# Patient Record
Sex: MALE | Race: WHITE | NOT HISPANIC OR LATINO | URBAN - METROPOLITAN AREA
[De-identification: names, ages, dates, MRNs, and addresses within clinical notes are randomized per-mention and may not be internally consistent; named-entity substitution may affect disease eponyms.]

---

## 2017-12-30 ENCOUNTER — EMERGENCY (EMERGENCY)
Facility: HOSPITAL | Age: 72
LOS: 1 days | Discharge: HOME | End: 2017-12-30
Attending: EMERGENCY MEDICINE | Admitting: EMERGENCY MEDICINE
Payer: MEDICARE

## 2017-12-30 ENCOUNTER — EMERGENCY (EMERGENCY)
Facility: HOSPITAL | Age: 72
LOS: 1 days | Discharge: ANOTHER HOSPITAL | End: 2017-12-30
Attending: EMERGENCY MEDICINE | Admitting: EMERGENCY MEDICINE
Payer: MEDICARE

## 2017-12-30 ENCOUNTER — INPATIENT (INPATIENT)
Facility: HOSPITAL | Age: 72
LOS: 3 days | Discharge: HOME | DRG: 243 | End: 2018-01-03
Attending: HOSPITALIST | Admitting: STUDENT IN AN ORGANIZED HEALTH CARE EDUCATION/TRAINING PROGRAM
Payer: MEDICARE

## 2017-12-30 VITALS
HEIGHT: 68 IN | RESPIRATION RATE: 20 BRPM | HEART RATE: 86 BPM | DIASTOLIC BLOOD PRESSURE: 82 MMHG | WEIGHT: 166.01 LBS | OXYGEN SATURATION: 98 % | SYSTOLIC BLOOD PRESSURE: 154 MMHG | TEMPERATURE: 98 F

## 2017-12-30 VITALS
RESPIRATION RATE: 14 BRPM | OXYGEN SATURATION: 99 % | SYSTOLIC BLOOD PRESSURE: 148 MMHG | HEART RATE: 82 BPM | TEMPERATURE: 97 F | DIASTOLIC BLOOD PRESSURE: 81 MMHG

## 2017-12-30 VITALS
WEIGHT: 265 LBS | HEART RATE: 42 BPM | HEIGHT: 68 IN | RESPIRATION RATE: 22 BRPM | OXYGEN SATURATION: 98 % | SYSTOLIC BLOOD PRESSURE: 182 MMHG | TEMPERATURE: 98 F | DIASTOLIC BLOOD PRESSURE: 90 MMHG

## 2017-12-30 VITALS
HEART RATE: 80 BPM | HEIGHT: 68 IN | RESPIRATION RATE: 20 BRPM | SYSTOLIC BLOOD PRESSURE: 171 MMHG | TEMPERATURE: 98 F | OXYGEN SATURATION: 98 % | DIASTOLIC BLOOD PRESSURE: 89 MMHG | WEIGHT: 268.96 LBS

## 2017-12-30 VITALS
RESPIRATION RATE: 14 BRPM | HEART RATE: 88 BPM | DIASTOLIC BLOOD PRESSURE: 59 MMHG | OXYGEN SATURATION: 99 % | TEMPERATURE: 98 F | SYSTOLIC BLOOD PRESSURE: 141 MMHG

## 2017-12-30 DIAGNOSIS — I10 ESSENTIAL (PRIMARY) HYPERTENSION: ICD-10-CM

## 2017-12-30 DIAGNOSIS — I25.10 ATHEROSCLEROTIC HEART DISEASE OF NATIVE CORONARY ARTERY WITHOUT ANGINA PECTORIS: ICD-10-CM

## 2017-12-30 DIAGNOSIS — Z95.0 PRESENCE OF CARDIAC PACEMAKER: Chronic | ICD-10-CM

## 2017-12-30 DIAGNOSIS — E78.5 HYPERLIPIDEMIA, UNSPECIFIED: ICD-10-CM

## 2017-12-30 DIAGNOSIS — R55 SYNCOPE AND COLLAPSE: ICD-10-CM

## 2017-12-30 DIAGNOSIS — Z95.3 PRESENCE OF XENOGENIC HEART VALVE: Chronic | ICD-10-CM

## 2017-12-30 DIAGNOSIS — T82.118A BREAKDOWN (MECHANICAL) OF OTHER CARDIAC ELECTRONIC DEVICE, INITIAL ENCOUNTER: ICD-10-CM

## 2017-12-30 DIAGNOSIS — I49.9 CARDIAC ARRHYTHMIA, UNSPECIFIED: ICD-10-CM

## 2017-12-30 DIAGNOSIS — I48.91 UNSPECIFIED ATRIAL FIBRILLATION: ICD-10-CM

## 2017-12-30 LAB
ALBUMIN SERPL ELPH-MCNC: 3.7 G/DL — SIGNIFICANT CHANGE UP (ref 3.3–5)
ALBUMIN SERPL ELPH-MCNC: 4.1 G/DL — SIGNIFICANT CHANGE UP (ref 3.3–5)
ALBUMIN SERPL ELPH-MCNC: 4.1 G/DL — SIGNIFICANT CHANGE UP (ref 3.3–5)
ALP SERPL-CCNC: 58 U/L — SIGNIFICANT CHANGE UP (ref 40–120)
ALP SERPL-CCNC: 60 U/L — SIGNIFICANT CHANGE UP (ref 30–120)
ALP SERPL-CCNC: 69 U/L — SIGNIFICANT CHANGE UP (ref 30–120)
ALT FLD-CCNC: 25 U/L RC — SIGNIFICANT CHANGE UP (ref 10–45)
ALT FLD-CCNC: 32 U/L DA — SIGNIFICANT CHANGE UP (ref 10–60)
ALT FLD-CCNC: 36 U/L DA — SIGNIFICANT CHANGE UP (ref 10–60)
ANION GAP SERPL CALC-SCNC: 10 MMOL/L — SIGNIFICANT CHANGE UP (ref 5–17)
ANION GAP SERPL CALC-SCNC: 11 MMOL/L — SIGNIFICANT CHANGE UP (ref 5–17)
ANION GAP SERPL CALC-SCNC: 16 MMOL/L — SIGNIFICANT CHANGE UP (ref 5–17)
ANISOCYTOSIS BLD QL: SLIGHT — SIGNIFICANT CHANGE UP
APTT BLD: 27.9 SEC — SIGNIFICANT CHANGE UP (ref 27.5–37.4)
APTT BLD: 28.8 SEC — SIGNIFICANT CHANGE UP (ref 27.5–37.4)
APTT BLD: 29.9 SEC — SIGNIFICANT CHANGE UP (ref 27.5–37.4)
AST SERPL-CCNC: 30 U/L — SIGNIFICANT CHANGE UP (ref 10–40)
AST SERPL-CCNC: 31 U/L — SIGNIFICANT CHANGE UP (ref 10–40)
AST SERPL-CCNC: 48 U/L — HIGH (ref 10–40)
BASOPHILS # BLD AUTO: 0.1 K/UL — SIGNIFICANT CHANGE UP (ref 0–0.2)
BASOPHILS NFR BLD AUTO: 0.6 % — SIGNIFICANT CHANGE UP (ref 0–2)
BASOPHILS NFR BLD AUTO: 0.9 % — SIGNIFICANT CHANGE UP (ref 0–2)
BASOPHILS NFR BLD AUTO: 1.6 % — SIGNIFICANT CHANGE UP (ref 0–2)
BILIRUB SERPL-MCNC: 0.6 MG/DL — SIGNIFICANT CHANGE UP (ref 0.2–1.2)
BILIRUB SERPL-MCNC: 0.7 MG/DL — SIGNIFICANT CHANGE UP (ref 0.2–1.2)
BILIRUB SERPL-MCNC: 0.8 MG/DL — SIGNIFICANT CHANGE UP (ref 0.2–1.2)
BLD GP AB SCN SERPL QL: NEGATIVE — SIGNIFICANT CHANGE UP
BUN SERPL-MCNC: 13 MG/DL — SIGNIFICANT CHANGE UP (ref 7–23)
BUN SERPL-MCNC: 14 MG/DL — SIGNIFICANT CHANGE UP (ref 7–23)
BUN SERPL-MCNC: 15 MG/DL — SIGNIFICANT CHANGE UP (ref 7–23)
CALCIUM SERPL-MCNC: 8.7 MG/DL — SIGNIFICANT CHANGE UP (ref 8.4–10.5)
CALCIUM SERPL-MCNC: 9.1 MG/DL — SIGNIFICANT CHANGE UP (ref 8.4–10.5)
CALCIUM SERPL-MCNC: 9.6 MG/DL — SIGNIFICANT CHANGE UP (ref 8.4–10.5)
CHLORIDE SERPL-SCNC: 105 MMOL/L — SIGNIFICANT CHANGE UP (ref 96–108)
CHLORIDE SERPL-SCNC: 105 MMOL/L — SIGNIFICANT CHANGE UP (ref 96–108)
CHLORIDE SERPL-SCNC: 106 MMOL/L — SIGNIFICANT CHANGE UP (ref 96–108)
CK MB BLD-MCNC: 4.6 % — HIGH (ref 0–3.5)
CK MB CFR SERPL CALC: 5.2 NG/ML — HIGH (ref 0–3.6)
CK MB CFR SERPL CALC: 7.1 NG/ML — HIGH (ref 0–6.7)
CK SERPL-CCNC: 156 U/L — SIGNIFICANT CHANGE UP (ref 30–200)
CO2 SERPL-SCNC: 21 MMOL/L — LOW (ref 22–31)
CO2 SERPL-SCNC: 22 MMOL/L — SIGNIFICANT CHANGE UP (ref 22–31)
CO2 SERPL-SCNC: 23 MMOL/L — SIGNIFICANT CHANGE UP (ref 22–31)
CREAT SERPL-MCNC: 1.03 MG/DL — SIGNIFICANT CHANGE UP (ref 0.5–1.3)
CREAT SERPL-MCNC: 1.15 MG/DL — SIGNIFICANT CHANGE UP (ref 0.5–1.3)
CREAT SERPL-MCNC: 1.2 MG/DL — SIGNIFICANT CHANGE UP (ref 0.5–1.3)
EOSINOPHIL # BLD AUTO: 0.1 K/UL — SIGNIFICANT CHANGE UP (ref 0–0.5)
EOSINOPHIL # BLD AUTO: 0.1 K/UL — SIGNIFICANT CHANGE UP (ref 0–0.5)
EOSINOPHIL # BLD AUTO: 0.2 K/UL — SIGNIFICANT CHANGE UP (ref 0–0.5)
EOSINOPHIL NFR BLD AUTO: 0.9 % — SIGNIFICANT CHANGE UP (ref 0–6)
EOSINOPHIL NFR BLD AUTO: 1.4 % — SIGNIFICANT CHANGE UP (ref 0–6)
EOSINOPHIL NFR BLD AUTO: 3.2 % — SIGNIFICANT CHANGE UP (ref 0–6)
GLUCOSE SERPL-MCNC: 109 MG/DL — HIGH (ref 70–99)
GLUCOSE SERPL-MCNC: 120 MG/DL — HIGH (ref 70–99)
GLUCOSE SERPL-MCNC: 144 MG/DL — HIGH (ref 70–99)
HBA1C BLD-MCNC: 5.5 % — SIGNIFICANT CHANGE UP (ref 4–5.6)
HCT VFR BLD CALC: 38.3 % — LOW (ref 39–50)
HCT VFR BLD CALC: 41 % — SIGNIFICANT CHANGE UP (ref 39–50)
HCT VFR BLD CALC: 43.6 % — SIGNIFICANT CHANGE UP (ref 39–50)
HGB BLD-MCNC: 13 G/DL — SIGNIFICANT CHANGE UP (ref 13–17)
HGB BLD-MCNC: 13.4 G/DL — SIGNIFICANT CHANGE UP (ref 13–17)
HGB BLD-MCNC: 13.8 G/DL — SIGNIFICANT CHANGE UP (ref 13–17)
INR BLD: 1.06 RATIO — SIGNIFICANT CHANGE UP (ref 0.88–1.16)
INR BLD: 1.09 RATIO — SIGNIFICANT CHANGE UP (ref 0.88–1.16)
INR BLD: 1.12 RATIO — SIGNIFICANT CHANGE UP (ref 0.88–1.16)
LYMPHOCYTES # BLD AUTO: 1.7 K/UL — SIGNIFICANT CHANGE UP (ref 1–3.3)
LYMPHOCYTES # BLD AUTO: 2.3 K/UL — SIGNIFICANT CHANGE UP (ref 1–3.3)
LYMPHOCYTES # BLD AUTO: 20.9 % — SIGNIFICANT CHANGE UP (ref 13–44)
LYMPHOCYTES # BLD AUTO: 3.4 K/UL — HIGH (ref 1–3.3)
LYMPHOCYTES # BLD AUTO: 33.1 % — SIGNIFICANT CHANGE UP (ref 13–44)
LYMPHOCYTES # BLD AUTO: 38 % — SIGNIFICANT CHANGE UP (ref 13–44)
MAGNESIUM SERPL-MCNC: 1.8 MG/DL — SIGNIFICANT CHANGE UP (ref 1.6–2.6)
MANUAL SMEAR VERIFICATION: SIGNIFICANT CHANGE UP
MCHC RBC-ENTMCNC: 22.4 PG — LOW (ref 27–34)
MCHC RBC-ENTMCNC: 22.4 PG — LOW (ref 27–34)
MCHC RBC-ENTMCNC: 24.9 PG — LOW (ref 27–34)
MCHC RBC-ENTMCNC: 31.7 GM/DL — LOW (ref 32–36)
MCHC RBC-ENTMCNC: 31.7 GM/DL — LOW (ref 32–36)
MCHC RBC-ENTMCNC: 34.9 GM/DL — SIGNIFICANT CHANGE UP (ref 32–36)
MCV RBC AUTO: 70.5 FL — LOW (ref 80–100)
MCV RBC AUTO: 70.6 FL — LOW (ref 80–100)
MCV RBC AUTO: 71.2 FL — LOW (ref 80–100)
MICROCYTES BLD QL: SLIGHT — SIGNIFICANT CHANGE UP
MONOCYTES # BLD AUTO: 0.5 K/UL — SIGNIFICANT CHANGE UP (ref 0–0.9)
MONOCYTES # BLD AUTO: 0.6 K/UL — SIGNIFICANT CHANGE UP (ref 0–0.9)
MONOCYTES # BLD AUTO: 0.7 K/UL — SIGNIFICANT CHANGE UP (ref 0–0.9)
MONOCYTES NFR BLD AUTO: 7.2 % — SIGNIFICANT CHANGE UP (ref 2–14)
MONOCYTES NFR BLD AUTO: 7.8 % — SIGNIFICANT CHANGE UP (ref 2–14)
MONOCYTES NFR BLD AUTO: 7.8 % — SIGNIFICANT CHANGE UP (ref 2–14)
NEUTROPHILS # BLD AUTO: 2.9 K/UL — SIGNIFICANT CHANGE UP (ref 1.8–7.4)
NEUTROPHILS # BLD AUTO: 5.8 K/UL — SIGNIFICANT CHANGE UP (ref 1.8–7.4)
NEUTROPHILS # BLD AUTO: 5.9 K/UL — SIGNIFICANT CHANGE UP (ref 1.8–7.4)
NEUTROPHILS NFR BLD AUTO: 49.5 % — SIGNIFICANT CHANGE UP (ref 43–77)
NEUTROPHILS NFR BLD AUTO: 57.4 % — SIGNIFICANT CHANGE UP (ref 43–77)
NEUTROPHILS NFR BLD AUTO: 69.8 % — SIGNIFICANT CHANGE UP (ref 43–77)
NT-PROBNP SERPL-SCNC: 328 PG/ML — HIGH (ref 0–125)
OVALOCYTES BLD QL SMEAR: SLIGHT — SIGNIFICANT CHANGE UP
PHOSPHATE SERPL-MCNC: 2.2 MG/DL — LOW (ref 2.5–4.5)
PLAT MORPH BLD: NORMAL — SIGNIFICANT CHANGE UP
PLAT MORPH BLD: NORMAL — SIGNIFICANT CHANGE UP
PLATELET # BLD AUTO: 105 K/UL — LOW (ref 150–400)
PLATELET # BLD AUTO: 115 K/UL — LOW (ref 150–400)
PLATELET # BLD AUTO: 133 K/UL — LOW (ref 150–400)
POTASSIUM SERPL-MCNC: 3.6 MMOL/L — SIGNIFICANT CHANGE UP (ref 3.5–5.3)
POTASSIUM SERPL-MCNC: 4.4 MMOL/L — SIGNIFICANT CHANGE UP (ref 3.5–5.3)
POTASSIUM SERPL-MCNC: 4.8 MMOL/L — SIGNIFICANT CHANGE UP (ref 3.5–5.3)
POTASSIUM SERPL-SCNC: 3.6 MMOL/L — SIGNIFICANT CHANGE UP (ref 3.5–5.3)
POTASSIUM SERPL-SCNC: 4.4 MMOL/L — SIGNIFICANT CHANGE UP (ref 3.5–5.3)
POTASSIUM SERPL-SCNC: 4.8 MMOL/L — SIGNIFICANT CHANGE UP (ref 3.5–5.3)
PROT SERPL-MCNC: 7.8 G/DL — SIGNIFICANT CHANGE UP (ref 6–8.3)
PROT SERPL-MCNC: 7.9 G/DL — SIGNIFICANT CHANGE UP (ref 6–8.3)
PROT SERPL-MCNC: 8.4 G/DL — HIGH (ref 6–8.3)
PROTHROM AB SERPL-ACNC: 11.6 SEC — SIGNIFICANT CHANGE UP (ref 9.8–12.7)
PROTHROM AB SERPL-ACNC: 11.9 SEC — SIGNIFICANT CHANGE UP (ref 9.8–12.7)
PROTHROM AB SERPL-ACNC: 12.2 SEC — SIGNIFICANT CHANGE UP (ref 9.8–12.7)
RBC # BLD: 5.38 M/UL — SIGNIFICANT CHANGE UP (ref 4.2–5.8)
RBC # BLD: 5.81 M/UL — HIGH (ref 4.2–5.8)
RBC # BLD: 6.18 M/UL — HIGH (ref 4.2–5.8)
RBC # FLD: 13.7 % — SIGNIFICANT CHANGE UP (ref 10.3–14.5)
RBC # FLD: 13.8 % — SIGNIFICANT CHANGE UP (ref 10.3–14.5)
RBC # FLD: 14 % — SIGNIFICANT CHANGE UP (ref 10.3–14.5)
RBC BLD AUTO: ABNORMAL
RBC BLD AUTO: SIGNIFICANT CHANGE UP
RH IG SCN BLD-IMP: POSITIVE — SIGNIFICANT CHANGE UP
SODIUM SERPL-SCNC: 137 MMOL/L — SIGNIFICANT CHANGE UP (ref 135–145)
SODIUM SERPL-SCNC: 139 MMOL/L — SIGNIFICANT CHANGE UP (ref 135–145)
SODIUM SERPL-SCNC: 143 MMOL/L — SIGNIFICANT CHANGE UP (ref 135–145)
TROPONIN I SERPL-MCNC: 0.03 NG/ML — SIGNIFICANT CHANGE UP (ref 0.02–0.06)
TROPONIN T SERPL-MCNC: <0.01 NG/ML — SIGNIFICANT CHANGE UP (ref 0–0.06)
TSH SERPL-MCNC: 2.7 UIU/ML — SIGNIFICANT CHANGE UP (ref 0.27–4.2)
WBC # BLD: 10.2 K/UL — SIGNIFICANT CHANGE UP (ref 3.8–10.5)
WBC # BLD: 5.9 K/UL — SIGNIFICANT CHANGE UP (ref 3.8–10.5)
WBC # BLD: 8.3 K/UL — SIGNIFICANT CHANGE UP (ref 3.8–10.5)
WBC # FLD AUTO: 10.2 K/UL — SIGNIFICANT CHANGE UP (ref 3.8–10.5)
WBC # FLD AUTO: 5.9 K/UL — SIGNIFICANT CHANGE UP (ref 3.8–10.5)
WBC # FLD AUTO: 8.3 K/UL — SIGNIFICANT CHANGE UP (ref 3.8–10.5)

## 2017-12-30 PROCEDURE — 93010 ELECTROCARDIOGRAM REPORT: CPT

## 2017-12-30 PROCEDURE — 71045 X-RAY EXAM CHEST 1 VIEW: CPT

## 2017-12-30 PROCEDURE — 71010: CPT | Mod: 26

## 2017-12-30 PROCEDURE — 70486 CT MAXILLOFACIAL W/O DYE: CPT

## 2017-12-30 PROCEDURE — 82553 CREATINE MB FRACTION: CPT

## 2017-12-30 PROCEDURE — 72125 CT NECK SPINE W/O DYE: CPT

## 2017-12-30 PROCEDURE — 80053 COMPREHEN METABOLIC PANEL: CPT

## 2017-12-30 PROCEDURE — 99291 CRITICAL CARE FIRST HOUR: CPT

## 2017-12-30 PROCEDURE — 83880 ASSAY OF NATRIURETIC PEPTIDE: CPT

## 2017-12-30 PROCEDURE — 85027 COMPLETE CBC AUTOMATED: CPT

## 2017-12-30 PROCEDURE — 85730 THROMBOPLASTIN TIME PARTIAL: CPT

## 2017-12-30 PROCEDURE — 36415 COLL VENOUS BLD VENIPUNCTURE: CPT

## 2017-12-30 PROCEDURE — 71010: CPT | Mod: 77,26

## 2017-12-30 PROCEDURE — 70450 CT HEAD/BRAIN W/O DYE: CPT

## 2017-12-30 PROCEDURE — 70450 CT HEAD/BRAIN W/O DYE: CPT | Mod: 26

## 2017-12-30 PROCEDURE — 72125 CT NECK SPINE W/O DYE: CPT | Mod: 26

## 2017-12-30 PROCEDURE — 85610 PROTHROMBIN TIME: CPT

## 2017-12-30 PROCEDURE — 84484 ASSAY OF TROPONIN QUANT: CPT

## 2017-12-30 PROCEDURE — 99284 EMERGENCY DEPT VISIT MOD MDM: CPT | Mod: 25

## 2017-12-30 PROCEDURE — 70486 CT MAXILLOFACIAL W/O DYE: CPT | Mod: 26

## 2017-12-30 PROCEDURE — 99284 EMERGENCY DEPT VISIT MOD MDM: CPT

## 2017-12-30 RX ORDER — METOPROLOL TARTRATE 50 MG
50 TABLET ORAL
Qty: 0 | Refills: 0 | Status: DISCONTINUED | OUTPATIENT
Start: 2017-12-30 | End: 2017-12-30

## 2017-12-30 RX ORDER — THIAMINE MONONITRATE (VIT B1) 100 MG
100 TABLET ORAL DAILY
Qty: 0 | Refills: 0 | Status: DISCONTINUED | OUTPATIENT
Start: 2017-12-30 | End: 2018-01-03

## 2017-12-30 RX ORDER — ASPIRIN/CALCIUM CARB/MAGNESIUM 324 MG
81 TABLET ORAL DAILY
Qty: 0 | Refills: 0 | Status: DISCONTINUED | OUTPATIENT
Start: 2017-12-30 | End: 2018-01-03

## 2017-12-30 RX ORDER — ATORVASTATIN CALCIUM 80 MG/1
40 TABLET, FILM COATED ORAL AT BEDTIME
Qty: 0 | Refills: 0 | Status: DISCONTINUED | OUTPATIENT
Start: 2017-12-30 | End: 2018-01-03

## 2017-12-30 RX ORDER — SPIRONOLACTONE 25 MG/1
12.5 TABLET, FILM COATED ORAL DAILY
Qty: 0 | Refills: 0 | Status: DISCONTINUED | OUTPATIENT
Start: 2017-12-30 | End: 2018-01-03

## 2017-12-30 RX ORDER — SPIRONOLACTONE 25 MG/1
12.5 TABLET, FILM COATED ORAL DAILY
Qty: 0 | Refills: 0 | Status: DISCONTINUED | OUTPATIENT
Start: 2017-12-30 | End: 2017-12-30

## 2017-12-30 RX ORDER — DOCUSATE SODIUM 100 MG
100 CAPSULE ORAL THREE TIMES A DAY
Qty: 0 | Refills: 0 | Status: DISCONTINUED | OUTPATIENT
Start: 2017-12-30 | End: 2018-01-03

## 2017-12-30 RX ORDER — SODIUM CHLORIDE 9 MG/ML
3 INJECTION INTRAMUSCULAR; INTRAVENOUS; SUBCUTANEOUS ONCE
Qty: 0 | Refills: 0 | Status: DISCONTINUED | OUTPATIENT
Start: 2017-12-30 | End: 2018-01-03

## 2017-12-30 RX ORDER — FOLIC ACID 0.8 MG
1 TABLET ORAL DAILY
Qty: 0 | Refills: 0 | Status: DISCONTINUED | OUTPATIENT
Start: 2017-12-30 | End: 2018-01-03
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
## 2017-12-30 NOTE — ED PROVIDER NOTE - MEDICAL DECISION MAKING DETAILS
evaluate the etiology of frequent episode of syncope near syncope corollate with history and lab test

## 2017-12-30 NOTE — CONSULT NOTE ADULT - SUBJECTIVE AND OBJECTIVE BOX
History of Present Illness: The patient is a 72 year old male with a history of HTN, AVR, dual chamber PPM who presents with syncope. He fell and hit his head three days ago. CT head and neck were negative for acute pathology. While in bathroom here, had an episode of syncope. ECG done revealing paroxysmal CHB. He had another episode of syncope about one week ago. He has also been feeling intermittent dizziness over the past few weeks. His device wirelessly send interrogation info, no known issues with pacemaker. No chest pain, shortness of breath, LE edema.    Past Medical/Surgical History:  HTN, AVR, dual chamber PPM     Medications:  · 	Lopressor 50 mg oral tablet: 1 tab(s) orally 2 times a day  · 	Lipitor 10 mg oral tablet: 1 tab(s) orally once a day  · 	Protonix 40 mg oral delayed release tablet: 1 tab(s) orally once a day  · 	folic acid 1 mg oral tablet: 1 tab(s) orally once a day  · 	aspirin 81 mg oral delayed release tablet: 1 tab(s) orally once a day  · 	ZyrTEC 10 mg oral tablet: 1 tab(s) orally once a day  · 	Colace 100 mg oral capsule: orally 3 times a day  · 	Aldactone: 12.5 milligram(s) orally once a day  · 	Tofranil 25 mg oral tablet: orally once a day   · 	Vitamin B1 100 mg oral tablet: 1 tab(s) orally once a day    Family History: Non-contributory family history of premature cardiovascular atherosclerotic disease    Social History: No tobacco, alcohol or drug use    Review of Systems:  General: No fevers, chills, weight loss or gain  Skin: No rashes, color changes  Cardiovascular: No chest pain, orthopnea  Respiratory: No shortness of breath, cough  Gastrointestinal: No nausea, abdominal pain  Genitourinary: No incontinence, pain with urination  Musculoskeletal: No pain, swelling, decreased range of motion  Neurological: No headache, weakness  Psychiatric: No depression, anxiety  Endocrine: No weight loss or gain, increased thirst  All other systems are comprehensively negative.    Physical Exam:  Vitals:        Vital Signs Last 24 Hrs  T(C): 36.6 (30 Dec 2017 11:46), Max: 36.6 (30 Dec 2017 11:46)  T(F): 97.9 (30 Dec 2017 11:46), Max: 97.9 (30 Dec 2017 11:46)  HR: 75 (30 Dec 2017 12:01) (42 - 86)  BP: 162/90 (30 Dec 2017 12:01) (148/81 - 184/90)  BP(mean): --  RR: 14 (30 Dec 2017 12:01) (14 - 22)  SpO2: 99% (30 Dec 2017 12:01) (97% - 99%)  General: NAD  HEENT: MMM  Neck: No JVD, no carotid bruit  Lungs: CTAB  CV: RRR, nl S1/S2, 1/6 systolic murmur  Abdomen: S/NT/ND, +BS  Extremities: No LE edema, no cyanosis  Neuro: AAOx3, non-focal  Skin: No rash    Labs:                        13.8   10.2  )-----------( 133      ( 30 Dec 2017 12:18 )             43.6     12-30    139  |  106  |  14  ----------------------------<  120<H>  3.6   |  23  |  1.20    Ca    9.1      30 Dec 2017 12:18    TPro  8.4<H>  /  Alb  4.1  /  TBili  0.6  /  DBili  x   /  AST  31  /  ALT  36  /  AlkPhos  69  12-30    CARDIAC MARKERS ( 30 Dec 2017 12:18 )  .025 ng/mL / x     / x     / x     / 5.2 ng/mL      PT/INR - ( 30 Dec 2017 12:18 )   PT: 11.9 sec;   INR: 1.09 ratio         PTT - ( 30 Dec 2017 12:18 )  PTT:28.8 sec    ECG:   #1: NSR, LAFB, RBBB  #2: NSR, CHB, ventricular escape
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
## 2017-12-30 NOTE — ED PROVIDER NOTE - CRITICAL CARE PROVIDED
interpretation of diagnostic studies/consultation with other physicians/documentation/direct patient care (not related to procedure)/additional history taking/consult w/ pt's family directly relating to pts condition

## 2017-12-30 NOTE — ED PROVIDER NOTE - OBJECTIVE STATEMENT
73 y/o M seen here today for fall 3 days ago. Pt fell and hit head his head and was c/o bruising to the face. CT was performed today, negative. Pt was discharged but as per wife, pt passed out coming out of the bathroom in [facility]. Pt had an open heart surgery 2 years ago, with valve repair and pacemaker placement. Pt takes baby aspirin. 73 y/o M seen here today for fall 3 days ago. Pt fell and hit head his head and was c/o bruising to the face. CT was performed today, negative. Pt was discharged but as per wife, pt passed out coming out of the bathroom in [facility]. Pt had an open heart surgery 2 years ago, with aortic valve repair and pacemaker placement. Pt takes baby aspirin.

## 2017-12-30 NOTE — CHART NOTE - NSCHARTNOTEFT_GEN_A_CORE
====================  CCU MIDNIGHT ROUNDS  ====================    MARIA G MCFADDEN  [id number]  Patient is a 72y old  Male who presents with a chief complaint of     ====================  SUMMARY:  ====================  72M w/ hx of Ao Valve bovine replacement c/b heart block requiring [facility] PPM (2015), CAD s/p FELIPA to LAD(2009), HTN, & HLD initially presented w/ syncope to osh and found to have comple av dissociation w/ RV lead fx & transferred to [facility] CCU for management and TVP (placed 12/30)      ====================  NEW EVENTS:  ====================    TVP placed by fellow tonight    ====================  VITALS (Last 12 hrs):  ====================    T(C): 36.7 (12-30-17 @ 19:00), Max: 36.7 (12-30-17 @ 17:15)  T(F): 98 (12-30-17 @ 19:00), Max: 98.1 (12-30-17 @ 17:15)  HR: 70 (12-30-17 @ 20:00) (42 - 88)  BP: 147/78 (12-30-17 @ 20:00) (141/59 - 184/90)  BP(mean): 94 (12-30-17 @ 20:00) (94 - 132)  ABP: --  ABP(mean): --  RR: 20 (12-30-17 @ 20:00) (14 - 24)  SpO2: 100% (12-30-17 @ 20:00) (97% - 100%)  Wt(kg): --  CVP(mm Hg): --  CVP(cm H2O): --  CO: --  CI: --  PA: --  PA(mean): --  PCWP: --  SVR: --  PVR: --    I&O's Summary      ====================  NEW LABS:  ====================                          13.4   8.3   )-----------( 115      ( 30 Dec 2017 18:16 )             38.3     12-30    143  |  105  |  13  ----------------------------<  109<H>  4.4   |  22  |  1.03    Ca    9.6      30 Dec 2017 18:17  Phos  2.2     12-30  Mg     1.8     12-30    TPro  7.8  /  Alb  4.1  /  TBili  0.8  /  DBili  x   /  AST  30  /  ALT  25  /  AlkPhos  58  12-30    PT/INR - ( 30 Dec 2017 18:16 )   PT: 12.2 sec;   INR: 1.12 ratio         PTT - ( 30 Dec 2017 18:16 )  PTT:29.9 sec  Troponin T, Serum: <0.01 ng/mL (12-30-17 @ 18:17)  Creatine Kinase, Serum: 156 U/L (12-30-17 @ 18:17)    CKMB Units: 7.1 ng/mL (12-30 @ 18:17)  CKMB Units: 5.2 ng/mL (12-30 @ 12:18)      ====================  PLAN:  ====================  # Complete AV dissociation 2/2 Ao valve w/ PPM RV lead fx  - TVP placed tonight by fellow. Per fellow d/w EP attending and TVP set to rate of 60 BPM set to rate of 40BPM  - hold antinodal agents, hold imipramine held.  -CXR ordered to check TVP placement  - will need AM TTE to evaluate EF  #CAD s/p stent to LAD 2009  - c/w asa/atorvastatin  #HTN  - c/w spironolactone--unclear if has HF history. will need TTE in am          Danielito Poole M.D. PGY-2  CCU Resident [id number]

## 2017-12-30 NOTE — H&P ADULT - PROBLEM SELECTOR PLAN 1
Interrogated [facility] PPM at OSH found to be in failure, high suspicion cause of syncope/collapse and nhung cardia.   - Transvenous pacemaker will be placed at bedside.   -EP consulted and aware, eventual replacement of lead/or PPM  -Monitor on tele   -check CMP, CBC, Mg, Phos, Cardiac enzymes, TSH for alternative causes of syncope  -Will obtain CT head as pt sustained second collapse once TVP in place. Interrogated [facility] PPM at OSH found to be in failure, high suspicion cause of syncope/collapse and nhung cardia.   - Transvenous pacemaker will be placed at bedside.   -EP consulted and aware, eventual replacement of lead/or PPM  -obtain EKG now and daily  -Monitor on tele   -check CMP, CBC, Mg, Phos, Cardiac enzymes, TSH for alternative causes of syncope  -Will obtain CT head as pt sustained second collapse once TVP in place.

## 2017-12-30 NOTE — H&P ADULT - PROBLEM SELECTOR PLAN 2
Interrogated [facility] PPM at OSH found to be in failure, high suspicion cause of syncope/collapse and nhung cardia.   - Transvenous pacemaker will be placed at bedside.  - EP to evaluate for eventual replacement/repair Interrogated [facility] PPM at OSH found to be in failure, high suspicion cause of syncope/collapse and bradycardia.   - Transvenous pacemaker will be placed at bedside.  - EP to evaluate for eventual replacement/repair
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
## 2017-12-30 NOTE — H&P ADULT - PROBLEM SELECTOR PLAN 3
CKMB 5.2 pro- Troponin I 328  -Repeat cardiac enzymes  -c/w lopressor 50mG BID  -c/w ASA 81mG daily  -Start Atorvastatin 40mG daily

## 2017-12-30 NOTE — CONSULT NOTE ADULT - ASSESSMENT
The patient is a 72 year old male with a history of HTN, AVR, dual chamber PPM who presents with syncope in the setting of pacemaker malfunction.    Plan:  - CXR done as portable, no obvious abnormalities seen on that  - [facility] rep called but interrogation not done as of yet  - Transcutaneous pacer capturing, set as back-up at 56 bpm  - Currently in NSR with normal AV conduction  - Plan for transfer to [facility] for further work-up and possible lead reposition vs. pacemaker change The patient is a 72 year old male with a history of HTN, AVR, dual chamber PPM who presents with syncope in the setting of pacemaker malfunction.    Plan:  - CXR done as portable, no obvious abnormalities seen on that  - [facility] rep called but interrogation not done as of yet  - Transcutaneous pacer capturing, set as back-up at 56 bpm  - Currently in NSR with normal AV conduction  - Plan for transfer to [facility] for further work-up and possible lead reposition vs. lead change    ADDENDUM:  Device interrogated, appeared to be oversensing, settings changed. Threshold significantly elevated, suspicious for lead fracture. The patient is a 72 year old male with a history of HTN, AVR, dual chamber PPM who presents with syncope in the setting of pacemaker malfunction.    Plan:  - CXR done as portable, no obvious abnormalities seen on that  - [facility] rep called but interrogation not done as of yet  - Transcutaneous pacer capturing, set as back-up at 56 bpm  - Currently in NSR with normal AV conduction  - Plan for transfer to [facility] for further work-up and possible lead revision    ADDENDUM:  - Recurrent episode of CHB when patient sat up, short syncope/cardiac arrest with 5 seconds of CPR, HR eventually picked up  - Device interrogated, appeared to be oversensing, settings changed. Threshold significantly elevated, suspicious for lead fracture.

## 2017-12-31 LAB
ALBUMIN SERPL ELPH-MCNC: 3.8 G/DL — SIGNIFICANT CHANGE UP (ref 3.3–5)
ALBUMIN SERPL ELPH-MCNC: 4 G/DL — SIGNIFICANT CHANGE UP (ref 3.3–5)
ALP SERPL-CCNC: 55 U/L — SIGNIFICANT CHANGE UP (ref 40–120)
ALP SERPL-CCNC: 57 U/L — SIGNIFICANT CHANGE UP (ref 40–120)
ALT FLD-CCNC: 22 U/L RC — SIGNIFICANT CHANGE UP (ref 10–45)
ALT FLD-CCNC: 24 U/L RC — SIGNIFICANT CHANGE UP (ref 10–45)
ANION GAP SERPL CALC-SCNC: 15 MMOL/L — SIGNIFICANT CHANGE UP (ref 5–17)
ANION GAP SERPL CALC-SCNC: 17 MMOL/L — SIGNIFICANT CHANGE UP (ref 5–17)
APTT BLD: 28.8 SEC — SIGNIFICANT CHANGE UP (ref 27.5–37.4)
APTT BLD: 29.3 SEC — SIGNIFICANT CHANGE UP (ref 27.5–37.4)
AST SERPL-CCNC: 23 U/L — SIGNIFICANT CHANGE UP (ref 10–40)
AST SERPL-CCNC: 26 U/L — SIGNIFICANT CHANGE UP (ref 10–40)
BASOPHILS # BLD AUTO: 0 K/UL — SIGNIFICANT CHANGE UP (ref 0–0.2)
BASOPHILS # BLD AUTO: 0.1 K/UL — SIGNIFICANT CHANGE UP (ref 0–0.2)
BASOPHILS NFR BLD AUTO: 0.4 % — SIGNIFICANT CHANGE UP (ref 0–2)
BASOPHILS NFR BLD AUTO: 0.9 % — SIGNIFICANT CHANGE UP (ref 0–2)
BILIRUB SERPL-MCNC: 0.9 MG/DL — SIGNIFICANT CHANGE UP (ref 0.2–1.2)
BILIRUB SERPL-MCNC: 1.1 MG/DL — SIGNIFICANT CHANGE UP (ref 0.2–1.2)
BUN SERPL-MCNC: 12 MG/DL — SIGNIFICANT CHANGE UP (ref 7–23)
BUN SERPL-MCNC: 12 MG/DL — SIGNIFICANT CHANGE UP (ref 7–23)
CALCIUM SERPL-MCNC: 9 MG/DL — SIGNIFICANT CHANGE UP (ref 8.4–10.5)
CALCIUM SERPL-MCNC: 9.5 MG/DL — SIGNIFICANT CHANGE UP (ref 8.4–10.5)
CHLORIDE SERPL-SCNC: 103 MMOL/L — SIGNIFICANT CHANGE UP (ref 96–108)
CHLORIDE SERPL-SCNC: 105 MMOL/L — SIGNIFICANT CHANGE UP (ref 96–108)
CHOLEST SERPL-MCNC: 171 MG/DL — SIGNIFICANT CHANGE UP (ref 10–199)
CHOLEST SERPL-MCNC: 185 MG/DL — SIGNIFICANT CHANGE UP (ref 10–199)
CO2 SERPL-SCNC: 21 MMOL/L — LOW (ref 22–31)
CO2 SERPL-SCNC: 22 MMOL/L — SIGNIFICANT CHANGE UP (ref 22–31)
CREAT SERPL-MCNC: 1.03 MG/DL — SIGNIFICANT CHANGE UP (ref 0.5–1.3)
CREAT SERPL-MCNC: 1.05 MG/DL — SIGNIFICANT CHANGE UP (ref 0.5–1.3)
EOSINOPHIL # BLD AUTO: 0.1 K/UL — SIGNIFICANT CHANGE UP (ref 0–0.5)
EOSINOPHIL # BLD AUTO: 0.2 K/UL — SIGNIFICANT CHANGE UP (ref 0–0.5)
EOSINOPHIL NFR BLD AUTO: 1.3 % — SIGNIFICANT CHANGE UP (ref 0–6)
EOSINOPHIL NFR BLD AUTO: 1.8 % — SIGNIFICANT CHANGE UP (ref 0–6)
GLUCOSE SERPL-MCNC: 105 MG/DL — HIGH (ref 70–99)
GLUCOSE SERPL-MCNC: 117 MG/DL — HIGH (ref 70–99)
HBA1C BLD-MCNC: 5.4 % — SIGNIFICANT CHANGE UP (ref 4–5.6)
HCT VFR BLD CALC: 38 % — LOW (ref 39–50)
HCT VFR BLD CALC: 38.2 % — LOW (ref 39–50)
HDLC SERPL-MCNC: 46 MG/DL — SIGNIFICANT CHANGE UP (ref 40–125)
HDLC SERPL-MCNC: 51 MG/DL — SIGNIFICANT CHANGE UP (ref 40–125)
HGB BLD-MCNC: 12.8 G/DL — LOW (ref 13–17)
HGB BLD-MCNC: 12.9 G/DL — LOW (ref 13–17)
INR BLD: 1.16 RATIO — SIGNIFICANT CHANGE UP (ref 0.88–1.16)
INR BLD: 1.21 RATIO — HIGH (ref 0.88–1.16)
LIPID PNL WITH DIRECT LDL SERPL: 107 MG/DL — SIGNIFICANT CHANGE UP
LIPID PNL WITH DIRECT LDL SERPL: 93 MG/DL — SIGNIFICANT CHANGE UP
LYMPHOCYTES # BLD AUTO: 2.6 K/UL — SIGNIFICANT CHANGE UP (ref 1–3.3)
LYMPHOCYTES # BLD AUTO: 2.7 K/UL — SIGNIFICANT CHANGE UP (ref 1–3.3)
LYMPHOCYTES # BLD AUTO: 29.6 % — SIGNIFICANT CHANGE UP (ref 13–44)
LYMPHOCYTES # BLD AUTO: 31.1 % — SIGNIFICANT CHANGE UP (ref 13–44)
MAGNESIUM SERPL-MCNC: 1.8 MG/DL — SIGNIFICANT CHANGE UP (ref 1.6–2.6)
MAGNESIUM SERPL-MCNC: 2.2 MG/DL — SIGNIFICANT CHANGE UP (ref 1.6–2.6)
MCHC RBC-ENTMCNC: 23.7 PG — LOW (ref 27–34)
MCHC RBC-ENTMCNC: 23.8 PG — LOW (ref 27–34)
MCHC RBC-ENTMCNC: 33.5 GM/DL — SIGNIFICANT CHANGE UP (ref 32–36)
MCHC RBC-ENTMCNC: 33.8 GM/DL — SIGNIFICANT CHANGE UP (ref 32–36)
MCV RBC AUTO: 70.6 FL — LOW (ref 80–100)
MCV RBC AUTO: 70.8 FL — LOW (ref 80–100)
MONOCYTES # BLD AUTO: 0.8 K/UL — SIGNIFICANT CHANGE UP (ref 0–0.9)
MONOCYTES # BLD AUTO: 0.8 K/UL — SIGNIFICANT CHANGE UP (ref 0–0.9)
MONOCYTES NFR BLD AUTO: 8.9 % — SIGNIFICANT CHANGE UP (ref 2–14)
MONOCYTES NFR BLD AUTO: 9.8 % — SIGNIFICANT CHANGE UP (ref 2–14)
NEUTROPHILS # BLD AUTO: 5 K/UL — SIGNIFICANT CHANGE UP (ref 1.8–7.4)
NEUTROPHILS # BLD AUTO: 5 K/UL — SIGNIFICANT CHANGE UP (ref 1.8–7.4)
NEUTROPHILS NFR BLD AUTO: 57.5 % — SIGNIFICANT CHANGE UP (ref 43–77)
NEUTROPHILS NFR BLD AUTO: 58.8 % — SIGNIFICANT CHANGE UP (ref 43–77)
PHOSPHATE SERPL-MCNC: 4.8 MG/DL — HIGH (ref 2.5–4.5)
PLATELET # BLD AUTO: 109 K/UL — LOW (ref 150–400)
PLATELET # BLD AUTO: 114 K/UL — LOW (ref 150–400)
POTASSIUM SERPL-MCNC: 3.9 MMOL/L — SIGNIFICANT CHANGE UP (ref 3.5–5.3)
POTASSIUM SERPL-MCNC: 4.1 MMOL/L — SIGNIFICANT CHANGE UP (ref 3.5–5.3)
POTASSIUM SERPL-SCNC: 3.9 MMOL/L — SIGNIFICANT CHANGE UP (ref 3.5–5.3)
POTASSIUM SERPL-SCNC: 4.1 MMOL/L — SIGNIFICANT CHANGE UP (ref 3.5–5.3)
PROT SERPL-MCNC: 7.2 G/DL — SIGNIFICANT CHANGE UP (ref 6–8.3)
PROT SERPL-MCNC: 7.4 G/DL — SIGNIFICANT CHANGE UP (ref 6–8.3)
PROTHROM AB SERPL-ACNC: 12.7 SEC — SIGNIFICANT CHANGE UP (ref 9.8–12.7)
PROTHROM AB SERPL-ACNC: 13.2 SEC — HIGH (ref 9.8–12.7)
RBC # BLD: 5.37 M/UL — SIGNIFICANT CHANGE UP (ref 4.2–5.8)
RBC # BLD: 5.42 M/UL — SIGNIFICANT CHANGE UP (ref 4.2–5.8)
RBC # FLD: 13.7 % — SIGNIFICANT CHANGE UP (ref 10.3–14.5)
RBC # FLD: 13.7 % — SIGNIFICANT CHANGE UP (ref 10.3–14.5)
RH IG SCN BLD-IMP: POSITIVE — SIGNIFICANT CHANGE UP
SODIUM SERPL-SCNC: 140 MMOL/L — SIGNIFICANT CHANGE UP (ref 135–145)
SODIUM SERPL-SCNC: 143 MMOL/L — SIGNIFICANT CHANGE UP (ref 135–145)
TOTAL CHOLESTEROL/HDL RATIO MEASUREMENT: 3.6 RATIO — SIGNIFICANT CHANGE UP (ref 3.4–9.6)
TOTAL CHOLESTEROL/HDL RATIO MEASUREMENT: 3.7 RATIO — SIGNIFICANT CHANGE UP (ref 3.4–9.6)
TRIGL SERPL-MCNC: 135 MG/DL — SIGNIFICANT CHANGE UP (ref 10–149)
TRIGL SERPL-MCNC: 158 MG/DL — HIGH (ref 10–149)
TSH SERPL-MCNC: 3.63 UIU/ML — SIGNIFICANT CHANGE UP (ref 0.27–4.2)
WBC # BLD: 8.6 K/UL — SIGNIFICANT CHANGE UP (ref 3.8–10.5)
WBC # BLD: 8.7 K/UL — SIGNIFICANT CHANGE UP (ref 3.8–10.5)
WBC # FLD AUTO: 8.6 K/UL — SIGNIFICANT CHANGE UP (ref 3.8–10.5)
WBC # FLD AUTO: 8.7 K/UL — SIGNIFICANT CHANGE UP (ref 3.8–10.5)

## 2017-12-31 PROCEDURE — 70450 CT HEAD/BRAIN W/O DYE: CPT | Mod: 26

## 2017-12-31 PROCEDURE — 99291 CRITICAL CARE FIRST HOUR: CPT

## 2017-12-31 PROCEDURE — 99233 SBSQ HOSP IP/OBS HIGH 50: CPT

## 2017-12-31 PROCEDURE — 71010: CPT | Mod: 26

## 2017-12-31 PROCEDURE — 93010 ELECTROCARDIOGRAM REPORT: CPT | Mod: 76

## 2017-12-31 RX ORDER — BACITRACIN ZINC 500 UNIT/G
1 OINTMENT IN PACKET (EA) TOPICAL
Qty: 0 | Refills: 0 | Status: DISCONTINUED | OUTPATIENT
Start: 2017-12-31 | End: 2018-01-03

## 2017-12-31 RX ORDER — POLYETHYLENE GLYCOL 3350 17 G/17G
17 POWDER, FOR SOLUTION ORAL AT BEDTIME
Qty: 0 | Refills: 0 | Status: COMPLETED | OUTPATIENT
Start: 2017-12-31 | End: 2017-12-31

## 2017-12-31 RX ORDER — POTASSIUM CHLORIDE 20 MEQ
20 PACKET (EA) ORAL ONCE
Qty: 0 | Refills: 0 | Status: COMPLETED | OUTPATIENT
Start: 2017-12-31 | End: 2017-12-31

## 2017-12-31 RX ORDER — SENNA PLUS 8.6 MG/1
2 TABLET ORAL AT BEDTIME
Qty: 0 | Refills: 0 | Status: DISCONTINUED | OUTPATIENT
Start: 2017-12-31 | End: 2018-01-03

## 2017-12-31 RX ORDER — MAGNESIUM SULFATE 500 MG/ML
1 VIAL (ML) INJECTION ONCE
Qty: 0 | Refills: 0 | Status: COMPLETED | OUTPATIENT
Start: 2017-12-31 | End: 2017-12-31

## 2017-12-31 RX ADMIN — Medication 100 GRAM(S): at 02:27

## 2017-12-31 RX ADMIN — Medication 100 MILLIGRAM(S): at 05:45

## 2017-12-31 RX ADMIN — Medication 81 MILLIGRAM(S): at 12:13

## 2017-12-31 RX ADMIN — Medication 20 MILLIEQUIVALENT(S): at 06:20

## 2017-12-31 RX ADMIN — ATORVASTATIN CALCIUM 40 MILLIGRAM(S): 80 TABLET, FILM COATED ORAL at 21:43

## 2017-12-31 RX ADMIN — Medication 1 APPLICATION(S): at 16:25

## 2017-12-31 RX ADMIN — Medication 100 MILLIGRAM(S): at 12:14

## 2017-12-31 RX ADMIN — SPIRONOLACTONE 12.5 MILLIGRAM(S): 25 TABLET, FILM COATED ORAL at 05:45

## 2017-12-31 RX ADMIN — Medication 100 MILLIGRAM(S): at 21:43

## 2017-12-31 RX ADMIN — ATORVASTATIN CALCIUM 40 MILLIGRAM(S): 80 TABLET, FILM COATED ORAL at 00:01

## 2017-12-31 RX ADMIN — Medication 100 MILLIGRAM(S): at 00:00

## 2017-12-31 RX ADMIN — Medication 1 MILLIGRAM(S): at 12:14

## 2017-12-31 RX ADMIN — SENNA PLUS 2 TABLET(S): 8.6 TABLET ORAL at 21:43

## 2017-12-31 RX ADMIN — POLYETHYLENE GLYCOL 3350 17 GRAM(S): 17 POWDER, FOR SOLUTION ORAL at 21:43

## 2017-12-31 NOTE — PROGRESS NOTE ADULT - PROBLEM SELECTOR PLAN 1
Interrogated [facility] PPM at OSH found to be in failure, high suspicion cause of syncope/collapse and nhung cardia.   - Transvenous pacemaker will be placed at bedside.   - EP consulted and aware, eventual replacement of lead/or PPM  -reversible causes ie electrolyes, TSH (3.6), Cardiac enzymes,   -obtain CT head today  -obtain EKG now and daily  -Monitor on tele Interrogated [facility] PPM at OSH found to be in failure, high suspicion cause of syncope/collapse and nhung cardia.   - Transvenous pacemaker will be placed at bedside.   - EP consulted and aware, eventual replacement of lead/or PPM  - reversible causes ie electrolyes, TSH (3.6), Cardiac enzymes wnl  - obtain CT head today  -obtain EKG now and daily  -Monitor on tele

## 2017-12-31 NOTE — CONSULT NOTE ADULT - SUBJECTIVE AND OBJECTIVE BOX
Mr. Oden is a 72 Man w/ hx of CAD s/p x2 FELIPA to LAD (), severe AS s/p AVR c/b heart block requiring PPM ([facility], 2015), HLD, HTN, transferred from [facility] for broken PPM lead.  Pt endorsing dizziness for the past few days as well as confusion.  Fell ~ 3 days ago; and presented to OSH ([facility]) for further evaluation.  While at the ED, he syncopized and had CHB.  PPM was interrogated and lead was found to be fractured.  He was transferred to our facility for further evaluation.        PMH:   Depression  DVT (deep venous thrombosis)  HLD (hyperlipidemia)  HTN (hypertension)  Aortic valve replaced  Stented coronary artery  CAD (coronary artery disease)      PSH:   Artificial pacemaker  S/P aortic valve replacement with porcine valve      Medications:   aspirin enteric coated 81 milliGRAM(s) Oral daily  atorvastatin 40 milliGRAM(s) Oral at bedtime  docusate sodium 100 milliGRAM(s) Oral three times a day  folic acid 1 milliGRAM(s) Oral daily  spironolactone 12.5 milliGRAM(s) Oral daily  thiamine 100 milliGRAM(s) Oral daily      Allergies:  No Known Allergies      FAMILY HISTORY:      Social History:  Smoking History:  Alcohol Use:  Drug Use:    Review of Systems:  REVIEW OF SYSTEMS:    CONSTITUTIONAL: No weakness, fevers or chills  EYES/ENT: No visual changes;  No dysphagia  NECK: No pain or stiffness  RESPIRATORY: No cough, wheezing, hemoptysis; No shortness of breath  CARDIOVASCULAR: No chest pain or palpitations; No lower extremity edema  GASTROINTESTINAL: No abdominal or epigastric pain. No nausea, vomiting, or hematemesis; No diarrhea or constipation. No melena or hematochezia.  BACK: No back pain  GENITOURINARY: No dysuria, frequency or hematuria  NEUROLOGICAL: No numbness or weakness  SKIN: No itching, burning, rashes, or lesions   All other review of systems is negative unless indicated above.    Physical Exam:  T(F): 98.6 (12-31), Max: 98.6 (12-)  HR: 80 (-) (42 - 90)  BP: 164/78 (12-) (101/70 - 184/90)  RR: 13 (12-31)  SpO2: 92% (12-31)  GENERAL: No acute distress, well-developed  HEAD:  Atraumatic, Normocephalic  ENT: EOMI, PERRLA, conjunctiva and sclera clear, Neck supple, No JVD, moist mucosa  CHEST/LUNG: Clear to auscultation bilaterally; No wheeze, equal breath sounds bilaterally   BACK: No spinal tenderness  HEART: Regular rate and rhythm; No murmurs, rubs, or gallops  ABDOMEN: Soft, Nontender, Nondistended; Bowel sounds present  EXTREMITIES:  No clubbing, cyanosis, or edema  PSYCH: Nl behavior, nl affect  NEUROLOGY: AAOx3, non-focal, cranial nerves intact  SKIN: Normal color, No rashes or lesions  LINES:    Cardiovascular Diagnostic Testing:    ECG: Personally reviewed    Echo:    Stress Testing:    Cath:    Interpretation of Telemetry:    Imaging:    Labs: Personally reviewed                        12.8   8.6   )-----------( 109      ( 31 Dec 2017 05:24 )             38.0         140  |  103  |  12  ----------------------------<  117<H>  3.9   |  22  |  1.03    Ca    9.0      31 Dec 2017 05:24  Phos  4.8       Mg     2.2         TPro  7.2  /  Alb  3.8  /  TBili  0.9  /  DBili  x   /  AST  23  /  ALT  22  /  AlkPhos  55      PT/INR - ( 31 Dec 2017 05:24 )   PT: 12.7 sec;   INR: 1.16 ratio         PTT - ( 31 Dec 2017 05:24 )  PTT:29.3 sec  CARDIAC MARKERS ( 30 Dec 2017 18:17 )  x     / <0.01 ng/mL / 156 U/L / x     / 7.1 ng/mL  CARDIAC MARKERS ( 30 Dec 2017 12:18 )  .025 ng/mL / x     / x     / x     / 5.2 ng/mL      Serum Pro-Brain Natriuretic Peptide: 328 pg/mL ( @ 12:18)    Total Cholesterol: 171  LDL: 93  HDL: 46  T  Total Cholesterol: 185  LDL: 107  HDL: 51  T    Hemoglobin A1C, Whole Blood: 5.4 % ( @ 04:33)  Hemoglobin A1C, Whole Blood: 5.5 % ( @ 21:14)    Thyroid Stimulating Hormone, Serum: 3.63 uIU/mL ( @ 04:33)  Thyroid Stimulating Hormone, Serum: 2.70 uIU/mL ( @ 21:14)    Assessment     72 man w/ hx of severe AS s/p AVR w/ post-procedure CHB requiring PPM ~ 4 years ago.  Found to be in CHB w/ pauses and NSVT (concerning for R on T).  PPM interrogated, found to have RV lead fracture.      Plan  - TVP placed (threshold ~ 1mV)   - hold AVN therapy; will need lead removal   - TTE tomorrow   - EP recs in the am     Binu Foster EP

## 2017-12-31 NOTE — CONSULT NOTE ADULT - ASSESSMENT
72 man w/ hx of severe AS s/p bio AVR w/ post-procedure CHB requiring PPM ~ 4 years ago.  Found to be in CHB w/ pauses and NSVT (concerning for R on T).  PPM interrogated, found to have RV lead fracture.  Pt feeling well now, resting comfortably in bed    TVP placed overnight  EP input appreciated, plan for lead extraction  hold AV radha blockers  BP stable - monitor hemodynamics carefully  Check TTE to eval LV function  MRI brain pending as well s/p multiple falls fall - CT head negative for acute pathology  c/w ASA, statin, spironolactone  CCU team input greatly appreciated  Will follow along with you
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
## 2017-12-31 NOTE — PROGRESS NOTE ADULT - PROBLEM SELECTOR PLAN 3
CKMB 5.2 pro- Troponin I 328  -Repeat cardiac enzymes  -c/w lopressor 50mG BID  -c/w ASA 81mG daily  -Start Atorvastatin 40mG daily CKMB 5.2 pro- Troponin I 328  -Repeat cardiac enzymes  -c/w ASA 81mG daily  -Start Atorvastatin 40mG daily  - hold AV radha blockers for now

## 2017-12-31 NOTE — PROGRESS NOTE ADULT - PROBLEM SELECTOR PLAN 2
Interrogated [facility] Joe PPM at OSH found to be in failure, high suspicion cause of syncope/collapse and bradycardia.   - Transvenous pacemaker will be placed at bedside.  - EP to evaluate for eventual replacement/repair Interrogated [facility] Joe PPM at OSH found to be in failure, high suspicion cause of syncope/collapse and bradycardia.   - Transvenous pacemaker  - TVP setting: rate 60, mA 5, threshold 0.6  - EP to evaluate for eventual replacement/repair likely 01/02

## 2018-01-01 ENCOUNTER — TRANSCRIPTION ENCOUNTER (OUTPATIENT)
Age: 73
End: 2018-01-01

## 2018-01-01 LAB
ALBUMIN SERPL ELPH-MCNC: 4.1 G/DL — SIGNIFICANT CHANGE UP (ref 3.3–5)
ALP SERPL-CCNC: 61 U/L — SIGNIFICANT CHANGE UP (ref 40–120)
ALT FLD-CCNC: 20 U/L RC — SIGNIFICANT CHANGE UP (ref 10–45)
ANION GAP SERPL CALC-SCNC: 17 MMOL/L — SIGNIFICANT CHANGE UP (ref 5–17)
AST SERPL-CCNC: 25 U/L — SIGNIFICANT CHANGE UP (ref 10–40)
BASOPHILS # BLD AUTO: 0.1 K/UL — SIGNIFICANT CHANGE UP (ref 0–0.2)
BASOPHILS NFR BLD AUTO: 0.7 % — SIGNIFICANT CHANGE UP (ref 0–2)
BILIRUB SERPL-MCNC: 1 MG/DL — SIGNIFICANT CHANGE UP (ref 0.2–1.2)
BUN SERPL-MCNC: 15 MG/DL — SIGNIFICANT CHANGE UP (ref 7–23)
CALCIUM SERPL-MCNC: 9.2 MG/DL — SIGNIFICANT CHANGE UP (ref 8.4–10.5)
CHLORIDE SERPL-SCNC: 103 MMOL/L — SIGNIFICANT CHANGE UP (ref 96–108)
CO2 SERPL-SCNC: 22 MMOL/L — SIGNIFICANT CHANGE UP (ref 22–31)
CREAT SERPL-MCNC: 1.02 MG/DL — SIGNIFICANT CHANGE UP (ref 0.5–1.3)
EOSINOPHIL # BLD AUTO: 0.3 K/UL — SIGNIFICANT CHANGE UP (ref 0–0.5)
EOSINOPHIL NFR BLD AUTO: 3.1 % — SIGNIFICANT CHANGE UP (ref 0–6)
GLUCOSE SERPL-MCNC: 127 MG/DL — HIGH (ref 70–99)
HCT VFR BLD CALC: 41.4 % — SIGNIFICANT CHANGE UP (ref 39–50)
HGB BLD-MCNC: 13.7 G/DL — SIGNIFICANT CHANGE UP (ref 13–17)
INR BLD: 1.22 RATIO — HIGH (ref 0.88–1.16)
LYMPHOCYTES # BLD AUTO: 2.8 K/UL — SIGNIFICANT CHANGE UP (ref 1–3.3)
LYMPHOCYTES # BLD AUTO: 33 % — SIGNIFICANT CHANGE UP (ref 13–44)
MAGNESIUM SERPL-MCNC: 2 MG/DL — SIGNIFICANT CHANGE UP (ref 1.6–2.6)
MCHC RBC-ENTMCNC: 23.5 PG — LOW (ref 27–34)
MCHC RBC-ENTMCNC: 33.1 GM/DL — SIGNIFICANT CHANGE UP (ref 32–36)
MCV RBC AUTO: 71.1 FL — LOW (ref 80–100)
MONOCYTES # BLD AUTO: 0.8 K/UL — SIGNIFICANT CHANGE UP (ref 0–0.9)
MONOCYTES NFR BLD AUTO: 9.5 % — SIGNIFICANT CHANGE UP (ref 2–14)
NEUTROPHILS # BLD AUTO: 4.5 K/UL — SIGNIFICANT CHANGE UP (ref 1.8–7.4)
NEUTROPHILS NFR BLD AUTO: 53.7 % — SIGNIFICANT CHANGE UP (ref 43–77)
PHOSPHATE SERPL-MCNC: 3.7 MG/DL — SIGNIFICANT CHANGE UP (ref 2.5–4.5)
PLATELET # BLD AUTO: 104 K/UL — LOW (ref 150–400)
POTASSIUM SERPL-MCNC: 3.7 MMOL/L — SIGNIFICANT CHANGE UP (ref 3.5–5.3)
POTASSIUM SERPL-SCNC: 3.7 MMOL/L — SIGNIFICANT CHANGE UP (ref 3.5–5.3)
PROT SERPL-MCNC: 7.6 G/DL — SIGNIFICANT CHANGE UP (ref 6–8.3)
PROTHROM AB SERPL-ACNC: 13.2 SEC — HIGH (ref 9.8–12.7)
RBC # BLD: 5.83 M/UL — HIGH (ref 4.2–5.8)
RBC # FLD: 14 % — SIGNIFICANT CHANGE UP (ref 10.3–14.5)
SODIUM SERPL-SCNC: 142 MMOL/L — SIGNIFICANT CHANGE UP (ref 135–145)
WBC # BLD: 8.4 K/UL — SIGNIFICANT CHANGE UP (ref 3.8–10.5)
WBC # FLD AUTO: 8.4 K/UL — SIGNIFICANT CHANGE UP (ref 3.8–10.5)

## 2018-01-01 PROCEDURE — 93010 ELECTROCARDIOGRAM REPORT: CPT

## 2018-01-01 PROCEDURE — 71045 X-RAY EXAM CHEST 1 VIEW: CPT | Mod: 26

## 2018-01-01 PROCEDURE — 93306 TTE W/DOPPLER COMPLETE: CPT | Mod: 26

## 2018-01-01 PROCEDURE — 99291 CRITICAL CARE FIRST HOUR: CPT

## 2018-01-01 RX ORDER — CHLORHEXIDINE GLUCONATE 213 G/1000ML
1 SOLUTION TOPICAL ONCE
Qty: 0 | Refills: 0 | Status: COMPLETED | OUTPATIENT
Start: 2018-01-01 | End: 2018-01-02

## 2018-01-01 RX ORDER — GLYCERIN ADULT
1 SUPPOSITORY, RECTAL RECTAL ONCE
Qty: 0 | Refills: 0 | Status: COMPLETED | OUTPATIENT
Start: 2018-01-01 | End: 2018-01-01

## 2018-01-01 RX ORDER — CEFAZOLIN SODIUM 1 G
2000 VIAL (EA) INJECTION ONCE
Qty: 0 | Refills: 0 | Status: COMPLETED | OUTPATIENT
Start: 2018-01-01 | End: 2018-01-02

## 2018-01-01 RX ORDER — POTASSIUM CHLORIDE 20 MEQ
40 PACKET (EA) ORAL ONCE
Qty: 0 | Refills: 0 | Status: COMPLETED | OUTPATIENT
Start: 2018-01-01 | End: 2018-01-01

## 2018-01-01 RX ORDER — TAMSULOSIN HYDROCHLORIDE 0.4 MG/1
0.4 CAPSULE ORAL AT BEDTIME
Qty: 0 | Refills: 0 | Status: DISCONTINUED | OUTPATIENT
Start: 2018-01-01 | End: 2018-01-03

## 2018-01-01 RX ADMIN — SENNA PLUS 2 TABLET(S): 8.6 TABLET ORAL at 21:21

## 2018-01-01 RX ADMIN — Medication 100 MILLIGRAM(S): at 21:22

## 2018-01-01 RX ADMIN — Medication 1 MILLIGRAM(S): at 12:02

## 2018-01-01 RX ADMIN — ATORVASTATIN CALCIUM 40 MILLIGRAM(S): 80 TABLET, FILM COATED ORAL at 21:22

## 2018-01-01 RX ADMIN — Medication 100 MILLIGRAM(S): at 12:02

## 2018-01-01 RX ADMIN — Medication 100 MILLIGRAM(S): at 05:12

## 2018-01-01 RX ADMIN — Medication 1 SUPPOSITORY(S): at 21:52

## 2018-01-01 RX ADMIN — Medication 40 MILLIEQUIVALENT(S): at 06:31

## 2018-01-01 RX ADMIN — Medication 1 APPLICATION(S): at 18:13

## 2018-01-01 RX ADMIN — Medication 1 APPLICATION(S): at 05:12

## 2018-01-01 RX ADMIN — TAMSULOSIN HYDROCHLORIDE 0.4 MILLIGRAM(S): 0.4 CAPSULE ORAL at 21:21

## 2018-01-01 RX ADMIN — Medication 81 MILLIGRAM(S): at 12:02

## 2018-01-01 RX ADMIN — SPIRONOLACTONE 12.5 MILLIGRAM(S): 25 TABLET, FILM COATED ORAL at 05:12

## 2018-01-01 NOTE — PROGRESS NOTE ADULT - SUBJECTIVE AND OBJECTIVE BOX
[facility]    Jesús Kelley, Kodak, Nneka, Mike, Senthil, Irene      [phone]    CHIEF COMPLAINT: Patient is a 72y old  Male who presents with a chief complaint of syncope    Follow Up: pacemaker lead failure, syncope    Interim history: The patient reports no new symptoms.  Denies chest discomfort and shortness of breath.  No abdominal pain.  No new neurologic symptoms.    MEDICATIONS  (STANDING):  aspirin enteric coated 81 milliGRAM(s) Oral daily  atorvastatin 40 milliGRAM(s) Oral at bedtime  BACItracin   Ointment 1 Application(s) Topical two times a day  docusate sodium 100 milliGRAM(s) Oral three times a day  folic acid 1 milliGRAM(s) Oral daily  senna 2 Tablet(s) Oral at bedtime  spironolactone 12.5 milliGRAM(s) Oral daily  thiamine 100 milliGRAM(s) Oral daily    MEDICATIONS  (PRN):      REVIEW OF SYSTEMS:  eye, ent, GI, , allergic, dermatologic, musculoskeletal and neurologic are negative except as described above    Vital Signs Last 24 Hrs  T(C): 36.7 (01 Jan 2018 08:00), Max: 36.8 (31 Dec 2017 15:00)  T(F): 98 (01 Jan 2018 08:00), Max: 98.2 (31 Dec 2017 15:00)  HR: 44 (01 Jan 2018 08:00) (44 - 70)  BP: 182/95 (01 Jan 2018 08:00) (108/70 - 190/89)  BP(mean): 133 (01 Jan 2018 08:00) (85 - 133)  RR: 25 (01 Jan 2018 08:00) (7 - 28)  SpO2: 95% (01 Jan 2018 08:00) (92% - 97%)    I&O's Summary    31 Dec 2017 07:01  -  01 Jan 2018 07:00  --------------------------------------------------------  IN: 600 mL / OUT: 1050 mL / NET: -450 mL        Telemetry past 24h: backup . 6 beats wct    PHYSICAL EXAM:    Constitutional: well-nourished, well-developed, NAD   HEENT:  right ij tvp introducer, MMM, sclerae anicteric, conjunctivae clear, no oral cyanosis. facial ecchymosis  Pulmonary: Non-labored, breath sounds are clear bilaterally, No wheezing, rales or rhonchi  Cardiovascular: Regular, S1 and S2.  No murmur.  No rubs, gallops or clicks  Gastrointestinal: Bowel Sounds present, soft, nontender.   Lymph: No peripheral edema.   Neurological: Alert, no focal deficits  Skin: No rashes.  Psych:  Mood & affect appropriate    LABS: All Labs Reviewed:                        13.7   8.4   )-----------( 104      ( 01 Jan 2018 05:46 )             41.4                         12.8   8.6   )-----------( 109      ( 31 Dec 2017 05:24 )             38.0                         12.9   8.7   )-----------( 114      ( 31 Dec 2017 00:02 )             38.2     01 Jan 2018 05:46    142    |  103    |  15     ----------------------------<  127    3.7     |  22     |  1.02   31 Dec 2017 05:24    140    |  103    |  12     ----------------------------<  117    3.9     |  22     |  1.03   31 Dec 2017 00:02    143    |  105    |  12     ----------------------------<  105    4.1     |  21     |  1.05     Ca    9.2        01 Jan 2018 05:46  Ca    9.0        31 Dec 2017 05:24  Ca    9.5        31 Dec 2017 00:02  Phos  3.7       01 Jan 2018 05:46  Phos  4.8       31 Dec 2017 05:24  Phos  2.2       30 Dec 2017 18:17  Mg     2.0       01 Jan 2018 05:46  Mg     2.2       31 Dec 2017 05:24  Mg     1.8       31 Dec 2017 00:02    TPro  7.6    /  Alb  4.1    /  TBili  1.0    /  DBili  x      /  AST  25     /  ALT  20     /  AlkPhos  61     01 Jan 2018 05:46  TPro  7.2    /  Alb  3.8    /  TBili  0.9    /  DBili  x      /  AST  23     /  ALT  22     /  AlkPhos  55     31 Dec 2017 05:24  TPro  7.4    /  Alb  4.0    /  TBili  1.1    /  DBili  x      /  AST  26     /  ALT  24     /  AlkPhos  57     31 Dec 2017 00:02    PT/INR - ( 01 Jan 2018 05:46 )   PT: 13.2 sec;   INR: 1.22 ratio         PTT - ( 31 Dec 2017 05:24 )  PTT:29.3 sec  CARDIAC MARKERS ( 30 Dec 2017 18:17 )  x     / <0.01 ng/mL / 156 U/L / x     / 7.1 ng/mL  CARDIAC MARKERS ( 30 Dec 2017 12:18 )  .025 ng/mL / x     / x     / x     / 5.2 ng/mL      Blood Culture:   12-30 @ 12:18  Pro Bnp 328    12-31 @ 04:33  TSH: 3.63  12-30 @ 21:14  TSH: 2.70      RADIOLOGY:    EKG:    Echo:

## 2018-01-01 NOTE — PROGRESS NOTE ADULT - PROBLEM SELECTOR PLAN 2
Interrogated [facility] PPM at OSH found to be in failure, high suspicion cause of syncope/collapse and bradycardia.   - TVP setting: rate 60, mA 5, threshold 0.6  - EP to evaluate for eventual replacement/repair likely 01/02

## 2018-01-01 NOTE — DISCHARGE NOTE ADULT - CARE PROVIDER_API CALL
Monique Ndiaye), Internal Medicine  [address] [place] [address]  Phone: [phone]  Fax: [phone] Monique Ndiaye), Internal Medicine  [address] [place] [address]  Phone: [phone]  Fax: [phone] Monique Ndiaye), Internal Medicine  [address] [place] [address]  Phone: [phone]  Fax: [phone]    Yuki Drake), Cardiac Electrophysiology; Cardiovascular Disease; Internal Medicine  [address] [place] [address]  Phone: [phone]  Fax: [phone]

## 2018-01-01 NOTE — DIETITIAN INITIAL EVALUATION ADULT. - ENERGY NEEDS
Height: 68 inches, Weight: 295 pounds, BMI: 40.9 kg/m2 IBW: 154 pounds (+/-10%), %IBW: 192%  No edema, no pressure injuries noted at present.

## 2018-01-01 NOTE — CHART NOTE - NSCHARTNOTEFT_GEN_A_CORE
====================  CCU MIDNIGHT ROUNDS  ====================    MARIA G MCFADDEN  [id number]    ====================  SUMMARY:   72M hx CAD s/p x2 FELIPA to LAD (2009), 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 PPM (2015), HLD, HTN, Colonoscopy polyp removed (2002), provoked 2/2 trauma DVT of LE formally on warfarin (25-30 years prior) presents to CCU as txfer from [facility] for broken PPM lead. In the CCU pt was found to be in complete heart block and urgent TVP placed.   ====================    ====================  NEW EVENTS: No acute events. TVP capturing and awaiting lead revision in the AM.   ====================    ====================  VITALS (Last 12 hrs):  ====================    T(C): 36.5 (01-01-18 @ 20:10), Max: 36.8 (01-01-18 @ 17:00)  HR: 58 (01-01-18 @ 21:10) (58 - 60)  BP: 168/75 (01-01-18 @ 21:10) (119/66 - 169/63)  BP(mean): 108 (01-01-18 @ 21:10) (73 - 122)  RR: 25 (01-01-18 @ 21:10) (18 - 37)  SpO2: 95% (01-01-18 @ 21:10) (92% - 96%)    TELEMETRY: V paced 100% via TVP    I&O's Summary    31 Dec 2017 07:01  -  01 Jan 2018 07:00  --------------------------------------------------------  IN: 600 mL / OUT: 1050 mL / NET: -450 mL    01 Jan 2018 07:01  -  01 Jan 2018 22:36  --------------------------------------------------------  IN: 300 mL / OUT: 725 mL / NET: -425 mL    ====================  PLAN:  ====================  CHB secondary to lead fracture: TVP in place, daily chest XR, plan for lead revision in AM   CAD s/p CABG: Continue ASA, Lipitor, spirinolactone     Gauri Jenkins, CCU NP   #[id number]

## 2018-01-01 NOTE — DISCHARGE NOTE ADULT - ADDITIONAL INSTRUCTIONS
1. Please call to schedule an appointment with your cardiologist  within 2 week after discharge from hospital 1. Please call to schedule an appointment with Dr. Drake  within 2 week after discharge from hospital  2. please call to schedule an appointment with your Cardiologist within 1-2 week after discharge from hospital

## 2018-01-01 NOTE — DIETITIAN INITIAL EVALUATION ADULT. - PROBLEM SELECTOR PLAN 1
Interrogated [facility] PPM at OSH found to be in failure, high suspicion cause of syncope/collapse and nhung cardia.   - Transvenous pacemaker will be placed at bedside.   -EP consulted and aware, eventual replacement of lead/or PPM  -obtain EKG now and daily  -Monitor on tele   -check CMP, CBC, Mg, Phos, Cardiac enzymes, TSH for alternative causes of syncope  -Will obtain CT head as pt sustained second collapse once TVP in place.

## 2018-01-01 NOTE — PROGRESS NOTE ADULT - PROBLEM SELECTOR PLAN 3
CKMB 7.1 pro- Troponin I .025  -Repeat cardiac enzymes  -c/w ASA 81mG daily  -c/w Atorvastatin 40mG daily  - hold AV radha blockers for now

## 2018-01-01 NOTE — DIETITIAN INITIAL EVALUATION ADULT. - OTHER INFO
Pt admitted with syncope, noted with pacemaker lead failure, planned for lead extraction 1/2/18 per chart review. RD consult requested for BMI >40 kg/m2. Pt currently on DASH diet with good appetite/intake (%). Denies N+V. Reports constipation (on senna and colace). Nutrition recommendations for constipation reviewed. Pt/family also expressed interest in reviewing nutrition education. Nutrition information reviewed and noted below.

## 2018-01-01 NOTE — DISCHARGE NOTE ADULT - HOSPITAL COURSE
Pt. w/ PPM p/w cc syncope found to have fractured lead, had R TVP placed 12/31/17, plan for transvenous lead replacement on 1/2/18 Mr. Oden is a 72 Man w/ hx of CAD s/p x2 FELIPA to LAD (2009), severe AS s/p AVR c/b heart block requiring PPM ([facility], 2015), HLD, HTN, transferred from [facility] for broken PPM lead.  Pt endorsing dizziness for the past few days as well as confusion.  Fell ~ 3 days ago; and presented to OSH ([facility]) for further evaluation.  While at the ED, he synopsized and had CHB.  CT head/ maxillofacial/ cervical spine: No CT evidence for acute territorial infarct, intracranial hemorrhage, or midline shift is visualized. No mass or mass effect is noted, no fractures. Patient was discharged from the ED and in the waiting crandall had another episode of dizziness and syncope of which lead to another fall w/ head trauma (no repeat CT done).   PPM was interrogated and lead was found to be fractured.  He was transferred to our facility for further evaluation.      On 1/2/18 PPM lead extraction with a DDD PCM implanted by EP           Pt. w/ PPM p/w cc syncope found to have fractured lead, had R TVP placed 12/31/17, plan for transvenous lead replacement on 1/2/18 Mr. Oden is a 72 Man w/ hx of CAD s/p x2 FELIPA to LAD (2009), severe AS s/p AVR c/b heart block requiring PPM ([facility], 2015), HLD, HTN, transferred from [facility] for broken PPM lead.  Pt endorsing dizziness for the past few days as well as confusion.  Fell ~ 3 days ago; and presented to OSH ([facility]) for further evaluation.  While at the ED, he synopsized and had CHB.  CT head/ maxillofacial/ cervical spine: No CT evidence for acute territorial infarct, intracranial hemorrhage, or midline shift is visualized. No mass or mass effect is noted, no fractures. Patient was discharged from the ED and in the waiting crandall had another episode of dizziness and syncope of which lead to another fall w/ head trauma (no repeat CT done).   PPM was interrogated and lead was found to be fractured.  He was transferred to our facility for further evaluation.      On 1/2/18 PPM lead extraction with a DDD PCM implanted by EP   Chest xray on 1/2  with no evidence of pneumothorax   PPM was interrogated on 1/3 by EP   patient seen by cardiology this am and cleared for discharge  Patient will follow up with EP in 2 weeks and his PMD within 1 week           Pt. w/ PPM p/w cc syncope found to have fractured lead, had R TVP placed 12/31/17, plan for transvenous lead replacement on 1/2/18 Mr. Oden is a 72 Man w/ hx of CAD s/p x2 FELIPA to LAD (2009), severe AS s/p AVR c/b heart block requiring PPM ([facility], 2015), HLD, HTN, transferred from [facility] for broken PPM lead.  Pt endorsing dizziness for the past few days as well as confusion.  Fell ~ 3 days ago; and presented to OSH ([facility]) for further evaluation.  While at the ED, he synopsized and had CHB.  CT head/ maxillofacial/ cervical spine: No CT evidence for acute territorial infarct, intracranial hemorrhage, or midline shift is visualized. No mass or mass effect is noted, no fractures. Patient was discharged from the ED and in the waiting crandall had another episode of dizziness and syncope of which lead to another fall w/ head trauma (no repeat CT done).   PPM was interrogated and lead was found to be fractured.  He was transferred [facility].    Pt. w/ PPM p/w cc syncope found to have fractured lead, had R TVP placed 12/31/17, plan for transvenous lead replacement on 1/2/18  On 1/2/18 PPM lead extraction with a DDD PCM implanted by EP   Chest xray on 1/2 and 1/3 with no evidence of pneumothorax   PPM was interrogated on 1/3 by EP without any issues, was capturing well  patient seen by cardiology this am and cleared for discharge  Patient will follow up with EP in 2 weeks and his PMD within 1 week     Discharge Diagnosis  Complete Heart Block  Pacemaker failure  Syncope and Collapse  CAD  HTN  HLD  Morbid Obesity  BMI 40.9 Mr. Oden is a 72 Man w/ hx of CAD s/p x2 FELIPA to LAD (2009), severe AS s/p AVR c/b heart block requiring PPM ([facility], 2015), HLD, HTN, transferred from [facility] for broken PPM lead.  Pt endorsing dizziness for the past few days as well as confusion.  Fell ~ 3 days ago; and presented to OSH ([facility]) for further evaluation.  While at the ED, he synopsized and had CHB.  CT head/ maxillofacial/ cervical spine: No CT evidence for acute territorial infarct, intracranial hemorrhage, or midline shift is visualized. No mass or mass effect is noted, no fractures. Patient was discharged from the ED and in the waiting crandall had another episode of dizziness and syncope of which lead to another fall w/ head trauma (no repeat CT done).   PPM was interrogated and lead was found to be fractured.  He was transferred [facility].    Had syncopal episodes in ER. Tele showing complete heart block. Found to have fractured pacemaker lead, had R TVP placed 12/31/17 was was admitted to CCU.  Had Pacemaker and lead extraction with new DDD PCM implantation on 1/2/18 by EP  Chest xray on 1/2 and 1/3 with no evidence of pneumothorax   PPM was interrogated on 1/3 by EP without any issues, was capturing well. Patient was asymptomatic and stable for discharge home per EP and cards. Patient was HD stable. PPM site intact.  Patient will follow up with EP in 2 weeks and his PMD within 1 week     Discharge Diagnosis  Complete Heart Block  Pacemaker failure  Syncope and Collapse  CAD  HTN  HLD  Morbid Obesity  BMI 40.9

## 2018-01-01 NOTE — DISCHARGE NOTE ADULT - PATIENT PORTAL LINK FT
“You can access the [employer] Patient Portal, offered by [facility], by registering with the following website: [email or web address]”

## 2018-01-01 NOTE — PROGRESS NOTE ADULT - PROBLEM SELECTOR PLAN 1
Interrogated [facility] PPM at OSH found to be in failure, high suspicion cause of syncope/collapse and nhung cardia.   - Transvenous pacemaker placed. Rate: 60   - Eventual lead extract.  - reversible causes ie electrolyes, TSH (3.6), Cardiac enzymes wnl  - obtain CT head today  - obtain EKG now and daily  - monitor on tele Interrogated [facility] PPM at OSH found to be in failure, high suspicion cause of syncope/collapse and nhung cardia.   - Transvenous pacemaker placed. Rate: 60 MA:5  - Eventual lead extract.  - reversible causes ie electrolyes, TSH (3.6), Cardiac enzymes wnl  - obtain CT head today  - obtain EKG now and daily  - monitor on tele

## 2018-01-01 NOTE — ED PROVIDER NOTE - OBJECTIVE STATEMENT
Patient came in complaining of dizzy spell on and off state fell 4 days ago face forward here to be check . Denies any LOC but fells nauseous at time,

## 2018-01-01 NOTE — ED PROVIDER NOTE - ENMT, MLM
Airway patent, Nasal mucosa clear. Mouth with normal mucosa. Throat has no vesicles, no oropharyngeal exudates and uvula is midline. multiple skin abrasion face for head nasal bridge

## 2018-01-01 NOTE — DISCHARGE NOTE ADULT - MEDICATION SUMMARY - MEDICATIONS TO TAKE
I will START or STAY ON the medications listed below when I get home from the hospital:    Aldactone  -- 12.5 milligram(s) by mouth once a day  -- Indication: For water pill     aspirin 81 mg oral delayed release tablet  -- 1 tab(s) by mouth once a day  -- Indication: For Preventio of coronary artery disease     tamsulosin 0.4 mg oral capsule  -- 1 cap(s) by mouth once a day (at bedtime), As needed, urinary retention  -- Indication: For urinary retention     Tofranil 25 mg oral tablet  -- orally once a day  -- Indication: For Antidepressant     ZyrTEC 10 mg oral tablet  -- 1 tab(s) by mouth once a day  -- Indication: For Antihistamine     atorvastatin 40 mg oral tablet  -- 1 tab(s) by mouth once a day (at bedtime)  -- Indication: For Elevated cholesterol     Lopressor 50 mg oral tablet  -- 1 tab(s) by mouth 2 times a day  -- Indication: For blood pressure     bacitracin 500 units/g topical ointment  -- 1 application on skin 2 times a day  -- Indication: For Antibiotic to be applies to PPM site wound     senna oral tablet  -- 2 tab(s) by mouth once a day (at bedtime)  -- Indication: For Constipation     Colace 100 mg oral capsule  -- orally 3 times a day  -- Indication: For Stool softner     folic acid 1 mg oral tablet  -- 1 tab(s) by mouth once a day  -- Indication: For Supplement     Vitamin B1 100 mg oral tablet  -- 1 tab(s) by mouth once a day  -- Indication: For vitamin B supplement

## 2018-01-01 NOTE — PROGRESS NOTE ADULT - PROBLEM SELECTOR PLAN 4
-c/w home medication Aldactone  - monitor BP per CCU routine - c/w home medication Aldactone  - monitor BP per CCU routine

## 2018-01-01 NOTE — DIETITIAN INITIAL EVALUATION ADULT. - PROBLEM SELECTOR PLAN 2
Interrogated [facility] PPM at OSH found to be in failure, high suspicion cause of syncope/collapse and bradycardia.   - Transvenous pacemaker will be placed at bedside.  - EP to evaluate for eventual replacement/repair

## 2018-01-01 NOTE — DIETITIAN INITIAL EVALUATION ADULT. - ORAL INTAKE PTA
3 meals/day. Reports breakfast rich in carbohydrates and mixed meals for lunch and dinner. Does not monitor portion sizes and tries to limit sodium on occasion. Consumed Vitamin B1 and folic acid supplements PTA./good

## 2018-01-01 NOTE — DISCHARGE NOTE ADULT - CARE PROVIDERS DIRECT ADDRESSES
[email or web address]

## 2018-01-01 NOTE — PROGRESS NOTE ADULT - SUBJECTIVE AND OBJECTIVE BOX
CHIEF COMPLAINT: SYNCOPE    HPI: 72M hx CAD s/p x2 FELIPA to LAD (2009), 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 This was unwitnessed, patient regained consciousness 2-3 mins later, no loss of bowel or bladder function. Denied chest pain, palpations. Sustained bruising and lacerations of face. He did not seek medical attention at the time. Today (12/30) patient was driving to a [employer] shop (familiar location) and had memory loss, he called his wife as he did not know where he was. Wife picked him up and took him to the hospital. On the grounds of the hospital he felt dizzy/nausea and collapsed. CT head/ maxillofacial/ cervical spine: No CT evidence for acute territorial infarct, intracranial hemorrhage, or midline shift is visualized. No mass or mass effect is noted, no fractures. Patient was discharged from the ED and in the waiting crandall had another episode of dizziness and syncope of which lead to another fall w/ head trauma (no repeat CT done). PPM was interrogated and found to have a broken lead. As per ED notes EKG was read as NSR w/ "episode of sinus arrest". CKMB 5.2 pro- Troponin I 328 (30 Dec 2017 19:18)      Interval Events: PVCs overnight.    REVIEW OF SYSTEMS:  Constitutional:   Eyes:  ENT:  CV:  Resp:  GI:  :  MSK:  Integumentary:  Neurological:  Psychiatric:  Endocrine:  Hematologic/Lymphatic:  Allergic/Immunologic:  [ ] All other systems negative  [ ] Unable to assess ROS because ________    OBJECTIVE:  ICU Vital Signs Last 24 Hrs  T(C): 36.7 (01 Jan 2018 06:00), Max: 36.8 (31 Dec 2017 15:00)  T(F): 98 (01 Jan 2018 06:00), Max: 98.2 (31 Dec 2017 15:00)  HR: 60 (01 Jan 2018 06:00) (58 - 70)  BP: 177/79 (01 Jan 2018 06:00) (108/70 - 187/87)  BP(mean): 112 (01 Jan 2018 06:00) (85 - 133)  ABP: --  ABP(mean): --  RR: 18 (01 Jan 2018 06:00) (13 - 28)  SpO2: 97% (01 Jan 2018 06:00) (92% - 97%)        12-31 @ 07:01  -  01-01 @ 07:00  --------------------------------------------------------  IN: 600 mL / OUT: 1050 mL / NET: -450 mL      CAPILLARY BLOOD GLUCOSE          PHYSICAL EXAM:  General:   HEENT:   Lymph Nodes:  Neck:   Respiratory:   Cardiovascular:   Abdomen:   Extremities:   Skin:   Neurological:  Psychiatry:    LINES:    HOSPITAL MEDICATIONS:  MEDICATIONS  (STANDING):  aspirin enteric coated 81 milliGRAM(s) Oral daily  atorvastatin 40 milliGRAM(s) Oral at bedtime  BACItracin   Ointment 1 Application(s) Topical two times a day  docusate sodium 100 milliGRAM(s) Oral three times a day  folic acid 1 milliGRAM(s) Oral daily  senna 2 Tablet(s) Oral at bedtime  spironolactone 12.5 milliGRAM(s) Oral daily  thiamine 100 milliGRAM(s) Oral daily    MEDICATIONS  (PRN):      LABS:     CARDIAC MARKERS ( 30 Dec 2017 18:17 )  x     / <0.01 ng/mL / 156 U/L / x     / 7.1 ng/mL    CARDIAC MARKERS ( 30 Dec 2017 12:18 )  .025 ng/mL / x     / x     / x     / 5.2 ng/mL                         13.7   8.4   )-----------( 104      ( 01 Jan 2018 05:46 )             41.4     01-01    142  |  103  |  15  ----------------------------<  127<H>  3.7   |  22  |  1.02    Ca    9.2      01 Jan 2018 05:46  Phos  3.7     01-01  Mg     2.0     01-01    TPro  7.6  /  Alb  4.1  /  TBili  1.0  /  DBili  x   /  AST  25  /  ALT  20  /  AlkPhos  61  01-01    PT/INR - ( 01 Jan 2018 05:46 )   PT: 13.2 sec;   INR: 1.22 ratio         PTT - ( 31 Dec 2017 05:24 )  PTT:29.3 sec          MICROBIOLOGY:     RADIOLOGY:  [ ] Reviewed and interpreted by me    EKG: CHIEF COMPLAINT: SYNCOPE    HPI: 72M hx CAD s/p x2 FELIPA to LAD (2009), 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 This was unwitnessed, patient regained consciousness 2-3 mins later, no loss of bowel or bladder function. Denied chest pain, palpations. Sustained bruising and lacerations of face. He did not seek medical attention at the time. Today (12/30) patient was driving to a [employer] shop (familiar location) and had memory loss, he called his wife as he did not know where he was. Wife picked him up and took him to the hospital. On the grounds of the hospital he felt dizzy/nausea and collapsed. CT head/ maxillofacial/ cervical spine: No CT evidence for acute territorial infarct, intracranial hemorrhage, or midline shift is visualized. No mass or mass effect is noted, no fractures. Patient was discharged from the ED and in the waiting crandall had another episode of dizziness and syncope of which lead to another fall w/ head trauma (no repeat CT done). PPM was interrogated and found to have a broken lead. As per ED notes EKG was read as NSR w/ "episode of sinus arrest". CKMB 5.2 pro- Troponin I 328 (30 Dec 2017 19:18)      Interval Events: PVCs overnight.    REVIEW OF SYSTEMS:  GI: Constipation  [X] All other systems negative    OBJECTIVE:  ICU Vital Signs Last 24 Hrs  T(C): 36.7 (01 Jan 2018 06:00), Max: 36.8 (31 Dec 2017 15:00)  T(F): 98 (01 Jan 2018 06:00), Max: 98.2 (31 Dec 2017 15:00)  HR: 60 (01 Jan 2018 06:00) (58 - 70)  BP: 177/79 (01 Jan 2018 06:00) (108/70 - 187/87)  BP(mean): 112 (01 Jan 2018 06:00) (85 - 133)  ABP: --  ABP(mean): --  RR: 18 (01 Jan 2018 06:00) (13 - 28)  SpO2: 97% (01 Jan 2018 06:00) (92% - 97%)        12-31 @ 07:01  -  01-01 @ 07:00  --------------------------------------------------------  IN: 600 mL / OUT: 1050 mL / NET: -450 mL      CAPILLARY BLOOD GLUCOSE          PHYSICAL EXAM:  Appearance:  NAD   Eyes: PERRL, ecchymosis of right eye, multiple bruises on face from fall  HENT: + TVP   Cardiovascular: paced RRR, unappreciable JVP  Respiratory: Clear to auscultation bilaterally  Gastrointestinal: protuberant, soft NTND  Psychiatry: AAOx3      LINES: R TVP, L hand peripheral IV    HOSPITAL MEDICATIONS:  MEDICATIONS  (STANDING):  aspirin enteric coated 81 milliGRAM(s) Oral daily  atorvastatin 40 milliGRAM(s) Oral at bedtime  BACItracin   Ointment 1 Application(s) Topical two times a day  docusate sodium 100 milliGRAM(s) Oral three times a day  folic acid 1 milliGRAM(s) Oral daily  senna 2 Tablet(s) Oral at bedtime  spironolactone 12.5 milliGRAM(s) Oral daily  thiamine 100 milliGRAM(s) Oral daily    MEDICATIONS  (PRN):      LABS:     CARDIAC MARKERS ( 30 Dec 2017 18:17 )  x     / <0.01 ng/mL / 156 U/L / x     / 7.1 ng/mL    CARDIAC MARKERS ( 30 Dec 2017 12:18 )  .025 ng/mL / x     / x     / x     / 5.2 ng/mL                         13.7   8.4   )-----------( 104      ( 01 Jan 2018 05:46 )             41.4     01-01    142  |  103  |  15  ----------------------------<  127<H>  3.7   |  22  |  1.02    Ca    9.2      01 Jan 2018 05:46  Phos  3.7     01-01  Mg     2.0     01-01    TPro  7.6  /  Alb  4.1  /  TBili  1.0  /  DBili  x   /  AST  25  /  ALT  20  /  AlkPhos  61  01-01    PT/INR - ( 01 Jan 2018 05:46 )   PT: 13.2 sec;   INR: 1.22 ratio         PTT - ( 31 Dec 2017 05:24 )  PTT:29.3 sec          MICROBIOLOGY:     RADIOLOGY:  [ ] Reviewed and interpreted by me    EKG:

## 2018-01-01 NOTE — DISCHARGE NOTE ADULT - CARE PLAN
Principal Discharge DX:	Syncope and collapse  Goal:	resolved  Instructions for follow-up, activity and diet:	Fainting usually is caused by fear, stress, pain, standing too long, over tired, overheated, going to bathroom, or coughing  Blood pressure can drop if you do not drink enough, blood pressure medication, alcohol, bleeding,  Consult with your doctor about driving  Avoid activity or condition that causes your syncope  Lay down with your feet up when you feel like you might faint  Secondary Diagnosis:	Pacemaker failure, subsequent encounter  Instructions for follow-up, activity and diet:	Mechanical break done of PCM lead with Pacemaker and lead extraction and   DDD PCM implantation  your pacemaker can send an electrical signal to your heart when it is necessary  call your doctor if you feel dizzy, lightheaded, shortness of breath, confused, more tired, faint  you will follow up with your pacemaker doctor regularly to check your pacemaker  your pacemaker battery usually will last 5 - 8 years  avoid certain electric or magnetic equipment  if you cannot walk through metal detector, ask for hand security search  most of the time you will not be able to have MRI  follow directions  that you received about arm use and mobility, driving, sex & sling use  you make want to get a emergency medical bracelet telling people you have a pacemaker  tell any health care provider that you have a pacemaker  Secondary Diagnosis:	CAD (coronary artery disease)  Instructions for follow-up, activity and diet:	Coronary artery disease is a condition where the arteries the supply the heart muscle get clogges with fatty deposits & puts you at risk for a heart attack  Call your doctor if you have any new pain, pressure, or discomfort in the center of your chest, pain, tingling or discomfort in arms, back, neck, jaw, or stomach, shortness of breath, nausea, vomiting, burping or heartburn, sweating, cold and clammy skin, racing or abnormal heartbeat for more than 10 minutes or if they keep coming & going.  Call 911 and do not tr to get to hospital by care  You can help yourself with lefestyle changes (quitting smoking if you smoke), eat lots of fruits & vegetables & low fat dairy products, not a lot of meat & fatty foods, walk or some form of physical activity most days of the week, lose weight if you are overweight  Take your cardiac medication as prescribed to lower cholesterol, to lower blood pressure, aspirin to prevent blood clots, and diabetes control  Make sure to keep appointments with doctor for cardiac follow up care  Secondary Diagnosis:	Essential hypertension  Instructions for follow-up, activity and diet:	Low salt diet  Activity as tolerated.  Take all medication as prescribed.  Follow up with your medical doctor for routine blood pressure monitoring at your next visit.  Notify your doctor if you have any of the following symptoms:   Dizziness, Lightheadedness, Blurry vision, Headache, Chest pain, Shortness of breath Principal Discharge DX:	Syncope and collapse  Goal:	resolved  Instructions for follow-up, activity and diet:	Fainting usually is caused by fear, stress, pain, standing too long, over tired, overheated, going to bathroom, or coughing  Blood pressure can drop if you do not drink enough, blood pressure medication, alcohol, bleeding,  Consult with your doctor about driving  Avoid activity or condition that causes your syncope  Lay down with your feet up when you feel like you might faint  Secondary Diagnosis:	Pacemaker failure, subsequent encounter  Instructions for follow-up, activity and diet:	Mechanical break done of PCM lead with Pacemaker and lead extraction and   DDD PCM implantation  your pacemaker can send an electrical signal to your heart when it is necessary  call your doctor if you feel dizzy, lightheaded, shortness of breath, confused, more tired, faint  you will follow up with your pacemaker doctor regularly to check your pacemaker  your pacemaker battery usually will last 5 - 8 years  avoid certain electric or magnetic equipment  if you cannot walk through metal detector, ask for hand security search  most of the time you will not be able to have MRI  follow directions  that you received about arm use and mobility, driving, sex & sling use  you make want to get a emergency medical bracelet telling people you have a pacemaker  tell any health care provider that you have a pacemaker  Secondary Diagnosis:	CAD (coronary artery disease)  Instructions for follow-up, activity and diet:	Coronary artery disease is a condition where the arteries the supply the heart muscle get clogges with fatty deposits & puts you at risk for a heart attack  Call your doctor if you have any new pain, pressure, or discomfort in the center of your chest, pain, tingling or discomfort in arms, back, neck, jaw, or stomach, shortness of breath, nausea, vomiting, burping or heartburn, sweating, cold and clammy skin, racing or abnormal heartbeat for more than 10 minutes or if they keep coming & going.  Call 911 and do not tr to get to hospital by care  You can help yourself with lefestyle changes (quitting smoking if you smoke), eat lots of fruits & vegetables & low fat dairy products, not a lot of meat & fatty foods, walk or some form of physical activity most days of the week, lose weight if you are overweight  Take your cardiac medication as prescribed to lower cholesterol, to lower blood pressure, aspirin to prevent blood clots, and diabetes control  Make sure to keep appointments with doctor for cardiac follow up care  Secondary Diagnosis:	Essential hypertension  Instructions for follow-up, activity and diet:	Low salt diet  Activity as tolerated.  Take all medication as prescribed.  Follow up with your medical doctor for routine blood pressure monitoring at your next visit.  Notify your doctor if you have any of the following symptoms:   Dizziness, Lightheadedness, Blurry vision, Headache, Chest pain, Shortness of breath  Goal:	PPM care  Instructions for follow-up, activity and diet:	1. No shower for the next week  2. no heavy lifting at all   3. keep dressing over PPM site for 3 more days or until wound heals   4. apply bacitracin ointment to pacemaker site twice a day and cover with dry sterile dressing    5. no lifting with left arm   6. do not raise left arm over head or reaching for any objects

## 2018-01-01 NOTE — DIETITIAN INITIAL EVALUATION ADULT. - NS AS NUTRI INTERV ED CONTENT
Reviewed DASH diet, including importance of limiting sodium intake and monitoring portion sizes. Healthy/balanced meals and snacks reviewed with pt and family. RD to remain available for further nutritional interventions as indicated/requested by medical team/pt.

## 2018-01-02 LAB
ALBUMIN SERPL ELPH-MCNC: 3.9 G/DL — SIGNIFICANT CHANGE UP (ref 3.3–5)
ALP SERPL-CCNC: 62 U/L — SIGNIFICANT CHANGE UP (ref 40–120)
ALT FLD-CCNC: 22 U/L RC — SIGNIFICANT CHANGE UP (ref 10–45)
ANION GAP SERPL CALC-SCNC: 15 MMOL/L — SIGNIFICANT CHANGE UP (ref 5–17)
AST SERPL-CCNC: 25 U/L — SIGNIFICANT CHANGE UP (ref 10–40)
BASOPHILS # BLD AUTO: 0 K/UL — SIGNIFICANT CHANGE UP (ref 0–0.2)
BASOPHILS NFR BLD AUTO: 0.5 % — SIGNIFICANT CHANGE UP (ref 0–2)
BILIRUB SERPL-MCNC: 1.1 MG/DL — SIGNIFICANT CHANGE UP (ref 0.2–1.2)
BUN SERPL-MCNC: 18 MG/DL — SIGNIFICANT CHANGE UP (ref 7–23)
CALCIUM SERPL-MCNC: 9.1 MG/DL — SIGNIFICANT CHANGE UP (ref 8.4–10.5)
CHLORIDE SERPL-SCNC: 103 MMOL/L — SIGNIFICANT CHANGE UP (ref 96–108)
CO2 SERPL-SCNC: 22 MMOL/L — SIGNIFICANT CHANGE UP (ref 22–31)
CREAT SERPL-MCNC: 1.03 MG/DL — SIGNIFICANT CHANGE UP (ref 0.5–1.3)
EOSINOPHIL # BLD AUTO: 0.4 K/UL — SIGNIFICANT CHANGE UP (ref 0–0.5)
EOSINOPHIL NFR BLD AUTO: 4.2 % — SIGNIFICANT CHANGE UP (ref 0–6)
GLUCOSE SERPL-MCNC: 141 MG/DL — HIGH (ref 70–99)
HCT VFR BLD CALC: 41.1 % — SIGNIFICANT CHANGE UP (ref 39–50)
HGB BLD-MCNC: 13.6 G/DL — SIGNIFICANT CHANGE UP (ref 13–17)
LYMPHOCYTES # BLD AUTO: 2.8 K/UL — SIGNIFICANT CHANGE UP (ref 1–3.3)
LYMPHOCYTES # BLD AUTO: 31.4 % — SIGNIFICANT CHANGE UP (ref 13–44)
MAGNESIUM SERPL-MCNC: 1.9 MG/DL — SIGNIFICANT CHANGE UP (ref 1.6–2.6)
MCHC RBC-ENTMCNC: 23.4 PG — LOW (ref 27–34)
MCHC RBC-ENTMCNC: 33 GM/DL — SIGNIFICANT CHANGE UP (ref 32–36)
MCV RBC AUTO: 71 FL — LOW (ref 80–100)
MONOCYTES # BLD AUTO: 0.9 K/UL — SIGNIFICANT CHANGE UP (ref 0–0.9)
MONOCYTES NFR BLD AUTO: 10.2 % — SIGNIFICANT CHANGE UP (ref 2–14)
NEUTROPHILS # BLD AUTO: 4.7 K/UL — SIGNIFICANT CHANGE UP (ref 1.8–7.4)
NEUTROPHILS NFR BLD AUTO: 53.7 % — SIGNIFICANT CHANGE UP (ref 43–77)
PHOSPHATE SERPL-MCNC: 4.4 MG/DL — SIGNIFICANT CHANGE UP (ref 2.5–4.5)
PLATELET # BLD AUTO: 97 K/UL — LOW (ref 150–400)
POTASSIUM SERPL-MCNC: 3.9 MMOL/L — SIGNIFICANT CHANGE UP (ref 3.5–5.3)
POTASSIUM SERPL-SCNC: 3.9 MMOL/L — SIGNIFICANT CHANGE UP (ref 3.5–5.3)
PROT SERPL-MCNC: 7.4 G/DL — SIGNIFICANT CHANGE UP (ref 6–8.3)
RBC # BLD: 5.8 M/UL — SIGNIFICANT CHANGE UP (ref 4.2–5.8)
RBC # FLD: 13.9 % — SIGNIFICANT CHANGE UP (ref 10.3–14.5)
SODIUM SERPL-SCNC: 140 MMOL/L — SIGNIFICANT CHANGE UP (ref 135–145)
WBC # BLD: 8.8 K/UL — SIGNIFICANT CHANGE UP (ref 3.8–10.5)
WBC # FLD AUTO: 8.8 K/UL — SIGNIFICANT CHANGE UP (ref 3.8–10.5)

## 2018-01-02 PROCEDURE — 99291 CRITICAL CARE FIRST HOUR: CPT

## 2018-01-02 PROCEDURE — 71045 X-RAY EXAM CHEST 1 VIEW: CPT | Mod: 26

## 2018-01-02 PROCEDURE — 93010 ELECTROCARDIOGRAM REPORT: CPT

## 2018-01-02 PROCEDURE — 33208 INSRT HEART PM ATRIAL & VENT: CPT | Mod: 59

## 2018-01-02 PROCEDURE — 33235 REMOVAL PACEMAKER ELECTRODE: CPT | Mod: 59

## 2018-01-02 PROCEDURE — 33233 REMOVAL OF PM GENERATOR: CPT

## 2018-01-02 PROCEDURE — 93010 ELECTROCARDIOGRAM REPORT: CPT | Mod: 77

## 2018-01-02 RX ORDER — MAGNESIUM SULFATE 500 MG/ML
1 VIAL (ML) INJECTION ONCE
Qty: 0 | Refills: 0 | Status: COMPLETED | OUTPATIENT
Start: 2018-01-02 | End: 2018-01-02

## 2018-01-02 RX ORDER — VANCOMYCIN HCL 1 G
1000 VIAL (EA) INTRAVENOUS ONCE
Qty: 0 | Refills: 0 | Status: COMPLETED | OUTPATIENT
Start: 2018-01-02 | End: 2018-01-02

## 2018-01-02 RX ORDER — SPIRONOLACTONE 25 MG/1
25 TABLET, FILM COATED ORAL DAILY
Qty: 0 | Refills: 0 | Status: DISCONTINUED | OUTPATIENT
Start: 2018-01-02 | End: 2018-01-02

## 2018-01-02 RX ORDER — ATORVASTATIN CALCIUM 80 MG/1
10 TABLET, FILM COATED ORAL AT BEDTIME
Qty: 0 | Refills: 0 | Status: DISCONTINUED | OUTPATIENT
Start: 2018-01-02 | End: 2018-01-02

## 2018-01-02 RX ORDER — ASPIRIN/CALCIUM CARB/MAGNESIUM 324 MG
81 TABLET ORAL DAILY
Qty: 0 | Refills: 0 | Status: DISCONTINUED | OUTPATIENT
Start: 2018-01-02 | End: 2018-01-02

## 2018-01-02 RX ORDER — LORATADINE 10 MG/1
10 TABLET ORAL DAILY
Qty: 0 | Refills: 0 | Status: DISCONTINUED | OUTPATIENT
Start: 2018-01-02 | End: 2018-01-02

## 2018-01-02 RX ORDER — METOPROLOL TARTRATE 50 MG
50 TABLET ORAL
Qty: 0 | Refills: 0 | Status: DISCONTINUED | OUTPATIENT
Start: 2018-01-02 | End: 2018-01-03

## 2018-01-02 RX ORDER — POTASSIUM CHLORIDE 20 MEQ
20 PACKET (EA) ORAL ONCE
Qty: 0 | Refills: 0 | Status: COMPLETED | OUTPATIENT
Start: 2018-01-02 | End: 2018-01-02

## 2018-01-02 RX ADMIN — Medication 1 APPLICATION(S): at 05:06

## 2018-01-02 RX ADMIN — Medication 50 MILLIGRAM(S): at 20:24

## 2018-01-02 RX ADMIN — ATORVASTATIN CALCIUM 40 MILLIGRAM(S): 80 TABLET, FILM COATED ORAL at 23:09

## 2018-01-02 RX ADMIN — CHLORHEXIDINE GLUCONATE 1 APPLICATION(S): 213 SOLUTION TOPICAL at 05:00

## 2018-01-02 RX ADMIN — Medication 100 MILLIGRAM(S): at 17:47

## 2018-01-02 RX ADMIN — Medication 1 MILLIGRAM(S): at 15:53

## 2018-01-02 RX ADMIN — Medication 100 MILLIGRAM(S): at 05:06

## 2018-01-02 RX ADMIN — Medication 250 MILLIGRAM(S): at 23:10

## 2018-01-02 RX ADMIN — Medication 100 GRAM(S): at 05:25

## 2018-01-02 RX ADMIN — Medication 100 MILLIGRAM(S): at 15:52

## 2018-01-02 RX ADMIN — Medication 81 MILLIGRAM(S): at 15:52

## 2018-01-02 RX ADMIN — Medication 1 APPLICATION(S): at 17:48

## 2018-01-02 RX ADMIN — Medication 20 MILLIEQUIVALENT(S): at 05:25

## 2018-01-02 RX ADMIN — SPIRONOLACTONE 12.5 MILLIGRAM(S): 25 TABLET, FILM COATED ORAL at 05:06

## 2018-01-02 NOTE — PROGRESS NOTE ADULT - SUBJECTIVE AND OBJECTIVE BOX
CHIEF COMPLAINT: Syncope.    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 This was unwitnessed, patient regained consciousness 2-3 mins later, no loss of bowel or bladder function. Denied chest pain, palpations. Sustained bruising and lacerations of face. He did not seek medical attention at the time. Today (12/30) patient was driving to a [employer] shop (familiar location) and had memory loss, he called his wife as he did not know where he was. Wife picked him up and took him to the hospital. On the grounds of the hospital he felt dizzy/nausea and collapsed. CT head/ maxillofacial/ cervical spine: No CT evidence for acute territorial infarct, intracranial hemorrhage, or midline shift is visualized. No mass or mass effect is noted, no fractures. Patient was discharged from the ED and in the waiting crandall had another episode of dizziness and syncope of which lead to another fall w/ head trauma (no repeat CT done). PPM was interrogated and found to have a broken lead. As per ED notes EKG was read as NSR w/ "episode of sinus arrest". CKMB 5.2 pro- Troponin I 328 (30 Dec 2017 19:18)    Interval Events: Symptoms of sundowning overnight.    REVIEW OF SYSTEMS:  Constitutional:   Eyes:  ENT:  CV:  Resp:  GI:  :  MSK:  Integumentary:  Neurological:  Psychiatric:  Endocrine:  Hematologic/Lymphatic:  Allergic/Immunologic:  [ ] All other systems negative  [ ] Unable to assess ROS because ________    OBJECTIVE:  ICU Vital Signs Last 24 Hrs  T(C): 36.5 (02 Jan 2018 05:15), Max: 36.8 (01 Jan 2018 17:00)  T(F): 97.7 (02 Jan 2018 05:15), Max: 98.3 (01 Jan 2018 17:00)  HR: 54 (02 Jan 2018 06:00) (44 - 60)  BP: 128/59 (02 Jan 2018 06:00) (106/64 - 182/104)  BP(mean): 74 (02 Jan 2018 06:00) (73 - 143)  ABP: --  ABP(mean): --  RR: 14 (02 Jan 2018 06:00) (14 - 37)  SpO2: 100% (02 Jan 2018 06:00) (90% - 100%)        01-01 @ 07:01  -  01-02 @ 07:00  --------------------------------------------------------  IN: 760 mL / OUT: 775 mL / NET: -15 mL      CAPILLARY BLOOD GLUCOSE        PHYSICAL EXAM:  General:   HEENT:   Lymph Nodes:  Neck:   Respiratory:   Cardiovascular:   Abdomen:   Extremities:   Skin:   Neurological:  Psychiatry:    LINES: R TVP, L hand [facility] MEDICATIONS:  MEDICATIONS  (STANDING):  aspirin enteric coated 81 milliGRAM(s) Oral daily  atorvastatin 40 milliGRAM(s) Oral at bedtime  BACItracin   Ointment 1 Application(s) Topical two times a day  ceFAZolin   IVPB 2000 milliGRAM(s) IV Intermittent once  docusate sodium 100 milliGRAM(s) Oral three times a day  folic acid 1 milliGRAM(s) Oral daily  senna 2 Tablet(s) Oral at bedtime  spironolactone 12.5 milliGRAM(s) Oral daily  thiamine 100 milliGRAM(s) Oral daily    MEDICATIONS  (PRN):  tamsulosin 0.4 milliGRAM(s) Oral at bedtime PRN urinary retention      LABS:                          13.6   8.8   )-----------( 97       ( 02 Jan 2018 04:51 )             41.1     01-02    140  |  103  |  18  ----------------------------<  141<H>  3.9   |  22  |  1.03    Ca    9.1      02 Jan 2018 04:51  Phos  4.4     01-02  Mg     1.9     01-02    TPro  7.4  /  Alb  3.9  /  TBili  1.1  /  DBili  x   /  AST  25  /  ALT  22  /  AlkPhos  62  01-02    PT/INR - ( 01 Jan 2018 05:46 )   PT: 13.2 sec;   INR: 1.22 ratio                 MICROBIOLOGY:     RADIOLOGY:  [ ] Reviewed and interpreted by me    EKG: CHIEF COMPLAINT: Syncope.    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 This was unwitnessed, patient regained consciousness 2-3 mins later, no loss of bowel or bladder function. Denied chest pain, palpations. Sustained bruising and lacerations of face. He did not seek medical attention at the time. Today (12/30) patient was driving to a [employer] shop (familiar location) and had memory loss, he called his wife as he did not know where he was. Wife picked him up and took him to the hospital. On the grounds of the hospital he felt dizzy/nausea and collapsed. CT head/ maxillofacial/ cervical spine: No CT evidence for acute territorial infarct, intracranial hemorrhage, or midline shift is visualized. No mass or mass effect is noted, no fractures. Patient was discharged from the ED and in the waiting crandall had another episode of dizziness and syncope of which lead to another fall w/ head trauma (no repeat CT done). PPM was interrogated and found to have a broken lead. As per ED notes EKG was read as NSR w/ "episode of sinus arrest". CKMB 5.2 pro- Troponin I 328 (30 Dec 2017 19:18)    Interval Events: Symptoms of sundowning overnight.    REVIEW OF SYSTEMS:  [X] All other systems negative    OBJECTIVE:  ICU Vital Signs Last 24 Hrs  T(C): 36.5 (02 Jan 2018 05:15), Max: 36.8 (01 Jan 2018 17:00)  T(F): 97.7 (02 Jan 2018 05:15), Max: 98.3 (01 Jan 2018 17:00)  HR: 54 (02 Jan 2018 06:00) (44 - 60)  BP: 128/59 (02 Jan 2018 06:00) (106/64 - 182/104)  BP(mean): 74 (02 Jan 2018 06:00) (73 - 143)  ABP: --  ABP(mean): --  RR: 14 (02 Jan 2018 06:00) (14 - 37)  SpO2: 100% (02 Jan 2018 06:00) (90% - 100%)        01-01 @ 07:01  -  01-02 @ 07:00  --------------------------------------------------------  IN: 760 mL / OUT: 775 mL / NET: -15 mL      CAPILLARY BLOOD GLUCOSE        PHYSICAL EXAM:  General: Well appearing, NAD.  HEENT: EOMI PERRLA  Neck: No JVD  Respiratory: CTA BL  Cardiovascular: RRR, S1S2  Abdomen: Soft NTND  Extremities: No swelling/edema  Skin: No rash.  Neurological: AAOx3  Psychiatry: No risk of harm to self or others.    LINES: R TVP, L hand PIV    HOSPITAL MEDICATIONS:  MEDICATIONS  (STANDING):  aspirin enteric coated 81 milliGRAM(s) Oral daily  atorvastatin 40 milliGRAM(s) Oral at bedtime  BACItracin   Ointment 1 Application(s) Topical two times a day  ceFAZolin   IVPB 2000 milliGRAM(s) IV Intermittent once  docusate sodium 100 milliGRAM(s) Oral three times a day  folic acid 1 milliGRAM(s) Oral daily  senna 2 Tablet(s) Oral at bedtime  spironolactone 12.5 milliGRAM(s) Oral daily  thiamine 100 milliGRAM(s) Oral daily    MEDICATIONS  (PRN):  tamsulosin 0.4 milliGRAM(s) Oral at bedtime PRN urinary retention      LABS:                          13.6   8.8   )-----------( 97       ( 02 Jan 2018 04:51 )             41.1     01-02    140  |  103  |  18  ----------------------------<  141<H>  3.9   |  22  |  1.03    Ca    9.1      02 Jan 2018 04:51  Phos  4.4     01-02  Mg     1.9     01-02    TPro  7.4  /  Alb  3.9  /  TBili  1.1  /  DBili  x   /  AST  25  /  ALT  22  /  AlkPhos  62  01-02    PT/INR - ( 01 Jan 2018 05:46 )   PT: 13.2 sec;   INR: 1.22 ratio                 MICROBIOLOGY:     RADIOLOGY:  [ ] Reviewed and interpreted by me    EKG:
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
## 2018-01-02 NOTE — PROGRESS NOTE ADULT - PROBLEM SELECTOR PLAN 1
Interrogated [facility] PPM at OSH found to be in failure, high suspicion cause of syncope/collapse and nhung cardia.   - Transvenous pacemaker placed. Rate: 60 MA:5  - Eventual lead extract.  - reversible causes ie electrolyes, TSH (3.6), Cardiac enzymes wnl  - obtain CT head today  - obtain EKG now and daily  - monitor on tele Interrogated [facility] PPM at OSH found to be in failure, high suspicion cause of syncope/collapse and nhung cardia.   - Transvenous pacemaker placed. Rate: 60 MA:5  - Lead extraction today  - reversible causes ie electrolyes, TSH (3.6), Cardiac enzymes wnl  - obtain EKG now and daily  - monitor on tele

## 2018-01-02 NOTE — PROGRESS NOTE ADULT - PROBLEM SELECTOR PLAN 2
Interrogated [facility] PPM at OSH found to be in failure, high suspicion cause of syncope/collapse and bradycardia.   - TVP setting: rate 60, mA 5, threshold 0.6  - EP to evaluate for eventual replacement/repair likely 01/02 Interrogated [facility] PPM at OSH found to be in failure, high suspicion cause of syncope/collapse and bradycardia.   - TVP setting: rate 60, mA 5, threshold 0.6  - Pt. sent for lead extraction today

## 2018-01-02 NOTE — PROGRESS NOTE ADULT - SUBJECTIVE AND OBJECTIVE BOX
Pre-op Diagnosis:  Mechanical breakdone of PCM lead    Post-op Diagnosis:  Same    Procedure:  Pacemaker and lead extraction  DDD PCM implantation    Electrophysiologist:  Yuki Drake MD    Assistant:  none    Anesthesia:  Local/IV sedation    Access:   Left axillary puncture    Description:  left PCM pocket opened and PCM removed  Access under flouro via left axillary puncture  New RV lead to RV septum  Old A lead removed with traction  Old V lead removed with traction/ LLD  New A lead to RAA  New Abbott DDD PCM place  temp wire and cordis removed with fluoroscopic guidance    Complications:  none    EBL: 30cc    Disposition:  Transferred to CCU in good condition    Plan:  Stat CXR  CXR/Check PCM in AM  1 dose vanco in 12 hours  FU 2 weeks

## 2018-01-02 NOTE — PROGRESS NOTE ADULT - SUBJECTIVE AND OBJECTIVE BOX
[facility] - Jesús Kelley, Kodak, Nneka, Senthil Mckeon Savella  Office Number:  [phone]    Patient resting comfortably in bed in NAD.  Laying flat with no respiratory distress.  No complaints of chest pain, dyspnea, palpitations, PND, or orthopnea.  For lead extraction today.  Still with TVP    Telemetry:  V pacing    MEDICATIONS  (STANDING):  aspirin enteric coated 81 milliGRAM(s) Oral daily  atorvastatin 40 milliGRAM(s) Oral at bedtime  BACItracin   Ointment 1 Application(s) Topical two times a day  ceFAZolin   IVPB 2000 milliGRAM(s) IV Intermittent once  docusate sodium 100 milliGRAM(s) Oral three times a day  folic acid 1 milliGRAM(s) Oral daily  senna 2 Tablet(s) Oral at bedtime  spironolactone 12.5 milliGRAM(s) Oral daily  thiamine 100 milliGRAM(s) Oral daily    MEDICATIONS  (PRN):  tamsulosin 0.4 milliGRAM(s) Oral at bedtime PRN urinary retention      Allergies    No Known Allergies          Vital Signs Last 24 Hrs  T(C): 36.5 (02 Jan 2018 05:15), Max: 36.8 (01 Jan 2018 17:00)  T(F): 97.7 (02 Jan 2018 05:15), Max: 98.3 (01 Jan 2018 17:00)  HR: 54 (02 Jan 2018 06:00) (44 - 60)  BP: 128/59 (02 Jan 2018 06:00) (106/64 - 182/104)  BP(mean): 74 (02 Jan 2018 06:00) (73 - 143)  RR: 14 (02 Jan 2018 06:00) (14 - 37)  SpO2: 100% (02 Jan 2018 06:00) (90% - 100%)    I&O's Summary    01 Jan 2018 07:01  -  02 Jan 2018 07:00  --------------------------------------------------------  IN: 760 mL / OUT: 775 mL / NET: -15 mL        ON EXAM:    General: NAD, awake and alert, oriented x 3  HEENT: Mucous membranes are moist, anicteric  Lungs: Non-labored, breath sounds are clear bilaterally, No wheezing, rales or rhonchi  Cardiovascular: Regular, S1 and S2, 1/6 systolic murmur  Gastrointestinal: Bowel Sounds present, soft, nontender. obese  Lymph: Minimal peripheral edema. No lymphadenopathy.  Skin: No rashes or ulcers  Psych:  Mood & affect appropriate    LABS: All Labs Reviewed:                        13.6   8.8   )-----------( 97       ( 02 Jan 2018 04:51 )             41.1                         13.7   8.4   )-----------( 104      ( 01 Jan 2018 05:46 )             41.4                         12.8   8.6   )-----------( 109      ( 31 Dec 2017 05:24 )             38.0     02 Jan 2018 04:51    140    |  103    |  18     ----------------------------<  141    3.9     |  22     |  1.03   01 Jan 2018 05:46    142    |  103    |  15     ----------------------------<  127    3.7     |  22     |  1.02   31 Dec 2017 05:24    140    |  103    |  12     ----------------------------<  117    3.9     |  22     |  1.03     Ca    9.1        02 Jan 2018 04:51  Ca    9.2        01 Jan 2018 05:46  Ca    9.0        31 Dec 2017 05:24  Phos  4.4       02 Jan 2018 04:51  Phos  3.7       01 Jan 2018 05:46  Phos  4.8       31 Dec 2017 05:24  Mg     1.9       02 Jan 2018 04:51  Mg     2.0       01 Jan 2018 05:46  Mg     2.2       31 Dec 2017 05:24    TPro  7.4    /  Alb  3.9    /  TBili  1.1    /  DBili  x      /  AST  25     /  ALT  22     /  AlkPhos  62     02 Jan 2018 04:51  TPro  7.6    /  Alb  4.1    /  TBili  1.0    /  DBili  x      /  AST  25     /  ALT  20     /  AlkPhos  61     01 Jan 2018 05:46  TPro  7.2    /  Alb  3.8    /  TBili  0.9    /  DBili  x      /  AST  23     /  ALT  22     /  AlkPhos  55     31 Dec 2017 05:24    PT/INR - ( 01 Jan 2018 05:46 )   PT: 13.2 sec;   INR: 1.22 ratio               Blood Culture:   12-30 @ 12:18  Pro Bnp 328    12-31 @ 04:33  TSH: 3.63  12-30 @ 21:14  TSH: 2.70

## 2018-01-03 VITALS
RESPIRATION RATE: 18 BRPM | TEMPERATURE: 98 F | OXYGEN SATURATION: 99 % | DIASTOLIC BLOOD PRESSURE: 89 MMHG | HEART RATE: 88 BPM | SYSTOLIC BLOOD PRESSURE: 150 MMHG

## 2018-01-03 DIAGNOSIS — Z29.9 ENCOUNTER FOR PROPHYLACTIC MEASURES, UNSPECIFIED: ICD-10-CM

## 2018-01-03 DIAGNOSIS — T82.118D BREAKDOWN (MECHANICAL) OF OTHER CARDIAC ELECTRONIC DEVICE, SUBSEQUENT ENCOUNTER: ICD-10-CM

## 2018-01-03 DIAGNOSIS — I10 ESSENTIAL (PRIMARY) HYPERTENSION: ICD-10-CM

## 2018-01-03 DIAGNOSIS — E66.01 MORBID (SEVERE) OBESITY DUE TO EXCESS CALORIES: ICD-10-CM

## 2018-01-03 DIAGNOSIS — I25.10 ATHEROSCLEROTIC HEART DISEASE OF NATIVE CORONARY ARTERY WITHOUT ANGINA PECTORIS: ICD-10-CM

## 2018-01-03 DIAGNOSIS — E78.5 HYPERLIPIDEMIA, UNSPECIFIED: ICD-10-CM

## 2018-01-03 LAB
ANION GAP SERPL CALC-SCNC: 15 MMOL/L — SIGNIFICANT CHANGE UP (ref 5–17)
BASOPHILS # BLD AUTO: 0.05 K/UL — SIGNIFICANT CHANGE UP (ref 0–0.2)
BASOPHILS NFR BLD AUTO: 0.6 % — SIGNIFICANT CHANGE UP (ref 0–2)
BUN SERPL-MCNC: 21 MG/DL — SIGNIFICANT CHANGE UP (ref 7–23)
CALCIUM SERPL-MCNC: 9.1 MG/DL — SIGNIFICANT CHANGE UP (ref 8.4–10.5)
CHLORIDE SERPL-SCNC: 102 MMOL/L — SIGNIFICANT CHANGE UP (ref 96–108)
CO2 SERPL-SCNC: 23 MMOL/L — SIGNIFICANT CHANGE UP (ref 22–31)
CREAT SERPL-MCNC: 1.13 MG/DL — SIGNIFICANT CHANGE UP (ref 0.5–1.3)
EOSINOPHIL # BLD AUTO: 0.43 K/UL — SIGNIFICANT CHANGE UP (ref 0–0.5)
EOSINOPHIL NFR BLD AUTO: 4.9 % — SIGNIFICANT CHANGE UP (ref 0–6)
GLUCOSE SERPL-MCNC: 114 MG/DL — HIGH (ref 70–99)
HCT VFR BLD CALC: 40.4 % — SIGNIFICANT CHANGE UP (ref 39–50)
HGB BLD-MCNC: 12.9 G/DL — LOW (ref 13–17)
IMM GRANULOCYTES NFR BLD AUTO: 0.9 % — SIGNIFICANT CHANGE UP (ref 0–1.5)
LYMPHOCYTES # BLD AUTO: 2.08 K/UL — SIGNIFICANT CHANGE UP (ref 1–3.3)
LYMPHOCYTES # BLD AUTO: 23.8 % — SIGNIFICANT CHANGE UP (ref 13–44)
MAGNESIUM SERPL-MCNC: 2.1 MG/DL — SIGNIFICANT CHANGE UP (ref 1.6–2.6)
MCHC RBC-ENTMCNC: 22.3 PG — LOW (ref 27–34)
MCHC RBC-ENTMCNC: 31.9 GM/DL — LOW (ref 32–36)
MCV RBC AUTO: 69.8 FL — LOW (ref 80–100)
MONOCYTES # BLD AUTO: 0.73 K/UL — SIGNIFICANT CHANGE UP (ref 0–0.9)
MONOCYTES NFR BLD AUTO: 8.3 % — SIGNIFICANT CHANGE UP (ref 2–14)
NEUTROPHILS # BLD AUTO: 5.38 K/UL — SIGNIFICANT CHANGE UP (ref 1.8–7.4)
NEUTROPHILS NFR BLD AUTO: 61.5 % — SIGNIFICANT CHANGE UP (ref 43–77)
PLATELET # BLD AUTO: 106 K/UL — LOW (ref 150–400)
POTASSIUM SERPL-MCNC: 4 MMOL/L — SIGNIFICANT CHANGE UP (ref 3.5–5.3)
POTASSIUM SERPL-SCNC: 4 MMOL/L — SIGNIFICANT CHANGE UP (ref 3.5–5.3)
RBC # BLD: 5.79 M/UL — SIGNIFICANT CHANGE UP (ref 4.2–5.8)
RBC # FLD: 15.3 % — HIGH (ref 10.3–14.5)
SODIUM SERPL-SCNC: 140 MMOL/L — SIGNIFICANT CHANGE UP (ref 135–145)
WBC # BLD: 8.75 K/UL — SIGNIFICANT CHANGE UP (ref 3.8–10.5)
WBC # FLD AUTO: 8.75 K/UL — SIGNIFICANT CHANGE UP (ref 3.8–10.5)

## 2018-01-03 PROCEDURE — 99223 1ST HOSP IP/OBS HIGH 75: CPT

## 2018-01-03 PROCEDURE — 99239 HOSP IP/OBS DSCHRG MGMT >30: CPT

## 2018-01-03 PROCEDURE — 71046 X-RAY EXAM CHEST 2 VIEWS: CPT | Mod: 26

## 2018-01-03 PROCEDURE — 99232 SBSQ HOSP IP/OBS MODERATE 35: CPT

## 2018-01-03 RX ORDER — SENNA PLUS 8.6 MG/1
2 TABLET ORAL
Qty: 0 | Refills: 0 | DISCHARGE
Start: 2018-01-03

## 2018-01-03 RX ORDER — BACITRACIN ZINC 500 UNIT/G
1 OINTMENT IN PACKET (EA) TOPICAL
Qty: 0 | Refills: 0 | DISCHARGE
Start: 2018-01-03

## 2018-01-03 RX ORDER — ATORVASTATIN CALCIUM 80 MG/1
1 TABLET, FILM COATED ORAL
Qty: 0 | Refills: 0 | DISCHARGE
Start: 2018-01-03

## 2018-01-03 RX ORDER — TAMSULOSIN HYDROCHLORIDE 0.4 MG/1
1 CAPSULE ORAL
Qty: 0 | Refills: 0 | DISCHARGE
Start: 2018-01-03

## 2018-01-03 RX ADMIN — Medication 1 APPLICATION(S): at 05:47

## 2018-01-03 RX ADMIN — Medication 100 MILLIGRAM(S): at 05:21

## 2018-01-03 RX ADMIN — SPIRONOLACTONE 12.5 MILLIGRAM(S): 25 TABLET, FILM COATED ORAL at 05:21

## 2018-01-03 RX ADMIN — Medication 1 MILLIGRAM(S): at 11:56

## 2018-01-03 RX ADMIN — Medication 50 MILLIGRAM(S): at 05:21

## 2018-01-03 RX ADMIN — Medication 100 MILLIGRAM(S): at 11:56

## 2018-01-03 RX ADMIN — Medication 81 MILLIGRAM(S): at 11:56
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
## 2018-01-03 NOTE — PROGRESS NOTE ADULT - PROBLEM SELECTOR PLAN 2
resolved, was due to complete heart block due to nonfunctioning pacemaker due to RV lead fracture now s/p repair

## 2018-01-03 NOTE — PROGRESS NOTE ADULT - ATTENDING COMMENTS
Patient is seen and examined with fellow, NP and the CCU house-staff. I agree with the history, physical and the assessment and plan.  s/p TVP - threshold  plan for possible lead extraction  TTE to evaluate LV function
Patient is seen and examined with fellow, NP and the CCU house-staff. I agree with the history, physical and the assessment and plan.  NPO post MN for lead extraction and possible re-implant
Patient seen and examined. Agree with assessment and plan as outlined above. 71 yo M with PPM lead fx status-post TVP status-post PPM lead revision.
IF CXR from 11/3 negative and EP interrogation of new pacemaker is normal and functioning, patient can be discharged home today with EP and cards followup  discharge time 45 min

## 2018-01-03 NOTE — PROGRESS NOTE ADULT - PROBLEM SELECTOR PROBLEM 5
HLD (hyperlipidemia)
Hyperlipidemia, unspecified hyperlipidemia type
HLD (hyperlipidemia)
HLD (hyperlipidemia)
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
## 2018-01-03 NOTE — PROGRESS NOTE ADULT - PROBLEM SELECTOR PLAN 5
-lipid profile acceptable  -will start atorvastatin 40mG as pt has hx of CAD w/ stent
-lipid profile acceptable  -c/w atorvastatin 40mG as pt has hx of CAD w/ stent
-lipid profile acceptable  -c/w atorvastatin 40mG as pt has hx of CAD w/ stent
c/w statin

## 2018-01-03 NOTE — PROVIDER CONTACT NOTE (OTHER) - SITUATION
pt. 4.4 sec pause on tele, ppm failure to capture on tele. VSS. patient clam in bed. EP team interrogating ppm during tele event as stated by patient.

## 2018-01-03 NOTE — PROGRESS NOTE ADULT - PROBLEM SELECTOR PLAN 1
Appreciate EP note and recommendations. Broken lead repaired today. CXR and empiric antibiotics performed.  -F/u AM CXR PA/LAT  -Repeat interrogation AM as per EP  -F/u w/ EP 2 weeks after d/c.  -Discharge as per EP Appreciate EP note and recommendations. Broken lead repaired today. CXR and empiric antibiotics performed.  -F/u AM CXR PA/LAT regarding pneumothorax  -Repeat interrogation AM as per EP  -F/u w/ EP 2 weeks after d/c. Post procedure care as per EP  -Discharge when cleared by EP, tentatively planned for today

## 2018-01-03 NOTE — PROGRESS NOTE ADULT - PROBLEM SELECTOR PROBLEM 3
CAD (coronary artery disease)
Essential hypertension
CAD (coronary artery disease)
CAD (coronary artery disease)
Coronary artery disease involving native heart without angina pectoris, unspecified vessel or lesion type

## 2018-01-03 NOTE — PROVIDER CONTACT NOTE (CHANGE IN STATUS NOTIFICATION) - ASSESSMENT
patient woke up from nap and was disoriented to place and time. Patient was also visually hallucinating. Gait and stability were off. Neuro assessment done; pupils reactive no facial asymmetry noted. Patient reoriented after 10 minutes.

## 2018-01-03 NOTE — PROCEDURE NOTE - ADDITIONAL PROCEDURE DETAILS
Indication for interrogation: Routine check post op day 1  Measured data WNL, NL PM function, Pt is  not PM dependent  Stored data revealed; no events for review   Changes made: none

## 2018-01-03 NOTE — PROGRESS NOTE ADULT - PROBLEM SELECTOR PLAN 1
Appreciate EP note and recommendations. Broken lead repaired by EP on 1/2/18  -s/p ppx antibiotics per EP  -CXR performed on 11/2 no pneumo,  -CXR performed on 11/3, personally reviewed by me, no PNA but f/u offical read  -discussed with EP NP, will interogate new pacemaker today  -F/u w/ EP 2 weeks after d/c. Post procedure care as per EP

## 2018-01-03 NOTE — PROGRESS NOTE ADULT - SUBJECTIVE AND OBJECTIVE BOX
Patient is a 72y old  Male who presents with a chief complaint of 71yo M p/w cc syncope. (01 Jan 2018 13:19)      SUBJECTIVE / OVERNIGHT EVENTS: Feels well, no overnight events. No chest pain, sob, lightheadness, syncope, n/v.     MEDICATIONS  (STANDING):  aspirin enteric coated 81 milliGRAM(s) Oral daily  atorvastatin 40 milliGRAM(s) Oral at bedtime  BACItracin   Ointment 1 Application(s) Topical two times a day  docusate sodium 100 milliGRAM(s) Oral three times a day  folic acid 1 milliGRAM(s) Oral daily  metoprolol     tartrate 50 milliGRAM(s) Oral two times a day  senna 2 Tablet(s) Oral at bedtime  spironolactone 12.5 milliGRAM(s) Oral daily  thiamine 100 milliGRAM(s) Oral daily    MEDICATIONS  (PRN):  tamsulosin 0.4 milliGRAM(s) Oral at bedtime PRN urinary retention      Vital Signs Last 24 Hrs  T(C): 37 (03 Jan 2018 05:04), Max: 37 (03 Jan 2018 05:04)  T(F): 98.6 (03 Jan 2018 05:04), Max: 98.6 (03 Jan 2018 05:04)  HR: 77 (03 Jan 2018 09:52) (77 - 110)  BP: 130/89 (03 Jan 2018 05:04) (86/65 - 149/75)  BP(mean): 95 (02 Jan 2018 21:00) (72 - 107)  RR: 19 (03 Jan 2018 05:04) (14 - 26)  SpO2: 96% (03 Jan 2018 09:52) (91% - 98%)  CAPILLARY BLOOD GLUCOSE        I&O's Summary    02 Jan 2018 07:01  -  03 Jan 2018 07:00  --------------------------------------------------------  IN: 150 mL / OUT: 550 mL / NET: -400 mL    03 Jan 2018 07:01  -  03 Jan 2018 11:27  --------------------------------------------------------  IN: 300 mL / OUT: 0 mL / NET: 300 mL        PHYSICAL EXAM:  GENERAL: NAD, well-developed  HEAD:  Atraumatic, Normocephalic  NECK: Supple, No JVD  CHEST/LUNG: Clear to auscultation bilaterally; No wheeze. pacemaker site healing well, no swelling  HEART: Regular rate and rhythm; No murmurs, rubs, or gallops  ABDOMEN: Soft, Nontender, Nondistended; Bowel sounds present  EXTREMITIES:  2+ Peripheral Pulses, No clubbing, cyanosis, or edema  PSYCH: AAOx3  Neuro: non focal, 5/5 in all extremites      LABS:                        13.6   8.8   )-----------( 97       ( 02 Jan 2018 04:51 )             41.1     01-03    140  |  102  |  21  ----------------------------<  114<H>  4.0   |  23  |  1.13    Ca    9.1      03 Jan 2018 08:50  Phos  4.4     01-02  Mg     2.1     01-03    TPro  7.4  /  Alb  3.9  /  TBili  1.1  /  DBili  x   /  AST  25  /  ALT  22  /  AlkPhos  62  01-02            RADIOLOGY & ADDITIONAL TESTS:    personally reviewed tele: sinus a paced 70-80s    Imaging Personally Reviewed: personally reviewed CXR: clear lungs, pacemaker in place, no pneuno, f/u offical read    Consultant(s) Notes Reviewed:   EP, cards    Care Discussed with Consultants/Other Providers: EP NP as below

## 2018-01-03 NOTE — PROVIDER CONTACT NOTE (CHANGE IN STATUS NOTIFICATION) - BACKGROUND
Patient came in with cardiac arrhythmia. S/p pacer maker interrogation, lead replaced surgically on 1/2/18. Vancomycin given prophylactically.  Hx of depression, HTN, CAD.

## 2018-01-03 NOTE — PROGRESS NOTE ADULT - PROBLEM SELECTOR PROBLEM 4
HTN (hypertension)
Coronary artery disease involving native heart without angina pectoris, unspecified vessel or lesion type
HTN (hypertension)
HTN (hypertension)
Prophylactic measure

## 2018-01-03 NOTE — PROVIDER CONTACT NOTE (OTHER) - ACTION/TREATMENT ORDERED:
Lisa Mcleod, NP aware, stated that tele event is artifact due to EP team interrogating PPM at the time.

## 2018-01-04 NOTE — PROGRESS NOTE ADULT - PROBLEM SELECTOR PROBLEM 1
Pacemaker failure, subsequent encounter
Pacemaker failure, subsequent encounter
Syncope and collapse
Syncope and collapse
Pacemaker failure, subsequent encounter
Syncope and collapse
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
## 2018-01-04 NOTE — PROGRESS NOTE ADULT - PROVIDER SPECIALTY LIST ADULT
CCU
Cardiology
Electrophysiology
Electrophysiology
Hospitalist
Hospitalist

## 2018-01-04 NOTE — PROGRESS NOTE ADULT - PROBLEM SELECTOR PLAN 1
S/P PPM implant on 1/3  Completed antibiotic regimen for PPM prophylaxis  Chest x-ray: lead tips in place, no PTX noted  PPM interrogated today- normal pacemaker fxn. See computer for report.  ID card, booklet and discharge instructions given, pt and wife verbalized understanding  Discharge planning and F/U with EP clinic as scheduled for device/ wound check.

## 2018-01-04 NOTE — PROGRESS NOTE ADULT - SUBJECTIVE AND OBJECTIVE BOX
LATE ENTRY NOTE- PT SEEN ON 1/3/18 @ 3PM.      INTERVAL HPI/OVERNIGHT EVENTS: Pt resting comfortably in bed, no complaints offered    MEDICATIONS  (STANDING):    MEDICATIONS  (PRN):      Allergies    No Known Allergies    Intolerances      ROS:  General: Pt denies fever/chills  Cardiovascular: denies chest pain/palpitations/leg edema  Respiratory and Thorax: denies SOB/cough/wheezing  Gastrointestinal: denies abdominal pain/diarrhea/constipation/bloody stool  Musculoskeletal: denies joint pain or swelling, denies restricted motion  Skin: denies rashes/sores      Vital Signs Last 24 Hrs  T(C): 36.8 (03 Jan 2018 16:44), Max: 36.8 (03 Jan 2018 16:44)  T(F): 98.2 (03 Jan 2018 16:44), Max: 98.2 (03 Jan 2018 16:44)  HR: 88 (03 Jan 2018 16:44) (77 - 88)  BP: 150/89 (03 Jan 2018 16:44) (129/87 - 150/89)  BP(mean): --  RR: 18 (03 Jan 2018 16:44) (18 - 18)  SpO2: 99% (03 Jan 2018 16:44) (96% - 99%)    Physical Exam:  Neurological: Alert & Oriented x 3  Respiratory: CTA B/L, No wheezing/crackles/rhonchi  Cardiovascular: (+) S1 & S2  Gastrointestinal: soft, NT, nondistended, (+) BS  Genitourinary: non distended bladder, voiding freely  Extremities: No pedal edema, No clubbing, No cyanosis  Skin:  normal skin color and pigmentation, no skin lesions        LABS:                        12.9   8.75  )-----------( 106      ( 03 Jan 2018 08:51 )             40.4     01-03    140  |  102  |  21  ----------------------------<  114<H>  4.0   |  23  |  1.13    Ca    9.1      03 Jan 2018 08:50  Mg     2.1     01-03            RADIOLOGY & ADDITIONAL TESTS:    TELE: SR 70- 80's occasional A- pacing

## 2018-01-10 PROBLEM — Z00.00 ENCOUNTER FOR PREVENTIVE HEALTH EXAMINATION: Status: ACTIVE | Noted: 2018-01-10

## 2018-01-17 RX ORDER — ATORVASTATIN CALCIUM 80 MG/1
1 TABLET, FILM COATED ORAL
Qty: 0 | Refills: 0 | COMMUNITY

## 2018-01-26 ENCOUNTER — APPOINTMENT (OUTPATIENT)
Dept: ELECTROPHYSIOLOGY | Facility: CLINIC | Age: 73
End: 2018-01-26
Payer: MEDICARE

## 2018-01-26 VITALS
OXYGEN SATURATION: 98 % | DIASTOLIC BLOOD PRESSURE: 77 MMHG | HEART RATE: 61 BPM | BODY MASS INDEX: 40.32 KG/M2 | SYSTOLIC BLOOD PRESSURE: 136 MMHG | WEIGHT: 266 LBS | HEIGHT: 68 IN

## 2018-01-26 DIAGNOSIS — R00.1 BRADYCARDIA, UNSPECIFIED: ICD-10-CM

## 2018-01-26 PROCEDURE — 99024 POSTOP FOLLOW-UP VISIT: CPT

## 2018-04-27 ENCOUNTER — APPOINTMENT (OUTPATIENT)
Dept: ELECTROPHYSIOLOGY | Facility: CLINIC | Age: 73
End: 2018-04-27
Payer: MEDICARE

## 2018-04-27 VITALS
DIASTOLIC BLOOD PRESSURE: 77 MMHG | HEIGHT: 68 IN | BODY MASS INDEX: 39.4 KG/M2 | SYSTOLIC BLOOD PRESSURE: 114 MMHG | HEART RATE: 66 BPM | OXYGEN SATURATION: 98 % | WEIGHT: 260 LBS

## 2018-04-27 PROCEDURE — 93280 PM DEVICE PROGR EVAL DUAL: CPT

## 2018-07-16 PROBLEM — Z95.2 PRESENCE OF PROSTHETIC HEART VALVE: Chronic | Status: ACTIVE | Noted: 2017-12-30

## 2018-09-19 NOTE — PROVIDER CONTACT NOTE (CHANGE IN STATUS NOTIFICATION) - SITUATION
present during morning rounds with Guilherme Radford MD and Duncan Vlilarreal NP. Thank you for this referral,   
 
[facility]  /Patient [facility] [address]. [address]   
[phone] mental status change

## 2019-01-01 ENCOUNTER — APPOINTMENT (OUTPATIENT)
Dept: CT IMAGING | Facility: CLINIC | Age: 74
End: 2019-01-01
Payer: MEDICARE

## 2019-01-01 ENCOUNTER — APPOINTMENT (OUTPATIENT)
Dept: INTERNAL MEDICINE | Facility: CLINIC | Age: 74
End: 2019-01-01
Payer: MEDICARE

## 2019-01-01 ENCOUNTER — TRANSCRIPTION ENCOUNTER (OUTPATIENT)
Age: 74
End: 2019-01-01

## 2019-01-01 ENCOUNTER — OTHER (OUTPATIENT)
Age: 74
End: 2019-01-01

## 2019-01-01 ENCOUNTER — RESULT REVIEW (OUTPATIENT)
Age: 74
End: 2019-01-01

## 2019-01-01 ENCOUNTER — APPOINTMENT (OUTPATIENT)
Dept: NEUROLOGY | Facility: CLINIC | Age: 74
End: 2019-01-01
Payer: MEDICARE

## 2019-01-01 ENCOUNTER — INPATIENT (INPATIENT)
Facility: HOSPITAL | Age: 74
LOS: 5 days | Discharge: HOME | DRG: 802 | End: 2019-12-20
Attending: INTERNAL MEDICINE | Admitting: INTERNAL MEDICINE
Payer: MEDICARE

## 2019-01-01 ENCOUNTER — APPOINTMENT (OUTPATIENT)
Dept: GASTROENTEROLOGY | Facility: CLINIC | Age: 74
End: 2019-01-01

## 2019-01-01 ENCOUNTER — MEDICATION RENEWAL (OUTPATIENT)
Age: 74
End: 2019-01-01

## 2019-01-01 ENCOUNTER — RX RENEWAL (OUTPATIENT)
Age: 74
End: 2019-01-01

## 2019-01-01 ENCOUNTER — APPOINTMENT (OUTPATIENT)
Dept: GASTROENTEROLOGY | Facility: CLINIC | Age: 74
End: 2019-01-01
Payer: MEDICARE

## 2019-01-01 ENCOUNTER — LABORATORY RESULT (OUTPATIENT)
Age: 74
End: 2019-01-01

## 2019-01-01 ENCOUNTER — OUTPATIENT (OUTPATIENT)
Dept: OUTPATIENT SERVICES | Facility: HOSPITAL | Age: 74
LOS: 1 days | End: 2019-01-01
Payer: MEDICARE

## 2019-01-01 ENCOUNTER — FORM ENCOUNTER (OUTPATIENT)
Age: 74
End: 2019-01-01

## 2019-01-01 ENCOUNTER — INPATIENT (INPATIENT)
Facility: HOSPITAL | Age: 74
LOS: 3 days | Discharge: HOME | DRG: 177 | End: 2019-11-07
Attending: HOSPITALIST | Admitting: HOSPITALIST
Payer: MEDICARE

## 2019-01-01 ENCOUNTER — APPOINTMENT (OUTPATIENT)
Dept: INTERNAL MEDICINE | Facility: CLINIC | Age: 74
End: 2019-01-01

## 2019-01-01 VITALS
HEIGHT: 68 IN | TEMPERATURE: 98.3 F | OXYGEN SATURATION: 95 % | DIASTOLIC BLOOD PRESSURE: 85 MMHG | BODY MASS INDEX: 40.77 KG/M2 | HEART RATE: 64 BPM | WEIGHT: 269 LBS | RESPIRATION RATE: 16 BRPM | SYSTOLIC BLOOD PRESSURE: 128 MMHG

## 2019-01-01 VITALS
HEIGHT: 68 IN | WEIGHT: 275 LBS | TEMPERATURE: 98.8 F | BODY MASS INDEX: 41.68 KG/M2 | HEART RATE: 98 BPM | OXYGEN SATURATION: 96 %

## 2019-01-01 VITALS
HEIGHT: 68 IN | TEMPERATURE: 98.6 F | WEIGHT: 277 LBS | BODY MASS INDEX: 41.98 KG/M2 | HEART RATE: 72 BPM | OXYGEN SATURATION: 95 %

## 2019-01-01 VITALS
DIASTOLIC BLOOD PRESSURE: 62 MMHG | HEART RATE: 71 BPM | RESPIRATION RATE: 17 BRPM | OXYGEN SATURATION: 97 % | TEMPERATURE: 98 F | SYSTOLIC BLOOD PRESSURE: 117 MMHG

## 2019-01-01 VITALS
HEART RATE: 89 BPM | BODY MASS INDEX: 41.83 KG/M2 | OXYGEN SATURATION: 98 % | TEMPERATURE: 98.2 F | HEIGHT: 68 IN | WEIGHT: 276 LBS

## 2019-01-01 VITALS
OXYGEN SATURATION: 95 % | HEART RATE: 106 BPM | WEIGHT: 220.02 LBS | SYSTOLIC BLOOD PRESSURE: 159 MMHG | RESPIRATION RATE: 22 BRPM | DIASTOLIC BLOOD PRESSURE: 83 MMHG | TEMPERATURE: 103 F | HEIGHT: 65 IN

## 2019-01-01 VITALS
WEIGHT: 275 LBS | HEIGHT: 68 IN | DIASTOLIC BLOOD PRESSURE: 69 MMHG | HEART RATE: 72 BPM | OXYGEN SATURATION: 97 % | BODY MASS INDEX: 41.68 KG/M2 | TEMPERATURE: 98.5 F | SYSTOLIC BLOOD PRESSURE: 108 MMHG

## 2019-01-01 VITALS
WEIGHT: 269 LBS | HEIGHT: 68 IN | SYSTOLIC BLOOD PRESSURE: 138 MMHG | HEART RATE: 84 BPM | OXYGEN SATURATION: 97 % | RESPIRATION RATE: 14 BRPM | BODY MASS INDEX: 40.77 KG/M2 | DIASTOLIC BLOOD PRESSURE: 88 MMHG

## 2019-01-01 VITALS
SYSTOLIC BLOOD PRESSURE: 136 MMHG | HEIGHT: 68 IN | TEMPERATURE: 103 F | DIASTOLIC BLOOD PRESSURE: 85 MMHG | RESPIRATION RATE: 19 BRPM | OXYGEN SATURATION: 93 % | WEIGHT: 274.92 LBS | HEART RATE: 91 BPM

## 2019-01-01 VITALS — DIASTOLIC BLOOD PRESSURE: 80 MMHG | BODY MASS INDEX: 40.25 KG/M2 | WEIGHT: 264.7 LBS | SYSTOLIC BLOOD PRESSURE: 122 MMHG

## 2019-01-01 VITALS — SYSTOLIC BLOOD PRESSURE: 138 MMHG | DIASTOLIC BLOOD PRESSURE: 84 MMHG

## 2019-01-01 VITALS — OXYGEN SATURATION: 99 %

## 2019-01-01 VITALS — SYSTOLIC BLOOD PRESSURE: 132 MMHG | DIASTOLIC BLOOD PRESSURE: 82 MMHG

## 2019-01-01 DIAGNOSIS — Z95.0 PRESENCE OF CARDIAC PACEMAKER: Chronic | ICD-10-CM

## 2019-01-01 DIAGNOSIS — R79.89 OTHER SPECIFIED ABNORMAL FINDINGS OF BLOOD CHEMISTRY: ICD-10-CM

## 2019-01-01 DIAGNOSIS — Z87.898 PERSONAL HISTORY OF OTHER SPECIFIED CONDITIONS: ICD-10-CM

## 2019-01-01 DIAGNOSIS — Z95.3 PRESENCE OF XENOGENIC HEART VALVE: Chronic | ICD-10-CM

## 2019-01-01 DIAGNOSIS — E66.9 OBESITY, UNSPECIFIED: ICD-10-CM

## 2019-01-01 DIAGNOSIS — J18.9 PNEUMONIA, UNSPECIFIED ORGANISM: ICD-10-CM

## 2019-01-01 DIAGNOSIS — G43.109 MIGRAINE WITH AURA, NOT INTRACTABLE, W/OUT STATUS MIGRAINOSUS: ICD-10-CM

## 2019-01-01 DIAGNOSIS — E53.8 DEFICIENCY OF OTHER SPECIFIED B GROUP VITAMINS: ICD-10-CM

## 2019-01-01 DIAGNOSIS — Z80.1 FAMILY HISTORY OF MALIGNANT NEOPLASM OF TRACHEA, BRONCHUS AND LUNG: ICD-10-CM

## 2019-01-01 DIAGNOSIS — Z12.11 ENCOUNTER FOR SCREENING FOR MALIGNANT NEOPLASM OF COLON: ICD-10-CM

## 2019-01-01 DIAGNOSIS — R41.82 ALTERED MENTAL STATUS, UNSPECIFIED: ICD-10-CM

## 2019-01-01 DIAGNOSIS — K63.5 POLYP OF COLON: ICD-10-CM

## 2019-01-01 DIAGNOSIS — J20.9 ACUTE BRONCHITIS, UNSPECIFIED: ICD-10-CM

## 2019-01-01 DIAGNOSIS — Z78.9 OTHER SPECIFIED HEALTH STATUS: ICD-10-CM

## 2019-01-01 DIAGNOSIS — G47.30 SLEEP APNEA, UNSPECIFIED: ICD-10-CM

## 2019-01-01 DIAGNOSIS — R05 COUGH: ICD-10-CM

## 2019-01-01 DIAGNOSIS — Z81.8 FAMILY HISTORY OF OTHER MENTAL AND BEHAVIORAL DISORDERS: ICD-10-CM

## 2019-01-01 DIAGNOSIS — J44.9 CHRONIC OBSTRUCTIVE PULMONARY DISEASE, UNSPECIFIED: ICD-10-CM

## 2019-01-01 DIAGNOSIS — K21.9 GASTRO-ESOPHAGEAL REFLUX DISEASE WITHOUT ESOPHAGITIS: ICD-10-CM

## 2019-01-01 DIAGNOSIS — Z95.2 PRESENCE OF PROSTHETIC HEART VALVE: ICD-10-CM

## 2019-01-01 DIAGNOSIS — I25.10 ATHEROSCLEROTIC HEART DISEASE OF NATIVE CORONARY ARTERY WITHOUT ANGINA PECTORIS: ICD-10-CM

## 2019-01-01 DIAGNOSIS — G31.84 MILD COGNITIVE IMPAIRMENT OF UNCERTAIN OR UNKNOWN ETIOLOGY: ICD-10-CM

## 2019-01-01 DIAGNOSIS — Z82.0 FAMILY HISTORY OF EPILEPSY AND OTHER DISEASES OF THE NERVOUS SYSTEM: ICD-10-CM

## 2019-01-01 DIAGNOSIS — Z86.69 PERSONAL HISTORY OF OTHER DISEASES OF THE NERVOUS SYSTEM AND SENSE ORGANS: ICD-10-CM

## 2019-01-01 DIAGNOSIS — Z86.39 PERSONAL HISTORY OF OTHER ENDOCRINE, NUTRITIONAL AND METABOLIC DISEASE: ICD-10-CM

## 2019-01-01 DIAGNOSIS — Z86.19 PERSONAL HISTORY OF OTHER INFECTIOUS AND PARASITIC DISEASES: ICD-10-CM

## 2019-01-01 DIAGNOSIS — R21 RASH AND OTHER NONSPECIFIC SKIN ERUPTION: ICD-10-CM

## 2019-01-01 DIAGNOSIS — I35.9 NONRHEUMATIC AORTIC VALVE DISORDER, UNSPECIFIED: ICD-10-CM

## 2019-01-01 DIAGNOSIS — J30.2 OTHER SEASONAL ALLERGIC RHINITIS: ICD-10-CM

## 2019-01-01 DIAGNOSIS — Z86.718 PERSONAL HISTORY OF OTHER VENOUS THROMBOSIS AND EMBOLISM: ICD-10-CM

## 2019-01-01 DIAGNOSIS — F03.90 UNSPECIFIED DEMENTIA WITHOUT BEHAVIORAL DISTURBANCE: ICD-10-CM

## 2019-01-01 DIAGNOSIS — E55.9 VITAMIN D DEFICIENCY, UNSPECIFIED: ICD-10-CM

## 2019-01-01 DIAGNOSIS — R41.0 DISORIENTATION, UNSPECIFIED: ICD-10-CM

## 2019-01-01 DIAGNOSIS — I82.409 ACUTE EMBOLISM AND THROMBOSIS OF UNSPECIFIED DEEP VEINS OF UNSPECIFIED LOWER EXTREMITY: ICD-10-CM

## 2019-01-01 LAB
25(OH)D3 SERPL-MCNC: 19.7 NG/ML
25(OH)D3 SERPL-MCNC: 22 NG/ML
ALBUMIN SERPL ELPH-MCNC: 2.7 G/DL — LOW (ref 3.3–5)
ALBUMIN SERPL ELPH-MCNC: 2.7 G/DL — LOW (ref 3.3–5)
ALBUMIN SERPL ELPH-MCNC: 2.8 G/DL — LOW (ref 3.3–5)
ALBUMIN SERPL ELPH-MCNC: 3.2 G/DL — LOW (ref 3.3–5)
ALBUMIN SERPL ELPH-MCNC: 3.2 G/DL — LOW (ref 3.3–5)
ALBUMIN SERPL ELPH-MCNC: 3.3 G/DL — SIGNIFICANT CHANGE UP (ref 3.3–5)
ALBUMIN SERPL ELPH-MCNC: 3.3 G/DL — SIGNIFICANT CHANGE UP (ref 3.3–5)
ALBUMIN SERPL ELPH-MCNC: 3.4 G/DL — SIGNIFICANT CHANGE UP (ref 3.3–5)
ALBUMIN SERPL ELPH-MCNC: 3.9 G/DL
ALBUMIN SERPL ELPH-MCNC: 4.3 G/DL
ALBUMIN SERPL ELPH-MCNC: 4.3 G/DL
ALP BLD-CCNC: 117 U/L
ALP BLD-CCNC: 60 U/L
ALP BLD-CCNC: 83 U/L
ALP SERPL-CCNC: 100 U/L — SIGNIFICANT CHANGE UP (ref 30–120)
ALP SERPL-CCNC: 111 U/L — SIGNIFICANT CHANGE UP (ref 30–120)
ALP SERPL-CCNC: 71 U/L — SIGNIFICANT CHANGE UP (ref 30–120)
ALP SERPL-CCNC: 72 U/L — SIGNIFICANT CHANGE UP (ref 30–120)
ALP SERPL-CCNC: 77 U/L — SIGNIFICANT CHANGE UP (ref 30–120)
ALP SERPL-CCNC: 81 U/L — SIGNIFICANT CHANGE UP (ref 30–120)
ALP SERPL-CCNC: 82 U/L — SIGNIFICANT CHANGE UP (ref 30–120)
ALP SERPL-CCNC: 87 U/L — SIGNIFICANT CHANGE UP (ref 30–120)
ALT FLD-CCNC: 18 U/L DA — SIGNIFICANT CHANGE UP (ref 10–60)
ALT FLD-CCNC: 18 U/L DA — SIGNIFICANT CHANGE UP (ref 10–60)
ALT FLD-CCNC: 21 U/L DA — SIGNIFICANT CHANGE UP (ref 10–60)
ALT FLD-CCNC: 24 U/L DA — SIGNIFICANT CHANGE UP (ref 10–60)
ALT FLD-CCNC: 25 U/L DA — SIGNIFICANT CHANGE UP (ref 10–60)
ALT FLD-CCNC: 26 U/L DA — SIGNIFICANT CHANGE UP (ref 10–60)
ALT SERPL-CCNC: 13 U/L
ALT SERPL-CCNC: 22 U/L
ALT SERPL-CCNC: 29 U/L
ANA SER IF-ACNC: NEGATIVE
ANION GAP SERPL CALC-SCNC: 11 MMOL/L — SIGNIFICANT CHANGE UP (ref 5–17)
ANION GAP SERPL CALC-SCNC: 13 MMOL/L
ANION GAP SERPL CALC-SCNC: 13 MMOL/L — SIGNIFICANT CHANGE UP (ref 5–17)
ANION GAP SERPL CALC-SCNC: 14 MMOL/L
ANION GAP SERPL CALC-SCNC: 14 MMOL/L — SIGNIFICANT CHANGE UP (ref 5–17)
ANION GAP SERPL CALC-SCNC: 15 MMOL/L
ANION GAP SERPL CALC-SCNC: 16 MMOL/L — SIGNIFICANT CHANGE UP (ref 5–17)
ANION GAP SERPL CALC-SCNC: 7 MMOL/L — SIGNIFICANT CHANGE UP (ref 5–17)
ANION GAP SERPL CALC-SCNC: 7 MMOL/L — SIGNIFICANT CHANGE UP (ref 5–17)
ANION GAP SERPL CALC-SCNC: 8 MMOL/L — SIGNIFICANT CHANGE UP (ref 5–17)
ANION GAP SERPL CALC-SCNC: 9 MMOL/L — SIGNIFICANT CHANGE UP (ref 5–17)
APPEARANCE UR: CLEAR — SIGNIFICANT CHANGE UP
APTT BLD: 34.9 SEC — SIGNIFICANT CHANGE UP (ref 28.5–37)
APTT BLD: 37.6 SEC — HIGH (ref 28.5–37)
AST SERPL-CCNC: 17 U/L — SIGNIFICANT CHANGE UP (ref 10–40)
AST SERPL-CCNC: 18 U/L
AST SERPL-CCNC: 20 U/L — SIGNIFICANT CHANGE UP (ref 10–40)
AST SERPL-CCNC: 21 U/L — SIGNIFICANT CHANGE UP (ref 10–40)
AST SERPL-CCNC: 23 U/L — SIGNIFICANT CHANGE UP (ref 10–40)
AST SERPL-CCNC: 24 U/L — SIGNIFICANT CHANGE UP (ref 10–40)
AST SERPL-CCNC: 25 U/L — SIGNIFICANT CHANGE UP (ref 10–40)
AST SERPL-CCNC: 26 U/L — SIGNIFICANT CHANGE UP (ref 10–40)
AST SERPL-CCNC: 29 U/L — SIGNIFICANT CHANGE UP (ref 10–40)
AST SERPL-CCNC: 32 U/L
AST SERPL-CCNC: 33 U/L
B BURGDOR IGG+IGM SER QL IB: NORMAL
BASE EXCESS BLDA CALC-SCNC: -1.9 MMOL/L — SIGNIFICANT CHANGE UP (ref -2–2)
BASE EXCESS BLDA CALC-SCNC: -6.7 MMOL/L — LOW (ref -2–2)
BASE EXCESS BLDV CALC-SCNC: -4.3 MMOL/L — LOW (ref -2–2)
BASOPHILS # BLD AUTO: 0 K/UL — SIGNIFICANT CHANGE UP (ref 0–0.2)
BASOPHILS # BLD AUTO: 0.01 K/UL — SIGNIFICANT CHANGE UP (ref 0–0.2)
BASOPHILS # BLD AUTO: 0.02 K/UL
BASOPHILS # BLD AUTO: 0.02 K/UL
BASOPHILS # BLD AUTO: 0.02 K/UL — SIGNIFICANT CHANGE UP (ref 0–0.2)
BASOPHILS # BLD AUTO: 0.02 K/UL — SIGNIFICANT CHANGE UP (ref 0–0.2)
BASOPHILS # BLD AUTO: 0.05 K/UL
BASOPHILS NFR BLD AUTO: 0 % — SIGNIFICANT CHANGE UP (ref 0–2)
BASOPHILS NFR BLD AUTO: 0.2 % — SIGNIFICANT CHANGE UP (ref 0–2)
BASOPHILS NFR BLD AUTO: 0.3 % — SIGNIFICANT CHANGE UP (ref 0–2)
BASOPHILS NFR BLD AUTO: 0.4 %
BASOPHILS NFR BLD AUTO: 0.4 %
BASOPHILS NFR BLD AUTO: 0.4 % — SIGNIFICANT CHANGE UP (ref 0–2)
BASOPHILS NFR BLD AUTO: 0.5 %
BASOPHILS NFR BLD AUTO: 0.5 % — SIGNIFICANT CHANGE UP (ref 0–2)
BASOPHILS NFR BLD AUTO: 0.6 % — SIGNIFICANT CHANGE UP (ref 0–2)
BILIRUB SERPL-MCNC: 0.6 MG/DL — SIGNIFICANT CHANGE UP (ref 0.2–1.2)
BILIRUB SERPL-MCNC: 0.7 MG/DL
BILIRUB SERPL-MCNC: 0.7 MG/DL
BILIRUB SERPL-MCNC: 0.7 MG/DL — SIGNIFICANT CHANGE UP (ref 0.2–1.2)
BILIRUB SERPL-MCNC: 0.7 MG/DL — SIGNIFICANT CHANGE UP (ref 0.2–1.2)
BILIRUB SERPL-MCNC: 0.9 MG/DL
BILIRUB SERPL-MCNC: 0.9 MG/DL — SIGNIFICANT CHANGE UP (ref 0.2–1.2)
BILIRUB SERPL-MCNC: 0.9 MG/DL — SIGNIFICANT CHANGE UP (ref 0.2–1.2)
BILIRUB SERPL-MCNC: 1 MG/DL — SIGNIFICANT CHANGE UP (ref 0.2–1.2)
BILIRUB SERPL-MCNC: 1 MG/DL — SIGNIFICANT CHANGE UP (ref 0.2–1.2)
BILIRUB SERPL-MCNC: 1.6 MG/DL — HIGH (ref 0.2–1.2)
BILIRUB UR-MCNC: NEGATIVE — SIGNIFICANT CHANGE UP
BLOOD GAS SOURCE: SIGNIFICANT CHANGE UP
BLOOD GAS SOURCE: SIGNIFICANT CHANGE UP
BUN SERPL-MCNC: 10 MG/DL — SIGNIFICANT CHANGE UP (ref 7–23)
BUN SERPL-MCNC: 10 MG/DL — SIGNIFICANT CHANGE UP (ref 7–23)
BUN SERPL-MCNC: 12 MG/DL — SIGNIFICANT CHANGE UP (ref 7–23)
BUN SERPL-MCNC: 13 MG/DL
BUN SERPL-MCNC: 13 MG/DL — SIGNIFICANT CHANGE UP (ref 7–23)
BUN SERPL-MCNC: 13 MG/DL — SIGNIFICANT CHANGE UP (ref 7–23)
BUN SERPL-MCNC: 14 MG/DL
BUN SERPL-MCNC: 14 MG/DL — SIGNIFICANT CHANGE UP (ref 7–23)
BUN SERPL-MCNC: 14 MG/DL — SIGNIFICANT CHANGE UP (ref 7–23)
BUN SERPL-MCNC: 15 MG/DL
BUN SERPL-MCNC: 15 MG/DL — SIGNIFICANT CHANGE UP (ref 7–23)
BUN SERPL-MCNC: 16 MG/DL — SIGNIFICANT CHANGE UP (ref 7–23)
BUN SERPL-MCNC: 18 MG/DL — SIGNIFICANT CHANGE UP (ref 7–23)
BUN SERPL-MCNC: 19 MG/DL — SIGNIFICANT CHANGE UP (ref 7–23)
CALCIUM SERPL-MCNC: 8.2 MG/DL — LOW (ref 8.4–10.5)
CALCIUM SERPL-MCNC: 8.3 MG/DL — LOW (ref 8.4–10.5)
CALCIUM SERPL-MCNC: 8.4 MG/DL — SIGNIFICANT CHANGE UP (ref 8.4–10.5)
CALCIUM SERPL-MCNC: 8.5 MG/DL — SIGNIFICANT CHANGE UP (ref 8.4–10.5)
CALCIUM SERPL-MCNC: 8.5 MG/DL — SIGNIFICANT CHANGE UP (ref 8.4–10.5)
CALCIUM SERPL-MCNC: 8.6 MG/DL — SIGNIFICANT CHANGE UP (ref 8.4–10.5)
CALCIUM SERPL-MCNC: 8.8 MG/DL — SIGNIFICANT CHANGE UP (ref 8.4–10.5)
CALCIUM SERPL-MCNC: 9 MG/DL — SIGNIFICANT CHANGE UP (ref 8.4–10.5)
CALCIUM SERPL-MCNC: 9 MG/DL — SIGNIFICANT CHANGE UP (ref 8.4–10.5)
CALCIUM SERPL-MCNC: 9.2 MG/DL
CALCIUM SERPL-MCNC: 9.3 MG/DL
CALCIUM SERPL-MCNC: 9.6 MG/DL
CHLORIDE SERPL-SCNC: 100 MMOL/L — SIGNIFICANT CHANGE UP (ref 96–108)
CHLORIDE SERPL-SCNC: 101 MMOL/L
CHLORIDE SERPL-SCNC: 101 MMOL/L — SIGNIFICANT CHANGE UP (ref 96–108)
CHLORIDE SERPL-SCNC: 102 MMOL/L — SIGNIFICANT CHANGE UP (ref 96–108)
CHLORIDE SERPL-SCNC: 103 MMOL/L — SIGNIFICANT CHANGE UP (ref 96–108)
CHLORIDE SERPL-SCNC: 103 MMOL/L — SIGNIFICANT CHANGE UP (ref 96–108)
CHLORIDE SERPL-SCNC: 104 MMOL/L — SIGNIFICANT CHANGE UP (ref 96–108)
CHLORIDE SERPL-SCNC: 105 MMOL/L
CHLORIDE SERPL-SCNC: 105 MMOL/L — SIGNIFICANT CHANGE UP (ref 96–108)
CHLORIDE SERPL-SCNC: 105 MMOL/L — SIGNIFICANT CHANGE UP (ref 96–108)
CHLORIDE SERPL-SCNC: 106 MMOL/L
CHOLEST SERPL-MCNC: 139 MG/DL
CHOLEST SERPL-MCNC: 151 MG/DL
CHOLEST SERPL-MCNC: 163 MG/DL
CHOLEST/HDLC SERPL: 3.5 RATIO
CHOLEST/HDLC SERPL: 4 RATIO
CHOLEST/HDLC SERPL: 4.5 RATIO
CK MB CFR SERPL CALC: 2.4 NG/ML — SIGNIFICANT CHANGE UP (ref 0–3.6)
CO2 SERPL-SCNC: 19 MMOL/L — LOW (ref 22–31)
CO2 SERPL-SCNC: 21 MMOL/L — LOW (ref 22–31)
CO2 SERPL-SCNC: 22 MMOL/L
CO2 SERPL-SCNC: 22 MMOL/L — SIGNIFICANT CHANGE UP (ref 22–31)
CO2 SERPL-SCNC: 23 MMOL/L
CO2 SERPL-SCNC: 24 MMOL/L
CO2 SERPL-SCNC: 24 MMOL/L — SIGNIFICANT CHANGE UP (ref 22–31)
CO2 SERPL-SCNC: 26 MMOL/L — SIGNIFICANT CHANGE UP (ref 22–31)
CO2 SERPL-SCNC: 28 MMOL/L — SIGNIFICANT CHANGE UP (ref 22–31)
CO2 SERPL-SCNC: 29 MMOL/L — SIGNIFICANT CHANGE UP (ref 22–31)
CO2 SERPL-SCNC: 29 MMOL/L — SIGNIFICANT CHANGE UP (ref 22–31)
CO2 SERPL-SCNC: 30 MMOL/L — SIGNIFICANT CHANGE UP (ref 22–31)
COLOR SPEC: YELLOW — SIGNIFICANT CHANGE UP
CREAT SERPL-MCNC: 1.13 MG/DL — SIGNIFICANT CHANGE UP (ref 0.5–1.3)
CREAT SERPL-MCNC: 1.15 MG/DL — SIGNIFICANT CHANGE UP (ref 0.5–1.3)
CREAT SERPL-MCNC: 1.16 MG/DL
CREAT SERPL-MCNC: 1.17 MG/DL — SIGNIFICANT CHANGE UP (ref 0.5–1.3)
CREAT SERPL-MCNC: 1.2 MG/DL
CREAT SERPL-MCNC: 1.26 MG/DL — SIGNIFICANT CHANGE UP (ref 0.5–1.3)
CREAT SERPL-MCNC: 1.26 MG/DL — SIGNIFICANT CHANGE UP (ref 0.5–1.3)
CREAT SERPL-MCNC: 1.3 MG/DL — SIGNIFICANT CHANGE UP (ref 0.5–1.3)
CREAT SERPL-MCNC: 1.32 MG/DL
CREAT SERPL-MCNC: 1.34 MG/DL — HIGH (ref 0.5–1.3)
CREAT SERPL-MCNC: 1.42 MG/DL — HIGH (ref 0.5–1.3)
CREAT SERPL-MCNC: 1.45 MG/DL — HIGH (ref 0.5–1.3)
CREAT SERPL-MCNC: 1.45 MG/DL — HIGH (ref 0.5–1.3)
CREAT SERPL-MCNC: 1.73 MG/DL — HIGH (ref 0.5–1.3)
CRP SERPL-MCNC: 0.31 MG/DL
CRP SERPL-MCNC: 5.39 MG/DL — HIGH (ref 0–0.4)
CRP SERPL-MCNC: 6.79 MG/DL — HIGH (ref 0–0.4)
CULTURE RESULTS: SIGNIFICANT CHANGE UP
DIFF PNL FLD: ABNORMAL
DIR ANTIGLOB POLYSPECIFIC INTERPRETATION: SIGNIFICANT CHANGE UP
EOSINOPHIL # BLD AUTO: 0 K/UL — SIGNIFICANT CHANGE UP (ref 0–0.5)
EOSINOPHIL # BLD AUTO: 0.02 K/UL — SIGNIFICANT CHANGE UP (ref 0–0.5)
EOSINOPHIL # BLD AUTO: 0.02 K/UL — SIGNIFICANT CHANGE UP (ref 0–0.5)
EOSINOPHIL # BLD AUTO: 0.03 K/UL — SIGNIFICANT CHANGE UP (ref 0–0.5)
EOSINOPHIL # BLD AUTO: 0.03 K/UL — SIGNIFICANT CHANGE UP (ref 0–0.5)
EOSINOPHIL # BLD AUTO: 0.05 K/UL
EOSINOPHIL # BLD AUTO: 0.06 K/UL
EOSINOPHIL # BLD AUTO: 0.09 K/UL — SIGNIFICANT CHANGE UP (ref 0–0.5)
EOSINOPHIL # BLD AUTO: 0.13 K/UL
EOSINOPHIL NFR BLD AUTO: 0 % — SIGNIFICANT CHANGE UP (ref 0–6)
EOSINOPHIL NFR BLD AUTO: 0.5 % — SIGNIFICANT CHANGE UP (ref 0–6)
EOSINOPHIL NFR BLD AUTO: 0.9 % — SIGNIFICANT CHANGE UP (ref 0–6)
EOSINOPHIL NFR BLD AUTO: 0.9 % — SIGNIFICANT CHANGE UP (ref 0–6)
EOSINOPHIL NFR BLD AUTO: 1 % — SIGNIFICANT CHANGE UP (ref 0–6)
EOSINOPHIL NFR BLD AUTO: 1.1 %
EOSINOPHIL NFR BLD AUTO: 1.3 %
EOSINOPHIL NFR BLD AUTO: 1.4 %
EOSINOPHIL NFR BLD AUTO: 2.8 % — SIGNIFICANT CHANGE UP (ref 0–6)
ERYTHROCYTE [SEDIMENTATION RATE] IN BLOOD BY WESTERGREN METHOD: 44 MM/HR
ERYTHROCYTE [SEDIMENTATION RATE] IN BLOOD: 36 MM/HR — HIGH (ref 0–9)
ESTIMATED AVERAGE GLUCOSE: 114 MG/DL
ESTIMATED AVERAGE GLUCOSE: 117 MG/DL
ESTIMATED AVERAGE GLUCOSE: 117 MG/DL
FERRITIN SERPL-MCNC: 467 NG/ML — HIGH (ref 30–400)
FERRITIN SERPL-MCNC: 685 NG/ML — HIGH (ref 30–400)
FLU A RESULT: SIGNIFICANT CHANGE UP
FLUAV AG NPH QL: SIGNIFICANT CHANGE UP
FLUAV AG NPH QL: SIGNIFICANT CHANGE UP
FLUBV AG NPH QL: SIGNIFICANT CHANGE UP
FLUBV AG NPH QL: SIGNIFICANT CHANGE UP
FOLATE SERPL-MCNC: 16.1 NG/ML — SIGNIFICANT CHANGE UP
FOLATE SERPL-MCNC: >20 NG/ML
FOLATE SERPL-MCNC: >20 NG/ML — SIGNIFICANT CHANGE UP
GAS PNL BLDV: SIGNIFICANT CHANGE UP
GLUCOSE BLDC GLUCOMTR-MCNC: 143 MG/DL — HIGH (ref 70–99)
GLUCOSE BLDC GLUCOMTR-MCNC: 152 MG/DL — HIGH (ref 70–99)
GLUCOSE BLDC GLUCOMTR-MCNC: 155 MG/DL — HIGH (ref 70–99)
GLUCOSE BLDC GLUCOMTR-MCNC: 172 MG/DL — HIGH (ref 70–99)
GLUCOSE SERPL-MCNC: 101 MG/DL
GLUCOSE SERPL-MCNC: 107 MG/DL — HIGH (ref 70–99)
GLUCOSE SERPL-MCNC: 109 MG/DL
GLUCOSE SERPL-MCNC: 110 MG/DL — HIGH (ref 70–99)
GLUCOSE SERPL-MCNC: 112 MG/DL
GLUCOSE SERPL-MCNC: 115 MG/DL — HIGH (ref 70–99)
GLUCOSE SERPL-MCNC: 117 MG/DL — HIGH (ref 70–99)
GLUCOSE SERPL-MCNC: 126 MG/DL — HIGH (ref 70–99)
GLUCOSE SERPL-MCNC: 131 MG/DL — HIGH (ref 70–99)
GLUCOSE SERPL-MCNC: 133 MG/DL — HIGH (ref 70–99)
GLUCOSE SERPL-MCNC: 154 MG/DL — HIGH (ref 70–99)
GLUCOSE SERPL-MCNC: 162 MG/DL — HIGH (ref 70–99)
GLUCOSE SERPL-MCNC: 224 MG/DL — HIGH (ref 70–99)
GLUCOSE SERPL-MCNC: 224 MG/DL — HIGH (ref 70–99)
GLUCOSE UR QL: NEGATIVE MG/DL — SIGNIFICANT CHANGE UP
HBA1C BLD-MCNC: 5.4 % — SIGNIFICANT CHANGE UP (ref 4–5.6)
HBA1C MFR BLD HPLC: 5.6 %
HBA1C MFR BLD HPLC: 5.7 %
HBA1C MFR BLD HPLC: 5.7 %
HCO3 BLDA-SCNC: 19 MMOL/L — LOW (ref 21–29)
HCO3 BLDV-SCNC: 21 MMOL/L — SIGNIFICANT CHANGE UP (ref 21–29)
HCT VFR BLD CALC: 30.5 % — LOW (ref 39–50)
HCT VFR BLD CALC: 30.9 % — LOW (ref 39–50)
HCT VFR BLD CALC: 31.5 % — LOW (ref 39–50)
HCT VFR BLD CALC: 32.9 % — LOW (ref 39–50)
HCT VFR BLD CALC: 33.5 % — LOW (ref 39–50)
HCT VFR BLD CALC: 33.9 % — LOW (ref 39–50)
HCT VFR BLD CALC: 34.5 % — LOW (ref 39–50)
HCT VFR BLD CALC: 34.8 % — LOW (ref 39–50)
HCT VFR BLD CALC: 35.6 % — LOW (ref 39–50)
HCT VFR BLD CALC: 36.7 % — LOW (ref 39–50)
HCT VFR BLD CALC: 37.1 % — LOW (ref 39–50)
HCT VFR BLD CALC: 38.4 %
HCT VFR BLD CALC: 39.6 %
HCT VFR BLD CALC: 44 %
HDLC SERPL-MCNC: 31 MG/DL
HDLC SERPL-MCNC: 38 MG/DL
HDLC SERPL-MCNC: 46 MG/DL
HGB BLD-MCNC: 10.1 G/DL — LOW (ref 13–17)
HGB BLD-MCNC: 10.4 G/DL — LOW (ref 13–17)
HGB BLD-MCNC: 10.6 G/DL — LOW (ref 13–17)
HGB BLD-MCNC: 10.9 G/DL — LOW (ref 13–17)
HGB BLD-MCNC: 11 G/DL — LOW (ref 13–17)
HGB BLD-MCNC: 11.3 G/DL — LOW (ref 13–17)
HGB BLD-MCNC: 11.7 G/DL
HGB BLD-MCNC: 12 G/DL
HGB BLD-MCNC: 12.6 G/DL
HGB BLD-MCNC: 9.6 G/DL — LOW (ref 13–17)
HOROWITZ INDEX BLDA+IHG-RTO: 21 — SIGNIFICANT CHANGE UP
HOROWITZ INDEX BLDA+IHG-RTO: 28 — SIGNIFICANT CHANGE UP
HOROWITZ INDEX BLDV+IHG-RTO: 21 — SIGNIFICANT CHANGE UP
IMM GRANULOCYTES NFR BLD AUTO: 0.3 %
IMM GRANULOCYTES NFR BLD AUTO: 0.3 % — SIGNIFICANT CHANGE UP (ref 0–1.5)
IMM GRANULOCYTES NFR BLD AUTO: 0.4 %
IMM GRANULOCYTES NFR BLD AUTO: 0.4 % — SIGNIFICANT CHANGE UP (ref 0–1.5)
IMM GRANULOCYTES NFR BLD AUTO: 0.4 % — SIGNIFICANT CHANGE UP (ref 0–1.5)
IMM GRANULOCYTES NFR BLD AUTO: 0.6 % — SIGNIFICANT CHANGE UP (ref 0–1.5)
IMM GRANULOCYTES NFR BLD AUTO: 0.9 %
IMM GRANULOCYTES NFR BLD AUTO: 0.9 % — SIGNIFICANT CHANGE UP (ref 0–1.5)
IMM GRANULOCYTES NFR BLD AUTO: 0.9 % — SIGNIFICANT CHANGE UP (ref 0–1.5)
INR BLD: 1.46 RATIO — HIGH (ref 0.88–1.16)
INR BLD: 1.48 RATIO — HIGH (ref 0.88–1.16)
IRON SATN MFR SERPL: 15 UG/DL — LOW (ref 45–165)
IRON SATN MFR SERPL: 18 % — SIGNIFICANT CHANGE UP (ref 16–55)
IRON SATN MFR SERPL: 28 UG/DL — LOW (ref 45–165)
IRON SATN MFR SERPL: 7 % — LOW (ref 16–55)
KETONES UR-MCNC: NEGATIVE — SIGNIFICANT CHANGE UP
LACTATE SERPL-SCNC: 1.9 MMOL/L — SIGNIFICANT CHANGE UP (ref 0.7–2)
LACTATE SERPL-SCNC: 2.2 MMOL/L — HIGH (ref 0.7–2)
LACTATE SERPL-SCNC: 2.3 MMOL/L — HIGH (ref 0.7–2)
LACTATE SERPL-SCNC: 2.4 MMOL/L — HIGH (ref 0.7–2)
LACTATE SERPL-SCNC: 4.1 MMOL/L — CRITICAL HIGH (ref 0.7–2)
LACTATE SERPL-SCNC: 5.5 MMOL/L — CRITICAL HIGH (ref 0.7–2)
LACTATE SERPL-SCNC: 7.6 MMOL/L — CRITICAL HIGH (ref 0.7–2)
LACTATE SERPL-SCNC: 9 MMOL/L — CRITICAL HIGH (ref 0.7–2)
LDH SERPL L TO P-CCNC: 252 U/L — HIGH (ref 50–242)
LDH SERPL L TO P-CCNC: 306 U/L — HIGH (ref 50–242)
LDLC SERPL CALC-MCNC: 85 MG/DL
LDLC SERPL CALC-MCNC: 85 MG/DL
LDLC SERPL CALC-MCNC: 88 MG/DL
LEGIONELLA AG UR QL: NEGATIVE — SIGNIFICANT CHANGE UP
LEUKOCYTE ESTERASE UR-ACNC: NEGATIVE — SIGNIFICANT CHANGE UP
LYMPHOCYTES # BLD AUTO: 0.38 K/UL — LOW (ref 1–3.3)
LYMPHOCYTES # BLD AUTO: 0.44 K/UL — LOW (ref 1–3.3)
LYMPHOCYTES # BLD AUTO: 0.48 K/UL — LOW (ref 1–3.3)
LYMPHOCYTES # BLD AUTO: 0.57 K/UL — LOW (ref 1–3.3)
LYMPHOCYTES # BLD AUTO: 0.64 K/UL
LYMPHOCYTES # BLD AUTO: 0.67 K/UL — LOW (ref 1–3.3)
LYMPHOCYTES # BLD AUTO: 0.71 K/UL — LOW (ref 1–3.3)
LYMPHOCYTES # BLD AUTO: 0.73 K/UL — LOW (ref 1–3.3)
LYMPHOCYTES # BLD AUTO: 0.74 K/UL — LOW (ref 1–3.3)
LYMPHOCYTES # BLD AUTO: 1.34 K/UL
LYMPHOCYTES # BLD AUTO: 1.9 K/UL
LYMPHOCYTES # BLD AUTO: 11.4 % — LOW (ref 13–44)
LYMPHOCYTES # BLD AUTO: 12.2 % — LOW (ref 13–44)
LYMPHOCYTES # BLD AUTO: 13.7 % — SIGNIFICANT CHANGE UP (ref 13–44)
LYMPHOCYTES # BLD AUTO: 19.1 % — SIGNIFICANT CHANGE UP (ref 13–44)
LYMPHOCYTES # BLD AUTO: 22.3 % — SIGNIFICANT CHANGE UP (ref 13–44)
LYMPHOCYTES # BLD AUTO: 22.5 % — SIGNIFICANT CHANGE UP (ref 13–44)
LYMPHOCYTES # BLD AUTO: 6 % — LOW (ref 13–44)
LYMPHOCYTES # BLD AUTO: 8.2 % — LOW (ref 13–44)
LYMPHOCYTES NFR BLD AUTO: 14.4 %
LYMPHOCYTES NFR BLD AUTO: 20.2 %
LYMPHOCYTES NFR BLD AUTO: 28.8 %
MAGNESIUM SERPL-MCNC: 1.8 MG/DL — SIGNIFICANT CHANGE UP (ref 1.6–2.6)
MAGNESIUM SERPL-MCNC: 1.8 MG/DL — SIGNIFICANT CHANGE UP (ref 1.6–2.6)
MAGNESIUM SERPL-MCNC: 1.9 MG/DL — SIGNIFICANT CHANGE UP (ref 1.6–2.6)
MAGNESIUM SERPL-MCNC: 2.1 MG/DL
MAGNESIUM SERPL-MCNC: 2.1 MG/DL — SIGNIFICANT CHANGE UP (ref 1.6–2.6)
MAGNESIUM SERPL-MCNC: 2.1 MG/DL — SIGNIFICANT CHANGE UP (ref 1.6–2.6)
MAGNESIUM SERPL-MCNC: 2.2 MG/DL — SIGNIFICANT CHANGE UP (ref 1.6–2.6)
MAN DIFF?: NORMAL
MCHC RBC-ENTMCNC: 20.9 PG — LOW (ref 27–34)
MCHC RBC-ENTMCNC: 21 PG — LOW (ref 27–34)
MCHC RBC-ENTMCNC: 21.1 PG — LOW (ref 27–34)
MCHC RBC-ENTMCNC: 21.2 PG — LOW (ref 27–34)
MCHC RBC-ENTMCNC: 21.2 PG — LOW (ref 27–34)
MCHC RBC-ENTMCNC: 21.3 PG — LOW (ref 27–34)
MCHC RBC-ENTMCNC: 21.4 PG
MCHC RBC-ENTMCNC: 21.5 PG
MCHC RBC-ENTMCNC: 21.5 PG — LOW (ref 27–34)
MCHC RBC-ENTMCNC: 21.6 PG — LOW (ref 27–34)
MCHC RBC-ENTMCNC: 21.7 PG — LOW (ref 27–34)
MCHC RBC-ENTMCNC: 21.7 PG — LOW (ref 27–34)
MCHC RBC-ENTMCNC: 21.9 PG
MCHC RBC-ENTMCNC: 21.9 PG — LOW (ref 27–34)
MCHC RBC-ENTMCNC: 28.6 GM/DL
MCHC RBC-ENTMCNC: 30 GM/DL — LOW (ref 32–36)
MCHC RBC-ENTMCNC: 30.3 GM/DL
MCHC RBC-ENTMCNC: 30.5 GM/DL
MCHC RBC-ENTMCNC: 30.5 GM/DL — LOW (ref 32–36)
MCHC RBC-ENTMCNC: 30.5 GM/DL — LOW (ref 32–36)
MCHC RBC-ENTMCNC: 30.7 GM/DL — LOW (ref 32–36)
MCHC RBC-ENTMCNC: 30.9 GM/DL — LOW (ref 32–36)
MCHC RBC-ENTMCNC: 31 GM/DL — LOW (ref 32–36)
MCHC RBC-ENTMCNC: 31.1 GM/DL — LOW (ref 32–36)
MCHC RBC-ENTMCNC: 31.3 GM/DL — LOW (ref 32–36)
MCHC RBC-ENTMCNC: 31.3 GM/DL — LOW (ref 32–36)
MCHC RBC-ENTMCNC: 31.5 GM/DL — LOW (ref 32–36)
MCHC RBC-ENTMCNC: 31.9 GM/DL — LOW (ref 32–36)
MCV RBC AUTO: 67.9 FL — LOW (ref 80–100)
MCV RBC AUTO: 68 FL — LOW (ref 80–100)
MCV RBC AUTO: 68.1 FL — LOW (ref 80–100)
MCV RBC AUTO: 68.7 FL — LOW (ref 80–100)
MCV RBC AUTO: 69.2 FL — LOW (ref 80–100)
MCV RBC AUTO: 69.5 FL — LOW (ref 80–100)
MCV RBC AUTO: 69.5 FL — LOW (ref 80–100)
MCV RBC AUTO: 70.1 FL
MCV RBC AUTO: 70.7 FL — LOW (ref 80–100)
MCV RBC AUTO: 70.7 FL — LOW (ref 80–100)
MCV RBC AUTO: 70.8 FL
MCV RBC AUTO: 76.4 FL
MONOCYTES # BLD AUTO: 0.22 K/UL — SIGNIFICANT CHANGE UP (ref 0–0.9)
MONOCYTES # BLD AUTO: 0.39 K/UL — SIGNIFICANT CHANGE UP (ref 0–0.9)
MONOCYTES # BLD AUTO: 0.41 K/UL — SIGNIFICANT CHANGE UP (ref 0–0.9)
MONOCYTES # BLD AUTO: 0.45 K/UL — SIGNIFICANT CHANGE UP (ref 0–0.9)
MONOCYTES # BLD AUTO: 0.49 K/UL — SIGNIFICANT CHANGE UP (ref 0–0.9)
MONOCYTES # BLD AUTO: 0.53 K/UL
MONOCYTES # BLD AUTO: 0.58 K/UL — SIGNIFICANT CHANGE UP (ref 0–0.9)
MONOCYTES # BLD AUTO: 0.63 K/UL — SIGNIFICANT CHANGE UP (ref 0–0.9)
MONOCYTES # BLD AUTO: 0.64 K/UL
MONOCYTES # BLD AUTO: 0.64 K/UL — SIGNIFICANT CHANGE UP (ref 0–0.9)
MONOCYTES # BLD AUTO: 0.77 K/UL
MONOCYTES NFR BLD AUTO: 10.9 % — SIGNIFICANT CHANGE UP (ref 2–14)
MONOCYTES NFR BLD AUTO: 11.4 %
MONOCYTES NFR BLD AUTO: 14.4 %
MONOCYTES NFR BLD AUTO: 18.2 % — HIGH (ref 2–14)
MONOCYTES NFR BLD AUTO: 19.2 % — HIGH (ref 2–14)
MONOCYTES NFR BLD AUTO: 19.6 % — HIGH (ref 2–14)
MONOCYTES NFR BLD AUTO: 2.5 % — SIGNIFICANT CHANGE UP (ref 2–14)
MONOCYTES NFR BLD AUTO: 20.2 % — HIGH (ref 2–14)
MONOCYTES NFR BLD AUTO: 4.1 % — SIGNIFICANT CHANGE UP (ref 2–14)
MONOCYTES NFR BLD AUTO: 8.2 %
MONOCYTES NFR BLD AUTO: 9 % — SIGNIFICANT CHANGE UP (ref 2–14)
NEUTROPHILS # BLD AUTO: 1.19 K/UL — LOW (ref 1.8–7.4)
NEUTROPHILS # BLD AUTO: 1.37 K/UL — LOW (ref 1.8–7.4)
NEUTROPHILS # BLD AUTO: 1.77 K/UL — LOW (ref 1.8–7.4)
NEUTROPHILS # BLD AUTO: 2.06 K/UL — SIGNIFICANT CHANGE UP (ref 1.8–7.4)
NEUTROPHILS # BLD AUTO: 2.68 K/UL
NEUTROPHILS # BLD AUTO: 3.07 K/UL
NEUTROPHILS # BLD AUTO: 4.15 K/UL — SIGNIFICANT CHANGE UP (ref 1.8–7.4)
NEUTROPHILS # BLD AUTO: 4.5 K/UL — SIGNIFICANT CHANGE UP (ref 1.8–7.4)
NEUTROPHILS # BLD AUTO: 6.51 K/UL
NEUTROPHILS # BLD AUTO: 7.97 K/UL — HIGH (ref 1.8–7.4)
NEUTROPHILS # BLD AUTO: 8.53 K/UL — HIGH (ref 1.8–7.4)
NEUTROPHILS NFR BLD AUTO: 55.5 % — SIGNIFICANT CHANGE UP (ref 43–77)
NEUTROPHILS NFR BLD AUTO: 56 % — SIGNIFICANT CHANGE UP (ref 43–77)
NEUTROPHILS NFR BLD AUTO: 57.4 %
NEUTROPHILS NFR BLD AUTO: 59.5 % — SIGNIFICANT CHANGE UP (ref 43–77)
NEUTROPHILS NFR BLD AUTO: 66 % — SIGNIFICANT CHANGE UP (ref 43–77)
NEUTROPHILS NFR BLD AUTO: 69.1 %
NEUTROPHILS NFR BLD AUTO: 69.4 %
NEUTROPHILS NFR BLD AUTO: 76.5 % — SIGNIFICANT CHANGE UP (ref 43–77)
NEUTROPHILS NFR BLD AUTO: 76.7 % — SIGNIFICANT CHANGE UP (ref 43–77)
NEUTROPHILS NFR BLD AUTO: 89 % — HIGH (ref 43–77)
NEUTROPHILS NFR BLD AUTO: 89.5 % — HIGH (ref 43–77)
NITRITE UR-MCNC: NEGATIVE — SIGNIFICANT CHANGE UP
NRBC # BLD: 0 /100 WBCS — SIGNIFICANT CHANGE UP (ref 0–0)
NT-PROBNP SERPL-SCNC: 3620 PG/ML — HIGH (ref 0–450)
NT-PROBNP SERPL-SCNC: 841 PG/ML — HIGH (ref 0–450)
NT-PROBNP SERPL-SCNC: 907 PG/ML — HIGH (ref 0–450)
PCO2 BLDA: 27 MMHG — LOW (ref 32–46)
PCO2 BLDA: 29 MMHG — LOW (ref 32–46)
PCO2 BLDV: 36 MMHG — SIGNIFICANT CHANGE UP (ref 35–50)
PCP SPEC-MCNC: SIGNIFICANT CHANGE UP
PH BLD: 7.42 — SIGNIFICANT CHANGE UP (ref 7.35–7.45)
PH BLD: 7.47 — HIGH (ref 7.35–7.45)
PH BLDV: 7.36 — SIGNIFICANT CHANGE UP (ref 7.35–7.45)
PH UR: 5 — SIGNIFICANT CHANGE UP (ref 5–8)
PHOSPHATE SERPL-MCNC: 2.4 MG/DL — LOW (ref 2.5–4.5)
PHOSPHATE SERPL-MCNC: 2.9 MG/DL — SIGNIFICANT CHANGE UP (ref 2.5–4.5)
PHOSPHATE SERPL-MCNC: 3.1 MG/DL
PHOSPHATE SERPL-MCNC: 3.3 MG/DL — SIGNIFICANT CHANGE UP (ref 2.5–4.5)
PHOSPHATE SERPL-MCNC: 3.5 MG/DL — SIGNIFICANT CHANGE UP (ref 2.5–4.5)
PHOSPHATE SERPL-MCNC: 4.2 MG/DL — SIGNIFICANT CHANGE UP (ref 2.5–4.5)
PHOSPHATE SERPL-MCNC: 4.2 MG/DL — SIGNIFICANT CHANGE UP (ref 2.5–4.5)
PHOSPHATE SERPL-MCNC: 4.4 MG/DL — SIGNIFICANT CHANGE UP (ref 2.5–4.5)
PLATELET # BLD AUTO: 105 K/UL — LOW (ref 150–400)
PLATELET # BLD AUTO: 105 K/UL — LOW (ref 150–400)
PLATELET # BLD AUTO: 115 K/UL
PLATELET # BLD AUTO: 130 K/UL
PLATELET # BLD AUTO: 185 K/UL
PLATELET # BLD AUTO: 52 K/UL — LOW (ref 150–400)
PLATELET # BLD AUTO: 53 K/UL — LOW (ref 150–400)
PLATELET # BLD AUTO: 68 K/UL — LOW (ref 150–400)
PLATELET # BLD AUTO: 70 K/UL — LOW (ref 150–400)
PLATELET # BLD AUTO: 70 K/UL — LOW (ref 150–400)
PLATELET # BLD AUTO: 84 K/UL — LOW (ref 150–400)
PLATELET # BLD AUTO: 90 K/UL — LOW (ref 150–400)
PLATELET # BLD AUTO: 95 K/UL — LOW (ref 150–400)
PLATELET # BLD AUTO: 98 K/UL — LOW (ref 150–400)
PO2 BLDA: 100 MMHG — SIGNIFICANT CHANGE UP (ref 74–108)
PO2 BLDA: 154 MMHG — HIGH (ref 74–108)
PO2 BLDV: 60 MMHG — HIGH (ref 25–45)
POTASSIUM SERPL-MCNC: 3.4 MMOL/L — LOW (ref 3.5–5.3)
POTASSIUM SERPL-MCNC: 3.7 MMOL/L — SIGNIFICANT CHANGE UP (ref 3.5–5.3)
POTASSIUM SERPL-MCNC: 3.8 MMOL/L — SIGNIFICANT CHANGE UP (ref 3.5–5.3)
POTASSIUM SERPL-MCNC: 4 MMOL/L — SIGNIFICANT CHANGE UP (ref 3.5–5.3)
POTASSIUM SERPL-MCNC: 4.2 MMOL/L — SIGNIFICANT CHANGE UP (ref 3.5–5.3)
POTASSIUM SERPL-SCNC: 3.4 MMOL/L — LOW (ref 3.5–5.3)
POTASSIUM SERPL-SCNC: 3.7 MMOL/L — SIGNIFICANT CHANGE UP (ref 3.5–5.3)
POTASSIUM SERPL-SCNC: 3.8 MMOL/L — SIGNIFICANT CHANGE UP (ref 3.5–5.3)
POTASSIUM SERPL-SCNC: 4 MMOL/L — SIGNIFICANT CHANGE UP (ref 3.5–5.3)
POTASSIUM SERPL-SCNC: 4.2 MMOL/L — SIGNIFICANT CHANGE UP (ref 3.5–5.3)
POTASSIUM SERPL-SCNC: 4.4 MMOL/L
POTASSIUM SERPL-SCNC: 4.7 MMOL/L
POTASSIUM SERPL-SCNC: 4.9 MMOL/L
PROCALCITONIN SERPL-MCNC: 0.1 NG/ML — SIGNIFICANT CHANGE UP (ref 0.02–0.1)
PROCALCITONIN SERPL-MCNC: 0.16 NG/ML — HIGH (ref 0.02–0.1)
PROT SERPL-MCNC: 6.9 G/DL — SIGNIFICANT CHANGE UP (ref 6–8.3)
PROT SERPL-MCNC: 7.2 G/DL — SIGNIFICANT CHANGE UP (ref 6–8.3)
PROT SERPL-MCNC: 7.2 G/DL — SIGNIFICANT CHANGE UP (ref 6–8.3)
PROT SERPL-MCNC: 7.3 G/DL
PROT SERPL-MCNC: 7.3 G/DL
PROT SERPL-MCNC: 7.4 G/DL — SIGNIFICANT CHANGE UP (ref 6–8.3)
PROT SERPL-MCNC: 7.5 G/DL
PROT SERPL-MCNC: 7.6 G/DL — SIGNIFICANT CHANGE UP (ref 6–8.3)
PROT SERPL-MCNC: 7.7 G/DL — SIGNIFICANT CHANGE UP (ref 6–8.3)
PROT SERPL-MCNC: 8.3 G/DL — SIGNIFICANT CHANGE UP (ref 6–8.3)
PROT SERPL-MCNC: 8.3 G/DL — SIGNIFICANT CHANGE UP (ref 6–8.3)
PROT UR-MCNC: 15 MG/DL
PROTHROM AB SERPL-ACNC: 16.1 SEC — HIGH (ref 10–12.9)
PROTHROM AB SERPL-ACNC: 16.3 SEC — HIGH (ref 10–12.9)
RAPID RVP RESULT: SIGNIFICANT CHANGE UP
RBC # BLD: 4.44 M/UL — SIGNIFICANT CHANGE UP (ref 4.2–5.8)
RBC # BLD: 4.5 M/UL — SIGNIFICANT CHANGE UP (ref 4.2–5.8)
RBC # BLD: 4.53 M/UL — SIGNIFICANT CHANGE UP (ref 4.2–5.8)
RBC # BLD: 4.83 M/UL — SIGNIFICANT CHANGE UP (ref 4.2–5.8)
RBC # BLD: 4.88 M/UL — SIGNIFICANT CHANGE UP (ref 4.2–5.8)
RBC # BLD: 4.93 M/UL — SIGNIFICANT CHANGE UP (ref 4.2–5.8)
RBC # BLD: 5.02 M/UL — SIGNIFICANT CHANGE UP (ref 4.2–5.8)
RBC # BLD: 5.03 M/UL — SIGNIFICANT CHANGE UP (ref 4.2–5.8)
RBC # BLD: 5.19 M/UL — SIGNIFICANT CHANGE UP (ref 4.2–5.8)
RBC # BLD: 5.24 M/UL — SIGNIFICANT CHANGE UP (ref 4.2–5.8)
RBC # BLD: 5.25 M/UL — SIGNIFICANT CHANGE UP (ref 4.2–5.8)
RBC # BLD: 5.48 M/UL
RBC # BLD: 5.59 M/UL
RBC # BLD: 5.76 M/UL
RBC # FLD: 14.6 % — HIGH (ref 10.3–14.5)
RBC # FLD: 14.7 % — HIGH (ref 10.3–14.5)
RBC # FLD: 15 % — HIGH (ref 10.3–14.5)
RBC # FLD: 15.1 % — HIGH (ref 10.3–14.5)
RBC # FLD: 15.1 % — HIGH (ref 10.3–14.5)
RBC # FLD: 15.3 % — HIGH (ref 10.3–14.5)
RBC # FLD: 15.4 %
RBC # FLD: 15.4 % — HIGH (ref 10.3–14.5)
RBC # FLD: 15.5 %
RBC # FLD: 15.5 % — HIGH (ref 10.3–14.5)
RBC # FLD: 15.7 % — HIGH (ref 10.3–14.5)
RBC # FLD: 17.6 %
RBC CASTS # UR COMP ASSIST: ABNORMAL /HPF (ref 0–4)
RSV RESULT: SIGNIFICANT CHANGE UP
RSV RESULT: SIGNIFICANT CHANGE UP
RSV RNA RESP QL NAA+PROBE: SIGNIFICANT CHANGE UP
RSV RNA RESP QL NAA+PROBE: SIGNIFICANT CHANGE UP
S PNEUM AG SER QL: SIGNIFICANT CHANGE UP
SAO2 % BLDA: 98 % — HIGH (ref 92–96)
SAO2 % BLDA: >99 % — HIGH (ref 92–96)
SAO2 % BLDV: 89 % — HIGH (ref 67–88)
SODIUM SERPL-SCNC: 136 MMOL/L — SIGNIFICANT CHANGE UP (ref 135–145)
SODIUM SERPL-SCNC: 136 MMOL/L — SIGNIFICANT CHANGE UP (ref 135–145)
SODIUM SERPL-SCNC: 137 MMOL/L — SIGNIFICANT CHANGE UP (ref 135–145)
SODIUM SERPL-SCNC: 138 MMOL/L
SODIUM SERPL-SCNC: 138 MMOL/L — SIGNIFICANT CHANGE UP (ref 135–145)
SODIUM SERPL-SCNC: 140 MMOL/L — SIGNIFICANT CHANGE UP (ref 135–145)
SODIUM SERPL-SCNC: 140 MMOL/L — SIGNIFICANT CHANGE UP (ref 135–145)
SODIUM SERPL-SCNC: 141 MMOL/L — SIGNIFICANT CHANGE UP (ref 135–145)
SODIUM SERPL-SCNC: 141 MMOL/L — SIGNIFICANT CHANGE UP (ref 135–145)
SODIUM SERPL-SCNC: 142 MMOL/L
SODIUM SERPL-SCNC: 142 MMOL/L — SIGNIFICANT CHANGE UP (ref 135–145)
SODIUM SERPL-SCNC: 143 MMOL/L
SP GR SPEC: 1.01 — SIGNIFICANT CHANGE UP (ref 1.01–1.02)
SPECIMEN SOURCE: SIGNIFICANT CHANGE UP
T PALLIDUM AB SER QL IA: NEGATIVE
T4 FREE SERPL-MCNC: 1.1 NG/DL — SIGNIFICANT CHANGE UP (ref 0.9–1.8)
TIBC SERPL-MCNC: 152 UG/DL — LOW (ref 220–430)
TIBC SERPL-MCNC: 229 UG/DL — SIGNIFICANT CHANGE UP (ref 220–430)
TM INTERPRETATION: SIGNIFICANT CHANGE UP
TRIGL SERPL-MCNC: 115 MG/DL
TRIGL SERPL-MCNC: 138 MG/DL
TRIGL SERPL-MCNC: 146 MG/DL
TROPONIN I SERPL-MCNC: 0 NG/ML — LOW (ref 0.02–0.06)
TROPONIN I SERPL-MCNC: 0.04 NG/ML — SIGNIFICANT CHANGE UP (ref 0.02–0.06)
TROPONIN I SERPL-MCNC: 0.11 NG/ML — HIGH (ref 0.02–0.06)
TROPONIN I SERPL-MCNC: 0.14 NG/ML — HIGH (ref 0.02–0.06)
TROPONIN I SERPL-MCNC: 0.17 NG/ML — HIGH (ref 0.02–0.06)
TSH SERPL-ACNC: 1.91 UIU/ML
TSH SERPL-ACNC: 2.01 UIU/ML
TSH SERPL-ACNC: 2.66 UIU/ML
TSH SERPL-MCNC: 1 UIU/ML — SIGNIFICANT CHANGE UP (ref 0.27–4.2)
TSH SERPL-MCNC: 1.48 UIU/ML — SIGNIFICANT CHANGE UP (ref 0.27–4.2)
UIBC SERPL-MCNC: 124 UG/DL — SIGNIFICANT CHANGE UP (ref 110–370)
UIBC SERPL-MCNC: 214 UG/DL — SIGNIFICANT CHANGE UP (ref 110–370)
UROBILINOGEN FLD QL: NEGATIVE MG/DL — SIGNIFICANT CHANGE UP
VANCOMYCIN TROUGH SERPL-MCNC: 8.9 UG/ML — LOW (ref 10–20)
VIT B12 SERPL-MCNC: 312 PG/ML — SIGNIFICANT CHANGE UP (ref 232–1245)
VIT B12 SERPL-MCNC: 441 PG/ML
VIT B12 SERPL-MCNC: 468 PG/ML
VIT B12 SERPL-MCNC: 608 PG/ML — SIGNIFICANT CHANGE UP (ref 232–1245)
VIT B12 SERPL-MCNC: 863 PG/ML
WBC # BLD: 2.09 K/UL — LOW (ref 3.8–10.5)
WBC # BLD: 2.3 K/UL — LOW (ref 3.8–10.5)
WBC # BLD: 2.91 K/UL — LOW (ref 3.8–10.5)
WBC # BLD: 3.12 K/UL — LOW (ref 3.8–10.5)
WBC # BLD: 3.19 K/UL — LOW (ref 3.8–10.5)
WBC # BLD: 4.97 K/UL — SIGNIFICANT CHANGE UP (ref 3.8–10.5)
WBC # BLD: 5.42 K/UL — SIGNIFICANT CHANGE UP (ref 3.8–10.5)
WBC # BLD: 5.87 K/UL — SIGNIFICANT CHANGE UP (ref 3.8–10.5)
WBC # BLD: 7.67 K/UL — SIGNIFICANT CHANGE UP (ref 3.8–10.5)
WBC # BLD: 8.95 K/UL — SIGNIFICANT CHANGE UP (ref 3.8–10.5)
WBC # BLD: 9.53 K/UL — SIGNIFICANT CHANGE UP (ref 3.8–10.5)
WBC # FLD AUTO: 2.09 K/UL — LOW (ref 3.8–10.5)
WBC # FLD AUTO: 2.3 K/UL — LOW (ref 3.8–10.5)
WBC # FLD AUTO: 2.91 K/UL — LOW (ref 3.8–10.5)
WBC # FLD AUTO: 3.12 K/UL — LOW (ref 3.8–10.5)
WBC # FLD AUTO: 3.19 K/UL — LOW (ref 3.8–10.5)
WBC # FLD AUTO: 4.45 K/UL
WBC # FLD AUTO: 4.66 K/UL
WBC # FLD AUTO: 4.97 K/UL — SIGNIFICANT CHANGE UP (ref 3.8–10.5)
WBC # FLD AUTO: 5.42 K/UL — SIGNIFICANT CHANGE UP (ref 3.8–10.5)
WBC # FLD AUTO: 5.87 K/UL — SIGNIFICANT CHANGE UP (ref 3.8–10.5)
WBC # FLD AUTO: 7.67 K/UL — SIGNIFICANT CHANGE UP (ref 3.8–10.5)
WBC # FLD AUTO: 8.95 K/UL — SIGNIFICANT CHANGE UP (ref 3.8–10.5)
WBC # FLD AUTO: 9.39 K/UL
WBC # FLD AUTO: 9.53 K/UL — SIGNIFICANT CHANGE UP (ref 3.8–10.5)

## 2019-01-01 PROCEDURE — 84484 ASSAY OF TROPONIN QUANT: CPT

## 2019-01-01 PROCEDURE — 36415 COLL VENOUS BLD VENIPUNCTURE: CPT

## 2019-01-01 PROCEDURE — 12345: CPT | Mod: NC

## 2019-01-01 PROCEDURE — 20206 BIOPSY MUSCLE PERQ NEEDLE: CPT

## 2019-01-01 PROCEDURE — 70450 CT HEAD/BRAIN W/O DYE: CPT | Mod: 26

## 2019-01-01 PROCEDURE — 88333 PATH CONSLTJ SURG CYTO XM 1: CPT | Mod: 26

## 2019-01-01 PROCEDURE — 80048 BASIC METABOLIC PNL TOTAL CA: CPT

## 2019-01-01 PROCEDURE — 99233 SBSQ HOSP IP/OBS HIGH 50: CPT

## 2019-01-01 PROCEDURE — 88333 PATH CONSLTJ SURG CYTO XM 1: CPT

## 2019-01-01 PROCEDURE — 88305 TISSUE EXAM BY PATHOLOGIST: CPT | Mod: 26

## 2019-01-01 PROCEDURE — 88305 TISSUE EXAM BY PATHOLOGIST: CPT

## 2019-01-01 PROCEDURE — 99285 EMERGENCY DEPT VISIT HI MDM: CPT | Mod: 25

## 2019-01-01 PROCEDURE — 82565 ASSAY OF CREATININE: CPT

## 2019-01-01 PROCEDURE — 99222 1ST HOSP IP/OBS MODERATE 55: CPT

## 2019-01-01 PROCEDURE — 83615 LACTATE (LD) (LDH) ENZYME: CPT

## 2019-01-01 PROCEDURE — 84145 PROCALCITONIN (PCT): CPT

## 2019-01-01 PROCEDURE — 94640 AIRWAY INHALATION TREATMENT: CPT

## 2019-01-01 PROCEDURE — 99232 SBSQ HOSP IP/OBS MODERATE 35: CPT

## 2019-01-01 PROCEDURE — 80053 COMPREHEN METABOLIC PANEL: CPT

## 2019-01-01 PROCEDURE — 80202 ASSAY OF VANCOMYCIN: CPT

## 2019-01-01 PROCEDURE — 93306 TTE W/DOPPLER COMPLETE: CPT

## 2019-01-01 PROCEDURE — 87040 BLOOD CULTURE FOR BACTERIA: CPT

## 2019-01-01 PROCEDURE — 74261 CT COLONOGRAPHY DX: CPT | Mod: 26

## 2019-01-01 PROCEDURE — 97161 PT EVAL LOW COMPLEX 20 MIN: CPT

## 2019-01-01 PROCEDURE — 96365 THER/PROPH/DIAG IV INF INIT: CPT

## 2019-01-01 PROCEDURE — 90653 IIV ADJUVANT VACCINE IM: CPT

## 2019-01-01 PROCEDURE — 93970 EXTREMITY STUDY: CPT

## 2019-01-01 PROCEDURE — G0008: CPT

## 2019-01-01 PROCEDURE — 87205 SMEAR GRAM STAIN: CPT

## 2019-01-01 PROCEDURE — 83550 IRON BINDING TEST: CPT

## 2019-01-01 PROCEDURE — 74176 CT ABD & PELVIS W/O CONTRAST: CPT | Mod: 26

## 2019-01-01 PROCEDURE — 87633 RESP VIRUS 12-25 TARGETS: CPT

## 2019-01-01 PROCEDURE — 74261 CT COLONOGRAPHY DX: CPT

## 2019-01-01 PROCEDURE — 70496 CT ANGIOGRAPHY HEAD: CPT | Mod: 26

## 2019-01-01 PROCEDURE — 99214 OFFICE O/P EST MOD 30 MIN: CPT

## 2019-01-01 PROCEDURE — 71250 CT THORAX DX C-: CPT | Mod: 26

## 2019-01-01 PROCEDURE — 87899 AGENT NOS ASSAY W/OPTIC: CPT

## 2019-01-01 PROCEDURE — 85027 COMPLETE CBC AUTOMATED: CPT

## 2019-01-01 PROCEDURE — 99239 HOSP IP/OBS DSCHRG MGMT >30: CPT

## 2019-01-01 PROCEDURE — 87581 M.PNEUMON DNA AMP PROBE: CPT

## 2019-01-01 PROCEDURE — 71045 X-RAY EXAM CHEST 1 VIEW: CPT | Mod: 26

## 2019-01-01 PROCEDURE — 84439 ASSAY OF FREE THYROXINE: CPT

## 2019-01-01 PROCEDURE — 90792 PSYCH DIAG EVAL W/MED SRVCS: CPT

## 2019-01-01 PROCEDURE — 99203 OFFICE O/P NEW LOW 30 MIN: CPT

## 2019-01-01 PROCEDURE — 83735 ASSAY OF MAGNESIUM: CPT

## 2019-01-01 PROCEDURE — 82607 VITAMIN B-12: CPT

## 2019-01-01 PROCEDURE — 97110 THERAPEUTIC EXERCISES: CPT

## 2019-01-01 PROCEDURE — 70450 CT HEAD/BRAIN W/O DYE: CPT

## 2019-01-01 PROCEDURE — 82962 GLUCOSE BLOOD TEST: CPT

## 2019-01-01 PROCEDURE — 49180 BIOPSY ABDOMINAL MASS: CPT

## 2019-01-01 PROCEDURE — 93005 ELECTROCARDIOGRAM TRACING: CPT

## 2019-01-01 PROCEDURE — 70498 CT ANGIOGRAPHY NECK: CPT | Mod: 26

## 2019-01-01 PROCEDURE — 99231 SBSQ HOSP IP/OBS SF/LOW 25: CPT

## 2019-01-01 PROCEDURE — 96368 THER/DIAG CONCURRENT INF: CPT

## 2019-01-01 PROCEDURE — 99205 OFFICE O/P NEW HI 60 MIN: CPT

## 2019-01-01 PROCEDURE — 94660 CPAP INITIATION&MGMT: CPT

## 2019-01-01 PROCEDURE — 84100 ASSAY OF PHOSPHORUS: CPT

## 2019-01-01 PROCEDURE — 82746 ASSAY OF FOLIC ACID SERUM: CPT

## 2019-01-01 PROCEDURE — 85610 PROTHROMBIN TIME: CPT

## 2019-01-01 PROCEDURE — 99213 OFFICE O/P EST LOW 20 MIN: CPT | Mod: 25

## 2019-01-01 PROCEDURE — 77012 CT SCAN FOR NEEDLE BIOPSY: CPT

## 2019-01-01 PROCEDURE — 94664 DEMO&/EVAL PT USE INHALER: CPT

## 2019-01-01 PROCEDURE — 93306 TTE W/DOPPLER COMPLETE: CPT | Mod: 26

## 2019-01-01 PROCEDURE — 99223 1ST HOSP IP/OBS HIGH 75: CPT

## 2019-01-01 PROCEDURE — 83880 ASSAY OF NATRIURETIC PEPTIDE: CPT

## 2019-01-01 PROCEDURE — 87449 NOS EACH ORGANISM AG IA: CPT

## 2019-01-01 PROCEDURE — 70460 CT HEAD/BRAIN W/DYE: CPT

## 2019-01-01 PROCEDURE — 86880 COOMBS TEST DIRECT: CPT

## 2019-01-01 PROCEDURE — 81001 URINALYSIS AUTO W/SCOPE: CPT

## 2019-01-01 PROCEDURE — 83540 ASSAY OF IRON: CPT

## 2019-01-01 PROCEDURE — 87798 DETECT AGENT NOS DNA AMP: CPT

## 2019-01-01 PROCEDURE — 71045 X-RAY EXAM CHEST 1 VIEW: CPT

## 2019-01-01 PROCEDURE — 70496 CT ANGIOGRAPHY HEAD: CPT

## 2019-01-01 PROCEDURE — 93970 EXTREMITY STUDY: CPT | Mod: 26

## 2019-01-01 PROCEDURE — 86140 C-REACTIVE PROTEIN: CPT

## 2019-01-01 PROCEDURE — 94760 N-INVAS EAR/PLS OXIMETRY 1: CPT

## 2019-01-01 PROCEDURE — 83036 HEMOGLOBIN GLYCOSYLATED A1C: CPT

## 2019-01-01 PROCEDURE — 74176 CT ABD & PELVIS W/O CONTRAST: CPT

## 2019-01-01 PROCEDURE — 82803 BLOOD GASES ANY COMBINATION: CPT

## 2019-01-01 PROCEDURE — 82728 ASSAY OF FERRITIN: CPT

## 2019-01-01 PROCEDURE — 99495 TRANSJ CARE MGMT MOD F2F 14D: CPT

## 2019-01-01 PROCEDURE — 76705 ECHO EXAM OF ABDOMEN: CPT

## 2019-01-01 PROCEDURE — 93010 ELECTROCARDIOGRAM REPORT: CPT

## 2019-01-01 PROCEDURE — 83605 ASSAY OF LACTIC ACID: CPT

## 2019-01-01 PROCEDURE — 99285 EMERGENCY DEPT VISIT HI MDM: CPT

## 2019-01-01 PROCEDURE — 99238 HOSP IP/OBS DSCHRG MGMT 30/<: CPT

## 2019-01-01 PROCEDURE — 76705 ECHO EXAM OF ABDOMEN: CPT | Mod: 26

## 2019-01-01 PROCEDURE — 85652 RBC SED RATE AUTOMATED: CPT

## 2019-01-01 PROCEDURE — 96366 THER/PROPH/DIAG IV INF ADDON: CPT

## 2019-01-01 PROCEDURE — 82553 CREATINE MB FRACTION: CPT

## 2019-01-01 PROCEDURE — 80307 DRUG TEST PRSMV CHEM ANLYZR: CPT

## 2019-01-01 PROCEDURE — 88184 FLOWCYTOMETRY/ TC 1 MARKER: CPT

## 2019-01-01 PROCEDURE — 87086 URINE CULTURE/COLONY COUNT: CPT

## 2019-01-01 PROCEDURE — 96367 TX/PROPH/DG ADDL SEQ IV INF: CPT

## 2019-01-01 PROCEDURE — 84443 ASSAY THYROID STIM HORMONE: CPT

## 2019-01-01 PROCEDURE — 87631 RESP VIRUS 3-5 TARGETS: CPT

## 2019-01-01 PROCEDURE — 71250 CT THORAX DX C-: CPT

## 2019-01-01 PROCEDURE — 97116 GAIT TRAINING THERAPY: CPT

## 2019-01-01 PROCEDURE — 96375 TX/PRO/DX INJ NEW DRUG ADDON: CPT

## 2019-01-01 PROCEDURE — 88185 FLOWCYTOMETRY/TC ADD-ON: CPT

## 2019-01-01 PROCEDURE — 85730 THROMBOPLASTIN TIME PARTIAL: CPT

## 2019-01-01 PROCEDURE — 99291 CRITICAL CARE FIRST HOUR: CPT

## 2019-01-01 PROCEDURE — 99213 OFFICE O/P EST LOW 20 MIN: CPT

## 2019-01-01 PROCEDURE — 70498 CT ANGIOGRAPHY NECK: CPT

## 2019-01-01 PROCEDURE — 77012 CT SCAN FOR NEEDLE BIOPSY: CPT | Mod: 26

## 2019-01-01 PROCEDURE — 87486 CHLMYD PNEUM DNA AMP PROBE: CPT

## 2019-01-01 PROCEDURE — 92610 EVALUATE SWALLOWING FUNCTION: CPT

## 2019-01-01 PROCEDURE — 88189 FLOWCYTOMETRY/READ 16 & >: CPT

## 2019-01-01 RX ORDER — PANTOPRAZOLE 40 MG/1
40 TABLET, DELAYED RELEASE ORAL DAILY
Qty: 90 | Refills: 1 | Status: ACTIVE | COMMUNITY
Start: 2019-01-01 | End: 1900-01-01

## 2019-01-01 RX ORDER — ACETAMINOPHEN 500 MG
2 TABLET ORAL
Qty: 0 | Refills: 0 | DISCHARGE
Start: 2019-01-01

## 2019-01-01 RX ORDER — FOLIC ACID 0.8 MG
1 TABLET ORAL
Qty: 0 | Refills: 0 | DISCHARGE
Start: 2019-01-01

## 2019-01-01 RX ORDER — ENOXAPARIN SODIUM 100 MG/ML
40 INJECTION SUBCUTANEOUS EVERY 12 HOURS
Refills: 0 | Status: DISCONTINUED | OUTPATIENT
Start: 2019-01-01 | End: 2019-01-01

## 2019-01-01 RX ORDER — FERROUS SULFATE 325(65) MG
325 TABLET ORAL
Refills: 0 | Status: DISCONTINUED | OUTPATIENT
Start: 2019-01-01 | End: 2019-01-01

## 2019-01-01 RX ORDER — METOPROLOL TARTRATE 50 MG
1 TABLET ORAL
Qty: 0 | Refills: 0 | DISCHARGE

## 2019-01-01 RX ORDER — FOLIC ACID 0.8 MG
1 TABLET ORAL DAILY
Refills: 0 | Status: DISCONTINUED | OUTPATIENT
Start: 2019-01-01 | End: 2019-01-01

## 2019-01-01 RX ORDER — SODIUM CHLORIDE 9 MG/ML
1000 INJECTION, SOLUTION INTRAVENOUS ONCE
Refills: 0 | Status: COMPLETED | OUTPATIENT
Start: 2019-01-01 | End: 2019-01-01

## 2019-01-01 RX ORDER — PANTOPRAZOLE SODIUM 20 MG/1
40 TABLET, DELAYED RELEASE ORAL
Refills: 0 | Status: DISCONTINUED | OUTPATIENT
Start: 2019-01-01 | End: 2019-01-01

## 2019-01-01 RX ORDER — FOLIC ACID 1 MG/1
1 TABLET ORAL DAILY
Qty: 90 | Refills: 3 | Status: ACTIVE | COMMUNITY
Start: 2019-01-01 | End: 1900-01-01

## 2019-01-01 RX ORDER — PIPERACILLIN AND TAZOBACTAM 4; .5 G/20ML; G/20ML
3.38 INJECTION, POWDER, LYOPHILIZED, FOR SOLUTION INTRAVENOUS EVERY 8 HOURS
Refills: 0 | Status: DISCONTINUED | OUTPATIENT
Start: 2019-01-01 | End: 2019-01-01

## 2019-01-01 RX ORDER — MAGNESIUM SULFATE 500 MG/ML
2 VIAL (ML) INJECTION ONCE
Refills: 0 | Status: COMPLETED | OUTPATIENT
Start: 2019-01-01 | End: 2019-01-01

## 2019-01-01 RX ORDER — PREGABALIN 225 MG/1
1000 CAPSULE ORAL DAILY
Refills: 0 | Status: DISCONTINUED | OUTPATIENT
Start: 2019-01-01 | End: 2019-01-01

## 2019-01-01 RX ORDER — ACETAMINOPHEN 500 MG
650 TABLET ORAL EVERY 6 HOURS
Refills: 0 | Status: DISCONTINUED | OUTPATIENT
Start: 2019-01-01 | End: 2019-01-01

## 2019-01-01 RX ORDER — SODIUM,POTASSIUM PHOSPHATES 278-250MG
1 POWDER IN PACKET (EA) ORAL
Refills: 0 | Status: COMPLETED | OUTPATIENT
Start: 2019-01-01 | End: 2019-01-01

## 2019-01-01 RX ORDER — DOCUSATE SODIUM 100 MG
0 CAPSULE ORAL
Qty: 0 | Refills: 0 | DISCHARGE

## 2019-01-01 RX ORDER — IPRATROPIUM/ALBUTEROL SULFATE 18-103MCG
3 AEROSOL WITH ADAPTER (GRAM) INHALATION EVERY 6 HOURS
Refills: 0 | Status: DISCONTINUED | OUTPATIENT
Start: 2019-01-01 | End: 2019-01-01

## 2019-01-01 RX ORDER — ATORVASTATIN CALCIUM 10 MG/1
10 TABLET, FILM COATED ORAL
Qty: 90 | Refills: 0 | Status: ACTIVE | COMMUNITY
Start: 2019-01-01 | End: 1900-01-01

## 2019-01-01 RX ORDER — HEPARIN SODIUM 5000 [USP'U]/ML
5000 INJECTION INTRAVENOUS; SUBCUTANEOUS EVERY 8 HOURS
Refills: 0 | Status: DISCONTINUED | OUTPATIENT
Start: 2019-01-01 | End: 2019-01-01

## 2019-01-01 RX ORDER — BUDESONIDE AND FORMOTEROL FUMARATE DIHYDRATE 160; 4.5 UG/1; UG/1
2 AEROSOL RESPIRATORY (INHALATION)
Refills: 0 | Status: DISCONTINUED | OUTPATIENT
Start: 2019-01-01 | End: 2019-01-01

## 2019-01-01 RX ORDER — SODIUM CHLORIDE 9 MG/ML
1000 INJECTION INTRAMUSCULAR; INTRAVENOUS; SUBCUTANEOUS ONCE
Refills: 0 | Status: COMPLETED | OUTPATIENT
Start: 2019-01-01 | End: 2019-01-01

## 2019-01-01 RX ORDER — THIAMINE MONONITRATE (VIT B1) 100 MG
1 TABLET ORAL
Qty: 0 | Refills: 0 | DISCHARGE
Start: 2019-01-01

## 2019-01-01 RX ORDER — SODIUM BICARBONATE 1 MEQ/ML
0.11 SYRINGE (ML) INTRAVENOUS
Qty: 150 | Refills: 0 | Status: DISCONTINUED | OUTPATIENT
Start: 2019-01-01 | End: 2019-01-01

## 2019-01-01 RX ORDER — THIAMINE MONONITRATE (VIT B1) 100 MG
1 TABLET ORAL
Qty: 0 | Refills: 0 | DISCHARGE

## 2019-01-01 RX ORDER — ASPIRIN/CALCIUM CARB/MAGNESIUM 324 MG
81 TABLET ORAL DAILY
Refills: 0 | Status: DISCONTINUED | OUTPATIENT
Start: 2019-01-01 | End: 2019-01-01

## 2019-01-01 RX ORDER — CEFTRIAXONE 500 MG/1
1000 INJECTION, POWDER, FOR SOLUTION INTRAMUSCULAR; INTRAVENOUS EVERY 24 HOURS
Refills: 0 | Status: DISCONTINUED | OUTPATIENT
Start: 2019-01-01 | End: 2019-01-01

## 2019-01-01 RX ORDER — ASPIRIN/CALCIUM CARB/MAGNESIUM 324 MG
1 TABLET ORAL
Qty: 0 | Refills: 0 | DISCHARGE
Start: 2019-01-01

## 2019-01-01 RX ORDER — ATORVASTATIN CALCIUM 80 MG/1
10 TABLET, FILM COATED ORAL AT BEDTIME
Refills: 0 | Status: DISCONTINUED | OUTPATIENT
Start: 2019-01-01 | End: 2019-01-01

## 2019-01-01 RX ORDER — SODIUM BICARBONATE 1 MEQ/ML
50 SYRINGE (ML) INTRAVENOUS ONCE
Refills: 0 | Status: COMPLETED | OUTPATIENT
Start: 2019-01-01 | End: 2019-01-01

## 2019-01-01 RX ORDER — IPRATROPIUM/ALBUTEROL SULFATE 18-103MCG
3 AEROSOL WITH ADAPTER (GRAM) INHALATION ONCE
Refills: 0 | Status: COMPLETED | OUTPATIENT
Start: 2019-01-01 | End: 2019-01-01

## 2019-01-01 RX ORDER — FOLIC ACID 0.8 MG
1 TABLET ORAL
Qty: 0 | Refills: 0 | DISCHARGE

## 2019-01-01 RX ORDER — BUDESONIDE AND FORMOTEROL FUMARATE DIHYDRATE 160; 4.5 UG/1; UG/1
1 AEROSOL RESPIRATORY (INHALATION)
Qty: 0 | Refills: 0 | DISCHARGE
Start: 2019-01-01

## 2019-01-01 RX ORDER — PREGABALIN 225 MG/1
1000 CAPSULE ORAL ONCE
Refills: 0 | Status: COMPLETED | OUTPATIENT
Start: 2019-01-01 | End: 2019-01-01

## 2019-01-01 RX ORDER — TIOTROPIUM BROMIDE 18 UG/1
1 CAPSULE ORAL; RESPIRATORY (INHALATION) DAILY
Refills: 0 | Status: DISCONTINUED | OUTPATIENT
Start: 2019-01-01 | End: 2019-01-01

## 2019-01-01 RX ORDER — ASPIRIN/CALCIUM CARB/MAGNESIUM 324 MG
1 TABLET ORAL
Qty: 0 | Refills: 0 | DISCHARGE

## 2019-01-01 RX ORDER — AZITHROMYCIN 500 MG/1
500 TABLET, FILM COATED ORAL EVERY 24 HOURS
Refills: 0 | Status: DISCONTINUED | OUTPATIENT
Start: 2019-01-01 | End: 2019-01-01

## 2019-01-01 RX ORDER — CETIRIZINE HYDROCHLORIDE 10 MG/1
1 TABLET ORAL
Qty: 0 | Refills: 0 | DISCHARGE

## 2019-01-01 RX ORDER — ALBUTEROL 90 UG/1
2 AEROSOL, METERED ORAL EVERY 6 HOURS
Refills: 0 | Status: DISCONTINUED | OUTPATIENT
Start: 2019-01-01 | End: 2019-01-01

## 2019-01-01 RX ORDER — SODIUM BICARBONATE 1 MEQ/ML
100 SYRINGE (ML) INTRAVENOUS ONCE
Refills: 0 | Status: DISCONTINUED | OUTPATIENT
Start: 2019-01-01 | End: 2019-01-01

## 2019-01-01 RX ORDER — IPRATROPIUM/ALBUTEROL SULFATE 18-103MCG
3 AEROSOL WITH ADAPTER (GRAM) INHALATION EVERY 4 HOURS
Refills: 0 | Status: DISCONTINUED | OUTPATIENT
Start: 2019-01-01 | End: 2019-01-01

## 2019-01-01 RX ORDER — VANCOMYCIN HCL 1 G
1000 VIAL (EA) INTRAVENOUS EVERY 12 HOURS
Refills: 0 | Status: DISCONTINUED | OUTPATIENT
Start: 2019-01-01 | End: 2019-01-01

## 2019-01-01 RX ORDER — METOPROLOL TARTRATE 50 MG
1 TABLET ORAL
Qty: 0 | Refills: 0 | DISCHARGE
Start: 2019-01-01

## 2019-01-01 RX ORDER — BUDESONIDE AND FORMOTEROL FUMARATE DIHYDRATE 160; 4.5 UG/1; UG/1
1 AEROSOL RESPIRATORY (INHALATION)
Qty: 1 | Refills: 0
Start: 2019-01-01 | End: 2019-01-01

## 2019-01-01 RX ORDER — IMIPRAMINE HYDROCHLORIDE 25 MG/1
25 TABLET, FILM COATED ORAL
Qty: 90 | Refills: 0 | Status: ACTIVE | COMMUNITY
Start: 2019-01-01 | End: 1900-01-01

## 2019-01-01 RX ORDER — BETAMETHASONE DIPROPIONATE 0.5 MG/G
0.05 CREAM TOPICAL TWICE DAILY
Refills: 0 | Status: ACTIVE | COMMUNITY
Start: 2019-01-01

## 2019-01-01 RX ORDER — PREDNISONE 10 MG/1
10 TABLET ORAL
Refills: 0 | Status: DISCONTINUED | COMMUNITY
Start: 2019-01-01 | End: 2019-01-01

## 2019-01-01 RX ORDER — THIAMINE MONONITRATE (VIT B1) 100 MG
100 TABLET ORAL DAILY
Refills: 0 | Status: DISCONTINUED | OUTPATIENT
Start: 2019-01-01 | End: 2019-01-01

## 2019-01-01 RX ORDER — METOPROLOL TARTRATE 50 MG
50 TABLET ORAL
Refills: 0 | Status: DISCONTINUED | OUTPATIENT
Start: 2019-01-01 | End: 2019-01-01

## 2019-01-01 RX ORDER — METOPROLOL TARTRATE 50 MG/1
50 TABLET, FILM COATED ORAL TWICE DAILY
Qty: 180 | Refills: 0 | Status: ACTIVE | COMMUNITY
Start: 2019-01-01 | End: 1900-01-01

## 2019-01-01 RX ORDER — ACETAMINOPHEN 500 MG
650 TABLET ORAL ONCE
Refills: 0 | Status: COMPLETED | OUTPATIENT
Start: 2019-01-01 | End: 2019-01-01

## 2019-01-01 RX ORDER — ENOXAPARIN SODIUM 100 MG/ML
40 INJECTION SUBCUTANEOUS DAILY
Refills: 0 | Status: DISCONTINUED | OUTPATIENT
Start: 2019-01-01 | End: 2019-01-01

## 2019-01-01 RX ORDER — CETIRIZINE HYDROCHLORIDE 10 MG/1
10 TABLET, CHEWABLE ORAL DAILY
Qty: 30 | Refills: 0 | Status: ACTIVE | COMMUNITY
Start: 2019-01-01

## 2019-01-01 RX ORDER — PREGABALIN 225 MG/1
1 CAPSULE ORAL
Qty: 14 | Refills: 0
Start: 2019-01-01 | End: 2019-01-01

## 2019-01-01 RX ORDER — IPRATROPIUM/ALBUTEROL SULFATE 18-103MCG
3 AEROSOL WITH ADAPTER (GRAM) INHALATION
Qty: 0 | Refills: 0 | DISCHARGE
Start: 2019-01-01

## 2019-01-01 RX ORDER — MAGNESIUM HYDROXIDE 400 MG/1
30 TABLET, CHEWABLE ORAL DAILY
Refills: 0 | Status: DISCONTINUED | OUTPATIENT
Start: 2019-01-01 | End: 2019-01-01

## 2019-01-01 RX ORDER — CEFPODOXIME PROXETIL 100 MG
1 TABLET ORAL
Qty: 6 | Refills: 0
Start: 2019-01-01 | End: 2019-01-01

## 2019-01-01 RX ORDER — SODIUM CHLORIDE 9 MG/ML
1000 INJECTION, SOLUTION INTRAVENOUS
Refills: 0 | Status: DISCONTINUED | OUTPATIENT
Start: 2019-01-01 | End: 2019-01-01

## 2019-01-01 RX ORDER — AZITHROMYCIN 500 MG/1
500 TABLET, FILM COATED ORAL ONCE
Refills: 0 | Status: COMPLETED | OUTPATIENT
Start: 2019-01-01 | End: 2019-01-01

## 2019-01-01 RX ORDER — ASPIRIN 81 MG/1
81 TABLET, COATED ORAL DAILY
Refills: 0 | Status: ACTIVE | COMMUNITY
Start: 2019-01-01

## 2019-01-01 RX ORDER — ATORVASTATIN CALCIUM 80 MG/1
1 TABLET, FILM COATED ORAL
Qty: 0 | Refills: 0 | DISCHARGE

## 2019-01-01 RX ORDER — IOHEXOL 300 MG/ML
30 INJECTION, SOLUTION INTRAVENOUS ONCE
Refills: 0 | Status: COMPLETED | OUTPATIENT
Start: 2019-01-01 | End: 2019-01-01

## 2019-01-01 RX ORDER — CEFTRIAXONE 500 MG/1
1000 INJECTION, POWDER, FOR SOLUTION INTRAMUSCULAR; INTRAVENOUS ONCE
Refills: 0 | Status: COMPLETED | OUTPATIENT
Start: 2019-01-01 | End: 2019-01-01

## 2019-01-01 RX ORDER — ATORVASTATIN CALCIUM 80 MG/1
1 TABLET, FILM COATED ORAL
Qty: 0 | Refills: 0 | DISCHARGE
Start: 2019-01-01

## 2019-01-01 RX ORDER — SODIUM CHLORIDE 9 MG/ML
1000 INJECTION INTRAMUSCULAR; INTRAVENOUS; SUBCUTANEOUS
Refills: 0 | Status: DISCONTINUED | OUTPATIENT
Start: 2019-01-01 | End: 2019-01-01

## 2019-01-01 RX ORDER — PIPERACILLIN AND TAZOBACTAM 4; .5 G/20ML; G/20ML
3.38 INJECTION, POWDER, LYOPHILIZED, FOR SOLUTION INTRAVENOUS ONCE
Refills: 0 | Status: COMPLETED | OUTPATIENT
Start: 2019-01-01 | End: 2019-01-01

## 2019-01-01 RX ORDER — VANCOMYCIN HCL 1 G
1000 VIAL (EA) INTRAVENOUS ONCE
Refills: 0 | Status: COMPLETED | OUTPATIENT
Start: 2019-01-01 | End: 2019-01-01

## 2019-01-01 RX ADMIN — Medication 3 MILLILITER(S): at 05:34

## 2019-01-01 RX ADMIN — Medication 3 MILLILITER(S): at 03:01

## 2019-01-01 RX ADMIN — Medication 100 MILLIGRAM(S): at 20:25

## 2019-01-01 RX ADMIN — Medication 125 MILLIGRAM(S): at 06:01

## 2019-01-01 RX ADMIN — Medication 3 MILLILITER(S): at 00:36

## 2019-01-01 RX ADMIN — Medication 250 MILLIGRAM(S): at 17:20

## 2019-01-01 RX ADMIN — Medication 1 MILLIGRAM(S): at 12:20

## 2019-01-01 RX ADMIN — Medication 81 MILLIGRAM(S): at 11:20

## 2019-01-01 RX ADMIN — HEPARIN SODIUM 5000 UNIT(S): 5000 INJECTION INTRAVENOUS; SUBCUTANEOUS at 05:31

## 2019-01-01 RX ADMIN — Medication 50 MILLIGRAM(S): at 18:28

## 2019-01-01 RX ADMIN — Medication 3 MILLILITER(S): at 07:11

## 2019-01-01 RX ADMIN — Medication 100 MILLIGRAM(S): at 06:04

## 2019-01-01 RX ADMIN — PIPERACILLIN AND TAZOBACTAM 25 GRAM(S): 4; .5 INJECTION, POWDER, LYOPHILIZED, FOR SOLUTION INTRAVENOUS at 22:24

## 2019-01-01 RX ADMIN — HEPARIN SODIUM 5000 UNIT(S): 5000 INJECTION INTRAVENOUS; SUBCUTANEOUS at 23:02

## 2019-01-01 RX ADMIN — Medication 3 MILLILITER(S): at 07:21

## 2019-01-01 RX ADMIN — TIOTROPIUM BROMIDE 1 CAPSULE(S): 18 CAPSULE ORAL; RESPIRATORY (INHALATION) at 06:52

## 2019-01-01 RX ADMIN — Medication 1 MILLIGRAM(S): at 11:38

## 2019-01-01 RX ADMIN — Medication 100 MILLIGRAM(S): at 15:35

## 2019-01-01 RX ADMIN — ATORVASTATIN CALCIUM 10 MILLIGRAM(S): 80 TABLET, FILM COATED ORAL at 21:01

## 2019-01-01 RX ADMIN — HEPARIN SODIUM 5000 UNIT(S): 5000 INJECTION INTRAVENOUS; SUBCUTANEOUS at 05:48

## 2019-01-01 RX ADMIN — Medication 100 MILLIGRAM(S): at 11:17

## 2019-01-01 RX ADMIN — Medication 50 MILLIGRAM(S): at 06:04

## 2019-01-01 RX ADMIN — Medication 50 MILLIGRAM(S): at 05:15

## 2019-01-01 RX ADMIN — Medication 3 MILLILITER(S): at 07:08

## 2019-01-01 RX ADMIN — AZITHROMYCIN 255 MILLIGRAM(S): 500 TABLET, FILM COATED ORAL at 05:08

## 2019-01-01 RX ADMIN — Medication 50 MILLIGRAM(S): at 17:56

## 2019-01-01 RX ADMIN — Medication 50 MILLIGRAM(S): at 06:06

## 2019-01-01 RX ADMIN — Medication 650 MILLIGRAM(S): at 20:15

## 2019-01-01 RX ADMIN — Medication 1 MILLIGRAM(S): at 11:43

## 2019-01-01 RX ADMIN — BUDESONIDE AND FORMOTEROL FUMARATE DIHYDRATE 2 PUFF(S): 160; 4.5 AEROSOL RESPIRATORY (INHALATION) at 06:36

## 2019-01-01 RX ADMIN — BUDESONIDE AND FORMOTEROL FUMARATE DIHYDRATE 2 PUFF(S): 160; 4.5 AEROSOL RESPIRATORY (INHALATION) at 06:52

## 2019-01-01 RX ADMIN — Medication 1 PACKET(S): at 17:27

## 2019-01-01 RX ADMIN — Medication 3 MILLILITER(S): at 14:29

## 2019-01-01 RX ADMIN — Medication 50 GRAM(S): at 20:35

## 2019-01-01 RX ADMIN — Medication 10 MILLIGRAM(S): at 06:13

## 2019-01-01 RX ADMIN — TIOTROPIUM BROMIDE 1 CAPSULE(S): 18 CAPSULE ORAL; RESPIRATORY (INHALATION) at 06:44

## 2019-01-01 RX ADMIN — SODIUM CHLORIDE 100 MILLILITER(S): 9 INJECTION, SOLUTION INTRAVENOUS at 05:35

## 2019-01-01 RX ADMIN — ATORVASTATIN CALCIUM 10 MILLIGRAM(S): 80 TABLET, FILM COATED ORAL at 21:02

## 2019-01-01 RX ADMIN — AZITHROMYCIN 255 MILLIGRAM(S): 500 TABLET, FILM COATED ORAL at 05:22

## 2019-01-01 RX ADMIN — ATORVASTATIN CALCIUM 10 MILLIGRAM(S): 80 TABLET, FILM COATED ORAL at 22:24

## 2019-01-01 RX ADMIN — ATORVASTATIN CALCIUM 10 MILLIGRAM(S): 80 TABLET, FILM COATED ORAL at 22:23

## 2019-01-01 RX ADMIN — Medication 250 MILLIGRAM(S): at 06:55

## 2019-01-01 RX ADMIN — Medication 50 GRAM(S): at 09:33

## 2019-01-01 RX ADMIN — Medication 25 MILLIGRAM(S): at 11:20

## 2019-01-01 RX ADMIN — Medication 10 MILLIGRAM(S): at 06:43

## 2019-01-01 RX ADMIN — MAGNESIUM HYDROXIDE 30 MILLILITER(S): 400 TABLET, CHEWABLE ORAL at 06:25

## 2019-01-01 RX ADMIN — BUDESONIDE AND FORMOTEROL FUMARATE DIHYDRATE 2 PUFF(S): 160; 4.5 AEROSOL RESPIRATORY (INHALATION) at 17:44

## 2019-01-01 RX ADMIN — PANTOPRAZOLE SODIUM 40 MILLIGRAM(S): 20 TABLET, DELAYED RELEASE ORAL at 05:30

## 2019-01-01 RX ADMIN — PANTOPRAZOLE SODIUM 40 MILLIGRAM(S): 20 TABLET, DELAYED RELEASE ORAL at 05:15

## 2019-01-01 RX ADMIN — ENOXAPARIN SODIUM 40 MILLIGRAM(S): 100 INJECTION SUBCUTANEOUS at 12:08

## 2019-01-01 RX ADMIN — PREGABALIN 1000 MICROGRAM(S): 225 CAPSULE ORAL at 12:15

## 2019-01-01 RX ADMIN — Medication 25 MILLIGRAM(S): at 12:15

## 2019-01-01 RX ADMIN — SODIUM CHLORIDE 45 MILLILITER(S): 9 INJECTION, SOLUTION INTRAVENOUS at 18:37

## 2019-01-01 RX ADMIN — Medication 650 MILLIGRAM(S): at 16:18

## 2019-01-01 RX ADMIN — Medication 25 MILLIGRAM(S): at 11:37

## 2019-01-01 RX ADMIN — Medication 3 MILLILITER(S): at 20:21

## 2019-01-01 RX ADMIN — PREGABALIN 1000 MICROGRAM(S): 225 CAPSULE ORAL at 12:08

## 2019-01-01 RX ADMIN — Medication 25 MILLIGRAM(S): at 15:35

## 2019-01-01 RX ADMIN — Medication 125 MILLIGRAM(S): at 05:14

## 2019-01-01 RX ADMIN — PANTOPRAZOLE SODIUM 40 MILLIGRAM(S): 20 TABLET, DELAYED RELEASE ORAL at 06:20

## 2019-01-01 RX ADMIN — HEPARIN SODIUM 5000 UNIT(S): 5000 INJECTION INTRAVENOUS; SUBCUTANEOUS at 22:20

## 2019-01-01 RX ADMIN — Medication 1 MILLIGRAM(S): at 15:35

## 2019-01-01 RX ADMIN — Medication 50 MILLIEQUIVALENT(S): at 20:35

## 2019-01-01 RX ADMIN — Medication 20 MILLIGRAM(S): at 05:31

## 2019-01-01 RX ADMIN — ATORVASTATIN CALCIUM 10 MILLIGRAM(S): 80 TABLET, FILM COATED ORAL at 22:21

## 2019-01-01 RX ADMIN — CEFTRIAXONE 100 MILLIGRAM(S): 500 INJECTION, POWDER, FOR SOLUTION INTRAMUSCULAR; INTRAVENOUS at 05:06

## 2019-01-01 RX ADMIN — Medication 1 MILLIGRAM(S): at 22:33

## 2019-01-01 RX ADMIN — Medication 3 MILLILITER(S): at 13:06

## 2019-01-01 RX ADMIN — PANTOPRAZOLE SODIUM 40 MILLIGRAM(S): 20 TABLET, DELAYED RELEASE ORAL at 05:26

## 2019-01-01 RX ADMIN — Medication 10 MILLIGRAM(S): at 17:38

## 2019-01-01 RX ADMIN — Medication 650 MILLIGRAM(S): at 05:02

## 2019-01-01 RX ADMIN — Medication 3 MILLILITER(S): at 20:18

## 2019-01-01 RX ADMIN — Medication 1 MILLIGRAM(S): at 13:23

## 2019-01-01 RX ADMIN — SODIUM CHLORIDE 1000 MILLILITER(S): 9 INJECTION INTRAMUSCULAR; INTRAVENOUS; SUBCUTANEOUS at 04:00

## 2019-01-01 RX ADMIN — Medication 650 MILLIGRAM(S): at 05:36

## 2019-01-01 RX ADMIN — Medication 50 MILLIGRAM(S): at 05:25

## 2019-01-01 RX ADMIN — BUDESONIDE AND FORMOTEROL FUMARATE DIHYDRATE 2 PUFF(S): 160; 4.5 AEROSOL RESPIRATORY (INHALATION) at 06:20

## 2019-01-01 RX ADMIN — TIOTROPIUM BROMIDE 1 CAPSULE(S): 18 CAPSULE ORAL; RESPIRATORY (INHALATION) at 18:12

## 2019-01-01 RX ADMIN — Medication 3 MILLILITER(S): at 01:51

## 2019-01-01 RX ADMIN — TIOTROPIUM BROMIDE 1 CAPSULE(S): 18 CAPSULE ORAL; RESPIRATORY (INHALATION) at 06:14

## 2019-01-01 RX ADMIN — Medication 325 MILLIGRAM(S): at 05:09

## 2019-01-01 RX ADMIN — Medication 3 MILLILITER(S): at 13:36

## 2019-01-01 RX ADMIN — HEPARIN SODIUM 5000 UNIT(S): 5000 INJECTION INTRAVENOUS; SUBCUTANEOUS at 05:24

## 2019-01-01 RX ADMIN — PIPERACILLIN AND TAZOBACTAM 25 GRAM(S): 4; .5 INJECTION, POWDER, LYOPHILIZED, FOR SOLUTION INTRAVENOUS at 14:43

## 2019-01-01 RX ADMIN — Medication 325 MILLIGRAM(S): at 17:44

## 2019-01-01 RX ADMIN — Medication 25 MILLIGRAM(S): at 15:34

## 2019-01-01 RX ADMIN — CEFTRIAXONE 100 MILLIGRAM(S): 500 INJECTION, POWDER, FOR SOLUTION INTRAMUSCULAR; INTRAVENOUS at 05:41

## 2019-01-01 RX ADMIN — Medication 650 MILLIGRAM(S): at 07:00

## 2019-01-01 RX ADMIN — PIPERACILLIN AND TAZOBACTAM 25 GRAM(S): 4; .5 INJECTION, POWDER, LYOPHILIZED, FOR SOLUTION INTRAVENOUS at 13:32

## 2019-01-01 RX ADMIN — PANTOPRAZOLE SODIUM 40 MILLIGRAM(S): 20 TABLET, DELAYED RELEASE ORAL at 06:13

## 2019-01-01 RX ADMIN — Medication 650 MILLIGRAM(S): at 21:01

## 2019-01-01 RX ADMIN — PREGABALIN 1000 MICROGRAM(S): 225 CAPSULE ORAL at 12:20

## 2019-01-01 RX ADMIN — AZITHROMYCIN 255 MILLIGRAM(S): 500 TABLET, FILM COATED ORAL at 06:03

## 2019-01-01 RX ADMIN — HEPARIN SODIUM 5000 UNIT(S): 5000 INJECTION INTRAVENOUS; SUBCUTANEOUS at 06:03

## 2019-01-01 RX ADMIN — BUDESONIDE AND FORMOTEROL FUMARATE DIHYDRATE 2 PUFF(S): 160; 4.5 AEROSOL RESPIRATORY (INHALATION) at 17:49

## 2019-01-01 RX ADMIN — Medication 3 MILLILITER(S): at 13:40

## 2019-01-01 RX ADMIN — Medication 650 MILLIGRAM(S): at 21:37

## 2019-01-01 RX ADMIN — Medication 650 MILLIGRAM(S): at 06:06

## 2019-01-01 RX ADMIN — Medication 20 MILLIGRAM(S): at 05:26

## 2019-01-01 RX ADMIN — HEPARIN SODIUM 5000 UNIT(S): 5000 INJECTION INTRAVENOUS; SUBCUTANEOUS at 13:16

## 2019-01-01 RX ADMIN — Medication 1 MILLIGRAM(S): at 13:25

## 2019-01-01 RX ADMIN — Medication 3 MILLILITER(S): at 07:03

## 2019-01-01 RX ADMIN — BUDESONIDE AND FORMOTEROL FUMARATE DIHYDRATE 2 PUFF(S): 160; 4.5 AEROSOL RESPIRATORY (INHALATION) at 06:04

## 2019-01-01 RX ADMIN — BUDESONIDE AND FORMOTEROL FUMARATE DIHYDRATE 2 PUFF(S): 160; 4.5 AEROSOL RESPIRATORY (INHALATION) at 06:44

## 2019-01-01 RX ADMIN — Medication 1 MILLIGRAM(S): at 11:26

## 2019-01-01 RX ADMIN — ATORVASTATIN CALCIUM 10 MILLIGRAM(S): 80 TABLET, FILM COATED ORAL at 22:18

## 2019-01-01 RX ADMIN — Medication 81 MILLIGRAM(S): at 12:20

## 2019-01-01 RX ADMIN — Medication 50 MILLIGRAM(S): at 17:28

## 2019-01-01 RX ADMIN — HEPARIN SODIUM 5000 UNIT(S): 5000 INJECTION INTRAVENOUS; SUBCUTANEOUS at 05:09

## 2019-01-01 RX ADMIN — Medication 3 MILLILITER(S): at 20:13

## 2019-01-01 RX ADMIN — Medication 100 MILLIGRAM(S): at 21:11

## 2019-01-01 RX ADMIN — PANTOPRAZOLE SODIUM 40 MILLIGRAM(S): 20 TABLET, DELAYED RELEASE ORAL at 06:06

## 2019-01-01 RX ADMIN — SODIUM CHLORIDE 45 MILLILITER(S): 9 INJECTION, SOLUTION INTRAVENOUS at 16:00

## 2019-01-01 RX ADMIN — Medication 81 MILLIGRAM(S): at 15:34

## 2019-01-01 RX ADMIN — Medication 1 PACKET(S): at 06:43

## 2019-01-01 RX ADMIN — Medication 650 MILLIGRAM(S): at 05:59

## 2019-01-01 RX ADMIN — BUDESONIDE AND FORMOTEROL FUMARATE DIHYDRATE 2 PUFF(S): 160; 4.5 AEROSOL RESPIRATORY (INHALATION) at 19:12

## 2019-01-01 RX ADMIN — Medication 10 MILLIGRAM(S): at 06:19

## 2019-01-01 RX ADMIN — Medication 250 MILLIGRAM(S): at 18:14

## 2019-01-01 RX ADMIN — SODIUM CHLORIDE 1000 MILLILITER(S): 9 INJECTION INTRAMUSCULAR; INTRAVENOUS; SUBCUTANEOUS at 02:58

## 2019-01-01 RX ADMIN — Medication 75 MEQ/KG/HR: at 22:21

## 2019-01-01 RX ADMIN — BUDESONIDE AND FORMOTEROL FUMARATE DIHYDRATE 2 PUFF(S): 160; 4.5 AEROSOL RESPIRATORY (INHALATION) at 06:14

## 2019-01-01 RX ADMIN — Medication 3 MILLILITER(S): at 01:34

## 2019-01-01 RX ADMIN — BUDESONIDE AND FORMOTEROL FUMARATE DIHYDRATE 2 PUFF(S): 160; 4.5 AEROSOL RESPIRATORY (INHALATION) at 18:30

## 2019-01-01 RX ADMIN — Medication 25 MILLIGRAM(S): at 11:17

## 2019-01-01 RX ADMIN — HEPARIN SODIUM 5000 UNIT(S): 5000 INJECTION INTRAVENOUS; SUBCUTANEOUS at 06:06

## 2019-01-01 RX ADMIN — TIOTROPIUM BROMIDE 1 CAPSULE(S): 18 CAPSULE ORAL; RESPIRATORY (INHALATION) at 06:20

## 2019-01-01 RX ADMIN — HEPARIN SODIUM 5000 UNIT(S): 5000 INJECTION INTRAVENOUS; SUBCUTANEOUS at 15:46

## 2019-01-01 RX ADMIN — Medication 1 MILLIGRAM(S): at 12:08

## 2019-01-01 RX ADMIN — PREGABALIN 1000 MICROGRAM(S): 225 CAPSULE ORAL at 11:43

## 2019-01-01 RX ADMIN — PIPERACILLIN AND TAZOBACTAM 25 GRAM(S): 4; .5 INJECTION, POWDER, LYOPHILIZED, FOR SOLUTION INTRAVENOUS at 05:49

## 2019-01-01 RX ADMIN — Medication 3 MILLILITER(S): at 19:56

## 2019-01-01 RX ADMIN — Medication 3 MILLILITER(S): at 19:35

## 2019-01-01 RX ADMIN — Medication 650 MILLIGRAM(S): at 22:00

## 2019-01-01 RX ADMIN — Medication 50 MILLIGRAM(S): at 17:44

## 2019-01-01 RX ADMIN — SODIUM CHLORIDE 75 MILLILITER(S): 9 INJECTION INTRAMUSCULAR; INTRAVENOUS; SUBCUTANEOUS at 16:38

## 2019-01-01 RX ADMIN — Medication 3 MILLILITER(S): at 07:59

## 2019-01-01 RX ADMIN — Medication 81 MILLIGRAM(S): at 11:43

## 2019-01-01 RX ADMIN — AZITHROMYCIN 255 MILLIGRAM(S): 500 TABLET, FILM COATED ORAL at 03:22

## 2019-01-01 RX ADMIN — Medication 81 MILLIGRAM(S): at 12:15

## 2019-01-01 RX ADMIN — CEFTRIAXONE 100 MILLIGRAM(S): 500 INJECTION, POWDER, FOR SOLUTION INTRAMUSCULAR; INTRAVENOUS at 05:23

## 2019-01-01 RX ADMIN — HEPARIN SODIUM 5000 UNIT(S): 5000 INJECTION INTRAVENOUS; SUBCUTANEOUS at 14:12

## 2019-01-01 RX ADMIN — PREGABALIN 1000 MICROGRAM(S): 225 CAPSULE ORAL at 11:17

## 2019-01-01 RX ADMIN — Medication 20 MILLIGRAM(S): at 11:41

## 2019-01-01 RX ADMIN — Medication 81 MILLIGRAM(S): at 11:17

## 2019-01-01 RX ADMIN — Medication 250 MILLIGRAM(S): at 05:51

## 2019-01-01 RX ADMIN — Medication 650 MILLIGRAM(S): at 04:43

## 2019-01-01 RX ADMIN — PANTOPRAZOLE SODIUM 40 MILLIGRAM(S): 20 TABLET, DELAYED RELEASE ORAL at 06:04

## 2019-01-01 RX ADMIN — SODIUM CHLORIDE 1000 MILLILITER(S): 9 INJECTION, SOLUTION INTRAVENOUS at 11:41

## 2019-01-01 RX ADMIN — TIOTROPIUM BROMIDE 1 CAPSULE(S): 18 CAPSULE ORAL; RESPIRATORY (INHALATION) at 06:34

## 2019-01-01 RX ADMIN — Medication 1000 MILLIGRAM(S): at 09:00

## 2019-01-01 RX ADMIN — Medication 650 MILLIGRAM(S): at 20:37

## 2019-01-01 RX ADMIN — PIPERACILLIN AND TAZOBACTAM 25 GRAM(S): 4; .5 INJECTION, POWDER, LYOPHILIZED, FOR SOLUTION INTRAVENOUS at 22:21

## 2019-01-01 RX ADMIN — Medication 81 MILLIGRAM(S): at 11:37

## 2019-01-01 RX ADMIN — Medication 3 MILLILITER(S): at 02:41

## 2019-01-01 RX ADMIN — SODIUM CHLORIDE 1000 MILLILITER(S): 9 INJECTION INTRAMUSCULAR; INTRAVENOUS; SUBCUTANEOUS at 05:05

## 2019-01-01 RX ADMIN — PANTOPRAZOLE SODIUM 40 MILLIGRAM(S): 20 TABLET, DELAYED RELEASE ORAL at 06:36

## 2019-01-01 RX ADMIN — PANTOPRAZOLE SODIUM 40 MILLIGRAM(S): 20 TABLET, DELAYED RELEASE ORAL at 06:43

## 2019-01-01 RX ADMIN — PIPERACILLIN AND TAZOBACTAM 25 GRAM(S): 4; .5 INJECTION, POWDER, LYOPHILIZED, FOR SOLUTION INTRAVENOUS at 05:30

## 2019-01-01 RX ADMIN — HEPARIN SODIUM 5000 UNIT(S): 5000 INJECTION INTRAVENOUS; SUBCUTANEOUS at 22:24

## 2019-01-01 RX ADMIN — SODIUM CHLORIDE 45 MILLILITER(S): 9 INJECTION, SOLUTION INTRAVENOUS at 05:30

## 2019-01-01 RX ADMIN — Medication 25 MILLIGRAM(S): at 11:26

## 2019-01-01 RX ADMIN — Medication 25 MILLIGRAM(S): at 11:43

## 2019-01-01 RX ADMIN — Medication 100 MILLIGRAM(S): at 16:04

## 2019-01-01 RX ADMIN — Medication 25 MILLIGRAM(S): at 12:07

## 2019-01-01 RX ADMIN — PREGABALIN 1000 MICROGRAM(S): 225 CAPSULE ORAL at 13:25

## 2019-01-01 RX ADMIN — Medication 1 MILLIGRAM(S): at 11:17

## 2019-01-01 RX ADMIN — Medication 3 MILLILITER(S): at 06:15

## 2019-01-01 RX ADMIN — Medication 100 MILLIGRAM(S): at 11:38

## 2019-01-01 RX ADMIN — HEPARIN SODIUM 5000 UNIT(S): 5000 INJECTION INTRAVENOUS; SUBCUTANEOUS at 22:23

## 2019-01-01 RX ADMIN — CEFTRIAXONE 100 MILLIGRAM(S): 500 INJECTION, POWDER, FOR SOLUTION INTRAMUSCULAR; INTRAVENOUS at 02:58

## 2019-01-01 RX ADMIN — ATORVASTATIN CALCIUM 10 MILLIGRAM(S): 80 TABLET, FILM COATED ORAL at 21:22

## 2019-01-01 RX ADMIN — Medication 1 PACKET(S): at 06:06

## 2019-01-01 RX ADMIN — Medication 20 MILLIGRAM(S): at 05:48

## 2019-01-01 RX ADMIN — Medication 100 MILLIGRAM(S): at 20:15

## 2019-01-01 RX ADMIN — Medication 650 MILLIGRAM(S): at 06:13

## 2019-01-01 RX ADMIN — PREGABALIN 1000 MICROGRAM(S): 225 CAPSULE ORAL at 17:44

## 2019-01-01 RX ADMIN — PIPERACILLIN AND TAZOBACTAM 3.38 GRAM(S): 4; .5 INJECTION, POWDER, LYOPHILIZED, FOR SOLUTION INTRAVENOUS at 06:50

## 2019-01-01 RX ADMIN — HEPARIN SODIUM 5000 UNIT(S): 5000 INJECTION INTRAVENOUS; SUBCUTANEOUS at 14:43

## 2019-01-01 RX ADMIN — HEPARIN SODIUM 5000 UNIT(S): 5000 INJECTION INTRAVENOUS; SUBCUTANEOUS at 21:10

## 2019-01-01 RX ADMIN — Medication 100 MILLIGRAM(S): at 11:20

## 2019-01-01 RX ADMIN — Medication 650 MILLIGRAM(S): at 21:15

## 2019-01-01 RX ADMIN — PANTOPRAZOLE SODIUM 40 MILLIGRAM(S): 20 TABLET, DELAYED RELEASE ORAL at 06:52

## 2019-01-01 RX ADMIN — Medication 3 MILLILITER(S): at 13:45

## 2019-01-01 RX ADMIN — Medication 1 PACKET(S): at 17:51

## 2019-01-01 RX ADMIN — BUDESONIDE AND FORMOTEROL FUMARATE DIHYDRATE 2 PUFF(S): 160; 4.5 AEROSOL RESPIRATORY (INHALATION) at 17:19

## 2019-01-01 RX ADMIN — HEPARIN SODIUM 5000 UNIT(S): 5000 INJECTION INTRAVENOUS; SUBCUTANEOUS at 21:22

## 2019-01-01 RX ADMIN — Medication 81 MILLIGRAM(S): at 11:26

## 2019-01-01 RX ADMIN — Medication 25 MILLIGRAM(S): at 13:25

## 2019-01-01 RX ADMIN — HEPARIN SODIUM 5000 UNIT(S): 5000 INJECTION INTRAVENOUS; SUBCUTANEOUS at 22:21

## 2019-01-01 RX ADMIN — Medication 81 MILLIGRAM(S): at 12:08

## 2019-01-01 RX ADMIN — BUDESONIDE AND FORMOTEROL FUMARATE DIHYDRATE 2 PUFF(S): 160; 4.5 AEROSOL RESPIRATORY (INHALATION) at 18:55

## 2019-01-01 RX ADMIN — PREGABALIN 1000 MICROGRAM(S): 225 CAPSULE ORAL at 11:20

## 2019-01-01 RX ADMIN — Medication 3 MILLILITER(S): at 08:24

## 2019-01-01 RX ADMIN — PREGABALIN 1000 MICROGRAM(S): 225 CAPSULE ORAL at 11:26

## 2019-01-01 RX ADMIN — BUDESONIDE AND FORMOTEROL FUMARATE DIHYDRATE 2 PUFF(S): 160; 4.5 AEROSOL RESPIRATORY (INHALATION) at 22:20

## 2019-01-01 RX ADMIN — Medication 3 MILLILITER(S): at 07:15

## 2019-01-01 RX ADMIN — Medication 3 MILLILITER(S): at 07:51

## 2019-01-01 RX ADMIN — ATORVASTATIN CALCIUM 10 MILLIGRAM(S): 80 TABLET, FILM COATED ORAL at 21:10

## 2019-01-01 RX ADMIN — SODIUM CHLORIDE 1000 MILLILITER(S): 9 INJECTION INTRAMUSCULAR; INTRAVENOUS; SUBCUTANEOUS at 07:16

## 2019-01-01 RX ADMIN — Medication 3 MILLILITER(S): at 12:35

## 2019-01-01 RX ADMIN — SODIUM CHLORIDE 1000 MILLILITER(S): 9 INJECTION INTRAMUSCULAR; INTRAVENOUS; SUBCUTANEOUS at 14:12

## 2019-01-01 RX ADMIN — AZITHROMYCIN 500 MILLIGRAM(S): 500 TABLET, FILM COATED ORAL at 04:30

## 2019-01-01 RX ADMIN — Medication 250 MILLIGRAM(S): at 05:29

## 2019-01-01 RX ADMIN — ATORVASTATIN CALCIUM 10 MILLIGRAM(S): 80 TABLET, FILM COATED ORAL at 23:02

## 2019-01-01 RX ADMIN — Medication 1 MILLIGRAM(S): at 11:20

## 2019-01-01 RX ADMIN — Medication 50 MILLIGRAM(S): at 05:09

## 2019-01-01 RX ADMIN — Medication 100 MILLIGRAM(S): at 13:16

## 2019-01-01 RX ADMIN — TIOTROPIUM BROMIDE 1 CAPSULE(S): 18 CAPSULE ORAL; RESPIRATORY (INHALATION) at 06:36

## 2019-01-01 RX ADMIN — PIPERACILLIN AND TAZOBACTAM 200 GRAM(S): 4; .5 INJECTION, POWDER, LYOPHILIZED, FOR SOLUTION INTRAVENOUS at 06:20

## 2019-01-01 RX ADMIN — ATORVASTATIN CALCIUM 10 MILLIGRAM(S): 80 TABLET, FILM COATED ORAL at 21:11

## 2019-01-01 RX ADMIN — CEFTRIAXONE 1000 MILLIGRAM(S): 500 INJECTION, POWDER, FOR SOLUTION INTRAMUSCULAR; INTRAVENOUS at 03:31

## 2019-01-01 RX ADMIN — CEFTRIAXONE 100 MILLIGRAM(S): 500 INJECTION, POWDER, FOR SOLUTION INTRAMUSCULAR; INTRAVENOUS at 05:08

## 2019-01-01 RX ADMIN — Medication 650 MILLIGRAM(S): at 03:28

## 2019-01-01 RX ADMIN — IOHEXOL 30 MILLILITER(S): 300 INJECTION, SOLUTION INTRAVENOUS at 15:14

## 2019-01-01 RX ADMIN — BUDESONIDE AND FORMOTEROL FUMARATE DIHYDRATE 2 PUFF(S): 160; 4.5 AEROSOL RESPIRATORY (INHALATION) at 06:34

## 2019-01-01 RX ADMIN — HEPARIN SODIUM 5000 UNIT(S): 5000 INJECTION INTRAVENOUS; SUBCUTANEOUS at 21:11

## 2019-01-01 RX ADMIN — Medication 3 MILLILITER(S): at 01:20

## 2019-01-01 RX ADMIN — HEPARIN SODIUM 5000 UNIT(S): 5000 INJECTION INTRAVENOUS; SUBCUTANEOUS at 15:26

## 2019-03-12 ENCOUNTER — RESULT REVIEW (OUTPATIENT)
Age: 74
End: 2019-03-12

## 2019-04-30 ENCOUNTER — RESULT REVIEW (OUTPATIENT)
Age: 74
End: 2019-04-30

## 2019-06-04 PROBLEM — I82.409 ACUTE EMBOLISM AND THROMBOSIS OF UNSPECIFIED DEEP VEINS OF UNSPECIFIED LOWER EXTREMITY: Chronic | Status: ACTIVE | Noted: 2017-12-30

## 2019-06-04 PROBLEM — I25.10 ATHEROSCLEROTIC HEART DISEASE OF NATIVE CORONARY ARTERY WITHOUT ANGINA PECTORIS: Chronic | Status: ACTIVE | Noted: 2017-12-30

## 2019-06-04 PROBLEM — F32.9 MAJOR DEPRESSIVE DISORDER, SINGLE EPISODE, UNSPECIFIED: Chronic | Status: ACTIVE | Noted: 2017-12-30

## 2019-06-04 PROBLEM — E78.5 HYPERLIPIDEMIA, UNSPECIFIED: Chronic | Status: ACTIVE | Noted: 2017-12-30

## 2019-06-04 PROBLEM — Z95.5 PRESENCE OF CORONARY ANGIOPLASTY IMPLANT AND GRAFT: Chronic | Status: ACTIVE | Noted: 2017-12-30

## 2019-06-04 PROBLEM — I10 ESSENTIAL (PRIMARY) HYPERTENSION: Chronic | Status: ACTIVE | Noted: 2017-12-30

## 2019-07-10 PROBLEM — Z86.69 HISTORY OF OBSTRUCTIVE SLEEP APNEA: Status: RESOLVED | Noted: 2019-01-01 | Resolved: 2019-01-01

## 2019-07-10 PROBLEM — Z87.898 HISTORY OF CONFUSION: Status: RESOLVED | Noted: 2019-01-01 | Resolved: 2019-01-01

## 2019-07-10 PROBLEM — R21 SKIN RASH: Status: RESOLVED | Noted: 2019-01-01 | Resolved: 2019-01-01

## 2019-07-10 PROBLEM — Z86.19 HISTORY OF SEPSIS: Status: RESOLVED | Noted: 2019-01-01 | Resolved: 2019-01-01

## 2019-07-10 PROBLEM — Z87.898 HISTORY OF SYNCOPE: Status: RESOLVED | Noted: 2019-01-01 | Resolved: 2019-01-01

## 2019-07-10 PROBLEM — Z86.718 HISTORY OF DEEP VENOUS THROMBOSIS: Status: RESOLVED | Noted: 2019-01-01 | Resolved: 2019-01-01

## 2019-07-10 PROBLEM — Z86.39 HISTORY OF HYPERGLYCEMIA: Status: RESOLVED | Noted: 2019-01-01 | Resolved: 2019-01-01

## 2019-07-10 PROBLEM — Z80.1 FAMILY HISTORY OF LUNG CANCER: Status: ACTIVE | Noted: 2019-01-01

## 2019-07-10 PROBLEM — Z82.0 FAMILY HISTORY OF ALZHEIMER'S DISEASE: Status: ACTIVE | Noted: 2019-01-01

## 2019-07-10 PROBLEM — Z95.2 H/O AORTIC VALVE REPLACEMENT: Status: RESOLVED | Noted: 2019-01-01 | Resolved: 2019-01-01

## 2019-07-16 NOTE — HISTORY OF PRESENT ILLNESS
[FreeTextEntry8] : Pt comes for FBW and med renewal NO chest painor SOb or leg swelling Wife and kids concerned about memory at times

## 2019-07-16 NOTE — PHYSICAL EXAM
[No Acute Distress] : no acute distress [Well Nourished] : well nourished [Normal Sclera/Conjunctiva] : normal sclera/conjunctiva [Normal Outer Ear/Nose] : the outer ears and nose were normal in appearance [No JVD] : no jugular venous distention [No Lymphadenopathy] : no lymphadenopathy [No Respiratory Distress] : no respiratory distress  [No Accessory Muscle Use] : no accessory muscle use [Clear to Auscultation] : lungs were clear to auscultation bilaterally [Normal Rate] : normal rate  [Regular Rhythm] : with a regular rhythm [No Edema] : there was no peripheral edema [No Extremity Clubbing/Cyanosis] : no extremity clubbing/cyanosis [Soft] : abdomen soft [Non Tender] : non-tender [Non-distended] : non-distended [Coordination Grossly Intact] : coordination grossly intact [No Focal Deficits] : no focal deficits [Normal Affect] : the affect was normal

## 2019-07-16 NOTE — REVIEW OF SYSTEMS
[Joint Stiffness] : joint stiffness [Memory Loss] : memory loss [Fever] : no fever [Chills] : no chills [Chest Pain] : no chest pain [Palpitations] : no palpitations [Lower Ext Edema] : no lower extremity edema [Paroysmal Nocturnal Dyspnea] : no paroysmal nocturnal dyspnea [Shortness Of Breath] : no shortness of breath [Wheezing] : no wheezing [Cough] : no cough [Abdominal Pain] : no abdominal pain [Headache] : no headache [Dizziness] : no dizziness [Fainting] : no fainting

## 2019-07-29 NOTE — PHYSICAL EXAM
[No Acute Distress] : no acute distress [Well Nourished] : well nourished [Normal Sclera/Conjunctiva] : normal sclera/conjunctiva [Normal Outer Ear/Nose] : the outer ears and nose were normal in appearance [Normal Oropharynx] : the oropharynx was normal [No Lymphadenopathy] : no lymphadenopathy [No JVD] : no jugular venous distention [No Respiratory Distress] : no respiratory distress  [No Accessory Muscle Use] : no accessory muscle use [Clear to Auscultation] : lungs were clear to auscultation bilaterally [Normal Rate] : normal rate  [Regular Rhythm] : with a regular rhythm [No Edema] : there was no peripheral edema [Soft] : abdomen soft [Non Tender] : non-tender [No Extremity Clubbing/Cyanosis] : no extremity clubbing/cyanosis

## 2019-07-29 NOTE — REVIEW OF SYSTEMS
[Fever] : no fever [Palpitations] : no palpitations [Chills] : no chills [Chest Pain] : no chest pain [Shortness Of Breath] : no shortness of breath [Wheezing] : no wheezing [Abdominal Pain] : no abdominal pain [Joint Stiffness] : no joint stiffness [Joint Pain] : no joint pain [Joint Swelling] : no joint swelling

## 2019-07-29 NOTE — HISTORY OF PRESENT ILLNESS
[FreeTextEntry8] : Pt comes for cough and mucous production  Hx of prolonged intubation with tracheostomy a few yrs ago with similar problem  Has appt with pulm on THursday  No fever
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
## 2019-09-04 NOTE — PHYSICAL EXAM
[General Appearance - Alert] : alert [General Appearance - In No Acute Distress] : in no acute distress [FreeTextEntry1] : Morbid obese, pleasant, in no acute distress, alert and oriented x3 [Sclera] : the sclera and conjunctiva were normal [Neck Appearance] : the appearance of the neck was normal [Jugular Venous Distention Increased] : there was no jugular-venous distention [Neck Cervical Mass (___cm)] : no neck mass was observed [Apical Impulse] : the apical impulse was normal [Auscultation Breath Sounds / Voice Sounds] : lungs were clear to auscultation bilaterally [Full Pulse] : the pedal pulses are present [Edema] : there was no peripheral edema [Bowel Sounds] : normal bowel sounds [Abdomen Tenderness] : non-tender [Abdomen Soft] : soft [Abdomen Mass (___ Cm)] : no abdominal mass palpated [Normal Sphincter Tone] : normal sphincter tone [No Rectal Mass] : no rectal mass [Occult Blood Positive] : stool was negative for occult blood [Cervical Lymph Nodes Enlarged Posterior Bilaterally] : posterior cervical [Cervical Lymph Nodes Enlarged Anterior Bilaterally] : anterior cervical [Supraclavicular Lymph Nodes Enlarged Bilaterally] : supraclavicular [Axillary Lymph Nodes Enlarged Bilaterally] : axillary [Inguinal Lymph Nodes Enlarged Bilaterally] : inguinal [Femoral Lymph Nodes Enlarged Bilaterally] : femoral [No Spinal Tenderness] : no spinal tenderness [No CVA Tenderness] : no ~M costovertebral angle tenderness [Nail Clubbing] : no clubbing  or cyanosis of the fingernails [Abnormal Walk] : normal gait [Musculoskeletal - Swelling] : no joint swelling seen [Motor Tone] : muscle strength and tone were normal [] : no rash [Skin Turgor] : normal skin turgor [Skin Color & Pigmentation] : normal skin color and pigmentation [Oriented To Time, Place, And Person] : oriented to person, place, and time [No Focal Deficits] : no focal deficits [Affect] : the affect was normal [Impaired Insight] : insight and judgment were intact

## 2019-09-04 NOTE — ASSESSMENT
[FreeTextEntry1] : Impression\par \par Request for colon cancer screening\par \par Suggest\par \par The patient, I am a lightheaded nice compensation\par \par We will agree that CT virtual colonoscopy would probably be the better means of screening for colon cancer in this gentleman\par \par I will call the results afterwards\par \par He may call me or followup with me anytime

## 2019-09-04 NOTE — HISTORY OF PRESENT ILLNESS
[de-identified] : Rei Conde MD Takes care of this very pleasant 74-year-old gentleman is here with his wife\par \par The patient is here to discuss options for screening for colon cancer and polyps\par \par He's had no change in bowel movements\par \par He has constipation that is mild and chronic and does very well with Colace\par \par No blood per rectum\par \par No family history of colon cancer\par \par He is a fairly complicated medical history with aortic valve replacement, permanent pacemaker, tracheostomy with tracheomalacia\par \par He is morbidly obese and a sleep apnea

## 2019-10-22 PROBLEM — E55.9 VITAMIN D DEFICIENCY: Status: ACTIVE | Noted: 2019-01-01

## 2019-10-22 PROBLEM — E53.8 VITAMIN B 12 DEFICIENCY: Status: ACTIVE | Noted: 2019-01-01

## 2019-10-22 NOTE — PHYSICAL EXAM
[No Acute Distress] : no acute distress [Normal Outer Ear/Nose] : the outer ears and nose were normal in appearance [No Lymphadenopathy] : no lymphadenopathy [No JVD] : no jugular venous distention [No Respiratory Distress] : no respiratory distress  [Supple] : supple [Normal Rate] : normal rate  [No Accessory Muscle Use] : no accessory muscle use [Clear to Auscultation] : lungs were clear to auscultation bilaterally [Regular Rhythm] : with a regular rhythm [No Edema] : there was no peripheral edema [Normal S1, S2] : normal S1 and S2 [No Extremity Clubbing/Cyanosis] : no extremity clubbing/cyanosis [Soft] : abdomen soft [Non Tender] : non-tender [Non-distended] : non-distended

## 2019-10-22 NOTE — REVIEW OF SYSTEMS
[Joint Stiffness] : joint stiffness [Fever] : no fever [Chills] : no chills [Chest Pain] : no chest pain [Lower Ext Edema] : no lower extremity edema [Palpitations] : no palpitations [Wheezing] : no wheezing [Shortness Of Breath] : no shortness of breath [Cough] : no cough [Abdominal Pain] : no abdominal pain

## 2019-10-28 PROBLEM — J30.2 SEASONAL ALLERGIES: Status: ACTIVE | Noted: 2019-01-01

## 2019-10-28 PROBLEM — Z78.9 DOES NOT USE ILLICIT DRUGS: Status: ACTIVE | Noted: 2019-01-01

## 2019-10-28 PROBLEM — G43.109 OCULAR MIGRAINE: Status: ACTIVE | Noted: 2019-01-01

## 2019-10-28 PROBLEM — Z81.8 FAMILY HISTORY OF DEMENTIA: Status: ACTIVE | Noted: 2019-01-01

## 2019-10-28 NOTE — PHYSICAL EXAM
[General Appearance - Alert] : alert [General Appearance - In No Acute Distress] : in no acute distress [Oriented To Time, Place, And Person] : oriented to person, place, and time [Impaired Insight] : insight and judgment were intact [Person] : oriented to person [Place] : oriented to place [Time] : oriented to time [Short Term Intact] : short term memory intact [Concentration Intact] : normal concentrating ability [Visual Intact] : visual attention was ~T not ~L decreased [Naming Objects] : no difficulty naming common objects [Fluency] : fluency intact [Comprehension] : comprehension intact [___ / 30] : the patient achieved a total score of [unfilled] /30 [___ / 5] : Visuospatial / Executive: [unfilled] / 5 [0 / 0] : Memory: 0 / 0 [___ / 3] : Attention (Serial 7 subtraction): [unfilled] / 3 [___ / 1] : Fluency: [unfilled] / 1 [___ / 2] : Abstraction: [unfilled] / 2 [___ / 5] : Delayed Recall: [unfilled] / 5 [___ / 6] : Orientation: [unfilled] / 6 [Cranial Nerves Optic (II)] : visual acuity intact bilaterally,  visual fields full to confrontation, pupils equal round and reactive to light [Cranial Nerves Oculomotor (III)] : extraocular motion intact [Cranial Nerves Trigeminal (V)] : facial sensation intact symmetrically [Cranial Nerves Facial (VII)] : face symmetrical [Cranial Nerves Vestibulocochlear (VIII)] : hearing was intact bilaterally [Cranial Nerves Glossopharyngeal (IX)] : tongue and palate midline [Cranial Nerves Accessory (XI - Cranial And Spinal)] : head turning and shoulder shrug symmetric [Cranial Nerves Hypoglossal (XII)] : there was no tongue deviation with protrusion [Motor Tone] : muscle tone was normal in all four extremities [Motor Strength] : muscle strength was normal in all four extremities [No Muscle Atrophy] : normal bulk in all four extremities [Motor Handedness Right-Handed] : the patient is right hand dominant [Sensation Tactile Decrease] : light touch was intact [Sensation Pain / Temperature Decrease] : pain and temperature was intact [Abnormal Walk] : normal gait [Balance] : balance was intact [Coordination - Dysmetria Impaired Finger-to-Nose Right Only] : present on the ride side [2+] : Ankle jerk left 2+ [Sclera] : the sclera and conjunctiva were normal [PERRL With Normal Accommodation] : pupils were equal in size, round, reactive to light, with normal accommodation [Extraocular Movements] : extraocular movements were intact [Optic Disc Abnormality] : the optic disc were normal in size and color [Outer Ear] : the ears and nose were normal in appearance [Hearing Threshold Finger Rub Not Converse] : hearing was normal [Oropharynx] : the oropharynx was normal [Neck Appearance] : the appearance of the neck was normal [Auscultation Breath Sounds / Voice Sounds] : lungs were clear to auscultation bilaterally [Heart Rate And Rhythm] : heart rate was normal and rhythm regular [Heart Sounds] : normal S1 and S2 [Arterial Pulses Carotid] : carotid pulses were normal with no bruits [Full Pulse] : the pedal pulses are present [Bowel Sounds] : normal bowel sounds [Abdomen Soft] : soft [Abdomen Tenderness] : non-tender [No CVA Tenderness] : no ~M costovertebral angle tenderness [No Spinal Tenderness] : no spinal tenderness [Skin Color & Pigmentation] : normal skin color and pigmentation [Skin Turgor] : normal skin turgor [] : no rash [Remote Intact] : remote memory impaired [Repeating Phrases] : difficulty repeating a phrase [Paresis Pronator Drift Right-Sided] : no pronator drift on the right [Paresis Pronator Drift Left-Sided] : no pronator drift on the left [Motor Strength Upper Extremities Bilaterally] : strength was normal in both upper extremities [Motor Strength Lower Extremities Bilaterally] : strength was normal in both lower extremities [Romberg's Sign] : Romberg's sign was negtive [Allodynia] : no ~T allodynia present [Past-pointing] : there was no past-pointing [Tremor] : no tremor present [Dysdiadochokinesia Bilaterally] : not present [Coordination - Dysmetria Impaired Finger-to-Nose Left Only] : not present on the left side [Coordination - Dysmetria Impaired Heel-to-Shin Bilateral] : not present [Plantar Reflex Right Only] : normal on the right [Plantar Reflex Left Only] : normal on the left [FreeTextEntry4] : Memory Impairment Screen: 6 / 15 [FreeTextEntry8] : One error made with right finger-to-nose test; two other attempts had no errors. Slow, narrow-based gait. En bloc turning and retropulsion present. No difficulty with tiptoe and heel gaits. Lists to right with tandem gait, but does not lose balance. [FreeTextEntry1] : Paced rhythm

## 2019-10-28 NOTE — ASSESSMENT
[FreeTextEntry1] : 74 RHM with history of ocular migraines and family history of dementia, with concern for mild cognitive impairment with memory loss vs. early dementia, also with right upper extremity dysmetria and tandem gait difficulty in the setting of cardiovascular disease and morbid obesity that may indicate a cerebellar stroke, as well as bruxism, vivid dreams, en bloc turning, and retropulsion, which may be early signs of idiopathic Parkinson's disease.

## 2019-10-28 NOTE — DISCUSSION/SUMMARY
[FreeTextEntry1] : Neurocognitive Examination Functionality Questionnaire\par \par Relationship: Spouse\par Frequency of interactions in the past month: Daily / Live with\par \par Activities of Daily Living:\par 1. Bathing/Showerin\par 2. Dressin\par 3. Toiletin\par 4. Transferrin\par 5. Continence: 2\par 6: Feedin\par \par Instrumental Activities of Daily Living:\par A. Ability to Use Telephone: 2\par B. Shoppin\par C. Food Preparation: 2\par D. Housekeepin\par E. Laundry: 2\par F. Transportation: 2\par G. Responsibility for Own Medications: 2\par H: Ability to Handle Finances: 2\par

## 2019-10-28 NOTE — HISTORY OF PRESENT ILLNESS
[FreeTextEntry1] : This is a 74-year-old right-handed man who states that he is fine, but that over the past 1-2 years, he has noticed that he forgets things, specifically the addresses and locations of familiar places. His wife states that, in fact,he has been forgetting familiar places for the past 5 years, since he had an aortic valve replacement and permanent pacemaker placement at [facility], with a complicated recovery period lasting 4-5 weeks before discharge. During this time, she has also observed him saying random or tangential things and then forgetting that he said anything. However, she notes that over the past 1-2 months, he seems to have had a slight improvement in his memory and speech.\par \par The patient has a diagnosis of obstructive sleep apnea, and regularly uses a CPAP machine at night. Nevertheless, the patient's wife has noticed that he has intermittent teeth gnashing at night (sometimes also during the day), as well as frequent vivid dreams that can wake him out of sleep. She has observed these for over 10 years, and they predate the patient's use of CPAP and his cardiac surgical procedures 5 years ago.\par \par The patient was previously diagnosed with ocular migraines, and states that he has not had any significant headaches in the last 1-2 years, although about once per month he does have episodes lasting a few seconds each of seeing colorful spirals with either or both eyes, which seem to get closer and bigger before they resolve, typically not associated with eye or head pain, nor with alteration of consciousness.
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
## 2019-11-03 NOTE — ED ADULT NURSE NOTE - OBJECTIVE STATEMENT
wife states woke up nelly 2 hours prior to arrival w/ "cough green sputum". Rec'd flu shot 2 weeks ago.

## 2019-11-03 NOTE — CONSULT NOTE ADULT - ASSESSMENT
Seen for AMS/confusion  H/o Morbid obesity.  H/o Sleep apnea on C PAP  - was not using. Found to be confused.  Had fever, which resolved.  W/up is in progress.  No fever, No signs of meningitis.  Non focal exam.  CT Head - No acute pathology.  No signs of CVA.  H/o PPM   Symptoms likely sec to Sleep abnormality, Hypoxia/Hypercarbia  Continue C pap at night.  Would check TFTs, Mg, Vitamin B12, Folate level  Fall/safety precautions.  D/w Pt/wife at bedside. Questions answered.  Pt is morbidly obese  -Wt loss counseling is done with Pt/wife.  Would continue to follow.
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
## 2019-11-03 NOTE — H&P ADULT - NSHPLABSRESULTS_GEN_ALL_CORE
LABS:                        11.0<L>  5.87  )-----------( 84<L>    ( 03 Nov 2019 03:03 )             34.5<L>    137    |  102    |  14     ----------------------------<  133<H>    03 Nov 2019 03:03  3.7     |  24     |  1.34<H>        Ca 9.0           03 Nov 2019 03:03        TPro  7.6    /  Alb  3.4    /  TBili  1.0    /  DBili  x      /  AST  21     /  ALT  18     /  AlkPhos  77     03 Nov 2019 03:03      EKG:   atrial-sensed, v-paced  Radiology:  CXR - no gross abnormalities

## 2019-11-03 NOTE — ED PROVIDER NOTE - INPATIENT RESIDENT/ACP NOTIFIED DATE
Daily Note     Today's date: 2018  Patient name: Angela Zamarripa  : 1987  MRN: [id number]  Referring provider: Ernestina Lagunas MD  Dx:   Encounter Diagnosis     ICD-10-CM    1  Orthopedic aftercare Z47 89                   Subjective: Upon presentation, SPR =3/10  Patient described pain as 'throbbing'      Objective: See treatment diary below                          Left knee AAROM flexion/ ext  5 min   x5 min                                                                                                                         Exercise Diary             Recumbent bike 10 min  10 min  10'  10'  10' x10'level 2           Qs, add sets  3 sec x 20 each 3sec x 20 3" x 20  5" x 20 "05x20   20x           slr / s/l abd  20 ea  1# x20  3# x20  3# x20 3#x 20 3# 20x           Prone hs curls/ hip ext  20x each  1# x 20  3# x20  3# x20 3#x 20 3# 20x           Prone quad stretch w/strap 20 sec x 5  20 sec x 5  20' x 5  20' x5  np NP           Laq/hip flexion X 20   1# x20  3# x 30  3# x30 3#x 20  3# 20x           Standing slrx 3 X 20  1# x 20  1#x 20 1 5# x20 On foamx20  1 5# on foam             X 20                      Standing hs curls  x 20  1# x 20  1# x 20  1 5# x20 On foamx20 1 5# on foam 20x           Step ups f/ l  X 20 each  1# x20 each  1# x20 ea  1 5# x20 ea 2 5#x20 3# 20x           March on foam 1 min X 1 min  x1'  x1'  1' 2' /2#            slb on foam  X 5   x 5  x5  x5  x5             Squats @ llbar    x 20   x20  x20  x20 20x            leg press   80# x 20  80# x 30  100# x20 100# x30  100# x30            stand on foam ball @ tramp   bball  x 30   bball x 30  bball x30 [id number] yellow 30x            eliptical          5' 6'                                                                                                                 Modalities           Cp L knee 10 min  10 min  10'  pt deffered     NP                                                                Assessment: Tolerated treatment Patient continued to report slight increase in left knee discomfort with increased activity at home (cleaning, walking) and with therapeutic intervention  FOTO completed  Left knee PROM extension 0degrees, flexion 125 degrees    Patient exhibited good technique with therapeutic exercises      Plan: Potential discharge next visit  03-Nov-2019 03:59

## 2019-11-03 NOTE — H&P ADULT - ASSESSMENT
74M with obesity on aPAP, CAD s/p FELIPA x2 to LAD, bioprosthetic AVR, complete heart block s/p PPM, hx of DVT, HTN, HLD, depression, worsening memory loss/dementia who presents here for AMS.   Patient has sleep apnea and was not wearing his CPAP so possibly could be altered from hypercapnia.  Though this would not explain his transient fever.  For now, will treat for CAP +/- COPD exacerbation (fever + worsening cough).      Community-acquired pneumonia  - admitted to medicine  - continue with ceftriaxone and azithromycin for now  - f/u blood cultures  - f/u RVP  - pulm consult (Dr. Moore notified)  - check procalcitonin  - will check chest CT for further evaluation  - check head CT for AMS    COPD  - possible exacerbation  - continue with steroids for now  - nebs as needed    MILO  - previous values lower  - BUN/Cr <20 however  - hydrate and repeat BMP    Microcytic hypochromic anemia  - lower than baseline  - could be iron deficiency  - will check folate, iron studies, and vitamin B12  - check fecal occult    Thrombocytopenia  - was low before with range of   - consider heme/onc consult  - has renal failure + fever + thrombocytopenia -> consider thrombocytopenic microangiopathy (however, low suspicion)  - will check LDH, direct [place] (unlikely though since bilirubin is not elevated so unlikely to be hemolysis)    CAD  - cont with lipitor  - cont with metoprolol  - cont with aspirin    Depression  - cont with imipramine    Preventive measure  IMPROVE VTE Individual Risk Assessment          RISK                                                          Points  [X] Previous VTE                                                 3  [  ] Thrombophilia                                              2  [  ] Lower limb paralysis                                    2        (unable to hold up >15 seconds)    [  ] Current Cancer                                             2         (within 6 months)  [  ] Immobilization > 24 hrs                              1  [  ] ICU/CCU stay > 24 hours                            1  [X] Age > 60                                                        1    IMPROVE VTE Score 4    - had a previous DVT though it was provoked  - will await head CT before starting on anticoagulation   - SCD for now 74M with obesity on aPAP, CAD s/p FELIPA x2 to LAD, bioprosthetic AVR, complete heart block s/p PPM, hx of DVT, HTN, HLD, depression, worsening memory loss/dementia who presents here for AMS.   Patient has sleep apnea and was not wearing his CPAP so possibly could be altered from hypercapnia.  Though this would not explain his transient fever.  For now, will treat for CAP +/- COPD exacerbation (fever + worsening cough).      Community-acquired pneumonia  - admitted to medicine  - continue with ceftriaxone and azithromycin for now  - f/u blood cultures  - f/u RVP  - pulm consult (Dr. Moore notified)  - check procalcitonin  - will check chest CT for further evaluation  - check head CT for AMS    COPD  - possible exacerbation  - continue with steroids for now  - nebs as needed    MILO  - previous values lower  - BUN/Cr <20 however  - hydrate and repeat BMP    Microcytic hypochromic anemia  - lower than baseline  - could be iron deficiency  - will check folate, iron studies, and vitamin B12  - check fecal occult    Thrombocytopenia  - was low before with range of   - consider heme/onc consult  - has renal injury + fever + thrombocytopenia -> consider thrombocytopenic microangiopathy (however, low suspicion)  - will check LDH, direct [place] (though hemolysis unlikely given normal bilirubin level)  - medication adverse effect with imipramine?      CAD  - cont with lipitor  - cont with metoprolol  - cont with aspirin    Depression  - will cont with imipramine for now    Preventive measure  IMPROVE VTE Individual Risk Assessment          RISK                                                          Points  [X] Previous VTE                                                 3  [  ] Thrombophilia                                              2  [  ] Lower limb paralysis                                    2        (unable to hold up >15 seconds)    [  ] Current Cancer                                             2         (within 6 months)  [  ] Immobilization > 24 hrs                              1  [  ] ICU/CCU stay > 24 hours                            1  [X] Age > 60                                                        1    IMPROVE VTE Score 4    - had a previous DVT though it was provoked  - will await head CT before starting on anticoagulation   - SCD for now

## 2019-11-03 NOTE — H&P ADULT - HISTORY OF PRESENT ILLNESS
74M with obesity on aPAP, CAD s/p FELIPA x2 to LAD, bioprosthetic AVR, COPD, complete heart block s/p PPM, hx of DVT, HTN, HLD, depression, worsening memory loss/dementia who presents here for AMS.  Patient was in his usual state of health when all of sudden the patient woke up at 0115 altered.  Of note, patient was not wearing his CPAP.  According to the wife, patient was confused, disorientated, restless, and fell.  No reports of any head trauma or lost of consciousness.  Patient also started to have a worsening cough (has a chronic cough for 5 years) productive of yellow/greenish sputum.  Also was dyspneic.  Patient also started to become agitated and the wife got concerned and called EMS.  EMS came and found the patient to be febrile.  Transported here and was found to have a fever of 102.8F orally.  WBC normal.  CXR did not reveal any gross abnormalities.  However, given his fever and AMS and worsening cough, there was a concern for possible PNA.  Started on ceftriaxone and azithromycin.  Patient reports receiving the flu vaccination already (flu negative here in the ED).  Currently, the patient feels back to baseline and has no acute complaints.   Of note, patient had a head CT earlier today for evaluation of memory loss/dementia.  Patient also had a right bunionectomy about 5-6 weeks ago that just started to have some pain tonight (though no pain currently).

## 2019-11-03 NOTE — ED PROVIDER NOTE - LIVES WITH, PROFILE
Team D SURGERY PROGRESS NOTE      SUBJECTIVE: Pt seen at examined at bedside. AVSS. No acute events overnight. Pain well controlled. +Flatus/ +nonbloody BM. OOB and ambulating. Denies fever, CP, SOB, and NV. Last small amount of blood in BM yesterday morning         Vital Signs Last 24 Hrs  T(C): 36.4 (24 Dec 2017 09:34), Max: 36.9 (23 Dec 2017 16:17)  T(F): 97.5 (24 Dec 2017 09:34), Max: 98.5 (23 Dec 2017 16:17)  HR: 72 (24 Dec 2017 09:34) (61 - 72)  BP: 113/54 (24 Dec 2017 09:34) (106/59 - 125/63)  BP(mean): --  RR: 18 (24 Dec 2017 09:34) (18 - 18)  SpO2: 100% (24 Dec 2017 09:34) (95% - 100%)    Physical Exam  General: awake, alert, NAD    Pulm: respirations unlabored, no increased WOB  Abdomen: soft, mildly distended, NT, incision c/d/i  Extremities: Grossly symmetric    I&O's Summary    23 Dec 2017 07:01  -  24 Dec 2017 07:00  --------------------------------------------------------  IN: 725 mL / OUT: 1700 mL / NET: -975 mL      I&O's Detail    23 Dec 2017 07:01  -  24 Dec 2017 07:00  --------------------------------------------------------  IN:    lactated ringers.: 650 mL    Oral Fluid: 75 mL  Total IN: 725 mL    OUT:    Voided: 1700 mL  Total OUT: 1700 mL    Total NET: -975 mL          MEDICATIONS  (STANDING):  enoxaparin Injectable 40 milliGRAM(s) SubCutaneous every 24 hours  lactated ringers. 1000 milliLiter(s) (50 mL/Hr) IV Continuous <Continuous>    MEDICATIONS  (PRN):      LABS:                        11.0   3.90  )-----------( 259      ( 24 Dec 2017 04:59 )             34.2     12-24    143  |  104  |  5<L>  ----------------------------<  88  3.8   |  32<H>  |  0.51    Ca    8.7      24 Dec 2017 04:59  Phos  2.9     12-24  Mg     2.0     12-24            RADIOLOGY & ADDITIONAL STUDIES:  No new studies spouse

## 2019-11-03 NOTE — H&P ADULT - NSHPPHYSICALEXAM_GEN_ALL_CORE
PHYSICAL EXAM:  Vital Signs Last 24 Hrs  T(C): 39.3 (03 Nov 2019 02:30), Max: 39.3 (03 Nov 2019 02:30)  T(F): 102.8 (03 Nov 2019 02:30), Max: 102.8 (03 Nov 2019 02:30)  HR: 88 (03 Nov 2019 03:10) (83 - 91)  BP: 143/66 (03 Nov 2019 03:10) (136/85 - 143/66)  BP(mean): --  RR: 21 (03 Nov 2019 03:10) (19 - 21)  SpO2: 99% (03 Nov 2019 03:10) (93% - 99%)    GENERAL:     obese diaphoretic male in NAD  HEAD:     atraumatic, normocephalic  EYES:     EOMI, conjunctiva and sclera clear  ENMT:     no tonsillar erythema or exudates or enlargement, no oral lesions, moist mucous membranes, good dentition  NECK:     supple, no JVD  RESPIRATORY:     clear to auscultation bilaterally, no rales or rhonchi or wheezing or rubs  CARDIOVASCULAR:     regular rate and rhythm, soft i/vi HELGA LUSB  GASTROINTESTINAL:     soft, large and protuberant but nontender, nondistended, no hepatosplenomegaly palpated, bowel sounds present  EXTREMITIES:     no clubbing or cyanosis or edema  MUSCULOSKELETAL:     no joint pain or swelling or deformities  NERVOUS SYSTEM:     motor strength intact with 5/5 B/L upper and lower extremities, no gross sensory deficits  SKIN:     no rashes or lesions  PSYCH:     appropriate, alert and orientated x3, good concentration
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
## 2019-11-03 NOTE — H&P ADULT - NSICDXPASTSURGICALHX_GEN_ALL_CORE_FT
PAST SURGICAL HISTORY:  Artificial pacemaker for complete heart block    S/P aortic valve replacement with porcine valve

## 2019-11-03 NOTE — H&P ADULT - NSHPSOCIALHISTORY_GEN_ALL_CORE
+ former smoker (not a heavy smoker), quit years ago  + used to drink but stopped years ago  - no illegal drug use

## 2019-11-03 NOTE — ED ADULT TRIAGE NOTE - BP NONINVASIVE DIASTOLIC (MM HG)
Spoke to patient and .  She has skip-oral ulcerations with pain.  No rash elsewhere.  No fevers.    DDX includes Aguirre-Duran syndrome from seroquel, along with other dermatologic SE's from the drug.  Doubt oral HSV, but that's possible as I can't see the rash.    Recommended she go to the er for evaluation   85

## 2019-11-03 NOTE — ED PROVIDER NOTE - RESPIRATORY, MLM
The results were reviewed and a letter was sent. Normal labs. no respiratory distress, + bronchitic cough, +diffuse wheeze, no accessory muscle use

## 2019-11-03 NOTE — CONSULT NOTE ADULT - PROBLEM SELECTOR RECOMMENDATION 9
cough, copd, chf, cad, ashd, ppm, obesity, aurora, reji  reji - cpap - use nightly - weight management and sleep hygiene discussed -  copd - NEBS prn - will DC solumedrol for NOW - robitussin PRN -   eval Lower Resp tract infection - on Dual ABX - so far unconvincing story for PNA - CT chest pending - cx pending - need for ABX remains to be determined -   CAD ASHD valvular heart disease - PPM - eval CHF -   will check TSH VBG CRP   serial labs  serial clinical exam  on room air  supportive management - work up in progress  will follow
On abx as per pulm.

## 2019-11-03 NOTE — ED PROVIDER NOTE - OBJECTIVE STATEMENT
74 y.o. M BIBEMS for AMS, fever, cough, pt does have some baseline dementia, and seemed at baseline earlier today, tonight was altered, restless, had cough, noted to have fever by EMS, pt was panting to breath, fell while trying to get out of bed, hit his arm, wife does not think he hit his head, seemed disoriented, like couldn't find his place, which is not his baseline mental status, so wife called 911

## 2019-11-03 NOTE — H&P ADULT - NSHPREVIEWOFSYSTEMS_GEN_ALL_CORE
REVIEW OF SYSTEMS:  CONSTITUTIONAL:    +fever  EYES:    no eye pain or visual disturbances or discharge  ENMT:     no difficulty hearing or tinnitus or vertigo, no sinus or throat pain  NECK:    no pain or stiffness  RESPIRATORY:    +cough with sputum  CARDIOVASCULAR:    no chest pain or palpitations or dizziness or leg swelling  GASTROINTESTINAL:    no abdominal or epigastric pain. no nausea or vomiting or hematemesis, no diarrhea or constipation. no melena or hematochezia.  GENITOURINARY:    no dysuria or frequency or hematuria or incontinence  NEUROLOGICAL:    no headaches or memory loss or loss of strength or numbness or tremors  SKIN:    no itching or burning or rashes or lesions   LYMPH NODES:    no enlarged glands  ENDOCRINE:    no heat or cold intolerance, no hair loss, no polydipsia or polyuria  MUSCULOSKELETAL:    +right bunionectomy pain  PSYCHIATRIC:    no depression or anxiety or mood swings or difficulty sleeping  HEME/LYMPH:    no easy bruising or bleeding gums  ALLERGY AND IMMUNOLOGIC:    no hives or eczema
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
## 2019-11-04 NOTE — PROGRESS NOTE ADULT - SUBJECTIVE AND OBJECTIVE BOX
Neurology Follow up note    TAYLOR MCFADDEN 74y Male    HPI: 74M with obesity on aPAP, CAD s/p FELIPA x2 to LAD, bioprosthetic AVR, COPD, complete heart block s/p PPM, hx of DVT, HTN, HLD, depression, worsening memory loss/dementia who presents here for AMS.  Patient was in his usual state of health when all of sudden the patient woke up at 0115 altered.  Of note, patient was not wearing his CPAP.  According to the wife, patient was confused, disorientated, restless, and fell.  No reports of any head trauma or lost of consciousness.  Patient also started to have a worsening cough (has a chronic cough for 5 years) productive of yellow/greenish sputum.  Also was dyspneic.  Patient also started to become agitated and the wife got concerned and called EMS.  EMS came and found the patient to be febrile.  Transported here and was found to have a fever of 102.8F orally.  WBC normal.  CXR did not reveal any gross abnormalities.  However, given his fever and AMS and worsening cough, there was a concern for possible PNA.  Started on ceftriaxone and azithromycin.  Patient reports receiving the flu vaccination already (flu negative here in the ED).  Currently, the patient feels back to baseline and has no acute complaints.   Of note, patient had a head CT earlier today for evaluation of memory loss/dementia.  Patient also had a right bunionectomy about 5-6 weeks ago that just started to have some pain tonight (though no pain currently). (03 Nov 2019 04:44)    Interval History -    Patient is seen, chart was reviewed and case was discussed with the treatment team.  Pt is not in any distress.     Vital Signs Last 24 Hrs  T(C): 36.7 (04 Nov 2019 05:10), Max: 36.8 (03 Nov 2019 17:56)  T(F): 98.1 (04 Nov 2019 05:10), Max: 98.2 (03 Nov 2019 17:56)  HR: 86 (04 Nov 2019 05:10) (79 - 105)  BP: 142/84 (04 Nov 2019 05:10) (127/78 - 150/79)  BP(mean): 103 (04 Nov 2019 05:10) (103 - 108)  RR: 19 (04 Nov 2019 05:10) (19 - 22)  SpO2: 96% (04 Nov 2019 05:10) (96% - 100%)    MEDICATIONS    acetaminophen   Tablet .. 650 milliGRAM(s) Oral every 6 hours PRN  albuterol/ipratropium for Nebulization 3 milliLiter(s) Nebulizer every 4 hours PRN  aspirin enteric coated 81 milliGRAM(s) Oral daily  atorvastatin 10 milliGRAM(s) Oral at bedtime  azithromycin  IVPB 500 milliGRAM(s) IV Intermittent every 24 hours  cefTRIAXone   IVPB 1000 milliGRAM(s) IV Intermittent every 24 hours  folic acid 1 milliGRAM(s) Oral daily  guaiFENesin   Syrup  (Sugar-Free) 200 milliGRAM(s) Oral every 6 hours PRN  heparin  Injectable 5000 Unit(s) SubCutaneous every 8 hours  imipramine 25 milliGRAM(s) Oral daily  lactated ringers. 1000 milliLiter(s) IV Continuous <Continuous>  metoprolol tartrate 50 milliGRAM(s) Oral two times a day  pantoprazole    Tablet 40 milliGRAM(s) Oral before breakfast  thiamine 100 milliGRAM(s) Oral daily    Allergies    No Known Allergies      REVIEW OF SYSTEMS:    CONSTITUTIONAL: No fever  EYES: No eye pain,   ENMT:  No sinus or throat pain  NECK: No pain or stiffness  RESPIRATORY: No cough, No hemoptysis; No shortness of breath  CARDIOVASCULAR: No acute chest pain, palpitations,  or leg swelling  GASTROINTESTINAL: No abdominal pain. No nausea, vomiting, or hematemesis;  No melena or hematochezia.  GENITOURINARY: No  hematuria, or incontinence  MUSCULOSKELETAL: No joint swelling; No extremity pain  SKIN: No itching, rashes, or lesions   LYMPH NODES: No enlarged glands  NEUROLOGICAL: No headaches, memory loss,   PSYCHIATRIC: No depression, anxiety, mood swings, or difficulty sleeping  ENDOCRINE: No heat or cold intolerance;   HEME/LYMPH: No easy bruising, or bleeding gums  Allergy/Immunology. No medication allergy. No seasonal allergies.    PHYSICAL EXAM:    GENERAL: NAD, well-groomed, well-developed  HEAD:  Atraumatic, Normocephalic  EYES: EOMI, PERRLA, conjunctiva and sclera clear  NECK: Supple, No JVD, thyroid non-palpable    On Neurological Examination:    Mental Status - Pt is alert, awake, oriented X3. Poor cognition. Follows commands. Poor recall.    Speech -  Normal. Pt has no aphasia.    Cranial Nerves - Pupils 3 mm equal and reactive to light, extraocular eye movements intact. Pt has no visual field deficit. Pt has no facial asymmetry. Tongue - is in midline.    Motor Exam - 4 plus/5 all over, No drift. No shaking or tremors. Muscle tone - is normal all over. Moves all extremities equally. No asymmetry is seen.      Sensory Exam - Pt withdraws all extremities equally on stimulation. No asymmetry seen. No complaints of tingling, numbness.    Gait - Able to stand and walk unassisted.     Deep tendon Reflexes - 2 plus all over.    Coordination - Fine finger movements are normal on both sides. Finger to nose is also normal on both sides.       Romberg - Negative.    Neck Supple -  Yes.    LABS:    CBC Full  -  ( 04 Nov 2019 06:02 )  WBC Count : 7.67 K/uL  RBC Count : 4.88 M/uL  Hemoglobin : 10.6 g/dL  Hematocrit : 33.9 %  Platelet Count - Automated : 105 K/uL  Mean Cell Volume : 69.5 fl  Mean Cell Hemoglobin : 21.7 pg  Mean Cell Hemoglobin Concentration : 31.3 gm/dL    11-04    138  |  105  |  19  ----------------------------<  131<H>  4.0   |  26  |  1.15    Ca    8.8      04 Nov 2019 06:02  Phos  2.4     11-03  Mg     1.8     11-03    TPro  7.7  /  Alb  3.3  /  TBili  0.7  /  DBili  x   /  AST  20  /  ALT  18  /  AlkPhos  72  11-03      Vitamin B12, Serum: 312 pg/mL (11-03 @ 19:22)     RADIOLOGY & ADDITIONAL STUDIES:    < from: CT Head No Cont (11.03.19 @ 09:58) >    IMPRESSION: No evidence of hemorrhage or mass effect.      < end of copied text >

## 2019-11-04 NOTE — PROGRESS NOTE ADULT - ASSESSMENT
74M with obesity on aPAP, CAD s/p FELIPA x2 to LAD, bioprosthetic AVR, complete heart block s/p PPM, hx of DVT, HTN, HLD, depression, worsening memory loss/dementia who presents here for AMS.   Patient has sleep apnea and was not wearing his CPAP so possibly could be altered from hypercapnia.  Though this would not explain his transient fever.  For now, will treat for CAP +/- COPD exacerbation (fever + worsening cough).      Community-acquired pneumonia  - admitted to medicine  - continue with ceftriaxone and azithromycin for now  - f/u blood cultures  -  RVP-negative.  -CT head- non significant.  CT chest +lung nodule.    COPD  - possible exacerbation  - continue with steroids for now  - nebs as needed    MILO  - previous values lower  - BUN/Cr <20 however  - hydrate and repeat BMP    Microcytic hypochromic anemia  - check fecal occult    Thrombocytopenia  - was low before with range of   - consider heme/onc consult  .      CAD  - cont with lipitor  - cont with metoprolol  - cont with aspirin    Depression/ hallucination  - will cont with imipramine for now  psych evaluation.    Preventive measure  IMPROVE VTE Individual Risk Assessment          RISK                                                          Points  [X] Previous VTE                                                 3  [  ] Thrombophilia                                              2  [  ] Lower limb paralysis                                    2        (unable to hold up >15 seconds)    [  ] Current Cancer                                             2         (within 6 months)  [  ] Immobilization > 24 hrs                              1  [  ] ICU/CCU stay > 24 hours                            1  [X] Age > 60                                                        1    IMPROVE VTE Score 4    - had a previous DVT though it was provoked  - will await head CT before starting on anticoagulation   - SCD for now 74M with obesity on aPAP, CAD s/p FELIPA x2 to LAD, bioprosthetic AVR, complete heart block s/p PPM, hx of DVT, HTN, HLD, depression, worsening memory loss/dementia who presents here for AMS.   Patient has sleep apnea and was not wearing his CPAP so possibly could be altered from hypercapnia.  Though this would not explain his transient fever.  For now, will treat for CAP +/- COPD exacerbation (fever + worsening cough).      Community-acquired pneumonia  - admitted to medicine  - continue with ceftriaxone and azithromycin for now  - f/u blood cultures  -  RVP-negative.  -CT head- non significant.  CT chest +lung nodule.    COPD  - possible exacerbation  - continue with steroids for now  - nebs as needed    MILO  - previous values lower  - BUN/Cr <20 however  - hydrate and repeat BMP    Microcytic hypochromic anemia  - check fecal occult    Thrombocytopenia  - was low before with range of   - consider heme/onc consult  .      CAD  - cont with lipitor  - cont with metoprolol  - cont with aspirin    Depression/ hallucination  - will cont with imipramine for now  psych evaluation.    Hypophosphatemia  replete Phosph and repeat levels.    Lung nodule  6mm  needs out pt f/u.    Plan of care was discuss with patient and family, all questions were answered, seems understand and in agreement.    Case discuss with PMD.

## 2019-11-04 NOTE — PROGRESS NOTE ADULT - SUBJECTIVE AND OBJECTIVE BOX
Patient is a 74y old  Male who presents with a chief complaint of AMS (04 Nov 2019 13:15)      INTERVAL HPI/OVERNIGHT EVENTS:  Pt is seen and examined.  does not feel himself today.  As per wife he was seeing cats inth room, elevators in his room.    Pain Location & Control:     MEDICATIONS  (STANDING):  aspirin enteric coated 81 milliGRAM(s) Oral daily  atorvastatin 10 milliGRAM(s) Oral at bedtime  azithromycin  IVPB 500 milliGRAM(s) IV Intermittent every 24 hours  cefTRIAXone   IVPB 1000 milliGRAM(s) IV Intermittent every 24 hours  cyanocobalamin Injectable 1000 MICROGram(s) IntraMuscular once  folic acid 1 milliGRAM(s) Oral daily  heparin  Injectable 5000 Unit(s) SubCutaneous every 8 hours  imipramine 25 milliGRAM(s) Oral daily  lactated ringers. 1000 milliLiter(s) (45 mL/Hr) IV Continuous <Continuous>  metoprolol tartrate 50 milliGRAM(s) Oral two times a day  pantoprazole    Tablet 40 milliGRAM(s) Oral before breakfast  thiamine 100 milliGRAM(s) Oral daily    MEDICATIONS  (PRN):  acetaminophen   Tablet .. 650 milliGRAM(s) Oral every 6 hours PRN Temp greater or equal to 38C (100.4F)  albuterol/ipratropium for Nebulization 3 milliLiter(s) Nebulizer every 4 hours PRN Shortness of Breath and/or Wheezing  guaiFENesin   Syrup  (Sugar-Free) 200 milliGRAM(s) Oral every 6 hours PRN Cough      Allergies    No Known Allergies    Intolerances      Vital Signs Last 24 Hrs  T(C): 36.7 (04 Nov 2019 14:30), Max: 36.8 (03 Nov 2019 17:56)  T(F): 98.1 (04 Nov 2019 14:30), Max: 98.2 (03 Nov 2019 17:56)  HR: 105 (04 Nov 2019 14:30) (79 - 105)  BP: 163/93 (04 Nov 2019 14:30) (127/78 - 163/93)  BP(mean): 103 (04 Nov 2019 05:10) (103 - 108)  RR: 19 (04 Nov 2019 14:30) (19 - 22)  SpO2: 100% (04 Nov 2019 14:30) (96% - 100%)        LABS:                        10.6   7.67  )-----------( 105      ( 04 Nov 2019 06:02 )             33.9     04 Nov 2019 06:02    138    |  105    |  19     ----------------------------<  131    4.0     |  26     |  1.15     Ca    8.8        04 Nov 2019 06:02  Phos  2.4       03 Nov 2019 14:39  Mg     1.8       03 Nov 2019 14:39    TPro  7.7    /  Alb  3.3    /  TBili  0.7    /  DBili  x      /  AST  20     /  ALT  18     /  AlkPhos  72     03 Nov 2019 14:39        CAPILLARY BLOOD GLUCOSE        CARDIAC MARKERS ( 03 Nov 2019 12:45 )  .000 ng/mL / x     / x     / x     / x          Cultures  Culture Results:   No growth to date. (11-03 @ 12:00)  Culture Results:   No growth to date. (11-03 @ 12:00)        Culture - Blood (collected 11-03-19 @ 12:00)  Source: .Blood Blood-Peripheral  Preliminary Report (11-04-19 @ 12:01):    No growth to date.    Culture - Blood (collected 11-03-19 @ 12:00)  Source: .Blood Blood-Peripheral  Preliminary Report (11-04-19 @ 12:01):    No growth to date.        RADIOLOGY & ADDITIONAL TESTS:    Imaging Personally Reviewed:  [ ] YES  [ ] NO    Consultant(s) Notes Reviewed:  [ ] YES  [ ] NO    Care Discussed with Consultants/Other Providers [ x] YES  [ ] NO

## 2019-11-04 NOTE — DISCHARGE NOTE PROVIDER - NSDCCPCAREPLAN_GEN_ALL_CORE_FT
PRINCIPAL DISCHARGE DIAGNOSIS  Diagnosis: Pneumonia due to infectious organism, unspecified laterality, unspecified part of lung  Assessment and Plan of Treatment:       SECONDARY DISCHARGE DIAGNOSES  Diagnosis: COPD exacerbation  Assessment and Plan of Treatment:

## 2019-11-04 NOTE — DISCHARGE NOTE PROVIDER - NSDCFUSCHEDAPPT_GEN_ALL_CORE_FT
TAYLOR MCFADDEN ; 12/04/2019 ; NPP G[facility] [address] [facility]  TAYLOR MCFADDEN ; 01/21/2020 ; NPP Int[facility] [address] TAYLOR MCFADDEN ; 12/04/2019 ; NPP Gastro [address] [facility]  TAYLOR MCFADDEN ; 01/21/2020 ; NPP Int[facility] [address] TAYLOR MCFADDEN ; 12/04/2019 ; NPP Gastro [address] [facility]  TAYLOR MCFADDEN ; 01/21/2020 ; NPP Int[facility] [address]

## 2019-11-04 NOTE — PROGRESS NOTE ADULT - ASSESSMENT
Seen for AMS/confusion  H/o Morbid obesity.  H/o Sleep apnea on C PAP  - was not using. Found to be confused.  Had fever, which resolved.  W/up is in progress.  No fever, No signs of meningitis.  Non focal exam.  CT Head - No acute pathology.  No signs of CVA.  H/o PPM   Symptoms likely sec to Sleep abnormality, Hypoxia/Hypercarbia  Continue C pap at night.  Vitamin B12 - 312  -Would give one dose for supplementation. rec Monthly supplementation from PMDs office.  Fall/safety precautions.  D/w Pt/wife at bedside. Questions answered.  Pt is morbidly obese  - Wt loss counseling is done with Pt/wife.  DC planning is in progress.

## 2019-11-04 NOTE — PROGRESS NOTE ADULT - SUBJECTIVE AND OBJECTIVE BOX
74M with obesity on CPAP, CAD s/p FELIPA x2 to LAD, bioprosthetic AVR, COPD, complete heart block s/p PPM x 2 (lead fracture post initia implantation), hx of DVT, HTN, HLD, depression, worsening memory loss/dementia who presented here for AMS.  Patient was in his usual state of health when all of sudden the patient woke up altered.  Patient was not wearing his CPAP.  As per chart, patient was confused, disorientated, restless, and fell.  No head trauma or lost of consciousness was noted.  Patient also states he has a chronic cough which has started to worsen and be productive of yellow/greenish sputum.  Now he states he has no SOB or dyspnea but the chart says last evening he was dyspneic.  Chart also states patient became agitated and the wife got concerned and called EMS.  EMS came and found the patient to be febrile.  An had a fever of 102.8F orally.  Of note, patient had a head CT late last week for evaluation of memory loss/dementia.  Denies chest pain, shortness of breath at rest, dyspnea on exertion, orthopnea, paroxysmal nocturnal dyspnea, dizziness, lightheadedness, claudication, pre-syncope or syncope Now.  States this chronic cough has been evaluated by ENT and has a laryngeal abnormality which has not been addressed.      11/5/19: 74M with obesity on CPAP, CAD s/p FELIPA x2 to LAD, bioprosthetic AVR, COPD, complete heart block s/p PPM x 2 (lead fracture post initia implantation), hx of DVT, HTN, HLD, depression, worsening memory loss/dementia who presented here for AMS.  Patient was in his usual state of health when all of sudden the patient woke up altered.  Patient was not wearing his CPAP.  As per chart, patient was confused, disorientated, restless, and fell.  No head trauma or lost of consciousness was noted.  Patient also states he has a chronic cough which has started to worsen and be productive of yellow/greenish sputum.  Now he states he has no SOB or dyspnea but the chart says last evening he was dyspneic.  Chart also states patient became agitated and the wife got concerned and called EMS.  EMS came and found the patient to be febrile.  An had a fever of 102.8F orally.  Of note, patient had a head CT late last week for evaluation of memory loss/dementia.  Denies chest pain, shortness of breath at rest, dyspnea on exertion, orthopnea, paroxysmal nocturnal dyspnea, dizziness, lightheadedness, claudication, pre-syncope or syncope Now.  States this chronic cough has been evaluated by ENT and has a laryngeal abnormality which has not been addressed.      11/5/19: remains confused.  Per wife at bedside AMS waxes & wanes throughout day.      MEDICATIONS:    MEDICATIONS  (STANDING):  aspirin enteric coated 81 milliGRAM(s) Oral daily  atorvastatin 10 milliGRAM(s) Oral at bedtime  azithromycin  IVPB 500 milliGRAM(s) IV Intermittent every 24 hours  cefTRIAXone   IVPB 1000 milliGRAM(s) IV Intermittent every 24 hours  folic acid 1 milliGRAM(s) Oral daily  heparin  Injectable 5000 Unit(s) SubCutaneous every 8 hours  imipramine 25 milliGRAM(s) Oral daily  lactated ringers. 1000 milliLiter(s) (45 mL/Hr) IV Continuous <Continuous>  metoprolol tartrate 50 milliGRAM(s) Oral two times a day  pantoprazole    Tablet 40 milliGRAM(s) Oral before breakfast  thiamine 100 milliGRAM(s) Oral daily    MEDICATIONS  (PRN):  acetaminophen   Tablet .. 650 milliGRAM(s) Oral every 6 hours PRN Temp greater or equal to 38C (100.4F)  albuterol/ipratropium for Nebulization 3 milliLiter(s) Nebulizer every 4 hours PRN Shortness of Breath and/or Wheezing  guaiFENesin   Syrup  (Sugar-Free) 200 milliGRAM(s) Oral every 6 hours PRN Cough      Vital Signs Last 24 Hrs  T(C): 36.7 (04 Nov 2019 05:10), Max: 36.8 (03 Nov 2019 17:56)  T(F): 98.1 (04 Nov 2019 05:10), Max: 98.2 (03 Nov 2019 17:56)  HR: 86 (04 Nov 2019 05:10) (79 - 105)  BP: 142/84 (04 Nov 2019 05:10) (127/78 - 150/79)  BP(mean): 103 (04 Nov 2019 05:10) (103 - 108)  RR: 19 (04 Nov 2019 05:10) (19 - 22)  SpO2: 96% (04 Nov 2019 05:10) (96% - 100%)Daily     Daily I&O's Summary    03 Nov 2019 07:01  -  04 Nov 2019 07:00  --------------------------------------------------------  IN: 781 mL / OUT: 300 mL / NET: 481 mL    PHYSICAL EXAM:  Constitutional: NAD, awake , well-developed  HEENT: PERRLA, EOMI,  No oral cyanosis. Oropharynx Clean and Dry.  Neck:  supple,  No JVD, No Thyroid enlargement. No Carotid Bruits bilaterally.  Respiratory: Breath sounds show bibasilair rhonchi  Cardiovascular: NL S1 and S2, RRR, 1/6 HELGA to LLSB  Gastrointestinal: Bowel Sounds present, soft, Obeses   Extremities: No peripheral edema. No clubbing or cyanosis.  Vascular: 1+ peripheral pulses in LE   Musculoskeletal: no calf tenderness.  Skin: No rashes    LABS: All Labs Reviewed:                        10.6   7.67  )-----------( 105      ( 04 Nov 2019 06:02 )             33.9                         11.3   8.95  )-----------( 98       ( 03 Nov 2019 14:39 )             37.1                         11.0   5.87  )-----------( 84       ( 03 Nov 2019 03:03 )             34.5     04 Nov 2019 06:02    138    |  105    |  19     ----------------------------<  131    4.0     |  26     |  1.15   03 Nov 2019 14:39    138    |  104    |  14     ----------------------------<  224    4.2     |  21     |  1.42   03 Nov 2019 03:03    137    |  102    |  14     ----------------------------<  133    3.7     |  24     |  1.34     Ca    8.8        04 Nov 2019 06:02  Ca    9.0        03 Nov 2019 14:39  Ca    9.0        03 Nov 2019 03:03  Phos  2.4       03 Nov 2019 14:39  Mg     1.8       03 Nov 2019 14:39    TPro  7.7    /  Alb  3.3    /  TBili  0.7    /  DBili  x      /  AST  20     /  ALT  18     /  AlkPhos  72     03 Nov 2019 14:39  TPro  7.6    /  Alb  3.4    /  TBili  1.0    /  DBili  x      /  AST  21     /  ALT  18     /  AlkPhos  77     03 Nov 2019 03:03      CARDIAC MARKERS ( 03 Nov 2019 12:45 )  .000 ng/mL / x     / x     / x     / x          Blood Culture:   11-03 @ 12:45  Pro Bnp 907    11-03 @ 19:48  TSH: 1.00      RADIOLOGY/EKG:      ECHO/CARDIAC CATHTERIZATION/STRESS TEST:

## 2019-11-04 NOTE — PROGRESS NOTE ADULT - PROBLEM SELECTOR PLAN 3
await results of echo to evaluate prosthesis given fever & pneumonia. Echo poor quality study but no evidence of vegetation on prosthetic aortic valve or other valves.    Will sign off, please call if questions.
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
## 2019-11-04 NOTE — PROGRESS NOTE ADULT - ASSESSMENT
74M with PMH of copd, depression, dvt, hld, htn, s/p bioprosthetic AVR, cad s/p stent, CHB s/p PPM, dementia, who presented with AMS, admitted with CAP, COPD exacerbation, MILO, thrombocytopenia.     -AMS improved. ? due to hypercapnia due to not wearing cpap mask. CTH noted, no intracranial pathology  -Monitor HD's. Keep MAP >65  -breathing improved. Keep O2 saturation > 92%. Cpap qhs and prn. Continue nebs/chest PT  -PO diet. PPI  -Continue rocephin and zithro. Monitor wbc/temp. F/U cultures and adjust abx as necessary  -MILO improving. Monitor UOP/Lytes. Replete hypomagnesemia and hypophosphatemia. Avoid nephrotoxic agents  -Monitor plt count. No active bleed. Trend  -DVT ppx  -Supportive care
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
## 2019-11-04 NOTE — DISCHARGE NOTE PROVIDER - NSDCMRMEDTOKEN_GEN_ALL_CORE_FT
acetaminophen 325 mg oral tablet: 2 tab(s) orally every 6 hours, As needed, Temp greater or equal to 38C (100.4F)  aspirin 81 mg oral delayed release tablet: 1 tab(s) orally once a day  bacitracin 500 units/g topical ointment: 1 application topically 2 times a day  Colace 100 mg oral capsule: orally 3 times a day  folic acid 1 mg oral tablet: 1 tab(s) orally once a day  Lipitor 10 mg oral tablet: 1 tab(s) orally once a day  Lopressor 50 mg oral tablet: 1 tab(s) orally 2 times a day  Protonix 40 mg oral delayed release tablet: 1 tab(s) orally once a day  Tofranil 25 mg oral tablet: orally once a day  Vitamin B1 100 mg oral tablet: 1 tab(s) orally once a day  ZyrTEC 10 mg oral tablet: 1 tab(s) orally once a day acetaminophen 325 mg oral tablet: 2 tab(s) orally every 6 hours, As needed, Temp greater or equal to 38C (100.4F)  aspirin 81 mg oral delayed release tablet: 1 tab(s) orally once a day  atorvastatin 10 mg oral tablet: 1 tab(s) orally once a day (at bedtime)  benzonatate 100 mg oral capsule: 1 cap(s) orally 3 times a day  benzonatate 100 mg oral capsule: 1 cap(s) orally 3 times a day   budesonide-formoterol 160 mcg-4.5 mcg/inh inhalation aerosol: 1 puff(s) inhaled 2 times a day  Colace 100 mg oral capsule: orally 3 times a day  folic acid 1 mg oral tablet: 1 tab(s) orally once a day  imipramine 25 mg oral tablet: 1 tab(s) orally once a day  ipratropium-albuterol 0.5 mg-2.5 mg/3 mLinhalation solution: 3 milliliter(s) inhaled every 6 hours  metoprolol tartrate 50 mg oral tablet: 1 tab(s) orally 2 times a day  Protonix 40 mg oral delayed release tablet: 1 tab(s) orally once a day  ROLLING WALKER: 1 each buccal once a day   thiamine 100 mg oral tablet: 1 tab(s) orally once a day  Vitamin B12 1000 mcg oral tablet: 1 tab(s) orally once a day  ZyrTEC 10 mg oral tablet: 1 tab(s) orally once a day

## 2019-11-04 NOTE — DISCHARGE NOTE PROVIDER - HOSPITAL COURSE
74M with obesity on aPAP, CAD s/p FELIPA x2 to LAD, bioprosthetic AVR, complete heart block s/p PPM, hx of DVT, HTN, HLD, depression, worsening memory loss/dementia who presents here for AMS.   Patient has sleep apnea and was not wearing his CPAP so possibly could be altered from hypercapnia.  Though this would not explain his transient fever.  For now, will treat for CAP +/- COPD exacerbation (fever + worsening cough).          Community-acquired pneumonia    antibiotics for totao 5days.        COPD    - possible exacerbation    - continue with steroids for now    - nebs as needed        MILO    trend bmp prn.        Microcytic hypochromic anemia    - lower than baseline    - could be iron deficiency    - will check folate, iron studies, and vitamin B12    - check fecal occult    can be f/u out patient.        Thrombocytopenia    trend cbc.    w/u as an out pt.    CAD    stable    - cont with lipitor    - cont with metoprolol    - cont with aspirin    continue home meds.        Depression    - will cont with imipramine for now
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
## 2019-11-05 NOTE — DIETITIAN INITIAL EVALUATION ADULT. - OTHER INFO
Patient is a 74y old  Male who presents with a chief complaint of AMS, possible pneumonia d/t infectious organism. He was admitted for pneumonia and started on IV antibiotics although CXR was negative. Noted to have microcytic anemia and thrombocytopenia with mild renal insufficiency; heme consulted w/ recommendations for folic acid d/t pt's known to have thalassemia and vit b12 orally. Pt tolerating DASH/TLC diet w/ fair-good PO intakes per pt. Reports appetite is good. Pt lives w/ his wife at home who cooks for him; reports not adding salt to meals and attempting to monitor portion sizes. Provided w/ review of DASH/TLC diet and written nutrition education on heart healthy diet/weight management guidelines as pt noted w/ severe obesity/class III obesity. Pt reports hx of weight fluctuations d/t on and off dieting (UBW reported as 250-300# range). Pt denies n/v/d/c. Pt reports taking MVI/vit B1 at home. Recommend pt continue to take b1, b12 vitamins at home. Patient is a 74y old  Male who presents with a chief complaint of AMS, possible pneumonia d/t infectious organism. He was admitted for pneumonia and started on IV antibiotics although CXR was negative. Noted to have microcytic anemia and thrombocytopenia with mild renal insufficiency; heme consulted w/ recommendations for folic acid d/t pt's known to have thalassemia and vit b12 orally. Pt tolerating DASH/TLC diet w/ fair-good PO intakes per pt. Reports appetite is good. Pt lives w/ his wife at home who cooks for him; reports not adding salt to meals and attempting to monitor portion sizes. Provided w/ review of DASH/TLC diet and written nutrition education on heart healthy diet/weight management guidelines as pt noted w/ severe obesity/class III obesity. Pt reports hx of weight fluctuations d/t on and off dieting (UBW reported as 250-300# range). Pt denies n/v/d/c. Pt reports taking MVI/vit B1 at home. Recommend pt continue to take b1, b12 vitamins at home. Skin intact of PU. Trace 1+ edema noted bilateral feet per flowsheets.

## 2019-11-05 NOTE — PROGRESS NOTE ADULT - ASSESSMENT
74M with obesity on aPAP, CAD s/p FELIPA x2 to LAD, bioprosthetic AVR, complete heart block s/p PPM, hx of DVT, HTN, HLD, depression, worsening memory loss/dementia who presents here for AMS.   Patient has sleep apnea and was not wearing his CPAP so possibly could be altered from hypercapnia.  Though this would not explain his transient fever.  For now, will treat for CAP +/- COPD exacerbation (fever + worsening cough).      Community-acquired pneumonia  - admitted to medicine  - continue with ceftriaxone and azithromycin for now  - f/u blood cultures  -  RVP-negative.  -CT head- non significant.  CT chest +lung nodule.    COPD  - possible exacerbation  - continue with steroids for now  - nebs as needed    MILO  - previous values lower  - BUN/Cr <20 however  - hydrate and repeat BMP    Microcytic hypochromic anemia  - check fecal occult    Thrombocytopenia  - was low before with range of   - consider heme/onc consult  .      CAD  - cont with lipitor  - cont with metoprolol  - cont with aspirin    Depression/ hallucination  - will cont with imipramine for now  psych evaluation.    Hypophosphatemia  replete Phosph and repeat levels.    Lung nodule  6mm  needs out pt f/u.    Plan of care was discuss with patient and family, all questions were answered, seems understand and in agreement.    Case discuss with PMD. 74M with obesity on aPAP, CAD s/p FELIPA x2 to LAD, bioprosthetic AVR, complete heart block s/p PPM, hx of DVT, HTN, HLD, depression, worsening memory loss/dementia who presents here for AMS.   Patient has sleep apnea and was not wearing his CPAP so possibly could be altered from hypercapnia.  Though this would not explain his transient fever.  For now, will treat for CAP +/- COPD exacerbation (fever + worsening cough).      Community-acquired pneumonia  - continue with ceftriaxone and azithromycin for now  - f/u blood cultures  -  RVP-negative.  -CT head- non significant.  CT chest +lung nodule.    COPD  - possible exacerbation  - continue with steroids for now  - nebs as needed    MILO  - previous values lower  - BUN/Cr <20 however  - hydrate and repeat BMP    Microcytic hypochromic anemia  - check fecal occult    Thrombocytopenia  - was low before with range of   - consider heme/onc consult  .      CAD  - cont with lipitor  - cont with metoprolol  - cont with aspirin    Depression/visual  hallucination  - will cont with imipramine for now  psych evaluation.    Hypophosphatemia  replete Phosph and repeat levels.    Lung nodule  6mm  needs out pt f/u.    Plan of care was discuss with patient and family, all questions were answered, seems understand and in agreement.    Case discuss with PMD. 74M with obesity on aPAP, CAD s/p FELIPA x2 to LAD, bioprosthetic AVR, complete heart block s/p PPM, hx of DVT, HTN, HLD, depression, worsening memory loss/dementia who presents here for AMS.   Patient has sleep apnea and was not wearing his CPAP so possibly could be altered from hypercapnia.  Though this would not explain his transient fever.  For now, will treat for CAP +/- COPD exacerbation (fever + worsening cough).      Community-acquired pneumonia  - continue with ceftriaxone and azithromycin for now  - f/u blood cultures  -  RVP-negative.  -CT head- non significant.  CT chest +lung nodule.    COPD  - possible exacerbation  - continue with steroids for now  - nebs as needed    MILO  - previous values lower  - BUN/Cr <20 however  - hydrate and repeat BMP    Microcytic hypochromic anemia  - check fecal occult    Thrombocytopenia  - was low before with range of   - consider heme/onc consult  .      CAD  - cont with lipitor  - cont with metoprolol  - cont with aspirin    Depression/visual  hallucination  - will cont with imipramine for now  f/u psych evaluation.    Hypophosphatemia  replete Phosph and repeat levels.    Lung nodule  6mm  needs out pt f/u.    Plan of care was discuss with patient and family, all questions were answered, seems understand and in agreement.    Case discuss with PMD.

## 2019-11-05 NOTE — PHYSICAL THERAPY INITIAL EVALUATION ADULT - PERTINENT HX OF CURRENT PROBLEM, REHAB EVAL
74M with obesity on aPAP, CAD s/p FELIPA x2 to LAD, bioprosthetic AVR, COPD, complete heart block s/p PPM, hx of DVT, HTN, HLD, depression, worsening memory loss/dementia who presents here for AMS. Pt with +fever, community acquired PNA, possible COPD exacerbation. CT chest: Trace groundglass opacity medial aspect right lung base. 6 mm subpleural nodule, right lower lobe

## 2019-11-05 NOTE — DIETITIAN INITIAL EVALUATION ADULT. - FACTORS AFF FOOD INTAKE
Labs from [facility] laboratory medicine from 12/28/2018: Total T3 78, free T4 1 08, TSH 2 16  Please let patient know her thyroid labs look fine  none

## 2019-11-05 NOTE — BEHAVIORAL HEALTH ASSESSMENT NOTE - NSBHCHARTREVIEWIMAGING_PSY_A_CORE FT
EXAM:  CT BRAIN                        PROCEDURE DATE:  11/03/2019    INTERPRETATION:  Clinical information: Altered mental status     comparison study dated 12/31/2017    Axial images obtained, coronal and sagittal images computer reformatted.    Atrophic changes present. Periventricular white matter hypoattenuation,   microvascular ischemic change. No evidence of mass effect midline shift   epidural or subdural collection. No sign of acute or recent hemorrhage.   Hypodensity in the right basal ganglion likely lacuna infarct.    No unusual calcifications evident. Calvarium intact. Polyp or retention   cyst, right maxillary sinus. No fluid levels in visualized paranasal   sinuses. Mastoids pneumatized.    IMPRESSION: No evidence of hemorrhage or mass effect.    LOUISE SAM M.D.,ATTENDING RADIOLOGIST  This document has been electronically signed. Nov  3 2019 10:31AM

## 2019-11-05 NOTE — CONSULT NOTE ADULT - ASSESSMENT
73 yo male admitted with confusion and fever, noted to have mild renal insufficiency and thrombocytopenia; also mild anemia; started on IV antibiotics with clinical improvement    - improved platelet count also noted; suspect this was reactive due to acute illness/infection  - kidney function improving with hydration  - do not suspect TTP  - would start folic acid given patient known to have thalassemia and would also start B12 orally as his level is ~300  - no additional heme/onc intervention needed at this time  - case discussed with PA in [facility]PCU (Serafin)

## 2019-11-05 NOTE — BEHAVIORAL HEALTH ASSESSMENT NOTE - CASE SUMMARY
Pt is a 74M domiciled with wife with a PMH of obesity, CAD, COPD, complete heart block, DVT, HTN, and HLD and PPH of depression, worsening memory loss/dementia. He presents with visual hallucinations. Pt is alert and oriented x 3. Reports he saw a man being "cuffed" and a man "swimming in water." He asked if people have to "swim through dirt" in order to visit him. Pt received a score of 18/30 on the Superior Cognitive Assessment (MOCA) and had difficulty with recall and organization. Admits to past alcohol use. Denies depression, anxiety, and suicidal ideation.    Impression Delirium vs dementia   Plan: Pt may receive prns of Ativan 0.5mg for anxiety and Haldol  0.5mg po/ IM prn agitation

## 2019-11-05 NOTE — PROGRESS NOTE ADULT - SUBJECTIVE AND OBJECTIVE BOX
Neurology Follow up note    TAYLOR MCFADDEN 74y Male    HPI: 74M with obesity on aPAP, CAD s/p FELIPA x2 to LAD, bioprosthetic AVR, COPD, complete heart block s/p PPM, hx of DVT, HTN, HLD, depression, worsening memory loss/dementia who presents here for AMS.  Patient was in his usual state of health when all of sudden the patient woke up at 0115 altered.  Of note, patient was not wearing his CPAP.  According to the wife, patient was confused, disorientated, restless, and fell.  No reports of any head trauma or lost of consciousness.  Patient also started to have a worsening cough (has a chronic cough for 5 years) productive of yellow/greenish sputum.  Also was dyspneic.  Patient also started to become agitated and the wife got concerned and called EMS.  EMS came and found the patient to be febrile.  Transported here and was found to have a fever of 102.8F orally.  WBC normal.  CXR did not reveal any gross abnormalities.  However, given his fever and AMS and worsening cough, there was a concern for possible PNA.  Started on ceftriaxone and azithromycin.  Patient reports receiving the flu vaccination already (flu negative here in the ED).  Currently, the patient feels back to baseline and has no acute complaints.   Of note, patient had a head CT earlier today for evaluation of memory loss/dementia.  Patient also had a right bunionectomy about 5-6 weeks ago that just started to have some pain tonight (though no pain currently). (03 Nov 2019 04:44)    Interval History -    Patient is seen, chart was reviewed and case was discussed with the treatment team.  In [facility] for SOB.    Vital Signs Last 24 Hrs  T(C): 36.7 (05 Nov 2019 08:16), Max: 37.6 (04 Nov 2019 15:33)  T(F): 98.1 (05 Nov 2019 08:16), Max: 99.6 (04 Nov 2019 15:33)  HR: 82 (05 Nov 2019 08:00) (80 - 105)  BP: 120/81 (05 Nov 2019 08:00) (119/89 - 163/93)  BP(mean): 91 (05 Nov 2019 08:00) (91 - 115)  RR: 25 (05 Nov 2019 08:00) (12 - 25)  SpO2: 96% (05 Nov 2019 08:00) (92% - 100%)    MEDICATIONS    acetaminophen   Tablet .. 650 milliGRAM(s) Oral every 6 hours PRN  albuterol/ipratropium for Nebulization 3 milliLiter(s) Nebulizer every 4 hours PRN  aspirin enteric coated 81 milliGRAM(s) Oral daily  atorvastatin 10 milliGRAM(s) Oral at bedtime  azithromycin  IVPB 500 milliGRAM(s) IV Intermittent every 24 hours  cefTRIAXone   IVPB 1000 milliGRAM(s) IV Intermittent every 24 hours  cyanocobalamin 1000 MICROGram(s) Oral daily  folic acid 1 milliGRAM(s) Oral daily  guaiFENesin   Syrup  (Sugar-Free) 200 milliGRAM(s) Oral every 6 hours PRN  heparin  Injectable 5000 Unit(s) SubCutaneous every 8 hours  imipramine 25 milliGRAM(s) Oral daily  metoprolol tartrate 50 milliGRAM(s) Oral two times a day  pantoprazole    Tablet 40 milliGRAM(s) Oral before breakfast  potassium phosphate / sodium phosphate powder 1 Packet(s) Oral two times a day  thiamine 100 milliGRAM(s) Oral daily    Allergies    No Known Allergies    REVIEW OF SYSTEMS:    CONSTITUTIONAL: No fever  EYES: No eye pain,   ENMT:  No sinus or throat pain  NECK: No pain or stiffness  RESPIRATORY: No cough, No hemoptysis; No shortness of breath  CARDIOVASCULAR: No acute chest pain, palpitations,  or leg swelling  GASTROINTESTINAL: No abdominal pain. No nausea, vomiting, or hematemesis;  No melena or hematochezia.  GENITOURINARY: No  hematuria, or incontinence  MUSCULOSKELETAL: No joint swelling; No extremity pain  SKIN: No itching, rashes, or lesions   LYMPH NODES: No enlarged glands  NEUROLOGICAL: No headaches, memory loss,   PSYCHIATRIC: No depression, anxiety, mood swings, or difficulty sleeping  ENDOCRINE: No heat or cold intolerance;   HEME/LYMPH: No easy bruising, or bleeding gums  Allergy/Immunology. No medication allergy. No seasonal allergies.    PHYSICAL EXAM:    GENERAL: NAD, well-groomed, well-developed  HEAD:  Atraumatic, Normocephalic  EYES: EOMI, PERRLA, conjunctiva and sclera clear  NECK: Supple, No JVD, thyroid non-palpable    On Neurological Examination:    Mental Status - Pt is alert, awake, Poor cognition. Follows commands. Poor recall.    Speech -  Normal. Pt has no aphasia.    Cranial Nerves - Pupils 3 mm equal and reactive to light, extraocular eye movements intact. Pt has no visual field deficit. Pt has no facial asymmetry. Tongue - is in midline.    Motor Exam - 4 plus/5 all over, No drift. No shaking or tremors. Muscle tone - is normal all over. Moves all extremities equally. No asymmetry is seen.      Sensory Exam - Pt withdraws all extremities equally on stimulation. No asymmetry seen. No complaints of tingling, numbness.    Gait - Not tested currently.    Deep tendon Reflexes - 2 plus all over.    Coordination - Fine finger movements are normal on both sides. Finger to nose is also normal on both sides.       Romberg - Negative.    Neck Supple -  Yes.    LABS:    CBC Full  -  ( 04 Nov 2019 06:02 )  WBC Count : 7.67 K/uL  RBC Count : 4.88 M/uL  Hemoglobin : 10.6 g/dL  Hematocrit : 33.9 %  Platelet Count - Automated : 105 K/uL  Mean Cell Volume : 69.5 fl  Mean Cell Hemoglobin : 21.7 pg  Mean Cell Hemoglobin Concentration : 31.3 gm/dL    11-04    138  |  105  |  19  ----------------------------<  131<H>  4.0   |  26  |  1.15    Ca    8.8      04 Nov 2019 06:02  Phos  2.4     11-03  Mg     1.8     11-03    TPro  7.7  /  Alb  3.3  /  TBili  0.7  /  DBili  x   /  AST  20  /  ALT  18  /  AlkPhos  72  11-03    Vitamin B12, Serum: 312 pg/mL (11-03 @ 19:22)     RADIOLOGY & ADDITIONAL STUDIES:    < from: CT Head No Cont (11.03.19 @ 09:58) >    IMPRESSION: No evidence of hemorrhage or mass effect.      < end of copied text >

## 2019-11-05 NOTE — BEHAVIORAL HEALTH ASSESSMENT NOTE - HPI (INCLUDE ILLNESS QUALITY, SEVERITY, DURATION, TIMING, CONTEXT, MODIFYING FACTORS, ASSOCIATED SIGNS AND SYMPTOMS)
Pt is a 74M domiciled with wife with a PMH of obesity, CAD, COPD, complete heart block, DVT, HTN, and HLD and PPH of depression, worsening memory loss/dementia. He presents with visual hallucinations. Pt is alert and oriented x 3. Reports he saw a man being "cuffed" and a man "swimming in water." He asked if people have to "swim through dirt" in order to visit him. 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 Reports occasional bad dreams when sleeping. Pt received a score of 18/30 on the Mayo Cognitive Assessment (MOCA). Admits to past alcohol use. Denies depression, anxiety, and suicidal ideation.

## 2019-11-05 NOTE — PROGRESS NOTE ADULT - SUBJECTIVE AND OBJECTIVE BOX
Patient is a 74y old  Male who presents with a chief complaint of AMS (05 Nov 2019 11:51)      INTERVAL HPI/OVERNIGHT EVENTS:  Pt is seen and examined.    Pain Location & Control:     MEDICATIONS  (STANDING):  aspirin enteric coated 81 milliGRAM(s) Oral daily  atorvastatin 10 milliGRAM(s) Oral at bedtime  azithromycin  IVPB 500 milliGRAM(s) IV Intermittent every 24 hours  cefTRIAXone   IVPB 1000 milliGRAM(s) IV Intermittent every 24 hours  cyanocobalamin 1000 MICROGram(s) Oral daily  folic acid 1 milliGRAM(s) Oral daily  heparin  Injectable 5000 Unit(s) SubCutaneous every 8 hours  imipramine 25 milliGRAM(s) Oral daily  metoprolol tartrate 50 milliGRAM(s) Oral two times a day  pantoprazole    Tablet 40 milliGRAM(s) Oral before breakfast  potassium phosphate / sodium phosphate powder 1 Packet(s) Oral two times a day  thiamine 100 milliGRAM(s) Oral daily    MEDICATIONS  (PRN):  acetaminophen   Tablet .. 650 milliGRAM(s) Oral every 6 hours PRN Temp greater or equal to 38C (100.4F)  albuterol/ipratropium for Nebulization 3 milliLiter(s) Nebulizer every 4 hours PRN Shortness of Breath and/or Wheezing  guaiFENesin   Syrup  (Sugar-Free) 200 milliGRAM(s) Oral every 6 hours PRN Cough      Allergies    No Known Allergies    Intolerances            Vital Signs Last 24 Hrs  T(C): 36.7 (05 Nov 2019 08:16), Max: 37.6 (04 Nov 2019 15:33)  T(F): 98.1 (05 Nov 2019 08:16), Max: 99.6 (04 Nov 2019 15:33)  HR: 80 (05 Nov 2019 10:00) (80 - 103)  BP: 118/81 (05 Nov 2019 10:00) (118/81 - 162/100)  BP(mean): 91 (05 Nov 2019 10:00) (91 - 115)  RR: 18 (05 Nov 2019 10:00) (12 - 25)  SpO2: 97% (05 Nov 2019 10:00) (92% - 99%)        LABS:      Ca    8.8        04 Nov 2019 06:02          CAPILLARY BLOOD GLUCOSE            Cultures  Culture Results:   No growth to date. (11-03 @ 12:00)  Culture Results:   No growth to date. (11-03 @ 12:00)        Culture - Blood (collected 11-03-19 @ 12:00)  Source: .Blood Blood-Peripheral  Preliminary Report (11-04-19 @ 12:01):    No growth to date.    Culture - Blood (collected 11-03-19 @ 12:00)  Source: .Blood Blood-Peripheral  Preliminary Report (11-04-19 @ 12:01):    No growth to date.        RADIOLOGY & ADDITIONAL TESTS:    Imaging Personally Reviewed:  [ ] YES  [ ] NO    Consultant(s) Notes Reviewed:  [ ] YES  [ ] NO    Care Discussed with Consultants/Other Providers [ ] YES  [ ] NO Patient is a 74y old  Male who presents with a chief complaint of AMS (05 Nov 2019 11:51)      INTERVAL HPI/OVERNIGHT EVENTS:  Pt is seen and examined.  feels better. +seeing lions on the window.    Pain Location & Control:     MEDICATIONS  (STANDING):  aspirin enteric coated 81 milliGRAM(s) Oral daily  atorvastatin 10 milliGRAM(s) Oral at bedtime  azithromycin  IVPB 500 milliGRAM(s) IV Intermittent every 24 hours  cefTRIAXone   IVPB 1000 milliGRAM(s) IV Intermittent every 24 hours  cyanocobalamin 1000 MICROGram(s) Oral daily  folic acid 1 milliGRAM(s) Oral daily  heparin  Injectable 5000 Unit(s) SubCutaneous every 8 hours  imipramine 25 milliGRAM(s) Oral daily  metoprolol tartrate 50 milliGRAM(s) Oral two times a day  pantoprazole    Tablet 40 milliGRAM(s) Oral before breakfast  potassium phosphate / sodium phosphate powder 1 Packet(s) Oral two times a day  thiamine 100 milliGRAM(s) Oral daily    MEDICATIONS  (PRN):  acetaminophen   Tablet .. 650 milliGRAM(s) Oral every 6 hours PRN Temp greater or equal to 38C (100.4F)  albuterol/ipratropium for Nebulization 3 milliLiter(s) Nebulizer every 4 hours PRN Shortness of Breath and/or Wheezing  guaiFENesin   Syrup  (Sugar-Free) 200 milliGRAM(s) Oral every 6 hours PRN Cough      Allergies    No Known Allergies    Intolerances        Vital Signs Last 24 Hrs  T(C): 36.7 (05 Nov 2019 08:16), Max: 37.6 (04 Nov 2019 15:33)  T(F): 98.1 (05 Nov 2019 08:16), Max: 99.6 (04 Nov 2019 15:33)  HR: 80 (05 Nov 2019 10:00) (80 - 103)  BP: 118/81 (05 Nov 2019 10:00) (118/81 - 162/100)  BP(mean): 91 (05 Nov 2019 10:00) (91 - 115)  RR: 18 (05 Nov 2019 10:00) (12 - 25)  SpO2: 97% (05 Nov 2019 10:00) (92% - 99%)        LABS:      Ca    8.8        04 Nov 2019 06:02  CAPILLARY BLOOD GLUCOSE      Cultures  Culture Results:   No growth to date. (11-03 @ 12:00)  Culture Results:   No growth to date. (11-03 @ 12:00)        Culture - Blood (collected 11-03-19 @ 12:00)  Source: .Blood Blood-Peripheral  Preliminary Report (11-04-19 @ 12:01):    No growth to date.    Culture - Blood (collected 11-03-19 @ 12:00)  Source: .Blood Blood-Peripheral  Preliminary Report (11-04-19 @ 12:01):    No growth to date.        RADIOLOGY & ADDITIONAL TESTS:    Imaging Personally Reviewed:  [ ] YES  [ ] NO    Consultant(s) Notes Reviewed:  [ ] YES  [ ] NO    Care Discussed with Consultants/Other Providers [ x] YES  [ ] NO

## 2019-11-05 NOTE — PROGRESS NOTE ADULT - SUBJECTIVE AND OBJECTIVE BOX
HPI:     24 hour events:     Review of Systems:  Constitutional: No fever, chills, fatigue  Neuro: No headache, numbness, weakness  Resp: No cough, wheezing, shortness of breath  CVS: No chest pain, palpitations, leg swelling  GI: No abdominal pain, nausea, vomiting, diarrhea   : No dysuria, frequency, incontinence  Skin: No itching, burning, rashes, or lesions   Msk: No joint pain or swelling  Psych: No depression, anxiety, mood swings    T(F): 97.9 (11-05-19 @ 12:15), Max: 98.6 (11-05-19 @ 00:07)  HR: 93 (11-05-19 @ 15:25) (80 - 103)  BP: 134/86 (11-05-19 @ 15:25) (118/81 - 162/100)  RR: 25 (11-05-19 @ 15:25) (12 - 26)  SpO2: 100% (11-05-19 @ 14:00) (92% - 100%)  Wt(kg): --      11-04-19 @ 07:01  -  11-05-19 @ 07:00  --------------------------------------------------------  IN: 1595 mL / OUT: 1050 mL / NET: 545 mL        CAPILLARY BLOOD GLUCOSE          I&O's Summary    04 Nov 2019 07:01  -  05 Nov 2019 07:00  --------------------------------------------------------  IN: 1595 mL / OUT: 1050 mL / NET: 545 mL        Physical Exam:   Gen:  Neuro:  HEENT:  Resp:  CVS:  Abd:  Ext:  Skin:    Meds:  azithromycin  IVPB IV Intermittent  cefTRIAXone   IVPB IV Intermittent    metoprolol tartrate Oral    atorvastatin Oral    albuterol/ipratropium for Nebulization Nebulizer PRN  guaiFENesin   Syrup  (Sugar-Free) Oral PRN    acetaminophen   Tablet .. Oral PRN  imipramine Oral      aspirin enteric coated Oral  heparin  Injectable SubCutaneous    pantoprazole    Tablet Oral      cyanocobalamin Oral  folic acid Oral  potassium phosphate / sodium phosphate powder Oral  thiamine Oral                                  10.6   7.67  )-----------( 105      ( 04 Nov 2019 06:02 )             33.9       11-04    138  |  105  |  19  ----------------------------<  131<H>  4.0   |  26  |  1.15    Ca    8.8      04 Nov 2019 06:02                .Blood Blood-Peripheral   No growth to date. -- 11-03 @ 12:00      Rapid RVP Result: NotDetec (11-03 @ 11:26)          Radiology: ***  Bedside ultrasound: ***    CENTRAL LINE: N/Y          DATE INSERTED:              REMOVE: Y/N  JOHNSON: N/Y                       DATE INSERTED:              REMOVE: Y/N  A-LINE: N/Y                       DATE INSERTED:              REMOVE: Y/N    GLOBAL ISSUE/BEST PRACTICE:  Analgesia:  Sedation:  CAM-ICU:   [facility] elevation: yes  Stress ulcer prophylaxis:  VTE prophylaxis:  Glycemic control:  Nutrition:    CODE STATUS: *** HPI: 75 y/o M w/ pmh of copd, depression, dvt, hld, htn, s/p bioprosthetic AVR, cad s/p stent, CHB s/p PPM, dementia, presented to [facility] on 11/3/19 for fevers, cough and AMS, pt admitted to [facility] as tele-border for possible pna and hypercapnic respiratory failure.     24 hour events: No major overnight events, pt reports overall feeling well today and w/out any medical complains at this time.    Review of Systems:  Constitutional: No fever, chills, fatigue  Neuro: No headache, numbness, weakness  Resp: No cough, wheezing, shortness of breath  CVS: No chest pain, palpitations, leg swelling  GI: No abdominal pain, nausea, vomiting, diarrhea   : No dysuria, frequency, incontinence  Skin: No itching, burning, rashes, or lesions   Msk: No joint pain or swelling  Psych: No depression, anxiety, mood swings    T(F): 97.9 (11-05-19 @ 12:15), Max: 98.6 (11-05-19 @ 00:07)  HR: 93 (11-05-19 @ 15:25) (80 - 103)  BP: 134/86 (11-05-19 @ 15:25) (118/81 - 162/100)  RR: 25 (11-05-19 @ 15:25) (12 - 26)  SpO2: 100% (11-05-19 @ 14:00) (92% - 100%)  Wt(kg): --      11-04-19 @ 07:01  -  11-05-19 @ 07:00  --------------------------------------------------------  IN: 1595 mL / OUT: 1050 mL / NET: 545 mL        CAPILLARY BLOOD GLUCOSE          I&O's Summary    04 Nov 2019 07:01  -  05 Nov 2019 07:00  --------------------------------------------------------  IN: 1595 mL / OUT: 1050 mL / NET: 545 mL        Physical Exam:   Gen: Comfortable in chair in NAD w/ wife at bedside  Neuro: AAOx3   Resp: Good air entry b/l  CVS: +RRR  Abd: BSx4, soft, nt/nd  Ext: no edema  Skin: warm/dry    Meds:  azithromycin  IVPB IV Intermittent  cefTRIAXone   IVPB IV Intermittent    metoprolol tartrate Oral    atorvastatin Oral    albuterol/ipratropium for Nebulization Nebulizer PRN  guaiFENesin   Syrup  (Sugar-Free) Oral PRN    acetaminophen   Tablet .. Oral PRN  imipramine Oral      aspirin enteric coated Oral  heparin  Injectable SubCutaneous    pantoprazole    Tablet Oral      cyanocobalamin Oral  folic acid Oral  potassium phosphate / sodium phosphate powder Oral  thiamine Oral                                  10.6   7.67  )-----------( 105      ( 04 Nov 2019 06:02 )             33.9       11-04    138  |  105  |  19  ----------------------------<  131<H>  4.0   |  26  |  1.15    Ca    8.8      04 Nov 2019 06:02                .Blood Blood-Peripheral   No growth to date. -- 11-03 @ 12:00      Rapid RVP Result: NotDetec (11-03 @ 11:26)          Radiology:     < from: US Transthoracic Echocardiogram w/Doppler Complete (11.03.19 @ 12:11) >  EXAM:  US TTE W DOPPLER COMPLETE                                  PROCEDURE DATE:  11/03/2019          INTERPRETATION:  INDICATION: Fever AOR    Blood Pressure 130/72        Weight (pd) :275     Height (feet, inches):5'8"        Technician: Jayna Ferrera    Dimensions:    LA 4.4       Normal Values: 2.0 - 4.0 cm    Ao 2.1        Normal Values: 2.0 - 3.8 cm  SEPTUM 1.1       Normal Values: 0.6 - 1.2 cm  PWT 1.0       Normal Values: 0.6 - 1.1 cm  LVIDd 4.1         Normal Values: 3.0 - 5.6 cm  LVIDs 3.1         Normal Values: 1.8 - 4.0 cm      OBSERVATIONS:    Mitral Valve: Mildly calcified mitral valve leaflets.  No mitral   regurgitation.  Aortic Valve/Aorta: Poorly visualized aortic valve replacement. No   obvious vegetation present.  Tricuspid Valve: Normal tricuspid valve. Trace tricuspid regurgitation.  Pulmonic Valve: The pulmonic valve is not well visualized. Probably   normal.  Left Atrium: Moderately dilated  Right Atrium: Poorly visualized  Left Ventricle: Normal LV size and systolic function. The EF is   approximately 55%.  Right Ventricle: Poorly Visualized  Pericardium/Pleura: No pericardial effusion noted.  Pulmonary/RV Pressure: The right ventricular systolic pressure is   estimated to be 52 mHg, assuming that the right atrial pressure is   estimated to be 15 mHg. This is consistent with moderate pulmonary   hypertension.  LV Diastolic Function: Normal diastolic function.    Conclusion:   Aortic Valve/Aorta: Poorly visualized aortic valve replacement. No   obviousvegetation present.  Left Ventricle: Normal LV size and systolic function. The EF is   approximately 55%.  The right ventricular systolic pressure is estimated to be 52 mHg,   assuming that the right atrial pressure is estimated to be 15 mHg. This   is consistent with moderate pulmonary hypertension.          MANJEET DESIR   This document has been electronically signed. Nov 4 2019 10:14AM    < end of copied text >    < from: CT Chest No Cont (11.03.19 @ 09:59) >  EXAM:  CT CHEST                                  PROCEDURE DATE:  11/03/2019          INTERPRETATION:  Clinical information: Fever and cough    Axial images obtained, coronal and sagittal images computer reformatted.   Noncontrast exam limits evaluation of hilar and mediastinal regions.    Pacemaker generator present in the subcutaneous soft tissues anterior   left upper thorax. Sternal sutures present.    Coronary artery calcifications identified. Calcifications in the region   of the aortic valve and mitral valve. No pericardial effusion.    No thoracic aortic aneurysm.    No mediastinal lesions identified, evaluation limited due to lack of IV   contrast. No hilar lesions identified, evaluation limited due to lack of   IV contrast.    Central airway intact. Thyroid gland not enlarged.    Trace groundglass opacity medial aspect right lung base. 6 mm subpleural   nodule, right lower lobe.    No adrenal lesions. Large  stone visible in the region of the gallbladder   neck, confirm with sonography. No acute appearing osseous abnormalities.   Small nodes para-aortic region-paracaval region in the mid abdomen,   correlate clinically.        IMPRESSION:  1.  6 mm subpleural nodule  present right lower lobe  2. See additional findings as described above.      LOUISE SAM M.D.,ATTENDING RADIOLOGIST  This document has been electronically signed. Nov  3 2019 10:42AM    < end of copied text >          CENTRAL LINE: N  JOHNSON: N  A-LINE: N    GLOBAL ISSUE/BEST PRACTICE:  Analgesia: N  Sedation: N  CAM-ICU: n/a  HOB elevation: Y  Stress ulcer prophylaxis: Y  VTE prophylaxis: Y  Glycemic control: Y  Nutrition: Y    CODE STATUS: FULL CODE

## 2019-11-05 NOTE — DIETITIAN INITIAL EVALUATION ADULT. - ADD RECOMMEND
Review nutrition education on heart healthy nutrition therapy at follow up, monitor nutrition parameters (PO intake, weight, labs, skin, bowels), follow up x 5 days

## 2019-11-05 NOTE — BEHAVIORAL HEALTH ASSESSMENT NOTE - NSBHCHARTREVIEWLAB_PSY_A_CORE FT
11-04    138  |  105  |  19  ----------------------------<  131<H>  4.0   |  26  |  1.15    Ca    8.8      04 Nov 2019 06:02

## 2019-11-05 NOTE — PROGRESS NOTE ADULT - SUBJECTIVE AND OBJECTIVE BOX
74M with obesity on CPAP, CAD s/p FELIPA x2 to LAD, bioprosthetic AVR, COPD, complete heart block s/p PPM x 2 (lead fracture post initia implantation), hx of DVT, HTN, HLD, depression, worsening memory loss/dementia who presented here for AMS.  Patient was in his usual state of health when all of sudden the patient woke up altered.  Patient was not wearing his CPAP.  As per chart, patient was confused, disorientated, restless, and fell.  No head trauma or lost of consciousness was noted.  Patient also states he has a chronic cough which has started to worsen and be productive of yellow/greenish sputum.  Now he states he has no SOB or dyspnea but the chart says last evening he was dyspneic.  Chart also states patient became agitated and the wife got concerned and called EMS.  EMS came and found the patient to be febrile.  An had a fever of 102.8F orally.  Of note, patient had a head CT late last week for evaluation of memory loss/dementia.  Denies chest pain, shortness of breath at rest, dyspnea on exertion, orthopnea, paroxysmal nocturnal dyspnea, dizziness, lightheadedness, claudication, pre-syncope or syncope Now.  States this chronic cough has been evaluated by ENT and has a laryngeal abnormality which has not been addressed.      11/5/19: remains confused.  Per wife at bedside AMS waxes & wanes throughout day.  11/5/19 Patient is feeling ok , episodes of confusion , feeling better than before , cough        MEDICATIONS  (STANDING):  aspirin enteric coated 81 milliGRAM(s) Oral daily  atorvastatin 10 milliGRAM(s) Oral at bedtime  azithromycin  IVPB 500 milliGRAM(s) IV Intermittent every 24 hours  cefTRIAXone   IVPB 1000 milliGRAM(s) IV Intermittent every 24 hours  cyanocobalamin 1000 MICROGram(s) Oral daily  folic acid 1 milliGRAM(s) Oral daily  heparin  Injectable 5000 Unit(s) SubCutaneous every 8 hours  imipramine 25 milliGRAM(s) Oral daily  lactated ringers. 1000 milliLiter(s) (45 mL/Hr) IV Continuous <Continuous>  metoprolol tartrate 50 milliGRAM(s) Oral two times a day  pantoprazole    Tablet 40 milliGRAM(s) Oral before breakfast  potassium phosphate / sodium phosphate powder 1 Packet(s) Oral two times a day  thiamine 100 milliGRAM(s) Oral daily    MEDICATIONS  (PRN):  acetaminophen   Tablet .. 650 milliGRAM(s) Oral every 6 hours PRN Temp greater or equal to 38C (100.4F)  albuterol/ipratropium for Nebulization 3 milliLiter(s) Nebulizer every 4 hours PRN Shortness of Breath and/or Wheezing  guaiFENesin   Syrup  (Sugar-Free) 200 milliGRAM(s) Oral every 6 hours PRN Cough      Vital Signs Last 24 Hrs  T(C): 36.7 (05 Nov 2019 04:07), Max: 37.6 (04 Nov 2019 15:33)  T(F): 98 (05 Nov 2019 04:07), Max: 99.6 (04 Nov 2019 15:33)  HR: 81 (05 Nov 2019 06:00) (80 - 105)  BP: 149/72 (05 Nov 2019 06:00) (119/89 - 163/93)  BP(mean): 96 (05 Nov 2019 06:00) (96 - 115)  RR: 21 (05 Nov 2019 06:00) (12 - 21)  SpO2: 99% (05 Nov 2019 06:00) (92% - 100%)    I&O's Summary    04 Nov 2019 07:01  -  05 Nov 2019 07:00  --------------------------------------------------------  IN: 1595 mL / OUT: 1050 mL / NET: 545 mL      PHYSICAL EXAM:  Constitutional: NAD, awake , well-developed  HEENT: PERRLA, EOMI,  No oral cyanosis. Oropharynx Clean and Dry.  Neck:  supple,  No JVD, No Thyroid enlargement. No Carotid Bruits bilaterally.  Respiratory: Breath sounds show bibasilair rhonchi  Cardiovascular: NL S1 and S2, RRR, 1/6 HELGA to LLSB  Gastrointestinal: Bowel Sounds present, soft, Obeses   Extremities: No peripheral edema. No clubbing or cyanosis.  Vascular: 1+ peripheral pulses in LE   Musculoskeletal: no calf tenderness.  Skin: No rashes    LABS: All Labs Reviewed:                                    10.6   7.67  )-----------( 105      ( 04 Nov 2019 06:02 )             33.9     11-04    138  |  105  |  19  ----------------------------<  131<H>  4.0   |  26  |  1.15    Ca    8.8      04 Nov 2019 06:02  Phos  2.4     11-03  Mg     1.8     11-03    TPro  7.7  /  Alb  3.3  /  TBili  0.7  /  DBili  x   /  AST  20  /  ALT  18  /  AlkPhos  72  11-03    CARDIAC MARKERS ( 03 Nov 2019 12:45 )  .000 ng/mL / x     / x     / x     / x          LIVER FUNCTIONS - ( 03 Nov 2019 14:39 )  Alb: 3.3 g/dL / Pro: 7.7 g/dL / ALK PHOS: 72 U/L / ALT: 18 U/L DA / AST: 20 U/L / GGT: x                 Culture Results:   No growth to date. (11-03-19 @ 12:00)  Culture Results:   No growth to date. (11-03-19 @ 12:00)      < from: US Transthoracic Echocardiogram w/Doppler Complete (11.03.19 @ 12:11) >    Conclusion:   Aortic Valve/Aorta: Poorly visualized aortic valve replacement. No   obviousvegetation present.  Left Ventricle: Normal LV size and systolic function. The EF is   approximately 55%.  The right ventricular systolic pressure is estimated to be 52 mHg,   assuming that the right atrial pressure is estimated to be 15 mHg. This   is consistent with moderate pulmonary hypertension.        < end of copied text >

## 2019-11-05 NOTE — BEHAVIORAL HEALTH ASSESSMENT NOTE - SUMMARY
Pt is a 74M domiciled with wife with a PMH of obesity, CAD, COPD, complete heart block, DVT, HTN, and HLD and PPH of depression, worsening memory loss/dementia. He presents with visual hallucinations. Pt is alert and oriented x 3. Reports he saw a man being "cuffed" and a man "swimming in water." He asked if people have to "swim through dirt" in order to visit him. 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 Reports occasional bad dreams when sleeping. Pt received a score of 18/30 on the Mayo Cognitive Assessment (MOCA). Admits to past alcohol use. Denies depression, anxiety, and suicidal ideation.    Plan: Pt is a 74M domiciled with wife with a PMH of obesity, CAD, COPD, complete heart block, DVT, HTN, and HLD and PPH of depression, worsening memory loss/dementia. He presents with visual hallucinations. Pt is alert and oriented x 3. Reports he saw a man being "cuffed" and a man "swimming in water." He asked if people have to "swim through dirt" in order to visit him. 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 Reports occasional bad dreams when sleeping. Pt received a score of 18/30 on the Mayo Cognitive Assessment (MOCA). Admits to past alcohol use. Denies depression, anxiety, and suicidal ideation.    Impression Delirium vs dementia   Plan: Pt may receive prns of Ativan 0.5mg for anxiety and Haldol  0.5mg po/ IM prn agitation

## 2019-11-05 NOTE — BEHAVIORAL HEALTH ASSESSMENT NOTE - NSBHCHARTREVIEWINVESTIGATE_PSY_A_CORE FT
Ventricular Rate 89 BPM  Atrial Rate 89 BPM  P-R Interval 164 ms  QRS Duration 198 ms  Q-T Interval 464 ms  QTC Calculation(Bezet) 564 ms  P Axis 95 degrees  R Axis 116 degrees  T Axis -36 degrees    Diagnosis Line Atrial-sensed ventricular-paced rhythm with occasional premature ventricular complexes  Abnormal ECG  No previous ECGs available  Confirmed by Palla MD, Manjinder ([id number]) on 11/5/2019 11:14:46 AM

## 2019-11-05 NOTE — BEHAVIORAL HEALTH ASSESSMENT NOTE - NSBHCHARTREVIEWVS_PSY_A_CORE FT
Vital Signs Last 24 Hrs  T(C): 36.6 (05 Nov 2019 12:15), Max: 37.6 (04 Nov 2019 15:33)  T(F): 97.9 (05 Nov 2019 12:15), Max: 99.6 (04 Nov 2019 15:33)  HR: 80 (05 Nov 2019 10:00) (80 - 103)  BP: 118/81 (05 Nov 2019 10:00) (118/81 - 162/100)  BP(mean): 91 (05 Nov 2019 10:00) (91 - 115)  RR: 18 (05 Nov 2019 10:00) (12 - 25)  SpO2: 97% (05 Nov 2019 10:00) (92% - 99%)
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
## 2019-11-05 NOTE — PHYSICAL THERAPY INITIAL EVALUATION ADULT - ADDITIONAL COMMENTS
Pt lives with spouse in a first floor apt with no steps to enter and no steps inside.   Pt has no DME

## 2019-11-05 NOTE — PHYSICAL THERAPY INITIAL EVALUATION ADULT - CRITERIA FOR SKILLED THERAPEUTIC INTERVENTIONS
anticipated equipment needs at discharge/functional limitations in following categories/anticipated discharge recommendation/impairments found

## 2019-11-05 NOTE — DIETITIAN INITIAL EVALUATION ADULT. - PERTINENT MEDS FT
heparin, zithromax ivpb, rocephin ivpb, lipitor, folic acid, tofranil, robitussin sugar free, tofranil, lopressor, thiamine (B1), potassium phosphate/sodium phosphate, cyanocobalamin

## 2019-11-05 NOTE — DIETITIAN INITIAL EVALUATION ADULT. - PERTINENT LABORATORY DATA
Hgb 10.6 L, Hct 33.9 L, Na 138 wnl, K 4.0 wnl, Cl 105 wnl, CO2 26 wnl, BUN 19 wnl, Creat 1.15 wnl, Glu 131 H, eGFR 61 wnl, Mg 0.16 H, Ferritin 467 H, Iron total 15 L, TSH 5.39 H

## 2019-11-05 NOTE — PROGRESS NOTE ADULT - PROBLEM SELECTOR PLAN 3
Echo poor quality study but no evidence of vegetation on prosthetic aortic valve or other valves. cultures negative     Will sign off, please call if questions.

## 2019-11-05 NOTE — PROGRESS NOTE ADULT - ASSESSMENT
73 y/o M w/ pmh of copd, depression, dvt, hld, htn, s/p bioprosthetic AVR, cad s/p stent, CHB s/p PPM, dementia, presented to [facility] on 11/3/19 for fevers, cough and AMS, pt admitted to [facility]U as tele-border for possible pna and hypercapnic respiratory failure.     -Neuro: AMS improved unclear if from dementia vs hypercapnia? monitor MS closely for now  -Cardiac: no acute process, maintain MAP >65, htn cont home dose lopressor  -Resp: COPD cont supplemental o2 and qhs Cpap, maintain o2 >90%, cont pulmonary toilet, CT results w/ lung nodule informed pt of results which will require outpt follow up pt aware.  -GI: cont diet as ordered/toelarted, cont GI ppx w/ protonix  -Renal: Renal function stable, lytes reviewed, will replete as needed, cont to monitor renal function and lytes  -ID: CAP? cont IV rocephin/azithromycin, Blood cx prelim neg, RVP neg, flu swap neg  -Endo: no acute issues  -Heme: DVT ppx w/ heparin, thrombocytopenia likely 2/2 to acute illness and improving, heme/onc input noted  -Dispo: Pt medically optimized, currently in SPCU as tele border, can be transfer to floor, case d/w hospitilize

## 2019-11-05 NOTE — PROGRESS NOTE ADULT - SUBJECTIVE AND OBJECTIVE BOX
74M with PMH of copd, depression, dvt, hld, htn, s/p bioprosthetic AVR, CAD s/p stent, CHB s/p PPM, dementia, who presented with AMS, admitted  on 11/3 with AMS, COPD exacerbation, MILO, thrombocytopenia.    Vital signs stable  Chart/labs reviewed  Stable for  Telemetry  Plan discussed w/ nursing

## 2019-11-05 NOTE — DISCHARGE NOTE NURSING/CASE MANAGEMENT/SOCIAL WORK - PATIENT PORTAL LINK FT
You can access the FollowMyHealth Patient Portal offered by [facility] by registering at the following website: [email or web address]. By joining [facility]’s FollowMyHealth portal, you will also be able to view your health information using other applications (apps) compatible with our system.

## 2019-11-05 NOTE — PROGRESS NOTE ADULT - ASSESSMENT
Seen for AMS/confusion  H/o Morbid obesity.  SOB.  H/o Sleep apnea on C PAP  - was not using. Found to be confused.  Had fever, which resolved.  W/up is in progress.  No fever, No signs of meningitis.  Non focal exam.  CT Head - No acute pathology.  No signs of CVA.  H/o PPM   Symptoms likely sec to Sleep abnormality, Hypoxia/Hypercarbia  Continue C pap at night.  Vitamin B12 - 312  -Would give one more dose for supplementation. Received one dose yesterday.  Fall/safety precautions.  Pt is morbidly obese  - Wt loss counseling is done with Pt/wife.  Dr. Raphael to follow from tomorrow am.

## 2019-11-06 NOTE — PROGRESS NOTE BEHAVIORAL HEALTH - NSBHCHARTREVIEWVS_PSY_A_CORE FT
Vital Signs Last 24 Hrs  T(C): 36.6 (06 Nov 2019 08:37), Max: 37.1 (05 Nov 2019 22:04)  T(F): 97.9 (06 Nov 2019 08:37), Max: 98.7 (05 Nov 2019 22:04)  HR: 84 (06 Nov 2019 14:00) (60 - 105)  BP: 116/81 (06 Nov 2019 14:00) (102/64 - 138/84)  BP(mean): 93 (06 Nov 2019 14:00) (75 - 105)  RR: 27 (06 Nov 2019 14:00) (16 - 27)  SpO2: 94% (06 Nov 2019 14:00) (93% - 100%)
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
## 2019-11-06 NOTE — PROGRESS NOTE ADULT - SUBJECTIVE AND OBJECTIVE BOX
Patient seen and examined at bedside.  Patients wife and daughter at bedside. today patient denies any specific complaints. reports he has been possibily hallunicating yesterday. also reports non productive cough    Vitals are stable . sitting in chair. on room air,.    Currently on cef/azithro. + prn nebs +prn robitussion    on folic acid, thiamine and b12      Review of Systems:  General:denies fever chills, headache, weakness  HEENT: denies blurry vision,diffculty swallowing,  Cardiovascular: denies chest pain  ,palpitatins  Pulmonary:denies shortness of breath, +++cough, ----wheezing  Gastrointestinal: denies abdominal pain, constipation, diarrhra  : denies hematuria, dysuria  Neurological: denies weakness, numbness , tingling, dizziness  skin: denies skin rash  Psychiatrical: denies mood disturbances    Home Medications:        Objective:  Vitals  T(C): 36.2 (11-06-19 @ 04:07), Max: 37.3 (11-05-19 @ 16:00)  HR: 60 (11-06-19 @ 08:00) (60 - 105)  BP: 128/84 (11-06-19 @ 08:00) (118/81 - 151/95)  RR: 16 (11-06-19 @ 08:00) (16 - 26)  SpO2: 100% (11-06-19 @ 08:00) (93% - 100%)    Physical Exam:  General: comfortable, no acute distress, well nourished  HEENT: no LAD, trachea midline  Cardiovascular: normal s1s2, no mrg  Pulmonary: CTA on left.. Decreased on the right, no wheezing , rhonchi  Gastrointestinal: soft non tender non distended, no masses felt  Muscloskeletal: no lower extremity edema  Neurological: CN II-12 intact. No focal weakness  Psychiatrical: normal mood, cooperative    Labs:                          10.9   3.19  )-----------( 105      ( 06 Nov 2019 06:11 )             34.8     11-06    140  |  103  |  15  ----------------------------<  110<H>  3.8   |  29  |  1.17    Ca    8.6      06 Nov 2019 06:11  Phos  4.2     11-06  Mg     2.2     11-06    TPro  6.9  /  Alb  3.2<L>  /  TBili  0.6  /  DBili  x   /  AST  29  /  ALT  26  /  AlkPhos  71  11-06    LIVER FUNCTIONS - ( 06 Nov 2019 06:11 )  Alb: 3.2 g/dL / Pro: 6.9 g/dL / ALK PHOS: 71 U/L / ALT: 26 U/L DA / AST: 29 U/L / GGT: x                 Active Medications  MEDICATIONS  (STANDING):  albuterol/ipratropium for Nebulization 3 milliLiter(s) Nebulizer every 6 hours  aspirin enteric coated 81 milliGRAM(s) Oral daily  atorvastatin 10 milliGRAM(s) Oral at bedtime  azithromycin  IVPB 500 milliGRAM(s) IV Intermittent every 24 hours  benzonatate 100 milliGRAM(s) Oral three times a day  budesonide 160 MICROgram(s)/formoterol 4.5 MICROgram(s) Inhaler 2 Puff(s) Inhalation two times a day  cefTRIAXone   IVPB 1000 milliGRAM(s) IV Intermittent every 24 hours  cyanocobalamin 1000 MICROGram(s) Oral daily  folic acid 1 milliGRAM(s) Oral daily  heparin  Injectable 5000 Unit(s) SubCutaneous every 8 hours  imipramine 25 milliGRAM(s) Oral daily  metoprolol tartrate 50 milliGRAM(s) Oral two times a day  pantoprazole    Tablet 40 milliGRAM(s) Oral before breakfast  thiamine 100 milliGRAM(s) Oral daily    MEDICATIONS  (PRN):  acetaminophen   Tablet .. 650 milliGRAM(s) Oral every 6 hours PRN Temp greater or equal to 38C (100.4F)

## 2019-11-06 NOTE — PROGRESS NOTE ADULT - ASSESSMENT
74M with obesity on aPAP, CAD s/p FELIPA x2 to LAD, bioprosthetic AVR, complete heart block s/p PPM, hx of DVT, HTN, HLD, depression, worsening memory loss/dementia who presents here for AMS.   Now admitted to SPCU for further care . differentials on etiology include Hypercapnia, clinical CAP, vs COPD exacerbation. Patient has anemia with low b12 Patient has orginally prsented with productive cough therefore ddx includes bronchitis       Metabolic encephalopathy likely infectious in origin  Ct head negative  CXR negative for PNA  CT chest negative for infective processs  blood cultures are negative for  48 hours  B12 low  Appreciate Neurology recommendations    Hypoxemic Respiratory Failure more likely COPD exacerbation + Bronchitis with superimposed Community-acquired pneumonia  - patient initlaly with fever.  -CXR negative for pna  -CT chest negative for pna  - Clincally patient has improved with cef a/zirthro  -  RVP-negative.  -appreciate Pulmonary Recommendations  :: c/w cef /azithro, change prn nebs to standing, change prn robitussin to tessalon niurka. ++ symbicort ,, + chest pt +spirometry      MILO  resolved    Microcytic hypochromic anemia  Thrombocytopenia  likely reactive from infection  -improving  -fecal occult ordered. will cancel  -appreciate Hematology recommendations      CAD  - cont with lipitor  - cont with metoprolol  - cont with aspirin  -appreciate Cardiology recommendations. no further intervention    Depression/visual  hallucination  - will cont with imipramine for now  - to consult psych today      Lung nodule  6mm  needs out pt f/u.    Plan of care was discuss with patient and family, all questions were answered, seems understand and in agreement.      At this time for dispo  1. Downgrade to medicine  2. PT/OT  3. Psych eval  4. dispo after. wife does not want rehab. she prefers to take patient home. she understands limitaitons with MRI/    Target dispo. friday    Case discuss with PMD. 74M with obesity on aPAP, CAD s/p FELIPA x2 to LAD, bioprosthetic AVR, complete heart block s/p PPM, hx of DVT, HTN, HLD, depression, worsening memory loss/dementia who presents here for AMS.   Now admitted to SPCU for further care . differentials on etiology include Hypercapnia, clinical CAP, vs COPD exacerbation. Patient has anemia with low b12 Patient has orginally prsented with productive cough therefore ddx includes bronchitis       Metabolic encephalopathy likely infectious in origin  Ct head negative  CXR negative for PNA  CT chest negative for infective processs  blood cultures are negative for  48 hours  B12 low  Appreciate Neurology recommendations    Hypoxemic Respiratory Failure more likely COPD exacerbation + Bronchitis with superimposed Community-acquired pneumonia  - patient initlaly with fever.  -CXR negative for pNA. THERE IS A trace right lower lobe ground glass opacity.. with a negative procalci  -CT chest negative for pna  - Clincally patient has improved with cef a/zirthro  -  RVP-negative.  -appreciate Pulmonary Recommendations  :: c/w cef /azithro, change prn nebs to standing, change prn robitussin to tessalon niurka. ++ symbicort ,, + chest pt +spirometry      MILO  resolved    Microcytic hypochromic anemia  Thrombocytopenia  likely reactive from infection  -improving  -fecal occult ordered. will cancel  -appreciate Hematology recommendations      CAD  - cont with lipitor  - cont with metoprolol  - cont with aspirin  -appreciate Cardiology recommendations. no further intervention    Depression/visual  hallucination  - will cont with imipramine for now  - to consult psych today      Lung nodule  6mm  needs out pt f/u.    Plan of care was discuss with patient and family, all questions were answered,    At this time for dispo  2. PT/OT followup for ambulatory sats.  3. Psych eval with walk test  4. dispo after. wife does not want rehab. she prefers to take patient home. she understands limitaitons with MRI/    [facility] dispo. friday    Case discuss with PMD. 74M with obesity on aPAP, CAD s/p FELIPA x2 to LAD, bioprosthetic AVR, complete heart block s/p PPM, hx of DVT, HTN, HLD, depression, worsening memory loss/dementia who presents here for AMS.   Now admitted to SPCU for further care . differentials on etiology include Hypercapnia, clinical CAP, vs COPD exacerbation. Patient has anemia with low b12 Patient has orginally prsented with productive cough therefore ddx includes bronchitis       Metabolic encephalopathy likely infectious in origin  Ct head negative  CXR negative for PNA  CT chest negative for infective processs  blood cultures are negative for  48 hours  B12 low  Appreciate Neurology recommendations    Hypoxemic Respiratory Failure more likely COPD exacerbation + Bronchitis with superimposed Community-acquired pneumonia  Concern for aspiration pneumonitis  - patient initlaly with fever.  -CXR negative for pNA. THERE IS A trace right lower lobe ground glass opacity.. with a negative procalciton  -CT chest negative for pna  - Clincally patient has improved with cef a/zirthro  -  RVP-negative.  -appreciate Pulmonary Recommendations  - I have personally witnessed patient coughing while eating small dry foods. (noted periods of transiest desat)  :: c/w cef /azithro, change prn nebs to standing, change prn robitussin to tessalon niurka. ++ symbicort ,, + chest pt +spirometry  :: speech evaled patient. recommened soft diet. no MBS due to obesity    MILO  resolved    Microcytic hypochromic anemia  Thrombocytopenia  likely reactive from infection  -improving  -fecal occult ordered. will cancel  -appreciate Hematology recommendations      CAD  - cont with lipitor  - cont with metoprolol  - cont with aspirin  -appreciate Cardiology recommendations. no further intervention    Depression/visual  hallucination  - will cont with imipramine for now  - to consult psych today      Lung nodule  6mm  needs out pt f/u.    Plan of care was discuss with patient and family, all questions were answered,    At this time for dispo  1. PT/OT followup for ambulatory sats.  2. Psych eval  3. trial of soft diet  4. dispo after. wife does not want rehab. she prefers to take patient home. she understands limitaitons with MRI/    Target dispo. friday    Case discuss with family

## 2019-11-06 NOTE — PROGRESS NOTE BEHAVIORAL HEALTH - SUMMARY
Pt is a 74M domiciled with wife with a PMH of obesity, CAD, COPD, complete heart block, DVT, HTN, and HLD and PPH of depression, worsening memory loss/dementia. He presents with visual hallucinations. Pt is alert and oriented x 3. He reported yesterday that he saw a man being "cuffed" and a man "swimming in water."  Pt received a score of 18/30 on the Mayo Cognitive Assessment (MOCA) yesterday.  Pt is more alert and oriented today with no complaints of hallucinations. Denies depression, anxiety, and suicidal ideation.    Impression Delirium resolving.  Plan: Pt may receive prns of Ativan 0.5mg for anxiety and Haldol  0.5mg po/ IM prn agitation

## 2019-11-06 NOTE — SWALLOW BEDSIDE ASSESSMENT ADULT - SWALLOW EVAL: DIAGNOSIS
1- functional oral management given solid, soft solid, puree, nectar thick and thin liquid textures marked by adequate bolus collection, transfer and transport. 2- mild pharyngeal dysphagia given soft solid, puree, nectar thick and thin liquids marked by delayed swallow trigger and reduced hyolaryngeal excursion without any overt s/s of penetration/aspiration. 3- mild pharyngeal dysphagia given solids marked by delayed swallow trigger and reduced hyolaryngeal excursion resulting in use of multiple swallows, pt c/o pharyngeal stasis and reflexive throat clearing. pt provided with liquid wash which he reports aided in reducing pharyngeal stasis.

## 2019-11-06 NOTE — SWALLOW BEDSIDE ASSESSMENT ADULT - PHARYNGEAL PHASE
Decreased laryngeal elevation/Delayed pharyngeal swallow stasis reportedly reduces given liquid wash/Delayed pharyngeal swallow/Decreased laryngeal elevation/Throat clear post oral intake/Multiple swallows/Complaints of pharyngeal stasis

## 2019-11-06 NOTE — PROGRESS NOTE ADULT - SUBJECTIVE AND OBJECTIVE BOX
Neurology follow up note  COVERING DR RONALD [id number]      Interval History:    Patient feels ok no new complaints sitting in chair     MEDICATIONS    acetaminophen   Tablet .. 650 milliGRAM(s) Oral every 6 hours PRN  albuterol/ipratropium for Nebulization 3 milliLiter(s) Nebulizer every 6 hours  aspirin enteric coated 81 milliGRAM(s) Oral daily  atorvastatin 10 milliGRAM(s) Oral at bedtime  azithromycin  IVPB 500 milliGRAM(s) IV Intermittent every 24 hours  benzonatate 100 milliGRAM(s) Oral three times a day  budesonide 160 MICROgram(s)/formoterol 4.5 MICROgram(s) Inhaler 2 Puff(s) Inhalation two times a day  cefTRIAXone   IVPB 1000 milliGRAM(s) IV Intermittent every 24 hours  cyanocobalamin 1000 MICROGram(s) Oral daily  folic acid 1 milliGRAM(s) Oral daily  heparin  Injectable 5000 Unit(s) SubCutaneous every 8 hours  imipramine 25 milliGRAM(s) Oral daily  metoprolol tartrate 50 milliGRAM(s) Oral two times a day  pantoprazole    Tablet 40 milliGRAM(s) Oral before breakfast  thiamine 100 milliGRAM(s) Oral daily      Allergies    No Known Allergies    Intolerances            Vital Signs Last 24 Hrs  T(C): 36.6 (06 Nov 2019 08:37), Max: 37.3 (05 Nov 2019 16:00)  T(F): 97.9 (06 Nov 2019 08:37), Max: 99.1 (05 Nov 2019 16:00)  HR: 75 (06 Nov 2019 12:19) (60 - 105)  BP: 102/64 (06 Nov 2019 12:19) (102/64 - 151/95)  BP(mean): 81 (06 Nov 2019 10:00) (81 - 111)  RR: 17 (06 Nov 2019 10:00) (16 - 26)  SpO2: 95% (06 Nov 2019 12:19) (93% - 100%)      REVIEW OF SYSTEMS:     Constitutional: No fever, chills, fatigue, weakness  Eyes: no eye pain, visual disturbances, or discharge  ENT:  No difficulty hearing, tinnitus, vertigo; No sinus or throat pain  Neck: No pain or stiffness  Respiratory: no sob  Cardiovascular: No chest pain, palpitations,   Gastrointestinal: No abdominal or epigastric pain. No nausea, vomiting  No diarrhea or constipation.   Genitourinary: No dysuria, frequency, hematuria or incontinence  Neurological: No headaches, lightheadedness, vertigo, numbness or tremors  Psychiatric: No depression, anxiety, mood swings or difficulty sleeping  Musculoskeletal: No joint pain or swelling; No muscle, back or extremity pain  Skin: No itching, burning, rashes or lesions   Lymph Nodes: No enlarged glands  Endocrine: No heat or cold intolerance; No hair loss   Allergy and Immunologic: No hives or eczema    On Neurological Examination:    Mental Status - Patient is alert, awake, oriented X3      Follow simple commands  Follow complex commands    Speech -   Fluent                      Cranial Nerves - Pupils 3 mm equal and reactive to light,   extraocular eye movements intact.   smile symmetric  intact bilateral NLF    Motor Exam -   Right upper 4/5  Left upper 4/5  Right lower 4/5  Left lower  4/5    Muscle tone - is normal all over.  No asymmetry is seen.      Sensory    Bilateral intact to light touch      GENERAL Exam: Nontoxic , No Acute Distress   	  HEENT:  normocephalic, atraumatic  		  LUNGS: C Decreased bilaterally  	  HEART: Normal S1S2   No murmur RRR        	  GI/ ABDOMEN:  Soft  Non tender    EXTREMITIES:   No Edema  No Clubbing  No Cyanosis     MUSCULOSKELETAL: Normal Range of Motion  	   SKIN: Normal  No Ecchymosis               LABS:  CBC Full  -  ( 06 Nov 2019 06:11 )  WBC Count : 3.19 K/uL  RBC Count : 5.03 M/uL  Hemoglobin : 10.9 g/dL  Hematocrit : 34.8 %  Platelet Count - Automated : 105 K/uL  Mean Cell Volume : 69.2 fl  Mean Cell Hemoglobin : 21.7 pg  Mean Cell Hemoglobin Concentration : 31.3 gm/dL  Auto Neutrophil # : 1.77 K/uL  Auto Lymphocyte # : 0.71 K/uL  Auto Monocyte # : 0.58 K/uL  Auto Eosinophil # : 0.09 K/uL  Auto Basophil # : 0.02 K/uL  Auto Neutrophil % : 55.5 %  Auto Lymphocyte % : 22.3 %  Auto Monocyte % : 18.2 %  Auto Eosinophil % : 2.8 %  Auto Basophil % : 0.6 %      11-06    140  |  103  |  15  ----------------------------<  110<H>  3.8   |  29  |  1.17    Ca    8.6      06 Nov 2019 06:11  Phos  4.2     11-06  Mg     2.2     11-06    TPro  6.9  /  Alb  3.2<L>  /  TBili  0.6  /  DBili  x   /  AST  29  /  ALT  26  /  AlkPhos  71  11-06    Hemoglobin A1C:     LIVER FUNCTIONS - ( 06 Nov 2019 06:11 )  Alb: 3.2 g/dL / Pro: 6.9 g/dL / ALK PHOS: 71 U/L / ALT: 26 U/L DA / AST: 29 U/L / GGT: x           Vitamin B12         RADIOLOGY  Assessment and Plan:   · Assessment		  Seen for AMS/confusion metabolic respiratory issues resolved  SOB.  Symptoms likely sec to Sleep abnormality, Hypoxia/Hypercarbia  Continue C pap as needed   Ct head negative  Fall/safety precautions.      Physical therapy evaluation if possible and as tolerated .  OOB to chair/ambulation with assistance only.    40 minutes spent on total encounter; more than 50% of the visit was spent counseling and/or coordinating care by the attending physician.

## 2019-11-06 NOTE — SWALLOW BEDSIDE ASSESSMENT ADULT - COMMENTS
Pt was awake, cooperative and positioned chairside for a clinical assessment of swallow function this AM. Per charting, pt is a "74M with obesity on aPAP, CAD s/p FELIPA x2 to LAD, bioprosthetic AVR, complete heart block s/p PPM, hx of DVT, HTN, HLD, depression, worsening memory loss/dementia who presents here for AMS.   Now admitted to SPCU for further care . differentials on etiology include Hypercapnia, clinical CAP, vs COPD exacerbation. Patient has anemia with low b12 Patient has originally presented with productive cough therefore ddx includes bronchitis". Recent CXR revealed "No consolidation effusion or other interval change".    At the time of today's assessment, MD reports observed coughing with breakfast this AM. Upon clinician questioning, pt states occasional difficulty consuming dry solid textures (i.e. rice, crackers) prior to admission.

## 2019-11-06 NOTE — PROGRESS NOTE ADULT - ASSESSMENT
74M with PMH of copd, depression, dvt, hld, htn, s/p bioprosthetic AVR, cad s/p stent, CHB s/p PPM, dementia, who presented with AMS, admitted with CAP, COPD exacerbation, MILO, thrombocytopenia.     -AMS improved. ? due to hypercapnia due to not wearing cpap mask. CTH noted, no intracranial pathology  -Monitor HD's. Keep MAP >65  -breathing improved. Keep O2 saturation > 92%. Cpap qhs and prn. Continue nebs/chest PT  -PO diet. PPI  -Continue rocephin and zithro. Monitor wbc/temp. F/U cultures and adjust abx as necessary  -MILO improving. Monitor UOP/Lytes. Avoid nephrotoxic agents  -Monitor plt count. No active bleed. Trend  -DVT ppx  -Supportive care

## 2019-11-06 NOTE — SWALLOW BEDSIDE ASSESSMENT ADULT - SWALLOW EVAL: RECOMMENDED FEEDING/EATING TECHNIQUES
oral hygiene/maintain upright posture during/after eating for 30 mins/no straws/alternate food with liquid/position upright (90 degrees)/small sips/bites/allow for swallow between intakes

## 2019-11-06 NOTE — PROGRESS NOTE BEHAVIORAL HEALTH - NSBHFUPINTERVALHXFT_PSY_A_CORE
Pt appears more focused today and is no longer making statements about seeing unusual characters walking around on the unit.  He does recall our conversation yesterday and appears calm and comfortable.  He has not required any medication for agitation or behavioral problems. He has no thoughts of harming himself or others.

## 2019-11-06 NOTE — SWALLOW BEDSIDE ASSESSMENT ADULT - ASR SWALLOW ASPIRATION MONITOR
fever/pneumonia/throat clearing/upper respiratory infection/gurgly voice/oral hygiene/position upright (90Y)/change of breathing pattern/cough
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
## 2019-11-06 NOTE — PROGRESS NOTE BEHAVIORAL HEALTH - NSBHCHARTREVIEWLAB_PSY_A_CORE FT
10.9   3.19  )-----------( 105      ( 06 Nov 2019 06:11 )             34.8   11-06    140  |  103  |  15  ----------------------------<  110<H>  3.8   |  29  |  1.17    Ca    8.6      06 Nov 2019 06:11  Phos  4.2     11-06  Mg     2.2     11-06    TPro  6.9  /  Alb  3.2<L>  /  TBili  0.6  /  DBili  x   /  AST  29  /  ALT  26  /  AlkPhos  71  11-06
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
## 2019-11-07 NOTE — PROGRESS NOTE ADULT - PROBLEM SELECTOR PROBLEM 1
Dementia without behavioral disturbance, unspecified dementia type
Pneumonia
Pneumonia due to infectious organism, unspecified laterality, unspecified part of lung
Pneumonia

## 2019-11-07 NOTE — PROGRESS NOTE ADULT - PROBLEM SELECTOR PLAN 1
copd - PNA - GGO right side - pulm nodule - sub cm -   cont Abx for 5 days total -   cough rx regimen  DANIEL - unable to tolerate CPAP -   Neuro and Cardio eval noted -   labs and imaging reviewed -   dc planning  may complete Abx as outpatient and follow up with PMD -
dementia  reji  obesity  poss PNA  used CPAP last night -   I and O  cvs rx regimen and BP control -   cough rx regimen  would complete 5 days of ABX total -   dc planning  does not home o2 support -   spoke with Family - and Daughter and Patient -
monitor I and O  pulm HTN - TTE reviewed - valvular heart disease  cvs rx regimen and BP control -   labs and imaging reviewed  on ABX for poss PNA  pt has underlying Dementia and DANIEL  CPAP use as tolerated  dietary discretion  GOC documented  transfer - out of ICU
on abx as per pulm.
poss PNA - assess volume overload - HTN - obesity - DANIEL  robitussin PRN -   NEBS prn -   I and O - positive half liter - consider low dose LASIX x 1 -   serial labs and serial clinical assessment -   out of bed  CPAP use nightly - sleep hygiene discussed  on emp ABX for poss PNA - biomarkers and imaging reviewed -   will follow  does not require ICU level care
on abx as per pulm.

## 2019-11-07 NOTE — PROGRESS NOTE ADULT - SUBJECTIVE AND OBJECTIVE BOX
Neurology follow up note  COVERING DR RONALD [id number]      Interval History:    Patient feels ok no new complaints seen with spouse     MEDICATIONS    acetaminophen   Tablet .. 650 milliGRAM(s) Oral every 6 hours PRN  albuterol/ipratropium for Nebulization 3 milliLiter(s) Nebulizer every 6 hours  aspirin enteric coated 81 milliGRAM(s) Oral daily  atorvastatin 10 milliGRAM(s) Oral at bedtime  azithromycin  IVPB 500 milliGRAM(s) IV Intermittent every 24 hours  benzonatate 100 milliGRAM(s) Oral three times a day  budesonide 160 MICROgram(s)/formoterol 4.5 MICROgram(s) Inhaler 2 Puff(s) Inhalation two times a day  cefTRIAXone   IVPB 1000 milliGRAM(s) IV Intermittent every 24 hours  cyanocobalamin 1000 MICROGram(s) Oral daily  folic acid 1 milliGRAM(s) Oral daily  heparin  Injectable 5000 Unit(s) SubCutaneous every 8 hours  imipramine 25 milliGRAM(s) Oral daily  metoprolol tartrate 50 milliGRAM(s) Oral two times a day  pantoprazole    Tablet 40 milliGRAM(s) Oral before breakfast  thiamine 100 milliGRAM(s) Oral daily      Allergies    No Known Allergies    Intolerances            Vital Signs Last 24 Hrs  T(C): 36.7 (07 Nov 2019 08:54), Max: 37.2 (06 Nov 2019 20:57)  T(F): 98 (07 Nov 2019 08:54), Max: 99 (06 Nov 2019 20:57)  HR: 71 (07 Nov 2019 08:54) (63 - 91)  BP: 117/62 (07 Nov 2019 08:54) (102/64 - 150/75)  BP(mean): 78 (07 Nov 2019 08:00) (75 - 96)  RR: 17 (07 Nov 2019 08:54) (14 - 27)  SpO2: 97% (07 Nov 2019 08:54) (92% - 100%)      REVIEW OF SYSTEMS:     Constitutional: No fever, chills, fatigue, weakness  Eyes: no eye pain, visual disturbances, or discharge  ENT:  No difficulty hearing, tinnitus, vertigo; No sinus or throat pain  Neck: No pain or stiffness  Respiratory: no sob  Cardiovascular: No chest pain, palpitations,   Gastrointestinal: No abdominal or epigastric pain. No nausea, vomiting  No diarrhea or constipation.   Genitourinary: No dysuria, frequency, hematuria or incontinence  Neurological: No headaches, lightheadedness, vertigo, numbness or tremors  Psychiatric: No depression, anxiety, mood swings or difficulty sleeping  Musculoskeletal: No joint pain or swelling; No muscle, back or extremity pain  Skin: No itching, burning, rashes or lesions   Lymph Nodes: No enlarged glands  Endocrine: No heat or cold intolerance; No hair loss   Allergy and Immunologic: No hives or eczema    On Neurological Examination:    Mental Status - Patient is alert, awake, oriented X3      Follow simple commands  Follow complex commands    Speech -   Fluent                      Cranial Nerves - Pupils 3 mm equal and reactive to light,   extraocular eye movements intact.   smile symmetric  intact bilateral NLF    Motor Exam -   Right upper 4/5  Left upper 4/5  Right lower 4/5  Left lower  4/5    Muscle tone - is normal all over.  No asymmetry is seen.      Sensory    Bilateral intact to light touch      GENERAL Exam: Nontoxic , No Acute Distress   	  HEENT:  normocephalic, atraumatic  		  LUNGS: C Decreased bilaterally  	  HEART: Normal S1S2   No murmur RRR        	  GI/ ABDOMEN:  Soft  Non tender    EXTREMITIES:   No Edema  No Clubbing  No Cyanosis     MUSCULOSKELETAL: Normal Range of Motion  	   SKIN: Normal  No Ecchymosis             LABS:  CBC Full  -  ( 06 Nov 2019 06:11 )  WBC Count : 3.19 K/uL  RBC Count : 5.03 M/uL  Hemoglobin : 10.9 g/dL  Hematocrit : 34.8 %  Platelet Count - Automated : 105 K/uL  Mean Cell Volume : 69.2 fl  Mean Cell Hemoglobin : 21.7 pg  Mean Cell Hemoglobin Concentration : 31.3 gm/dL  Auto Neutrophil # : 1.77 K/uL  Auto Lymphocyte # : 0.71 K/uL  Auto Monocyte # : 0.58 K/uL  Auto Eosinophil # : 0.09 K/uL  Auto Basophil # : 0.02 K/uL  Auto Neutrophil % : 55.5 %  Auto Lymphocyte % : 22.3 %  Auto Monocyte % : 18.2 %  Auto Eosinophil % : 2.8 %  Auto Basophil % : 0.6 %      11-06    140  |  103  |  15  ----------------------------<  110<H>  3.8   |  29  |  1.17    Ca    8.6      06 Nov 2019 06:11  Phos  4.2     11-06  Mg     2.2     11-06    TPro  6.9  /  Alb  3.2<L>  /  TBili  0.6  /  DBili  x   /  AST  29  /  ALT  26  /  AlkPhos  71  11-06    Hemoglobin A1C:     LIVER FUNCTIONS - ( 06 Nov 2019 06:11 )  Alb: 3.2 g/dL / Pro: 6.9 g/dL / ALK PHOS: 71 U/L / ALT: 26 U/L DA / AST: 29 U/L / GGT: x           Vitamin B12         RADIOLOGY      Assessment and Plan:   · Assessment		  Seen for AMS/confusion metabolic respiratory issues resolved  SOB.  Symptoms likely sec to Sleep abnormality, Hypoxia/Hypercarbia  Continue C pap as needed   Ct head negative  Fall/safety precautions.  no new events        Physical therapy evaluation if possible and as tolerated .  OOB to chair/ambulation with assistance only.    40 minutes spent on total encounter; more than 50% of the visit was spent counseling and/or coordinating care by the attending physician.

## 2019-11-08 PROBLEM — J44.9 CHRONIC OBSTRUCTIVE PULMONARY DISEASE, UNSPECIFIED: Chronic | Status: ACTIVE | Noted: 2019-01-01

## 2019-11-08 NOTE — REVIEW OF SYSTEMS
[Anxiety] : anxiety [Fever] : no fever [Palpitations] : no palpitations [Chills] : no chills [Chest Pain] : no chest pain [Lower Ext Edema] : no lower extremity edema [Shortness Of Breath] : no shortness of breath [Wheezing] : no wheezing [Abdominal Pain] : no abdominal pain [Incontinence] : no incontinence [Dysuria] : no dysuria [Skin Rash] : no skin rash [Hematuria] : no hematuria [Back Pain] : no back pain [Fainting] : no fainting [Headache] : no headache [Dizziness] : no dizziness [Memory Loss] : no memory loss [Confusion] : no confusion

## 2019-11-08 NOTE — PHYSICAL EXAM
[No Acute Distress] : no acute distress [PERRL] : pupils equal round and reactive to light [Well Nourished] : well nourished [EOMI] : extraocular movements intact [No JVD] : no jugular venous distention [Normal Outer Ear/Nose] : the outer ears and nose were normal in appearance [No Lymphadenopathy] : no lymphadenopathy [Supple] : supple [No Respiratory Distress] : no respiratory distress  [No Accessory Muscle Use] : no accessory muscle use [Normal Rate] : normal rate  [Clear to Auscultation] : lungs were clear to auscultation bilaterally [No Edema] : there was no peripheral edema [No Extremity Clubbing/Cyanosis] : no extremity clubbing/cyanosis [Soft] : abdomen soft [Non Tender] : non-tender [No Masses] : no abdominal mass palpated [Non-distended] : non-distended [Normal Anterior Cervical Nodes] : no anterior cervical lymphadenopathy [de-identified] : world backwards missed one letter  serial 7s missed 86

## 2019-11-08 NOTE — HISTORY OF PRESENT ILLNESS
[Post-hospitalization from ___ Hospital] : Post-hospitalization from [unfilled] Hospital [Discharge Summary] : discharge summary [Radiology Findings] : radiology findings [Discharge Med List] : discharge medication list [Med Reconciliation] : medication reconciliation has been completed [Patient Contacted By: ____] : and contacted by [unfilled] [FreeTextEntry2] : Pt is s/p episode of confusion and agitation Had not been sleeping well for days  Had CT chest and sepsis eval and patient improved with sleep  Just had CTA brain and neck results reviewed  Also had TTE while in hospital and results discussed

## 2019-11-25 NOTE — HISTORY OF PRESENT ILLNESS
[de-identified] : Pt comes for recheck  Memory is better but had day yesterday in which he just wanted to sleep all day

## 2019-11-25 NOTE — REVIEW OF SYSTEMS
[Cough] : cough [Fever] : no fever [Chills] : no chills [Palpitations] : no palpitations [Chest Pain] : no chest pain [Lower Ext Edema] : no lower extremity edema [Shortness Of Breath] : no shortness of breath [Wheezing] : no wheezing [Abdominal Pain] : no abdominal pain

## 2019-11-25 NOTE — PHYSICAL EXAM
[No Acute Distress] : no acute distress [Normal Sclera/Conjunctiva] : normal sclera/conjunctiva [No JVD] : no jugular venous distention [Supple] : supple [No Respiratory Distress] : no respiratory distress  [No Lymphadenopathy] : no lymphadenopathy [Clear to Auscultation] : lungs were clear to auscultation bilaterally [No Accessory Muscle Use] : no accessory muscle use [Normal Rate] : normal rate  [Regular Rhythm] : with a regular rhythm [No Edema] : there was no peripheral edema [Soft] : abdomen soft [No Extremity Clubbing/Cyanosis] : no extremity clubbing/cyanosis [Non Tender] : non-tender [Non-distended] : non-distended

## 2019-12-04 PROBLEM — K63.5 BENIGN COLON POLYP: Status: ACTIVE | Noted: 2019-01-01

## 2019-12-04 PROBLEM — E66.9 OBESITY: Status: ACTIVE | Noted: 2019-01-01

## 2019-12-04 PROBLEM — Z12.11 COLON CANCER SCREENING: Status: ACTIVE | Noted: 2019-01-01

## 2019-12-04 PROBLEM — K63.5 BENIGN COLON POLYP: Status: RESOLVED | Noted: 2019-01-01 | Resolved: 2019-01-01

## 2019-12-04 NOTE — HISTORY OF PRESENT ILLNESS
[de-identified] : Rei Conde MD Takes care of this very pleasant 74-year-old gentleman is here with his wife\par \par CT virtual colonoscopy reveals several very small colon polyps\par \par The patient is at high risk for traditional colonoscopy\par \par He has aortic valve replaced and he has permanent pacemaker, is morbidly obese and sleep apnea\par \par Bowel movements at this point seems to be doing well on fiber supplement\par \par He is an appointment to see his cardiologist in the next couple of weeks

## 2019-12-04 NOTE — REVIEW OF SYSTEMS
[Feeling Poorly] : feeling poorly [Feeling Tired] : feeling tired [Recent Weight Gain (___ Lbs)] : recent [unfilled] ~Ulb weight gain [Negative] : Heme/Lymph

## 2019-12-04 NOTE — PHYSICAL EXAM
[General Appearance - Alert] : alert [General Appearance - In No Acute Distress] : in no acute distress [FreeTextEntry1] : Morbid obese, pleasant, in no acute distress, alert and oriented x3 [Sclera] : the sclera and conjunctiva were normal [Neck Appearance] : the appearance of the neck was normal [Neck Cervical Mass (___cm)] : no neck mass was observed [Jugular Venous Distention Increased] : there was no jugular-venous distention [Auscultation Breath Sounds / Voice Sounds] : lungs were clear to auscultation bilaterally [Apical Impulse] : the apical impulse was normal [Full Pulse] : the pedal pulses are present [Edema] : there was no peripheral edema [Bowel Sounds] : normal bowel sounds [Abdomen Soft] : soft [Abdomen Tenderness] : non-tender [Abdomen Mass (___ Cm)] : no abdominal mass palpated [Normal Sphincter Tone] : normal sphincter tone [No Rectal Mass] : no rectal mass [Cervical Lymph Nodes Enlarged Posterior Bilaterally] : posterior cervical [Occult Blood Positive] : stool was negative for occult blood [Femoral Lymph Nodes Enlarged Bilaterally] : femoral [Cervical Lymph Nodes Enlarged Anterior Bilaterally] : anterior cervical [Axillary Lymph Nodes Enlarged Bilaterally] : axillary [Supraclavicular Lymph Nodes Enlarged Bilaterally] : supraclavicular [No Spinal Tenderness] : no spinal tenderness [Inguinal Lymph Nodes Enlarged Bilaterally] : inguinal [No CVA Tenderness] : no ~M costovertebral angle tenderness [Abnormal Walk] : normal gait [Nail Clubbing] : no clubbing  or cyanosis of the fingernails [Motor Tone] : muscle strength and tone were normal [Skin Color & Pigmentation] : normal skin color and pigmentation [Musculoskeletal - Swelling] : no joint swelling seen [Skin Turgor] : normal skin turgor [Oriented To Time, Place, And Person] : oriented to person, place, and time [No Focal Deficits] : no focal deficits [] : no rash [Impaired Insight] : insight and judgment were intact [Affect] : the affect was normal

## 2019-12-04 NOTE — ASSESSMENT
[FreeTextEntry1] : Impression\par \par Recent CAT scan virtual colonoscopy showing some very small polyps\par \par High-Risk Roly additional colonoscopy with aortic valve replacement, permanent pacemaker, morbid obesity and sleep apnea\par \par Suggest\par \par Cardiac clearance\par \par Electrophysiology clearance\par \par Follow up with me after above\par \par I explained to the wife and patient that if traditional colonoscopy is to be done has been done in the hospital with both the above doctors agreeing that his cardiac stable for the procedure\par \par I have also explained to the patient and wife that the polyps on the CT virtual colonoscopy are small and that simply to look and see approach might be reasonable as they're very unlikely to grow and turn into a cancer

## 2019-12-14 NOTE — H&P ADULT - NSHPSOCIALHISTORY_GEN_ALL_CORE
SOCIAL HISTORY:  Smoker:   NO        PACK YEARS:                         WHEN QUIT?  ETOH use:   NO               FREQUENCY / QUANTITY:  Ilicit Drug use:   NO  : SOCIAL HISTORY:  Smoker:   NO        PACK YEARS:                         WHEN QUIT?  ETOH use:   NO               FREQUENCY / QUANTITY:  Ilicit Drug use:   NO

## 2019-12-14 NOTE — PROCEDURE NOTE - NSPROCDETAILS_GEN_ALL_CORE
positive blood return obtained via catheter/hemostasis with direct pressure, dressing applied/ultrasound guidance/all materials/supplies accounted for at end of procedure
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
## 2019-12-14 NOTE — ED PROVIDER NOTE - CLINICAL SUMMARY MEDICAL DECISION MAKING FREE TEXT BOX
74 year old male with cough, AMS, weakness x 1 day.  Found to be febrile in ED.  AMS likely secondary to hypercapnia.  Labs, culture, CXR, ABG, treat possible PNA, BiPAP, admit

## 2019-12-14 NOTE — H&P ADULT - HISTORY OF PRESENT ILLNESS
74 year old male with a history of COPD, DVT, HLD, HTN, CAD with stent, AVR, dementia presents with cough and AMS. Mental status is improved. c/o worsening of cough with brownish expectoration since yesterday. +fever since yesterday.  Patient has not been compliant with CPAP and paramedic reports that patient was sleeping flat.   He was 93% on RA and sluggish to respond to questions or follow commands.  He received a flu shot this year. Patient was admitted here 11/3-11/7 for the same symptoms and was diagnosed with CAP, COPD exacerbation, MILO, and thrombocytopenia. patient has persistently trending up s lactate level. clinically does not look septic, received 2 l and getting 3rd l of ivf. waiting for cT chest/abd/pelvis.
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
## 2019-12-14 NOTE — PROVIDER CONTACT NOTE (EICU) - BACKGROUND
74 year old CAD s/p PCI, PPM, AVR, HNT, COPD, h/o DVT not a/c, Sent in from home for cough and AMS. Admitted for ? PNA but has lactic acidosis of unsure source.     Was contacted by ICU PA earlier as pt with increased lactic acidosis. at that time awaiting CT and repeat labs.  repeat labs reviewed and noted worsening lactic acid till 9. Called dr. Ndiaye (hospitalist) to come evaluate patient and see if can get pt to monitored setting. She also stated that Cr Increasing request to d/c CTA abd /pelvis and do ct with out contrast.    at this time intervention is limited and requested to bring patient to  place where can camera in.  No Hypotension noted on flowsheet, no meftformin use

## 2019-12-14 NOTE — ED ADULT NURSE NOTE - NSIMPLEMENTINTERV_GEN_ALL_ED
Implemented All Universal Safety Interventions:  [place] to call system. Call bell, personal items and telephone within reach. Instruct patient to call for assistance. Room bathroom lighting operational. Non-slip footwear when patient is off stretcher. Physically safe environment: no spills, clutter or unnecessary equipment. Stretcher in lowest position, wheels locked, appropriate side rails in place.
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
## 2019-12-14 NOTE — PROGRESS NOTE ADULT - SUBJECTIVE AND OBJECTIVE BOX
24 hour events:     Review of Systems:  Constitutional: No fever, chills, fatigue  Neuro: No headache, numbness, weakness  Resp: No cough, wheezing, shortness of breath  CVS: No chest pain, palpitations, leg swelling  GI: No abdominal pain, nausea, vomiting, diarrhea   : No dysuria, frequency, incontinence  Skin: No itching, burning, rashes, or lesions   Msk: No joint pain or swelling  Psych: No depression, anxiety, mood swings    T(F): 99.5 (19 @ 07:15), Max: 103.1 (19 @ 05:43)  HR: 90 (19 @ 08:20) (90 - 106)  BP: 112/66 (19 @ 08:20) (108/64 - 159/83)  RR: 18 (19 @ 08:20) (18 - 22)  SpO2: 97% (19 @ 08:20) (95% - 98%)  Wt(kg): --        CAPILLARY BLOOD GLUCOSE          I&O's Summary      Physical Exam:   Gen:  Neuro:  HEENT:  Resp:  CVS:  Abd:  Ext:  Skin:    Meds:  piperacillin/tazobactam IVPB.. IV Intermittent      atorvastatin Oral  predniSONE   Tablet Oral    albuterol/ipratropium for Nebulization Nebulizer  budesonide 160 MICROgram(s)/formoterol 4.5 MICROgram(s) Inhaler Inhalation  tiotropium 18 MICROgram(s) Capsule Inhalation    imipramine Oral      aspirin enteric coated Oral  enoxaparin Injectable SubCutaneous    pantoprazole    Tablet Oral      cyanocobalamin Oral  folic acid Oral  sodium chloride 0.9% Bolus IV Bolus        iohexol 300 mG (iodine)/mL Oral Solution Oral                            11.0   5.42  )-----------( 90       ( 14 Dec 2019 06:12 )             35.6       12-14    136  |  100  |  13  ----------------------------<  162<H>  3.4<L>   |  22  |  1.45<H>    Ca    8.5      14 Dec 2019 06:12    TPro  8.3  /  Alb  3.3  /  TBili  1.6<H>  /  DBili  x   /  AST  25  /  ALT  21  /  AlkPhos  111  12-    Lactate 7.6           12-14 @ 11:36    Lactate 5.5            @ 09:23    Lactate 2.3            @ 06:12          PT/INR - ( 14 Dec 2019 10:06 )   PT: 16.3 sec;   INR: 1.48 ratio         PTT - ( 14 Dec 2019 10:06 )  PTT:37.6 sec  Urinalysis Basic - ( 14 Dec 2019 06:35 )    Color: Yellow / Appearance: Clear / S.015 / pH: x  Gluc: x / Ketone: Negative  / Bili: Negative / Urobili: Negative mg/dL   Blood: x / Protein: 15 mg/dL / Nitrite: Negative   Leuk Esterase: Negative / RBC: 3-5 /HPF / WBC x   Sq Epi: x / Non Sq Epi: x / Bacteria: x                  Radiology: ***  Bedside ultrasound: ***    CENTRAL LINE: N/Y          DATE INSERTED:              REMOVE: Y/N  JOHNSON: N/Y                       DATE INSERTED:              REMOVE: Y/N  A-LINE: N/Y                       DATE INSERTED:              REMOVE: Y/N    GLOBAL ISSUE/BEST PRACTICE:  Analgesia:  Sedation:  CAM-ICU:   HOB elevation: yes  Stress ulcer prophylaxis:  VTE prophylaxis:  Glycemic control:  Nutrition:    CODE STATUS: *** HPI: 75 y/o M w/ pmh of COPD, DVT, HLD, HTN, CAD with stent, AVR, CHB s/p PPM, dementia presents with cough and AMS, presents to [facility] on 19 for for cough and AMS. Pt states his wife told him he wasn't acting right and she dialed 911 and had him brought to the hospital for further treatment and evaluation. Pt was dx w/ with pna and admitted to the floor for further work-up/tx and then esculated up to the SPCU for worsening lactate and critical care was consulted. During interview pt states since he got into the ED he has been feeling much better states his cough and SOB has improved. Pt otherwise denies any other medical complains at this time.    Review of Systems:  Constitutional: No fever, chills, fatigue  Neuro: No headache, numbness, weakness  Resp: +cough, no wheezing, +shortness of breath  CVS: No chest pain, palpitations, leg swelling  GI: No abdominal pain, nausea, vomiting, diarrhea   : No dysuria, frequency, incontinence  Skin: No itching, burning, rashes, or lesions   Msk: No joint pain or swelling  Psych: No depression, anxiety, mood swings    T(F): 99.5 (19 @ 07:15), Max: 103.1 (19 @ 05:43)  HR: 90 (19 @ 08:20) (90 - 106)  BP: 112/66 (19 @ 08:20) (108/64 - 159/83)  RR: 18 (19 @ 08:20) (18 - 22)  SpO2: 97% (19 @ 08:20) (95% - 98%)  Wt(kg): --        CAPILLARY BLOOD GLUCOSE          I&O's Summary      Physical Exam:   Gen: Comfortable in bed in NAD, speaking in full and clear sentences  Neuro: AAOx3  Resp: Good air entry b/l  CVS: +RRR  Abd: BSx4, soft, nt/nd  Ext: no edema  Skin: warm/dry    Meds:  piperacillin/tazobactam IVPB.. IV Intermittent      atorvastatin Oral  predniSONE   Tablet Oral    albuterol/ipratropium for Nebulization Nebulizer  budesonide 160 MICROgram(s)/formoterol 4.5 MICROgram(s) Inhaler Inhalation  tiotropium 18 MICROgram(s) Capsule Inhalation    imipramine Oral      aspirin enteric coated Oral  enoxaparin Injectable SubCutaneous    pantoprazole    Tablet Oral      cyanocobalamin Oral  folic acid Oral  sodium chloride 0.9% Bolus IV Bolus        iohexol 300 mG (iodine)/mL Oral Solution Oral                            11.0   5.42  )-----------( 90       ( 14 Dec 2019 06:12 )             35.6           136  |  100  |  13  ----------------------------<  162<H>  3.4<L>   |  22  |  1.45<H>    Ca    8.5      14 Dec 2019 06:12    TPro  8.3  /  Alb  3.3  /  TBili  1.6<H>  /  DBili  x   /  AST  25  /  ALT  21  /  AlkPhos  111      Lactate 7.6            @ 11:36    Lactate 5.5            @ 09:23    Lactate 2.3            @ 06:12          PT/INR - ( 14 Dec 2019 10:06 )   PT: 16.3 sec;   INR: 1.48 ratio         PTT - ( 14 Dec 2019 10:06 )  PTT:37.6 sec  Urinalysis Basic - ( 14 Dec 2019 06:35 )    Color: Yellow / Appearance: Clear / S.015 / pH: x  Gluc: x / Ketone: Negative  / Bili: Negative / Urobili: Negative mg/dL   Blood: x / Protein: 15 mg/dL / Nitrite: Negative   Leuk Esterase: Negative / RBC: 3-5 /HPF / WBC x   Sq Epi: x / Non Sq Epi: x / Bacteria: x        Radiology:       < from: CT Head No Cont (19 @ 08:07) >  EXAM:  CT BRAIN                                  PROCEDURE DATE:  2019          INTERPRETATION:  CLINICAL STATEMENT: Pain.    TECHNIQUE: CT of the head was performed without IV contrast.    COMPARISON: CT head 11/3/2019    FINDINGS:  There ismoderate diffuse parenchymal volume loss. There are areas of low   attenuation in the periventricular subcortical white matter likely   related to severe chronic microvascular ischemic changes.    There is no acute intracranial hemorrhage, parenchymal mass, mass effect   or midline shift. There is no acute territorial infarct. There is no   hydrocephalus. Partial empty sella    The cranium is intact.    Retention cyst/polyp right maxillary sinus    IMPRESSION:  No acute intracranial hemorrhage oracute territorial infarct.  If   symptoms persist, follow-up MRI exam recommended.      SIM VELA M.D., ATTENDING RADIOLOGIST  This document has been electronically signed. Dec 14 2019  8:15AM        < end of copied text >    < from: Xray Chest 1 View AP/PA (19 @ 06:36) >  EXAM:  XR CHEST AP OR PA 1V                                  PROCEDURE DATE:  2019          INTERPRETATION:  CLINICAL STATEMENT: Chest pain.    TECHNIQUE: AP view of the chest.    COMPARISON: 2019    FINDINGS/  IMPRESSION:  Sternotomy wires and left cardiac device again noted.    Increased interstitial lung markings lung bases which could be related to   atelectasis. No pleural effusion.    Heart size within normal limits.    Mildly prominent right hilum likely related to mildly prominent pulmonary   artery      SIM VELA M.D., ATTENDING RADIOLOGIST  This document has been electronically signed. Dec 14 2019  7:22AM    < end of copied text >      CENTRAL LINE: N  JOHNSON: N  A-LINE: N    GLOBAL ISSUE/BEST PRACTICE:  Analgesia: N  Sedation: N  CAM-ICU: n/a  HOB elevation: Y  Stress ulcer prophylaxis: Y  VTE prophylaxis: Y  Glycemic control: Y  Nutrition: Y    CODE STATUS: FULL CODE
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
## 2019-12-14 NOTE — ED PROVIDER NOTE - OBJECTIVE STATEMENT
74 year old male with a history of COPD, DVT, HLD, HTN, CAD with stent, AVR, dementia presents with cough and AMS.  According to wife, patient developed a dry cough yesterday, he was unable to expectorate phlegm.  She was unaware of a fever.  Patient has not been compliant with CPAP and paramedic reports that patient was sleeping flat.   He was 93% on RA and sluggish to respond to questions or follow commands.  Patient was admitted here 11/3-11/7 for the same symptoms and was diagnosed with CAP, COPD exacerbation, MILO, and thrombocytopenia.  PMD Dr. Conde. Cardiology at [place] 74 year old male with a history of COPD, DVT, HLD, HTN, CAD with stent, AVR, dementia presents with cough and AMS.  According to wife, patient developed a dry cough yesterday, he was unable to expectorate phlegm.  She was unaware of a fever.  Patient has not been compliant with CPAP and paramedic reports that patient was sleeping flat.   He was 93% on RA and sluggish to respond to questions or follow commands.  He received a flu shot this year. Patient was admitted here 11/3-11/7 for the same symptoms and was diagnosed with CAP, COPD exacerbation, MILO, and thrombocytopenia.  PMD Dr. Conde. Cardiology at [place]

## 2019-12-14 NOTE — ED PROVIDER NOTE - NEUROLOGICAL, MLM
Alert and oriented x 3, no focal deficits, no motor or sensory deficits.  Slow to answer questions, not responding at times or following commands.  Appears confused but no focal deficits

## 2019-12-14 NOTE — CONSULT NOTE ADULT - PROBLEM SELECTOR RECOMMENDATION 9
w/ rising lactate possibly due to metabolic encephalopathy 2/2 sepsis.  However WBC not elevated, not febrile.  Concern re: cardiogenic causes.    STAT echo at bedside (prelim)  Cont. IV antibiotics & IV fluids. w/ rising lactate possibly due to metabolic encephalopathy 2/2 sepsis.  However WBC not elevated, not febrile.  Concern re: cardiogenic causes.    STAT echo at bedside (prelim) technically difficult but shows grossly normal LV systolic function. Cont. IV antibiotics & IV fluids.

## 2019-12-14 NOTE — ED PROVIDER NOTE - CONSTITUTIONAL, MLM
normal... Well appearing, awake, alert, oriented to person, place, time/situation and in no apparent distress.  Patient obese

## 2019-12-14 NOTE — PROGRESS NOTE ADULT - ASSESSMENT
73 y/o M w/ pmh of COPD, DVT, HLD, HTN, CAD with stent, AVR, CHB s/p PPM, dementia presents with cough and AMS, presents to [facility] on 12/14/19 for for cough and AMS. Pt states his wife told him he wasn't acting right and she dialed 911 and had him brought to the hospital for further treatment and evaluation. Pt was dx w/ with pna and admitted to the floor for further work-up/tx and then escalated up to the SPCU for worsening lactate and critical care was consulted. During interview pt states since he got into the ED he has been feeling much better states his cough and SOB has improved. Pt otherwise denies any other medical complains at this time.    -Neuro: mental status stable, no acute deficits at this time, CTH in the ED neg  -Cardiac: HDS at this time, maintain MAP >65  -Resp: COPD cont supplemental o2 maintain o2 >92%, cont duonebs and pulmonary toilet would recommend obtain CTA to r/o PE and further evaluation of pna?  -GI: would make pt NPO at this time given worsening LA and also obtain CTAP w/ IV/PO contrast to r/o intraabdominal pathology, will order additional IVF  -Renal: Slight MILO on CKD, lyte reviewed, will cont to monitor renal function and lytes, avoid further nephrotoxic drugs at this point given pt will be given IV constrast for study  -ID: pna? cont IV vanco/zosyn for now, MRSA swap ordered, rsv/flu swap neg, obtain RVP, f/u on urine/blood cx, ID consult place  -Heme: Given some MILO at this time would change from lovenox to heparin for dvt ppx  -Endo: no acute issues, monitor FS q6h given NPO status at this time  -Dispo: Admitted to the SPCU given worsening lactate acidosis, repeat labs, CE, LA, cbc, cmp and lytes ordered, case d/w hospitalize and will d/w eICU attending 73 y/o M w/ pmh of COPD, DVT, HLD, HTN, CAD with stent, AVR, CHB s/p PPM, dementia presents with cough and AMS, presents to [facility] on 12/14/19 for for cough and AMS. Pt states his wife told him he wasn't acting right and she dialed 911 and had him brought to the hospital for further treatment and evaluation. Pt was dx w/ with pna and admitted to the floor for further work-up/tx and then escalated up to the SPCU for worsening lactate and critical care was consulted. During interview pt states since he got into the ED he has been feeling much better states his cough and SOB has improved. Pt otherwise denies any other medical complains at this time.    -Neuro: mental status stable, no acute deficits at this time, CTH in the ED neg  -Cardiac: HDS at this time, maintain MAP >65  -Resp: COPD cont supplemental o2 maintain o2 >92%, cont duonebs and pulmonary toilet would recommend obtain CTA to r/o PE and further evaluation of pna?  -GI: would make pt NPO at this time given worsening LA and also obtain CTAP w/ IV/PO contrast to r/o intraabdominal pathology, will order additional IVF  -Renal: Slight MILO on CKD, lyte reviewed, will cont to monitor renal function and lytes, avoid further nephrotoxic drugs at this point given pt will be given IV constrast for study  -ID: pna? cont IV vanco/zosyn for now, MRSA swap ordered, rsv/flu swap neg, obtain RVP, f/u on urine/blood cx, ID consult place  -Heme: Given some MILO at this time would change from lovenox to heparin for dvt ppx  -Endo: no acute issues, monitor FS q6h given NPO status at this time  -Dispo: Admitted to the SPCU given worsening lactate acidosis, repeat labs, CE, LA, cbc, cmp and lytes ordered, case d/w hospitalize and d/w eICU attending

## 2019-12-14 NOTE — PROCEDURE NOTE - ADDITIONAL PROCEDURE DETAILS
Time spent on procedure independent of Critical Care time spent at the bedside  Indication for procedure: Lactic Acidosis  Dx Code: E87.2

## 2019-12-14 NOTE — PROGRESS NOTE ADULT - ASSESSMENT
ASSESSMENT:    74M who initially arrived to ER with C/O SOB and fevr, found to ahve worsening lactic acidosis despite benign abdominal exam, no hypotension, and denial of metformin use. ADmitted to SPCU for furtehr care    PLAN:    #Cough/Fever  -rapid viral panel (-)  -will continue vanco zosyn as l;actate was elevated on arrival, IDF following and agrees  -send procalcitonin in AM, if remains Negative may consider stopping anbx    #Elevated Lacatate  -unknown source at this time.  -did show steady increase from 2 --> 9 despite benign abdominal exam, no hypotension, and denial of medications that may induce  -now, post SPCU arrival, lactate has improved to 4, will continue to trend until normal  -received 1 amp of bicarb, will continue bicarb drip as patient also with mild MILO    #MILO  -bicarb drip as above  -has received multiple fluid boluses in ER  -will trend Scr, guidry in place trend urine output    #Large Galls tone o CT abd  -no signs of cholecystits  -will have surgical team evaluate in AM  -patient denies pain with meals  -will ordr formal RUQ ultrasound    #Hx of DVT  -will repeat b/l LE dopplesr to R/O recurrence    #Lung nodule w/ "signs of mets on CT"  -nodule was seen on CT chest in early november  -will need to follow as outpatient. WIll be followed by pulm this admission    #DANIEL  -will continue home CPAP settings, machine at bedside    #HTN  -home meds do not include anti-hypertensives  SBP's have been 112-148  -iff SBP >160 will treat with IVP meds PRN    #HLD  -continue lipitor    #Prophylaxis   -heparin sub cutaneous  -protonix

## 2019-12-14 NOTE — H&P ADULT - ASSESSMENT
74 year old male with a history of COPD, DVT, HLD, HTN, CAD with stent, AVR, dementia presents with cough and AMS. Mental status is improved. c/o worsening of cough with brownish expectoration since yesterday. +fever since yesterday.  Patient has not been compliant with CPAP and paramedic reports that patient was sleeping flat.   He was 93% on RA and sluggish to respond to questions or follow commands.  He received a flu shot this year. Patient was admitted here 11/3-11/7 for the same symptoms and was diagnosed with CAP, COPD exacerbation, MILO, and thrombocytopenia. patient has persistently trending up s lactate level. clinically does not look septic, received 2 l and getting 3rd l of ivf. waiting for cT chest/abd/pelvis.     Alterted mental status likly due to metabolic encephalopathy likly due to sepsis- elevated lactate, fever,    Admit to SPCU.  he received zosyn and vanco iV in ED.  iV zosyn.  received 2l and on 3rd l of  ivf.  f/u culture results.  f/u CT chest/abd/pelvis.  trend lactate level.  ID consult .notified.  seen by pulmonary .  mental status improved.    copd/reji/obesity  bronchodilators/steroids.  cpap  pulmonary following.     HLD, HTN, CAD with stent, AVR  cardiology , notified.    Plan of care was discuss with patient, all questions were answered, seems understand and in agreement.    Critical Time spent -  - spent 35 minutes. 74 year old male with a history of COPD, DVT, HLD, HTN, CAD with stent, AVR, dementia presents with cough and AMS. Mental status is improved. c/o worsening of cough with brownish expectoration since yesterday. +fever since yesterday.  Patient has not been compliant with CPAP and paramedic reports that patient was sleeping flat.   He was 93% on RA and sluggish to respond to questions or follow commands.  He received a flu shot this year. Patient was admitted here 11/3-11/7 for the same symptoms and was diagnosed with CAP, COPD exacerbation, MILO, and thrombocytopenia. patient has persistently trending up s lactate level. clinically does not look septic, received 2 l and getting 3rd l of ivf. waiting for cT chest/abd/pelvis.     Alterted mental status likly due to metabolic encephalopathy likly due to sepsis- elevated lactate, fever,    Admit to SPCU.  he received zosyn and vanco iV in ED.  iV zosyn.  received 2l and on 3rd l of  ivf.  f/u culture results.  f/u CT chest/abd/pelvis.  trend lactate level.  ID consult .notified.  seen by pulmonary .  mental status improved.    copd/reji/obesity  bronchodilators/steroids.  cpap  pulmonary following.     HLD, HTN, CAD with stent, AVR  cardiology , notified.    Plan of care was discuss with patient, all questions were answered, seems understand and in agreement.    Critical Time spent -  - spent 35 minutes.  PMD notified. 74 year old male with a history of COPD, DVT, HLD, HTN, CAD with stent, AVR, dementia presents with cough and AMS. Mental status is improved. c/o worsening of cough with brownish expectoration since yesterday. +fever since yesterday.  Patient has not been compliant with CPAP and paramedic reports that patient was sleeping flat.   He was 93% on RA and sluggish to respond to questions or follow commands.  He received a flu shot this year. Patient was admitted here 11/3-11/7 for the same symptoms and was diagnosed with CAP, COPD exacerbation, MILO, and thrombocytopenia. patient has persistently trending up s lactate level. clinically does not look septic, received 2 l and getting 3rd l of ivf. waiting for cT chest/abd/pelvis.     Alterted mental status likly due to metabolic encephalopathy likly due to sepsis- elevated lactate, fever,    Admit to SPCU.  he received zosyn and vanco iV in ED.  iV zosyn.  received 2l and on 3rd l of  ivf.  f/u culture results.  f/u CT chest/abd/pelvis.  trend lactate level.  ID consult .notified.  seen by pulmonary .  mental status improved.    copd/reji/obesity  bronchodilators/steroids.  cpap  pulmonary following.     HLD, HTN, CAD with stent, AVR  cardiology , notified.    Plan of care was discuss with patient, all questions were answered, seems understand and in agreement.    Critical Time spent -  - spent 35 minutes.  PMD notified.    Elsa junior -9 , case discuss with Eicu, clinically patient does not look very sick, repeat arterial blood lactate, ABG, stat ECHO, CT chest/abd/pelvis.. Patient is evaluated by E Icu.  case discuss with cardiology. 74 year old male with a history of COPD, DVT, HLD, HTN, CAD with stent, AVR, dementia presents with cough and AMS. Mental status is improved. c/o worsening of cough with brownish expectoration since yesterday. +fever since yesterday.  Patient has not been compliant with CPAP and paramedic reports that patient was sleeping flat.   He was 93% on RA and sluggish to respond to questions or follow commands.  He received a flu shot this year. Patient was admitted here 11/3-11/7 for the same symptoms and was diagnosed with CAP, COPD exacerbation, MILO, and thrombocytopenia. patient has persistently trending up s lactate level. clinically does not look septic, received 2 l and getting 3rd l of ivf. waiting for cT chest/abd/pelvis.     Alterted mental status likly due to metabolic encephalopathy likly due to sepsis- elevated lactate, fever,    Admit to SPCU.  he received zosyn and vanco iV in ED.  iV zosyn.  received 2l and on 3rd l of  ivf.  f/u culture results.  f/u CT chest/abd/pelvis.  trend lactate level.  ID consult .notified.  seen by pulmonary .  mental status improved.    copd/reji/obesity  bronchodilators/steroids.  cpap  pulmonary following.     HLD, HTN, CAD with stent, AVR  cardiology , notified.    Plan of care was discuss with patient, all questions were answered, seems understand and in agreement.    Critical Time spent -  - spent 35 minutes.  PMD notified.    Elsa junior -9 , case discuss with Eicu, clinically patient does not look very sick, repeat arterial blood lactate, CPK, troponin BNP, ABG, stat ECHO, CT chest/abd/pelvis.. Patient is evaluated by E Icu.  case discuss with cardiology.

## 2019-12-14 NOTE — ED ADULT TRIAGE NOTE - CHIEF COMPLAINT QUOTE
h/o aspiration pneumonia in November, cough x2 days. Sleeps w/ CPAP. Tonight diff breathing. Wife states possibly confused.

## 2019-12-14 NOTE — PROCEDURE NOTE - NSSITEPREP_SKIN_A_CORE
Adherence to aseptic technique: hand hygiene prior to donning barriers (gown, gloves), don cap and mask, sterile drape over patient/alcohol
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
## 2019-12-14 NOTE — PROVIDER CONTACT NOTE (EICU) - BACKGROUND
Was able to camera in now  pt seen and he states he feels well, mild anxiety  no sob, no abd pain  not dm on metformin,  no abd pain.    he feels well and at basline mild cough

## 2019-12-14 NOTE — CONSULT NOTE ADULT - ASSESSMENT
Seen for AMS.  No signs of meningitis.  Limited but non focal exam.  CT Head - No acute pathology.  No signs of CVA.  Received IV Fluids.  H/o Dementia.  H/o PPM    H/o aortic valve replacement.  H/o Vitamin B12 deficiency - On supplementation.  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  D/w Pt's wife at bedside. Questions answered.  Fall safety precautions.  Would follow.
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
## 2019-12-15 NOTE — PROGRESS NOTE ADULT - SUBJECTIVE AND OBJECTIVE BOX
HPI:  75 y/o M w/ pmh of COPD, DVT, HLD, HTN, CAD with stent, AVR, CHB s/p PPM, dementia presents with cough and AMS, presents to [facility] on 19 for for cough and AMS. Pt states his wife told him he wasn't acting right and she dialed 911 and had him brought to the hospital for further treatment and evaluation. Pt was dx w/ with pna and admitted to the floor for further work-up/tx and then esculated up to the SPCU for worsening lactate and critical care was consulted. During interview pt states since he got into the ED he has been feeling much better states his cough and SOB has improved. Pt otherwise denies any other medical complains at this time.    24 hour events:     Review of Systems:  Constitutional: No fever, chills, fatigue  Neuro: No headache, numbness, weakness  Resp: No cough, wheezing, shortness of breath  CVS: No chest pain, palpitations, leg swelling  GI: No abdominal pain, nausea, vomiting, diarrhea   : No dysuria, frequency, incontinence  Skin: No itching, burning, rashes, or lesions   Msk: No joint pain or swelling  Psych: No depression, anxiety, mood swings    T(F): 99.2 (12-15-19 @ 08:15), Max: 99.2 (12-15-19 @ 08:15)  HR: 102 (12-15-19 @ 10:00) (78 - 120)  BP: 148/78 (12-15-19 @ 10:00) (113/64 - 152/86)  RR: 23 (12-15-19 @ 10:00) (17 - 26)  SpO2: 96% (12-15-19 @ 10:00) (96% - 100%)  Wt(kg): --      19 @ 07:01  -  12-15-19 @ 07:00  --------------------------------------------------------  IN: 1325 mL / OUT: 700 mL / NET: 625 mL    12-15-19 @ 07:01  -  12-15-19 @ 11:41  --------------------------------------------------------  IN: 150 mL / OUT: 0 mL / NET: 150 mL        CAPILLARY BLOOD GLUCOSE      POCT Blood Glucose.: 152 mg/dL (15 Dec 2019 11:36)      I&O's Summary    14 Dec 2019 07:  -  15 Dec 2019 07:00  --------------------------------------------------------  IN: 1325 mL / OUT: 700 mL / NET: 625 mL    15 Dec 2019 07:  -  15 Dec 2019 11:41  --------------------------------------------------------  IN: 150 mL / OUT: 0 mL / NET: 150 mL        Physical Exam:   Gen:  Neuro:  HEENT:  Resp:  CVS:  Abd:  Ext:  Skin:    Meds:  piperacillin/tazobactam IVPB.. IV Intermittent  vancomycin  IVPB IV Intermittent      atorvastatin Oral  predniSONE   Tablet Oral    albuterol/ipratropium for Nebulization Nebulizer  budesonide 160 MICROgram(s)/formoterol 4.5 MICROgram(s) Inhaler Inhalation  tiotropium 18 MICROgram(s) Capsule Inhalation    imipramine Oral      aspirin enteric coated Oral  heparin  Injectable SubCutaneous    pantoprazole    Tablet Oral      cyanocobalamin Oral  folic acid Oral                                  9.6    4.97  )-----------( 70       ( 15 Dec 2019 06:38 )             30.9       12-15    140  |  105  |  10  ----------------------------<  154<H>  3.7   |  26  |  1.13    Ca    8.3<L>      15 Dec 2019 06:38  Phos  2.9     12-15  Mg     2.2     12-15    TPro  8.3  /  Alb  3.2<L>  /  TBili  1.0  /  DBili  x   /  AST  17  /  ALT  21  /  AlkPhos  100      Lactate 2.4           12-15 @ 06:38    Lactate 4.1            @ 20:00    Lactate 9.0            @ 16:23      CARDIAC MARKERS ( 15 Dec 2019 07:46 )  .113 ng/mL / x     / x     / x     / 2.4 ng/mL  CARDIAC MARKERS ( 14 Dec 2019 16:23 )  .041 ng/mL / x     / x     / x     / x          PT/INR - ( 14 Dec 2019 10:06 )   PT: 16.3 sec;   INR: 1.48 ratio         PTT - ( 14 Dec 2019 10:06 )  PTT:37.6 sec  Urinalysis Basic - ( 14 Dec 2019 06:35 )    Color: Yellow / Appearance: Clear / S.015 / pH: x  Gluc: x / Ketone: Negative  / Bili: Negative / Urobili: Negative mg/dL   Blood: x / Protein: 15 mg/dL / Nitrite: Negative   Leuk Esterase: Negative / RBC: 3-5 /HPF / WBC x   Sq Epi: x / Non Sq Epi: x / Bacteria: x      .Urine Clean Catch (Midstream)   <10,000 CFU/mL Normal Urogenital Melva --  @ 13:40              Radiology: ***  Bedside ultrasound: ***    CENTRAL LINE: N/Y          DATE INSERTED:              REMOVE: Y/N  JOHNSON: N/Y                       DATE INSERTED:              REMOVE: Y/N  A-LINE: N/Y                       DATE INSERTED:              REMOVE: Y/N    GLOBAL ISSUE/BEST PRACTICE:  Analgesia:  Sedation:  CAM-ICU:   HOB elevation: yes  Stress ulcer prophylaxis:  VTE prophylaxis:  Glycemic control:  Nutrition:    CODE STATUS: *** HPI:  75 y/o M w/ pmh of COPD, DVT, HLD, HTN, CAD with stent, AVR, CHB s/p PPM, dementia presents with cough and AMS, presents to [facility] on 19 for for cough and AMS. Pt states his wife told him he wasn't acting right and she dialed 911 and had him brought to the hospital for further treatment and evaluation. Pt was dx w/ with pna and admitted to the floor for further work-up/tx and then esculated up to the SPCU for worsening lactate and critical care was consulted.     24 hour events: Overnight pt LA continued to trend up 2--->5--->7--->9 for which pt had a CT chest and abd done to r/o any acute process for rising LA. Pt was also noted to have a decrease in serum bicarb in the setting of LA and MILO for which he was given x1 amp of bicarb and started on bibarb drip. Pt was found to have a large gall stone for which Abd US was done to r/o acute choely, pt also had b/l duplex done to r/o DVT. Today morning pt reports feeling much better and currently asking if he can go home. Pt otherwise currently denies any medical complains at this time.    Review of Systems:  Constitutional: No fever, chills, fatigue  Neuro: No headache, numbness, weakness  Resp: No cough, wheezing, shortness of breath  CVS: No chest pain, palpitations, leg swelling  GI: No abdominal pain, nausea, vomiting, diarrhea   : No dysuria, frequency, incontinence  Skin: No itching, burning, rashes, or lesions   Msk: No joint pain or swelling  Psych: No depression, anxiety, mood swings    T(F): 99.2 (12-15-19 @ 08:15), Max: 99.2 (12-15-19 @ 08:15)  HR: 102 (12-15-19 @ 10:00) (78 - 120)  BP: 148/78 (12-15-19 @ 10:00) (113/64 - 152/86)  RR: 23 (12-15-19 @ 10:00) (17 - 26)  SpO2: 96% (12-15-19 @ 10:00) (96% - 100%)  Wt(kg): --      19 @ 07:  -  12-15-19 @ 07:00  --------------------------------------------------------  IN: 1325 mL / OUT: 700 mL / NET: 625 mL    12-15-19 @ 07:  -  12-15-19 @ 11:41  --------------------------------------------------------  IN: 150 mL / OUT: 0 mL / NET: 150 mL        CAPILLARY BLOOD GLUCOSE      POCT Blood Glucose.: 152 mg/dL (15 Dec 2019 11:36)      I&O's Summary    14 Dec 2019 07:01  -  15 Dec 2019 07:00  --------------------------------------------------------  IN: 1325 mL / OUT: 700 mL / NET: 625 mL    15 Dec 2019 07:01  -  15 Dec 2019 11:41  --------------------------------------------------------  IN: 150 mL / OUT: 0 mL / NET: 150 mL        Physical Exam:   Gen: Comfortable in bed in NAD, speaking in full and clear sentences  Neuro: AAOx3  Resp: Good air entry b/l  CVS: +RRR  Abd: BSx4, soft, nt/nd  Ext: no edema  Skin: warm/dry      Meds:  piperacillin/tazobactam IVPB.. IV Intermittent  vancomycin  IVPB IV Intermittent      atorvastatin Oral  predniSONE   Tablet Oral    albuterol/ipratropium for Nebulization Nebulizer  budesonide 160 MICROgram(s)/formoterol 4.5 MICROgram(s) Inhaler Inhalation  tiotropium 18 MICROgram(s) Capsule Inhalation    imipramine Oral      aspirin enteric coated Oral  heparin  Injectable SubCutaneous    pantoprazole    Tablet Oral      cyanocobalamin Oral  folic acid Oral                                  9.6    4.97  )-----------( 70       ( 15 Dec 2019 06:38 )             30.9       12-15    140  |  105  |  10  ----------------------------<  154<H>  3.7   |  26  |  1.13    Ca    8.3<L>      15 Dec 2019 06:38  Phos  2.9     12-15  Mg     2.2     12-15    TPro  8.3  /  Alb  3.2<L>  /  TBili  1.0  /  DBili  x   /  AST  17  /  ALT  21  /  AlkPhos  100      Lactate 2.4           12-15 @ 06:38    Lactate 4.1            @ 20:00    Lactate 9.0            @ 16:23      CARDIAC MARKERS ( 15 Dec 2019 07:46 )  .113 ng/mL / x     / x     / x     / 2.4 ng/mL  CARDIAC MARKERS ( 14 Dec 2019 16:23 )  .041 ng/mL / x     / x     / x     / x          PT/INR - ( 14 Dec 2019 10:06 )   PT: 16.3 sec;   INR: 1.48 ratio         PTT - ( 14 Dec 2019 10:06 )  PTT:37.6 sec  Urinalysis Basic - ( 14 Dec 2019 06:35 )    Color: Yellow / Appearance: Clear / S.015 / pH: x  Gluc: x / Ketone: Negative  / Bili: Negative / Urobili: Negative mg/dL   Blood: x / Protein: 15 mg/dL / Nitrite: Negative   Leuk Esterase: Negative / RBC: 3-5 /HPF / WBC x   Sq Epi: x / Non Sq Epi: x / Bacteria: x      .Urine Clean Catch (Midstream)   <10,000 CFU/mL Normal Urogenital Melva --  @ 13:40      Radiology:     < from: US Abdomen Limited (12.15.19 @ 08:11) >  EXAM:  US ABDOMEN LIMITED                                  PROCEDURE DATE:  12/15/2019          INTERPRETATION:  History: Gallstone seen on CT .    Findings: Right upper quadrant sonogram was performed using a 5 MHz   curved transducer.    Correlation made with recent noncontrast CT 2019.    The liver is increased in echogenicity compatible with diffuse fatty   infiltration. Images of the liver otherwise grossly unremarkable.     There is a gallstone measuring up to approximately 2.5 cm in the region   of the gallbladder neck with an adjacent smaller gallstone. The   fluid-filled gallbladder is nondilated. No significant gallbladder wall   thickening or pericholecystic fluid. The gallbladder wall measurement of   4.4 mm is felt to be obtained with obliquity. No sonographic Manning's   sign elicited. No biliary ductal dilatation. The pancreas was poorly   visualized, obscured by overlying bowel gas. No aneurysmal dilatation of   the visualized portion of the upper abdominal aorta. Right kidney   measures approximately 10.9 cm long and is without hydronephrosis.    Impression:    Fatty infiltration of the liver.    Approximate 2.5 cm gallstone in the region of the gallbladder neck.   Gallbladder is not dilated. No significant gallbladder wall thickening,   pericholecystic fluid or sonographic Manning sign elicited to suggest   acute cholecystitis.    If there is clinical concern for cholecystitis, correlation with DISIDA   scan can be performed.      JOSÉ MIGUEL CHU M.D. ATTENDING RADIOLOGIST  This document has been electronically signed. Dec 15 2019  8:41AM    < end of copied text >    < from: US Duplex Venous Lower Ext Complete, Bilateral (12.15.19 @ 08:06) >  EXAM:  US DPLX LWR EXT VEINS COMPL BI                                  PROCEDURE DATE:  12/15/2019          INTERPRETATION:  History of DVT.    Findings: Duplex Doppler examination of the deep venous system bilateral   lower extremity was performedusing a 9 MHz linear transducer.    No evidence of deep vein thrombosis. Visualized veins are compressible   with respiratory variation and augmentation to flow demonstrated.    Impression:    No evidence of deep vein thrombosis bilateral lower extremity.      JOSÉ MIGUEL CHU M.D. ATTENDING RADIOLOGIST  This document has been electronically signed. Dec 15 2019  8:31AM    < end of copied text >    < from: Xray Chest 1 View- PORTABLE-Routine (12.15.19 @ 05:10) >  EXAM:  XR CHEST PORTABLE ROUTINE 1V                                  PROCEDURE DATE:  12/15/2019          INTERPRETATION:  History: Pneumonia.    Findings: Frontal chest.    Comparison: 2019.    Left chest wall implanted dual lead pacemaker again noted in place.   Sternotomy sutures. Heart size and mediastinum are unremarkable. There is   suboptimal inspiration with crowding of the bronchovascular markings. No   focal lung consolidation or sizable pleural effusion. Calcified granuloma   at the medial left lung base. No significant osseous changes.    Impression:    Unremarkable frontal chest.        JOSÉ MIGUEL CHU M.D. ATTENDING RADIOLOGIST  This document has been electronically signed. Dec 15 2019 10:43AM    < end of copied text >      EXAM:  US TTE W DOPPLER COMPLETE                                  PROCEDURE DATE:  2019          INTERPRETATION:  Ordering Physician: MANJEET DESIR    Indication: Shortness of breath    Technician: JEFFREY    Study Quality: Technically difficult   A complete echocardiographic study was performed utilizing standard   protocol including spectral and color Doppler in all echocardiographic   windows.    Height: 165 cm  Weight: 99 kg  BSA: 2.06 m2  Blood Pressure: 133/72 mmHg    MEASUREMENTS  IVS: 1.3 cm  PWT: 1.3 cm  LA: N/A  AO: N/A  LVIDd: 4.8 cm  LVIDs: 2.9 cm    LVEF: N/A  RVSP: N/A  RA Pressure: N/A    FINDINGS  Left Ventricle: The left ventricle is normal in size. Increased left   ventricular wall thickness. The left ventricular systolicfunction is not   well-visualized but appears grossly normal.  Right Ventricle: The right ventricle is not well-visualized.  Left Atrium: The left atrium is not well-visualized.  Right Atrium: The right atrium is not well-visualized.  Mitral Valve: The mitral valve is not well-visualized. Trace mitral   regurgitation.  Aortic Valve: The aortic valve is not well-visualized. Limited Doppler.   The mean gradient through the valve is 14 mmHg.  Tricuspid Valve: The tricuspid valve is not well-visualized. Limited   Doppler.  Pulmonic Valve: The pulmonic valve is not well-visualized.  Pericardium/Pleura: No obvious pericardial effusion visualized.  Aorta: The aortic root is not well-visualized.    CONCLUSIONS:  1. Technically difficult study.  2. Grossly normal left ventricular systolic function.  3. Increased left ventricular wall thickness.  4. Possibility of mild aortic stenosis on limited Doppler.        JUAN CARLOS THOMPSON   This document has been electronically signed. Dec 15 2019 11:17AM    < from: CT Abdomen and Pelvis w/ Oral Cont (19 @ 19:00) >  EXAM:  CT ABDOMEN AND PELVIS OC                          EXAM:  CT CHEST OC                                  PROCEDURE DATE:  2019          INTERPRETATION:  PROCEDURE INFORMATION:   Exam: CT Chest Without Contrast   Exam date and time: 2019 6:22 PM   Age: 74 years old   Clinical history: Other: Cough. Sepsis. Elevated lactate. Dyspnea.    TECHNIQUE:   Imaging protocol: Computed tomography of the chest without contrast.   3D rendering: MIP reconstructed images were created and reviewed.   Radiation optimization: All CT scans at this facility use at least one of   these   dose optimization techniques: automated exposure control; mA and/or kV   adjustment per patient size (includes targeted exams where dose is   matched to   clinical indication); or iterative reconstruction.   Other contrast: Route: Oral, Material: OMNIPAQUE 240, Volume: 32CC;     COMPARISON:   CT scan of chest dated 11/3/2019    FINDINGS:   Lungs: Mild stable patchy areas of groundglass changes are seen in   bilateral lower lobes more prominent on the left.   Enlarging 12 x 10 mm nodule in left lung base (3:116). This previously   measured 7 mm. Stable 5 mm nodule in right middle lobe seen in image 189,   a 4 mm right lower lobe nodule (3:106), 6 mm noduleof the left upper   lobe (3:54), a 5 mm nodule of the left upper lobe (3:43), 5 mm nodules of   the right upper lobe (3:54, 48) and a 3 mm right lower lobe nodule   (3:141) when compared to recent prior CT of 11/3/2019. Other subpleural   nodules of the right midlung field also unchanged.  Pleural space: Mild pleural-based scarring in the lungs.   Heart:  No cardiomegaly. No pericardial effusion. Coronary arterial,   aortic and mitral valvular calcification. Status post CABG.  Mediastinum: Surgical sternal wires in place with surgical changes in   mediastinum.   Aorta: Unremarkable. No aortic aneurysm. Moderate atheromatous changes of   the aorta and branch vessels.  Lymph nodes: Unremarkable. No enlarged lymph nodes.   Gallbladder and bile ducts: Large stone in the gallbladder. No   gallbladder wall   thickening.   Bones/joints: Degenerative changes in the spine. No evidence of fracture.   Degenerative changes in the spine. No evidence of fracture.   Soft tissues: Unremarkable.     IMPRESSION:   1. Mild patchy areas of groundglass changes are seen in bilateral lower   lobes stable compared with prior study. No consolidating infiltrate.  2. Enlarging 12 x 10 mm nodule in left lung base. Highly suspicious   nodule(s). Consider   PET/CT, or tissue sampling.(Srinivas et al., Fleischner Society, 2017).   Metastatic disease should be considered.  3. Multiple additional lung nodules are demonstrated which are unchanged   compared with the recent prior chest CT.  For patients at low risk (minimal or absent history of smoking and of   other known risk factors), no routine follow-up is indicated. For   patients at high risk (history of smoking or of other known risk   factors), consider optional CT at 12 months. (Srinivas et al.,   Fleischner Society, 2017)       =========================  PROCEDURE INFORMATION:   Exam: CT Abdomen Without Contrast   Exam date and time: 2019 6:22 PM   Age: 74 years old   Clinical history: Other: Cough     TECHNIQUE:   Imaging protocol: Computed tomography images of the abdomen and pelvis   without contrast.   Radiation optimization: All CT scans at this facility use at least one of   these   dose optimization techniques: automated exposure control; mA and/or kV   adjustment per patient size (includes targeted exams where dose is   matched to   clinical indication); or iterative reconstruction.   Other contrast: Route: Oral, Material: OMNIPAQUE 240, Volume: 32CC;     COMPARISON:   CT Colonoscopy 2019.    FINDINGS:   Liver: Calcified hepatic granuloma at the right dome..   Gallbladder and bile ducts: Large stone in the gallbladder. No   gallbladder wall thickening or pericholecystic fluid.   Pancreas: Normal. No ductal dilation.   Spleen: The spleen is mildly enlarged measuring 14.5 cm..   Adrenals: Normal. No mass.   Kidneys and ureters: Normal. No hydronephrosis.   Stomach and bowel: Sigmoid diverticulosis.No areas of wall thickening in   the large bowel. No evidence of large bowel obstruction. No pericolonic   inflammatory changes to suggest acute diverticulitis. No wall thickening   in the   small bowel. No evidence of small bowel obstruction. Stomach is   unremarkable. The appendix is normal.    Intraperitoneal space: Unremarkable. No free air. No significant fluid   collection.     Lymph nodes: Mild to moderate moderate abdominal and pelvic adenopathy   increasing compared with previous examination. Adenopathy is demonstrated   emeli-portal, para-aortic, and along both pelvic sidewalls. For reference :  Distalright external iliac node (3:280) measuring 4.2 x 3.2 cm.   A right distal common iliac node (3:243) measures 2.7 x 2.4 cm.    Vasculature: Moderate atheromatous disease of the abdominal aorta, iliac   arteries and branch vessels. No sophie aneurysm. Mild ectasia of the   distal abdominal aorta which maximally measures 2.7 cm unchanged.  Bones/joints: Degenerative changes in the spine. No evidence of fracture.   Soft tissues: Unremarkable.     IMPRESSION:   Mild to moderate adenopathy of the abdomen and pelvis. Mild splenomegaly.  Differential diagnosis includes lymphoma and metastatic disease.  Cholelithiasis. Diverticulosis.    LUCILA BARTON M.D., ATTENDING RADIOLOGIST  This document has been electronically signed. Dec 15 2019  8:46AM        < end of copied text >      CENTRAL LINE: N  JOHNSON: N  A-LINE: N    GLOBAL ISSUE/BEST PRACTICE:    Analgesia: N  Sedation: N  CAM-ICU: n/a  HOB elevation: Y  Stress ulcer prophylaxis: Y  VTE prophylaxis: Y  Glycemic control: Y  Nutrition: Y    CODE STATUS: FULL CODE

## 2019-12-15 NOTE — PROGRESS NOTE ADULT - SUBJECTIVE AND OBJECTIVE BOX
Patient is a 74y old  Male who presents with a chief complaint of fever, cough with brownish expectoration. (15 Dec 2019 11:18)      INTERVAL HPI/OVERNIGHT EVENTS:  Patient is seen and examined.    feels better.  Pain Location & Control:     MEDICATIONS  (STANDING):  albuterol/ipratropium for Nebulization 3 milliLiter(s) Nebulizer every 6 hours  aspirin enteric coated 81 milliGRAM(s) Oral daily  atorvastatin 10 milliGRAM(s) Oral at bedtime  budesonide 160 MICROgram(s)/formoterol 4.5 MICROgram(s) Inhaler 2 Puff(s) Inhalation two times a day  cyanocobalamin 1000 MICROGram(s) Oral daily  folic acid 1 milliGRAM(s) Oral daily  heparin  Injectable 5000 Unit(s) SubCutaneous every 8 hours  imipramine 25 milliGRAM(s) Oral daily  pantoprazole    Tablet 40 milliGRAM(s) Oral before breakfast  piperacillin/tazobactam IVPB.. 3.375 Gram(s) IV Intermittent every 8 hours  predniSONE   Tablet 20 milliGRAM(s) Oral daily  tiotropium 18 MICROgram(s) Capsule 1 Capsule(s) Inhalation daily  vancomycin  IVPB 1000 milliGRAM(s) IV Intermittent every 12 hours    MEDICATIONS  (PRN):      Allergies    No Known Allergies    Intolerances          Vital Signs Last 24 Hrs  T(C): 37.3 (15 Dec 2019 08:15), Max: 37.3 (15 Dec 2019 08:15)  T(F): 99.2 (15 Dec 2019 08:15), Max: 99.2 (15 Dec 2019 08:15)  HR: 108 (15 Dec 2019 12:00) (78 - 120)  BP: 146/80 (15 Dec 2019 12:00) (113/64 - 152/86)  BP(mean): 99 (15 Dec 2019 12:00) (76 - 101)  RR: 23 (15 Dec 2019 12:00) (17 - 26)  SpO2: 99% (15 Dec 2019 12:00) (96% - 100%)        I&O's Detail    14 Dec 2019 07:01  -  15 Dec 2019 07:00  --------------------------------------------------------  IN:    IV PiggyBack: 50 mL    sodium bicarbonate  Infusion: 825 mL    Solution: 250 mL    Solution: 200 mL  Total IN: 1325 mL    OUT:    Indwelling Catheter - Urethral: 700 mL  Total OUT: 700 mL    Total NET: 625 mL      15 Dec 2019 07:01  -  15 Dec 2019 12:15  --------------------------------------------------------  IN:    sodium bicarbonate  Infusion: 300 mL  Total IN: 300 mL    OUT:  Total OUT: 0 mL    Total NET: 300 mL          LABS:                        9.6    4.97  )-----------( 70       ( 15 Dec 2019 06:38 )             30.9     15 Dec 2019 06:38    140    |  105    |  10     ----------------------------<  154    3.7     |  26     |  1.13     Ca    8.3        15 Dec 2019 06:38  Phos  2.9       15 Dec 2019 06:38  Mg     2.2       15 Dec 2019 06:38    TPro  8.3    /  Alb  3.2    /  TBili  1.0    /  DBili  x      /  AST  17     /  ALT  21     /  AlkPhos  100    14 Dec 2019 16:23    PT/INR - ( 14 Dec 2019 10:06 )   PT: 16.3 sec;   INR: 1.48 ratio         PTT - ( 14 Dec 2019 10:06 )  PTT:37.6 sec  Urinalysis Basic - ( 14 Dec 2019 06:35 )    Color: Yellow / Appearance: Clear / S.015 / pH: x  Gluc: x / Ketone: Negative  / Bili: Negative / Urobili: Negative mg/dL   Blood: x / Protein: 15 mg/dL / Nitrite: Negative   Leuk Esterase: Negative / RBC: 3-5 /HPF / WBC x   Sq Epi: x / Non Sq Epi: x / Bacteria: x      CAPILLARY BLOOD GLUCOSE      POCT Blood Glucose.: 152 mg/dL (15 Dec 2019 11:36)  POCT Blood Glucose.: 172 mg/dL (15 Dec 2019 05:27)  POCT Blood Glucose.: 155 mg/dL (14 Dec 2019 23:01)    CARDIAC MARKERS ( 15 Dec 2019 07:46 )  .113 ng/mL / x     / x     / x     / 2.4 ng/mL  CARDIAC MARKERS ( 14 Dec 2019 16:23 )  .041 ng/mL / x     / x     / x     / x          Cultures  Culture Results:   <10,000 CFU/mL Normal Urogenital Melva ( @ 13:40)    Lactate, Blood: 2.4 mmol/L (12-15 @ 06:38)  Lactate, Blood: 4.1 mmol/L ( @ 20:00)  Lactate, Blood: 9.0 mmol/L ( @ 16:23)      Culture - Urine (collected 19 @ 13:40)  Source: .Urine Clean Catch (Midstream)  Final Report (12-15-19 @ 10:09):    <10,000 CFU/mL Normal Urogenital Melva        RADIOLOGY & ADDITIONAL TESTS:    Imaging Personally Reviewed:  [ ] YES  [ ] NO    Consultant(s) Notes Reviewed:  [ ] YES  [ ] NO    Care Discussed with Consultants/Other Providers [x ] YES  [ ] NO

## 2019-12-15 NOTE — PROGRESS NOTE ADULT - SUBJECTIVE AND OBJECTIVE BOX
74 year old male with a history of COPD, DVT, HLD, HTN, CAD with stent, AVR, dementia presents with cough and AMS. Mental status is improved. c/o worsening of cough with brownish expectoration since yesterday. +fever since yesterday.  Patient has not been compliant with CPAP and paramedic reports that patient was sleeping flat.   He was 93% on RA and sluggish to respond to questions or follow commands.  He received a flu shot this year. Patient was admitted here 11/3- for the same symptoms and was diagnosed with CAP, COPD exacerbation, MILO, and thrombocytopenia. patient has persistently trending up s lactate level. clinically does not look septic, received 2 l and getting 3rd l of ivf. waiting for cT chest/abd/pelvis.     19  cardiology called to evaluate patient. Denies chest pain, dyspnea, palpitations or other cardiac symptoms.Vital signs stable.lactate levels are persistently trending upward for unclear reason.  12/15/19 no complaints this AM, denies dyspnea, chest pain.    MEDICATIONS:    MEDICATIONS  (STANDING):  albuterol/ipratropium for Nebulization 3 milliLiter(s) Nebulizer every 6 hours  aspirin enteric coated 81 milliGRAM(s) Oral daily  atorvastatin 10 milliGRAM(s) Oral at bedtime  budesonide 160 MICROgram(s)/formoterol 4.5 MICROgram(s) Inhaler 2 Puff(s) Inhalation two times a day  cyanocobalamin 1000 MICROGram(s) Oral daily  folic acid 1 milliGRAM(s) Oral daily  heparin  Injectable 5000 Unit(s) SubCutaneous every 8 hours  imipramine 25 milliGRAM(s) Oral daily  pantoprazole    Tablet 40 milliGRAM(s) Oral before breakfast  piperacillin/tazobactam IVPB.. 3.375 Gram(s) IV Intermittent every 8 hours  predniSONE   Tablet 20 milliGRAM(s) Oral daily  sodium bicarbonate  Infusion 0.113 mEq/kG/Hr (75 mL/Hr) IV Continuous <Continuous>  tiotropium 18 MICROgram(s) Capsule 1 Capsule(s) Inhalation daily  vancomycin  IVPB 1000 milliGRAM(s) IV Intermittent every 12 hours    Vital Signs Last 24 Hrs  T(C): 37.1 (15 Dec 2019 04:07), Max: 37.2 (14 Dec 2019 11:45)  T(F): 98.8 (15 Dec 2019 04:07), Max: 99 (14 Dec 2019 19:45)  HR: 98 (15 Dec 2019 06:00) (88 - 120)  BP: 140/73 (15 Dec 2019 06:00) (112/66 - 152/86)  BP(mean): 94 (15 Dec 2019 06:00) (76 - 101)  RR: 21 (15 Dec 2019 06:00) (17 - 26)  SpO2: 97% (15 Dec 2019 06:00) (96% - 100%)Daily     Daily Weight in k.9 (15 Dec 2019 04:07)I&O's Summary    14 Dec 2019 07:01  -  15 Dec 2019 07:00  --------------------------------------------------------  IN: 1325 mL / OUT: 700 mL / NET: 625 mL        PHYSICAL EXAM:    Constitutional: NAD, awake and alert, well-developed  HEENT: PERR, EOMI,  No oral cyananosis.  Neck:  supple,  No JVD  Respiratory: Breath sounds are clear bilaterally, No wheezing, rales or rhonchi  Cardiovascular: S1 and S2, regular rate and rhythm, no Murmurs, gallops or rubs  Gastrointestinal: Bowel Sounds present, soft, nontender.   Extremities: No peripheral edema. No clubbing or cyanosis.  Vascular: 2+ peripheral pulses  Neurological: no focal deficits  Musculoskeletal: no calf tenderness.  Skin: No rashes.      LABS: All Labs Reviewed:                        9.6    4.97  )-----------( 70       ( 15 Dec 2019 06:38 )             30.9                         11.0   9.53  )-----------( 95       ( 14 Dec 2019 16:23 )             36.7                         11.0   5.42  )-----------( 90       ( 14 Dec 2019 06:12 )             35.6     15 Dec 2019 06:38    140    |  105    |  10     ----------------------------<  154    3.7     |  26     |  1.13   14 Dec 2019 16:23    138    |  103    |  12     ----------------------------<  224    3.7     |  19     |  1.73   14 Dec 2019 06:12    136    |  100    |  13     ----------------------------<  162    3.4     |  22     |  1.45     Ca    8.3        15 Dec 2019 06:38  Ca    8.4        14 Dec 2019 16:23  Ca    8.5        14 Dec 2019 06:12  Phos  2.9       15 Dec 2019 06:38  Mg     2.2       15 Dec 2019 06:38  Mg     1.8       14 Dec 2019 16:23    TPro  8.3    /  Alb  3.2    /  TBili  1.0    /  DBili  x      /  AST  17     /  ALT  21     /  AlkPhos  100    14 Dec 2019 16:23  TPro  8.3    /  Alb  3.3    /  TBili  1.6    /  DBili  x      /  AST  25     /  ALT  21     /  AlkPhos  111    14 Dec 2019 06:12    PT/INR - ( 14 Dec 2019 10:06 )   PT: 16.3 sec;   INR: 1.48 ratio         PTT - ( 14 Dec 2019 10:06 )  PTT:37.6 sec  CARDIAC MARKERS ( 14 Dec 2019 16:23 )  .041 ng/mL / x     / x     / x     / x          Blood Culture:   12-14 @ 06:12  Pro Bnp 841    RADIOLOGY/EKG:  paced rhythm.    tele -paced rhythm.    CXR  FINDINGS/  IMPRESSION:  Sternotomy wires and left cardiac device again noted.    Increased interstitial lung markings lung bases which could be related to   atelectasis. No pleural effusion.    Heart size within normal limits.    Mildly prominent right hilum likely related to mildly prominent pulmonary   artery      SIM VELA M.D., ATTENDING RADIOLOGIST  This document has been electronically signed. Dec 14 2019  7:22AM

## 2019-12-15 NOTE — PROGRESS NOTE ADULT - ASSESSMENT
ASSESSMENT:    74M COPD, prior DVT, HLD, HTN, aortic valve replacement w/ SICD/PPM placement, Daniel on home CPAP, admitted with worsening lactate and MILO    Events last 24 hours:   lactate continues to improve now down to 2.4  -acidosis improved, bicarb drip off  -U/S RUQ showed 2.5 cm gall stone with no signs of cholecystitis  -LE doppler (-) for DVT    PLAN:    #Cough/Fever  -rapid viral panel (-)  -now afebrile, no white count, lactate has improved  -may consider stopping anbx tomorrow if ID in agreement    #Elevated Lacatate  -now 2.4  -acidosis improved, off Bicarb drip, CTM      #MILO  Scr back to baseline, CTM    #Large Galls tone o CT abd  -hadU/S RUQ --> 2.5 cm stone, no signs of cholesystitis  -will need follow up as outpatient    #Hx of DVT  -B/L LE doppler (-) for DVT    #Lung nodule w/ "signs of mets on CT"  -nodule was seen on CT chest in early november  -will need to follow as outpatient. WIll be followed by pulm this admission    #DANIEL  -will continue home CPAP settings, machine at bedside    #HTN  -home meds do not include anti-hypertensives  SBP's have been 112-148  -iff SBP >160 will treat with IVP meds PRN    #HLD  -continue lipitor    #Prophylaxis   -heparin sub cutaneous  -protonix

## 2019-12-15 NOTE — PROGRESS NOTE ADULT - ASSESSMENT
73 y/o M w/ pmh of COPD, DVT, HLD, HTN, CAD with stent, AVR, CHB s/p PPM, dementia presents with cough and AMS, presents to [facility] on 12/14/19 for for cough and AMS. Pt states his wife told him he wasn't acting right and she dialed 911 and had him brought to the hospital for further treatment and evaluation. Pt was dx w/ with pna and admitted to the floor for further work-up/tx and then escalated up to the SPCU for worsening lactate and critical care was consulted.     -Neuro: mental status stable, no acute deficits at this time, CTH in the ED neg  -Cardiac: HDS at this time, maintain MAP >65, CT neg for PE, cardiology following input noted  -Resp: COPD/OSP cont supplemental o2 maintain o2 >92%, cont duonebs and pulmonary toilet and CPAP  -GI: NPO during CPAP, diet started, CTAP and US reviewed unlikely for acute choley given normal abd exam, stable LFT and bilis, hld cont statin, will monitor for now  -Renal: MILO on CKD w/ LA improving w/ bicarb drip will d/c bicarb drip for now, lyte reviewed, will cont to monitor renal function and lytes and replete w/ renal precautions, avoid further nephrotoxic drugs   -ID: Cont IV vanco/zosyn for now doubt infectious process given normal procalcitonin, improving LA, pt now afebril, MRSA swap pending, rsv/flu swap neg,  RVP neg, f/u on urine/blood cx, ID input noted  -Heme: DVT ppx w/ heparin, CT w/ possible new ca vs mestatic dz case d/w hem/onc who requested to speak w/ IR for possible biposy once family is agreeable for work-up, pt made aware of plan   -Endo: no acute issues  -Dispo: Admitted to the SPCU given worsening lactate acidosis/MILO now improving, CA? case d/w hospitalize and dr. wang
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
## 2019-12-15 NOTE — PROGRESS NOTE ADULT - ASSESSMENT
74 year old male with a history of COPD, DVT, HLD, HTN, CAD with stent, AVR, dementia presents with cough and AMS. Mental status is improved. c/o worsening of cough with brownish expectoration since yesterday. +fever since yesterday.  Patient has not been compliant with CPAP and paramedic reports that patient was sleeping flat.   He was 93% on RA and sluggish to respond to questions or follow commands.  He received a flu shot this year. Patient was admitted here 11/3-11/7 for the same symptoms and was diagnosed with CAP, COPD exacerbation, MILO, and thrombocytopenia. patient has persistently trending up s lactate level. clinically does not look septic, received 2 l and getting 3rd l of ivf. waiting for cT chest/abd/pelvis.     Alterted mental status likly due to metabolic encephalopathy likly due to sepsis- elevated lactate, fever,    Admit to SPCU.  he received zosyn and vanco iV in ED.  iV zosyn.  received 2l and on 3rd l of  ivf.  f/u culture results.  f CT chest/abd/pelvis- intra abdominal lymphadenopathy, can't rule out lymphoma vs metastasis disease.  results discuss with patient, wants us to discuss with spouse..  seen by pulmonary .  mental status improved.  LE US- no DVT.    Abd sono-Approximate 2.5 cm gallstone in the region of the gallbladder neck. patient is asymptomatics.  ECHO- 1. Technically difficult study.   2. Grossly normal left ventricular systolic function.   3. Increased left ventricular wall thickness.   4. Possibility of mild aortic stenosis on limited Doppler.       copd/reji/obesity  bronchodilators/steroids.  cpap  pulmonary following.     HLD, HTN, CAD with stent, AVR  cardiology  is following.    Plan of care was discuss with patient, all questions were answered, seems understand and in agreement.    Critical Time spent -  - spent 35 minutes.  PMD notified.    Elsa junior -9 , case discuss with Eicu, clinically patient does not look very sick, repeat arterial blood lactate, CPK, troponin BNP, ABG, stat ECHO, CT chest/abd/pelvis.. Patient is evaluated by E Icu.  case discuss with cardiology. 74 year old male with a history of COPD, DVT, HLD, HTN, CAD with stent, AVR, dementia presents with cough and AMS. Mental status is improved. c/o worsening of cough with brownish expectoration since yesterday. +fever since yesterday.  Patient has not been compliant with CPAP and paramedic reports that patient was sleeping flat.   He was 93% on RA and sluggish to respond to questions or follow commands.  He received a flu shot this year. Patient was admitted here 11/3-11/7 for the same symptoms and was diagnosed with CAP, COPD exacerbation, MILO, and thrombocytopenia. patient has persistently trending up s lactate level. clinically does not look septic, received 2 l and getting 3rd l of ivf. waiting for cT chest/abd/pelvis.     Alterted mental status likly due to metabolic encephalopathy likly due to sepsis- elevated lactate, fever,    Admit to SPCU.  he received zosyn and vanco iV in ED.  iV zosyn.  received 2l and on 3rd l of  ivf.  f/u culture results.  f CT chest/abd/pelvis- intra abdominal lymphadenopathy, can't rule out lymphoma vs metastasis disease.  results discuss with patient, wants us to discuss with spouse..Message left to her voicemail.  seen by pulmonary .  mental status improved.  LE US- no DVT.    Abd sono-Approximate 2.5 cm gallstone in the region of the gallbladder neck. patient is asymptomatics.  ECHO- 1. Technically difficult study.   2. Grossly normal left ventricular systolic function.   3. Increased left ventricular wall thickness.   4. Possibility of mild aortic stenosis on limited Doppler.       copd/reji/obesity  bronchodilators/steroids.  cpap  pulmonary following.     HLD, HTN, CAD with stent, AVR  cardiology  is following.    Plan of care was discuss with patient, all questions were answered, seems understand and in agreement.    Critical Time spent -  - spent 35 minutes.  PMD notified.    Elsa junior -9 , case discuss with Eicu, clinically patient does not look very sick, repeat arterial blood lactate, CPK, troponin BNP, ABG, stat ECHO, CT chest/abd/pelvis.. Patient is evaluated by E Icu.  case discuss with cardiology.
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
## 2019-12-16 NOTE — DIETITIAN INITIAL EVALUATION ADULT. - RD TO REMAIN AVAILABLE
Mother in room yelling "I am telling you right now, in about 10 minutes they are going to need security to take me the fuck out of here"  Patient is now crying    Dr Gambino Sample made aware     Mason Newman RN  10/10/17 1950 yes

## 2019-12-16 NOTE — PROGRESS NOTE ADULT - ASSESSMENT
75 y/o M w/ pmh of COPD, DVT, HLD, HTN, CAD with stent, AVR, CHB s/p PPM, dementia presents with cough and AMS, presents to [facility] on 12/14/19 for for cough and AMS. Pt states his wife told him he wasn't acting right and she dialed 911 and had him brought to the hospital for further treatment and evaluation. Pt was dx w/ with pna and admitted to the floor for further work-up/tx and then escalated up to the SPCU for worsening lactate and critical care was consulted.     -Neuro: mental status stable, no acute deficits at this time, CTH in the ED neg  -Cardiac: HDS at this time, maintain MAP >65, CT neg for PE, cardiology following input noted  -Resp: COPD/OSP cont supplemental o2 maintain o2 >92%, cont duonebs and pulmonary toilet and CPAP  -GI: NPO during CPAP, diet started, CTAP and US reviewed unlikely for acute choley given normal abd exam, stable LFT and bilis, hld cont statin, will monitor for now  -Renal: MILO on CKD imrpvoing now off bicarb drip, lyte reviewed, will cont to monitor renal function and lytes and replete w/ renal precautions, avoid further nephrotoxic drugs   -ID: Will d/c IV vanco/zosyn for now given pt is afebril doubt infectious process given normal procalcitonin, improving LA,  MRSA swap pending, rsv/flu swap neg,  RVP neg, urine and blood cx neg so far, will d/w ID if they agree  -Heme: DVT ppx w/ heparin for now will hold starting tomorrow for biopsy by IR on 12/18/19 give CT finding w/ possible new ca vs mestatic dz, hem/onc aware of case, IR aware and planned for 12/18/19  -Endo: no acute issues  -Dispo: Admitted to the SPCU given worsening lactate acidosis/MILO now improving, CA? case d/w hospitalize and dr. collins input noted
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
## 2019-12-16 NOTE — PROGRESS NOTE ADULT - SUBJECTIVE AND OBJECTIVE BOX
Neurology Follow up note    BOGDANTAYLOR 74y Male    HPI: 74 year old male with a history of COPD, DVT, HLD, HTN, CAD with stent, AVR, dementia presents with cough and AMS. Mental status is improved. c/o worsening of cough with brownish expectoration since yesterday. +fever since yesterday.  Patient has not been compliant with CPAP and paramedic reports that patient was sleeping flat.   He was 93% on RA and sluggish to respond to questions or follow commands.  He received a flu shot this year. Patient was admitted here 11/3-11/7 for the same symptoms and was diagnosed with CAP, COPD exacerbation, MILO, and thrombocytopenia. patient has persistently trending up s lactate level.   No lateralized weakness.  No facial asymmetry.  No c/o difficulty speaking or swallowing.  Interval History -    Patient is seen, chart was reviewed and case was discussed with the treatment team.  Pt is not in any distress.   Sitting on chair comfortably.   Wife is at bedside.    Vital Signs Last 24 Hrs  T(C): 37.2 (16 Dec 2019 08:45), Max: 38.4 (15 Dec 2019 20:16)  T(F): 99 (16 Dec 2019 08:45), Max: 101.1 (15 Dec 2019 20:16)  HR: 115 (16 Dec 2019 12:00) (76 - 120)  BP: 117/52 (16 Dec 2019 12:00) (117/52 - 165/106)  BP(mean): 73 (16 Dec 2019 12:00) (73 - 124)  RR: 26 (16 Dec 2019 12:00) (15 - 26)  SpO2: 99% (16 Dec 2019 12:00) (90% - 99%)    MEDICATIONS    acetaminophen   Tablet .. 650 milliGRAM(s) Oral every 6 hours PRN  albuterol/ipratropium for Nebulization 3 milliLiter(s) Nebulizer every 6 hours  aspirin enteric coated 81 milliGRAM(s) Oral daily  atorvastatin 10 milliGRAM(s) Oral at bedtime  budesonide 160 MICROgram(s)/formoterol 4.5 MICROgram(s) Inhaler 2 Puff(s) Inhalation two times a day  cyanocobalamin 1000 MICROGram(s) Oral daily  folic acid 1 milliGRAM(s) Oral daily  guaiFENesin   Syrup  (Sugar-Free) 100 milliGRAM(s) Oral every 6 hours PRN  heparin  Injectable 5000 Unit(s) SubCutaneous every 8 hours  imipramine 25 milliGRAM(s) Oral daily  pantoprazole    Tablet 40 milliGRAM(s) Oral before breakfast  piperacillin/tazobactam IVPB.. 3.375 Gram(s) IV Intermittent every 8 hours  predniSONE   Tablet 20 milliGRAM(s) Oral daily  tiotropium 18 MICROgram(s) Capsule 1 Capsule(s) Inhalation daily  vancomycin  IVPB 1000 milliGRAM(s) IV Intermittent every 12 hours    Allergies    No Known Allergies    GENERAL: NAD, well-groomed, well-developed  HEAD:  Atraumatic, Normocephalic  EYES: EOMI, PERRLA, conjunctiva and sclera clear  NECK: Supple, No JVD, thyroid non-palpable    On Neurological Examination:    Mental Status - Pt is alert, awake. Poor cognition. Follows commands well and able to answer questions appropriately.    Speech -  Normal. Pt has no aphasia.    Cranial Nerves - Pupils 3 mm equal and reactive to light, extraocular eye movements intact. Pt has no visual field deficit. Pt has no facial asymmetry. Tongue - is in midline.    Motor Exam - 4 plus/5 all over, No drift. No shaking or tremors. Muscle tone - is normal all over. Moves all extremities equally. No asymmetry is seen.      Sensory Exam - Pt withdraws all extremities equally on stimulation. No asymmetry seen. No complaints of tingling, numbness.    Gait - Pt is able to stand up with holding my hands and is able to walk for few feet around the bed. Not falling to either side.    Deep tendon Reflexes - 2 plus all over.    Coordination - Fine finger movements are normal on both sides. Finger to nose is also normal on both sides.       Neck Supple -  Yes.    LABS:    CBC Full  -  ( 16 Dec 2019 06:34 )  WBC Count : 3.12 K/uL  RBC Count : 4.83 M/uL  Hemoglobin : 10.1 g/dL  Hematocrit : 32.9 %  Platelet Count - Automated : 70 K/uL  Mean Cell Volume : 68.1 fl  Mean Cell Hemoglobin : 20.9 pg  Mean Cell Hemoglobin Concentration : 30.7 gm/dL  Auto Neutrophil # : 2.06 K/uL  Auto Lymphocyte # : 0.38 K/uL  Auto Monocyte # : 0.63 K/uL  Auto Eosinophil # : 0.03 K/uL  Auto Basophil # : 0.01 K/uL  Auto Neutrophil % : 66.0 %  Auto Lymphocyte % : 12.2 %  Auto Monocyte % : 20.2 %  Auto Eosinophil % : 1.0 %  Auto Basophil % : 0.3 %    12-16    142  |  104  |  10  ----------------------------<  115<H>  3.7   |  29  |  1.26    Ca    8.4      16 Dec 2019 06:34  Phos  3.5     12-16  Mg     1.9     12-16    TPro  7.4  /  Alb  2.8<L>  /  TBili  0.9  /  DBili  x   /  AST  23  /  ALT  21  /  AlkPhos  87  12-16    RADIOLOGY & ADDITIONAL STUDIES:    < from: CT Head No Cont (12.14.19 @ 08:07) >  IMPRESSION:  No acute intracranial hemorrhage oracute territorial infarct.  If   symptoms persist, follow-up MRI exam recommended.      < end of copied text >

## 2019-12-16 NOTE — PROGRESS NOTE ADULT - SUBJECTIVE AND OBJECTIVE BOX
Subjective: Patient eating breakfast.  Doing well and no overnight events. Woke up this AM with hemoptysis. Discussed results of scans.  Wants us to coordinate with wife.      MEDICATIONS  (STANDING):  albuterol/ipratropium for Nebulization 3 milliLiter(s) Nebulizer every 6 hours  aspirin enteric coated 81 milliGRAM(s) Oral daily  atorvastatin 10 milliGRAM(s) Oral at bedtime  budesonide 160 MICROgram(s)/formoterol 4.5 MICROgram(s) Inhaler 2 Puff(s) Inhalation two times a day  cyanocobalamin 1000 MICROGram(s) Oral daily  folic acid 1 milliGRAM(s) Oral daily  heparin  Injectable 5000 Unit(s) SubCutaneous every 8 hours  imipramine 25 milliGRAM(s) Oral daily  magnesium sulfate  IVPB 2 Gram(s) IV Intermittent once  pantoprazole    Tablet 40 milliGRAM(s) Oral before breakfast  piperacillin/tazobactam IVPB.. 3.375 Gram(s) IV Intermittent every 8 hours  predniSONE   Tablet 20 milliGRAM(s) Oral daily  tiotropium 18 MICROgram(s) Capsule 1 Capsule(s) Inhalation daily  vancomycin  IVPB 1000 milliGRAM(s) IV Intermittent every 12 hours    MEDICATIONS  (PRN):  acetaminophen   Tablet .. 650 milliGRAM(s) Oral every 6 hours PRN Temp greater or equal to 38C (100.4F)      Allergies    No Known Allergies    Intolerances        Vital Signs Last 24 Hrs  T(C): 37.1 (16 Dec 2019 04:07), Max: 38.4 (15 Dec 2019 20:16)  T(F): 98.8 (16 Dec 2019 04:07), Max: 101.1 (15 Dec 2019 20:16)  HR: 118 (16 Dec 2019 08:00) (76 - 120)  BP: 165/106 (16 Dec 2019 08:00) (137/76 - 165/106)  BP(mean): 124 (16 Dec 2019 08:00) (92 - 124)  RR: 22 (16 Dec 2019 08:00) (15 - 26)  SpO2: 94% (16 Dec 2019 08:00) (90% - 99%)    PHYSICAL EXAM:  GENERAL: NAD, well-groomed, well-developed  HEAD:  Atraumatic, Normocephalic  ENMT: Moist mucous membranes,   NECK: Supple, No JVD, Normal thyroid  NERVOUS SYSTEM:  All 4 extremities mobile, no gross sensory deficits.   CHEST/LUNG: Clear to auscultation bilaterally; No rales, rhonchi, wheezing, or rubs  HEART: Regular rate and rhythm; No murmurs, rubs, or gallops  ABDOMEN: Soft, Nontender, Nondistended; Bowel sounds present  EXTREMITIES:  2+ Peripheral Pulses, No clubbing, cyanosis, or edema      LABS:                        10.1   3.12  )-----------( 70       ( 16 Dec 2019 06:34 )             32.9     16 Dec 2019 06:34    142    |  104    |  10     ----------------------------<  115    3.7     |  29     |  1.26     Ca    8.4        16 Dec 2019 06:34  Phos  3.5       16 Dec 2019 06:34  Mg     1.9       16 Dec 2019 06:34    TPro  7.4    /  Alb  2.8    /  TBili  0.9    /  DBili  x      /  AST  23     /  ALT  21     /  AlkPhos  87     16 Dec 2019 06:34    PT/INR - ( 14 Dec 2019 10:06 )   PT: 16.3 sec;   INR: 1.48 ratio         PTT - ( 14 Dec 2019 10:06 )  PTT:37.6 sec    CAPILLARY BLOOD GLUCOSE      POCT Blood Glucose.: 143 mg/dL (15 Dec 2019 16:25)  POCT Blood Glucose.: 152 mg/dL (15 Dec 2019 11:36)      RADIOLOGY & ADDITIONAL TESTS:    Imaging Personally Reviewed:  [ ] YES     Consultant(s) Notes Reviewed:      Care Discussed with Consultants/Other Providers:    Advanced Directives: [ ] DNR  [ ] No feeding tube  [ ] MOLST in chart  [ ] MOLST completed today  [ ] Unknown
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
## 2019-12-16 NOTE — PROGRESS NOTE ADULT - ASSESSMENT
74M with PMH of copd, depression, dvt, hld, htn, s/p bioprosthetic AVR, cad s/p stent, CHB s/p PPM, dementia, who recently presented with AMS, admitted to [facility] with CAP, COPD exacerbation, MILO, thrombocytopenia. Pt now admitted to [facility]U with AMS, pna and worsening lactic acidosis 2>9.      -AMS resolved. Episode of agitation and confusion overnight. Possibly due to withdrawal. Monitor  -Monitor HD's to keep MAP > 65. No CT evidence of PE. Cardiology following, recommendations appreciated  -On steroids/nebs for copd/reji. cpap qhs. Keep O2 saturation > 92%  -NPO while on cpap. PO diet when off cpap. CT abd and US unlikely for acute cholecystitis. LFT's and bili's stable. Continue to monitor  -Improving MILO on CKD. Monitor UOP/Lytes. Avoid nephrotoxic agents  -Abx dc'd due to negative infectious work up. PT with episode of fever overnight. Will repeat sepsis work up if persists. Monitor wbc/temp  -Off sq heparin for IR guided bx due to CT findings of possible new ca vs mets. Heme/Onc following.

## 2019-12-16 NOTE — PROGRESS NOTE ADULT - ASSESSMENT
74M CAD (s/p PCI), AVR, COPD, DVT and HTN admitted for Fever, Confusion and Lactic Acidosis     Fever / Cough   All cultures are negative and now on IV Zosyn + Vancomycin; Procalcitonin negative and Lactic Acid improved  Afebrile and WBC normal;  I will stop IV Antibiotics as I think the Fever along with other findings are concerning for malignancy (B Symptoms)  Enlarged Lymph nodes in chest, abdomen and pelvis; Hemoptysis could be related dryness from CPAP or malignancy; Bronch?  Needs workup for Lymphoma with Interventional Radiology which we will arrange   ID (Dr. Majano); Pulm/CC (Dr. Mcdermott); Consult Hematology     Acute Renal Failure  Resolving with IVF and oral intake  Monitor BMP and electrolytes     CAD / AVR  / HLD /HTN  Echocardiogram showing normal EF and mild AS  Aspirin + Atorvastatin   Holding Anti-hypertensives  Cardiology following (Dr. Arango)    Diet  Regular    DVT Prophylaxis  Heparin SC    Disposition  Full Code/Inpatient  Discharge planning pending hospital course

## 2019-12-16 NOTE — PROGRESS NOTE ADULT - SUBJECTIVE AND OBJECTIVE BOX
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 obscured by overlying bowel gas. No aneurysmal dilatation of   the visualized portion of the upper abdominal aorta. Right kidney   measures approximately 10.9 cm long and is without hydronephrosis.    Impression:    Fatty infiltration of the liver.    Approximate 2.5 cm gallstone in the region of the gallbladder neck.   Gallbladder is not dilated. No significant gallbladder wall thickening,   pericholecystic fluid or sonographic Manning sign elicited to suggest   acute cholecystitis.    If there is clinical concern for cholecystitis, correlation with DISIDA   scan can be performed.      Assessment :  74 year old male with a history of COPD, DVT, HLD, HTN, CAD with stent, AVR, dementia presents with cough and AMS. Mental status is improved. Noted to have high lactate. Pt is awake alert and nontoxic, Denies CP SOB n/v/d Urinary symptoms or abdominal pain. Febrile to 103 but and hemodynamically stable. CXR with possible pneumonia though cannot explain high lactate of 9. Does not look septic. ?bacteremic though all cultures ngtd. ?GI source though pt has no GI symptoms. MILO. CT CAP noted. Doubt pneumonia. Thrombocytopenia. Procalcitonin 0.10 unimpressive. Has large gallstone but LFTs wnl and pt has no GI symptoms Still febrile. ?lymphoma ?malignancy    Plan :   Off antibiotics  Trend temps and cbc  May need lymph node biopsy    D/w ICU team    Continue with present regiment.  Appropriate use of antibiotics and adverse effects reviewed.    I have discussed the above plan of care with patient/ family in detail. They expressed understanding of the the treatment plan . Risks, benefits and alternatives discussed in detail. I have asked if they have any questions or concerns and appropriately addressed them to the best of my ability .      Critical care time greater then 35 minutes reviewing notes, labs data/ imaging , discussion with multidisciplinary team.    Thank you for allowing me to participate in care of your patient .      Whitney Majano MD  Infectious Disease  [phone]

## 2019-12-16 NOTE — PROGRESS NOTE ADULT - SUBJECTIVE AND OBJECTIVE BOX
74 year old male with a history of COPD, DVT, HLD, HTN, CAD with stent, AVR, dementia presents with cough and AMS. Mental status is improved. c/o worsening of cough with brownish expectoration since yesterday. +fever since yesterday.  Patient has not been compliant with CPAP and paramedic reports that patient was sleeping flat.   He was 93% on RA and sluggish to respond to questions or follow commands.  He received a flu shot this year. Patient was admitted here 11/3-11/7 for the same symptoms and was diagnosed with CAP, COPD exacerbation, MILO, and thrombocytopenia. patient has persistently trending up s lactate level. clinically does not look septic, received 2 l and getting 3rd l of ivf. waiting for cT chest/abd/pelvis.     12/14/19  cardiology called to evaluate patient. Denies chest pain, dyspnea, palpitations or other cardiac symptoms.Vital signs stable.lactate levels are persistently trending upward for unclear reason.  12/15/19 no complaints this AM, denies dyspnea, chest pain.  12/16/19 no complaints this AM.      MEDICATIONS:    MEDICATIONS  (STANDING):  albuterol/ipratropium for Nebulization 3 milliLiter(s) Nebulizer every 6 hours  aspirin enteric coated 81 milliGRAM(s) Oral daily  atorvastatin 10 milliGRAM(s) Oral at bedtime  budesonide 160 MICROgram(s)/formoterol 4.5 MICROgram(s) Inhaler 2 Puff(s) Inhalation two times a day  cyanocobalamin 1000 MICROGram(s) Oral daily  folic acid 1 milliGRAM(s) Oral daily  heparin  Injectable 5000 Unit(s) SubCutaneous every 8 hours  imipramine 25 milliGRAM(s) Oral daily  magnesium sulfate  IVPB 2 Gram(s) IV Intermittent once  pantoprazole    Tablet 40 milliGRAM(s) Oral before breakfast  piperacillin/tazobactam IVPB.. 3.375 Gram(s) IV Intermittent every 8 hours  predniSONE   Tablet 20 milliGRAM(s) Oral daily  tiotropium 18 MICROgram(s) Capsule 1 Capsule(s) Inhalation daily  vancomycin  IVPB 1000 milliGRAM(s) IV Intermittent every 12 hours    MEDICATIONS  (PRN):  acetaminophen   Tablet .. 650 milliGRAM(s) Oral every 6 hours PRN Temp greater or equal to 38C (100.4F)      Vital Signs Last 24 Hrs  T(C): 37.1 (16 Dec 2019 04:07), Max: 38.4 (15 Dec 2019 20:16)  T(F): 98.8 (16 Dec 2019 04:07), Max: 101.1 (15 Dec 2019 20:16)  HR: 89 (16 Dec 2019 07:55) (76 - 120)  BP: 147/91 (16 Dec 2019 06:00) (137/76 - 163/98)  BP(mean): 107 (16 Dec 2019 06:00) (92 - 112)  RR: 19 (16 Dec 2019 06:00) (15 - 26)  SpO2: 98% (16 Dec 2019 07:55) (90% - 99%)Daily     Daily I&O's Summary    15 Dec 2019 07:01  -  16 Dec 2019 07:00  --------------------------------------------------------  IN: 1340 mL / OUT: 2700 mL / NET: -1360 mL        PHYSICAL EXAM:    Constitutional: NAD, awake and alert, well-developed  Neck:  supple,  No JVD  Respiratory: Breath sounds are clear bilaterally, No wheezing, rales or rhonchi  Cardiovascular: S1 and S2, regular rate and rhythm, no Murmurs, gallops or rubs  Gastrointestinal: Bowel Sounds present, soft, nontender.   Extremities: No peripheral edema. No clubbing or cyanosis.  Vascular: 2+ peripheral pulses  Neurological: A/O x 3, no focal deficits  Musculoskeletal: no calf tenderness.  Skin: No rashes.      LABS: All Labs Reviewed:                        10.1   3.12  )-----------( 70       ( 16 Dec 2019 06:34 )             32.9                         9.6    4.97  )-----------( 70       ( 15 Dec 2019 06:38 )             30.9                         11.0   9.53  )-----------( 95       ( 14 Dec 2019 16:23 )             36.7     16 Dec 2019 06:34    142    |  104    |  10     ----------------------------<  115    3.7     |  29     |  1.26   15 Dec 2019 06:38    140    |  105    |  10     ----------------------------<  154    3.7     |  26     |  1.13   14 Dec 2019 16:23    138    |  103    |  12     ----------------------------<  224    3.7     |  19     |  1.73     Ca    8.4        16 Dec 2019 06:34  Ca    8.3        15 Dec 2019 06:38  Ca    8.4        14 Dec 2019 16:23  Phos  3.5       16 Dec 2019 06:34  Phos  2.9       15 Dec 2019 06:38  Mg     1.9       16 Dec 2019 06:34  Mg     2.2       15 Dec 2019 06:38  Mg     1.8       14 Dec 2019 16:23    TPro  7.4    /  Alb  2.8    /  TBili  0.9    /  DBili  x      /  AST  23     /  ALT  21     /  AlkPhos  87     16 Dec 2019 06:34  TPro  8.3    /  Alb  3.2    /  TBili  1.0    /  DBili  x      /  AST  17     /  ALT  21     /  AlkPhos  100    14 Dec 2019 16:23  TPro  8.3    /  Alb  3.3    /  TBili  1.6    /  DBili  x      /  AST  25     /  ALT  21     /  AlkPhos  111    14 Dec 2019 06:12    PT/INR - ( 14 Dec 2019 10:06 )   PT: 16.3 sec;   INR: 1.48 ratio         PTT - ( 14 Dec 2019 10:06 )  PTT:37.6 sec  CARDIAC MARKERS ( 15 Dec 2019 18:06 )  .165 ng/mL / x     / x     / x     / x      CARDIAC MARKERS ( 15 Dec 2019 11:56 )  .145 ng/mL / x     / x     / x     / x      CARDIAC MARKERS ( 15 Dec 2019 07:46 )  .113 ng/mL / x     / x     / x     / 2.4 ng/mL  CARDIAC MARKERS ( 14 Dec 2019 16:23 )  .041 ng/mL / x     / x     / x     / x          Blood Culture: Organism --  Gram Stain Blood -- Gram Stain --  Specimen Source .Urine Clean Catch (Midstream)  Culture-Blood --    Organism --  Gram Stain Blood -- Gram Stain --  Specimen Source .Blood Blood-Peripheral  Culture-Blood --      12-15 @ 07:46  Pro Bnp [id number]  12-14 @ 06:12  Pro Bnp [id number]    < from: US Transthoracic Echocardiogram w/Doppler Complete (12.14.19 @ 19:29) >  CONCLUSIONS:  1. Technically difficult study.  2. Grossly normal left ventricular systolic function.  3. Increased left ventricular wall thickness.  4. Possibility of mild aortic stenosis on limited Doppler.    JUAN CARLOS THOMPSON   This document has been electronically signed. Dec 15 2019 11:17AM    < end of copied text >

## 2019-12-16 NOTE — PROGRESS NOTE ADULT - SUBJECTIVE AND OBJECTIVE BOX
HPI:  73 y/o M w/ pmh of COPD, DVT, HLD, HTN, CAD with stent, AVR, CHB s/p PPM, dementia presents with cough and AMS, presents to [facility] on 12/14/19 for for cough and AMS. Pt states his wife told him he wasn't acting right and she dialed 911 and had him brought to the hospital for further treatment and evaluation. Pt was dx w/ with pna and admitted to the floor for further work-up/tx and then escalated up to the SPCU for worsening lactate ( 2--->5--->7--->9 ) and critical care was consulted.     24 hour events: Pt given IVF w/ overall improvement in LA last 2.4, now off bicarb drip. Abd US w/ no acute finding for choley, duplex neg for dvt. No major overnight events. Pt currently comfortable in bed in NAD.      Review of Systems:  Constitutional: No fever, chills, fatigue  Neuro: No headache, numbness, weakness  Resp: No cough, wheezing, shortness of breath  CVS: No chest pain, palpitations, leg swelling  GI: No abdominal pain, nausea, vomiting, diarrhea   : No dysuria, frequency, incontinence  Skin: No itching, burning, rashes, or lesions   Msk: No joint pain or swelling  Psych: No depression, anxiety, mood swings    T(F): 99 (12-16-19 @ 08:45), Max: 101.1 (12-15-19 @ 20:16)  HR: 115 (12-16-19 @ 12:00) (76 - 120)  BP: 117/52 (12-16-19 @ 12:00) (117/52 - 165/106)  RR: 26 (12-16-19 @ 12:00) (15 - 26)  SpO2: 99% (12-16-19 @ 12:00) (90% - 99%)  Wt(kg): --      12-15-19 @ 07:01  -  12-16-19 @ 07:00  --------------------------------------------------------  IN: 1340 mL / OUT: 2700 mL / NET: -1360 mL    12-16-19 @ 07:01  -  12-16-19 @ 12:19  --------------------------------------------------------  IN: 50 mL / OUT: 300 mL / NET: -250 mL        CAPILLARY BLOOD GLUCOSE      POCT Blood Glucose.: 143 mg/dL (15 Dec 2019 16:25)      I&O's Summary    15 Dec 2019 07:01  -  16 Dec 2019 07:00  --------------------------------------------------------  IN: 1340 mL / OUT: 2700 mL / NET: -1360 mL    16 Dec 2019 07:01  -  16 Dec 2019 12:19  --------------------------------------------------------  IN: 50 mL / OUT: 300 mL / NET: -250 mL        Physical Exam:   Gen: Comfortable in bed in NAD, speaking in full and clear sentences  Neuro: AAOx3  Resp: Good air entry b/l  CVS: +RRR  Abd: BSx4, soft, nt/nd  Ext: no edema  Skin: warm/dry      Meds:  piperacillin/tazobactam IVPB.. IV Intermittent  vancomycin  IVPB IV Intermittent      atorvastatin Oral  predniSONE   Tablet Oral    albuterol/ipratropium for Nebulization Nebulizer  budesonide 160 MICROgram(s)/formoterol 4.5 MICROgram(s) Inhaler Inhalation  tiotropium 18 MICROgram(s) Capsule Inhalation    acetaminophen   Tablet .. Oral PRN  imipramine Oral      aspirin enteric coated Oral  heparin  Injectable SubCutaneous    pantoprazole    Tablet Oral      cyanocobalamin Oral  folic acid Oral                                  10.1   3.12  )-----------( 70       ( 16 Dec 2019 06:34 )             32.9       12-16    142  |  104  |  10  ----------------------------<  115<H>  3.7   |  29  |  1.26    Ca    8.4      16 Dec 2019 06:34  Phos  3.5     12-16  Mg     1.9     12-16    TPro  7.4  /  Alb  2.8<L>  /  TBili  0.9  /  DBili  x   /  AST  23  /  ALT  21  /  AlkPhos  87  12-16      CARDIAC MARKERS ( 15 Dec 2019 18:06 )  .165 ng/mL / x     / x     / x     / x      CARDIAC MARKERS ( 15 Dec 2019 11:56 )  .145 ng/mL / x     / x     / x     / x      CARDIAC MARKERS ( 15 Dec 2019 07:46 )  .113 ng/mL / x     / x     / x     / 2.4 ng/mL  CARDIAC MARKERS ( 14 Dec 2019 16:23 )  .041 ng/mL / x     / x     / x     / x              .Urine Clean Catch (Midstream)   <10,000 CFU/mL Normal Urogenital Melva -- 12-14 @ 13:40  .Blood Blood-Peripheral   No growth to date. -- 12-14 @ 13:38              Radiology:     < from: US Abdomen Limited (12.15.19 @ 08:11) >  PROCEDURE DATE:  12/15/2019          INTERPRETATION:  History: Gallstone seen on CT .    Findings: Right upper quadrant sonogram was performed using a 5 MHz   curved transducer.    Correlation made with recent noncontrast CT 12/14/2019.    The liver is increased in echogenicity compatible with diffuse fatty   infiltration. Images of the liver otherwise grossly unremarkable.     There is a gallstone measuring up to approximately 2.5 cm in the region   of the gallbladder neck with an adjacent smaller gallstone. The   fluid-filled gallbladder is nondilated. No significant gallbladder wall   thickening or pericholecystic fluid. The gallbladder wall measurement of   4.4 mm is felt to be obtained with obliquity. No sonographic Manning's   sign elicited. No biliary ductal dilatation. The pancreas was poorly   visualized, obscured by overlying bowel gas. No aneurysmal dilatation of   the visualized portion of the upper abdominal aorta. Right kidney   measures approximately 10.9 cm long and is without hydronephrosis.    Impression:    Fatty infiltration of the liver.    Approximate 2.5 cm gallstone in the region of the gallbladder neck.   Gallbladder is not dilated. No significant gallbladder wall thickening,   pericholecystic fluid or sonographic Manning sign elicited to suggest   acute cholecystitis.    If there is clinical concern for cholecystitis, correlation with DISIDA   scan can be performed.      JOSÉ MIGUEL CHU M.D. ATTENDING RADIOLOGIST  This document has been electronically signed. Dec 15 2019  8:41AM    < end of copied text >    EXAM:  CT ABDOMEN AND PELVIS OC                          EXAM:  CT CHEST OC                                  PROCEDURE DATE:  12/14/2019          INTERPRETATION:  PROCEDURE INFORMATION:   Exam: CT Chest Without Contrast   Exam date and time: 12/14/2019 6:22 PM   Age: 74 years old   Clinical history: Other: Cough. Sepsis. Elevated lactate. Dyspnea.    TECHNIQUE:   Imaging protocol: Computed tomography of the chest without contrast.   3D rendering: MIP reconstructed images were created and reviewed.   Radiation optimization: All CT scans at this facility use at least one of   these   dose optimization techniques: automated exposure control; mA and/or kV   adjustment per patient size (includes targeted exams where dose is   matched to   clinical indication); or iterative reconstruction.   Other contrast: Route: Oral, Material: OMNIPAQUE 240, Volume: 32CC;     COMPARISON:   CT scan of chest dated 11/3/2019    FINDINGS:   Lungs: Mild stable patchy areas of groundglass changes are seen in   bilateral lower lobes more prominent on the left.   Enlarging 12 x 10 mm nodule in left lung base (3:116). This previously   measured 7 mm. Stable 5 mm nodule in right middle lobe seen in image 189,   a 4 mm right lower lobe nodule (3:106), 6 mm noduleof the left upper   lobe (3:54), a 5 mm nodule of the left upper lobe (3:43), 5 mm nodules of   the right upper lobe (3:54, 48) and a 3 mm right lower lobe nodule   (3:141) when compared to recent prior CT of 11/3/2019. Other subpleural   nodules of the right midlung field also unchanged.  Pleural space: Mild pleural-based scarring in the lungs.   Heart:  No cardiomegaly. No pericardial effusion. Coronary arterial,   aortic and mitral valvular calcification. Status post CABG.  Mediastinum: Surgical sternal wires in place with surgical changes in   mediastinum.   Aorta: Unremarkable. No aortic aneurysm. Moderate atheromatous changes of   the aorta and branch vessels.  Lymph nodes: Unremarkable. No enlarged lymph nodes.   Gallbladder and bile ducts: Large stone in the gallbladder. No   gallbladder wall   thickening.   Bones/joints: Degenerative changes in the spine. No evidence of fracture.   Degenerative changes in the spine. No evidence of fracture.   Soft tissues: Unremarkable.     IMPRESSION:   1. Mild patchy areas of groundglass changes are seen in bilateral lower   lobes stable compared with prior study. No consolidating infiltrate.  2. Enlarging 12 x 10 mm nodule in left lung base. Highly suspicious   nodule(s). Consider   PET/CT, or tissue sampling.(Srinivas et al., Fleischner Society, 2017).   Metastatic disease should be considered.  3. Multiple additional lung nodules are demonstrated which are unchanged   compared with the recent prior chest CT.  For patients at low risk (minimal or absent history of smoking and of   other known risk factors), no routine follow-up is indicated. For   patients at high risk (history of smoking or of other known risk   factors), consider optional CT at 12 months. (Srinivas et al.,   Fleischner Society, 2017)       =========================  PROCEDURE INFORMATION:   Exam: CT Abdomen Without Contrast   Exam date and time: 12/14/2019 6:22 PM   Age: 74 years old   Clinical history: Other: Cough     TECHNIQUE:   Imaging protocol: Computed tomography images of the abdomen and pelvis   without contrast.   Radiation optimization: All CT scans at this facility use at least one of   these   dose optimization techniques: automated exposure control; mA and/or kV   adjustment per patient size (includes targeted exams where dose is   matched to   clinical indication); or iterative reconstruction.   Other contrast: Route: Oral, Material: OMNIPAQUE 240, Volume: 32CC;     COMPARISON:   CT Colonoscopy 9/18/2019.    FINDINGS:   Liver: Calcified hepatic granuloma at the right dome..   Gallbladder and bile ducts: Large stone in the gallbladder. No   gallbladder wall thickening or pericholecystic fluid.   Pancreas: Normal. No ductal dilation.   Spleen: The spleen is mildly enlarged measuring 14.5 cm..   Adrenals: Normal. No mass.   Kidneys and ureters: Normal. No hydronephrosis.   Stomach and bowel: Sigmoid diverticulosis.No areas of wall thickening in   the large bowel. No evidence of large bowel obstruction. No pericolonic   inflammatory changes to suggest acute diverticulitis. No wall thickening   in the   small bowel. No evidence of small bowel obstruction. Stomach is   unremarkable. The appendix is normal.    Intraperitoneal space: Unremarkable. No free air. No significant fluid   collection.     Lymph nodes: Mild to moderate moderate abdominal and pelvic adenopathy   increasing compared with previous examination. Adenopathy is demonstrated   emeli-portal, para-aortic, and along both pelvic sidewalls. For reference :  Distalright external iliac node (3:280) measuring 4.2 x 3.2 cm.   A right distal common iliac node (3:243) measures 2.7 x 2.4 cm.    Vasculature: Moderate atheromatous disease of the abdominal aorta, iliac   arteries and branch vessels. No sophie aneurysm. Mild ectasia of the   distal abdominal aorta which maximally measures 2.7 cm unchanged.  Bones/joints: Degenerative changes in the spine. No evidence of fracture.   Soft tissues: Unremarkable.     IMPRESSION:   Mild to moderate adenopathy of the abdomen and pelvis. Mild splenomegaly.  Differential diagnosis includes lymphoma and metastatic disease.  Cholelithiasis. Diverticulosis.          LUCILA BARTON M.D., ATTENDING RADIOLOGIST  This document has been electronically signed. Dec 15 2019  8:46AM          CENTRAL LINE: N  JOHNSON: N  A-LINE: N    GLOBAL ISSUE/BEST PRACTICE:    Analgesia: N  Sedation: N  CAM-ICU: n/a  HOB elevation: Y  Stress ulcer prophylaxis: Y  VTE prophylaxis: Y  Glycemic control: Y  Nutrition: Y    CODE STATUS: FULL CODE

## 2019-12-16 NOTE — DIETITIAN INITIAL EVALUATION ADULT. - OTHER INFO
73 yo male admitted with cough, fever and AMS.  Pt also noted to have enlarged Lymph nodes in chest, abdomen and pelvis and is pending biopsy.  Pt currently on low na diet, tolerating well per pt and wife; denies chewing or swallowing difficulty.  Reinforced low sodium/heart healthy diet, wife states she does the meal preparation/cooking at home and does not add salt to food, also reports they very seldom eat out at restaurants.  Wife reports pt usually has excellent appetite, states CBW ~270-275#, lost weight since last hospitalization in November but is likely multifactorial (fluid status changes, portion control/"dieting" per wife).  Denies GI distress, s/p bm.  Skin: stage I to sacrum, coccyx fissure.

## 2019-12-17 NOTE — PROGRESS NOTE ADULT - SUBJECTIVE AND OBJECTIVE BOX
Neurology Follow up note    BOGDANTAYLOR 74y Male    HPI: 74 year old male with a history of COPD, DVT, HLD, HTN, CAD with stent, AVR, dementia presents with cough and AMS. Mental status is improved. c/o worsening of cough with brownish expectoration since yesterday. +fever since yesterday.  Patient has not been compliant with CPAP and paramedic reports that patient was sleeping flat.   He was 93% on RA and sluggish to respond to questions or follow commands.  He received a flu shot this year. Patient was admitted here 11/3-11/7 for the same symptoms and was diagnosed with CAP, COPD exacerbation, MILO, and thrombocytopenia. patient has persistently trending up s lactate level.   No lateralized weakness.  No facial asymmetry.  No c/o difficulty speaking or swallowing.    Interval History -    Patient is seen, chart was reviewed and case was discussed with the treatment team.  Pt is not in any distress.   No events reported overnight.   Sitting on chair comfortably.   Wife is at bedside.    Vital Signs Last 24 Hrs  T(C): 37.2 (17 Dec 2019 08:04), Max: 39.2 (16 Dec 2019 16:36)  T(F): 99 (17 Dec 2019 08:04), Max: 102.5 (16 Dec 2019 16:36)  HR: 116 (17 Dec 2019 13:41) (99 - 120)  BP: 147/92 (17 Dec 2019 10:00) (103/80 - 147/92)  BP(mean): 103 (17 Dec 2019 10:00) (78 - 103)  RR: 23 (17 Dec 2019 12:00) (15 - 38)  SpO2: 99% (17 Dec 2019 13:41) (95% - 100%)    MEDICATIONS    acetaminophen   Tablet .. 650 milliGRAM(s) Oral every 6 hours PRN  albuterol/ipratropium for Nebulization 3 milliLiter(s) Nebulizer every 6 hours  aspirin enteric coated 81 milliGRAM(s) Oral daily  atorvastatin 10 milliGRAM(s) Oral at bedtime  budesonide 160 MICROgram(s)/formoterol 4.5 MICROgram(s) Inhaler 2 Puff(s) Inhalation two times a day  cyanocobalamin 1000 MICROGram(s) Oral daily  folic acid 1 milliGRAM(s) Oral daily  guaiFENesin   Syrup  (Sugar-Free) 100 milliGRAM(s) Oral every 6 hours PRN  imipramine 25 milliGRAM(s) Oral daily  pantoprazole    Tablet 40 milliGRAM(s) Oral before breakfast  predniSONE   Tablet 10 milliGRAM(s) Oral daily  tiotropium 18 MICROgram(s) Capsule 1 Capsule(s) Inhalation daily    Allergies    No Known Allergies      GENERAL: NAD, well-groomed, well-developed  HEAD:  Atraumatic, Normocephalic  EYES: EOMI, PERRLA, conjunctiva and sclera clear  NECK: Supple, No JVD, thyroid non-palpable    On Neurological Examination:    Mental Status - Pt is alert, awake. Poor cognition. Follows commands well and able to answer questions appropriately.    Speech -  Normal. Pt has no aphasia.    Cranial Nerves - Pupils 3 mm equal and reactive to light, extraocular eye movements intact. Pt has no visual field deficit. Pt has no facial asymmetry. Tongue - is in midline.    Motor Exam - 4 plus/5 all over, No drift. No shaking or tremors. Muscle tone - is normal all over. Moves all extremities equally. No asymmetry is seen.      Sensory Exam - Pt withdraws all extremities equally on stimulation. No asymmetry seen. No complaints of tingling, numbness.    Gait - Pt is able to stand up with holding my hands and is able to walk for few feet around the bed. Not falling to either side.    Deep tendon Reflexes - 2 plus all over.    Coordination - Fine finger movements are normal on both sides. Finger to nose is also normal on both sides.       Neck Supple -  Yes.      LABS:    CBC Full  -  ( 17 Dec 2019 08:28 )  WBC Count : 2.09 K/uL  RBC Count : 4.44 M/uL  Hemoglobin : 9.6 g/dL  Hematocrit : 30.5 %  Platelet Count - Automated : 68 K/uL  Mean Cell Volume : 68.7 fl  Mean Cell Hemoglobin : 21.6 pg  Mean Cell Hemoglobin Concentration : 31.5 gm/dL  Auto Neutrophil # : 1.19 K/uL  Auto Lymphocyte # : 0.48 K/uL  Auto Monocyte # : 0.41 K/uL  Auto Eosinophil # : 0.02 K/uL  Auto Basophil # : 0.01 K/uL  Auto Neutrophil % : 56.0 %  Auto Lymphocyte % : 22.5 %  Auto Monocyte % : 19.2 %  Auto Eosinophil % : 0.9 %  Auto Basophil % : 0.5 %    12-17    141  |  104  |  13  ----------------------------<  126<H>  3.7   |  28  |  1.45<H>    Ca    8.2<L>      17 Dec 2019 06:36  Phos  4.2     12-17  Mg     2.1     12-17    TPro  7.2  /  Alb  2.7<L>  /  TBili  0.9  /  DBili  x   /  AST  26  /  ALT  25  /  AlkPhos  82  12-17    RADIOLOGY & ADDITIONAL STUDIES:    < from: CT Head No Cont (12.14.19 @ 08:07) >  IMPRESSION:  No acute intracranial hemorrhage oracute territorial infarct.  If   symptoms persist, follow-up MRI exam recommended.      < end of copied text >

## 2019-12-17 NOTE — PROGRESS NOTE ADULT - SUBJECTIVE AND OBJECTIVE BOX
HPI:  75 y/o M w/ pmh of COPD, DVT, HLD, HTN, CAD with stent, AVR, CHB s/p PPM, dementia presents with cough and AMS, presents to [facility] on 12/14/19 for for cough and AMS. Pt states his wife told him he wasn't acting right and she dialed 911 and had him brought to the hospital for further treatment and evaluation. Pt was dx w/ with pna and admitted to the floor for further work-up/tx and then escalated up to the S[facility]U for worsening lactate ( 2--->5--->7--->9 ) and critical care was consulted.     24 hour events: No major overnight events, case d/w IR and pt planned for IR guided biopsy most likely on Wednesday or Thursday. Pt currently stable and w/out any medical complains at this time.     Review of Systems:  Constitutional: No fever, chills, fatigue  Neuro: No headache, numbness, weakness  Resp: No cough, wheezing, shortness of breath  CVS: No chest pain, palpitations, leg swelling  GI: No abdominal pain, nausea, vomiting, diarrhea   : No dysuria, frequency, incontinence  Skin: No itching, burning, rashes, or lesions   Msk: No joint pain or swelling  Psych: No depression, anxiety, mood swings    T(F): 99 (12-17-19 @ 08:04), Max: 102.5 (12-16-19 @ 16:36)  HR: 109 (12-17-19 @ 10:00) (79 - 120)  BP: 114/84 (12-17-19 @ 08:00) (103/80 - 133/89)  RR: 24 (12-17-19 @ 10:00) (15 - 38)  SpO2: 98% (12-17-19 @ 10:00) (95% - 100%)  Wt(kg): --      12-16-19 @ 07:01  -  12-17-19 @ 07:00  --------------------------------------------------------  IN: 50 mL / OUT: 300 mL / NET: -250 mL        CAPILLARY BLOOD GLUCOSE      POCT Blood Glucose.: 143 mg/dL (15 Dec 2019 16:25)      I&O's Summary    16 Dec 2019 07:01  -  17 Dec 2019 07:00  --------------------------------------------------------  IN: 50 mL / OUT: 300 mL / NET: -250 mL        Physical Exam:   Gen: Comfortable in bed in NAD, speaking in full and clear sentences  Resp: Good air entry b/l  CVS: +RRR  Abd: BSx4, soft, nt/nd  Ext: no edema  Skin: warm/dry      Meds:      atorvastatin Oral  predniSONE   Tablet Oral    albuterol/ipratropium for Nebulization Nebulizer  budesonide 160 MICROgram(s)/formoterol 4.5 MICROgram(s) Inhaler Inhalation  guaiFENesin   Syrup  (Sugar-Free) Oral PRN  tiotropium 18 MICROgram(s) Capsule Inhalation    acetaminophen   Tablet .. Oral PRN  imipramine Oral      aspirin enteric coated Oral  heparin  Injectable SubCutaneous    pantoprazole    Tablet Oral      cyanocobalamin Oral  folic acid Oral                                  9.6    2.09  )-----------( 68       ( 17 Dec 2019 08:28 )             30.5       12-17    141  |  104  |  13  ----------------------------<  126<H>  3.7   |  28  |  1.45<H>    Ca    8.2<L>      17 Dec 2019 06:36  Phos  4.2     12-17  Mg     2.1     12-17    TPro  7.2  /  Alb  2.7<L>  /  TBili  0.9  /  DBili  x   /  AST  26  /  ALT  25  /  AlkPhos  82  12-17    Lactate 2.2           12-17 @ 06:36      CARDIAC MARKERS ( 15 Dec 2019 18:06 )  .165 ng/mL / x     / x     / x     / x      CARDIAC MARKERS ( 15 Dec 2019 11:56 )  .145 ng/mL / x     / x     / x     / x          PT/INR - ( 17 Dec 2019 06:36 )   PT: 16.1 sec;   INR: 1.46 ratio         PTT - ( 17 Dec 2019 06:36 )  PTT:34.9 sec    .Urine Clean Catch (Midstream)   <10,000 CFU/mL Normal Urogenital Melva -- 12-14 @ 13:40  .Blood Blood-Peripheral   No growth to date. -- 12-14 @ 13:38      Radiology:     EXAM:  US ABDOMEN LIMITED                                  PROCEDURE DATE:  12/15/2019          INTERPRETATION:  History: Gallstone seen on CT .    Findings: Right upper quadrant sonogram was performed using a 5 MHz   curved transducer.    Correlation made with recent noncontrast CT 12/14/2019.    The liver is increased in echogenicity compatible with diffuse fatty   infiltration. Images of the liver otherwise grossly unremarkable.     There is a gallstone measuring up to approximately 2.5 cm in the region   of the gallbladder neck with an adjacent smaller gallstone. The   fluid-filled gallbladder is nondilated. No significant gallbladder wall   thickening or pericholecystic fluid. The gallbladder wall measurement of   4.4 mm is felt to be obtained with obliquity. No sonographic Manning's   sign elicited. No biliary ductal dilatation. The pancreas was poorly   visualized, obscured by overlying bowel gas. No aneurysmal dilatation of   the visualized portion of the upper abdominal aorta. Right kidney   measures approximately 10.9 cm long and is without hydronephrosis.    Impression:    Fatty infiltration of the liver.    Approximate 2.5 cm gallstone in the region of the gallbladder neck.   Gallbladder is not dilated. No significant gallbladder wall thickening,   pericholecystic fluid or sonographic Manning sign elicited to suggest   acute cholecystitis.    If there is clinical concern for cholecystitis, correlation with DISIDA   scan can be performed.      JOSÉ MIGUEL CHU M.D. ATTENDING RADIOLOGIST  This document has been electronically signed. Dec 15 2019  8:41AM    EXAM:  XR CHEST PORTABLE ROUTINE 1V                                  PROCEDURE DATE:  12/15/2019          INTERPRETATION:  History: Pneumonia.    Findings: Frontal chest.    Comparison: 12/14/2019.    Left chest wall implanted dual lead pacemaker again noted in place.   Sternotomy sutures. Heart size and mediastinum are unremarkable. There is   suboptimal inspiration with crowding of the bronchovascular markings. No   focal lung consolidation or sizable pleural effusion. Calcified granuloma   at the medial left lung base. No significant osseous changes.    Impression:    Unremarkable frontal chest.          JOSÉ MIGUEL CHU M.D. ATTENDING RADIOLOGIST  This document has been electronically signed. Dec 15 2019 10:43AM    EXAM:  CT ABDOMEN AND PELVIS OC                          EXAM:  CT CHEST OC                                  PROCEDURE DATE:  12/14/2019          INTERPRETATION:  PROCEDURE INFORMATION:   Exam: CT Chest Without Contrast   Exam date and time: 12/14/2019 6:22 PM   Age: 74 years old   Clinical history: Other: Cough. Sepsis. Elevated lactate. Dyspnea.    TECHNIQUE:   Imaging protocol: Computed tomography of the chest without contrast.   3D rendering: MIP reconstructed images were created and reviewed.   Radiation optimization: All CT scans at this facility use at least one of   these   dose optimization techniques: automated exposure control; mA and/or kV   adjustment per patient size (includes targeted exams where dose is   matched to   clinical indication); or iterative reconstruction.   Other contrast: Route: Oral, Material: OMNIPAQUE 240, Volume: 32CC;     COMPARISON:   CT scan of chest dated 11/3/2019    FINDINGS:   Lungs: Mild stable patchy areas of groundglass changes are seen in   bilateral lower lobes more prominent on the left.   Enlarging 12 x 10 mm nodule in left lung base (3:116). This previously   measured 7 mm. Stable 5 mm nodule in right middle lobe seen in image 189,   a 4 mm right lower lobe nodule (3:106), 6 mm noduleof the left upper   lobe (3:54), a 5 mm nodule of the left upper lobe (3:43), 5 mm nodules of   the right upper lobe (3:54, 48) and a 3 mm right lower lobe nodule   (3:141) when compared to recent prior CT of 11/3/2019. Other subpleural   nodules of the right midlung field also unchanged.  Pleural space: Mild pleural-based scarring in the lungs.   Heart:  No cardiomegaly. No pericardial effusion. Coronary arterial,   aortic and mitral valvular calcification. Status post CABG.  Mediastinum: Surgical sternal wires in place with surgical changes in   mediastinum.   Aorta: Unremarkable. No aortic aneurysm. Moderate atheromatous changes of   the aorta and branch vessels.  Lymph nodes: Unremarkable. No enlarged lymph nodes.   Gallbladder and bile ducts: Large stone in the gallbladder. No   gallbladder wall   thickening.   Bones/joints: Degenerative changes in the spine. No evidence of fracture.   Degenerative changes in the spine. No evidence of fracture.   Soft tissues: Unremarkable.     IMPRESSION:   1. Mild patchy areas of groundglass changes are seen in bilateral lower   lobes stable compared with prior study. No consolidating infiltrate.  2. Enlarging 12 x 10 mm nodule in left lung base. Highly suspicious   nodule(s). Consider   PET/CT, or tissue sampling.(MacMahon, et al., Fleischner Society, 2017).   Metastatic disease should be considered.  3. Multiple additional lung nodules are demonstrated which are unchanged   compared with the recent prior chest CT.  For patients at low risk (minimal or absent history of smoking and of   other known risk factors), no routine follow-up is indicated. For   patients at high risk (history of smoking or of other known risk   factors), consider optional CT at 12 months. (Lamhocatie, et al.,   Fleischner Society, 2017)       =========================  PROCEDURE INFORMATION:   Exam: CT Abdomen Without Contrast   Exam date and time: 12/14/2019 6:22 PM   Age: 74 years old   Clinical history: Other: Cough     TECHNIQUE:   Imaging protocol: Computed tomography images of the abdomen and pelvis   without contrast.   Radiation optimization: All CT scans at this facility use at least one of   these   dose optimization techniques: automated exposure control; mA and/or kV   adjustment per patient size (includes targeted exams where dose is   matched to   clinical indication); or iterative reconstruction.   Other contrast: Route: Oral, Material: OMNIPAQUE 240, Volume: 32CC;     COMPARISON:   CT Colonoscopy 9/18/2019.    FINDINGS:   Liver: Calcified hepatic granuloma at the right dome..   Gallbladder and bile ducts: Large stone in the gallbladder. No   gallbladder wall thickening or pericholecystic fluid.   Pancreas: Normal. No ductal dilation.   Spleen: The spleen is mildly enlarged measuring 14.5 cm..   Adrenals: Normal. No mass.   Kidneys and ureters: Normal. No hydronephrosis.   Stomach and bowel: Sigmoid diverticulosis.No areas of wall thickening in   the large bowel. No evidence of large bowel obstruction. No pericolonic   inflammatory changes to suggest acute diverticulitis. No wall thickening   in the   small bowel. No evidence of small bowel obstruction. Stomach is   unremarkable. The appendix is normal.    Intraperitoneal space: Unremarkable. No free air. No significant fluid   collection.     Lymph nodes: Mild to moderate moderate abdominal and pelvic adenopathy   increasing compared with previous examination. Adenopathy is demonstrated   emeli-portal, para-aortic, and along both pelvic sidewalls. For reference :  Distalright external iliac node (3:280) measuring 4.2 x 3.2 cm.   A right distal common iliac node (3:243) measures 2.7 x 2.4 cm.    Vasculature: Moderate atheromatous disease of the abdominal aorta, iliac   arteries and branch vessels. No sophie aneurysm. Mild ectasia of the   distal abdominal aorta which maximally measures 2.7 cm unchanged.  Bones/joints: Degenerative changes in the spine. No evidence of fracture.   Soft tissues: Unremarkable.     IMPRESSION:   Mild to moderate adenopathy of the abdomen and pelvis. Mild splenomegaly.  Differential diagnosis includes lymphoma and metastatic disease.  Cholelithiasis. Diverticulosis.          LUCILA BARTON M.D., ATTENDING RADIOLOGIST  This document has been electronically signed. Dec 15 2019  8:46AM          CENTRAL LINE: N  JOHNSON: N  A-LINE: N    GLOBAL ISSUE/BEST PRACTICE:    Analgesia: N  Sedation: N  CAM-ICU: n/a  HOB elevation: Y  Stress ulcer prophylaxis: Y  VTE prophylaxis: Y  Glycemic control: Y  Nutrition: Y    CODE STATUS: FULL CODE
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
## 2019-12-17 NOTE — PROGRESS NOTE ADULT - ASSESSMENT
74M CAD (s/p PCI), AVR, COPD, DVT and HTN admitted for Fever, Confusion and Lactic Acidosis     Fever / Cough   All cultures are negative and  Procalcitonin negative and Lactic Acid improved  off antibiotics, had fever this morning, does not look septic, likly from malignancy  Enlarged Lymph nodes in chest, abdomen and pelvis; Hemoptysis could be related dryness from CPAP or malignancy; Bronch?  Needs workup for Lymphoma with Interventional Radiology , schedule for IR LN biopsy on 12/18, discuss with PA.  ID (Dr. Majano); Pulm/CC (Dr. Mcdermott); Consult Hematology     Acute Renal Failure   IVF.  Monitor BMP and electrolytes     CAD / AVR  / HLD /HTN  Echocardiogram showing normal EF and mild AS  Aspirin + Atorvastatin   Holding Anti-hypertensives  Cardiology following (Dr. Arango)    Diet  Regular    DVT Prophylaxis  Heparin SC    Disposition  Full Code/Inpatient  Discharge planning pending hospital course    Plan of care was discuss with patient, all questions were answered, seems understand and in agreement.

## 2019-12-17 NOTE — CONSULT NOTE ADULT - ASSESSMENT
73 y/o man w hx COPD, DVT, HLD, HTN, CAD with stent, AVR, dementia, brought in by wife due to incr coughs and change mental status/sluggishness,also fever. Was admitted 11/3-11/7 for the same symptoms, dx w pneumonia and, COPD exacerbation, MILO.  Started on Zosyn,Vanco, given solumedrol and started on Prednisone.  Clinically improved, MS back to baseline but still w coughs.    CT c/a/p 12/14/19 when compared w 11/2019 incr left base [id number] pulm nodule, was 7mm, as well as melvina subcentimeter nodules, mimld-mod abdomen/pelvic LADs, up to 4.2x2.2cm distal right external iliac chain. For IR bx tomorrow    Pt w chronic sweats since childhood, no anorexia/weight loss.  Recent virtual colonoscopy w polyps for colonoscopy pending respiratory status.    CBC show on adm WBC 5+, incr to 9+, now decr to 2.09 today, Hgb 9+, plts 90-70k  Old labs on computer show dating back to 2017 plts  w occ lower.    -pancytopenia- sl thrombocytopenia for at least 2 years, present levels suspect due to decompensation during acute illness. Also may be drug effect as was on Zosyn and Vanco. Initial more normal WBC may be due to high dose steroid, now decreased as on lower dose. Also in view of the LADs seen on CT scans, maybe due to underlying disease, ?lymphoma ?bone marrow involvement. Stable at this level, no acute intervention needed.    -lymphadenopathy, pulmonary nodules-?lymphoma, for LN bx w IR, Ideally should be excisional bx, if specimen via IR not adequate, will then need surgical consult for excisional bx.    -follow serial CBC    -follow up bx.    discussed w pt, wife  discussed w ICU and Medicine     Thank you, will follow w you 73 y/o man w hx COPD, DVT, HLD, HTN, CAD with stent, AVR, dementia, brought in by wife due to incr coughs and change mental status/sluggishness,also fever. Was admitted 11/3-11/7 for the same symptoms, dx w pneumonia and, COPD exacerbation, MILO.  Started on Zosyn,Vanco, given solumedrol and started on Prednisone.  Clinically improved, MS back to baseline but still w coughs.    CT c/a/p 12/14/19 when compared w 11/2019 incr left base [id number] pulm nodule, was 7mm, as well as melvina subcentimeter nodules, mimld-mod abdomen/pelvic LADs, up to 4.2x2.2cm distal right external iliac chain. For IR bx tomorrow    Pt w chronic sweats since childhood, no anorexia/weight loss.  Recent virtual colonoscopy w polyps for colonoscopy pending respiratory status.    CBC show on adm WBC 5+, incr to 9+, now decr to 2.09 today, Hgb 9+, plts 90-70k  Old labs on computer show dating back to 2017 plts  w occ lower.    -pancytopenia- sl thrombocytopenia for at least 2 years, present levels suspect due to decompensation during acute illness. Also may be drug effect as was on Zosyn and Vanco. Initial more normal WBC may be due to high dose steroid, now decreased as on lower dose. Also in view of the LADs seen on CT scans, maybe due to underlying disease, ?lymphoma ?bone marrow involvement. Stable at this level, no acute intervention needed. Will check iron studies(rissa as MCV low), B12, folate, thyroid fcn as part of anemia w/u.    -lymphadenopathy, pulmonary nodules-?lymphoma, for LN bx w IR, Ideally should be excisional bx, if specimen via IR not adequate, will then need surgical consult for excisional bx.    -follow serial CBC    -follow up bx.    discussed w pt, wife  discussed w ICU and Medicine     Thank you, will follow w you

## 2019-12-17 NOTE — PROGRESS NOTE ADULT - ASSESSMENT
5 y/o M w/ pmh of COPD, DVT, HLD, HTN, CAD with stent, AVR, CHB s/p PPM, dementia presents with cough and AMS, presents to [facility] on 12/14/19 for for cough and AMS. Pt states his wife told him he wasn't acting right and she dialed 911 and had him brought to the hospital for further treatment and evaluation. Pt was dx w/ with pna and admitted to the floor for further work-up/tx and then escalated up to the SPCU for worsening lactate and critical care was consulted.     -Neuro: mental status stable, no acute deficits at this time, CTH in the ED neg, neurology following input noted  -Cardiac: HDS at this time, maintain MAP >65, CT neg for PE, cardiology following input noted  -Resp: COPD/OSP cont supplemental o2 maintain o2 >92%, cont duonebs and pulmonary toilet and CPAP  -GI: NPO during CPAP, diet started, CTAP and US reviewed unlikely for acute choley given normal abd exam, stable LFT and bilis, hld cont statin, will monitor for now  -Renal: MILO on CKD improved, lyte reviewed, will cont to monitor renal function and lytes and replete w/ renal precautions, avoid further nephrotoxic drugs   -ID: Pt off IV vanco/zosyn for now given neg infectious work-up so far, case d/w ID and in agreement but pt developed fevers yesterday and today morning, wbc stable, will monitor for now if continues to spike fevers will consider repeat work-up  -Heme: DVT ppx w/ heparin for now will hold starting tomorrow for biopsy by IR on 12/18/19 give CT finding w/ possible new ca vs mestatic dz, hem/onc aware of case, IR aware and planned for 12/18/19  -Endo: no acute issues  -Dispo: Admitted to the SPCU given worsening lactate acidosis/MILO now improving, CA? case d/w hospitalize and dr. collins input noted 5 y/o M w/ pmh of COPD, DVT, HLD, HTN, CAD with stent, AVR, CHB s/p PPM, dementia presents with cough and AMS, presents to [facility] on 12/14/19 for for cough and AMS. Pt states his wife told him he wasn't acting right and she dialed 911 and had him brought to the hospital for further treatment and evaluation. Pt was dx w/ with pna and admitted to the floor for further work-up/tx and then escalated up to the SPCU for worsening lactate and critical care was consulted.     -Neuro: mental status stable, no acute deficits at this time, CTH in the ED neg, neurology following input noted  -Cardiac: HDS at this time, maintain MAP >65, CT neg for PE, cardiology following input noted  -Resp: COPD/OSP cont supplemental o2 maintain o2 >92%, cont duonebs and pulmonary toilet and CPAP  -GI: NPO during CPAP, diet started, CTAP and US reviewed unlikely for acute choley given normal abd exam, stable LFT and bilis, hld cont statin, will monitor for now  -Renal: MILO on CKD improved, lyte reviewed, will cont to monitor renal function and lytes and replete w/ renal precautions, avoid further nephrotoxic drugs   -ID: Pt off IV vanco/zosyn for now given neg infectious work-up so far, case d/w ID and in agreement but pt developed fevers yesterday and today morning, wbc stable, will monitor for now if continues to spike fevers will consider repeat work-up  -Heme: DVT ppx w/ SCD for now and stop heprain SubQ given biopsy by IR on 12/18/19 give CT finding w/ possible new ca vs mestatic dz, hem/onc aware of case, IR aware and planned for 12/18/19  -Endo: no acute issues  -Dispo: Admitted to the SPCU given worsening lactate acidosis/MILO now improving, CA? case d/w hospitalize and dr. collins input noted

## 2019-12-17 NOTE — PROGRESS NOTE ADULT - ASSESSMENT
74M with PMH of copd, depression, dvt, hld, htn, s/p bioprosthetic AVR, cad s/p stent, CHB s/p PPM, dementia, who recently presented with AMS, admitted to SPCU with CAP, COPD exacerbation, MILO, thrombocytopenia. Pt now admitted to SPCU with AMS, pna and worsening lactic acidosis 2>9.      -AMS resolved. Episode of agitation and confusion overnight. Possibly due to withdrawal. Monitor  -Monitor HD's to keep MAP > 65. No CT evidence of PE. Cardiology following, recommendations appreciated  -On steroids/nebs for copd/reji. cpap qhs. Keep O2 saturation > 92%  -NPO while on cpap. PO diet when off cpap. CT abd and US unlikely for acute cholecystitis. LFT's and bili's stable. Continue to monitor  -MILO on CKD with creatinine slightly worse today. Monitor UOP/Lytes. Avoid nephrotoxic agents  -Heme/)nc following, Recommendations appreciated. No acute interventions at this time.   -Abx dc'd due to negative infectious work up. PT with episode of fever overnight. Will repeat sepsis work up if persists. Monitor wbc/temp  -Off sq heparin for IR guided bx due to CT findings of possible new ca vs mets. Heme/Onc following.
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
## 2019-12-17 NOTE — PROGRESS NOTE ADULT - SUBJECTIVE AND OBJECTIVE BOX
Patient is a 74y old  Male who presents with a chief complaint of fever, cough with brownish expectoration. (17 Dec 2019 08:52)      INTERVAL HPI/OVERNIGHT EVENTS:  Pt is seen and examined.  feels better. sitting on a chair.      Pain Location & Control:     MEDICATIONS  (STANDING):  albuterol/ipratropium for Nebulization 3 milliLiter(s) Nebulizer every 6 hours  aspirin enteric coated 81 milliGRAM(s) Oral daily  atorvastatin 10 milliGRAM(s) Oral at bedtime  budesonide 160 MICROgram(s)/formoterol 4.5 MICROgram(s) Inhaler 2 Puff(s) Inhalation two times a day  cyanocobalamin 1000 MICROGram(s) Oral daily  folic acid 1 milliGRAM(s) Oral daily  heparin  Injectable 5000 Unit(s) SubCutaneous every 8 hours  imipramine 25 milliGRAM(s) Oral daily  pantoprazole    Tablet 40 milliGRAM(s) Oral before breakfast  predniSONE   Tablet 20 milliGRAM(s) Oral daily  tiotropium 18 MICROgram(s) Capsule 1 Capsule(s) Inhalation daily    MEDICATIONS  (PRN):  acetaminophen   Tablet .. 650 milliGRAM(s) Oral every 6 hours PRN Temp greater or equal to 38C (100.4F)  guaiFENesin   Syrup  (Sugar-Free) 100 milliGRAM(s) Oral every 6 hours PRN Cough      Allergies    No Known Allergies    Intolerances      Vital Signs Last 24 Hrs  T(C): 37.2 (17 Dec 2019 08:04), Max: 39.2 (16 Dec 2019 16:36)  T(F): 99 (17 Dec 2019 08:04), Max: 102.5 (16 Dec 2019 16:36)  HR: 103 (17 Dec 2019 08:00) (79 - 120)  BP: 114/84 (17 Dec 2019 08:00) (103/80 - 133/89)  BP(mean): 95 (17 Dec 2019 08:00) (73 - 102)  RR: 28 (17 Dec 2019 08:00) (15 - 38)  SpO2: 97% (17 Dec 2019 08:00) (95% - 100%)        LABS:                        9.6    2.09  )-----------( x        ( 17 Dec 2019 08:28 )             30.5     17 Dec 2019 06:36    141    |  104    |  13     ----------------------------<  126    3.7     |  28     |  1.45     Ca    8.2        17 Dec 2019 06:36  Phos  4.2       17 Dec 2019 06:36  Mg     2.1       17 Dec 2019 06:36    TPro  7.2    /  Alb  2.7    /  TBili  0.9    /  DBili  x      /  AST  26     /  ALT  25     /  AlkPhos  82     17 Dec 2019 06:36    PT/INR - ( 17 Dec 2019 06:36 )   PT: 16.1 sec;   INR: 1.46 ratio         PTT - ( 17 Dec 2019 06:36 )  PTT:34.9 sec    CAPILLARY BLOOD GLUCOSE        CARDIAC MARKERS ( 15 Dec 2019 18:06 )  .165 ng/mL / x     / x     / x     / x      CARDIAC MARKERS ( 15 Dec 2019 11:56 )  .145 ng/mL / x     / x     / x     / x          Cultures  Culture Results:   <10,000 CFU/mL Normal Urogenital Melva (12-14 @ 13:40)  Culture Results:   No growth to date. (12-14 @ 13:38)  Culture Results:   No growth to date. (12-14 @ 13:38)    Lactate, Blood: 2.2 mmol/L (12-17 @ 06:36)      Culture - Urine (collected 12-14-19 @ 13:40)  Source: .Urine Clean Catch (Midstream)  Final Report (12-15-19 @ 10:09):    <10,000 CFU/mL Normal Urogenital Melva    Culture - Blood (collected 12-14-19 @ 13:38)  Source: .Blood Blood-Peripheral  Preliminary Report (12-15-19 @ 14:02):    No growth to date.    Culture - Blood (collected 12-14-19 @ 13:38)  Source: .Blood Blood-Peripheral  Preliminary Report (12-15-19 @ 14:01):    No growth to date.        RADIOLOGY & ADDITIONAL TESTS:    Imaging Personally Reviewed:  [ ] YES  [ ] NO    Consultant(s) Notes Reviewed:  [ ] YES  [ ] NO    Care Discussed with Consultants/Other Providers [ x] YES  [ ] NO
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
## 2019-12-17 NOTE — PROGRESS NOTE ADULT - ASSESSMENT
Seen for AMS  -Much improved.  CT Head - No acute pathology.  Lactic acidosis.  No signs of CVA.  Positive Troponin.  Elevated BNP.  H/o Dementia.  H/o PPM    H/o aortic valve replacement.  H/o Vitamin B12 deficiency - On supplementation.  D/w Pt's wife. Questions answered.  Fall safety precautions.  Supportive care.  Would follow.

## 2019-12-17 NOTE — CONSULT NOTE ADULT - REASON FOR ADMISSION
fever, cough with brownish expectoration.

## 2019-12-18 NOTE — PROGRESS NOTE ADULT - ASSESSMENT
74M CAD (s/p PCI), AVR, COPD, DVT and HTN admitted for Fever, Confusion and Lactic Acidosis     Fever / Cough   Afebrile and WBC normal; I think the Fever along with other findings are concerning for malignancy (B Symptoms)  Enlarged Lymph nodes in chest, abdomen and pelvis; Hemoptysis could be related dryness from CPAP or malignancy; Bronch?  Needs workup for Lymphoma with Interventional Radiology; Biopsy later today  ID (Dr. Majano); Pulm/CC (Dr. Mcdermott); Hematology (Dr. Horne)     Microcytic Anemia  Suspect Thalassemia given mediterranean origin  WIll reach out to PCP to see if there was any outpatient workup done  If not will send out Hgb Electrophoresis  Iron studies normal in November 2019    CAD / AVR  / HLD /HTN  Echocardiogram showing normal EF and mild AS  Aspirin + Atorvastatin   Holding Anti-hypertensives  Cardiology following (Dr. Arango)    Diet  NPO    DVT Prophylaxis  Heparin SC    Disposition  Full Code/Inpatient  Discharge planning pending hospital course

## 2019-12-18 NOTE — PROGRESS NOTE ADULT - SUBJECTIVE AND OBJECTIVE BOX
24 hour events:     Review of Systems:  Constitutional: No fever, chills, fatigue  Neuro: No headache, numbness, weakness  Resp: No cough, wheezing, shortness of breath  CVS: No chest pain, palpitations, leg swelling  GI: No abdominal pain, nausea, vomiting, diarrhea   : No dysuria, frequency, incontinence  Skin: No itching, burning, rashes, or lesions   Msk: No joint pain or swelling  Psych: No depression, anxiety, mood swings    T(F): 98.9 (12-18-19 @ 07:40), Max: 100.3 (12-17-19 @ 20:40)  HR: 97 (12-18-19 @ 14:00) (87 - 118)  BP: 150/98 (12-18-19 @ 14:00) (102/74 - 159/67)  RR: 24 (12-18-19 @ 14:00) (17 - 25)  SpO2: 99% (12-18-19 @ 14:00) (94% - 100%)  Wt(kg): --        CAPILLARY BLOOD GLUCOSE          I&O's Summary      Physical Exam:   Gen:  Neuro:  HEENT:  Resp:  CVS:  Abd:  Ext:  Skin:    Meds:      atorvastatin Oral  predniSONE   Tablet Oral    albuterol/ipratropium for Nebulization Nebulizer  budesonide 160 MICROgram(s)/formoterol 4.5 MICROgram(s) Inhaler Inhalation  guaiFENesin   Syrup  (Sugar-Free) Oral PRN  tiotropium 18 MICROgram(s) Capsule Inhalation    acetaminophen   Tablet .. Oral PRN  imipramine Oral      aspirin enteric coated Oral    pantoprazole    Tablet Oral      cyanocobalamin Injectable IntraMuscular  folic acid Oral                                  9.6    2.30  )-----------( 52       ( 18 Dec 2019 09:45 )             31.5       12-18    141  |  104  |  16  ----------------------------<  117<H>  3.8   |  30  |  1.26    Ca    8.4      18 Dec 2019 09:45  Phos  4.4     12-18  Mg     2.2     12-18    TPro  7.2  /  Alb  2.7<L>  /  TBili  0.7  /  DBili  x   /  AST  24  /  ALT  24  /  AlkPhos  81  12-18          PT/INR - ( 17 Dec 2019 06:36 )   PT: 16.1 sec;   INR: 1.46 ratio         PTT - ( 17 Dec 2019 06:36 )  PTT:34.9 sec    .Blood Blood-Peripheral   No growth to date. -- 12-17 @ 12:22  .Urine Clean Catch (Midstream)   <10,000 CFU/mL Normal Urogenital Melva -- 12-14 @ 13:40  .Blood Blood-Peripheral   No growth to date. -- 12-14 @ 13:38              Radiology: ***  Bedside ultrasound: ***    CENTRAL LINE: N/Y          DATE INSERTED:              REMOVE: Y/N  JOHNSON: N/Y                       DATE INSERTED:              REMOVE: Y/N  A-LINE: N/Y                       DATE INSERTED:              REMOVE: Y/N    GLOBAL ISSUE/BEST PRACTICE:  Analgesia:  Sedation:  CAM-ICU:   HOB elevation: yes  Stress ulcer prophylaxis:  VTE prophylaxis:  Glycemic control:  Nutrition:    CODE STATUS: *** HPI:  75 y/o M w/ pmh of COPD, DVT, HLD, HTN, CAD with stent, AVR, CHB s/p PPM, dementia presents with cough and AMS, [facility] for for cough and AMS. Pt states his wife told him he wasn't acting right and she dialed 911 and had him brought to the hospital for further treatment and evaluation. Pt was dx w/ with pna and admitted to the floor for further work-up/tx and then escalated up to the SPCU for worsening lactate ( 2--->5--->7--->9 ) and critical care was consulted.     24 hour events: No major overnight events. Pt currently stable and w/out any medical complains at this time and now s/p LN biopsy by IR.     Review of Systems:  Constitutional: No fever, chills, fatigue  Neuro: No headache, numbness, weakness  Resp: No cough, wheezing, shortness of breath  CVS: No chest pain, palpitations, leg swelling  GI: No abdominal pain, nausea, vomiting, diarrhea   : No dysuria, frequency, incontinence  Skin: No itching, burning, rashes, or lesions   Msk: No joint pain or swelling  Psych: No depression, anxiety, mood swings    T(F): 98.9 (12-18-19 @ 07:40), Max: 100.3 (12-17-19 @ 20:40)  HR: 97 (12-18-19 @ 14:00) (87 - 118)  BP: 150/98 (12-18-19 @ 14:00) (102/74 - 159/67)  RR: 24 (12-18-19 @ 14:00) (17 - 25)  SpO2: 99% (12-18-19 @ 14:00) (94% - 100%)  Wt(kg): --        CAPILLARY BLOOD GLUCOSE          I&O's Summary      Physical Exam:   Gen: Comfortable in bed in NAD  Neuro: AAOx3  Resp: good air entry b/l  CVS: +RRR  Abd: BSx4, soft, nt/nd  Ext: no edema  Skin: warm/dry    Meds:      atorvastatin Oral  predniSONE   Tablet Oral    albuterol/ipratropium for Nebulization Nebulizer  budesonide 160 MICROgram(s)/formoterol 4.5 MICROgram(s) Inhaler Inhalation  guaiFENesin   Syrup  (Sugar-Free) Oral PRN  tiotropium 18 MICROgram(s) Capsule Inhalation    acetaminophen   Tablet .. Oral PRN  imipramine Oral      aspirin enteric coated Oral    pantoprazole    Tablet Oral      cyanocobalamin Injectable IntraMuscular  folic acid Oral                                  9.6    2.30  )-----------( 52       ( 18 Dec 2019 09:45 )             31.5       12-18    141  |  104  |  16  ----------------------------<  117<H>  3.8   |  30  |  1.26    Ca    8.4      18 Dec 2019 09:45  Phos  4.4     12-18  Mg     2.2     12-18    TPro  7.2  /  Alb  2.7<L>  /  TBili  0.7  /  DBili  x   /  AST  24  /  ALT  24  /  AlkPhos  81  12-18          PT/INR - ( 17 Dec 2019 06:36 )   PT: 16.1 sec;   INR: 1.46 ratio         PTT - ( 17 Dec 2019 06:36 )  PTT:34.9 sec    .Blood Blood-Peripheral   No growth to date. -- 12-17 @ 12:22  .Urine Clean Catch (Midstream)   <10,000 CFU/mL Normal Urogenital Melva -- 12-14 @ 13:40  .Blood Blood-Peripheral   No growth to date. -- 12-14 @ 13:38      Radiology:     EXAM:  IR PROCEDURE NON PICC                          EXAM:  CT BX SOFT TISSUE NDL #                                  PROCEDURE DATE:  12/18/2019          INTERPRETATION:    PROCEDURE: COMPUTED TOMOGRAPHY GUIDED CORE NEEDLE BIOPSY OF RIGHT   EXTERNAL ILIAC LYMPH NODE.    : FATOU Rhodes MD    CLINICAL INDICATION: 74-year-old male with lymphadenopathy. Percutaneous   biopsy of right external iliac lymph node was requested    TECHNIQUE: Following discussions of the risks, benefits, and alternatives   to the procedure informed consent was obtained. 1% lidocaine was utilized   for local anesthesia.    The patient was placed supine on the CT examination table and connected   to physiologic monitoring. Transaxial images of the pelvis were obtained   without the use of oral or intravenous contrast materials to select the   best path for percutaneous biopsy. The enlarged right external iliac   lymph node that was seen on a recent abdominal CT was again identified   and targeted for biopsy. The lower abdomen was prepped and draped in the   usual sterile fashion.    Using CT guidance, a 17-gauge trocar needle was advanced to the targeted   right external iliac lymph node. An 18-gauge core needle gun was advanced   via the trocar into the right external iliac lymph node, and 3 core   specimens were obtained. The obtained specimens were deemed adequate by   the pathologist in attendance. Foam slurry was administered via trocar   and compression was held to achieve hemostasis. Completion CT images   demonstrated expected postbiopsy changes with no evidence of acute   complications. A dry sterile dressing was applied.    The patient tolerated the procedure well and was transferred to the   recovery area in stable condition. There were no immediate complications.    IMPRESSION: SUCCESSFUL COMPUTED TOMOGRAPHY GUIDED CORE NEEDLE BIOPSY OF   RIGHT EXTERNAL ILIAC LYMPH NODE.                  IJEOMA RHODES M.D., ATTENDING RADIOLOGIST  This document has been electronically signed. Dec 18 2019  1:38PM      CENTRAL LINE: N  JOHNSON: N  A-LINE: N    GLOBAL ISSUE/BEST PRACTICE:    Analgesia: N  Sedation: N  CAM-ICU: n/a  HOB elevation: Y  Stress ulcer prophylaxis: Y  VTE prophylaxis: Y  Glycemic control: Y  Nutrition: Y    CODE STATUS: FULL CODE

## 2019-12-18 NOTE — PROGRESS NOTE ADULT - SUBJECTIVE AND OBJECTIVE BOX
Subjective: No overnight events and doing well. Waiting for his IR Biopsy later today.     MEDICATIONS  (STANDING):  albuterol/ipratropium for Nebulization 3 milliLiter(s) Nebulizer every 6 hours  aspirin enteric coated 81 milliGRAM(s) Oral daily  atorvastatin 10 milliGRAM(s) Oral at bedtime  budesonide 160 MICROgram(s)/formoterol 4.5 MICROgram(s) Inhaler 2 Puff(s) Inhalation two times a day  cyanocobalamin Injectable 1000 MICROGram(s) IntraMuscular daily  folic acid 1 milliGRAM(s) Oral daily  imipramine 25 milliGRAM(s) Oral daily  pantoprazole    Tablet 40 milliGRAM(s) Oral before breakfast  predniSONE   Tablet 10 milliGRAM(s) Oral daily  tiotropium 18 MICROgram(s) Capsule 1 Capsule(s) Inhalation daily    MEDICATIONS  (PRN):  acetaminophen   Tablet .. 650 milliGRAM(s) Oral every 6 hours PRN Temp greater or equal to 38C (100.4F)  guaiFENesin   Syrup  (Sugar-Free) 100 milliGRAM(s) Oral every 6 hours PRN Cough      Allergies    No Known Allergies    Intolerances        Vital Signs Last 24 Hrs  T(C): 37.2 (18 Dec 2019 07:40), Max: 37.9 (17 Dec 2019 20:40)  T(F): 98.9 (18 Dec 2019 07:40), Max: 100.3 (17 Dec 2019 20:40)  HR: 90 (18 Dec 2019 10:00) (89 - 120)  BP: 132/90 (18 Dec 2019 10:00) (102/74 - 141/93)  BP(mean): 102 (18 Dec 2019 10:00) (76 - 108)  RR: 24 (18 Dec 2019 10:00) (17 - 25)  SpO2: 98% (18 Dec 2019 10:00) (96% - 100%)    PHYSICAL EXAM:  GENERAL: NAD, well-groomed, well-developed  HEAD:  Atraumatic, Normocephalic  ENMT: Moist mucous membranes,   NECK: Supple, No JVD, Normal thyroid  NERVOUS SYSTEM:  All 4 extremities mobile, no gross sensory deficits.   CHEST/LUNG: Clear to auscultation bilaterally; No rales, rhonchi, wheezing, or rubs  HEART: Regular rate and rhythm; No murmurs, rubs, or gallops  ABDOMEN: Soft, Nontender, Nondistended; Bowel sounds present  EXTREMITIES:  2+ Peripheral Pulses, No clubbing, cyanosis, or edema      LABS:                        9.6    2.30  )-----------( 52       ( 18 Dec 2019 09:45 )             31.5     18 Dec 2019 09:45    141    |  104    |  16     ----------------------------<  117    3.8     |  30     |  1.26     Ca    8.4        18 Dec 2019 09:45  Phos  4.4       18 Dec 2019 09:45  Mg     2.2       18 Dec 2019 09:45    TPro  7.2    /  Alb  2.7    /  TBili  0.7    /  DBili  x      /  AST  24     /  ALT  24     /  AlkPhos  81     18 Dec 2019 09:45    PT/INR - ( 17 Dec 2019 06:36 )   PT: 16.1 sec;   INR: 1.46 ratio         PTT - ( 17 Dec 2019 06:36 )  PTT:34.9 sec    CAPILLARY BLOOD GLUCOSE          RADIOLOGY & ADDITIONAL TESTS:    Imaging Personally Reviewed:  [ ] YES     Consultant(s) Notes Reviewed:      Care Discussed with Consultants/Other Providers:    Advanced Directives: [ ] DNR  [ ] No feeding tube  [ ] MOLST in chart  [ ] MOLST completed today  [ ] Unknown

## 2019-12-18 NOTE — PROGRESS NOTE ADULT - ASSESSMENT
75 y/o man w hx COPD, DVT, HLD, HTN, CAD with stent, AVR, dementia, brought in by wife due to incr coughs and change mental status/sluggishness,also fever. Was admitted 11/3-11/7 for the same symptoms, dx w pneumonia and, COPD exacerbation, MILO.  Started on Zosyn,Vanco, given solumedrol and started on Prednisone.  Clinically improved, MS back to baseline but still w coughs.    CT c/a/p 12/14/19 when compared w 11/2019 incr left base [id number] pulm nodule, was 7mm, as well as melvina subcentimeter nodules, mimld-mod abdomen/pelvic LADs, up to 4.2x2.2cm distal right external iliac chain. For IR bx tomorrow    Pt w chronic sweats since childhood, no anorexia/weight loss.  Recent virtual colonoscopy w polyps for colonoscopy pending respiratory status.    CBC show on adm WBC 5+, incr to 9+, now decr to 2.09 today, Hgb 9+, plts 90-70k  Old labs on computer show dating back to 2017 plts  w occ lower.    -pancytopenia- sl thrombocytopenia for at least 2 years, present levels suspect due to decompensation during acute illness. Also may be drug effect as was on Zosyn and Vanco. Initial more normal WBC may be due to high dose steroid, now decreased as on lower dose. Also in view of the LADs seen on CT scans, maybe due to underlying disease, ?lymphoma ?bone marrow involvement. Stable at this level, no acute intervention needed. Will check iron studies(rissa as MCV low), B12, folate, thyroid fcn as part of anemia w/u.  B12 def. Despite normal diet. Start B12 injections  Folate adequate  Ferritin adfequate, unlikely Fe def. Pattern c/w anemia chronic diasease    -lymphadenopathy, pulmonary nodules-?lymphoma, for LN bx w IR, Ideally should be excisional bx, if specimen via IR not adequate, will then need surgical consult for excisional bx.    RECOMMNED  follow serial CBC  start B12 1000mcg IM daily x10d then transition to weekly x4, thereafter monthly.   Recommend proceed with LN bx to determine if has NHL, as may be cause of sx, and contribute current illness.   Pt cleared from hematology perspective to undergo planned needle bx, to eval for NHL, even with current cytopenia. Platelets, WBC and Hgb adequate for low risk procedure.     Follow CBC to determine when to resume DVT prophylaxis, commensurate with adequate plts counts.     discussed w pt, wife, patient.   discussed w SPCU team/PA, and RN.     Thank you, will follow w you

## 2019-12-18 NOTE — PROGRESS NOTE ADULT - SUBJECTIVE AND OBJECTIVE BOX
Interventional Radiology Brief - Operative Note    Procedure: CT guided biopsy of right external iliac lymph node    Operators: FATOU Marcelo MD    Anesthesia (type): local    Contrast: none    EBL: < 1 mL    Findings/Follow up Plan of Care: Core biopsy x3 of right external iliac lymph node was performed, with specimens handed to and confirmed by the pathologist in attendance. Follow up results.    Specimens Removed: as above    Implants: none    Complications: none    Condition/Disposition: stable    Miscellaneous: HOLD prophylactic anticoagulation for 24 hours.    Please call Interventional Radiology with any questions, concerns, or issues.      Official report to follow.

## 2019-12-18 NOTE — PROGRESS NOTE ADULT - ASSESSMENT
75 y/o M w/ pmh of COPD, DVT, HLD, HTN, CAD with stent, AVR, CHB s/p PPM, dementia presents with cough and AMS, presents to [facility] on 12/14/19 for for cough and AMS. Pt states his wife told him he wasn't acting right and she dialed 911 and had him brought to the hospital for further treatment and evaluation. Pt was dx w/ with pna and admitted to the floor for further work-up/tx and then escalated up to the SPCU for worsening lactate and critical care was consulted.     -Neuro: mental status stable, no acute deficits at this time, CTH in the ED neg, neurology following input noted  -Cardiac: HDS at this time, maintain MAP >65, CT neg for PE, cardiology following input noted  -Resp: COPD/OSP cont supplemental o2 maintain o2 >92%, cont duonebs and pulmonary toilet and CPAP  -GI: NPO during CPAP, diet started, CTAP and US reviewed unlikely for acute choley given normal abd exam, stable LFT and bilis, hld cont statin, will monitor for now  -Renal: MILO on CKD improved, lyte reviewed, will cont to monitor renal function and lytes and replete w/ renal precautions, avoid further nephrotoxic drugs   -ID: Pt off IV vanco/zosyn for now given neg infectious work-up so far, case d/w ID and in agreement, will monitor for now if continues to spike fevers will consider repeat work-up  -Heme: DVT ppx w/ SCD for now, no chemical AC for 24 hours post IR procedure, Biospy done and results pending, pancytopenia hematology following input noted   -Endo: no acute issues  -Dispo: Admitted to the SPCU given worsening lactate acidosis/MILO now improving, CA?, pt medically optimized and can be down graded to the floor but remains in the SPCU as border, case d/w hospitalize and dr. collins input noted
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
## 2019-12-18 NOTE — PROGRESS NOTE ADULT - SUBJECTIVE AND OBJECTIVE BOX
[INTERVAL HX: ]  Patient seen and examined;  Chart reviewed and events noted;   c/o cough. +fever, diaphoretic. Denies CP.  Awaiting IR Bx today.   Pt off heparin for procedure.   SPCU PA had spoken to IR yesterday.       Patient is a 74y Male with a known history of :  Pneumonia due to organism (J18.9) [Active]  COPD (chronic obstructive pulmonary disease) (J44.9) [Active]  Depression (F32.9) [Active]  DVT (deep venous thrombosis) (I82.409) [Active]  HLD (hyperlipidemia) (E78.5) [Active]  HTN (hypertension) (I10) [Active]  Aortic valve replaced (Z95.2) [Active]  Stented coronary artery (Z95.5) [Active]  CAD (coronary artery disease) (I25.10) [Active]  Artificial pacemaker (Z95.0) [Active]  S/P aortic valve replacement with porcine valve (Z95.3) [Active]    HPI:  74 year old male with a history of COPD, DVT, HLD, HTN, CAD with stent, AVR, dementia presents with cough and AMS. Mental status is improved. c/o worsening of cough with brownish expectoration since yesterday. +fever since yesterday.  Patient has not been compliant with CPAP and paramedic reports that patient was sleeping flat.   He was 93% on RA and sluggish to respond to questions or follow commands.  He received a flu shot this year. Patient was admitted here 11/3-11/7 for the same symptoms and was diagnosed with CAP, COPD exacerbation, MILO, and thrombocytopenia. patient has persistently trending up s lactate level. clinically does not look septic, received 2 l and getting 3rd l of ivf. waiting for cT chest/abd/pelvis. (14 Dec 2019 12:49)      MEDICATIONS  (STANDING):  albuterol/ipratropium for Nebulization 3 milliLiter(s) Nebulizer every 6 hours  aspirin enteric coated 81 milliGRAM(s) Oral daily  atorvastatin 10 milliGRAM(s) Oral at bedtime  budesonide 160 MICROgram(s)/formoterol 4.5 MICROgram(s) Inhaler 2 Puff(s) Inhalation two times a day  cyanocobalamin 1000 MICROGram(s) Oral daily  cyanocobalamin Injectable 1000 MICROGram(s) IntraMuscular daily  folic acid 1 milliGRAM(s) Oral daily  imipramine 25 milliGRAM(s) Oral daily  pantoprazole    Tablet 40 milliGRAM(s) Oral before breakfast  predniSONE   Tablet 10 milliGRAM(s) Oral daily  tiotropium 18 MICROgram(s) Capsule 1 Capsule(s) Inhalation daily    MEDICATIONS  (PRN):  acetaminophen   Tablet .. 650 milliGRAM(s) Oral every 6 hours PRN Temp greater or equal to 38C (100.4F)  guaiFENesin   Syrup  (Sugar-Free) 100 milliGRAM(s) Oral every 6 hours PRN Cough      Vital Signs Last 24 Hrs  T(C): 37.2 (18 Dec 2019 07:40), Max: 37.9 (17 Dec 2019 20:40)  T(F): 98.9 (18 Dec 2019 07:40), Max: 100.3 (17 Dec 2019 20:40)  HR: 93 (18 Dec 2019 08:00) (89 - 120)  BP: 132/83 (18 Dec 2019 08:00) (102/74 - 147/92)  BP(mean): 97 (18 Dec 2019 08:00) (76 - 108)  RR: 18 (18 Dec 2019 08:00) (17 - 25)  SpO2: 96% (18 Dec 2019 08:00) (96% - 100%)      [PHYSICAL EXAM]  General: adult in NAD,  WN,  WD.  HEENT: clear oropharynx, anicteric sclera, pink conjunctivae.  Neck: supple, no masses.  CV: normal S1S2, no murmur, no rubs, no gallops.  Lungs: clear to auscultation, no wheezes, no rales, no rhonchi.  Abdomen: soft, non-tender, non-distended, no hepatosplenomegaly, normal BS, no guarding.  Ext: no clubbing, no cyanosis, no edema.  Skin: no rashes,  no petechiae, no venous stasis changes.  Neuro: alert and oriented X3  , no focal motor deficits.  LN: no SC GUERO.      [LABS:]                        9.6    2.09  )-----------( 68       ( 17 Dec 2019 08:28 )             30.5     12-17    141  |  104  |  13  ----------------------------<  126<H>  3.7   |  28  |  1.45<H>    Ca    8.2<L>      17 Dec 2019 06:36  Phos  4.2     12-17  Mg     2.1     12-17    TPro  7.2  /  Alb  2.7<L>  /  TBili  0.9  /  DBili  x   /  AST  26  /  ALT  25  /  AlkPhos  82  12-17    PT/INR - ( 17 Dec 2019 06:36 )   PT: 16.1 sec;   INR: 1.46 ratio         PTT - ( 17 Dec 2019 06:36 )  PTT:34.9 sec      Vitamin B12, Serum (11.03.19 @ 19:22)    Vitamin B12, Serum: 312 pg/mL    Folate, Serum (11.03.19 @ 19:22)    Folate, Serum: 16.1 ng/mL    Iron with Total Binding Capacity (11.03.19 @ 19:22)    % Saturation, Iron: 7 %    Iron - Total Binding Capacity.: 229 ug/dL    Iron Total, Serum: 15 ug/dL    Unsaturated Iron Binding Capacity: 214 ug/dL    Ferritin, Serum (11.03.19 @ 19:22)    Ferritin, Serum: 467 ng/mL            [RADIOLOGY STUDIES:]
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
## 2019-12-18 NOTE — PROGRESS NOTE ADULT - ASSESSMENT
74y Male PMHx COPD, DVT, HLD, HTN, CAD with stent, AVR, CHB s/p PPM, dementia presents with cough and AMS, 12/14/19 Initially admitted to the Medical floor for further work-up/tx and then escalated up to the SPCU for worsening lactate.  lactate has normalized and patient remains HDS now on Downgrade.  S/p  Lymph node Bx today.      Off ABX - ID following   HDS   MILO on CKD improved   Cont Nebs / Supplemental O2   lactate has cleared  SCD's  Clinically Stable on downgrade
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
## 2019-12-18 NOTE — PROGRESS NOTE ADULT - ASSESSMENT
Seen for AMS  - Much improved.  CT Head - No acute pathology.  Lactic acidosis.  No signs of CVA.  Positive Troponin.  Elevated BNP.  S/p CT guided biopsy of right external iliac lymph node.  H/o Dementia.  H/o PPM    H/o aortic valve replacement.  H/o Vitamin B12 deficiency - On supplementation.  D/w Pt's wife. Questions answered.  Fall safety precautions.  Supportive care.  Would follow.

## 2019-12-18 NOTE — PROGRESS NOTE ADULT - SUBJECTIVE AND OBJECTIVE BOX
Neurology Follow up note    BOGDANTAYLOR 74y Male    HPI: 74 year old male with a history of COPD, DVT, HLD, HTN, CAD with stent, AVR, dementia presents with cough and AMS. Mental status is improved. c/o worsening of cough with brownish expectoration since yesterday. +fever since yesterday.  Patient has not been compliant with CPAP and paramedic reports that patient was sleeping flat.   He was 93% on RA and sluggish to respond to questions or follow commands.  He received a flu shot this year. Patient was admitted here 11/3-11/7 for the same symptoms and was diagnosed with CAP, COPD exacerbation, MILO, and thrombocytopenia. patient has persistently trending up s lactate level. clinically does not look septic, received 2 l and getting 3rd l of ivf. waiting for CT chest/abd/pelvis. (14 Dec 2019 12:49)    Interval History -    Patient is seen, chart was reviewed and case was discussed with the treatment team.  Pt is not in any distress.   Lying on bed comfortably.     Vital Signs Last 24 Hrs  T(C): 37.2 (18 Dec 2019 07:40), Max: 37.9 (17 Dec 2019 20:40)  T(F): 98.9 (18 Dec 2019 07:40), Max: 100.3 (17 Dec 2019 20:40)  HR: 90 (18 Dec 2019 10:00) (89 - 120)  BP: 132/90 (18 Dec 2019 10:00) (102/74 - 141/93)  BP(mean): 102 (18 Dec 2019 10:00) (76 - 108)  RR: 24 (18 Dec 2019 10:00) (17 - 25)  SpO2: 98% (18 Dec 2019 10:00) (96% - 100%)    MEDICATIONS    acetaminophen   Tablet .. 650 milliGRAM(s) Oral every 6 hours PRN  albuterol/ipratropium for Nebulization 3 milliLiter(s) Nebulizer every 6 hours  aspirin enteric coated 81 milliGRAM(s) Oral daily  atorvastatin 10 milliGRAM(s) Oral at bedtime  budesonide 160 MICROgram(s)/formoterol 4.5 MICROgram(s) Inhaler 2 Puff(s) Inhalation two times a day  cyanocobalamin Injectable 1000 MICROGram(s) IntraMuscular daily  folic acid 1 milliGRAM(s) Oral daily  guaiFENesin   Syrup  (Sugar-Free) 100 milliGRAM(s) Oral every 6 hours PRN  imipramine 25 milliGRAM(s) Oral daily  pantoprazole    Tablet 40 milliGRAM(s) Oral before breakfast  predniSONE   Tablet 10 milliGRAM(s) Oral daily  tiotropium 18 MICROgram(s) Capsule 1 Capsule(s) Inhalation daily    Allergies    No Known Allergies    GENERAL: NAD, well-groomed, well-developed  HEAD:  Atraumatic, Normocephalic  EYES: EOMI, PERRLA, conjunctiva and sclera clear  NECK: Supple, No JVD, thyroid non-palpable    On Neurological Examination:    Mental Status - Pt is alert, awake. Poor cognition. Follows commands well and able to answer questions appropriately.    Speech -  Normal. Pt has no aphasia.    Cranial Nerves - Pupils 3 mm equal and reactive to light, extraocular eye movements intact. No facial asymmetry. Tongue - is in midline.    Motor Exam - 4 plus/5 all over, No drift. No shaking or tremors. No asymmetry is seen.      Sensory Exam - Pt withdraws all extremities equally on stimulation.  No complaints of tingling, numbness.    Gait - Pt is able to stand up with holding my hands and is able to walk for few feet around the bed. Not falling to either side.    Deep tendon Reflexes - 2 plus all over.    Coordination - Fine finger movements are normal on both sides. Finger to nose is also normal on both sides.       Neck Supple -  Yes.    LABS:    CBC Full  -  ( 18 Dec 2019 09:45 )  WBC Count : 2.30 K/uL  RBC Count : 4.53 M/uL  Hemoglobin : 9.6 g/dL  Hematocrit : 31.5 %  Platelet Count - Automated : 52 K/uL  Mean Cell Volume : 69.5 fl  Mean Cell Hemoglobin : 21.2 pg  Mean Cell Hemoglobin Concentration : 30.5 gm/dL  Auto Neutrophil # : 1.37 K/uL  Auto Lymphocyte # : 0.44 K/uL  Auto Monocyte # : 0.45 K/uL  Auto Eosinophil # : 0.02 K/uL  Auto Basophil # : 0.00 K/uL  Auto Neutrophil % : 59.5 %  Auto Lymphocyte % : 19.1 %  Auto Monocyte % : 19.6 %  Auto Eosinophil % : 0.9 %  Auto Basophil % : 0.0 %    12-18    141  |  104  |  16  ----------------------------<  117<H>  3.8   |  30  |  1.26    Ca    8.4      18 Dec 2019 09:45  Phos  4.4     12-18  Mg     2.2     12-18    TPro  7.2  /  Alb  2.7<L>  /  TBili  0.7  /  DBili  x   /  AST  24  /  ALT  24  /  AlkPhos  81  12-18    RADIOLOGY & ADDITIONAL STUDIES:    < from: CT Head No Cont (12.14.19 @ 08:07) >  IMPRESSION:  No acute intracranial hemorrhage or acute territorial infarct.  If   symptoms persist, follow-up MRI exam recommended.      < end of copied text >

## 2019-12-19 NOTE — PROGRESS NOTE ADULT - SUBJECTIVE AND OBJECTIVE BOX
[INTERVAL HX: ]  Patient seen and examined;  Chart reviewed and events noted;   no CP, no change in QUINTANILLA/SOB      Patient is a 74y Male with a known history of :  Pneumonia due to organism (J18.9) [Active]  COPD (chronic obstructive pulmonary disease) (J44.9) [Active]  Depression (F32.9) [Active]  DVT (deep venous thrombosis) (I82.409) [Active]  HLD (hyperlipidemia) (E78.5) [Active]  HTN (hypertension) (I10) [Active]  Aortic valve replaced (Z95.2) [Active]  Stented coronary artery (Z95.5) [Active]  CAD (coronary artery disease) (I25.10) [Active]  Artificial pacemaker (Z95.0) [Active]  S/P aortic valve replacement with porcine valve (Z95.3) [Active]    HPI:  74 year old male with a history of COPD, DVT, HLD, HTN, CAD with stent, AVR, dementia presents with cough and AMS. Mental status is improved. c/o worsening of cough with brownish expectoration since yesterday. +fever since yesterday.  Patient has not been compliant with CPAP and paramedic reports that patient was sleeping flat.   He was 93% on RA and sluggish to respond to questions or follow commands.  He received a flu shot this year. Patient was admitted here 11/3-11/7 for the same symptoms and was diagnosed with CAP, COPD exacerbation, MILO, and thrombocytopenia. patient has persistently trending up s lactate level. clinically does not look septic, received 2 l and getting 3rd l of ivf. waiting for cT chest/abd/pelvis. (14 Dec 2019 12:49)            MEDICATIONS  (STANDING):  albuterol/ipratropium for Nebulization 3 milliLiter(s) Nebulizer every 6 hours  aspirin enteric coated 81 milliGRAM(s) Oral daily  atorvastatin 10 milliGRAM(s) Oral at bedtime  budesonide 160 MICROgram(s)/formoterol 4.5 MICROgram(s) Inhaler 2 Puff(s) Inhalation two times a day  cyanocobalamin Injectable 1000 MICROGram(s) IntraMuscular daily  folic acid 1 milliGRAM(s) Oral daily  imipramine 25 milliGRAM(s) Oral daily  pantoprazole    Tablet 40 milliGRAM(s) Oral before breakfast  predniSONE   Tablet 10 milliGRAM(s) Oral daily  tiotropium 18 MICROgram(s) Capsule 1 Capsule(s) Inhalation daily    MEDICATIONS  (PRN):  acetaminophen   Tablet .. 650 milliGRAM(s) Oral every 6 hours PRN Temp greater or equal to 38C (100.4F)  guaiFENesin   Syrup  (Sugar-Free) 100 milliGRAM(s) Oral every 6 hours PRN Cough  magnesium hydroxide Suspension 30 milliLiter(s) Oral daily PRN Constipation      Vital Signs Last 24 Hrs  T(C): 38.6 (19 Dec 2019 06:02), Max: 39.1 (18 Dec 2019 20:48)  T(F): 101.5 (19 Dec 2019 06:02), Max: 102.3 (18 Dec 2019 20:48)  HR: 103 (19 Dec 2019 07:10) (87 - 115)  BP: 140/57 (19 Dec 2019 06:23) (110/69 - 159/67)  BP(mean): 83 (19 Dec 2019 06:23) (81 - 115)  RR: 30 (19 Dec 2019 06:23) (14 - 30)  SpO2: 98% (19 Dec 2019 07:10) (91% - 100%)      [PHYSICAL EXAM]  General: adult in NAD,  WN,  WD. hyperhidrosis  HEENT: clear oropharynx, anicteric sclera, pink conjunctivae.  Neck: supple, no masses.  CV: normal S1S2, no murmur, no rubs, no gallops.  Lungs: clear to auscultation, no wheezes, no rales, no rhonchi.  Abdomen: soft, non-tender, non-distended, no hepatosplenomegaly, normal BS, no guarding.  Ext: no clubbing, no cyanosis, no edema.  Skin: no rashes,  no petechiae, no venous stasis changes.  Neuro: alert and oriented X3 , no focal motor deficits.  LN: no SC GUERO.      [LABS:]                        10.4   2.91  )-----------( 53       ( 19 Dec 2019 06:48 )             33.5     12-19    136  |  101  |  18  ----------------------------<  107<H>  3.8   |  26  |  1.30    Ca    8.5      19 Dec 2019 06:48  Phos  3.3     12-19  Mg     2.1     12-19    TPro  7.2  /  Alb  2.7<L>  /  TBili  0.7  /  DBili  x   /  AST  24  /  ALT  24  /  AlkPhos  81  12-18                  [RADIOLOGY STUDIES:]

## 2019-12-19 NOTE — PROGRESS NOTE ADULT - ASSESSMENT
74M CAD (s/p PCI), AVR, COPD, DVT and HTN admitted for Fever, Confusion and Lactic Acidosis     Fever / Cough   Had Fever this morning and WBC normal; I think the Fever along with other findings are concerning for malignancy (B Symptoms)  Enlarged Lymph nodes in chest, abdomen and pelvis; Hemoptysis could be related dryness from CPAP or malignancy; Bronch?  Workup for Lymphoma underway and patient is s/p biopsy by Interventional Radiology;  ID (Dr. Majano); Pulm/CC (Dr. Mcdermott); Hematology (Dr. Horne)     Microcytic Anemia  Suspect Thalassemia given mediterranean origin  WIll reach out to PCP to see if there was any outpatient workup done  If not will send out Hgb Electrophoresis  Iron studies normal in November 2019    CAD / AVR  / HLD /HTN  Echocardiogram showing normal EF and mild AS  Aspirin + Atorvastatin   Holding Anti-hypertensives  Cardiology following (Dr. Arango)    Diet  Regular    DVT Prophylaxis  Heparin SC    Disposition  Full Code/Inpatient  Will get PT evaluation and confirm with ID if any indication of antibiotics.  No labs tomorrow and no additional imaging for now.
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
## 2019-12-19 NOTE — PROGRESS NOTE ADULT - ASSESSMENT
74y Male PMHx COPD, DVT, HLD, HTN, CAD with stent, AVR, CHB s/p PPM, dementia presents with cough and AMS, 12/14/19 Initially admitted to the Medical floor for further work-up/tx and then escalated up to the SPCU for worsening lactate.  lactate has normalized and patient remains HDS now on Downgrade.  S/p  Lymph node Bx today.      Off ABX - ID following   HDS   MILO on CKD improved   Cont Nebs / Supplemental O2   SCD's  Clinically Stable on downgrade

## 2019-12-19 NOTE — PROGRESS NOTE ADULT - ASSESSMENT
75 y/o man w hx COPD, DVT, HLD, HTN, CAD with stent, AVR, dementia, brought in by wife due to incr coughs and change mental status/sluggishness,also fever. Was admitted 11/3-11/7 for the same symptoms, dx w pneumonia and, COPD exacerbation, MILO.  Started on Zosyn,Vanco, given solumedrol and started on Prednisone.  Clinically improved, MS back to baseline but still w coughs.    CT c/a/p 12/14/19 when compared w 11/2019 incr left base [id number] pulm nodule, was 7mm, as well as melvina subcentimeter nodules, mimld-mod abdomen/pelvic LADs, up to 4.2x2.2cm distal right external iliac chain. For IR bx tomorrow    Pt w chronic sweats since childhood, no anorexia/weight loss.  Recent virtual colonoscopy w polyps for colonoscopy pending respiratory status.    CBC show on adm WBC 5+, incr to 9+, now decr to 2.09 today, Hgb 9+, plts 90-70k  Old labs on computer show dating back to 2017 plts  w occ lower.    -pancytopenia- sl thrombocytopenia for at least 2 years, present levels suspect due to decompensation during acute illness. Also may be drug effect as was on Zosyn and Vanco. Initial more normal WBC may be due to high dose steroid, now decreased as on lower dose. Also in view of the LADs seen on CT scans, maybe due to underlying disease, ?lymphoma ?bone marrow involvement. Stable at this level, no acute intervention needed. Will check iron studies(rissa as MCV low), B12, folate, thyroid fcn as part of anemia w/u.  B12 def. Despite normal diet. Start B12 injections  Folate adequate  Ferritin adequate, unlikely Fe def. Pattern c/w anemia chronic diasease    -lymphadenopathy, pulmonary nodules-?lymphoma, for LN bx w IR, Ideally should be excisional bx, if specimen via IR not adequate, will then need surgical consult for excisional bx.  s/p IR bx 12/18/19      RECOMMNED  follow serial CBC  Continue B12 1000mcg IM daily x10d then transition to weekly x4, thereafter monthly.     WBC stable improving.   OK from heme perspective for DC, and follow in office.   DC pending improvement in resp status.     Follow CBC to determine when to resume DVT prophylaxis, commensurate with adequate plts counts.   For now hold anticoagulation as plts borderline.     discussed w pt, wife, patient.   discussed w SPCU team RN.     Thank you, will follow w you

## 2019-12-19 NOTE — PROGRESS NOTE ADULT - SUBJECTIVE AND OBJECTIVE BOX
Neurology Follow up note    EBONIETAYLOR LAM 74y Male    HPI: 74 year old male with a history of COPD, DVT, HLD, HTN, CAD with stent, AVR, dementia presents with cough and AMS. Mental status is improved. c/o worsening of cough with brownish expectoration since yesterday. +fever since yesterday.  Patient has not been compliant with CPAP and paramedic reports that patient was sleeping flat.   He was 93% on RA and sluggish to respond to questions or follow commands.  He received a flu shot this year. Patient was admitted here 11/3-11/7 for the same symptoms and was diagnosed with CAP, COPD exacerbation, MILO, and thrombocytopenia. patient has persistently trending up s lactate level. clinically does not look septic, received 2 l and getting 3rd l of ivf. waiting for cT chest/abd/pelvis. (14 Dec 2019 12:49)    Interval History -    Patient is seen, chart was reviewed and case was discussed with the treatment team.  Sitting on chair comfortably.   Wife  is at bedside.    Vital Signs Last 24 Hrs  T(C): 36.8 (19 Dec 2019 12:25), Max: 39.1 (18 Dec 2019 20:48)  T(F): 98.3 (19 Dec 2019 12:25), Max: 102.3 (18 Dec 2019 20:48)  HR: 103 (19 Dec 2019 10:00) (96 - 115)  BP: 130/86 (19 Dec 2019 08:00) (110/69 - 140/57)  BP(mean): 98 (19 Dec 2019 08:00) (81 - 105)  RR: 19 (19 Dec 2019 10:00) (14 - 30)  SpO2: 96% (19 Dec 2019 10:00) (91% - 100%)    MEDICATIONS    acetaminophen   Tablet .. 650 milliGRAM(s) Oral every 6 hours PRN  albuterol/ipratropium for Nebulization 3 milliLiter(s) Nebulizer every 6 hours  aspirin enteric coated 81 milliGRAM(s) Oral daily  atorvastatin 10 milliGRAM(s) Oral at bedtime  budesonide 160 MICROgram(s)/formoterol 4.5 MICROgram(s) Inhaler 2 Puff(s) Inhalation two times a day  cyanocobalamin Injectable 1000 MICROGram(s) IntraMuscular daily  folic acid 1 milliGRAM(s) Oral daily  guaiFENesin   Syrup  (Sugar-Free) 100 milliGRAM(s) Oral every 6 hours PRN  imipramine 25 milliGRAM(s) Oral daily  magnesium hydroxide Suspension 30 milliLiter(s) Oral daily PRN  pantoprazole    Tablet 40 milliGRAM(s) Oral before breakfast  predniSONE   Tablet 10 milliGRAM(s) Oral daily  tiotropium 18 MICROgram(s) Capsule 1 Capsule(s) Inhalation daily    Allergies    No Known Allergies    GENERAL: NAD, well-groomed, well-developed  HEAD:  Atraumatic, Normocephalic  EYES: EOMI, PERRLA, conjunctiva and sclera clear  NECK: Supple, No JVD, thyroid non-palpable    On Neurological Examination:    Mental Status - Pt is alert, awake. Poor cognition. Follows commands well and able to answer questions appropriately.    Speech -  Normal. Pt has no aphasia.    Cranial Nerves - Pupils 3 mm equal and reactive to light, extraocular eye movements intact. No facial asymmetry. Tongue - is in midline.    Motor Exam - 4 plus/5 all over, No drift. No shaking or tremors. No asymmetry is seen.      Sensory Exam - Pt withdraws all extremities equally on stimulation.     Gait - Pt is able to stand up with holding my hands and is able to walk for few feet around the bed. Not falling to either side.    Deep tendon Reflexes - 2 plus all over.    Coordination - Fine finger movements and finger to nose are normal on both sides.       Neck Supple -  Yes.    LABS:    CBC Full  -  ( 19 Dec 2019 06:48 )  WBC Count : 2.91 K/uL  RBC Count : 4.93 M/uL  Hemoglobin : 10.4 g/dL  Hematocrit : 33.5 %  Platelet Count - Automated : 53 K/uL  Mean Cell Volume : 68.0 fl  Mean Cell Hemoglobin : 21.1 pg  Mean Cell Hemoglobin Concentration : 31.0 gm/dL    12-19    136  |  101  |  18  ----------------------------<  107<H>  3.8   |  26  |  1.30    Ca    8.5      19 Dec 2019 06:48  Phos  3.3     12-19  Mg     2.1     12-19    TPro  7.2  /  Alb  2.7<L>  /  TBili  0.7  /  DBili  x   /  AST  24  /  ALT  24  /  AlkPhos  81  12-18    Vitamin B12, Serum: 608 pg/mL (12-18 @ 17:19)    RADIOLOGY & ADDITIONAL STUDIES:    < from: CT Head No Cont (12.14.19 @ 08:07) >  IMPRESSION:  No acute intracranial hemorrhage or acute territorial infarct.  If   symptoms persist, follow-up MRI exam recommended.      < end of copied text >

## 2019-12-19 NOTE — PROGRESS NOTE ADULT - SUBJECTIVE AND OBJECTIVE BOX
Subjective: Fever of >101 this AM. No other complaints.     MEDICATIONS  (STANDING):  albuterol/ipratropium for Nebulization 3 milliLiter(s) Nebulizer every 6 hours  aspirin enteric coated 81 milliGRAM(s) Oral daily  atorvastatin 10 milliGRAM(s) Oral at bedtime  budesonide 160 MICROgram(s)/formoterol 4.5 MICROgram(s) Inhaler 2 Puff(s) Inhalation two times a day  cyanocobalamin Injectable 1000 MICROGram(s) IntraMuscular daily  folic acid 1 milliGRAM(s) Oral daily  imipramine 25 milliGRAM(s) Oral daily  pantoprazole    Tablet 40 milliGRAM(s) Oral before breakfast  predniSONE   Tablet 10 milliGRAM(s) Oral daily  tiotropium 18 MICROgram(s) Capsule 1 Capsule(s) Inhalation daily    MEDICATIONS  (PRN):  acetaminophen   Tablet .. 650 milliGRAM(s) Oral every 6 hours PRN Temp greater or equal to 38C (100.4F)  guaiFENesin   Syrup  (Sugar-Free) 100 milliGRAM(s) Oral every 6 hours PRN Cough  magnesium hydroxide Suspension 30 milliLiter(s) Oral daily PRN Constipation      Allergies    No Known Allergies    Intolerances        Vital Signs Last 24 Hrs  T(C): 36.8 (19 Dec 2019 12:25), Max: 39.1 (18 Dec 2019 20:48)  T(F): 98.3 (19 Dec 2019 12:25), Max: 102.3 (18 Dec 2019 20:48)  HR: 106 (19 Dec 2019 14:29) (96 - 115)  BP: 130/86 (19 Dec 2019 08:00) (110/69 - 140/57)  BP(mean): 98 (19 Dec 2019 08:00) (81 - 105)  RR: 19 (19 Dec 2019 10:00) (14 - 30)  SpO2: 99% (19 Dec 2019 14:29) (91% - 100%)    PHYSICAL EXAM:  GENERAL: NAD, well-groomed, well-developed  HEAD:  Atraumatic, Normocephalic  ENMT: Moist mucous membranes,   NECK: Supple, No JVD, Normal thyroid  NERVOUS SYSTEM:  All 4 extremities mobile, no gross sensory deficits.   CHEST/LUNG: Clear to auscultation bilaterally; No rales, rhonchi, wheezing, or rubs  HEART: Regular rate and rhythm; No murmurs, rubs, or gallops  ABDOMEN: Soft, Nontender, Nondistended; Bowel sounds present  EXTREMITIES:  2+ Peripheral Pulses, No clubbing, cyanosis, or edema      LABS:                        10.4   2.91  )-----------( 53       ( 19 Dec 2019 06:48 )             33.5     19 Dec 2019 06:48    136    |  101    |  18     ----------------------------<  107    3.8     |  26     |  1.30     Ca    8.5        19 Dec 2019 06:48  Phos  3.3       19 Dec 2019 06:48  Mg     2.1       19 Dec 2019 06:48          CAPILLARY BLOOD GLUCOSE          RADIOLOGY & ADDITIONAL TESTS:    Imaging Personally Reviewed:  [ ] YES     Consultant(s) Notes Reviewed:      Care Discussed with Consultants/Other Providers:    Advanced Directives: [ ] DNR  [ ] No feeding tube  [ ] MOLST in chart  [ ] MOLST completed today  [ ] Unknown
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
## 2019-12-19 NOTE — PROGRESS NOTE ADULT - ASSESSMENT
Seen for AMS  - Much improved.  CT Head - No acute pathology.  Lactic acidosis.  No signs of CVA.  Had Positive Troponin. Elevated BNP.  S/p biopsy of right external iliac lymph node yesterday.  H/o Dementia.  H/o PPM    H/o aortic valve replacement.  H/o Vitamin B12 deficiency - On supplementation.  Supportive care.  D/w pt and Pt's wife. Questions answered.  Would follow.

## 2019-12-19 NOTE — DISCHARGE NOTE NURSING/CASE MANAGEMENT/SOCIAL WORK - PATIENT PORTAL LINK FT
You can access the FollowMyHealth Patient Portal offered by [facility] by registering at the following website: [email or web address]. By joining [facility]’s FollowMyHealth portal, you will also be able to view your health information using other applications (apps) compatible with our system.

## 2019-12-20 NOTE — PROGRESS NOTE ADULT - PROBLEM SELECTOR PLAN 1
COPD, DVT, HLD, HTN, CAD with stent, AVR, dementia presents with cough and AMS.  on emp ABX  ID follow up reviewed  TTE and CT chest reviewed  eval Infection vs Malignancy vs BOTH  I and O  spcu care and monitoring  CPAP night time for DANIEL  sleep hygiene discussed  labs and imaging reviewed  out of bed  monitor mental status and assist with ADL as needed  will need CT chest follow up study in 3 months
LN bx done  path pending  Heme Onc following  supportive medical regimen and care  CPAP night time  on room air  in the chair this am   resp status improved  monitored off ABX  cvs rx regimen
lactate levels trending down.  Remains hemodynamically stable.  Echo prelim at bedside yesterday technically difficult but shows grossly normal LV systolic function w/ no significant abnormalities.  Await full echo report.
off ABX  on room air  looks and feels better  cont NEBS  sp LN bx - excisional Bx  Flow Cytometry noted -   Path pending  supportive medical regimen   CPAP use for night time  sleep hygiene and weight management discussion  will follow  heme Onc follow up
poss IR bx - LN on CT   eval for Lymphoma  pulm nodules - mets vs infection vs old scarring  copd rx regimen  CPAP use night time  HEME following  eval and work up in progress  monitored off ABX  CT scan reviewed  ID follow up reviewed  supportive measures in effect  history of COPD, DVT, HLD, HTN, CAD with stent, AVR, dementia presents with cough and AMS  NEBS
pulm nodules - with GGO on CT chest - diff dx infection - malignancy - scarring -   monitored off ABX  CT scan reviewed  ID follow up reviewed  supportive measures in effect  history of COPD, DVT, HLD, HTN, CAD with stent, AVR, dementia presents with cough and AMS  NEBS  cough rx regimen  serial abx exam - eval GI source of infection  will follow
Remains hemodynamically stable.  Echo normal LV systolic function w/ no significant abnormalities.  Unlikely cardiac cause to elevated lactate levels.

## 2019-12-20 NOTE — DISCHARGE NOTE PROVIDER - HOSPITAL COURSE
74M CAD (s/p PCI), AVR, COPD, DVT and HTN admitted for Fever, Confusion and Lactic Acidosis         Fever / Cough     Patient initially treated with IV Antibiotics not no infectious source; Thought to be from possible malignancy and workup underway    On and off Fever; I think the Fever along with other findings are concerning for malignancy (B Symptoms)    Enlarged Lymph nodes in chest, abdomen and pelvis; Hemoptysis could be related dryness from CPAP or malignancy; Bronch?    Workup for Lymphoma underway and patient is s/p biopsy by Interventional Radiology;  These results will need to be followed up outpatient with Hematology and PCP    Hematology (Dr. Horne)         Microcytic Anemia    Suspect Thalassemia given mediterranean origin    WIll reach out to PCP to see if there was any outpatient workup done    If not will send out Hgb Electrophoresis    Iron studies normal in November 2019        CAD / AVR  / HLD     Echocardiogram showing normal EF and mild AS    Aspirin + Atorvastatin         Disposition    Full Code/Inpatient    Patient evaluated by PT and was deemed safe to go home with no skilled outpatient needs.  Patient has been asked to follow up with PCP, Hematology and Cardiology

## 2019-12-20 NOTE — PROGRESS NOTE ADULT - SUBJECTIVE AND OBJECTIVE BOX
Interval History:  feels better. wife present  Chart reviewed and events noted;   Overnight events:    MEDICATIONS  (STANDING):  albuterol/ipratropium for Nebulization 3 milliLiter(s) Nebulizer every 6 hours  aspirin enteric coated 81 milliGRAM(s) Oral daily  atorvastatin 10 milliGRAM(s) Oral at bedtime  budesonide 160 MICROgram(s)/formoterol 4.5 MICROgram(s) Inhaler 2 Puff(s) Inhalation two times a day  cyanocobalamin Injectable 1000 MICROGram(s) IntraMuscular daily  folic acid 1 milliGRAM(s) Oral daily  imipramine 25 milliGRAM(s) Oral daily  pantoprazole    Tablet 40 milliGRAM(s) Oral before breakfast  predniSONE   Tablet 10 milliGRAM(s) Oral daily  tiotropium 18 MICROgram(s) Capsule 1 Capsule(s) Inhalation daily    MEDICATIONS  (PRN):  acetaminophen   Tablet .. 650 milliGRAM(s) Oral every 6 hours PRN Temp greater or equal to 38C (100.4F), Mild Pain (1 - 3)  guaiFENesin   Syrup  (Sugar-Free) 100 milliGRAM(s) Oral every 6 hours PRN Cough  magnesium hydroxide Suspension 30 milliLiter(s) Oral daily PRN Constipation      Vital Signs Last 24 Hrs  T(C): 36.7 (20 Dec 2019 08:15), Max: 38.8 (19 Dec 2019 20:30)  T(F): 98 (20 Dec 2019 08:15), Max: 101.9 (19 Dec 2019 20:30)  HR: 98 (20 Dec 2019 08:00) (93 - 109)  BP: 129/77 (20 Dec 2019 08:00) (118/79 - 139/67)  BP(mean): 90 (20 Dec 2019 08:00) (80 - 101)  RR: 22 (20 Dec 2019 08:00) (19 - 29)  SpO2: 92% (20 Dec 2019 08:00) (90% - 100%)    PHYSICAL EXAM  General: adult in NAD  HEENT: clear oropharynx, anicteric sclera, pink conjunctivae  Neck: supple  CV: normal S1S2 with no murmur rubs or gallops  Lungs: clear to auscultation, no wheezes, no rhales  Abdomen: soft non-tender non-distended, no hepato/splenomegaly  Ext: no clubbing cyanosis or edema  Skin: no rashes and no petichiae  Neuro: alert and oriented X3 no focal deficits      LABS:  CBC Full  -  ( 19 Dec 2019 06:48 )  WBC Count : 2.91 K/uL  RBC Count : 4.93 M/uL  Hemoglobin : 10.4 g/dL  Hematocrit : 33.5 %  Platelet Count - Automated : 53 K/uL  Mean Cell Volume : 68.0 fl  Mean Cell Hemoglobin : 21.1 pg  Mean Cell Hemoglobin Concentration : 31.0 gm/dL  Auto Neutrophil # : x  Auto Lymphocyte # : x  Auto Monocyte # : x  Auto Eosinophil # : x  Auto Basophil # : x  Auto Neutrophil % : x  Auto Lymphocyte % : x  Auto Monocyte % : x  Auto Eosinophil % : x  Auto Basophil % : x    12-19    136  |  101  |  18  ----------------------------<  107<H>  3.8   |  26  |  1.30    Ca    8.5      19 Dec 2019 06:48  Phos  3.3     12-19  Mg     2.1     12-19    TPro  7.2  /  Alb  2.7<L>  /  TBili  0.7  /  DBili  x   /  AST  24  /  ALT  24  /  AlkPhos  81  12-18        fe studies  Ferritin, Serum: 685 ng/mL (12-18 @ 17:19)  Iron - Total Binding Capacity.: 152 ug/dL (12-18 @ 11:45)      WBC trend  2.91 K/uL (12-19-19 @ 06:48)  2.30 K/uL (12-18-19 @ 09:45)      Hgb trend  10.4 g/dL (12-19-19 @ 06:48)  9.6 g/dL (12-18-19 @ 09:45)      plt trend  53 K/uL (12-19-19 @ 06:48)  52 K/uL (12-18-19 @ 09:45)        RADIOLOGY & ADDITIONAL STUDIES:

## 2019-12-20 NOTE — DISCHARGE NOTE PROVIDER - NSDCFUADDINST_GEN_ALL_CORE_FT
1.  You have had a biopsy of your lymph node and the results of this are pending.  This will need to be followed up.  You may continue to have fevers intermittently but if you start feeling SOB, weakness or fatigued please visit ED asap. You will need to follow up with your Hematologist and PCP.  Contact information is provided.

## 2019-12-20 NOTE — PROGRESS NOTE ADULT - ASSESSMENT
Seen for AMS  - Much improved.  CT Head - No acute pathology.  Lactic acidosis.  No signs of CVA.  Had Positive Troponin. Elevated BNP.  S/p biopsy of right external iliac lymph node yesterday.  H/o Dementia.  H/o PPM    H/o aortic valve replacement.  H/o Vitamin B12 deficiency - On supplementation.  Supportive care.  D/w pt and Pt's wife. Questions answered.  Would follow. Seen for AMS  - Much improved.  CT Head - No acute pathology.  No signs of CVA.  Had Positive Troponin. Elevated BNP.  S/p biopsy of right external iliac lymph node yesterday.  H/o Dementia.  H/o PPM    H/o aortic valve replacement.  H/o Vitamin B12 deficiency - On supplementation.  D/w pt and Pt's wife. Questions answered.  F/up in my office. [phone]

## 2019-12-20 NOTE — PROGRESS NOTE ADULT - PROBLEM SELECTOR PROBLEM 1
Altered mental status
COPD (chronic obstructive pulmonary disease)
Pneumonia of right lung due to infectious organism
Pneumonia of right lung due to infectious organism
Sleep apnea
Sleep apnea
Lactate blood increase

## 2019-12-20 NOTE — DISCHARGE NOTE PROVIDER - CARE PROVIDERS DIRECT ADDRESSES
,[email or web address]

## 2019-12-20 NOTE — DISCHARGE NOTE PROVIDER - CARE PROVIDER_API CALL
Kat Horne)  Medical Oncology  [address], [address]  [place] [address]  Phone: [phone]  Fax: [phone]  Follow Up Time:     Beto [place])  Internal Medicine  [address] [place] [address]  Phone: [phone]  Fax: [phone]  Follow Up Time:

## 2019-12-20 NOTE — PROGRESS NOTE ADULT - ASSESSMENT
73 y/o man w hx COPD, DVT, HLD, HTN, CAD with stent, AVR, dementia, brought in by wife due to incr coughs and change mental status/sluggishness,also fever. Was admitted 11/3-11/7 for the same symptoms, dx w pneumonia and, COPD exacerbation, MILO.  Started on Zosyn,Vanco, given solumedrol and started on Prednisone.  Clinically improved, MS back to baseline but still w coughs.    CT c/a/p 12/14/19 when compared w 11/2019 incr left base [id number] pulm nodule, was 7mm, as well as melvina subcentimeter nodules, mimld-mod abdomen/pelvic LADs, up to 4.2x2.2cm distal right external iliac chain. For IR bx tomorrow    Pt w chronic sweats since childhood, no anorexia/weight loss.  Recent virtual colonoscopy w polyps for colonoscopy pending respiratory status.    CBC show on adm WBC 5+, incr to 9+, now decr to 2.09 today, Hgb 9+, plts 90-70k  Old labs on computer show dating back to 2017 plts  w occ lower.    -pancytopenia- sl thrombocytopenia for at least 2 years, present levels suspect due to decompensation during acute illness. Also may be drug effect as was on Zosyn and Vanco. Initial more normal WBC may be due to high dose steroid, now decreased as on lower dose. Also in view of the LADs seen on CT scans, maybe due to underlying disease, ?lymphoma ?bone marrow involvement. Stable at this level, no acute intervention needed. Will check iron studies(rissa as MCV low), B12, folate, thyroid fcn as part of anemia w/u.  B12 def. Despite normal diet. Start B12 injections  Folate adequate  Ferritin adequate, unlikely Fe def. Pattern c/w anemia chronic diasease    -lymphadenopathy, pulmonary nodules-?lymphoma, for LN bx w IR, Ideally should be excisional bx, if specimen via IR not adequate, will then need surgical consult for excisional bx.  s/p IR bx 12/18/19  flow cytometry not diagnostic few viable cells      RECOMMNED  follow serial CBC  Continue B12 1000mcg IM daily x10d then transition to weekly x4, thereafter monthly.     WBC stable improving.   OK from heme perspective for DC, and follow in office.   DC pending improvement in resp status.     Follow CBC to determine when to resume DVT prophylaxis, commensurate with adequate plts counts.   For now hold anticoagulation as plts borderline.     discussed w pt, wife, patient.   discussed w [facility] team RN.     if discharged today to followup on pathology as an outpatient

## 2019-12-20 NOTE — DISCHARGE NOTE PROVIDER - NSDCMRMEDTOKEN_GEN_ALL_CORE_FT
aspirin 81 mg oral delayed release tablet: 1 tab(s) orally once a day  atorvastatin 10 mg oral tablet: 1 tab(s) orally once a day (at bedtime)  budesonide-formoterol 160 mcg-4.5 mcg/inh inhalation aerosol: 1 puff(s) inhaled 2 times a day  Colace 100 mg oral capsule: 1 tab(s) orally 3 times a day  folic acid 1 mg oral tablet: 1 tab(s) orally once a day  imipramine 25 mg oral tablet: 1 tab(s) orally once a day  ipratropium-albuterol 0.5 mg-2.5 mg/3 mLinhalation solution: 3 milliliter(s) inhaled every 6 hours  predniSONE 10 mg oral tablet: 1 tab(s) orally once a day  Protonix 40 mg oral delayed release tablet: 1 tab(s) orally once a day  ROLLING WALKER: 1 each buccal once a day   Vitamin B12 1000 mcg oral tablet: 1 tab(s) orally once a day  ZyrTEC 10 mg oral tablet: 1 tab(s) orally once a day
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
## 2019-12-20 NOTE — PHYSICAL THERAPY INITIAL EVALUATION ADULT - ORIENTATION, REHAB EVAL
person/place Non-Graft Cartilage Fenestration Text: The cartilage was fenestrated with a 2mm punch biopsy to help facilitate healing.

## 2019-12-20 NOTE — PROGRESS NOTE ADULT - REASON FOR ADMISSION
fever, cough with brownish expectoration.

## 2019-12-20 NOTE — PHYSICAL THERAPY INITIAL EVALUATION ADULT - PATIENT/FAMILY AGREES WITH PLAN
Problem: Patient Care Overview (Adult)  Goal: Plan of Care Review  Outcome: Ongoing (interventions implemented as appropriate)    01/19/17 5461   Coping/Psychosocial Response Interventions   Plan Of Care Reviewed With patient;spouse   Patient Care Overview   Progress improving   Outcome Evaluation   Outcome Summary/Follow up Plan intermittent nausea. medicated x1. ate 1/2 cup of jello. repeat CTA of head tmr. 1x 6 beat run of vtach.          Problem: Acute Coronary Syndrome (ACS) (Adult)  Goal: Signs and Symptoms of Listed Potential Problems Will be Absent or Manageable (Acute Coronary Syndrome)  Outcome: Ongoing (interventions implemented as appropriate)    Problem: Stroke (Ischemic) (Adult)  Goal: Signs and Symptoms of Listed Potential Problems Will be Absent or Manageable (Stroke)  Outcome: Ongoing (interventions implemented as appropriate)       yes

## 2019-12-20 NOTE — PROGRESS NOTE ADULT - SUBJECTIVE AND OBJECTIVE BOX
Neurology Follow up note    TAYLOR MCFADDEN 74y Male    HPI:  74 year old male with a history of COPD, DVT, HLD, HTN, CAD with stent, AVR, dementia presents with cough and AMS. Mental status is improved. c/o worsening of cough with brownish expectoration since yesterday. +fever since yesterday.  Patient has not been compliant with CPAP and paramedic reports that patient was sleeping flat.   He was 93% on RA and sluggish to respond to questions or follow commands.  He received a flu shot this year. Patient was admitted here 11/3-11/7 for the same symptoms and was diagnosed with CAP, COPD exacerbation, MILO, and thrombocytopenia. patient has persistently trending up s lactate level. clinically does not look septic, received 2 l and getting 3rd l of ivf. waiting for cT chest/abd/pelvis. (14 Dec 2019 12:49)      Interval History -    Patient is seen, chart was reviewed and case was discussed with the treatment team.  Pt is not in any distress.   Lying on bed comfortably.   No events reported overnight.   No clinical seizure was reported.  Sitting on chair bed comfortably.    is at bedside.    Vital Signs Last 24 Hrs  T(C): 36.7 (20 Dec 2019 08:15), Max: 38.8 (19 Dec 2019 20:30)  T(F): 98 (20 Dec 2019 08:15), Max: 101.9 (19 Dec 2019 20:30)  HR: 99 (20 Dec 2019 13:46) (93 - 109)  BP: 123/87 (20 Dec 2019 12:00) (115/76 - 139/67)  BP(mean): 96 (20 Dec 2019 12:00) (80 - 101)  RR: 22 (20 Dec 2019 12:00) (20 - 26)  SpO2: 99% (20 Dec 2019 13:46) (90% - 100%)        REVIEW OF SYSTEMS:    Constitutional: No fever, weight loss or fatigue  Eyes: No eye pain, visual disturbances, or discharge  ENT:  No difficulty hearing, tinnitus, vertigo; No sinus or throat pain  Neck: No pain or stiffness  Respiratory: No cough, wheezing, chills or hemoptysis  Cardiovascular: No chest pain, palpitations, shortness of breath, dizziness or leg swelling  Gastrointestinal: No abdominal or epigastric pain. No nausea, vomiting or hematemesis; No diarrhea or constipation. No melena or hematochezia.  Genitourinary: No dysuria, frequency, hematuria or incontinence  Neurological: No headaches, memory loss, loss of strength, numbness or tremors  Psychiatric: No depression, anxiety, mood swings or difficulty sleeping  Musculoskeletal: No joint pain or swelling; No muscle, back or extremity pain  Skin: No itching, burning, rashes or lesions   Lymph Nodes: No enlarged glands  Endocrine: No heat or cold intolerance; No hair loss, No h/o diabetes or thyroid dysfunction  Allergy and Immunologic: No hives or eczema    On Neurological Examination:    Mental Status - Pt is alert, awake, oriented X3. Higher functions are intact. Pt. does have mild poor cognition. Follows commands well and able to answer questions appropriately.    Speech -  Normal. Slurred. Pt has no aphasia.    Cranial Nerves - Pupils 3 mm equal and reactive to light, extraocular eye movements intact. Pt has no visual field deficit.  Pt has no right left facial asymmetry. Tongue - is in midline.    Motor Exam - 4 plus/5 all over, No drift. No shaking or tremors.  Muscle tone - is normal all over. Moves all extremities equally. No asymmetry is seen.      Sensory Exam - Pin prick, temperature, joint position and vibration are intact on either side. Pt withdraws all extremities equally on stimulation. No asymmetry seen. No complaints of tingling, numbness.    Gait - Able to stand and walk unassisted. Pt is able to stand up with holding my hands and is able to walk for few feet around the bed. Not falling to either side.    Deep tendon Reflexes - 2 plus all over.    Coordination - Fine finger movements are normal on both sides. Finger to nose is also normal on both sides.       Romberg - Negative.    Neck Supple -  Yes.     MEDICATIONS    acetaminophen   Tablet .. 650 milliGRAM(s) Oral every 6 hours PRN  albuterol/ipratropium for Nebulization 3 milliLiter(s) Nebulizer every 6 hours  aspirin enteric coated 81 milliGRAM(s) Oral daily  atorvastatin 10 milliGRAM(s) Oral at bedtime  budesonide 160 MICROgram(s)/formoterol 4.5 MICROgram(s) Inhaler 2 Puff(s) Inhalation two times a day  cyanocobalamin Injectable 1000 MICROGram(s) IntraMuscular daily  folic acid 1 milliGRAM(s) Oral daily  guaiFENesin   Syrup  (Sugar-Free) 100 milliGRAM(s) Oral every 6 hours PRN  imipramine 25 milliGRAM(s) Oral daily  magnesium hydroxide Suspension 30 milliLiter(s) Oral daily PRN  pantoprazole    Tablet 40 milliGRAM(s) Oral before breakfast  predniSONE   Tablet 10 milliGRAM(s) Oral daily  tiotropium 18 MICROgram(s) Capsule 1 Capsule(s) Inhalation daily      Allergies    No Known Allergies    Intolerances        LABS:    CBC Full  -  ( 19 Dec 2019 06:48 )  WBC Count : 2.91 K/uL  RBC Count : 4.93 M/uL  Hemoglobin : 10.4 g/dL  Hematocrit : 33.5 %  Platelet Count - Automated : 53 K/uL  Mean Cell Volume : 68.0 fl  Mean Cell Hemoglobin : 21.1 pg  Mean Cell Hemoglobin Concentration : 31.0 gm/dL  Auto Neutrophil # : x  Auto Lymphocyte # : x  Auto Monocyte # : x  Auto Eosinophil # : x  Auto Basophil # : x  Auto Neutrophil % : x  Auto Lymphocyte % : x  Auto Monocyte % : x  Auto Eosinophil % : x  Auto Basophil % : x      12-19    136  |  101  |  18  ----------------------------<  107<H>  3.8   |  26  |  1.30    Ca    8.5      19 Dec 2019 06:48  Phos  3.3     12-19  Mg     2.1     12-19      Hemoglobin A1C:     Vitamin B12     RADIOLOGY    ASSESSMENT AND PLAN:          Advanced care planning was discussed with family. Pt is full code.  Pt is accessed signs of Clinical depression. Gabby has no signs of depression.  Risk of falls accessed. Fall prevention and plan of care is in place.  Pt is screened for tobacco and alcohol use. Counseling was done for smoking and alcohol cessation. Pt doesn't smoke or drink.  Use of narcotic pain meds was discussed. Pt is advised to use narcotic meds wisely and to refrain from over using them.  Plan of care was discussed with patient family. Questions answered.  Would continue to follow. Neurology Follow up note    EBONIETAYLOR LAM 74y Male    HPI:  74 year old male with a history of COPD, DVT, HLD, HTN, CAD with stent, AVR, dementia presents with cough and AMS. Mental status is improved. c/o worsening of cough with brownish expectoration since yesterday. +fever since yesterday.  Patient has not been compliant with CPAP and paramedic reports that patient was sleeping flat.   He was 93% on RA and sluggish to respond to questions or follow commands.  He received a flu shot this year. Patient was admitted here 11/3-11/7 for the same symptoms and was diagnosed with CAP, COPD exacerbation, MILO, and thrombocytopenia. patient has persistently trending up s lactate level. clinically does not look septic, received 2 l and getting 3rd l of ivf. waiting for cT chest/abd/pelvis. (14 Dec 2019 12:49)      Interval History -    Patient is seen, chart was reviewed and case was discussed with the treatment team.  Pt is not in any distress.     Vital Signs Last 24 Hrs  T(C): 36.7 (20 Dec 2019 08:15), Max: 38.8 (19 Dec 2019 20:30)  T(F): 98 (20 Dec 2019 08:15), Max: 101.9 (19 Dec 2019 20:30)  HR: 99 (20 Dec 2019 13:46) (93 - 109)  BP: 123/87 (20 Dec 2019 12:00) (115/76 - 139/67)  BP(mean): 96 (20 Dec 2019 12:00) (80 - 101)  RR: 22 (20 Dec 2019 12:00) (20 - 26)  SpO2: 99% (20 Dec 2019 13:46) (90% - 100%)    MEDICATIONS    acetaminophen   Tablet .. 650 milliGRAM(s) Oral every 6 hours PRN  albuterol/ipratropium for Nebulization 3 milliLiter(s) Nebulizer every 6 hours  aspirin enteric coated 81 milliGRAM(s) Oral daily  atorvastatin 10 milliGRAM(s) Oral at bedtime  budesonide 160 MICROgram(s)/formoterol 4.5 MICROgram(s) Inhaler 2 Puff(s) Inhalation two times a day  cyanocobalamin Injectable 1000 MICROGram(s) IntraMuscular daily  folic acid 1 milliGRAM(s) Oral daily  guaiFENesin   Syrup  (Sugar-Free) 100 milliGRAM(s) Oral every 6 hours PRN  imipramine 25 milliGRAM(s) Oral daily  magnesium hydroxide Suspension 30 milliLiter(s) Oral daily PRN  pantoprazole    Tablet 40 milliGRAM(s) Oral before breakfast  predniSONE   Tablet 10 milliGRAM(s) Oral daily  tiotropium 18 MICROgram(s) Capsule 1 Capsule(s) Inhalation daily    Allergies    No Known Allergies    GENERAL: NAD, well-groomed, well-developed  HEAD:  Atraumatic, Normocephalic  EYES: EOMI, PERRLA, conjunctiva and sclera clear  NECK: Supple, No JVD, thyroid non-palpable    On Neurological Examination:    Mental Status - Pt is alert, awake. Poor cognition. Follows commands well and able to answer questions appropriately.    Speech -  Normal. Pt has no aphasia.    Cranial Nerves - Pupils 3 mm equal and reactive to light, extraocular eye movements intact. No facial asymmetry. Tongue - is in midline.    Motor Exam - 4 plus/5 all over, No drift. No shaking or tremors. No asymmetry is seen.      Sensory Exam - Pt withdraws all extremities equally on stimulation.     Gait - Pt is able to stand up with holding my hands and is able to walk for few feet around the bed. Not falling to either side.    Deep tendon Reflexes - 2 plus all over.    Coordination - Fine finger movements and finger to nose are normal on both sides.       Neck Supple -  Yes.    LABS:    CBC Full  -  ( 19 Dec 2019 06:48 )  WBC Count : 2.91 K/uL  RBC Count : 4.93 M/uL  Hemoglobin : 10.4 g/dL  Hematocrit : 33.5 %  Platelet Count - Automated : 53 K/uL  Mean Cell Volume : 68.0 fl  Mean Cell Hemoglobin : 21.1 pg  Mean Cell Hemoglobin Concentration : 31.0 gm/dL    12-19    136  |  101  |  18  ----------------------------<  107<H>  3.8   |  26  |  1.30    Ca    8.5      19 Dec 2019 06:48  Phos  3.3     12-19  Mg     2.1     12-19      RADIOLOGY & ADDITIONAL STUDIES:    < from: CT Head No Cont (12.14.19 @ 08:07) >  IMPRESSION:  No acute intracranial hemorrhage or acute territorial infarct.  If   symptoms persist, follow-up MRI exam recommended.      < end of copied text >

## 2019-12-20 NOTE — DISCHARGE NOTE PROVIDER - NSDCFUSCHEDAPPT_GEN_ALL_CORE_FT
TAYLOR MCFADDEN ; 01/03/2020 ; [facility] [address] ; 01/08/2020 ; [facility] [address]  TAYLOR MCFADDEN ; 01/21/2020 ; [facility] [address]

## 2019-12-20 NOTE — DISCHARGE NOTE PROVIDER - NSDCCPCAREPLAN_GEN_ALL_CORE_FT
PRINCIPAL DISCHARGE DIAGNOSIS  Diagnosis: Enlarged lymph nodes  Assessment and Plan of Treatment: You have had a biopsy of your lymph node and the results of this are pending.  This will need to be followed up.  You may continue to have fevers intermittently but if you start feeling SOB, weakness or fatigued please visit ED asap. You will need to follow up with your Hematologist and PCP.  Contact information is provided.

## 2020-01-01 ENCOUNTER — TRANSCRIPTION ENCOUNTER (OUTPATIENT)
Age: 75
End: 2020-01-01

## 2020-01-01 ENCOUNTER — APPOINTMENT (OUTPATIENT)
Dept: PULMONOLOGY | Facility: CLINIC | Age: 75
End: 2020-01-01
Payer: MEDICARE

## 2020-01-01 ENCOUNTER — APPOINTMENT (OUTPATIENT)
Dept: INTERNAL MEDICINE | Facility: CLINIC | Age: 75
End: 2020-01-01
Payer: MEDICARE

## 2020-01-01 ENCOUNTER — RESULT REVIEW (OUTPATIENT)
Age: 75
End: 2020-01-01

## 2020-01-01 ENCOUNTER — INPATIENT (INPATIENT)
Facility: HOSPITAL | Age: 75
LOS: 5 days | Discharge: HOME | DRG: 314 | End: 2020-02-19
Attending: INTERNAL MEDICINE | Admitting: INTERNAL MEDICINE
Payer: MEDICARE

## 2020-01-01 ENCOUNTER — LABORATORY RESULT (OUTPATIENT)
Age: 75
End: 2020-01-01

## 2020-01-01 ENCOUNTER — RX RENEWAL (OUTPATIENT)
Age: 75
End: 2020-01-01

## 2020-01-01 ENCOUNTER — APPOINTMENT (OUTPATIENT)
Dept: NEUROLOGY | Facility: CLINIC | Age: 75
End: 2020-01-01
Payer: MEDICARE

## 2020-01-01 ENCOUNTER — INPATIENT (INPATIENT)
Facility: HOSPITAL | Age: 75
LOS: 12 days | Discharge: HOME | DRG: 841 | End: 2020-01-21
Attending: HOSPITALIST | Admitting: HOSPITALIST
Payer: MEDICARE

## 2020-01-01 ENCOUNTER — APPOINTMENT (OUTPATIENT)
Dept: PULMONOLOGY | Facility: CLINIC | Age: 75
End: 2020-01-01

## 2020-01-01 ENCOUNTER — APPOINTMENT (OUTPATIENT)
Dept: GASTROENTEROLOGY | Facility: CLINIC | Age: 75
End: 2020-01-01

## 2020-01-01 ENCOUNTER — INPATIENT (INPATIENT)
Facility: HOSPITAL | Age: 75
LOS: 8 days | End: 2020-05-01
Attending: INTERNAL MEDICINE | Admitting: INTERNAL MEDICINE
Payer: MEDICARE

## 2020-01-01 ENCOUNTER — OUTPATIENT (OUTPATIENT)
Dept: OUTPATIENT SERVICES | Facility: HOSPITAL | Age: 75
LOS: 1 days | End: 2020-01-01
Payer: MEDICARE

## 2020-01-01 ENCOUNTER — INPATIENT (INPATIENT)
Facility: HOSPITAL | Age: 75
LOS: 2 days | Discharge: HOME | DRG: 189 | End: 2020-02-08
Attending: FAMILY MEDICINE | Admitting: INTERNAL MEDICINE
Payer: MEDICARE

## 2020-01-01 ENCOUNTER — INPATIENT (INPATIENT)
Facility: HOSPITAL | Age: 75
LOS: 1 days | Discharge: HOME | DRG: 175 | End: 2020-04-22
Attending: FAMILY MEDICINE | Admitting: FAMILY MEDICINE
Payer: MEDICARE

## 2020-01-01 ENCOUNTER — APPOINTMENT (OUTPATIENT)
Dept: INTERNAL MEDICINE | Facility: CLINIC | Age: 75
End: 2020-01-01

## 2020-01-01 VITALS
DIASTOLIC BLOOD PRESSURE: 79 MMHG | TEMPERATURE: 98 F | HEIGHT: 68 IN | SYSTOLIC BLOOD PRESSURE: 122 MMHG | WEIGHT: 246.92 LBS | RESPIRATION RATE: 14 BRPM | HEART RATE: 88 BPM | OXYGEN SATURATION: 99 %

## 2020-01-01 VITALS
HEART RATE: 98 BPM | TEMPERATURE: 98 F | WEIGHT: 227.74 LBS | DIASTOLIC BLOOD PRESSURE: 92 MMHG | RESPIRATION RATE: 19 BRPM | HEIGHT: 68 IN | SYSTOLIC BLOOD PRESSURE: 143 MMHG | OXYGEN SATURATION: 100 %

## 2020-01-01 VITALS — BODY MASS INDEX: 36.07 KG/M2 | WEIGHT: 238 LBS | OXYGEN SATURATION: 98 % | HEIGHT: 68 IN | HEART RATE: 90 BPM

## 2020-01-01 VITALS
OXYGEN SATURATION: 96 % | TEMPERATURE: 98 F | RESPIRATION RATE: 15 BRPM | HEART RATE: 75 BPM | SYSTOLIC BLOOD PRESSURE: 112 MMHG | DIASTOLIC BLOOD PRESSURE: 67 MMHG

## 2020-01-01 VITALS
SYSTOLIC BLOOD PRESSURE: 153 MMHG | OXYGEN SATURATION: 97 % | DIASTOLIC BLOOD PRESSURE: 102 MMHG | BODY MASS INDEX: 36.22 KG/M2 | WEIGHT: 239 LBS | HEIGHT: 68 IN | HEART RATE: 94 BPM

## 2020-01-01 VITALS — SYSTOLIC BLOOD PRESSURE: 128 MMHG | DIASTOLIC BLOOD PRESSURE: 78 MMHG

## 2020-01-01 VITALS
HEIGHT: 68 IN | HEART RATE: 78 BPM | TEMPERATURE: 97.9 F | WEIGHT: 251 LBS | BODY MASS INDEX: 38.04 KG/M2 | SYSTOLIC BLOOD PRESSURE: 123 MMHG | DIASTOLIC BLOOD PRESSURE: 75 MMHG | OXYGEN SATURATION: 100 %

## 2020-01-01 VITALS
DIASTOLIC BLOOD PRESSURE: 77 MMHG | TEMPERATURE: 98 F | SYSTOLIC BLOOD PRESSURE: 113 MMHG | RESPIRATION RATE: 17 BRPM | HEART RATE: 78 BPM | OXYGEN SATURATION: 98 %

## 2020-01-01 VITALS
WEIGHT: 251 LBS | TEMPERATURE: 98 F | HEIGHT: 68 IN | HEART RATE: 94 BPM | BODY MASS INDEX: 38.04 KG/M2 | OXYGEN SATURATION: 98 %

## 2020-01-01 VITALS
TEMPERATURE: 98 F | HEIGHT: 68 IN | WEIGHT: 246.92 LBS | OXYGEN SATURATION: 99 % | SYSTOLIC BLOOD PRESSURE: 124 MMHG | DIASTOLIC BLOOD PRESSURE: 82 MMHG | HEART RATE: 103 BPM | RESPIRATION RATE: 18 BRPM

## 2020-01-01 VITALS
WEIGHT: 229.94 LBS | OXYGEN SATURATION: 100 % | HEIGHT: 68 IN | HEART RATE: 106 BPM | DIASTOLIC BLOOD PRESSURE: 75 MMHG | RESPIRATION RATE: 30 BRPM | SYSTOLIC BLOOD PRESSURE: 133 MMHG

## 2020-01-01 VITALS — DIASTOLIC BLOOD PRESSURE: 78 MMHG | SYSTOLIC BLOOD PRESSURE: 128 MMHG

## 2020-01-01 VITALS — TEMPERATURE: 98 F

## 2020-01-01 VITALS
SYSTOLIC BLOOD PRESSURE: 144 MMHG | DIASTOLIC BLOOD PRESSURE: 85 MMHG | WEIGHT: 238.1 LBS | HEART RATE: 74 BPM | TEMPERATURE: 98 F | RESPIRATION RATE: 16 BRPM | HEIGHT: 68 IN | OXYGEN SATURATION: 96 %

## 2020-01-01 VITALS
RESPIRATION RATE: 22 BRPM | OXYGEN SATURATION: 96 % | DIASTOLIC BLOOD PRESSURE: 71 MMHG | TEMPERATURE: 100 F | HEIGHT: 68 IN | HEART RATE: 180 BPM | SYSTOLIC BLOOD PRESSURE: 105 MMHG | WEIGHT: 300.05 LBS

## 2020-01-01 VITALS — SYSTOLIC BLOOD PRESSURE: 129 MMHG | DIASTOLIC BLOOD PRESSURE: 78 MMHG

## 2020-01-01 VITALS — OXYGEN SATURATION: 98 % | HEIGHT: 68 IN | HEART RATE: 86 BPM | WEIGHT: 236 LBS | BODY MASS INDEX: 35.77 KG/M2

## 2020-01-01 VITALS
HEIGHT: 68 IN | HEART RATE: 94 BPM | OXYGEN SATURATION: 98 % | TEMPERATURE: 97.9 F | BODY MASS INDEX: 36.37 KG/M2 | WEIGHT: 240 LBS

## 2020-01-01 VITALS
DIASTOLIC BLOOD PRESSURE: 67 MMHG | TEMPERATURE: 98 F | SYSTOLIC BLOOD PRESSURE: 108 MMHG | OXYGEN SATURATION: 96 % | RESPIRATION RATE: 16 BRPM | HEART RATE: 81 BPM

## 2020-01-01 VITALS
SYSTOLIC BLOOD PRESSURE: 146 MMHG | DIASTOLIC BLOOD PRESSURE: 89 MMHG | TEMPERATURE: 99 F | OXYGEN SATURATION: 95 % | HEART RATE: 99 BPM | RESPIRATION RATE: 16 BRPM

## 2020-01-01 VITALS — DIASTOLIC BLOOD PRESSURE: 80 MMHG | SYSTOLIC BLOOD PRESSURE: 134 MMHG

## 2020-01-01 VITALS
HEART RATE: 78 BPM | SYSTOLIC BLOOD PRESSURE: 123 MMHG | DIASTOLIC BLOOD PRESSURE: 79 MMHG | RESPIRATION RATE: 16 BRPM | OXYGEN SATURATION: 98 %

## 2020-01-01 VITALS
HEIGHT: 68 IN | SYSTOLIC BLOOD PRESSURE: 147 MMHG | HEART RATE: 71 BPM | OXYGEN SATURATION: 95 % | DIASTOLIC BLOOD PRESSURE: 78 MMHG | WEIGHT: 242.07 LBS | TEMPERATURE: 98 F | RESPIRATION RATE: 18 BRPM

## 2020-01-01 VITALS
HEIGHT: 68 IN | HEART RATE: 106 BPM | SYSTOLIC BLOOD PRESSURE: 166 MMHG | DIASTOLIC BLOOD PRESSURE: 96 MMHG | WEIGHT: 300.05 LBS

## 2020-01-01 VITALS
SYSTOLIC BLOOD PRESSURE: 129 MMHG | OXYGEN SATURATION: 98 % | TEMPERATURE: 98 F | DIASTOLIC BLOOD PRESSURE: 76 MMHG | RESPIRATION RATE: 19 BRPM | WEIGHT: 257.06 LBS | HEART RATE: 87 BPM

## 2020-01-01 VITALS — OXYGEN SATURATION: 96 % | HEART RATE: 91 BPM | BODY MASS INDEX: 41.36 KG/M2 | WEIGHT: 272 LBS

## 2020-01-01 VITALS
TEMPERATURE: 98 F | OXYGEN SATURATION: 98 % | RESPIRATION RATE: 18 BRPM | SYSTOLIC BLOOD PRESSURE: 167 MMHG | DIASTOLIC BLOOD PRESSURE: 99 MMHG | HEART RATE: 84 BPM

## 2020-01-01 VITALS — OXYGEN SATURATION: 99 %

## 2020-01-01 VITALS — DIASTOLIC BLOOD PRESSURE: 80 MMHG | SYSTOLIC BLOOD PRESSURE: 122 MMHG

## 2020-01-01 DIAGNOSIS — Z95.3 PRESENCE OF XENOGENIC HEART VALVE: Chronic | ICD-10-CM

## 2020-01-01 DIAGNOSIS — C81.90 HODGKIN LYMPHOMA, UNSPECIFIED, UNSPECIFIED SITE: ICD-10-CM

## 2020-01-01 DIAGNOSIS — D61.818 OTHER PANCYTOPENIA: ICD-10-CM

## 2020-01-01 DIAGNOSIS — R06.02 SHORTNESS OF BREATH: ICD-10-CM

## 2020-01-01 DIAGNOSIS — Z95.0 PRESENCE OF CARDIAC PACEMAKER: ICD-10-CM

## 2020-01-01 DIAGNOSIS — C85.90 NON-HODGKIN LYMPHOMA, UNSPECIFIED, UNSPECIFIED SITE: ICD-10-CM

## 2020-01-01 DIAGNOSIS — N17.9 ACUTE KIDNEY FAILURE, UNSPECIFIED: ICD-10-CM

## 2020-01-01 DIAGNOSIS — Z95.0 PRESENCE OF CARDIAC PACEMAKER: Chronic | ICD-10-CM

## 2020-01-01 DIAGNOSIS — R91.1 SOLITARY PULMONARY NODULE: ICD-10-CM

## 2020-01-01 DIAGNOSIS — E78.5 HYPERLIPIDEMIA, UNSPECIFIED: ICD-10-CM

## 2020-01-01 DIAGNOSIS — I82.621 ACUTE EMBOLISM AND THROMBOSIS OF DEEP VEINS OF RIGHT UPPER EXTREMITY: ICD-10-CM

## 2020-01-01 DIAGNOSIS — R09.89 OTHER SPECIFIED SYMPTOMS AND SIGNS INVOLVING THE CIRCULATORY AND RESPIRATORY SYSTEMS: ICD-10-CM

## 2020-01-01 DIAGNOSIS — I82.409 ACUTE EMBOLISM AND THROMBOSIS OF UNSPECIFIED DEEP VEINS OF UNSPECIFIED LOWER EXTREMITY: ICD-10-CM

## 2020-01-01 DIAGNOSIS — Z29.9 ENCOUNTER FOR PROPHYLACTIC MEASURES, UNSPECIFIED: ICD-10-CM

## 2020-01-01 DIAGNOSIS — I10 ESSENTIAL (PRIMARY) HYPERTENSION: ICD-10-CM

## 2020-01-01 DIAGNOSIS — I25.10 ATHEROSCLEROTIC HEART DISEASE OF NATIVE CORONARY ARTERY WITHOUT ANGINA PECTORIS: ICD-10-CM

## 2020-01-01 DIAGNOSIS — I80.8 PHLEBITIS AND THROMBOPHLEBITIS OF OTHER SITES: ICD-10-CM

## 2020-01-01 DIAGNOSIS — R52 PAIN, UNSPECIFIED: ICD-10-CM

## 2020-01-01 DIAGNOSIS — Z95.2 PRESENCE OF PROSTHETIC HEART VALVE: Chronic | ICD-10-CM

## 2020-01-01 DIAGNOSIS — F03.90 UNSPECIFIED DEMENTIA WITHOUT BEHAVIORAL DISTURBANCE: ICD-10-CM

## 2020-01-01 DIAGNOSIS — E53.8 DEFICIENCY OF OTHER SPECIFIED B GROUP VITAMINS: ICD-10-CM

## 2020-01-01 DIAGNOSIS — C81.73: ICD-10-CM

## 2020-01-01 DIAGNOSIS — A41.9 SEPSIS, UNSPECIFIED ORGANISM: ICD-10-CM

## 2020-01-01 DIAGNOSIS — I26.99 OTHER PULMONARY EMBOLISM WITHOUT ACUTE COR PULMONALE: ICD-10-CM

## 2020-01-01 DIAGNOSIS — I71.2 THORACIC AORTIC ANEURYSM, WITHOUT RUPTURE: ICD-10-CM

## 2020-01-01 DIAGNOSIS — F03.91 UNSPECIFIED DEMENTIA WITH BEHAVIORAL DISTURBANCE: ICD-10-CM

## 2020-01-01 DIAGNOSIS — F32.9 MAJOR DEPRESSIVE DISORDER, SINGLE EPISODE, UNSPECIFIED: ICD-10-CM

## 2020-01-01 DIAGNOSIS — R06.03 ACUTE RESPIRATORY DISTRESS: ICD-10-CM

## 2020-01-01 DIAGNOSIS — G31.84 MILD COGNITIVE IMPAIRMENT, SO STATED: ICD-10-CM

## 2020-01-01 DIAGNOSIS — K59.00 CONSTIPATION, UNSPECIFIED: ICD-10-CM

## 2020-01-01 DIAGNOSIS — J15.9 UNSPECIFIED BACTERIAL PNEUMONIA: ICD-10-CM

## 2020-01-01 DIAGNOSIS — J44.9 CHRONIC OBSTRUCTIVE PULMONARY DISEASE, UNSPECIFIED: ICD-10-CM

## 2020-01-01 DIAGNOSIS — D64.9 ANEMIA, UNSPECIFIED: ICD-10-CM

## 2020-01-01 DIAGNOSIS — D61.810 ANTINEOPLASTIC CHEMOTHERAPY INDUCED PANCYTOPENIA: ICD-10-CM

## 2020-01-01 DIAGNOSIS — T80.219A UNSPECIFIED INFECTION DUE TO CENTRAL VENOUS CATHETER, INITIAL ENCOUNTER: ICD-10-CM

## 2020-01-01 DIAGNOSIS — K21.9 GASTRO-ESOPHAGEAL REFLUX DISEASE W/OUT ESOPHAGITIS: ICD-10-CM

## 2020-01-01 DIAGNOSIS — G47.33 OBSTRUCTIVE SLEEP APNEA (ADULT) (PEDIATRIC): ICD-10-CM

## 2020-01-01 DIAGNOSIS — Z87.891 PERSONAL HISTORY OF NICOTINE DEPENDENCE: ICD-10-CM

## 2020-01-01 DIAGNOSIS — Z51.5 ENCOUNTER FOR PALLIATIVE CARE: ICD-10-CM

## 2020-01-01 DIAGNOSIS — R09.02 HYPOXEMIA: ICD-10-CM

## 2020-01-01 DIAGNOSIS — J18.9 PNEUMONIA, UNSPECIFIED ORGANISM: ICD-10-CM

## 2020-01-01 DIAGNOSIS — I25.10 ATHEROSCLEROTIC HEART DISEASE OF NATIVE CORONARY ARTERY W/OUT ANGINA PECTORIS: ICD-10-CM

## 2020-01-01 DIAGNOSIS — R50.9 FEVER, UNSPECIFIED: ICD-10-CM

## 2020-01-01 DIAGNOSIS — Z87.09 PERSONAL HISTORY OF OTHER DISEASES OF THE RESPIRATORY SYSTEM: ICD-10-CM

## 2020-01-01 DIAGNOSIS — R41.82 ALTERED MENTAL STATUS, UNSPECIFIED: ICD-10-CM

## 2020-01-01 DIAGNOSIS — D68.9 COAGULATION DEFECT, UNSPECIFIED: ICD-10-CM

## 2020-01-01 DIAGNOSIS — I50.32 CHRONIC DIASTOLIC (CONGESTIVE) HEART FAILURE: ICD-10-CM

## 2020-01-01 DIAGNOSIS — J96.91 RESPIRATORY FAILURE, UNSPECIFIED WITH HYPOXIA: ICD-10-CM

## 2020-01-01 DIAGNOSIS — Z95.2 PRESENCE OF PROSTHETIC HEART VALVE: ICD-10-CM

## 2020-01-01 LAB
ALBUMIN SERPL ELPH-MCNC: 2.4 G/DL — LOW (ref 3.3–5)
ALBUMIN SERPL ELPH-MCNC: 2.5 G/DL — LOW (ref 3.3–5)
ALBUMIN SERPL ELPH-MCNC: 2.7 G/DL — LOW (ref 3.3–5)
ALBUMIN SERPL ELPH-MCNC: 2.7 G/DL — LOW (ref 3.3–5)
ALBUMIN SERPL ELPH-MCNC: 2.8 G/DL — LOW (ref 3.3–5)
ALBUMIN SERPL ELPH-MCNC: 2.9 G/DL — LOW (ref 3.3–5)
ALBUMIN SERPL ELPH-MCNC: 2.9 G/DL — LOW (ref 3.3–5)
ALBUMIN SERPL ELPH-MCNC: 3 G/DL — LOW (ref 3.3–5)
ALBUMIN SERPL ELPH-MCNC: 3 G/DL — LOW (ref 3.3–5)
ALBUMIN SERPL ELPH-MCNC: 3.1 G/DL — LOW (ref 3.3–5)
ALBUMIN SERPL ELPH-MCNC: 3.1 G/DL — LOW (ref 3.3–5)
ALBUMIN SERPL ELPH-MCNC: 3.2 G/DL — LOW (ref 3.3–5)
ALBUMIN SERPL ELPH-MCNC: 3.3 G/DL — SIGNIFICANT CHANGE UP (ref 3.3–5)
ALBUMIN SERPL ELPH-MCNC: 4 G/DL
ALP BLD-CCNC: 65 U/L
ALP SERPL-CCNC: 102 U/L — SIGNIFICANT CHANGE UP (ref 40–120)
ALP SERPL-CCNC: 106 U/L — SIGNIFICANT CHANGE UP (ref 40–120)
ALP SERPL-CCNC: 108 U/L — SIGNIFICANT CHANGE UP (ref 40–120)
ALP SERPL-CCNC: 134 U/L — HIGH (ref 40–120)
ALP SERPL-CCNC: 151 U/L — HIGH (ref 40–120)
ALP SERPL-CCNC: 184 U/L — HIGH (ref 40–120)
ALP SERPL-CCNC: 185 U/L — HIGH (ref 40–120)
ALP SERPL-CCNC: 186 U/L — HIGH (ref 40–120)
ALP SERPL-CCNC: 190 U/L — HIGH (ref 40–120)
ALP SERPL-CCNC: 197 U/L — HIGH (ref 40–120)
ALP SERPL-CCNC: 65 U/L — SIGNIFICANT CHANGE UP (ref 40–120)
ALP SERPL-CCNC: 71 U/L — SIGNIFICANT CHANGE UP (ref 30–120)
ALP SERPL-CCNC: 73 U/L — SIGNIFICANT CHANGE UP (ref 40–120)
ALP SERPL-CCNC: 77 U/L — SIGNIFICANT CHANGE UP (ref 30–120)
ALP SERPL-CCNC: 83 U/L — SIGNIFICANT CHANGE UP (ref 40–120)
ALP SERPL-CCNC: 85 U/L — SIGNIFICANT CHANGE UP (ref 40–120)
ALP SERPL-CCNC: 87 U/L — SIGNIFICANT CHANGE UP (ref 40–120)
ALP SERPL-CCNC: 88 U/L — SIGNIFICANT CHANGE UP (ref 40–120)
ALP SERPL-CCNC: 89 U/L — SIGNIFICANT CHANGE UP (ref 40–120)
ALP SERPL-CCNC: 92 U/L — SIGNIFICANT CHANGE UP (ref 40–120)
ALP SERPL-CCNC: 94 U/L — SIGNIFICANT CHANGE UP (ref 40–120)
ALT FLD-CCNC: 11 U/L DA — SIGNIFICANT CHANGE UP (ref 10–60)
ALT FLD-CCNC: 12 U/L — SIGNIFICANT CHANGE UP (ref 4–41)
ALT FLD-CCNC: 14 U/L DA — SIGNIFICANT CHANGE UP (ref 10–60)
ALT FLD-CCNC: 14 U/L — SIGNIFICANT CHANGE UP (ref 4–41)
ALT FLD-CCNC: 14 U/L — SIGNIFICANT CHANGE UP (ref 4–41)
ALT FLD-CCNC: 16 U/L — SIGNIFICANT CHANGE UP (ref 12–78)
ALT FLD-CCNC: 16 U/L — SIGNIFICANT CHANGE UP (ref 4–41)
ALT FLD-CCNC: 16 U/L — SIGNIFICANT CHANGE UP (ref 4–41)
ALT FLD-CCNC: 18 U/L — SIGNIFICANT CHANGE UP (ref 12–78)
ALT FLD-CCNC: 19 U/L — SIGNIFICANT CHANGE UP (ref 12–78)
ALT FLD-CCNC: 20 U/L — SIGNIFICANT CHANGE UP (ref 12–78)
ALT FLD-CCNC: 21 U/L — SIGNIFICANT CHANGE UP (ref 12–78)
ALT FLD-CCNC: 23 U/L — SIGNIFICANT CHANGE UP (ref 12–78)
ALT FLD-CCNC: 23 U/L — SIGNIFICANT CHANGE UP (ref 4–41)
ALT FLD-CCNC: 23 U/L — SIGNIFICANT CHANGE UP (ref 4–41)
ALT FLD-CCNC: 28 U/L — SIGNIFICANT CHANGE UP (ref 12–78)
ALT FLD-CCNC: 29 U/L — SIGNIFICANT CHANGE UP (ref 12–78)
ALT FLD-CCNC: 30 U/L — SIGNIFICANT CHANGE UP (ref 12–78)
ALT FLD-CCNC: 34 U/L — SIGNIFICANT CHANGE UP (ref 12–78)
ALT FLD-CCNC: 43 U/L — SIGNIFICANT CHANGE UP (ref 12–78)
ALT FLD-CCNC: 56 U/L — SIGNIFICANT CHANGE UP (ref 12–78)
ALT SERPL-CCNC: 14 U/L
ANION GAP SERPL CALC-SCNC: 10 MMOL/L — SIGNIFICANT CHANGE UP (ref 5–17)
ANION GAP SERPL CALC-SCNC: 11 MMO/L — SIGNIFICANT CHANGE UP (ref 7–14)
ANION GAP SERPL CALC-SCNC: 11 MMOL/L — SIGNIFICANT CHANGE UP (ref 5–17)
ANION GAP SERPL CALC-SCNC: 12 MMO/L — SIGNIFICANT CHANGE UP (ref 7–14)
ANION GAP SERPL CALC-SCNC: 12 MMO/L — SIGNIFICANT CHANGE UP (ref 7–14)
ANION GAP SERPL CALC-SCNC: 12 MMOL/L — SIGNIFICANT CHANGE UP (ref 5–17)
ANION GAP SERPL CALC-SCNC: 14 MMO/L — SIGNIFICANT CHANGE UP (ref 7–14)
ANION GAP SERPL CALC-SCNC: 14 MMOL/L — SIGNIFICANT CHANGE UP (ref 5–17)
ANION GAP SERPL CALC-SCNC: 15 MMO/L — HIGH (ref 7–14)
ANION GAP SERPL CALC-SCNC: 16 MMOL/L
ANION GAP SERPL CALC-SCNC: 18 MMO/L — HIGH (ref 7–14)
ANION GAP SERPL CALC-SCNC: 18 MMO/L — HIGH (ref 7–14)
ANION GAP SERPL CALC-SCNC: 19 MMO/L — HIGH (ref 7–14)
ANION GAP SERPL CALC-SCNC: 4 MMOL/L — LOW (ref 5–17)
ANION GAP SERPL CALC-SCNC: 7 MMOL/L — SIGNIFICANT CHANGE UP (ref 5–17)
ANION GAP SERPL CALC-SCNC: 8 MMOL/L — SIGNIFICANT CHANGE UP (ref 5–17)
ANION GAP SERPL CALC-SCNC: 9 MMOL/L — SIGNIFICANT CHANGE UP (ref 5–17)
ANISOCYTOSIS BLD QL: SIGNIFICANT CHANGE UP
ANISOCYTOSIS BLD QL: SIGNIFICANT CHANGE UP
ANISOCYTOSIS BLD QL: SLIGHT — SIGNIFICANT CHANGE UP
APPEARANCE UR: ABNORMAL
APPEARANCE UR: CLEAR — SIGNIFICANT CHANGE UP
APTT 50/50 2HOUR INCUB: 44 SEC — HIGH (ref 27.5–37.4)
APTT BLD: 103.1 SEC — HIGH (ref 28.5–37)
APTT BLD: 163.9 SEC — CRITICAL HIGH (ref 28.5–37)
APTT BLD: 25.3 SEC — LOW (ref 28.5–37)
APTT BLD: 25.9 SEC — LOW (ref 28.5–37)
APTT BLD: 31.2 SEC — SIGNIFICANT CHANGE UP (ref 28.5–37)
APTT BLD: 31.4 SEC — SIGNIFICANT CHANGE UP (ref 27.5–36.3)
APTT BLD: 31.8 SEC — SIGNIFICANT CHANGE UP (ref 28.5–37)
APTT BLD: 31.9 SEC — SIGNIFICANT CHANGE UP (ref 27.5–36.3)
APTT BLD: 31.9 SEC — SIGNIFICANT CHANGE UP (ref 28.5–37)
APTT BLD: 35.5 SEC — SIGNIFICANT CHANGE UP (ref 27.5–37.4)
APTT BLD: 35.5 SEC — SIGNIFICANT CHANGE UP (ref 28.5–37)
APTT BLD: 38 SEC — HIGH (ref 28.5–37)
APTT BLD: 38.4 SEC — HIGH (ref 28.5–37)
APTT BLD: 38.6 SEC — HIGH (ref 27.5–36.3)
APTT BLD: 41.1 SEC — HIGH (ref 28.5–37)
APTT BLD: 57.2 SEC — HIGH (ref 28.5–37)
APTT BLD: 63.3 SEC — HIGH (ref 28.5–37)
APTT BLD: 64.6 SEC — HIGH (ref 28.5–37)
APTT BLD: 68.2 SEC — HIGH (ref 28.5–37)
APTT BLD: 87.9 SEC — HIGH (ref 28.5–37)
APTT BLD: 93.3 SEC — HIGH (ref 28.5–37)
AST SERPL-CCNC: 12 U/L — SIGNIFICANT CHANGE UP (ref 4–40)
AST SERPL-CCNC: 13 U/L — SIGNIFICANT CHANGE UP (ref 4–40)
AST SERPL-CCNC: 15 U/L — SIGNIFICANT CHANGE UP (ref 4–40)
AST SERPL-CCNC: 17 U/L — SIGNIFICANT CHANGE UP (ref 10–40)
AST SERPL-CCNC: 17 U/L — SIGNIFICANT CHANGE UP (ref 15–37)
AST SERPL-CCNC: 18 U/L
AST SERPL-CCNC: 18 U/L — SIGNIFICANT CHANGE UP (ref 4–40)
AST SERPL-CCNC: 19 U/L — SIGNIFICANT CHANGE UP (ref 10–40)
AST SERPL-CCNC: 19 U/L — SIGNIFICANT CHANGE UP (ref 15–37)
AST SERPL-CCNC: 20 U/L — SIGNIFICANT CHANGE UP (ref 15–37)
AST SERPL-CCNC: 20 U/L — SIGNIFICANT CHANGE UP (ref 4–40)
AST SERPL-CCNC: 21 U/L — SIGNIFICANT CHANGE UP (ref 15–37)
AST SERPL-CCNC: 23 U/L — SIGNIFICANT CHANGE UP (ref 15–37)
AST SERPL-CCNC: 24 U/L — SIGNIFICANT CHANGE UP (ref 15–37)
AST SERPL-CCNC: 29 U/L — SIGNIFICANT CHANGE UP (ref 4–40)
AST SERPL-CCNC: 34 U/L — SIGNIFICANT CHANGE UP (ref 15–37)
AST SERPL-CCNC: 37 U/L — SIGNIFICANT CHANGE UP (ref 15–37)
AST SERPL-CCNC: 41 U/L — HIGH (ref 15–37)
AST SERPL-CCNC: 45 U/L — HIGH (ref 15–37)
AST SERPL-CCNC: 8 U/L — SIGNIFICANT CHANGE UP (ref 4–40)
B PERT DNA SPEC QL NAA+PROBE: NOT DETECTED — SIGNIFICANT CHANGE UP
BACTERIA # UR AUTO: ABNORMAL
BASE EXCESS BLDA CALC-SCNC: -0.9 MMOL/L — SIGNIFICANT CHANGE UP (ref -2–2)
BASE EXCESS BLDA CALC-SCNC: -1.4 MMOL/L — SIGNIFICANT CHANGE UP
BASE EXCESS BLDA CALC-SCNC: -3.2 MMOL/L — SIGNIFICANT CHANGE UP
BASE EXCESS BLDA CALC-SCNC: -6.2 MMOL/L — SIGNIFICANT CHANGE UP
BASE EXCESS BLDA CALC-SCNC: 0.3 MMOL/L — SIGNIFICANT CHANGE UP (ref -2–2)
BASE EXCESS BLDV CALC-SCNC: -3.9 MMOL/L — LOW (ref -2–2)
BASE EXCESS BLDV CALC-SCNC: 1.4 MMOL/L — SIGNIFICANT CHANGE UP (ref -2–2)
BASE EXCESS BLDV CALC-SCNC: 4.8 MMOL/L — HIGH (ref -2–2)
BASOPHILS # BLD AUTO: 0 K/UL — SIGNIFICANT CHANGE UP (ref 0–0.2)
BASOPHILS # BLD AUTO: 0.01 K/UL — SIGNIFICANT CHANGE UP (ref 0–0.2)
BASOPHILS # BLD AUTO: 0.01 K/UL — SIGNIFICANT CHANGE UP (ref 0–0.2)
BASOPHILS # BLD AUTO: 0.02 K/UL — SIGNIFICANT CHANGE UP (ref 0–0.2)
BASOPHILS # BLD AUTO: 0.03 K/UL — SIGNIFICANT CHANGE UP (ref 0–0.2)
BASOPHILS # BLD AUTO: 0.05 K/UL — SIGNIFICANT CHANGE UP (ref 0–0.2)
BASOPHILS # BLD AUTO: 0.06 K/UL — SIGNIFICANT CHANGE UP (ref 0–0.2)
BASOPHILS # BLD AUTO: 0.06 K/UL — SIGNIFICANT CHANGE UP (ref 0–0.2)
BASOPHILS # BLD AUTO: 0.07 K/UL — SIGNIFICANT CHANGE UP (ref 0–0.2)
BASOPHILS # BLD AUTO: 0.08 K/UL — SIGNIFICANT CHANGE UP (ref 0–0.2)
BASOPHILS # BLD AUTO: 0.14 K/UL
BASOPHILS NFR BLD AUTO: 0 % — SIGNIFICANT CHANGE UP (ref 0–2)
BASOPHILS NFR BLD AUTO: 0.1 % — SIGNIFICANT CHANGE UP (ref 0–2)
BASOPHILS NFR BLD AUTO: 0.2 % — SIGNIFICANT CHANGE UP (ref 0–2)
BASOPHILS NFR BLD AUTO: 0.3 % — SIGNIFICANT CHANGE UP (ref 0–2)
BASOPHILS NFR BLD AUTO: 0.5 % — SIGNIFICANT CHANGE UP (ref 0–2)
BASOPHILS NFR BLD AUTO: 0.7 % — SIGNIFICANT CHANGE UP (ref 0–2)
BASOPHILS NFR BLD AUTO: 0.8 % — SIGNIFICANT CHANGE UP (ref 0–2)
BASOPHILS NFR BLD AUTO: 1 % — SIGNIFICANT CHANGE UP (ref 0–2)
BASOPHILS NFR BLD AUTO: 1 % — SIGNIFICANT CHANGE UP (ref 0–2)
BASOPHILS NFR BLD AUTO: 1.2 % — SIGNIFICANT CHANGE UP (ref 0–2)
BASOPHILS NFR BLD AUTO: 1.3 % — SIGNIFICANT CHANGE UP (ref 0–2)
BASOPHILS NFR BLD AUTO: 1.5 % — SIGNIFICANT CHANGE UP (ref 0–2)
BASOPHILS NFR BLD AUTO: 1.9 % — SIGNIFICANT CHANGE UP (ref 0–2)
BASOPHILS NFR BLD AUTO: 2 % — SIGNIFICANT CHANGE UP (ref 0–2)
BASOPHILS NFR BLD AUTO: 2.2 % — HIGH (ref 0–2)
BASOPHILS NFR BLD AUTO: 5.3 %
BASOPHILS NFR SPEC: 0 % — SIGNIFICANT CHANGE UP (ref 0–2)
BILIRUB SERPL-MCNC: 0.3 MG/DL — SIGNIFICANT CHANGE UP (ref 0.2–1.2)
BILIRUB SERPL-MCNC: 0.3 MG/DL — SIGNIFICANT CHANGE UP (ref 0.2–1.2)
BILIRUB SERPL-MCNC: 0.4 MG/DL
BILIRUB SERPL-MCNC: 0.5 MG/DL — SIGNIFICANT CHANGE UP (ref 0.2–1.2)
BILIRUB SERPL-MCNC: 0.5 MG/DL — SIGNIFICANT CHANGE UP (ref 0.2–1.2)
BILIRUB SERPL-MCNC: 0.6 MG/DL — SIGNIFICANT CHANGE UP (ref 0.2–1.2)
BILIRUB SERPL-MCNC: 0.6 MG/DL — SIGNIFICANT CHANGE UP (ref 0.2–1.2)
BILIRUB SERPL-MCNC: 0.7 MG/DL — SIGNIFICANT CHANGE UP (ref 0.2–1.2)
BILIRUB SERPL-MCNC: 0.8 MG/DL — SIGNIFICANT CHANGE UP (ref 0.2–1.2)
BILIRUB SERPL-MCNC: 0.9 MG/DL — SIGNIFICANT CHANGE UP (ref 0.2–1.2)
BILIRUB SERPL-MCNC: 0.9 MG/DL — SIGNIFICANT CHANGE UP (ref 0.2–1.2)
BILIRUB SERPL-MCNC: 1 MG/DL — SIGNIFICANT CHANGE UP (ref 0.2–1.2)
BILIRUB SERPL-MCNC: 1 MG/DL — SIGNIFICANT CHANGE UP (ref 0.2–1.2)
BILIRUB SERPL-MCNC: 1.1 MG/DL — SIGNIFICANT CHANGE UP (ref 0.2–1.2)
BILIRUB SERPL-MCNC: 1.2 MG/DL — SIGNIFICANT CHANGE UP (ref 0.2–1.2)
BILIRUB SERPL-MCNC: 1.3 MG/DL — HIGH (ref 0.2–1.2)
BILIRUB UR-MCNC: ABNORMAL
BILIRUB UR-MCNC: NEGATIVE — SIGNIFICANT CHANGE UP
BLASTS # FLD: 0 % — SIGNIFICANT CHANGE UP (ref 0–0)
BLD GP AB SCN SERPL QL: NEGATIVE — SIGNIFICANT CHANGE UP
BLOOD GAS ARTERIAL - FIO2: 100 — SIGNIFICANT CHANGE UP
BLOOD GAS ARTERIAL - FIO2: 100 — SIGNIFICANT CHANGE UP
BLOOD GAS COMMENTS ARTERIAL: SIGNIFICANT CHANGE UP
BLOOD GAS COMMENTS ARTERIAL: SIGNIFICANT CHANGE UP
BLOOD GAS COMMENTS, VENOUS: SIGNIFICANT CHANGE UP
BUN SERPL-MCNC: 10 MG/DL — SIGNIFICANT CHANGE UP (ref 7–23)
BUN SERPL-MCNC: 11 MG/DL — SIGNIFICANT CHANGE UP (ref 7–23)
BUN SERPL-MCNC: 12 MG/DL — SIGNIFICANT CHANGE UP (ref 7–23)
BUN SERPL-MCNC: 13 MG/DL — SIGNIFICANT CHANGE UP (ref 7–23)
BUN SERPL-MCNC: 14 MG/DL — SIGNIFICANT CHANGE UP (ref 7–23)
BUN SERPL-MCNC: 14 MG/DL — SIGNIFICANT CHANGE UP (ref 7–23)
BUN SERPL-MCNC: 15 MG/DL — SIGNIFICANT CHANGE UP (ref 7–23)
BUN SERPL-MCNC: 16 MG/DL — SIGNIFICANT CHANGE UP (ref 7–23)
BUN SERPL-MCNC: 16 MG/DL — SIGNIFICANT CHANGE UP (ref 7–23)
BUN SERPL-MCNC: 17 MG/DL — SIGNIFICANT CHANGE UP (ref 7–23)
BUN SERPL-MCNC: 17 MG/DL — SIGNIFICANT CHANGE UP (ref 7–23)
BUN SERPL-MCNC: 18 MG/DL — SIGNIFICANT CHANGE UP (ref 7–23)
BUN SERPL-MCNC: 19 MG/DL — SIGNIFICANT CHANGE UP (ref 7–23)
BUN SERPL-MCNC: 20 MG/DL — SIGNIFICANT CHANGE UP (ref 7–23)
BUN SERPL-MCNC: 21 MG/DL — SIGNIFICANT CHANGE UP (ref 7–23)
BUN SERPL-MCNC: 21 MG/DL — SIGNIFICANT CHANGE UP (ref 7–23)
BUN SERPL-MCNC: 22 MG/DL — SIGNIFICANT CHANGE UP (ref 7–23)
BUN SERPL-MCNC: 23 MG/DL — SIGNIFICANT CHANGE UP (ref 7–23)
BUN SERPL-MCNC: 23 MG/DL — SIGNIFICANT CHANGE UP (ref 7–23)
BUN SERPL-MCNC: 25 MG/DL — HIGH (ref 7–23)
BUN SERPL-MCNC: 25 MG/DL — HIGH (ref 7–23)
BUN SERPL-MCNC: 26 MG/DL — HIGH (ref 7–23)
BUN SERPL-MCNC: 9 MG/DL
BUN SERPL-MCNC: 9 MG/DL — SIGNIFICANT CHANGE UP (ref 7–23)
C PNEUM DNA SPEC QL NAA+PROBE: NOT DETECTED — SIGNIFICANT CHANGE UP
CA-I BLDA-SCNC: 1.08 MMOL/L — LOW (ref 1.15–1.29)
CALCIUM SERPL-MCNC: 7.4 MG/DL — LOW (ref 8.5–10.1)
CALCIUM SERPL-MCNC: 7.6 MG/DL — LOW (ref 8.5–10.1)
CALCIUM SERPL-MCNC: 7.6 MG/DL — LOW (ref 8.5–10.1)
CALCIUM SERPL-MCNC: 7.7 MG/DL — LOW (ref 8.5–10.1)
CALCIUM SERPL-MCNC: 7.8 MG/DL — LOW (ref 8.5–10.1)
CALCIUM SERPL-MCNC: 7.8 MG/DL — LOW (ref 8.5–10.1)
CALCIUM SERPL-MCNC: 7.9 MG/DL — LOW (ref 8.5–10.1)
CALCIUM SERPL-MCNC: 7.9 MG/DL — LOW (ref 8.5–10.1)
CALCIUM SERPL-MCNC: 8 MG/DL — LOW (ref 8.5–10.1)
CALCIUM SERPL-MCNC: 8 MG/DL — LOW (ref 8.5–10.1)
CALCIUM SERPL-MCNC: 8.1 MG/DL — LOW (ref 8.5–10.1)
CALCIUM SERPL-MCNC: 8.2 MG/DL — LOW (ref 8.4–10.5)
CALCIUM SERPL-MCNC: 8.2 MG/DL — LOW (ref 8.5–10.1)
CALCIUM SERPL-MCNC: 8.3 MG/DL — LOW (ref 8.5–10.1)
CALCIUM SERPL-MCNC: 8.3 MG/DL — LOW (ref 8.5–10.1)
CALCIUM SERPL-MCNC: 8.4 MG/DL — LOW (ref 8.5–10.1)
CALCIUM SERPL-MCNC: 8.4 MG/DL — LOW (ref 8.5–10.1)
CALCIUM SERPL-MCNC: 8.5 MG/DL — SIGNIFICANT CHANGE UP (ref 8.5–10.1)
CALCIUM SERPL-MCNC: 8.6 MG/DL — SIGNIFICANT CHANGE UP (ref 8.5–10.1)
CALCIUM SERPL-MCNC: 8.7 MG/DL — SIGNIFICANT CHANGE UP (ref 8.5–10.1)
CALCIUM SERPL-MCNC: 8.8 MG/DL — SIGNIFICANT CHANGE UP (ref 8.4–10.5)
CALCIUM SERPL-MCNC: 8.8 MG/DL — SIGNIFICANT CHANGE UP (ref 8.5–10.1)
CALCIUM SERPL-MCNC: 8.9 MG/DL — SIGNIFICANT CHANGE UP (ref 8.4–10.5)
CALCIUM SERPL-MCNC: 8.9 MG/DL — SIGNIFICANT CHANGE UP (ref 8.5–10.1)
CALCIUM SERPL-MCNC: 9 MG/DL
CALCIUM SERPL-MCNC: 9 MG/DL — SIGNIFICANT CHANGE UP (ref 8.4–10.5)
CALCIUM SERPL-MCNC: 9.1 MG/DL — SIGNIFICANT CHANGE UP (ref 8.4–10.5)
CALCIUM SERPL-MCNC: 9.1 MG/DL — SIGNIFICANT CHANGE UP (ref 8.5–10.1)
CALCIUM SERPL-MCNC: 9.4 MG/DL — SIGNIFICANT CHANGE UP (ref 8.5–10.1)
CHLORIDE SERPL-SCNC: 100 MMOL/L
CHLORIDE SERPL-SCNC: 100 MMOL/L — SIGNIFICANT CHANGE UP (ref 96–108)
CHLORIDE SERPL-SCNC: 101 MMOL/L — SIGNIFICANT CHANGE UP (ref 96–108)
CHLORIDE SERPL-SCNC: 101 MMOL/L — SIGNIFICANT CHANGE UP (ref 96–108)
CHLORIDE SERPL-SCNC: 102 MMOL/L — SIGNIFICANT CHANGE UP (ref 96–108)
CHLORIDE SERPL-SCNC: 103 MMOL/L — SIGNIFICANT CHANGE UP (ref 96–108)
CHLORIDE SERPL-SCNC: 104 MMOL/L — SIGNIFICANT CHANGE UP (ref 96–108)
CHLORIDE SERPL-SCNC: 105 MMOL/L — SIGNIFICANT CHANGE UP (ref 96–108)
CHLORIDE SERPL-SCNC: 106 MMOL/L — SIGNIFICANT CHANGE UP (ref 96–108)
CHLORIDE SERPL-SCNC: 107 MMOL/L — SIGNIFICANT CHANGE UP (ref 96–108)
CHLORIDE SERPL-SCNC: 108 MMOL/L — SIGNIFICANT CHANGE UP (ref 96–108)
CHLORIDE SERPL-SCNC: 109 MMOL/L — HIGH (ref 96–108)
CHLORIDE SERPL-SCNC: 110 MMOL/L — HIGH (ref 96–108)
CHLORIDE SERPL-SCNC: 89 MMOL/L — LOW (ref 98–107)
CHLORIDE SERPL-SCNC: 89 MMOL/L — LOW (ref 98–107)
CHLORIDE SERPL-SCNC: 90 MMOL/L — LOW (ref 98–107)
CHLORIDE SERPL-SCNC: 92 MMOL/L — LOW (ref 98–107)
CHLORIDE SERPL-SCNC: 95 MMOL/L — LOW (ref 98–107)
CHLORIDE SERPL-SCNC: 97 MMOL/L — LOW (ref 98–107)
CHLORIDE SERPL-SCNC: 97 MMOL/L — LOW (ref 98–107)
CHLORIDE SERPL-SCNC: 98 MMOL/L — SIGNIFICANT CHANGE UP (ref 96–108)
CHLORIDE SERPL-SCNC: 98 MMOL/L — SIGNIFICANT CHANGE UP (ref 98–107)
CHLORIDE SERPL-SCNC: 99 MMOL/L — SIGNIFICANT CHANGE UP (ref 96–108)
CHOLEST SERPL-MCNC: 124 MG/DL
CHOLEST/HDLC SERPL: 3 RATIO
CK MB BLD-MCNC: 3.7 % — HIGH (ref 0–3.5)
CK MB BLD-MCNC: 3.9 % — HIGH (ref 0–3.5)
CK MB CFR SERPL CALC: 1.5 NG/ML — SIGNIFICANT CHANGE UP (ref 0–3.6)
CK MB CFR SERPL CALC: 1.6 NG/ML — SIGNIFICANT CHANGE UP (ref 0–3.6)
CK MB CFR SERPL CALC: 1.9 NG/ML — SIGNIFICANT CHANGE UP (ref 0–3.6)
CK SERPL-CCNC: 41 U/L — SIGNIFICANT CHANGE UP (ref 39–308)
CK SERPL-CCNC: 43 U/L — SIGNIFICANT CHANGE UP (ref 26–308)
CK SERPL-CCNC: 49 U/L — SIGNIFICANT CHANGE UP (ref 26–308)
CO2 SERPL-SCNC: 19 MMOL/L — LOW (ref 22–31)
CO2 SERPL-SCNC: 19 MMOL/L — LOW (ref 22–31)
CO2 SERPL-SCNC: 20 MMOL/L — LOW (ref 22–31)
CO2 SERPL-SCNC: 20 MMOL/L — LOW (ref 22–31)
CO2 SERPL-SCNC: 21 MMOL/L — LOW (ref 22–31)
CO2 SERPL-SCNC: 21 MMOL/L — LOW (ref 22–31)
CO2 SERPL-SCNC: 22 MMOL/L — SIGNIFICANT CHANGE UP (ref 22–31)
CO2 SERPL-SCNC: 23 MMOL/L — SIGNIFICANT CHANGE UP (ref 22–31)
CO2 SERPL-SCNC: 24 MMOL/L — SIGNIFICANT CHANGE UP (ref 22–31)
CO2 SERPL-SCNC: 25 MMOL/L
CO2 SERPL-SCNC: 25 MMOL/L — SIGNIFICANT CHANGE UP (ref 22–31)
CO2 SERPL-SCNC: 26 MMOL/L — SIGNIFICANT CHANGE UP (ref 22–31)
CO2 SERPL-SCNC: 27 MMOL/L — SIGNIFICANT CHANGE UP (ref 22–31)
CO2 SERPL-SCNC: 28 MMOL/L — SIGNIFICANT CHANGE UP (ref 22–31)
CO2 SERPL-SCNC: 28 MMOL/L — SIGNIFICANT CHANGE UP (ref 22–31)
CO2 SERPL-SCNC: 29 MMOL/L — SIGNIFICANT CHANGE UP (ref 22–31)
CO2 SERPL-SCNC: 29 MMOL/L — SIGNIFICANT CHANGE UP (ref 22–31)
CO2 SERPL-SCNC: 30 MMOL/L — SIGNIFICANT CHANGE UP (ref 22–31)
CO2 SERPL-SCNC: 31 MMOL/L — SIGNIFICANT CHANGE UP (ref 22–31)
CO2 SERPL-SCNC: 33 MMOL/L — HIGH (ref 22–31)
COLOR SPEC: YELLOW — SIGNIFICANT CHANGE UP
COMMENT - URINE: SIGNIFICANT CHANGE UP
CREAT SERPL-MCNC: 0.68 MG/DL — SIGNIFICANT CHANGE UP (ref 0.5–1.3)
CREAT SERPL-MCNC: 0.82 MG/DL — SIGNIFICANT CHANGE UP (ref 0.5–1.3)
CREAT SERPL-MCNC: 0.83 MG/DL — SIGNIFICANT CHANGE UP (ref 0.5–1.3)
CREAT SERPL-MCNC: 0.87 MG/DL — SIGNIFICANT CHANGE UP (ref 0.5–1.3)
CREAT SERPL-MCNC: 0.87 MG/DL — SIGNIFICANT CHANGE UP (ref 0.5–1.3)
CREAT SERPL-MCNC: 0.89 MG/DL
CREAT SERPL-MCNC: 0.89 MG/DL — SIGNIFICANT CHANGE UP (ref 0.5–1.3)
CREAT SERPL-MCNC: 0.9 MG/DL — SIGNIFICANT CHANGE UP (ref 0.5–1.3)
CREAT SERPL-MCNC: 0.91 MG/DL — SIGNIFICANT CHANGE UP (ref 0.5–1.3)
CREAT SERPL-MCNC: 0.93 MG/DL — SIGNIFICANT CHANGE UP (ref 0.5–1.3)
CREAT SERPL-MCNC: 0.93 MG/DL — SIGNIFICANT CHANGE UP (ref 0.5–1.3)
CREAT SERPL-MCNC: 0.97 MG/DL — SIGNIFICANT CHANGE UP (ref 0.5–1.3)
CREAT SERPL-MCNC: 0.98 MG/DL — SIGNIFICANT CHANGE UP (ref 0.5–1.3)
CREAT SERPL-MCNC: 1 MG/DL — SIGNIFICANT CHANGE UP (ref 0.5–1.3)
CREAT SERPL-MCNC: 1.03 MG/DL — SIGNIFICANT CHANGE UP (ref 0.5–1.3)
CREAT SERPL-MCNC: 1.04 MG/DL — SIGNIFICANT CHANGE UP (ref 0.5–1.3)
CREAT SERPL-MCNC: 1.1 MG/DL — SIGNIFICANT CHANGE UP (ref 0.5–1.3)
CREAT SERPL-MCNC: 1.17 MG/DL — SIGNIFICANT CHANGE UP (ref 0.5–1.3)
CREAT SERPL-MCNC: 1.2 MG/DL — SIGNIFICANT CHANGE UP (ref 0.5–1.3)
CREAT SERPL-MCNC: 1.4 MG/DL — HIGH (ref 0.5–1.3)
CRP SERPL-MCNC: 11 MG/DL — HIGH (ref 0–0.4)
CRP SERPL-MCNC: 15.93 MG/DL — HIGH (ref 0–0.4)
CRP SERPL-MCNC: 16.1 MG/L — HIGH
CRP SERPL-MCNC: 16.24 MG/DL — HIGH (ref 0–0.4)
CRP SERPL-MCNC: 202.3 MG/L — HIGH
CRP SERPL-MCNC: 246.5 MG/L — HIGH
CRP SERPL-MCNC: 8.7 MG/L — HIGH
CRYPTOC AG FLD QL: NEGATIVE — SIGNIFICANT CHANGE UP
CULTURE RESULTS: NO GROWTH — SIGNIFICANT CHANGE UP
CULTURE RESULTS: NO GROWTH — SIGNIFICANT CHANGE UP
CULTURE RESULTS: SIGNIFICANT CHANGE UP
D DIMER BLD IA.RAPID-MCNC: 1004 NG/ML — SIGNIFICANT CHANGE UP
D DIMER BLD IA.RAPID-MCNC: 1588 NG/ML DDU — HIGH
D DIMER BLD IA.RAPID-MCNC: 603 NG/ML — SIGNIFICANT CHANGE UP
D DIMER BLD IA.RAPID-MCNC: 665 NG/ML DDU — HIGH
DACRYOCYTES BLD QL SMEAR: SLIGHT — SIGNIFICANT CHANGE UP
DIFF PNL FLD: ABNORMAL
DIFF PNL FLD: ABNORMAL
DIFF PNL FLD: NEGATIVE — SIGNIFICANT CHANGE UP
DIFF PNL FLD: NEGATIVE — SIGNIFICANT CHANGE UP
ELLIPTOCYTES BLD QL SMEAR: SLIGHT — SIGNIFICANT CHANGE UP
EOSINOPHIL # BLD AUTO: 0 K/UL — SIGNIFICANT CHANGE UP (ref 0–0.5)
EOSINOPHIL # BLD AUTO: 0.01 K/UL — SIGNIFICANT CHANGE UP (ref 0–0.5)
EOSINOPHIL # BLD AUTO: 0.02 K/UL — SIGNIFICANT CHANGE UP (ref 0–0.5)
EOSINOPHIL # BLD AUTO: 0.03 K/UL — SIGNIFICANT CHANGE UP (ref 0–0.5)
EOSINOPHIL # BLD AUTO: 0.03 K/UL — SIGNIFICANT CHANGE UP (ref 0–0.5)
EOSINOPHIL # BLD AUTO: 0.04 K/UL — SIGNIFICANT CHANGE UP (ref 0–0.5)
EOSINOPHIL # BLD AUTO: 0.05 K/UL — SIGNIFICANT CHANGE UP (ref 0–0.5)
EOSINOPHIL # BLD AUTO: 0.05 K/UL — SIGNIFICANT CHANGE UP (ref 0–0.5)
EOSINOPHIL # BLD AUTO: 0.06 K/UL — SIGNIFICANT CHANGE UP (ref 0–0.5)
EOSINOPHIL # BLD AUTO: 0.06 K/UL — SIGNIFICANT CHANGE UP (ref 0–0.5)
EOSINOPHIL # BLD AUTO: 0.07 K/UL — SIGNIFICANT CHANGE UP (ref 0–0.5)
EOSINOPHIL # BLD AUTO: 0.08 K/UL — SIGNIFICANT CHANGE UP (ref 0–0.5)
EOSINOPHIL # BLD AUTO: 0.08 K/UL — SIGNIFICANT CHANGE UP (ref 0–0.5)
EOSINOPHIL # BLD AUTO: 0.12 K/UL
EOSINOPHIL NFR BLD AUTO: 0 % — SIGNIFICANT CHANGE UP (ref 0–6)
EOSINOPHIL NFR BLD AUTO: 0.3 % — SIGNIFICANT CHANGE UP (ref 0–6)
EOSINOPHIL NFR BLD AUTO: 0.3 % — SIGNIFICANT CHANGE UP (ref 0–6)
EOSINOPHIL NFR BLD AUTO: 0.6 % — SIGNIFICANT CHANGE UP (ref 0–6)
EOSINOPHIL NFR BLD AUTO: 0.6 % — SIGNIFICANT CHANGE UP (ref 0–6)
EOSINOPHIL NFR BLD AUTO: 0.7 % — SIGNIFICANT CHANGE UP (ref 0–6)
EOSINOPHIL NFR BLD AUTO: 0.9 % — SIGNIFICANT CHANGE UP (ref 0–6)
EOSINOPHIL NFR BLD AUTO: 1 % — SIGNIFICANT CHANGE UP (ref 0–6)
EOSINOPHIL NFR BLD AUTO: 1 % — SIGNIFICANT CHANGE UP (ref 0–6)
EOSINOPHIL NFR BLD AUTO: 1.2 % — SIGNIFICANT CHANGE UP (ref 0–6)
EOSINOPHIL NFR BLD AUTO: 1.7 % — SIGNIFICANT CHANGE UP (ref 0–6)
EOSINOPHIL NFR BLD AUTO: 1.9 % — SIGNIFICANT CHANGE UP (ref 0–6)
EOSINOPHIL NFR BLD AUTO: 2.1 % — SIGNIFICANT CHANGE UP (ref 0–6)
EOSINOPHIL NFR BLD AUTO: 3 % — SIGNIFICANT CHANGE UP (ref 0–6)
EOSINOPHIL NFR BLD AUTO: 4.4 %
EOSINOPHIL NFR BLD AUTO: 6.2 % — HIGH (ref 0–6)
EOSINOPHIL NFR BLD AUTO: 8.1 % — HIGH (ref 0–6)
EOSINOPHIL NFR FLD: 0 % — SIGNIFICANT CHANGE UP (ref 0–6)
EOSINOPHIL NFR FLD: 1 % — SIGNIFICANT CHANGE UP (ref 0–6)
EOSINOPHIL NFR FLD: 5.4 % — SIGNIFICANT CHANGE UP (ref 0–6)
EPI CELLS # UR: SIGNIFICANT CHANGE UP
ESTIMATED AVERAGE GLUCOSE: 108 MG/DL
FERRITIN SERPL-MCNC: 1038 NG/ML — HIGH (ref 30–400)
FERRITIN SERPL-MCNC: 1050 NG/ML — HIGH (ref 30–400)
FERRITIN SERPL-MCNC: 1220 NG/ML — HIGH (ref 30–400)
FERRITIN SERPL-MCNC: 1632 NG/ML — HIGH (ref 30–400)
FERRITIN SERPL-MCNC: 2204 NG/ML — HIGH (ref 30–400)
FERRITIN SERPL-MCNC: 771.6 NG/ML — HIGH (ref 30–400)
FERRITIN SERPL-MCNC: 793.5 NG/ML — HIGH (ref 30–400)
FERRITIN SERPL-MCNC: 857.4 NG/ML — HIGH (ref 30–400)
FIBRINOGEN PPP-MCNC: 695 MG/DL — HIGH (ref 350–510)
FIBRINOGEN PPP-MCNC: 749 MG/DL — HIGH (ref 350–510)
FIBRINOGEN PPP-MCNC: 760 MG/DL — HIGH (ref 350–510)
FIBRINOGEN PPP-MCNC: 800 MG/DL — HIGH (ref 320–500)
FIBRINOGEN PPP-MCNC: 920 MG/DL — HIGH (ref 320–500)
FLU A RESULT: SIGNIFICANT CHANGE UP
FLU A RESULT: SIGNIFICANT CHANGE UP
FLUAV AG NPH QL: SIGNIFICANT CHANGE UP
FLUAV H1 2009 PAND RNA SPEC QL NAA+PROBE: NOT DETECTED — SIGNIFICANT CHANGE UP
FLUAV H1 RNA SPEC QL NAA+PROBE: NOT DETECTED — SIGNIFICANT CHANGE UP
FLUAV H3 RNA SPEC QL NAA+PROBE: NOT DETECTED — SIGNIFICANT CHANGE UP
FLUAV SUBTYP SPEC NAA+PROBE: NOT DETECTED — SIGNIFICANT CHANGE UP
FLUBV AG NPH QL: SIGNIFICANT CHANGE UP
FLUBV RNA SPEC QL NAA+PROBE: NOT DETECTED — SIGNIFICANT CHANGE UP
FSP PPP-MCNC: < 5 UG/ML — SIGNIFICANT CHANGE UP
FSP PPP-MCNC: >=5 <20 UG/ML
FSP PPP-MCNC: >=5 <20 UG/ML
GAS PNL BLDV: SIGNIFICANT CHANGE UP
GAS PNL BLDV: SIGNIFICANT CHANGE UP
GIANT PLATELETS BLD QL SMEAR: PRESENT — SIGNIFICANT CHANGE UP
GLUCOSE BLDA-MCNC: 116 MG/DL — HIGH (ref 70–99)
GLUCOSE BLDC GLUCOMTR-MCNC: 107 MG/DL — HIGH (ref 70–99)
GLUCOSE BLDC GLUCOMTR-MCNC: 107 MG/DL — HIGH (ref 70–99)
GLUCOSE BLDC GLUCOMTR-MCNC: 141 MG/DL — HIGH (ref 70–99)
GLUCOSE SERPL-MCNC: 100 MG/DL — HIGH (ref 70–99)
GLUCOSE SERPL-MCNC: 102 MG/DL — HIGH (ref 70–99)
GLUCOSE SERPL-MCNC: 102 MG/DL — HIGH (ref 70–99)
GLUCOSE SERPL-MCNC: 106 MG/DL — HIGH (ref 70–99)
GLUCOSE SERPL-MCNC: 108 MG/DL — HIGH (ref 70–99)
GLUCOSE SERPL-MCNC: 110 MG/DL — HIGH (ref 70–99)
GLUCOSE SERPL-MCNC: 112 MG/DL — HIGH (ref 70–99)
GLUCOSE SERPL-MCNC: 114 MG/DL — HIGH (ref 70–99)
GLUCOSE SERPL-MCNC: 115 MG/DL — HIGH (ref 70–99)
GLUCOSE SERPL-MCNC: 117 MG/DL
GLUCOSE SERPL-MCNC: 120 MG/DL — HIGH (ref 70–99)
GLUCOSE SERPL-MCNC: 122 MG/DL — HIGH (ref 70–99)
GLUCOSE SERPL-MCNC: 125 MG/DL — HIGH (ref 70–99)
GLUCOSE SERPL-MCNC: 137 MG/DL — HIGH (ref 70–99)
GLUCOSE SERPL-MCNC: 138 MG/DL — HIGH (ref 70–99)
GLUCOSE SERPL-MCNC: 141 MG/DL — HIGH (ref 70–99)
GLUCOSE SERPL-MCNC: 151 MG/DL — HIGH (ref 70–99)
GLUCOSE SERPL-MCNC: 162 MG/DL — HIGH (ref 70–99)
GLUCOSE SERPL-MCNC: 163 MG/DL — HIGH (ref 70–99)
GLUCOSE SERPL-MCNC: 164 MG/DL — HIGH (ref 70–99)
GLUCOSE SERPL-MCNC: 165 MG/DL — HIGH (ref 70–99)
GLUCOSE SERPL-MCNC: 169 MG/DL — HIGH (ref 70–99)
GLUCOSE SERPL-MCNC: 171 MG/DL — HIGH (ref 70–99)
GLUCOSE SERPL-MCNC: 174 MG/DL — HIGH (ref 70–99)
GLUCOSE SERPL-MCNC: 180 MG/DL — HIGH (ref 70–99)
GLUCOSE SERPL-MCNC: 181 MG/DL — HIGH (ref 70–99)
GLUCOSE SERPL-MCNC: 184 MG/DL — HIGH (ref 70–99)
GLUCOSE SERPL-MCNC: 191 MG/DL — HIGH (ref 70–99)
GLUCOSE SERPL-MCNC: 195 MG/DL — HIGH (ref 70–99)
GLUCOSE SERPL-MCNC: 200 MG/DL — HIGH (ref 70–99)
GLUCOSE SERPL-MCNC: 203 MG/DL — HIGH (ref 70–99)
GLUCOSE SERPL-MCNC: 225 MG/DL — HIGH (ref 70–99)
GLUCOSE SERPL-MCNC: 84 MG/DL — SIGNIFICANT CHANGE UP (ref 70–99)
GLUCOSE SERPL-MCNC: 90 MG/DL — SIGNIFICANT CHANGE UP (ref 70–99)
GLUCOSE SERPL-MCNC: 90 MG/DL — SIGNIFICANT CHANGE UP (ref 70–99)
GLUCOSE SERPL-MCNC: 94 MG/DL — SIGNIFICANT CHANGE UP (ref 70–99)
GLUCOSE SERPL-MCNC: 97 MG/DL — SIGNIFICANT CHANGE UP (ref 70–99)
GLUCOSE UR QL: NEGATIVE MG/DL — SIGNIFICANT CHANGE UP
GLUCOSE UR QL: NEGATIVE — SIGNIFICANT CHANGE UP
H CAPSUL AG SPEC-ACNC: SIGNIFICANT CHANGE UP
H CAPSUL AG UR QL IA: SIGNIFICANT CHANGE UP
HADV DNA SPEC QL NAA+PROBE: NOT DETECTED — SIGNIFICANT CHANGE UP
HBA1C MFR BLD HPLC: 5.4 %
HCO3 BLDA-SCNC: 17 MMOL/L — LOW (ref 22–26)
HCO3 BLDA-SCNC: 20 MMOL/L — LOW (ref 22–26)
HCO3 BLDA-SCNC: 21 MMOL/L — LOW (ref 22–26)
HCO3 BLDA-SCNC: 25 MMOL/L — SIGNIFICANT CHANGE UP (ref 23–27)
HCO3 BLDA-SCNC: 26 MMOL/L — SIGNIFICANT CHANGE UP (ref 23–27)
HCO3 BLDV-SCNC: 22 MMOL/L — SIGNIFICANT CHANGE UP (ref 21–29)
HCO3 BLDV-SCNC: 25 MMOL/L — SIGNIFICANT CHANGE UP (ref 21–29)
HCO3 BLDV-SCNC: 28 MMOL/L — SIGNIFICANT CHANGE UP (ref 21–29)
HCOV PNL SPEC NAA+PROBE: SIGNIFICANT CHANGE UP
HCT VFR BLD CALC: 23.5 % — LOW (ref 39–50)
HCT VFR BLD CALC: 24.1 % — LOW (ref 39–50)
HCT VFR BLD CALC: 24.3 % — LOW (ref 39–50)
HCT VFR BLD CALC: 24.4 % — LOW (ref 39–50)
HCT VFR BLD CALC: 24.8 % — LOW (ref 39–50)
HCT VFR BLD CALC: 25.2 % — LOW (ref 39–50)
HCT VFR BLD CALC: 25.4 % — LOW (ref 39–50)
HCT VFR BLD CALC: 25.8 % — LOW (ref 39–50)
HCT VFR BLD CALC: 25.8 % — LOW (ref 39–50)
HCT VFR BLD CALC: 26.1 % — LOW (ref 39–50)
HCT VFR BLD CALC: 26.1 % — LOW (ref 39–50)
HCT VFR BLD CALC: 26.3 % — LOW (ref 39–50)
HCT VFR BLD CALC: 26.4 % — LOW (ref 39–50)
HCT VFR BLD CALC: 26.9 % — LOW (ref 39–50)
HCT VFR BLD CALC: 26.9 % — LOW (ref 39–50)
HCT VFR BLD CALC: 27 % — LOW (ref 39–50)
HCT VFR BLD CALC: 27.1 % — LOW (ref 39–50)
HCT VFR BLD CALC: 27.3 % — LOW (ref 39–50)
HCT VFR BLD CALC: 27.4 % — LOW (ref 39–50)
HCT VFR BLD CALC: 27.6 % — LOW (ref 39–50)
HCT VFR BLD CALC: 27.7 % — LOW (ref 39–50)
HCT VFR BLD CALC: 27.9 % — LOW (ref 39–50)
HCT VFR BLD CALC: 28.4 % — LOW (ref 39–50)
HCT VFR BLD CALC: 28.5 % — LOW (ref 39–50)
HCT VFR BLD CALC: 29 % — LOW (ref 39–50)
HCT VFR BLD CALC: 29.3 %
HCT VFR BLD CALC: 29.3 % — LOW (ref 39–50)
HCT VFR BLD CALC: 30.1 % — LOW (ref 39–50)
HCT VFR BLD CALC: 30.3 % — LOW (ref 39–50)
HCT VFR BLD CALC: 30.3 % — LOW (ref 39–50)
HCT VFR BLD CALC: 30.4 % — LOW (ref 39–50)
HCT VFR BLD CALC: 30.9 % — LOW (ref 39–50)
HCT VFR BLD CALC: 30.9 % — LOW (ref 39–50)
HCT VFR BLD CALC: 32.1 % — LOW (ref 39–50)
HCT VFR BLD CALC: 32.2 % — LOW (ref 39–50)
HCT VFR BLD CALC: 32.5 % — LOW (ref 39–50)
HCT VFR BLD CALC: 36.2 % — LOW (ref 39–50)
HCT VFR BLD CALC: 36.2 % — LOW (ref 39–50)
HCT VFR BLD CALC: 37.4 % — LOW (ref 39–50)
HCT VFR BLDA CALC: 34.4 % — LOW (ref 39–51)
HDLC SERPL-MCNC: 42 MG/DL
HGB BLD-MCNC: 10 G/DL — LOW (ref 13–17)
HGB BLD-MCNC: 10 G/DL — LOW (ref 13–17)
HGB BLD-MCNC: 10.1 G/DL — LOW (ref 13–17)
HGB BLD-MCNC: 11.2 G/DL — LOW (ref 13–17)
HGB BLD-MCNC: 11.2 G/DL — LOW (ref 13–17)
HGB BLD-MCNC: 11.6 G/DL — LOW (ref 13–17)
HGB BLD-MCNC: 7.2 G/DL — LOW (ref 13–17)
HGB BLD-MCNC: 7.5 G/DL — LOW (ref 13–17)
HGB BLD-MCNC: 7.5 G/DL — LOW (ref 13–17)
HGB BLD-MCNC: 7.6 G/DL — LOW (ref 13–17)
HGB BLD-MCNC: 7.9 G/DL — LOW (ref 13–17)
HGB BLD-MCNC: 8 G/DL — LOW (ref 13–17)
HGB BLD-MCNC: 8.1 G/DL — LOW (ref 13–17)
HGB BLD-MCNC: 8.2 G/DL — LOW (ref 13–17)
HGB BLD-MCNC: 8.2 G/DL — LOW (ref 13–17)
HGB BLD-MCNC: 8.3 G/DL — LOW (ref 13–17)
HGB BLD-MCNC: 8.3 G/DL — LOW (ref 13–17)
HGB BLD-MCNC: 8.4 G/DL — LOW (ref 13–17)
HGB BLD-MCNC: 8.5 G/DL — LOW (ref 13–17)
HGB BLD-MCNC: 8.5 G/DL — LOW (ref 13–17)
HGB BLD-MCNC: 8.6 G/DL
HGB BLD-MCNC: 8.6 G/DL — LOW (ref 13–17)
HGB BLD-MCNC: 8.8 G/DL — LOW (ref 13–17)
HGB BLD-MCNC: 8.9 G/DL — LOW (ref 13–17)
HGB BLD-MCNC: 9 G/DL — LOW (ref 13–17)
HGB BLD-MCNC: 9.1 G/DL — LOW (ref 13–17)
HGB BLD-MCNC: 9.2 G/DL — LOW (ref 13–17)
HGB BLD-MCNC: 9.3 G/DL — LOW (ref 13–17)
HGB BLD-MCNC: 9.5 G/DL — LOW (ref 13–17)
HGB BLD-MCNC: 9.5 G/DL — LOW (ref 13–17)
HGB BLD-MCNC: 9.7 G/DL — LOW (ref 13–17)
HGB BLD-MCNC: 9.8 G/DL — LOW (ref 13–17)
HGB BLDA-MCNC: 11.2 G/DL — LOW (ref 13–17)
HMPV RNA SPEC QL NAA+PROBE: NOT DETECTED — SIGNIFICANT CHANGE UP
HOROWITZ INDEX BLDA+IHG-RTO: 28 — SIGNIFICANT CHANGE UP
HOROWITZ INDEX BLDA+IHG-RTO: 28 — SIGNIFICANT CHANGE UP
HOROWITZ INDEX BLDV+IHG-RTO: 21 — SIGNIFICANT CHANGE UP
HOROWITZ INDEX BLDV+IHG-RTO: 21 — SIGNIFICANT CHANGE UP
HPIV1 RNA SPEC QL NAA+PROBE: NOT DETECTED — SIGNIFICANT CHANGE UP
HPIV2 RNA SPEC QL NAA+PROBE: NOT DETECTED — SIGNIFICANT CHANGE UP
HPIV3 RNA SPEC QL NAA+PROBE: NOT DETECTED — SIGNIFICANT CHANGE UP
HPIV4 RNA SPEC QL NAA+PROBE: NOT DETECTED — SIGNIFICANT CHANGE UP
HYPOCHROMIA BLD QL: SIGNIFICANT CHANGE UP
HYPOCHROMIA BLD QL: SIGNIFICANT CHANGE UP
HYPOCHROMIA BLD QL: SLIGHT — SIGNIFICANT CHANGE UP
IGA FLD-MCNC: 311 MG/DL — SIGNIFICANT CHANGE UP (ref 84–499)
IGA FLD-MCNC: 344 MG/DL — SIGNIFICANT CHANGE UP (ref 84–499)
IGG FLD-MCNC: 1510 MG/DL — SIGNIFICANT CHANGE UP (ref 610–1660)
IGG FLD-MCNC: 1599 MG/DL — SIGNIFICANT CHANGE UP (ref 610–1660)
IGG SERPL-MCNC: 1511 MG/DL — SIGNIFICANT CHANGE UP (ref 700–1600)
IGG1 SER-MCNC: 788 MG/DL — SIGNIFICANT CHANGE UP (ref 248–810)
IGG2 SER-MCNC: 586 MG/DL — HIGH (ref 130–555)
IGG3 SER-MCNC: 51 MG/DL — SIGNIFICANT CHANGE UP (ref 15–102)
IGG4 SER-MCNC: 22 MG/DL — SIGNIFICANT CHANGE UP (ref 2–96)
IGM SERPL-MCNC: 56 MG/DL — SIGNIFICANT CHANGE UP (ref 35–242)
IGM SERPL-MCNC: 64 MG/DL — SIGNIFICANT CHANGE UP (ref 35–242)
IMM GRANULOCYTES NFR BLD AUTO: 0.5 % — SIGNIFICANT CHANGE UP (ref 0–1.5)
IMM GRANULOCYTES NFR BLD AUTO: 0.6 % — SIGNIFICANT CHANGE UP (ref 0–1.5)
IMM GRANULOCYTES NFR BLD AUTO: 1.2 % — SIGNIFICANT CHANGE UP (ref 0–1.5)
IMM GRANULOCYTES NFR BLD AUTO: 1.4 % — SIGNIFICANT CHANGE UP (ref 0–1.5)
IMM GRANULOCYTES NFR BLD AUTO: 1.5 % — SIGNIFICANT CHANGE UP (ref 0–1.5)
IMM GRANULOCYTES NFR BLD AUTO: 1.8 % — HIGH (ref 0–1.5)
IMM GRANULOCYTES NFR BLD AUTO: 1.9 % — HIGH (ref 0–1.5)
IMM GRANULOCYTES NFR BLD AUTO: 10.4 % — HIGH (ref 0–1.5)
IMM GRANULOCYTES NFR BLD AUTO: 2.2 % — HIGH (ref 0–1.5)
IMM GRANULOCYTES NFR BLD AUTO: 4.1 % — HIGH (ref 0–1.5)
IMM GRANULOCYTES NFR BLD AUTO: 4.1 % — HIGH (ref 0–1.5)
IMM GRANULOCYTES NFR BLD AUTO: 4.9 % — HIGH (ref 0–1.5)
IMM GRANULOCYTES NFR BLD AUTO: 5.8 % — HIGH (ref 0–1.5)
IMM GRANULOCYTES NFR BLD AUTO: 6.6 % — HIGH (ref 0–1.5)
IMM GRANULOCYTES NFR BLD AUTO: 6.7 % — HIGH (ref 0–1.5)
IMM GRANULOCYTES NFR BLD AUTO: 7.4 % — HIGH (ref 0–1.5)
IMM GRANULOCYTES NFR BLD AUTO: 7.9 % — HIGH (ref 0–1.5)
IMM GRANULOCYTES NFR BLD AUTO: 8.3 % — HIGH (ref 0–1.5)
INR BLD: 1.24 RATIO — HIGH (ref 0.88–1.16)
INR BLD: 1.28 RATIO — HIGH (ref 0.88–1.16)
INR BLD: 1.29 RATIO — HIGH (ref 0.88–1.16)
INR BLD: 1.32 RATIO — HIGH (ref 0.88–1.16)
INR BLD: 1.38 — HIGH (ref 0.88–1.17)
INR BLD: 1.39 RATIO — HIGH (ref 0.88–1.16)
INR BLD: 1.4 — HIGH (ref 0.88–1.17)
INR BLD: 1.46 RATIO — HIGH (ref 0.88–1.16)
INR BLD: 1.47 RATIO — HIGH (ref 0.88–1.16)
INR BLD: 1.48 RATIO — HIGH (ref 0.88–1.16)
INR BLD: 1.48 RATIO — HIGH (ref 0.88–1.16)
INR BLD: 1.57 RATIO — HIGH (ref 0.88–1.16)
INR BLD: 1.8 RATIO — HIGH (ref 0.88–1.16)
INR BLD: 1.8 RATIO — HIGH (ref 0.88–1.16)
INR BLD: 1.82 RATIO — HIGH (ref 0.88–1.16)
INR BLD: 1.86 RATIO — HIGH (ref 0.88–1.16)
INR BLD: 1.9 RATIO — HIGH (ref 0.88–1.16)
INR BLD: 2.03 RATIO — HIGH (ref 0.88–1.16)
KAPPA LC SER QL IFE: 4.09 MG/DL — HIGH (ref 0.33–1.94)
KAPPA LC SER QL IFE: 5.15 MG/DL — HIGH (ref 0.33–1.94)
KAPPA/LAMBDA FREE LIGHT CHAIN RATIO, SERUM: 1.48 RATIO — SIGNIFICANT CHANGE UP (ref 0.26–1.65)
KAPPA/LAMBDA FREE LIGHT CHAIN RATIO, SERUM: 1.65 RATIO — SIGNIFICANT CHANGE UP (ref 0.26–1.65)
KETONES UR-MCNC: ABNORMAL
KETONES UR-MCNC: NEGATIVE — SIGNIFICANT CHANGE UP
L PNEUMO AG UR QL: NEGATIVE — SIGNIFICANT CHANGE UP
LACTATE BLDA-SCNC: 8.6 MMOL/L — CRITICAL HIGH (ref 0.5–2)
LACTATE SERPL-SCNC: 1.5 MMOL/L — SIGNIFICANT CHANGE UP (ref 0.7–2)
LACTATE SERPL-SCNC: 2 MMOL/L — SIGNIFICANT CHANGE UP (ref 0.7–2)
LACTATE SERPL-SCNC: 2 MMOL/L — SIGNIFICANT CHANGE UP (ref 0.7–2)
LACTATE SERPL-SCNC: 3.2 MMOL/L — HIGH (ref 0.7–2)
LAMBDA LC SER QL IFE: 2.76 MG/DL — HIGH (ref 0.57–2.63)
LAMBDA LC SER QL IFE: 3.13 MG/DL — HIGH (ref 0.57–2.63)
LDH SERPL L TO P-CCNC: 344 U/L — HIGH (ref 50–242)
LDH SERPL L TO P-CCNC: 346 U/L — HIGH (ref 135–225)
LDH SERPL L TO P-CCNC: 388 U/L — HIGH (ref 50–242)
LDH SERPL L TO P-CCNC: 396 U/L — HIGH (ref 50–242)
LDH SERPL L TO P-CCNC: 817 U/L — HIGH (ref 135–225)
LDLC SERPL CALC-MCNC: 57 MG/DL
LEUKOCYTE ESTERASE UR-ACNC: ABNORMAL
LEUKOCYTE ESTERASE UR-ACNC: NEGATIVE — SIGNIFICANT CHANGE UP
LG PLATELETS BLD QL AUTO: SLIGHT — SIGNIFICANT CHANGE UP
LYMPHOCYTES # BLD AUTO: 0.03 K/UL — LOW (ref 1–3.3)
LYMPHOCYTES # BLD AUTO: 0.08 K/UL — LOW (ref 1–3.3)
LYMPHOCYTES # BLD AUTO: 0.1 K/UL — LOW (ref 1–3.3)
LYMPHOCYTES # BLD AUTO: 0.12 K/UL — LOW (ref 1–3.3)
LYMPHOCYTES # BLD AUTO: 0.14 K/UL — LOW (ref 1–3.3)
LYMPHOCYTES # BLD AUTO: 0.15 K/UL — LOW (ref 1–3.3)
LYMPHOCYTES # BLD AUTO: 0.21 K/UL — LOW (ref 1–3.3)
LYMPHOCYTES # BLD AUTO: 0.24 K/UL — LOW (ref 1–3.3)
LYMPHOCYTES # BLD AUTO: 0.26 K/UL — LOW (ref 1–3.3)
LYMPHOCYTES # BLD AUTO: 0.28 K/UL — LOW (ref 1–3.3)
LYMPHOCYTES # BLD AUTO: 0.28 K/UL — LOW (ref 1–3.3)
LYMPHOCYTES # BLD AUTO: 0.3 K/UL — LOW (ref 1–3.3)
LYMPHOCYTES # BLD AUTO: 0.36 K/UL — LOW (ref 1–3.3)
LYMPHOCYTES # BLD AUTO: 0.37 K/UL — LOW (ref 1–3.3)
LYMPHOCYTES # BLD AUTO: 0.43 K/UL — LOW (ref 1–3.3)
LYMPHOCYTES # BLD AUTO: 0.46 K/UL — LOW (ref 1–3.3)
LYMPHOCYTES # BLD AUTO: 0.47 K/UL — LOW (ref 1–3.3)
LYMPHOCYTES # BLD AUTO: 0.53 K/UL — LOW (ref 1–3.3)
LYMPHOCYTES # BLD AUTO: 0.56 K/UL — LOW (ref 1–3.3)
LYMPHOCYTES # BLD AUTO: 0.65 K/UL — LOW (ref 1–3.3)
LYMPHOCYTES # BLD AUTO: 0.7 K/UL
LYMPHOCYTES # BLD AUTO: 0.73 K/UL — LOW (ref 1–3.3)
LYMPHOCYTES # BLD AUTO: 0.87 K/UL — LOW (ref 1–3.3)
LYMPHOCYTES # BLD AUTO: 0.9 K/UL — LOW (ref 1–3.3)
LYMPHOCYTES # BLD AUTO: 1 % — LOW (ref 13–44)
LYMPHOCYTES # BLD AUTO: 1.55 K/UL — SIGNIFICANT CHANGE UP (ref 1–3.3)
LYMPHOCYTES # BLD AUTO: 1.6 % — LOW (ref 13–44)
LYMPHOCYTES # BLD AUTO: 11.9 % — LOW (ref 13–44)
LYMPHOCYTES # BLD AUTO: 12.4 % — LOW (ref 13–44)
LYMPHOCYTES # BLD AUTO: 12.5 % — LOW (ref 13–44)
LYMPHOCYTES # BLD AUTO: 12.6 % — LOW (ref 13–44)
LYMPHOCYTES # BLD AUTO: 14.9 % — SIGNIFICANT CHANGE UP (ref 13–44)
LYMPHOCYTES # BLD AUTO: 15.3 % — SIGNIFICANT CHANGE UP (ref 13–44)
LYMPHOCYTES # BLD AUTO: 16 % — SIGNIFICANT CHANGE UP (ref 13–44)
LYMPHOCYTES # BLD AUTO: 17 % — SIGNIFICANT CHANGE UP (ref 13–44)
LYMPHOCYTES # BLD AUTO: 18.9 % — SIGNIFICANT CHANGE UP (ref 13–44)
LYMPHOCYTES # BLD AUTO: 20 % — SIGNIFICANT CHANGE UP (ref 13–44)
LYMPHOCYTES # BLD AUTO: 22.2 % — SIGNIFICANT CHANGE UP (ref 13–44)
LYMPHOCYTES # BLD AUTO: 23.2 % — SIGNIFICANT CHANGE UP (ref 13–44)
LYMPHOCYTES # BLD AUTO: 24.2 % — SIGNIFICANT CHANGE UP (ref 13–44)
LYMPHOCYTES # BLD AUTO: 26.7 % — SIGNIFICANT CHANGE UP (ref 13–44)
LYMPHOCYTES # BLD AUTO: 3.7 % — LOW (ref 13–44)
LYMPHOCYTES # BLD AUTO: 32.3 % — SIGNIFICANT CHANGE UP (ref 13–44)
LYMPHOCYTES # BLD AUTO: 34.8 % — SIGNIFICANT CHANGE UP (ref 13–44)
LYMPHOCYTES # BLD AUTO: 4 % — LOW (ref 13–44)
LYMPHOCYTES # BLD AUTO: 4 % — LOW (ref 13–44)
LYMPHOCYTES # BLD AUTO: 7.4 % — LOW (ref 13–44)
LYMPHOCYTES # BLD AUTO: 7.7 % — LOW (ref 13–44)
LYMPHOCYTES # BLD AUTO: 8.9 % — LOW (ref 13–44)
LYMPHOCYTES NFR BLD AUTO: 26.3 %
LYMPHOCYTES NFR SPEC AUTO: 26.4 % — SIGNIFICANT CHANGE UP (ref 13–44)
LYMPHOCYTES NFR SPEC AUTO: 3.5 % — LOW (ref 13–44)
LYMPHOCYTES NFR SPEC AUTO: 3.5 % — LOW (ref 13–44)
LYMPHOCYTES NFR SPEC AUTO: 5.3 % — LOW (ref 13–44)
LYMPHOCYTES NFR SPEC AUTO: 8 % — LOW (ref 13–44)
MAGNESIUM SERPL-MCNC: 1.9 MG/DL — SIGNIFICANT CHANGE UP (ref 1.6–2.6)
MAGNESIUM SERPL-MCNC: 2.1 MG/DL — SIGNIFICANT CHANGE UP (ref 1.6–2.6)
MAGNESIUM SERPL-MCNC: 2.2 MG/DL — SIGNIFICANT CHANGE UP (ref 1.6–2.6)
MAGNESIUM SERPL-MCNC: 2.3 MG/DL — SIGNIFICANT CHANGE UP (ref 1.6–2.6)
MAGNESIUM SERPL-MCNC: 2.4 MG/DL — SIGNIFICANT CHANGE UP (ref 1.6–2.6)
MAGNESIUM SERPL-MCNC: 2.5 MG/DL — SIGNIFICANT CHANGE UP (ref 1.6–2.6)
MAGNESIUM SERPL-MCNC: 2.5 MG/DL — SIGNIFICANT CHANGE UP (ref 1.6–2.6)
MAN DIFF?: NORMAL
MANUAL SMEAR VERIFICATION: SIGNIFICANT CHANGE UP
MANUAL SMEAR VERIFICATION: YES — SIGNIFICANT CHANGE UP
MCHC RBC-ENTMCNC: 20.2 PG — LOW (ref 27–34)
MCHC RBC-ENTMCNC: 20.2 PG — LOW (ref 27–34)
MCHC RBC-ENTMCNC: 20.3 PG — LOW (ref 27–34)
MCHC RBC-ENTMCNC: 20.3 PG — LOW (ref 27–34)
MCHC RBC-ENTMCNC: 20.4 PG — LOW (ref 27–34)
MCHC RBC-ENTMCNC: 20.5 PG — LOW (ref 27–34)
MCHC RBC-ENTMCNC: 21.4 PG — LOW (ref 27–34)
MCHC RBC-ENTMCNC: 21.4 PG — LOW (ref 27–34)
MCHC RBC-ENTMCNC: 21.5 PG — LOW (ref 27–34)
MCHC RBC-ENTMCNC: 21.6 PG — LOW (ref 27–34)
MCHC RBC-ENTMCNC: 21.7 PG — LOW (ref 27–34)
MCHC RBC-ENTMCNC: 21.8 PG — LOW (ref 27–34)
MCHC RBC-ENTMCNC: 21.9 PG — LOW (ref 27–34)
MCHC RBC-ENTMCNC: 22 PG — LOW (ref 27–34)
MCHC RBC-ENTMCNC: 22.1 PG — LOW (ref 27–34)
MCHC RBC-ENTMCNC: 22.2 PG
MCHC RBC-ENTMCNC: 22.3 PG — LOW (ref 27–34)
MCHC RBC-ENTMCNC: 22.4 PG — LOW (ref 27–34)
MCHC RBC-ENTMCNC: 22.5 PG — LOW (ref 27–34)
MCHC RBC-ENTMCNC: 22.6 PG — LOW (ref 27–34)
MCHC RBC-ENTMCNC: 22.6 PG — LOW (ref 27–34)
MCHC RBC-ENTMCNC: 22.7 PG — LOW (ref 27–34)
MCHC RBC-ENTMCNC: 22.8 PG — LOW (ref 27–34)
MCHC RBC-ENTMCNC: 29.4 GM/DL
MCHC RBC-ENTMCNC: 30.1 GM/DL — LOW (ref 32–36)
MCHC RBC-ENTMCNC: 30.4 GM/DL — LOW (ref 32–36)
MCHC RBC-ENTMCNC: 30.6 GM/DL — LOW (ref 32–36)
MCHC RBC-ENTMCNC: 30.6 GM/DL — LOW (ref 32–36)
MCHC RBC-ENTMCNC: 30.7 % — LOW (ref 32–36)
MCHC RBC-ENTMCNC: 30.7 % — LOW (ref 32–36)
MCHC RBC-ENTMCNC: 30.7 GM/DL — LOW (ref 32–36)
MCHC RBC-ENTMCNC: 30.8 GM/DL — LOW (ref 32–36)
MCHC RBC-ENTMCNC: 30.8 GM/DL — LOW (ref 32–36)
MCHC RBC-ENTMCNC: 30.9 % — LOW (ref 32–36)
MCHC RBC-ENTMCNC: 30.9 % — LOW (ref 32–36)
MCHC RBC-ENTMCNC: 30.9 GM/DL — LOW (ref 32–36)
MCHC RBC-ENTMCNC: 31 % — LOW (ref 32–36)
MCHC RBC-ENTMCNC: 31 GM/DL — LOW (ref 32–36)
MCHC RBC-ENTMCNC: 31 GM/DL — LOW (ref 32–36)
MCHC RBC-ENTMCNC: 31.1 % — LOW (ref 32–36)
MCHC RBC-ENTMCNC: 31.1 % — LOW (ref 32–36)
MCHC RBC-ENTMCNC: 31.1 GM/DL — LOW (ref 32–36)
MCHC RBC-ENTMCNC: 31.1 GM/DL — LOW (ref 32–36)
MCHC RBC-ENTMCNC: 31.2 GM/DL — LOW (ref 32–36)
MCHC RBC-ENTMCNC: 31.2 GM/DL — LOW (ref 32–36)
MCHC RBC-ENTMCNC: 31.3 GM/DL — LOW (ref 32–36)
MCHC RBC-ENTMCNC: 31.3 GM/DL — LOW (ref 32–36)
MCHC RBC-ENTMCNC: 31.4 GM/DL — LOW (ref 32–36)
MCHC RBC-ENTMCNC: 31.5 GM/DL — LOW (ref 32–36)
MCHC RBC-ENTMCNC: 31.5 GM/DL — LOW (ref 32–36)
MCHC RBC-ENTMCNC: 31.6 GM/DL — LOW (ref 32–36)
MCHC RBC-ENTMCNC: 31.6 GM/DL — LOW (ref 32–36)
MCHC RBC-ENTMCNC: 31.7 % — LOW (ref 32–36)
MCHC RBC-ENTMCNC: 31.7 GM/DL — LOW (ref 32–36)
MCHC RBC-ENTMCNC: 31.8 GM/DL — LOW (ref 32–36)
MCHC RBC-ENTMCNC: 31.9 GM/DL — LOW (ref 32–36)
MCHC RBC-ENTMCNC: 31.9 GM/DL — LOW (ref 32–36)
MCHC RBC-ENTMCNC: 32.1 GM/DL — SIGNIFICANT CHANGE UP (ref 32–36)
MCHC RBC-ENTMCNC: 32.2 % — SIGNIFICANT CHANGE UP (ref 32–36)
MCHC RBC-ENTMCNC: 32.2 GM/DL — SIGNIFICANT CHANGE UP (ref 32–36)
MCV RBC AUTO: 64.2 FL — LOW (ref 80–100)
MCV RBC AUTO: 64.4 FL — LOW (ref 80–100)
MCV RBC AUTO: 64.8 FL — LOW (ref 80–100)
MCV RBC AUTO: 65.3 FL — LOW (ref 80–100)
MCV RBC AUTO: 65.4 FL — LOW (ref 80–100)
MCV RBC AUTO: 65.8 FL — LOW (ref 80–100)
MCV RBC AUTO: 66 FL — LOW (ref 80–100)
MCV RBC AUTO: 66.2 FL — LOW (ref 80–100)
MCV RBC AUTO: 66.7 FL — LOW (ref 80–100)
MCV RBC AUTO: 66.8 FL — LOW (ref 80–100)
MCV RBC AUTO: 66.8 FL — LOW (ref 80–100)
MCV RBC AUTO: 67.5 FL — LOW (ref 80–100)
MCV RBC AUTO: 67.5 FL — LOW (ref 80–100)
MCV RBC AUTO: 68.1 FL — LOW (ref 80–100)
MCV RBC AUTO: 68.3 FL — LOW (ref 80–100)
MCV RBC AUTO: 68.9 FL — LOW (ref 80–100)
MCV RBC AUTO: 69.2 FL — LOW (ref 80–100)
MCV RBC AUTO: 69.3 FL — LOW (ref 80–100)
MCV RBC AUTO: 69.8 FL — LOW (ref 80–100)
MCV RBC AUTO: 70.2 FL — LOW (ref 80–100)
MCV RBC AUTO: 70.4 FL — LOW (ref 80–100)
MCV RBC AUTO: 70.4 FL — LOW (ref 80–100)
MCV RBC AUTO: 70.5 FL — LOW (ref 80–100)
MCV RBC AUTO: 70.5 FL — LOW (ref 80–100)
MCV RBC AUTO: 70.6 FL — LOW (ref 80–100)
MCV RBC AUTO: 70.7 FL — LOW (ref 80–100)
MCV RBC AUTO: 70.7 FL — LOW (ref 80–100)
MCV RBC AUTO: 71.1 FL — LOW (ref 80–100)
MCV RBC AUTO: 71.1 FL — LOW (ref 80–100)
MCV RBC AUTO: 71.2 FL — LOW (ref 80–100)
MCV RBC AUTO: 71.2 FL — LOW (ref 80–100)
MCV RBC AUTO: 71.7 FL — LOW (ref 80–100)
MCV RBC AUTO: 71.8 FL — LOW (ref 80–100)
MCV RBC AUTO: 71.9 FL — LOW (ref 80–100)
MCV RBC AUTO: 72 FL — LOW (ref 80–100)
MCV RBC AUTO: 72.4 FL — LOW (ref 80–100)
MCV RBC AUTO: 72.9 FL — LOW (ref 80–100)
MCV RBC AUTO: 73.2 FL — LOW (ref 80–100)
MCV RBC AUTO: 75.5 FL
METAMYELOCYTES # FLD: 0 % — SIGNIFICANT CHANGE UP (ref 0–1)
METAMYELOCYTES # FLD: 1 % — HIGH (ref 0–0)
MICROCYTES BLD QL: SIGNIFICANT CHANGE UP
MICROCYTES BLD QL: SLIGHT — SIGNIFICANT CHANGE UP
MISCELLANEOUS - CHEM: SIGNIFICANT CHANGE UP
MONOCYTES # BLD AUTO: 0.02 K/UL
MONOCYTES # BLD AUTO: 0.04 K/UL — SIGNIFICANT CHANGE UP (ref 0–0.9)
MONOCYTES # BLD AUTO: 0.05 K/UL — SIGNIFICANT CHANGE UP (ref 0–0.9)
MONOCYTES # BLD AUTO: 0.06 K/UL — SIGNIFICANT CHANGE UP (ref 0–0.9)
MONOCYTES # BLD AUTO: 0.06 K/UL — SIGNIFICANT CHANGE UP (ref 0–0.9)
MONOCYTES # BLD AUTO: 0.07 K/UL — SIGNIFICANT CHANGE UP (ref 0–0.9)
MONOCYTES # BLD AUTO: 0.09 K/UL — SIGNIFICANT CHANGE UP (ref 0–0.9)
MONOCYTES # BLD AUTO: 0.12 K/UL — SIGNIFICANT CHANGE UP (ref 0–0.9)
MONOCYTES # BLD AUTO: 0.13 K/UL — SIGNIFICANT CHANGE UP (ref 0–0.9)
MONOCYTES # BLD AUTO: 0.16 K/UL — SIGNIFICANT CHANGE UP (ref 0–0.9)
MONOCYTES # BLD AUTO: 0.18 K/UL — SIGNIFICANT CHANGE UP (ref 0–0.9)
MONOCYTES # BLD AUTO: 0.2 K/UL — SIGNIFICANT CHANGE UP (ref 0–0.9)
MONOCYTES # BLD AUTO: 0.2 K/UL — SIGNIFICANT CHANGE UP (ref 0–0.9)
MONOCYTES # BLD AUTO: 0.21 K/UL — SIGNIFICANT CHANGE UP (ref 0–0.9)
MONOCYTES # BLD AUTO: 0.22 K/UL — SIGNIFICANT CHANGE UP (ref 0–0.9)
MONOCYTES # BLD AUTO: 0.53 K/UL — SIGNIFICANT CHANGE UP (ref 0–0.9)
MONOCYTES # BLD AUTO: 0.83 K/UL — SIGNIFICANT CHANGE UP (ref 0–0.9)
MONOCYTES # BLD AUTO: 0.89 K/UL — SIGNIFICANT CHANGE UP (ref 0–0.9)
MONOCYTES # BLD AUTO: 1.09 K/UL — HIGH (ref 0–0.9)
MONOCYTES # BLD AUTO: 1.31 K/UL — HIGH (ref 0–0.9)
MONOCYTES # BLD AUTO: 1.33 K/UL — HIGH (ref 0–0.9)
MONOCYTES # BLD AUTO: 3.04 K/UL — HIGH (ref 0–0.9)
MONOCYTES # BLD AUTO: 4.86 K/UL — HIGH (ref 0–0.9)
MONOCYTES NFR BLD AUTO: 0.9 %
MONOCYTES NFR BLD AUTO: 1.4 % — LOW (ref 2–14)
MONOCYTES NFR BLD AUTO: 10.9 % — SIGNIFICANT CHANGE UP (ref 2–14)
MONOCYTES NFR BLD AUTO: 13.8 % — SIGNIFICANT CHANGE UP (ref 2–14)
MONOCYTES NFR BLD AUTO: 15.5 % — HIGH (ref 2–14)
MONOCYTES NFR BLD AUTO: 2 % — SIGNIFICANT CHANGE UP (ref 2–14)
MONOCYTES NFR BLD AUTO: 2 % — SIGNIFICANT CHANGE UP (ref 2–14)
MONOCYTES NFR BLD AUTO: 2.5 % — SIGNIFICANT CHANGE UP (ref 2–14)
MONOCYTES NFR BLD AUTO: 23.3 % — HIGH (ref 2–14)
MONOCYTES NFR BLD AUTO: 24.4 % — HIGH (ref 2–14)
MONOCYTES NFR BLD AUTO: 27 % — HIGH (ref 2–14)
MONOCYTES NFR BLD AUTO: 3 % — SIGNIFICANT CHANGE UP (ref 2–14)
MONOCYTES NFR BLD AUTO: 3.6 % — SIGNIFICANT CHANGE UP (ref 2–14)
MONOCYTES NFR BLD AUTO: 32 % — HIGH (ref 2–14)
MONOCYTES NFR BLD AUTO: 34.3 % — HIGH (ref 2–14)
MONOCYTES NFR BLD AUTO: 37.2 % — HIGH (ref 2–14)
MONOCYTES NFR BLD AUTO: 38.9 % — HIGH (ref 2–14)
MONOCYTES NFR BLD AUTO: 4 % — SIGNIFICANT CHANGE UP (ref 2–14)
MONOCYTES NFR BLD AUTO: 4.7 % — SIGNIFICANT CHANGE UP (ref 2–14)
MONOCYTES NFR BLD AUTO: 42.4 % — HIGH (ref 2–14)
MONOCYTES NFR BLD AUTO: 5 % — SIGNIFICANT CHANGE UP (ref 2–14)
MONOCYTES NFR BLD AUTO: 5.5 % — SIGNIFICANT CHANGE UP (ref 2–14)
MONOCYTES NFR BLD AUTO: 5.6 % — SIGNIFICANT CHANGE UP (ref 2–14)
MONOCYTES NFR BLD AUTO: 7.5 % — SIGNIFICANT CHANGE UP (ref 2–14)
MONOCYTES NFR BLD AUTO: 9 % — SIGNIFICANT CHANGE UP (ref 2–14)
MONOCYTES NFR BLD: 0.9 % — LOW (ref 2–9)
MONOCYTES NFR BLD: 19.5 % — HIGH (ref 2–9)
MONOCYTES NFR BLD: 19.5 % — HIGH (ref 2–9)
MONOCYTES NFR BLD: 25 % — HIGH (ref 2–9)
MONOCYTES NFR BLD: 7.3 % — SIGNIFICANT CHANGE UP (ref 2–9)
MORPHOLOGY BLD-IMP: SIGNIFICANT CHANGE UP
MYELOCYTES NFR BLD: 0 % — SIGNIFICANT CHANGE UP (ref 0–0)
MYELOCYTES NFR BLD: 0 % — SIGNIFICANT CHANGE UP (ref 0–0)
MYELOCYTES NFR BLD: 1 % — HIGH (ref 0–0)
MYELOCYTES NFR BLD: 1 % — HIGH (ref 0–0)
MYELOCYTES NFR BLD: 2.6 % — HIGH (ref 0–0)
MYELOCYTES NFR BLD: 2.6 % — HIGH (ref 0–0)
NEUTROPHIL AB SER-ACNC: 44.5 % — SIGNIFICANT CHANGE UP (ref 43–77)
NEUTROPHIL AB SER-ACNC: 62 % — SIGNIFICANT CHANGE UP (ref 43–77)
NEUTROPHIL AB SER-ACNC: 67.3 % — SIGNIFICANT CHANGE UP (ref 43–77)
NEUTROPHIL AB SER-ACNC: 67.3 % — SIGNIFICANT CHANGE UP (ref 43–77)
NEUTROPHIL AB SER-ACNC: 93.8 % — HIGH (ref 43–77)
NEUTROPHILS # BLD AUTO: 0.3 K/UL — LOW (ref 1.8–7.4)
NEUTROPHILS # BLD AUTO: 0.31 K/UL — LOW (ref 1.8–7.4)
NEUTROPHILS # BLD AUTO: 0.86 K/UL — LOW (ref 1.8–7.4)
NEUTROPHILS # BLD AUTO: 0.89 K/UL — LOW (ref 1.8–7.4)
NEUTROPHILS # BLD AUTO: 0.92 K/UL — LOW (ref 1.8–7.4)
NEUTROPHILS # BLD AUTO: 1 K/UL — LOW (ref 1.8–7.4)
NEUTROPHILS # BLD AUTO: 1.04 K/UL — LOW (ref 1.8–7.4)
NEUTROPHILS # BLD AUTO: 1.08 K/UL — LOW (ref 1.8–7.4)
NEUTROPHILS # BLD AUTO: 1.14 K/UL — LOW (ref 1.8–7.4)
NEUTROPHILS # BLD AUTO: 1.34 K/UL — LOW (ref 1.8–7.4)
NEUTROPHILS # BLD AUTO: 1.39 K/UL — LOW (ref 1.8–7.4)
NEUTROPHILS # BLD AUTO: 1.45 K/UL — LOW (ref 1.8–7.4)
NEUTROPHILS # BLD AUTO: 1.49 K/UL — LOW (ref 1.8–7.4)
NEUTROPHILS # BLD AUTO: 1.63 K/UL
NEUTROPHILS # BLD AUTO: 1.98 K/UL — SIGNIFICANT CHANGE UP (ref 1.8–7.4)
NEUTROPHILS # BLD AUTO: 12.17 K/UL — HIGH (ref 1.8–7.4)
NEUTROPHILS # BLD AUTO: 2.25 K/UL — SIGNIFICANT CHANGE UP (ref 1.8–7.4)
NEUTROPHILS # BLD AUTO: 2.37 K/UL — SIGNIFICANT CHANGE UP (ref 1.8–7.4)
NEUTROPHILS # BLD AUTO: 2.42 K/UL — SIGNIFICANT CHANGE UP (ref 1.8–7.4)
NEUTROPHILS # BLD AUTO: 2.55 K/UL — SIGNIFICANT CHANGE UP (ref 1.8–7.4)
NEUTROPHILS # BLD AUTO: 2.87 K/UL — SIGNIFICANT CHANGE UP (ref 1.8–7.4)
NEUTROPHILS # BLD AUTO: 2.97 K/UL — SIGNIFICANT CHANGE UP (ref 1.8–7.4)
NEUTROPHILS # BLD AUTO: 3.4 K/UL — SIGNIFICANT CHANGE UP (ref 1.8–7.4)
NEUTROPHILS # BLD AUTO: 4.82 K/UL — SIGNIFICANT CHANGE UP (ref 1.8–7.4)
NEUTROPHILS # BLD AUTO: 7.88 K/UL — HIGH (ref 1.8–7.4)
NEUTROPHILS NFR BLD AUTO: 29.3 % — LOW (ref 43–77)
NEUTROPHILS NFR BLD AUTO: 30.8 % — LOW (ref 43–77)
NEUTROPHILS NFR BLD AUTO: 36.8 % — LOW (ref 43–77)
NEUTROPHILS NFR BLD AUTO: 41 % — LOW (ref 43–77)
NEUTROPHILS NFR BLD AUTO: 41.3 % — LOW (ref 43–77)
NEUTROPHILS NFR BLD AUTO: 41.9 % — LOW (ref 43–77)
NEUTROPHILS NFR BLD AUTO: 46.2 % — SIGNIFICANT CHANGE UP (ref 43–77)
NEUTROPHILS NFR BLD AUTO: 55.3 % — SIGNIFICANT CHANGE UP (ref 43–77)
NEUTROPHILS NFR BLD AUTO: 58.5 % — SIGNIFICANT CHANGE UP (ref 43–77)
NEUTROPHILS NFR BLD AUTO: 61.4 %
NEUTROPHILS NFR BLD AUTO: 62.5 % — SIGNIFICANT CHANGE UP (ref 43–77)
NEUTROPHILS NFR BLD AUTO: 63.2 % — SIGNIFICANT CHANGE UP (ref 43–77)
NEUTROPHILS NFR BLD AUTO: 70.7 % — SIGNIFICANT CHANGE UP (ref 43–77)
NEUTROPHILS NFR BLD AUTO: 71 % — SIGNIFICANT CHANGE UP (ref 43–77)
NEUTROPHILS NFR BLD AUTO: 71.7 % — SIGNIFICANT CHANGE UP (ref 43–77)
NEUTROPHILS NFR BLD AUTO: 72 % — SIGNIFICANT CHANGE UP (ref 43–77)
NEUTROPHILS NFR BLD AUTO: 73.3 % — SIGNIFICANT CHANGE UP (ref 43–77)
NEUTROPHILS NFR BLD AUTO: 76.5 % — SIGNIFICANT CHANGE UP (ref 43–77)
NEUTROPHILS NFR BLD AUTO: 76.8 % — SIGNIFICANT CHANGE UP (ref 43–77)
NEUTROPHILS NFR BLD AUTO: 77 % — SIGNIFICANT CHANGE UP (ref 43–77)
NEUTROPHILS NFR BLD AUTO: 81.3 % — HIGH (ref 43–77)
NEUTROPHILS NFR BLD AUTO: 81.5 % — HIGH (ref 43–77)
NEUTROPHILS NFR BLD AUTO: 84 % — HIGH (ref 43–77)
NEUTROPHILS NFR BLD AUTO: 91 % — HIGH (ref 43–77)
NEUTROPHILS NFR BLD AUTO: 94.1 % — HIGH (ref 43–77)
NEUTS BAND # BLD: 0 % — SIGNIFICANT CHANGE UP (ref 0–6)
NEUTS BAND # BLD: 0 % — SIGNIFICANT CHANGE UP (ref 0–6)
NEUTS BAND # BLD: 2 % — SIGNIFICANT CHANGE UP (ref 0–6)
NEUTS BAND # BLD: 4 % — SIGNIFICANT CHANGE UP (ref 0–8)
NEUTS BAND # BLD: 5 % — SIGNIFICANT CHANGE UP (ref 0–8)
NEUTS BAND # BLD: 7.1 % — HIGH (ref 0–6)
NEUTS BAND # BLD: 7.1 % — HIGH (ref 0–6)
NITRITE UR-MCNC: NEGATIVE — SIGNIFICANT CHANGE UP
NITRITE UR-MCNC: POSITIVE
NRBC # BLD: 0 /100 WBCS — SIGNIFICANT CHANGE UP (ref 0–0)
NRBC # BLD: 0 — SIGNIFICANT CHANGE UP
NRBC # BLD: 1 /100WBC — SIGNIFICANT CHANGE UP
NRBC # BLD: 3 /100 WBCS — HIGH (ref 0–0)
NRBC # BLD: 3 /100 WBCS — HIGH (ref 0–0)
NRBC # BLD: 3 /100WBC — SIGNIFICANT CHANGE UP
NRBC # BLD: 3 /100WBC — SIGNIFICANT CHANGE UP
NRBC # BLD: 3 — SIGNIFICANT CHANGE UP
NRBC # BLD: 8 /100WBC — SIGNIFICANT CHANGE UP
NRBC # BLD: SIGNIFICANT CHANGE UP /100 WBCS (ref 0–0)
NRBC # FLD: 0 K/UL — SIGNIFICANT CHANGE UP (ref 0–0)
NRBC # FLD: 0 K/UL — SIGNIFICANT CHANGE UP (ref 0–0)
NRBC # FLD: 0.02 K/UL — SIGNIFICANT CHANGE UP (ref 0–0)
NRBC # FLD: 0.04 K/UL — SIGNIFICANT CHANGE UP (ref 0–0)
NRBC # FLD: 0.06 K/UL — SIGNIFICANT CHANGE UP (ref 0–0)
NRBC # FLD: 0.07 K/UL — SIGNIFICANT CHANGE UP (ref 0–0)
NRBC # FLD: 0.24 K/UL — SIGNIFICANT CHANGE UP (ref 0–0)
NRBC # FLD: 0.24 K/UL — SIGNIFICANT CHANGE UP (ref 0–0)
NRBC # FLD: 0.36 K/UL — SIGNIFICANT CHANGE UP (ref 0–0)
NRBC FLD-RTO: 1.7 — SIGNIFICANT CHANGE UP
NRBC FLD-RTO: 1.7 — SIGNIFICANT CHANGE UP
NRBC FLD-RTO: 1.9 — SIGNIFICANT CHANGE UP
NRBC FLD-RTO: 1.9 — SIGNIFICANT CHANGE UP
NRBC FLD-RTO: 10.8 — SIGNIFICANT CHANGE UP
NRBC FLD-RTO: 2.5 — SIGNIFICANT CHANGE UP
NRBC FLD-RTO: 5.4 — SIGNIFICANT CHANGE UP
NT-PROBNP SERPL-SCNC: 3877 PG/ML — HIGH (ref 0–450)
OTHER - HEMATOLOGY %: 0 — SIGNIFICANT CHANGE UP
OVALOCYTES BLD QL SMEAR: SIGNIFICANT CHANGE UP
OVALOCYTES BLD QL SMEAR: SLIGHT — SIGNIFICANT CHANGE UP
PAT CTL 2H: 39 SEC — HIGH (ref 27.5–37.4)
PCO2 BLDA: 18 MMHG — LOW (ref 32–46)
PCO2 BLDA: 22 MMHG — LOW (ref 32–46)
PCO2 BLDA: 83 MMHG — CRITICAL HIGH (ref 35–48)
PCO2 BLDA: 85 MMHG — CRITICAL HIGH (ref 35–48)
PCO2 BLDA: 85 MMHG — CRITICAL HIGH (ref 35–48)
PCO2 BLDV: 30 MMHG — LOW (ref 35–50)
PCO2 BLDV: 34 MMHG — LOW (ref 35–50)
PCO2 BLDV: 45 MMHG — SIGNIFICANT CHANGE UP (ref 35–50)
PH BLDA: 7.06 PH — CRITICAL LOW (ref 7.35–7.45)
PH BLDA: 7.11 PH — CRITICAL LOW (ref 7.35–7.45)
PH BLDA: 7.13 PH — CRITICAL LOW (ref 7.35–7.45)
PH BLDA: 7.59 — HIGH (ref 7.35–7.45)
PH BLDA: 7.68 — CRITICAL HIGH (ref 7.35–7.45)
PH BLDV: 7.38 — SIGNIFICANT CHANGE UP (ref 7.35–7.45)
PH BLDV: 7.43 — SIGNIFICANT CHANGE UP (ref 7.35–7.45)
PH BLDV: 7.52 — HIGH (ref 7.35–7.45)
PH UR: 5 — SIGNIFICANT CHANGE UP (ref 5–8)
PH UR: 5 — SIGNIFICANT CHANGE UP (ref 5–8)
PH UR: 6.5 — SIGNIFICANT CHANGE UP (ref 5–8)
PH UR: 7 — SIGNIFICANT CHANGE UP (ref 5–8)
PHOSPHATE SERPL-MCNC: 2.1 MG/DL — LOW (ref 2.5–4.5)
PHOSPHATE SERPL-MCNC: 2.9 MG/DL — SIGNIFICANT CHANGE UP (ref 2.5–4.5)
PHOSPHATE SERPL-MCNC: 3 MG/DL — SIGNIFICANT CHANGE UP (ref 2.5–4.5)
PHOSPHATE SERPL-MCNC: 3.3 MG/DL — SIGNIFICANT CHANGE UP (ref 2.5–4.5)
PHOSPHATE SERPL-MCNC: 3.3 MG/DL — SIGNIFICANT CHANGE UP (ref 2.5–4.5)
PHOSPHATE SERPL-MCNC: 4.5 MG/DL — SIGNIFICANT CHANGE UP (ref 2.5–4.5)
PLAT MORPH BLD: NORMAL — SIGNIFICANT CHANGE UP
PLATELET # BLD AUTO: 107 K/UL — LOW (ref 150–400)
PLATELET # BLD AUTO: 116 K/UL — LOW (ref 150–400)
PLATELET # BLD AUTO: 129 K/UL — LOW (ref 150–400)
PLATELET # BLD AUTO: 129 K/UL — LOW (ref 150–400)
PLATELET # BLD AUTO: 132 K/UL — LOW (ref 150–400)
PLATELET # BLD AUTO: 137 K/UL — LOW (ref 150–400)
PLATELET # BLD AUTO: 138 K/UL — LOW (ref 150–400)
PLATELET # BLD AUTO: 141 K/UL — LOW (ref 150–400)
PLATELET # BLD AUTO: 144 K/UL — LOW (ref 150–400)
PLATELET # BLD AUTO: 147 K/UL
PLATELET # BLD AUTO: 154 K/UL — SIGNIFICANT CHANGE UP (ref 150–400)
PLATELET # BLD AUTO: 158 K/UL — SIGNIFICANT CHANGE UP (ref 150–400)
PLATELET # BLD AUTO: 160 K/UL — SIGNIFICANT CHANGE UP (ref 150–400)
PLATELET # BLD AUTO: 171 K/UL — SIGNIFICANT CHANGE UP (ref 150–400)
PLATELET # BLD AUTO: 172 K/UL — SIGNIFICANT CHANGE UP (ref 150–400)
PLATELET # BLD AUTO: 175 K/UL — SIGNIFICANT CHANGE UP (ref 150–400)
PLATELET # BLD AUTO: 175 K/UL — SIGNIFICANT CHANGE UP (ref 150–400)
PLATELET # BLD AUTO: 179 K/UL — SIGNIFICANT CHANGE UP (ref 150–400)
PLATELET # BLD AUTO: 183 K/UL — SIGNIFICANT CHANGE UP (ref 150–400)
PLATELET # BLD AUTO: 184 K/UL — SIGNIFICANT CHANGE UP (ref 150–400)
PLATELET # BLD AUTO: 191 K/UL — SIGNIFICANT CHANGE UP (ref 150–400)
PLATELET # BLD AUTO: 198 K/UL — SIGNIFICANT CHANGE UP (ref 150–400)
PLATELET # BLD AUTO: 211 K/UL — SIGNIFICANT CHANGE UP (ref 150–400)
PLATELET # BLD AUTO: 216 K/UL — SIGNIFICANT CHANGE UP (ref 150–400)
PLATELET # BLD AUTO: 229 K/UL — SIGNIFICANT CHANGE UP (ref 150–400)
PLATELET # BLD AUTO: 27 K/UL — LOW (ref 150–400)
PLATELET # BLD AUTO: 29 K/UL — LOW (ref 150–400)
PLATELET # BLD AUTO: 33 K/UL — LOW (ref 150–400)
PLATELET # BLD AUTO: 35 K/UL — LOW (ref 150–400)
PLATELET # BLD AUTO: 38 K/UL — LOW (ref 150–400)
PLATELET # BLD AUTO: 44 K/UL — LOW (ref 150–400)
PLATELET # BLD AUTO: 46 K/UL — LOW (ref 150–400)
PLATELET # BLD AUTO: 47 K/UL — LOW (ref 150–400)
PLATELET # BLD AUTO: 49 K/UL — LOW (ref 150–400)
PLATELET # BLD AUTO: 52 K/UL — LOW (ref 150–400)
PLATELET # BLD AUTO: 53 K/UL — LOW (ref 150–400)
PLATELET # BLD AUTO: 55 K/UL — LOW (ref 150–400)
PLATELET # BLD AUTO: 55 K/UL — LOW (ref 150–400)
PLATELET # BLD AUTO: 56 K/UL — LOW (ref 150–400)
PLATELET # BLD AUTO: 89 K/UL — LOW (ref 150–400)
PLATELET # BLD AUTO: 92 K/UL — LOW (ref 150–400)
PLATELET COUNT - ESTIMATE: NORMAL — SIGNIFICANT CHANGE UP
PLATELET COUNT - ESTIMATE: SIGNIFICANT CHANGE UP
PLATELET COUNT - ESTIMATE: SIGNIFICANT CHANGE UP
PMV BLD: SIGNIFICANT CHANGE UP FL (ref 7–13)
PO2 BLDA: 150 MMHG — HIGH (ref 74–108)
PO2 BLDA: 167 MMHG — HIGH (ref 74–108)
PO2 BLDA: 75 MMHG — LOW (ref 83–108)
PO2 BLDA: 83 MMHG — SIGNIFICANT CHANGE UP (ref 83–108)
PO2 BLDA: 86 MMHG — SIGNIFICANT CHANGE UP (ref 83–108)
PO2 BLDV: 103 MMHG — HIGH (ref 25–45)
PO2 BLDV: 58 MMHG — HIGH (ref 25–45)
PO2 BLDV: <44 MMHG — SIGNIFICANT CHANGE UP (ref 25–45)
POIKILOCYTOSIS BLD QL AUTO: SIGNIFICANT CHANGE UP
POIKILOCYTOSIS BLD QL AUTO: SLIGHT — SIGNIFICANT CHANGE UP
POIKILOCYTOSIS BLD QL AUTO: SLIGHT — SIGNIFICANT CHANGE UP
POLYCHROMASIA BLD QL SMEAR: SIGNIFICANT CHANGE UP
POLYCHROMASIA BLD QL SMEAR: SIGNIFICANT CHANGE UP
POLYCHROMASIA BLD QL SMEAR: SLIGHT — SIGNIFICANT CHANGE UP
POLYCHROMASIA BLD QL SMEAR: SLIGHT — SIGNIFICANT CHANGE UP
POTASSIUM BLDA-SCNC: 4.5 MMOL/L — SIGNIFICANT CHANGE UP (ref 3.4–4.5)
POTASSIUM SERPL-MCNC: 3 MMOL/L — LOW (ref 3.5–5.3)
POTASSIUM SERPL-MCNC: 3.1 MMOL/L — LOW (ref 3.5–5.3)
POTASSIUM SERPL-MCNC: 3.3 MMOL/L — LOW (ref 3.5–5.3)
POTASSIUM SERPL-MCNC: 3.4 MMOL/L — LOW (ref 3.5–5.3)
POTASSIUM SERPL-MCNC: 3.5 MMOL/L — SIGNIFICANT CHANGE UP (ref 3.5–5.3)
POTASSIUM SERPL-MCNC: 3.6 MMOL/L — SIGNIFICANT CHANGE UP (ref 3.5–5.3)
POTASSIUM SERPL-MCNC: 3.7 MMOL/L — SIGNIFICANT CHANGE UP (ref 3.5–5.3)
POTASSIUM SERPL-MCNC: 3.8 MMOL/L — SIGNIFICANT CHANGE UP (ref 3.5–5.3)
POTASSIUM SERPL-MCNC: 3.9 MMOL/L — SIGNIFICANT CHANGE UP (ref 3.5–5.3)
POTASSIUM SERPL-MCNC: 4 MMOL/L — SIGNIFICANT CHANGE UP (ref 3.5–5.3)
POTASSIUM SERPL-MCNC: 4.1 MMOL/L — SIGNIFICANT CHANGE UP (ref 3.5–5.3)
POTASSIUM SERPL-MCNC: 4.2 MMOL/L — SIGNIFICANT CHANGE UP (ref 3.5–5.3)
POTASSIUM SERPL-MCNC: 4.3 MMOL/L — SIGNIFICANT CHANGE UP (ref 3.5–5.3)
POTASSIUM SERPL-MCNC: 4.4 MMOL/L — SIGNIFICANT CHANGE UP (ref 3.5–5.3)
POTASSIUM SERPL-MCNC: 4.4 MMOL/L — SIGNIFICANT CHANGE UP (ref 3.5–5.3)
POTASSIUM SERPL-SCNC: 3 MMOL/L — LOW (ref 3.5–5.3)
POTASSIUM SERPL-SCNC: 3.1 MMOL/L — LOW (ref 3.5–5.3)
POTASSIUM SERPL-SCNC: 3.3 MMOL/L — LOW (ref 3.5–5.3)
POTASSIUM SERPL-SCNC: 3.4 MMOL/L — LOW (ref 3.5–5.3)
POTASSIUM SERPL-SCNC: 3.5 MMOL/L — SIGNIFICANT CHANGE UP (ref 3.5–5.3)
POTASSIUM SERPL-SCNC: 3.6 MMOL/L
POTASSIUM SERPL-SCNC: 3.6 MMOL/L — SIGNIFICANT CHANGE UP (ref 3.5–5.3)
POTASSIUM SERPL-SCNC: 3.7 MMOL/L — SIGNIFICANT CHANGE UP (ref 3.5–5.3)
POTASSIUM SERPL-SCNC: 3.8 MMOL/L — SIGNIFICANT CHANGE UP (ref 3.5–5.3)
POTASSIUM SERPL-SCNC: 3.9 MMOL/L — SIGNIFICANT CHANGE UP (ref 3.5–5.3)
POTASSIUM SERPL-SCNC: 4 MMOL/L — SIGNIFICANT CHANGE UP (ref 3.5–5.3)
POTASSIUM SERPL-SCNC: 4.1 MMOL/L — SIGNIFICANT CHANGE UP (ref 3.5–5.3)
POTASSIUM SERPL-SCNC: 4.2 MMOL/L — SIGNIFICANT CHANGE UP (ref 3.5–5.3)
POTASSIUM SERPL-SCNC: 4.3 MMOL/L — SIGNIFICANT CHANGE UP (ref 3.5–5.3)
POTASSIUM SERPL-SCNC: 4.4 MMOL/L — SIGNIFICANT CHANGE UP (ref 3.5–5.3)
POTASSIUM SERPL-SCNC: 4.4 MMOL/L — SIGNIFICANT CHANGE UP (ref 3.5–5.3)
PROCALCITONIN SERPL-MCNC: 0.13 NG/ML — HIGH (ref 0.02–0.1)
PROCALCITONIN SERPL-MCNC: 0.15 NG/ML — HIGH (ref 0.02–0.1)
PROCALCITONIN SERPL-MCNC: 0.3 NG/ML — HIGH (ref 0.02–0.1)
PROCALCITONIN SERPL-MCNC: 0.41 NG/ML — HIGH (ref 0.02–0.1)
PROCALCITONIN SERPL-MCNC: 0.42 NG/ML — HIGH (ref 0.02–0.1)
PROCALCITONIN SERPL-MCNC: 1.15 NG/ML — HIGH (ref 0–0.04)
PROMYELOCYTES # FLD: 0 % — SIGNIFICANT CHANGE UP (ref 0–0)
PROT C ACT/NOR PPP: 36 % — LOW (ref 74–150)
PROT S FREE AG PPP IA-ACNC: 58 % — LOW (ref 67–141)
PROT SERPL-MCNC: 6.1 G/DL — SIGNIFICANT CHANGE UP (ref 6–8.3)
PROT SERPL-MCNC: 6.2 G/DL — SIGNIFICANT CHANGE UP (ref 6–8.3)
PROT SERPL-MCNC: 6.3 G/DL — SIGNIFICANT CHANGE UP (ref 6–8.3)
PROT SERPL-MCNC: 6.3 G/DL — SIGNIFICANT CHANGE UP (ref 6–8.3)
PROT SERPL-MCNC: 6.4 G/DL
PROT SERPL-MCNC: 6.4 G/DL — SIGNIFICANT CHANGE UP (ref 6–8.3)
PROT SERPL-MCNC: 6.5 G/DL — SIGNIFICANT CHANGE UP (ref 6–8.3)
PROT SERPL-MCNC: 6.8 G/DL — SIGNIFICANT CHANGE UP (ref 6–8.3)
PROT SERPL-MCNC: 6.8 G/DL — SIGNIFICANT CHANGE UP (ref 6–8.3)
PROT SERPL-MCNC: 6.9 G/DL — SIGNIFICANT CHANGE UP (ref 6–8.3)
PROT SERPL-MCNC: 7 G/DL — SIGNIFICANT CHANGE UP (ref 6–8.3)
PROT SERPL-MCNC: 7.1 G/DL — SIGNIFICANT CHANGE UP (ref 6–8.3)
PROT SERPL-MCNC: 7.1 G/DL — SIGNIFICANT CHANGE UP (ref 6–8.3)
PROT SERPL-MCNC: 7.4 G/DL — SIGNIFICANT CHANGE UP (ref 6–8.3)
PROT SERPL-MCNC: 7.6 G/DL — SIGNIFICANT CHANGE UP (ref 6–8.3)
PROT SERPL-MCNC: 7.7 G/DL — SIGNIFICANT CHANGE UP (ref 6–8.3)
PROT UR-MCNC: 15
PROT UR-MCNC: 75 MG/DL
PROT UR-MCNC: NEGATIVE MG/DL — SIGNIFICANT CHANGE UP
PROT UR-MCNC: NEGATIVE — SIGNIFICANT CHANGE UP
PROTHROM AB SERPL-ACNC: 14 SEC — HIGH (ref 10–12.9)
PROTHROM AB SERPL-ACNC: 14.4 SEC — HIGH (ref 10–12.9)
PROTHROM AB SERPL-ACNC: 14.5 SEC — HIGH (ref 10–12.9)
PROTHROM AB SERPL-ACNC: 14.9 SEC — HIGH (ref 10–12.9)
PROTHROM AB SERPL-ACNC: 15.7 SEC — HIGH (ref 10–12.9)
PROTHROM AB SERPL-ACNC: 15.9 SEC — HIGH (ref 9.8–13.1)
PROTHROM AB SERPL-ACNC: 16.3 SEC — HIGH (ref 9.8–13.1)
PROTHROM AB SERPL-ACNC: 16.6 SEC — HIGH (ref 10–12.9)
PROTHROM AB SERPL-ACNC: 16.6 SEC — HIGH (ref 10–12.9)
PROTHROM AB SERPL-ACNC: 16.7 SEC — HIGH (ref 10–12.9)
PROTHROM AB SERPL-ACNC: 16.8 SEC — HIGH (ref 10–12.9)
PROTHROM AB SERPL-ACNC: 18.1 SEC — HIGH (ref 10–12.9)
PROTHROM AB SERPL-ACNC: 20.7 SEC — HIGH (ref 10–12.9)
PROTHROM AB SERPL-ACNC: 20.9 SEC — HIGH (ref 10–12.9)
PROTHROM AB SERPL-ACNC: 21.1 SEC — HIGH (ref 10–12.9)
PROTHROM AB SERPL-ACNC: 21.6 SEC — HIGH (ref 10–12.9)
PROTHROM AB SERPL-ACNC: 22.1 SEC — HIGH (ref 10–12.9)
PROTHROM AB SERPL-ACNC: 23.4 SEC — HIGH (ref 10–12.9)
PT 100%: 20.1 SEC — HIGH (ref 10–13.1)
PT 50/50: 13.9 SEC — SIGNIFICANT CHANGE UP (ref 10–15.2)
RBC # BLD: 3.52 M/UL — LOW (ref 4.2–5.8)
RBC # BLD: 3.6 M/UL — LOW (ref 4.2–5.8)
RBC # BLD: 3.64 M/UL — LOW (ref 4.2–5.8)
RBC # BLD: 3.66 M/UL — LOW (ref 4.2–5.8)
RBC # BLD: 3.67 M/UL — LOW (ref 4.2–5.8)
RBC # BLD: 3.69 M/UL — LOW (ref 4.2–5.8)
RBC # BLD: 3.7 M/UL — LOW (ref 4.2–5.8)
RBC # BLD: 3.72 M/UL — LOW (ref 4.2–5.8)
RBC # BLD: 3.73 M/UL — LOW (ref 4.2–5.8)
RBC # BLD: 3.75 M/UL — LOW (ref 4.2–5.8)
RBC # BLD: 3.79 M/UL — LOW (ref 4.2–5.8)
RBC # BLD: 3.81 M/UL — LOW (ref 4.2–5.8)
RBC # BLD: 3.82 M/UL — LOW (ref 4.2–5.8)
RBC # BLD: 3.82 M/UL — LOW (ref 4.2–5.8)
RBC # BLD: 3.87 M/UL — LOW (ref 4.2–5.8)
RBC # BLD: 3.88 M/UL
RBC # BLD: 3.89 M/UL — LOW (ref 4.2–5.8)
RBC # BLD: 3.91 M/UL — LOW (ref 4.2–5.8)
RBC # BLD: 3.92 M/UL — LOW (ref 4.2–5.8)
RBC # BLD: 3.93 M/UL — LOW (ref 4.2–5.8)
RBC # BLD: 4.05 M/UL — LOW (ref 4.2–5.8)
RBC # BLD: 4.11 M/UL — LOW (ref 4.2–5.8)
RBC # BLD: 4.12 M/UL — LOW (ref 4.2–5.8)
RBC # BLD: 4.14 M/UL — LOW (ref 4.2–5.8)
RBC # BLD: 4.21 M/UL — SIGNIFICANT CHANGE UP (ref 4.2–5.8)
RBC # BLD: 4.22 M/UL — SIGNIFICANT CHANGE UP (ref 4.2–5.8)
RBC # BLD: 4.23 M/UL — SIGNIFICANT CHANGE UP (ref 4.2–5.8)
RBC # BLD: 4.27 M/UL — SIGNIFICANT CHANGE UP (ref 4.2–5.8)
RBC # BLD: 4.29 M/UL — SIGNIFICANT CHANGE UP (ref 4.2–5.8)
RBC # BLD: 4.34 M/UL — SIGNIFICANT CHANGE UP (ref 4.2–5.8)
RBC # BLD: 4.35 M/UL — SIGNIFICANT CHANGE UP (ref 4.2–5.8)
RBC # BLD: 4.45 M/UL — SIGNIFICANT CHANGE UP (ref 4.2–5.8)
RBC # BLD: 4.46 M/UL — SIGNIFICANT CHANGE UP (ref 4.2–5.8)
RBC # BLD: 4.49 M/UL — SIGNIFICANT CHANGE UP (ref 4.2–5.8)
RBC # BLD: 4.52 M/UL — SIGNIFICANT CHANGE UP (ref 4.2–5.8)
RBC # BLD: 4.63 M/UL — SIGNIFICANT CHANGE UP (ref 4.2–5.8)
RBC # BLD: 5.03 M/UL — SIGNIFICANT CHANGE UP (ref 4.2–5.8)
RBC # BLD: 5.03 M/UL — SIGNIFICANT CHANGE UP (ref 4.2–5.8)
RBC # BLD: 5.2 M/UL — SIGNIFICANT CHANGE UP (ref 4.2–5.8)
RBC # FLD: 16.1 % — HIGH (ref 10.3–14.5)
RBC # FLD: 16.2 % — HIGH (ref 10.3–14.5)
RBC # FLD: 16.3 % — HIGH (ref 10.3–14.5)
RBC # FLD: 16.4 % — HIGH (ref 10.3–14.5)
RBC # FLD: 16.5 % — HIGH (ref 10.3–14.5)
RBC # FLD: 16.5 % — HIGH (ref 10.3–14.5)
RBC # FLD: 16.6 % — HIGH (ref 10.3–14.5)
RBC # FLD: 16.7 % — HIGH (ref 10.3–14.5)
RBC # FLD: 17 % — HIGH (ref 10.3–14.5)
RBC # FLD: 17.1 % — HIGH (ref 10.3–14.5)
RBC # FLD: 17.2 % — HIGH (ref 10.3–14.5)
RBC # FLD: 17.8 % — HIGH (ref 10.3–14.5)
RBC # FLD: 17.9 % — HIGH (ref 10.3–14.5)
RBC # FLD: 18 % — HIGH (ref 10.3–14.5)
RBC # FLD: 18.2 % — HIGH (ref 10.3–14.5)
RBC # FLD: 18.3 % — HIGH (ref 10.3–14.5)
RBC # FLD: 18.5 % — HIGH (ref 10.3–14.5)
RBC # FLD: 18.5 % — HIGH (ref 10.3–14.5)
RBC # FLD: 18.7 % — HIGH (ref 10.3–14.5)
RBC # FLD: 19.3 %
RBC # FLD: 19.6 % — HIGH (ref 10.3–14.5)
RBC # FLD: 19.8 % — HIGH (ref 10.3–14.5)
RBC # FLD: 19.9 % — HIGH (ref 10.3–14.5)
RBC # FLD: 19.9 % — HIGH (ref 10.3–14.5)
RBC # FLD: 20.3 % — HIGH (ref 10.3–14.5)
RBC # FLD: 20.5 % — HIGH (ref 10.3–14.5)
RBC # FLD: 20.6 % — HIGH (ref 10.3–14.5)
RBC # FLD: 20.7 % — HIGH (ref 10.3–14.5)
RBC # FLD: 20.8 % — HIGH (ref 10.3–14.5)
RBC # FLD: 20.8 % — HIGH (ref 10.3–14.5)
RBC # FLD: 20.9 % — HIGH (ref 10.3–14.5)
RBC # FLD: 20.9 % — HIGH (ref 10.3–14.5)
RBC # FLD: 21.3 % — HIGH (ref 10.3–14.5)
RBC # FLD: 21.5 % — HIGH (ref 10.3–14.5)
RBC # FLD: 21.8 % — HIGH (ref 10.3–14.5)
RBC # FLD: 21.9 % — HIGH (ref 10.3–14.5)
RBC BLD AUTO: ABNORMAL
RBC BLD AUTO: SIGNIFICANT CHANGE UP
RBC CASTS # UR COMP ASSIST: SIGNIFICANT CHANGE UP /HPF (ref 0–4)
RH IG SCN BLD-IMP: POSITIVE — SIGNIFICANT CHANGE UP
RH IG SCN BLD-IMP: POSITIVE — SIGNIFICANT CHANGE UP
RSV RESULT: SIGNIFICANT CHANGE UP
RSV RNA RESP QL NAA+PROBE: SIGNIFICANT CHANGE UP
RSV RNA SPEC QL NAA+PROBE: NOT DETECTED — SIGNIFICANT CHANGE UP
RV+EV RNA SPEC QL NAA+PROBE: NOT DETECTED — SIGNIFICANT CHANGE UP
SAO2 % BLDA: 100 % — HIGH (ref 92–96)
SAO2 % BLDA: 87.3 % — LOW (ref 95–99)
SAO2 % BLDA: 91.8 % — LOW (ref 95–99)
SAO2 % BLDA: 92 % — LOW (ref 95–99)
SAO2 % BLDA: 99 % — HIGH (ref 92–96)
SAO2 % BLDV: 66 % — LOW (ref 67–88)
SAO2 % BLDV: 90 % — HIGH (ref 67–88)
SAO2 % BLDV: 98 % — HIGH (ref 67–88)
SARS-COV-2 RNA SPEC QL NAA+PROBE: SIGNIFICANT CHANGE UP
SCHISTOCYTES BLD QL AUTO: SIGNIFICANT CHANGE UP
SCHISTOCYTES BLD QL AUTO: SLIGHT — SIGNIFICANT CHANGE UP
SODIUM BLDA-SCNC: 135 MMOL/L — LOW (ref 136–146)
SODIUM SERPL-SCNC: 133 MMOL/L — LOW (ref 135–145)
SODIUM SERPL-SCNC: 134 MMOL/L — LOW (ref 135–145)
SODIUM SERPL-SCNC: 136 MMOL/L — SIGNIFICANT CHANGE UP (ref 135–145)
SODIUM SERPL-SCNC: 137 MMOL/L — SIGNIFICANT CHANGE UP (ref 135–145)
SODIUM SERPL-SCNC: 138 MMOL/L — SIGNIFICANT CHANGE UP (ref 135–145)
SODIUM SERPL-SCNC: 139 MMOL/L — SIGNIFICANT CHANGE UP (ref 135–145)
SODIUM SERPL-SCNC: 140 MMOL/L
SODIUM SERPL-SCNC: 140 MMOL/L — SIGNIFICANT CHANGE UP (ref 135–145)
SODIUM SERPL-SCNC: 141 MMOL/L — SIGNIFICANT CHANGE UP (ref 135–145)
SODIUM SERPL-SCNC: 141 MMOL/L — SIGNIFICANT CHANGE UP (ref 135–145)
SODIUM SERPL-SCNC: 142 MMOL/L — SIGNIFICANT CHANGE UP (ref 135–145)
SP GR SPEC: 1 — LOW (ref 1.01–1.02)
SP GR SPEC: 1.01 — SIGNIFICANT CHANGE UP (ref 1.01–1.02)
SP GR SPEC: 1.01 — SIGNIFICANT CHANGE UP (ref 1.01–1.02)
SP GR SPEC: 1.02 — SIGNIFICANT CHANGE UP (ref 1.01–1.02)
SPECIMEN SOURCE: SIGNIFICANT CHANGE UP
TARGETS BLD QL SMEAR: SLIGHT — SIGNIFICANT CHANGE UP
THROMBIN TIME: 26.1 SEC — HIGH (ref 16–25)
TRIGL SERPL-MCNC: 111 MG/DL — SIGNIFICANT CHANGE UP (ref 10–149)
TRIGL SERPL-MCNC: 125 MG/DL
TROPONIN I SERPL-MCNC: 0.03 NG/ML — SIGNIFICANT CHANGE UP (ref 0.01–0.04)
TROPONIN I SERPL-MCNC: 0.18 NG/ML — HIGH (ref 0.02–0.06)
TROPONIN I SERPL-MCNC: 0.48 NG/ML — HIGH (ref 0.02–0.06)
TROPONIN I SERPL-MCNC: 0.49 NG/ML — HIGH (ref 0.02–0.06)
TSH SERPL-ACNC: 1.87 UIU/ML
TSH SERPL-MCNC: 0.83 UIU/ML — SIGNIFICANT CHANGE UP (ref 0.36–3.74)
UROBILINOGEN FLD QL: 1
UROBILINOGEN FLD QL: 4
UROBILINOGEN FLD QL: NEGATIVE MG/DL — SIGNIFICANT CHANGE UP
UROBILINOGEN FLD QL: NEGATIVE — SIGNIFICANT CHANGE UP
VANCOMYCIN TROUGH SERPL-MCNC: 14.3 UG/ML — SIGNIFICANT CHANGE UP (ref 10–20)
VARIANT LYMPHS # BLD: 0 % — SIGNIFICANT CHANGE UP
VARIANT LYMPHS # BLD: 16.4 % — SIGNIFICANT CHANGE UP
VARIANT LYMPHS # BLD: 2 % — SIGNIFICANT CHANGE UP
VARIANT LYMPHS # BLD: 2 % — SIGNIFICANT CHANGE UP (ref 0–6)
VARIANT LYMPHS # BLD: 4 % — SIGNIFICANT CHANGE UP (ref 0–6)
WBC # BLD: 0.74 K/UL — CRITICAL LOW (ref 3.8–10.5)
WBC # BLD: 0.75 K/UL — CRITICAL LOW (ref 3.8–10.5)
WBC # BLD: 1.2 K/UL — LOW (ref 3.8–10.5)
WBC # BLD: 1.61 K/UL — LOW (ref 3.8–10.5)
WBC # BLD: 1.77 K/UL — LOW (ref 3.8–10.5)
WBC # BLD: 1.83 K/UL — LOW (ref 3.8–10.5)
WBC # BLD: 1.85 K/UL — LOW (ref 3.8–10.5)
WBC # BLD: 1.88 K/UL — LOW (ref 3.8–10.5)
WBC # BLD: 1.92 K/UL — LOW (ref 3.8–10.5)
WBC # BLD: 1.94 K/UL — LOW (ref 3.8–10.5)
WBC # BLD: 12.37 K/UL — HIGH (ref 3.8–10.5)
WBC # BLD: 12.37 K/UL — HIGH (ref 3.8–10.5)
WBC # BLD: 2.41 K/UL — LOW (ref 3.8–10.5)
WBC # BLD: 2.42 K/UL — LOW (ref 3.8–10.5)
WBC # BLD: 2.52 K/UL — LOW (ref 3.8–10.5)
WBC # BLD: 2.75 K/UL — LOW (ref 3.8–10.5)
WBC # BLD: 2.79 K/UL — LOW (ref 3.8–10.5)
WBC # BLD: 2.83 K/UL — LOW (ref 3.8–10.5)
WBC # BLD: 2.86 K/UL — LOW (ref 3.8–10.5)
WBC # BLD: 2.9 K/UL — LOW (ref 3.8–10.5)
WBC # BLD: 2.91 K/UL — LOW (ref 3.8–10.5)
WBC # BLD: 2.93 K/UL — LOW (ref 3.8–10.5)
WBC # BLD: 2.97 K/UL — LOW (ref 3.8–10.5)
WBC # BLD: 21.39 K/UL — HIGH (ref 3.8–10.5)
WBC # BLD: 3.05 K/UL — LOW (ref 3.8–10.5)
WBC # BLD: 3.14 K/UL — LOW (ref 3.8–10.5)
WBC # BLD: 3.23 K/UL — LOW (ref 3.8–10.5)
WBC # BLD: 3.33 K/UL — LOW (ref 3.8–10.5)
WBC # BLD: 3.37 K/UL — LOW (ref 3.8–10.5)
WBC # BLD: 3.42 K/UL — LOW (ref 3.8–10.5)
WBC # BLD: 3.6 K/UL — LOW (ref 3.8–10.5)
WBC # BLD: 3.62 K/UL — LOW (ref 3.8–10.5)
WBC # BLD: 3.65 K/UL — LOW (ref 3.8–10.5)
WBC # BLD: 3.7 K/UL — LOW (ref 3.8–10.5)
WBC # BLD: 4.39 K/UL — SIGNIFICANT CHANGE UP (ref 3.8–10.5)
WBC # BLD: 4.45 K/UL — SIGNIFICANT CHANGE UP (ref 3.8–10.5)
WBC # BLD: 4.86 K/UL — SIGNIFICANT CHANGE UP (ref 3.8–10.5)
WBC # BLD: 5.12 K/UL — SIGNIFICANT CHANGE UP (ref 3.8–10.5)
WBC # BLD: 5.58 K/UL — SIGNIFICANT CHANGE UP (ref 3.8–10.5)
WBC # BLD: 8.56 K/UL — SIGNIFICANT CHANGE UP (ref 3.8–10.5)
WBC # FLD AUTO: 0.74 K/UL — CRITICAL LOW (ref 3.8–10.5)
WBC # FLD AUTO: 0.75 K/UL — CRITICAL LOW (ref 3.8–10.5)
WBC # FLD AUTO: 1.2 K/UL — LOW (ref 3.8–10.5)
WBC # FLD AUTO: 1.61 K/UL — LOW (ref 3.8–10.5)
WBC # FLD AUTO: 1.77 K/UL — LOW (ref 3.8–10.5)
WBC # FLD AUTO: 1.83 K/UL — LOW (ref 3.8–10.5)
WBC # FLD AUTO: 1.85 K/UL — LOW (ref 3.8–10.5)
WBC # FLD AUTO: 1.88 K/UL — LOW (ref 3.8–10.5)
WBC # FLD AUTO: 1.92 K/UL — LOW (ref 3.8–10.5)
WBC # FLD AUTO: 1.94 K/UL — LOW (ref 3.8–10.5)
WBC # FLD AUTO: 12.37 K/UL — HIGH (ref 3.8–10.5)
WBC # FLD AUTO: 12.37 K/UL — HIGH (ref 3.8–10.5)
WBC # FLD AUTO: 2.41 K/UL — LOW (ref 3.8–10.5)
WBC # FLD AUTO: 2.42 K/UL — LOW (ref 3.8–10.5)
WBC # FLD AUTO: 2.52 K/UL — LOW (ref 3.8–10.5)
WBC # FLD AUTO: 2.66 K/UL
WBC # FLD AUTO: 2.75 K/UL — LOW (ref 3.8–10.5)
WBC # FLD AUTO: 2.79 K/UL — LOW (ref 3.8–10.5)
WBC # FLD AUTO: 2.83 K/UL — LOW (ref 3.8–10.5)
WBC # FLD AUTO: 2.86 K/UL — LOW (ref 3.8–10.5)
WBC # FLD AUTO: 2.9 K/UL — LOW (ref 3.8–10.5)
WBC # FLD AUTO: 2.91 K/UL — LOW (ref 3.8–10.5)
WBC # FLD AUTO: 2.93 K/UL — LOW (ref 3.8–10.5)
WBC # FLD AUTO: 2.97 K/UL — LOW (ref 3.8–10.5)
WBC # FLD AUTO: 21.39 K/UL — HIGH (ref 3.8–10.5)
WBC # FLD AUTO: 3.05 K/UL — LOW (ref 3.8–10.5)
WBC # FLD AUTO: 3.14 K/UL — LOW (ref 3.8–10.5)
WBC # FLD AUTO: 3.23 K/UL — LOW (ref 3.8–10.5)
WBC # FLD AUTO: 3.33 K/UL — LOW (ref 3.8–10.5)
WBC # FLD AUTO: 3.37 K/UL — LOW (ref 3.8–10.5)
WBC # FLD AUTO: 3.42 K/UL — LOW (ref 3.8–10.5)
WBC # FLD AUTO: 3.6 K/UL — LOW (ref 3.8–10.5)
WBC # FLD AUTO: 3.62 K/UL — LOW (ref 3.8–10.5)
WBC # FLD AUTO: 3.65 K/UL — LOW (ref 3.8–10.5)
WBC # FLD AUTO: 3.7 K/UL — LOW (ref 3.8–10.5)
WBC # FLD AUTO: 4.39 K/UL — SIGNIFICANT CHANGE UP (ref 3.8–10.5)
WBC # FLD AUTO: 4.45 K/UL — SIGNIFICANT CHANGE UP (ref 3.8–10.5)
WBC # FLD AUTO: 4.86 K/UL — SIGNIFICANT CHANGE UP (ref 3.8–10.5)
WBC # FLD AUTO: 5.12 K/UL — SIGNIFICANT CHANGE UP (ref 3.8–10.5)
WBC # FLD AUTO: 5.58 K/UL — SIGNIFICANT CHANGE UP (ref 3.8–10.5)
WBC # FLD AUTO: 8.56 K/UL — SIGNIFICANT CHANGE UP (ref 3.8–10.5)
WBC UR QL: SIGNIFICANT CHANGE UP

## 2020-01-01 PROCEDURE — 36573 INSJ PICC RS&I 5 YR+: CPT

## 2020-01-01 PROCEDURE — 94660 CPAP INITIATION&MGMT: CPT

## 2020-01-01 PROCEDURE — 82553 CREATINE MB FRACTION: CPT

## 2020-01-01 PROCEDURE — 99233 SBSQ HOSP IP/OBS HIGH 50: CPT | Mod: GC

## 2020-01-01 PROCEDURE — 99232 SBSQ HOSP IP/OBS MODERATE 35: CPT

## 2020-01-01 PROCEDURE — 82787 IGG 1 2 3 OR 4 EACH: CPT

## 2020-01-01 PROCEDURE — 77012 CT SCAN FOR NEEDLE BIOPSY: CPT | Mod: 26

## 2020-01-01 PROCEDURE — 71048 X-RAY EXAM CHEST 4+ VIEWS: CPT

## 2020-01-01 PROCEDURE — 93970 EXTREMITY STUDY: CPT

## 2020-01-01 PROCEDURE — 94727 GAS DIL/WSHOT DETER LNG VOL: CPT

## 2020-01-01 PROCEDURE — C1751: CPT

## 2020-01-01 PROCEDURE — 88305 TISSUE EXAM BY PATHOLOGIST: CPT | Mod: 26

## 2020-01-01 PROCEDURE — 99213 OFFICE O/P EST LOW 20 MIN: CPT

## 2020-01-01 PROCEDURE — 84478 ASSAY OF TRIGLYCERIDES: CPT

## 2020-01-01 PROCEDURE — 93306 TTE W/DOPPLER COMPLETE: CPT | Mod: 26

## 2020-01-01 PROCEDURE — 94060 EVALUATION OF WHEEZING: CPT

## 2020-01-01 PROCEDURE — 87205 SMEAR GRAM STAIN: CPT

## 2020-01-01 PROCEDURE — 99285 EMERGENCY DEPT VISIT HI MDM: CPT

## 2020-01-01 PROCEDURE — 86850 RBC ANTIBODY SCREEN: CPT

## 2020-01-01 PROCEDURE — 85027 COMPLETE CBC AUTOMATED: CPT

## 2020-01-01 PROCEDURE — 99239 HOSP IP/OBS DSCHRG MGMT >30: CPT | Mod: GC

## 2020-01-01 PROCEDURE — 85732 THROMBOPLASTIN TIME PARTIAL: CPT

## 2020-01-01 PROCEDURE — P9037: CPT

## 2020-01-01 PROCEDURE — 77012 CT SCAN FOR NEEDLE BIOPSY: CPT

## 2020-01-01 PROCEDURE — 85097 BONE MARROW INTERPRETATION: CPT

## 2020-01-01 PROCEDURE — 71275 CT ANGIOGRAPHY CHEST: CPT | Mod: 26

## 2020-01-01 PROCEDURE — 92950 HEART/LUNG RESUSCITATION CPR: CPT

## 2020-01-01 PROCEDURE — 94760 N-INVAS EAR/PLS OXIMETRY 1: CPT

## 2020-01-01 PROCEDURE — 71045 X-RAY EXAM CHEST 1 VIEW: CPT | Mod: 26

## 2020-01-01 PROCEDURE — 95800 SLP STDY UNATTENDED: CPT

## 2020-01-01 PROCEDURE — 71045 X-RAY EXAM CHEST 1 VIEW: CPT

## 2020-01-01 PROCEDURE — 88280 CHROMOSOME KARYOTYPE STUDY: CPT

## 2020-01-01 PROCEDURE — 85730 THROMBOPLASTIN TIME PARTIAL: CPT

## 2020-01-01 PROCEDURE — 77001 FLUOROGUIDE FOR VEIN DEVICE: CPT

## 2020-01-01 PROCEDURE — 85610 PROTHROMBIN TIME: CPT

## 2020-01-01 PROCEDURE — 93280 PM DEVICE PROGR EVAL DUAL: CPT | Mod: 26

## 2020-01-01 PROCEDURE — 99223 1ST HOSP IP/OBS HIGH 75: CPT | Mod: AI

## 2020-01-01 PROCEDURE — 88285 CHROMOSOME COUNT ADDITIONAL: CPT

## 2020-01-01 PROCEDURE — C8929: CPT

## 2020-01-01 PROCEDURE — 85362 FIBRIN DEGRADATION PRODUCTS: CPT

## 2020-01-01 PROCEDURE — 83880 ASSAY OF NATRIURETIC PEPTIDE: CPT

## 2020-01-01 PROCEDURE — 88189 FLOWCYTOMETRY/READ 16 & >: CPT

## 2020-01-01 PROCEDURE — 87040 BLOOD CULTURE FOR BACTERIA: CPT

## 2020-01-01 PROCEDURE — 96413 CHEMO IV INFUSION 1 HR: CPT

## 2020-01-01 PROCEDURE — 71046 X-RAY EXAM CHEST 2 VIEWS: CPT | Mod: 26

## 2020-01-01 PROCEDURE — 94729 DIFFUSING CAPACITY: CPT

## 2020-01-01 PROCEDURE — 84484 ASSAY OF TROPONIN QUANT: CPT

## 2020-01-01 PROCEDURE — 88738 HGB QUANT TRANSCUTANEOUS: CPT

## 2020-01-01 PROCEDURE — 80202 ASSAY OF VANCOMYCIN: CPT

## 2020-01-01 PROCEDURE — 80053 COMPREHEN METABOLIC PANEL: CPT

## 2020-01-01 PROCEDURE — 88312 SPECIAL STAINS GROUP 1: CPT | Mod: 26

## 2020-01-01 PROCEDURE — 31603 EMER TRACHEOSTOMY TTRACH: CPT | Mod: GC

## 2020-01-01 PROCEDURE — 99232 SBSQ HOSP IP/OBS MODERATE 35: CPT | Mod: GC

## 2020-01-01 PROCEDURE — 94640 AIRWAY INHALATION TREATMENT: CPT

## 2020-01-01 PROCEDURE — 83615 LACTATE (LD) (LDH) ENZYME: CPT

## 2020-01-01 PROCEDURE — 97116 GAIT TRAINING THERAPY: CPT

## 2020-01-01 PROCEDURE — 93005 ELECTROCARDIOGRAM TRACING: CPT

## 2020-01-01 PROCEDURE — 99239 HOSP IP/OBS DSCHRG MGMT >30: CPT | Mod: GC,25

## 2020-01-01 PROCEDURE — 88300 SURGICAL PATH GROSS: CPT | Mod: 26,59

## 2020-01-01 PROCEDURE — 97161 PT EVAL LOW COMPLEX 20 MIN: CPT

## 2020-01-01 PROCEDURE — 97530 THERAPEUTIC ACTIVITIES: CPT

## 2020-01-01 PROCEDURE — 94010 BREATHING CAPACITY TEST: CPT

## 2020-01-01 PROCEDURE — 87086 URINE CULTURE/COLONY COUNT: CPT

## 2020-01-01 PROCEDURE — 93010 ELECTROCARDIOGRAM REPORT: CPT

## 2020-01-01 PROCEDURE — 88313 SPECIAL STAINS GROUP 2: CPT

## 2020-01-01 PROCEDURE — 99221 1ST HOSP IP/OBS SF/LOW 40: CPT

## 2020-01-01 PROCEDURE — 88342 IMHCHEM/IMCYTCHM 1ST ANTB: CPT | Mod: 26,59

## 2020-01-01 PROCEDURE — 99285 EMERGENCY DEPT VISIT HI MDM: CPT | Mod: 25

## 2020-01-01 PROCEDURE — 99358 PROLONG SERVICE W/O CONTACT: CPT

## 2020-01-01 PROCEDURE — 99232 SBSQ HOSP IP/OBS MODERATE 35: CPT | Mod: 25

## 2020-01-01 PROCEDURE — 80048 BASIC METABOLIC PNL TOTAL CA: CPT

## 2020-01-01 PROCEDURE — 36430 TRANSFUSION BLD/BLD COMPNT: CPT

## 2020-01-01 PROCEDURE — 71046 X-RAY EXAM CHEST 2 VIEWS: CPT

## 2020-01-01 PROCEDURE — 87635 SARS-COV-2 COVID-19 AMP PRB: CPT

## 2020-01-01 PROCEDURE — 81001 URINALYSIS AUTO W/SCOPE: CPT

## 2020-01-01 PROCEDURE — 96415 CHEMO IV INFUSION ADDL HR: CPT

## 2020-01-01 PROCEDURE — 88312 SPECIAL STAINS GROUP 1: CPT

## 2020-01-01 PROCEDURE — 84443 ASSAY THYROID STIM HORMONE: CPT

## 2020-01-01 PROCEDURE — 85097 BONE MARROW INTERPRETATION: CPT | Mod: 59

## 2020-01-01 PROCEDURE — 88300 SURGICAL PATH GROSS: CPT

## 2020-01-01 PROCEDURE — 76937 US GUIDE VASCULAR ACCESS: CPT

## 2020-01-01 PROCEDURE — 86901 BLOOD TYPING SEROLOGIC RH(D): CPT

## 2020-01-01 PROCEDURE — 85303 CLOT INHIBIT PROT C ACTIVITY: CPT

## 2020-01-01 PROCEDURE — 88264 CHROMOSOME ANALYSIS 20-25: CPT

## 2020-01-01 PROCEDURE — 93970 EXTREMITY STUDY: CPT | Mod: 26

## 2020-01-01 PROCEDURE — 99233 SBSQ HOSP IP/OBS HIGH 50: CPT

## 2020-01-01 PROCEDURE — 99214 OFFICE O/P EST MOD 30 MIN: CPT

## 2020-01-01 PROCEDURE — 36415 COLL VENOUS BLD VENIPUNCTURE: CPT

## 2020-01-01 PROCEDURE — 99053 MED SERV 10PM-8AM 24 HR FAC: CPT

## 2020-01-01 PROCEDURE — 88365 INSITU HYBRIDIZATION (FISH): CPT

## 2020-01-01 PROCEDURE — 88313 SPECIAL STAINS GROUP 2: CPT | Mod: 26

## 2020-01-01 PROCEDURE — 93306 TTE W/DOPPLER COMPLETE: CPT

## 2020-01-01 PROCEDURE — 82803 BLOOD GASES ANY COMBINATION: CPT

## 2020-01-01 PROCEDURE — 88341 IMHCHEM/IMCYTCHM EA ADD ANTB: CPT | Mod: 26

## 2020-01-01 PROCEDURE — 99223 1ST HOSP IP/OBS HIGH 75: CPT | Mod: GC

## 2020-01-01 PROCEDURE — P9016: CPT

## 2020-01-01 PROCEDURE — 99214 OFFICE O/P EST MOD 30 MIN: CPT | Mod: 95

## 2020-01-01 PROCEDURE — 99204 OFFICE O/P NEW MOD 45 MIN: CPT | Mod: 25

## 2020-01-01 PROCEDURE — 71048 X-RAY EXAM CHEST 4+ VIEWS: CPT | Mod: 26

## 2020-01-01 PROCEDURE — 99291 CRITICAL CARE FIRST HOUR: CPT

## 2020-01-01 PROCEDURE — 82962 GLUCOSE BLOOD TEST: CPT

## 2020-01-01 PROCEDURE — 84100 ASSAY OF PHOSPHORUS: CPT

## 2020-01-01 PROCEDURE — 88185 FLOWCYTOMETRY/TC ADD-ON: CPT

## 2020-01-01 PROCEDURE — 85670 THROMBIN TIME PLASMA: CPT

## 2020-01-01 PROCEDURE — 86140 C-REACTIVE PROTEIN: CPT

## 2020-01-01 PROCEDURE — 88237 TISSUE CULTURE BONE MARROW: CPT

## 2020-01-01 PROCEDURE — 99215 OFFICE O/P EST HI 40 MIN: CPT | Mod: 25

## 2020-01-01 PROCEDURE — 84145 PROCALCITONIN (PCT): CPT

## 2020-01-01 PROCEDURE — 99223 1ST HOSP IP/OBS HIGH 75: CPT

## 2020-01-01 PROCEDURE — 93971 EXTREMITY STUDY: CPT | Mod: 26,RT

## 2020-01-01 PROCEDURE — 83036 HEMOGLOBIN GLYCOSYLATED A1C: CPT

## 2020-01-01 PROCEDURE — 96374 THER/PROPH/DIAG INJ IV PUSH: CPT

## 2020-01-01 PROCEDURE — 88342 IMHCHEM/IMCYTCHM 1ST ANTB: CPT

## 2020-01-01 PROCEDURE — 70450 CT HEAD/BRAIN W/O DYE: CPT | Mod: 26

## 2020-01-01 PROCEDURE — 82550 ASSAY OF CK (CPK): CPT

## 2020-01-01 PROCEDURE — 85384 FIBRINOGEN ACTIVITY: CPT

## 2020-01-01 PROCEDURE — 96374 THER/PROPH/DIAG INJ IV PUSH: CPT | Mod: XU

## 2020-01-01 PROCEDURE — 85379 FIBRIN DEGRADATION QUANT: CPT

## 2020-01-01 PROCEDURE — 36600 WITHDRAWAL OF ARTERIAL BLOOD: CPT

## 2020-01-01 PROCEDURE — 71275 CT ANGIOGRAPHY CHEST: CPT

## 2020-01-01 PROCEDURE — 83605 ASSAY OF LACTIC ACID: CPT

## 2020-01-01 PROCEDURE — 82728 ASSAY OF FERRITIN: CPT

## 2020-01-01 PROCEDURE — 70450 CT HEAD/BRAIN W/O DYE: CPT

## 2020-01-01 PROCEDURE — 88313 SPECIAL STAINS GROUP 2: CPT | Mod: 26,59

## 2020-01-01 PROCEDURE — 73201 CT UPPER EXTREMITY W/DYE: CPT

## 2020-01-01 PROCEDURE — 99222 1ST HOSP IP/OBS MODERATE 55: CPT

## 2020-01-01 PROCEDURE — 85302 CLOT INHIBIT PROT C ANTIGEN: CPT

## 2020-01-01 PROCEDURE — 88365 INSITU HYBRIDIZATION (FISH): CPT | Mod: 26

## 2020-01-01 PROCEDURE — 86900 BLOOD TYPING SEROLOGIC ABO: CPT

## 2020-01-01 PROCEDURE — 88184 FLOWCYTOMETRY/ TC 1 MARKER: CPT

## 2020-01-01 PROCEDURE — 88305 TISSUE EXAM BY PATHOLOGIST: CPT

## 2020-01-01 PROCEDURE — 99223 1ST HOSP IP/OBS HIGH 75: CPT | Mod: GC,AI

## 2020-01-01 PROCEDURE — 99204 OFFICE O/P NEW MOD 45 MIN: CPT

## 2020-01-01 PROCEDURE — 99231 SBSQ HOSP IP/OBS SF/LOW 25: CPT | Mod: GC

## 2020-01-01 PROCEDURE — 82784 ASSAY IGA/IGD/IGG/IGM EACH: CPT

## 2020-01-01 PROCEDURE — 85611 PROTHROMBIN TEST: CPT

## 2020-01-01 PROCEDURE — 96116 NUBHVL XM PHYS/QHP 1ST HR: CPT | Mod: 59

## 2020-01-01 PROCEDURE — 93971 EXTREMITY STUDY: CPT

## 2020-01-01 PROCEDURE — 87631 RESP VIRUS 3-5 TARGETS: CPT

## 2020-01-01 PROCEDURE — 73201 CT UPPER EXTREMITY W/DYE: CPT | Mod: 26,RT

## 2020-01-01 PROCEDURE — 99214 OFFICE O/P EST MOD 30 MIN: CPT | Mod: 25

## 2020-01-01 PROCEDURE — 83735 ASSAY OF MAGNESIUM: CPT

## 2020-01-01 PROCEDURE — 86923 COMPATIBILITY TEST ELECTRIC: CPT

## 2020-01-01 PROCEDURE — 99238 HOSP IP/OBS DSCHRG MGMT 30/<: CPT

## 2020-01-01 PROCEDURE — 88341 IMHCHEM/IMCYTCHM EA ADD ANTB: CPT

## 2020-01-01 PROCEDURE — 20225 BONE BIOPSY TROCAR/NDL DEEP: CPT

## 2020-01-01 PROCEDURE — 97162 PT EVAL MOD COMPLEX 30 MIN: CPT

## 2020-01-01 PROCEDURE — 85306 CLOT INHIBIT PROT S FREE: CPT

## 2020-01-01 PROCEDURE — 99292 CRITICAL CARE ADDL 30 MIN: CPT | Mod: 25

## 2020-01-01 PROCEDURE — 99291 CRITICAL CARE FIRST HOUR: CPT | Mod: 25

## 2020-01-01 RX ORDER — THIAMINE MONONITRATE (VIT B1) 100 MG
100 TABLET ORAL DAILY
Refills: 0 | Status: DISCONTINUED | OUTPATIENT
Start: 2020-01-01 | End: 2020-01-01

## 2020-01-01 RX ORDER — SODIUM CHLORIDE 9 MG/ML
1000 INJECTION INTRAMUSCULAR; INTRAVENOUS; SUBCUTANEOUS
Refills: 0 | Status: DISCONTINUED | OUTPATIENT
Start: 2020-01-01 | End: 2020-01-01

## 2020-01-01 RX ORDER — FOLIC ACID 0.8 MG
1 TABLET ORAL
Qty: 0 | Refills: 0 | DISCHARGE
Start: 2020-01-01

## 2020-01-01 RX ORDER — CHLORHEXIDINE GLUCONATE 213 G/1000ML
1 SOLUTION TOPICAL
Refills: 0 | Status: DISCONTINUED | OUTPATIENT
Start: 2020-01-01 | End: 2020-01-01

## 2020-01-01 RX ORDER — PANTOPRAZOLE SODIUM 20 MG/1
40 TABLET, DELAYED RELEASE ORAL DAILY
Refills: 0 | Status: DISCONTINUED | OUTPATIENT
Start: 2020-01-01 | End: 2020-01-01

## 2020-01-01 RX ORDER — LANOLIN ALCOHOL/MO/W.PET/CERES
3 CREAM (GRAM) TOPICAL AT BEDTIME
Refills: 0 | Status: COMPLETED | OUTPATIENT
Start: 2020-01-01 | End: 2020-01-01

## 2020-01-01 RX ORDER — HEPARIN SODIUM 5000 [USP'U]/ML
INJECTION INTRAVENOUS; SUBCUTANEOUS
Qty: 25000 | Refills: 0 | Status: DISCONTINUED | OUTPATIENT
Start: 2020-01-01 | End: 2020-01-01

## 2020-01-01 RX ORDER — CETIRIZINE HYDROCHLORIDE 10 MG/1
1 TABLET ORAL
Qty: 0 | Refills: 0 | DISCHARGE

## 2020-01-01 RX ORDER — NYSTATIN 500MM UNIT
500000 POWDER (EA) MISCELLANEOUS
Refills: 0 | Status: DISCONTINUED | OUTPATIENT
Start: 2020-01-01 | End: 2020-01-01

## 2020-01-01 RX ORDER — MORPHINE SULFATE 50 MG/1
2 CAPSULE, EXTENDED RELEASE ORAL ONCE
Refills: 0 | Status: DISCONTINUED | OUTPATIENT
Start: 2020-01-01 | End: 2020-01-01

## 2020-01-01 RX ORDER — POLYETHYLENE GLYCOL 3350 17 G/17G
17 POWDER, FOR SOLUTION ORAL DAILY
Refills: 0 | Status: DISCONTINUED | OUTPATIENT
Start: 2020-01-01 | End: 2020-01-01

## 2020-01-01 RX ORDER — FILGRASTIM 480MCG/1.6
480 VIAL (ML) INJECTION DAILY
Refills: 0 | Status: DISCONTINUED | OUTPATIENT
Start: 2020-01-01 | End: 2020-01-01

## 2020-01-01 RX ORDER — CEFTRIAXONE 500 MG/1
1000 INJECTION, POWDER, FOR SOLUTION INTRAMUSCULAR; INTRAVENOUS EVERY 24 HOURS
Refills: 0 | Status: DISCONTINUED | OUTPATIENT
Start: 2020-01-01 | End: 2020-01-01

## 2020-01-01 RX ORDER — METOPROLOL TARTRATE 50 MG
50 TABLET ORAL
Refills: 0 | Status: DISCONTINUED | OUTPATIENT
Start: 2020-01-01 | End: 2020-01-01

## 2020-01-01 RX ORDER — VINBLASTINE SULFATE 10 MG
13 VIAL (EA) INTRAVENOUS ONCE
Refills: 0 | Status: COMPLETED | OUTPATIENT
Start: 2020-01-01 | End: 2020-01-01

## 2020-01-01 RX ORDER — PANTOPRAZOLE SODIUM 20 MG/1
1 TABLET, DELAYED RELEASE ORAL
Qty: 0 | Refills: 0 | DISCHARGE

## 2020-01-01 RX ORDER — CHLORHEXIDINE GLUCONATE 213 G/1000ML
1 SOLUTION TOPICAL DAILY
Refills: 0 | Status: DISCONTINUED | OUTPATIENT
Start: 2020-01-01 | End: 2020-01-01

## 2020-01-01 RX ORDER — BUDESONIDE AND FORMOTEROL FUMARATE DIHYDRATE 160; 4.5 UG/1; UG/1
0 AEROSOL RESPIRATORY (INHALATION)
Qty: 0 | Refills: 0 | DISCHARGE
Start: 2020-01-01

## 2020-01-01 RX ORDER — MIDAZOLAM HYDROCHLORIDE 1 MG/ML
4 INJECTION, SOLUTION INTRAMUSCULAR; INTRAVENOUS ONCE
Refills: 0 | Status: DISCONTINUED | OUTPATIENT
Start: 2020-01-01 | End: 2020-01-01

## 2020-01-01 RX ORDER — FOLIC ACID 0.8 MG
1 TABLET ORAL DAILY
Refills: 0 | Status: DISCONTINUED | OUTPATIENT
Start: 2020-01-01 | End: 2020-01-01

## 2020-01-01 RX ORDER — PALONOSETRON HYDROCHLORIDE 0.25 MG/5ML
0.25 INJECTION, SOLUTION INTRAVENOUS ONCE
Refills: 0 | Status: COMPLETED | OUTPATIENT
Start: 2020-01-01 | End: 2020-01-01

## 2020-01-01 RX ORDER — ATORVASTATIN CALCIUM 80 MG/1
1 TABLET, FILM COATED ORAL
Qty: 0 | Refills: 0 | DISCHARGE

## 2020-01-01 RX ORDER — ATORVASTATIN CALCIUM 80 MG/1
10 TABLET, FILM COATED ORAL AT BEDTIME
Refills: 0 | Status: DISCONTINUED | OUTPATIENT
Start: 2020-01-01 | End: 2020-01-01

## 2020-01-01 RX ORDER — PIPERACILLIN AND TAZOBACTAM 4; .5 G/20ML; G/20ML
3.38 INJECTION, POWDER, LYOPHILIZED, FOR SOLUTION INTRAVENOUS EVERY 8 HOURS
Refills: 0 | Status: DISCONTINUED | OUTPATIENT
Start: 2020-01-01 | End: 2020-01-01

## 2020-01-01 RX ORDER — DEXAMETHASONE 0.5 MG/5ML
10 ELIXIR ORAL ONCE
Refills: 0 | Status: COMPLETED | OUTPATIENT
Start: 2020-01-01 | End: 2020-01-01

## 2020-01-01 RX ORDER — FUROSEMIDE 40 MG
0 TABLET ORAL
Qty: 0 | Refills: 0 | DISCHARGE

## 2020-01-01 RX ORDER — FUROSEMIDE 40 MG
40 TABLET ORAL DAILY
Refills: 0 | Status: DISCONTINUED | OUTPATIENT
Start: 2020-01-01 | End: 2020-01-01

## 2020-01-01 RX ORDER — METOPROLOL TARTRATE 50 MG
1 TABLET ORAL
Qty: 0 | Refills: 0 | DISCHARGE
Start: 2020-01-01

## 2020-01-01 RX ORDER — DIPHENHYDRAMINE HCL 50 MG
25 CAPSULE ORAL ONCE
Refills: 0 | Status: COMPLETED | OUTPATIENT
Start: 2020-01-01 | End: 2020-01-01

## 2020-01-01 RX ORDER — BLEOMYCIN SULFATE 30 UNIT
20 VIAL (EA) INJECTION ONCE
Refills: 0 | Status: COMPLETED | OUTPATIENT
Start: 2020-01-01 | End: 2020-01-01

## 2020-01-01 RX ORDER — HALOPERIDOL DECANOATE 100 MG/ML
1 INJECTION INTRAMUSCULAR ONCE
Refills: 0 | Status: COMPLETED | OUTPATIENT
Start: 2020-01-01 | End: 2020-01-01

## 2020-01-01 RX ORDER — ASPIRIN/CALCIUM CARB/MAGNESIUM 324 MG
1 TABLET ORAL
Qty: 0 | Refills: 0 | DISCHARGE
Start: 2020-01-01

## 2020-01-01 RX ORDER — OLANZAPINE 15 MG/1
10 TABLET, FILM COATED ORAL EVERY 6 HOURS
Refills: 0 | Status: DISCONTINUED | OUTPATIENT
Start: 2020-01-01 | End: 2020-01-01

## 2020-01-01 RX ORDER — RISPERIDONE 4 MG/1
1 TABLET ORAL
Refills: 0 | Status: DISCONTINUED | OUTPATIENT
Start: 2020-01-01 | End: 2020-01-01

## 2020-01-01 RX ORDER — IPRATROPIUM/ALBUTEROL SULFATE 18-103MCG
3 AEROSOL WITH ADAPTER (GRAM) INHALATION EVERY 4 HOURS
Refills: 0 | Status: DISCONTINUED | OUTPATIENT
Start: 2020-01-01 | End: 2020-01-01

## 2020-01-01 RX ORDER — NYSTATIN 500MM UNIT
5 POWDER (EA) MISCELLANEOUS
Qty: 200 | Refills: 0
Start: 2020-01-01

## 2020-01-01 RX ORDER — CEFPODOXIME PROXETIL 100 MG
1 TABLET ORAL
Qty: 8 | Refills: 0
Start: 2020-01-01 | End: 2020-01-01

## 2020-01-01 RX ORDER — RISPERIDONE 4 MG/1
1 TABLET ORAL
Qty: 0 | Refills: 0 | DISCHARGE

## 2020-01-01 RX ORDER — QUETIAPINE FUMARATE 200 MG/1
25 TABLET, FILM COATED ORAL ONCE
Refills: 0 | Status: COMPLETED | OUTPATIENT
Start: 2020-01-01 | End: 2020-01-01

## 2020-01-01 RX ORDER — BLEOMYCIN SULFATE 30 UNIT
22 VIAL (EA) INJECTION ONCE
Refills: 0 | Status: COMPLETED | OUTPATIENT
Start: 2020-01-01 | End: 2020-01-01

## 2020-01-01 RX ORDER — DACARBAZINE 10 MG/ML
600 INJECTION, POWDER, FOR SOLUTION INTRAVENOUS ONCE
Refills: 0 | Status: COMPLETED | OUTPATIENT
Start: 2020-01-01 | End: 2020-01-01

## 2020-01-01 RX ORDER — POTASSIUM CHLORIDE 20 MEQ
10 PACKET (EA) ORAL
Refills: 0 | Status: COMPLETED | OUTPATIENT
Start: 2020-01-01 | End: 2020-01-01

## 2020-01-01 RX ORDER — FUROSEMIDE 40 MG
80 TABLET ORAL ONCE
Refills: 0 | Status: COMPLETED | OUTPATIENT
Start: 2020-01-01 | End: 2020-01-01

## 2020-01-01 RX ORDER — ENOXAPARIN SODIUM 100 MG/ML
150 INJECTION SUBCUTANEOUS
Qty: 4500 | Refills: 0
Start: 2020-01-01 | End: 2020-01-01

## 2020-01-01 RX ORDER — BLEOMYCIN SULFATE 30 UNIT
2 VIAL (EA) INJECTION ONCE
Refills: 0 | Status: COMPLETED | OUTPATIENT
Start: 2020-01-01 | End: 2020-01-01

## 2020-01-01 RX ORDER — SODIUM CHLORIDE 9 MG/ML
1000 INJECTION INTRAMUSCULAR; INTRAVENOUS; SUBCUTANEOUS ONCE
Refills: 0 | Status: COMPLETED | OUTPATIENT
Start: 2020-01-01 | End: 2020-01-01

## 2020-01-01 RX ORDER — ACETAMINOPHEN 500 MG
650 TABLET ORAL ONCE
Refills: 0 | Status: COMPLETED | OUTPATIENT
Start: 2020-01-01 | End: 2020-01-01

## 2020-01-01 RX ORDER — BLEOMYCIN SULFATE 30 UNIT
22 VIAL (EA) INJECTION ONCE
Refills: 0 | Status: DISCONTINUED | OUTPATIENT
Start: 2020-01-01 | End: 2020-01-01

## 2020-01-01 RX ORDER — FUROSEMIDE 40 MG
1 TABLET ORAL
Qty: 0 | Refills: 0 | DISCHARGE

## 2020-01-01 RX ORDER — ENOXAPARIN SODIUM 100 MG/ML
150 INJECTION SUBCUTANEOUS ONCE
Refills: 0 | Status: COMPLETED | OUTPATIENT
Start: 2020-01-01 | End: 2020-01-01

## 2020-01-01 RX ORDER — ONDANSETRON 8 MG/1
4 TABLET, FILM COATED ORAL ONCE
Refills: 0 | Status: COMPLETED | OUTPATIENT
Start: 2020-01-01 | End: 2020-01-01

## 2020-01-01 RX ORDER — ASPIRIN/CALCIUM CARB/MAGNESIUM 324 MG
81 TABLET ORAL DAILY
Refills: 0 | Status: DISCONTINUED | OUTPATIENT
Start: 2020-01-01 | End: 2020-01-01

## 2020-01-01 RX ORDER — DACARBAZINE 10 MG/ML
200 INJECTION, POWDER, FOR SOLUTION INTRAVENOUS ONCE
Refills: 0 | Status: COMPLETED | OUTPATIENT
Start: 2020-01-01 | End: 2020-01-01

## 2020-01-01 RX ORDER — METOPROLOL TARTRATE 50 MG
1 TABLET ORAL
Qty: 0 | Refills: 0 | DISCHARGE

## 2020-01-01 RX ORDER — DOXORUBICIN HYDROCHLORIDE 2 MG/ML
57 INJECTION, SOLUTION INTRAVENOUS ONCE
Refills: 0 | Status: COMPLETED | OUTPATIENT
Start: 2020-01-01 | End: 2020-01-01

## 2020-01-01 RX ORDER — LORATADINE 10 MG/1
10 TABLET ORAL DAILY
Refills: 0 | Status: DISCONTINUED | OUTPATIENT
Start: 2020-01-01 | End: 2020-01-01

## 2020-01-01 RX ORDER — TIOTROPIUM BROMIDE AND OLODATEROL 3.124; 2.736 UG/1; UG/1
2.5-2.5 SPRAY, METERED RESPIRATORY (INHALATION) DAILY
Qty: 1 | Refills: 3 | Status: ACTIVE | COMMUNITY
Start: 2020-01-01 | End: 1900-01-01

## 2020-01-01 RX ORDER — ACETAMINOPHEN 500 MG
650 TABLET ORAL EVERY 6 HOURS
Refills: 0 | Status: DISCONTINUED | OUTPATIENT
Start: 2020-01-01 | End: 2020-01-01

## 2020-01-01 RX ORDER — ACETAMINOPHEN 500 MG
1000 TABLET ORAL EVERY 6 HOURS
Refills: 0 | Status: DISCONTINUED | OUTPATIENT
Start: 2020-01-01 | End: 2020-01-01

## 2020-01-01 RX ORDER — OXYCODONE HYDROCHLORIDE 5 MG/1
5 TABLET ORAL EVERY 6 HOURS
Refills: 0 | Status: DISCONTINUED | OUTPATIENT
Start: 2020-01-01 | End: 2020-01-01

## 2020-01-01 RX ORDER — SENNA PLUS 8.6 MG/1
2 TABLET ORAL AT BEDTIME
Refills: 0 | Status: DISCONTINUED | OUTPATIENT
Start: 2020-01-01 | End: 2020-01-01

## 2020-01-01 RX ORDER — OLANZAPINE 15 MG/1
5 TABLET, FILM COATED ORAL ONCE
Refills: 0 | Status: COMPLETED | OUTPATIENT
Start: 2020-01-01 | End: 2020-01-01

## 2020-01-01 RX ORDER — THIAMINE MONONITRATE (VIT B1) 100 MG
1 TABLET ORAL
Qty: 0 | Refills: 0 | DISCHARGE
Start: 2020-01-01

## 2020-01-01 RX ORDER — DONEPEZIL HYDROCHLORIDE 10 MG/1
1 TABLET, FILM COATED ORAL
Qty: 0 | Refills: 0 | DISCHARGE
Start: 2020-01-01

## 2020-01-01 RX ORDER — HEPARIN SODIUM 5000 [USP'U]/ML
5000 INJECTION INTRAVENOUS; SUBCUTANEOUS EVERY 8 HOURS
Refills: 0 | Status: DISCONTINUED | OUTPATIENT
Start: 2020-01-01 | End: 2020-01-01

## 2020-01-01 RX ORDER — HEPARIN SODIUM 5000 [USP'U]/ML
1700 INJECTION INTRAVENOUS; SUBCUTANEOUS
Qty: 25000 | Refills: 0 | Status: DISCONTINUED | OUTPATIENT
Start: 2020-01-01 | End: 2020-01-01

## 2020-01-01 RX ORDER — OXYCODONE HYDROCHLORIDE 5 MG/1
10 TABLET ORAL EVERY 6 HOURS
Refills: 0 | Status: DISCONTINUED | OUTPATIENT
Start: 2020-01-01 | End: 2020-01-01

## 2020-01-01 RX ORDER — ONDANSETRON 8 MG/1
8 TABLET, FILM COATED ORAL EVERY 8 HOURS
Refills: 0 | Status: DISCONTINUED | OUTPATIENT
Start: 2020-01-01 | End: 2020-01-01

## 2020-01-01 RX ORDER — DOCUSATE SODIUM 100 MG/1
100 CAPSULE ORAL DAILY
Refills: 0 | Status: DISCONTINUED | COMMUNITY
End: 2020-01-01

## 2020-01-01 RX ORDER — AZITHROMYCIN 500 MG/1
500 TABLET, FILM COATED ORAL ONCE
Refills: 0 | Status: COMPLETED | OUTPATIENT
Start: 2020-01-01 | End: 2020-01-01

## 2020-01-01 RX ORDER — RISPERIDONE 4 MG/1
1 TABLET ORAL
Qty: 30 | Refills: 0
Start: 2020-01-01 | End: 2020-01-01

## 2020-01-01 RX ORDER — PIPERACILLIN AND TAZOBACTAM 4; .5 G/20ML; G/20ML
3.38 INJECTION, POWDER, LYOPHILIZED, FOR SOLUTION INTRAVENOUS ONCE
Refills: 0 | Status: COMPLETED | OUTPATIENT
Start: 2020-01-01 | End: 2020-01-01

## 2020-01-01 RX ORDER — SODIUM CHLORIDE 9 MG/ML
1000 INJECTION, SOLUTION INTRAVENOUS
Refills: 0 | Status: DISCONTINUED | OUTPATIENT
Start: 2020-01-01 | End: 2020-01-01

## 2020-01-01 RX ORDER — ENOXAPARIN SODIUM 100 MG/ML
100 INJECTION SUBCUTANEOUS EVERY 12 HOURS
Refills: 0 | Status: DISCONTINUED | OUTPATIENT
Start: 2020-01-01 | End: 2020-01-01

## 2020-01-01 RX ORDER — LANOLIN ALCOHOL/MO/W.PET/CERES
5 CREAM (GRAM) TOPICAL ONCE
Refills: 0 | Status: COMPLETED | OUTPATIENT
Start: 2020-01-01 | End: 2020-01-01

## 2020-01-01 RX ORDER — UMECLIDINIUM 62.5 UG/1
62.5 AEROSOL, POWDER ORAL DAILY
Qty: 5 | Refills: 5 | Status: DISCONTINUED | COMMUNITY
Start: 2020-01-01 | End: 2020-01-01

## 2020-01-01 RX ORDER — FUROSEMIDE 20 MG/1
20 TABLET ORAL
Qty: 30 | Refills: 5 | Status: ACTIVE | COMMUNITY
Start: 2020-01-01 | End: 1900-01-01

## 2020-01-01 RX ORDER — OLANZAPINE 15 MG/1
2.5 TABLET, FILM COATED ORAL EVERY 4 HOURS
Refills: 0 | Status: DISCONTINUED | OUTPATIENT
Start: 2020-01-01 | End: 2020-01-01

## 2020-01-01 RX ORDER — HEPARIN SODIUM 5000 [USP'U]/ML
9000 INJECTION INTRAVENOUS; SUBCUTANEOUS ONCE
Refills: 0 | Status: COMPLETED | OUTPATIENT
Start: 2020-01-01 | End: 2020-01-01

## 2020-01-01 RX ORDER — MONTELUKAST 4 MG/1
10 TABLET, CHEWABLE ORAL DAILY
Refills: 0 | Status: DISCONTINUED | OUTPATIENT
Start: 2020-01-01 | End: 2020-01-01

## 2020-01-01 RX ORDER — FENTANYL CITRATE 50 UG/ML
200 INJECTION INTRAVENOUS ONCE
Refills: 0 | Status: DISCONTINUED | OUTPATIENT
Start: 2020-01-01 | End: 2020-01-01

## 2020-01-01 RX ORDER — HEPARIN SODIUM 5000 [USP'U]/ML
9000 INJECTION INTRAVENOUS; SUBCUTANEOUS EVERY 6 HOURS
Refills: 0 | Status: DISCONTINUED | OUTPATIENT
Start: 2020-01-01 | End: 2020-01-01

## 2020-01-01 RX ORDER — FUROSEMIDE 40 MG
1 TABLET ORAL
Qty: 0 | Refills: 0 | DISCHARGE
Start: 2020-01-01

## 2020-01-01 RX ORDER — PROPOFOL 10 MG/ML
10 INJECTION, EMULSION INTRAVENOUS
Qty: 1000 | Refills: 0 | Status: DISCONTINUED | OUTPATIENT
Start: 2020-01-01 | End: 2020-01-01

## 2020-01-01 RX ORDER — ALBUTEROL 90 UG/1
2 AEROSOL, METERED ORAL
Qty: 0 | Refills: 0 | DISCHARGE
Start: 2020-01-01

## 2020-01-01 RX ORDER — BUDESONIDE AND FORMOTEROL FUMARATE DIHYDRATE 160; 4.5 UG/1; UG/1
2 AEROSOL RESPIRATORY (INHALATION)
Refills: 0 | Status: DISCONTINUED | OUTPATIENT
Start: 2020-01-01 | End: 2020-01-01

## 2020-01-01 RX ORDER — MONTELUKAST 4 MG/1
1 TABLET, CHEWABLE ORAL
Qty: 0 | Refills: 0 | DISCHARGE
Start: 2020-01-01

## 2020-01-01 RX ORDER — HEPARIN SODIUM 5000 [USP'U]/ML
5000 INJECTION INTRAVENOUS; SUBCUTANEOUS EVERY 12 HOURS
Refills: 0 | Status: DISCONTINUED | OUTPATIENT
Start: 2020-01-01 | End: 2020-01-01

## 2020-01-01 RX ORDER — ACETAMINOPHEN 500 MG
3 TABLET ORAL
Qty: 0 | Refills: 0 | DISCHARGE
Start: 2020-01-01

## 2020-01-01 RX ORDER — ALBUTEROL SULFATE 90 UG/1
108 (90 BASE) INHALANT RESPIRATORY (INHALATION)
Qty: 1 | Refills: 5 | Status: ACTIVE | COMMUNITY
Start: 2020-01-01 | End: 1900-01-01

## 2020-01-01 RX ORDER — PHENYLEPHRINE HYDROCHLORIDE 10 MG/ML
0.1 INJECTION INTRAVENOUS
Qty: 40 | Refills: 0 | Status: DISCONTINUED | OUTPATIENT
Start: 2020-01-01 | End: 2020-01-01

## 2020-01-01 RX ORDER — MONTELUKAST 4 MG/1
1 TABLET, CHEWABLE ORAL
Qty: 0 | Refills: 0 | DISCHARGE

## 2020-01-01 RX ORDER — DONEPEZIL HYDROCHLORIDE 5 MG/1
5 TABLET ORAL
Qty: 90 | Refills: 3 | Status: ACTIVE | COMMUNITY
Start: 2020-01-01 | End: 1900-01-01

## 2020-01-01 RX ORDER — MORPHINE SULFATE 50 MG/1
1 CAPSULE, EXTENDED RELEASE ORAL ONCE
Refills: 0 | Status: DISCONTINUED | OUTPATIENT
Start: 2020-01-01 | End: 2020-01-01

## 2020-01-01 RX ORDER — PREGABALIN 225 MG/1
1000 CAPSULE ORAL DAILY
Refills: 0 | Status: DISCONTINUED | OUTPATIENT
Start: 2020-01-01 | End: 2020-01-01

## 2020-01-01 RX ORDER — PHYTONADIONE (VIT K1) 5 MG
5 TABLET ORAL ONCE
Refills: 0 | Status: COMPLETED | OUTPATIENT
Start: 2020-01-01 | End: 2020-01-01

## 2020-01-01 RX ORDER — CHLORHEXIDINE GLUCONATE 213 G/1000ML
15 SOLUTION TOPICAL EVERY 12 HOURS
Refills: 0 | Status: DISCONTINUED | OUTPATIENT
Start: 2020-01-01 | End: 2020-01-01

## 2020-01-01 RX ORDER — IPRATROPIUM/ALBUTEROL SULFATE 18-103MCG
3 AEROSOL WITH ADAPTER (GRAM) INHALATION EVERY 6 HOURS
Refills: 0 | Status: DISCONTINUED | OUTPATIENT
Start: 2020-01-01 | End: 2020-01-01

## 2020-01-01 RX ORDER — DONEPEZIL HYDROCHLORIDE 10 MG/1
1 TABLET, FILM COATED ORAL
Qty: 0 | Refills: 0 | DISCHARGE

## 2020-01-01 RX ORDER — PANTOPRAZOLE SODIUM 20 MG/1
40 TABLET, DELAYED RELEASE ORAL
Refills: 0 | Status: DISCONTINUED | OUTPATIENT
Start: 2020-01-01 | End: 2020-01-01

## 2020-01-01 RX ORDER — NYSTATIN CREAM 100000 [USP'U]/G
1 CREAM TOPICAL EVERY 8 HOURS
Refills: 0 | Status: DISCONTINUED | OUTPATIENT
Start: 2020-01-01 | End: 2020-01-01

## 2020-01-01 RX ORDER — ATOVAQUONE 750 MG/5ML
1500 SUSPENSION ORAL DAILY
Refills: 0 | Status: DISCONTINUED | OUTPATIENT
Start: 2020-01-01 | End: 2020-01-01

## 2020-01-01 RX ORDER — FOLIC ACID 0.8 MG
1 TABLET ORAL
Qty: 0 | Refills: 0 | DISCHARGE

## 2020-01-01 RX ORDER — ATORVASTATIN CALCIUM 80 MG/1
1 TABLET, FILM COATED ORAL
Qty: 0 | Refills: 0 | DISCHARGE
Start: 2020-01-01

## 2020-01-01 RX ORDER — CEFEPIME 1 G/1
2000 INJECTION, POWDER, FOR SOLUTION INTRAMUSCULAR; INTRAVENOUS EVERY 8 HOURS
Refills: 0 | Status: DISCONTINUED | OUTPATIENT
Start: 2020-01-01 | End: 2020-01-01

## 2020-01-01 RX ORDER — METOPROLOL TARTRATE 50 MG
50 TABLET ORAL DAILY
Refills: 0 | Status: DISCONTINUED | OUTPATIENT
Start: 2020-01-01 | End: 2020-01-01

## 2020-01-01 RX ORDER — POTASSIUM CHLORIDE 20 MEQ
20 PACKET (EA) ORAL
Refills: 0 | Status: DISCONTINUED | OUTPATIENT
Start: 2020-01-01 | End: 2020-01-01

## 2020-01-01 RX ORDER — DIPHENHYDRAMINE HCL 50 MG
50 CAPSULE ORAL ONCE
Refills: 0 | Status: COMPLETED | OUTPATIENT
Start: 2020-01-01 | End: 2020-01-01

## 2020-01-01 RX ORDER — POTASSIUM CHLORIDE 20 MEQ
40 PACKET (EA) ORAL EVERY 4 HOURS
Refills: 0 | Status: COMPLETED | OUTPATIENT
Start: 2020-01-01 | End: 2020-01-01

## 2020-01-01 RX ORDER — ACETAMINOPHEN 500 MG
1000 TABLET ORAL ONCE
Refills: 0 | Status: COMPLETED | OUTPATIENT
Start: 2020-01-01 | End: 2020-01-01

## 2020-01-01 RX ORDER — LANOLIN ALCOHOL/MO/W.PET/CERES
3 CREAM (GRAM) TOPICAL ONCE
Refills: 0 | Status: COMPLETED | OUTPATIENT
Start: 2020-01-01 | End: 2020-01-01

## 2020-01-01 RX ORDER — NOREPINEPHRINE BITARTRATE/D5W 8 MG/250ML
0.05 PLASTIC BAG, INJECTION (ML) INTRAVENOUS
Qty: 16 | Refills: 0 | Status: DISCONTINUED | OUTPATIENT
Start: 2020-01-01 | End: 2020-01-01

## 2020-01-01 RX ORDER — ACETAMINOPHEN 500 MG
975 TABLET ORAL EVERY 6 HOURS
Refills: 0 | Status: DISCONTINUED | OUTPATIENT
Start: 2020-01-01 | End: 2020-01-01

## 2020-01-01 RX ORDER — HALOPERIDOL DECANOATE 100 MG/ML
2 INJECTION INTRAMUSCULAR ONCE
Refills: 0 | Status: COMPLETED | OUTPATIENT
Start: 2020-01-01 | End: 2020-01-01

## 2020-01-01 RX ORDER — VANCOMYCIN HCL 1 G
1000 VIAL (EA) INTRAVENOUS ONCE
Refills: 0 | Status: COMPLETED | OUTPATIENT
Start: 2020-01-01 | End: 2020-01-01

## 2020-01-01 RX ORDER — CEFTRIAXONE 500 MG/1
1000 INJECTION, POWDER, FOR SOLUTION INTRAMUSCULAR; INTRAVENOUS ONCE
Refills: 0 | Status: COMPLETED | OUTPATIENT
Start: 2020-01-01 | End: 2020-01-01

## 2020-01-01 RX ORDER — FENTANYL CITRATE 50 UG/ML
0.5 INJECTION INTRAVENOUS
Qty: 2500 | Refills: 0 | Status: DISCONTINUED | OUTPATIENT
Start: 2020-01-01 | End: 2020-01-01

## 2020-01-01 RX ORDER — SODIUM CHLORIDE 0.65 %
1 AEROSOL, SPRAY (ML) NASAL ONCE
Refills: 0 | Status: COMPLETED | OUTPATIENT
Start: 2020-01-01 | End: 2020-01-01

## 2020-01-01 RX ORDER — HEPARIN SODIUM 5000 [USP'U]/ML
4500 INJECTION INTRAVENOUS; SUBCUTANEOUS EVERY 6 HOURS
Refills: 0 | Status: DISCONTINUED | OUTPATIENT
Start: 2020-01-01 | End: 2020-01-01

## 2020-01-01 RX ORDER — HEPARIN SODIUM 5000 [USP'U]/ML
5000 INJECTION INTRAVENOUS; SUBCUTANEOUS ONCE
Refills: 0 | Status: COMPLETED | OUTPATIENT
Start: 2020-01-01 | End: 2020-01-01

## 2020-01-01 RX ORDER — ROBINUL 0.2 MG/ML
0.2 INJECTION INTRAMUSCULAR; INTRAVENOUS EVERY 4 HOURS
Refills: 0 | Status: DISCONTINUED | OUTPATIENT
Start: 2020-01-01 | End: 2020-01-01

## 2020-01-01 RX ORDER — HYDROCORTISONE 1 %
1 OINTMENT (GRAM) TOPICAL
Refills: 0 | Status: DISCONTINUED | OUTPATIENT
Start: 2020-01-01 | End: 2020-01-01

## 2020-01-01 RX ORDER — CEFEPIME 1 G/1
2000 INJECTION, POWDER, FOR SOLUTION INTRAMUSCULAR; INTRAVENOUS ONCE
Refills: 0 | Status: COMPLETED | OUTPATIENT
Start: 2020-01-01 | End: 2020-01-01

## 2020-01-01 RX ORDER — MORPHINE SULFATE 50 MG/1
2 CAPSULE, EXTENDED RELEASE ORAL
Refills: 0 | Status: DISCONTINUED | OUTPATIENT
Start: 2020-01-01 | End: 2020-01-01

## 2020-01-01 RX ORDER — METOPROLOL TARTRATE 50 MG
50 TABLET ORAL EVERY 12 HOURS
Refills: 0 | Status: DISCONTINUED | OUTPATIENT
Start: 2020-01-01 | End: 2020-01-01

## 2020-01-01 RX ORDER — TIOTROPIUM BROMIDE AND OLODATEROL 3.124; 2.736 UG/1; UG/1
2.5-2.5 SPRAY, METERED RESPIRATORY (INHALATION) DAILY
Qty: 1 | Refills: 3 | Status: DISCONTINUED | COMMUNITY
Start: 2020-01-01 | End: 2020-01-01

## 2020-01-01 RX ORDER — DIPHENHYDRAMINE HCL/ZINC ACET 2 %-0.1 %
1 CREAM (GRAM) TOPICAL
Refills: 0 | Status: DISCONTINUED | OUTPATIENT
Start: 2020-01-01 | End: 2020-01-01

## 2020-01-01 RX ORDER — ASPIRIN/CALCIUM CARB/MAGNESIUM 324 MG
1 TABLET ORAL
Qty: 0 | Refills: 0 | DISCHARGE

## 2020-01-01 RX ORDER — BUDESONIDE AND FORMOTEROL FUMARATE DIHYDRATE 160; 4.5 UG/1; UG/1
1 AEROSOL RESPIRATORY (INHALATION)
Qty: 0 | Refills: 0 | DISCHARGE

## 2020-01-01 RX ORDER — MONTELUKAST 10 MG/1
10 TABLET, FILM COATED ORAL
Qty: 90 | Refills: 1 | Status: ACTIVE | COMMUNITY
Start: 2019-01-01 | End: 1900-01-01

## 2020-01-01 RX ORDER — THIAMINE MONONITRATE (VIT B1) 100 MG
1 TABLET ORAL
Qty: 0 | Refills: 0 | DISCHARGE

## 2020-01-01 RX ORDER — DOCUSATE SODIUM 100 MG
1 CAPSULE ORAL
Qty: 0 | Refills: 0 | DISCHARGE

## 2020-01-01 RX ORDER — DACARBAZINE 10 MG/ML
825 INJECTION, POWDER, FOR SOLUTION INTRAVENOUS ONCE
Refills: 0 | Status: COMPLETED | OUTPATIENT
Start: 2020-01-01 | End: 2020-01-01

## 2020-01-01 RX ORDER — ALBUTEROL 90 UG/1
2 AEROSOL, METERED ORAL EVERY 4 HOURS
Refills: 0 | Status: DISCONTINUED | OUTPATIENT
Start: 2020-01-01 | End: 2020-01-01

## 2020-01-01 RX ORDER — POLYETHYLENE GLYCOL 3350 17 G/17G
17 POWDER, FOR SOLUTION ORAL
Qty: 510 | Refills: 0 | Status: ACTIVE | COMMUNITY
Start: 2020-01-01

## 2020-01-01 RX ORDER — NYSTATIN 500MM UNIT
5 POWDER (EA) MISCELLANEOUS
Qty: 100 | Refills: 0
Start: 2020-01-01

## 2020-01-01 RX ORDER — ENOXAPARIN SODIUM 100 MG/ML
150 INJECTION SUBCUTANEOUS EVERY 24 HOURS
Refills: 0 | Status: DISCONTINUED | OUTPATIENT
Start: 2020-01-01 | End: 2020-01-01

## 2020-01-01 RX ORDER — CEFPODOXIME PROXETIL 100 MG
200 TABLET ORAL EVERY 12 HOURS
Refills: 0 | Status: DISCONTINUED | OUTPATIENT
Start: 2020-01-01 | End: 2020-01-01

## 2020-01-01 RX ORDER — LIDOCAINE 4 G/100G
1 CREAM TOPICAL DAILY
Refills: 0 | Status: DISCONTINUED | OUTPATIENT
Start: 2020-01-01 | End: 2020-01-01

## 2020-01-01 RX ORDER — POLYETHYLENE GLYCOL 3350 17 G/17G
17 POWDER, FOR SOLUTION ORAL ONCE
Refills: 0 | Status: COMPLETED | OUTPATIENT
Start: 2020-01-01 | End: 2020-01-01

## 2020-01-01 RX ORDER — FUROSEMIDE 40 MG/1
40 TABLET ORAL DAILY
Qty: 30 | Refills: 3 | Status: ACTIVE | COMMUNITY
Start: 2020-01-01 | End: 1900-01-01

## 2020-01-01 RX ORDER — ENOXAPARIN SODIUM 100 MG/ML
100 INJECTION SUBCUTANEOUS
Qty: 0 | Refills: 0 | DISCHARGE
Start: 2020-01-01

## 2020-01-01 RX ORDER — CEFEPIME 1 G/1
INJECTION, POWDER, FOR SOLUTION INTRAMUSCULAR; INTRAVENOUS
Refills: 0 | Status: DISCONTINUED | OUTPATIENT
Start: 2020-01-01 | End: 2020-01-01

## 2020-01-01 RX ORDER — IPRATROPIUM BROMIDE 0.5 MG/2.5ML
0.02 SOLUTION RESPIRATORY (INHALATION)
Qty: 2 | Refills: 5 | Status: ACTIVE | COMMUNITY
Start: 2020-01-01 | End: 1900-01-01

## 2020-01-01 RX ORDER — ENOXAPARIN SODIUM 100 MG/ML
150 INJECTION SUBCUTANEOUS
Qty: 0 | Refills: 0 | DISCHARGE

## 2020-01-01 RX ORDER — VANCOMYCIN HCL 1 G
1000 VIAL (EA) INTRAVENOUS EVERY 12 HOURS
Refills: 0 | Status: DISCONTINUED | OUTPATIENT
Start: 2020-01-01 | End: 2020-01-01

## 2020-01-01 RX ORDER — SODIUM CHLORIDE 9 MG/ML
500 INJECTION INTRAMUSCULAR; INTRAVENOUS; SUBCUTANEOUS ONCE
Refills: 0 | Status: COMPLETED | OUTPATIENT
Start: 2020-01-01 | End: 2020-01-01

## 2020-01-01 RX ORDER — POTASSIUM CHLORIDE 20 MEQ
40 PACKET (EA) ORAL ONCE
Refills: 0 | Status: COMPLETED | OUTPATIENT
Start: 2020-01-01 | End: 2020-01-01

## 2020-01-01 RX ORDER — SODIUM BICARBONATE 1 MEQ/ML
50 SYRINGE (ML) INTRAVENOUS ONCE
Refills: 0 | Status: COMPLETED | OUTPATIENT
Start: 2020-01-01 | End: 2020-01-01

## 2020-01-01 RX ORDER — ENOXAPARIN SODIUM 100 MG/ML
100 INJECTION SUBCUTANEOUS
Refills: 0 | Status: DISCONTINUED | OUTPATIENT
Start: 2020-01-01 | End: 2020-01-01

## 2020-01-01 RX ORDER — ATORVASTATIN CALCIUM 80 MG/1
10 TABLET, FILM COATED ORAL DAILY
Refills: 0 | Status: DISCONTINUED | OUTPATIENT
Start: 2020-01-01 | End: 2020-01-01

## 2020-01-01 RX ORDER — IPRATROPIUM BROMIDE AND ALBUTEROL SULFATE 2.5; .5 MG/3ML; MG/3ML
0.5-2.5 (3) SOLUTION RESPIRATORY (INHALATION)
Qty: 2 | Refills: 3 | Status: ACTIVE | COMMUNITY
Start: 2020-01-01 | End: 1900-01-01

## 2020-01-01 RX ORDER — PANTOPRAZOLE SODIUM 20 MG/1
1 TABLET, DELAYED RELEASE ORAL
Qty: 0 | Refills: 0 | DISCHARGE
Start: 2020-01-01

## 2020-01-01 RX ORDER — SPIRONOLACTONE 25 MG/1
12.5 TABLET, FILM COATED ORAL
Qty: 0 | Refills: 0 | DISCHARGE

## 2020-01-01 RX ORDER — DONEPEZIL HYDROCHLORIDE 10 MG/1
5 TABLET, FILM COATED ORAL AT BEDTIME
Refills: 0 | Status: DISCONTINUED | OUTPATIENT
Start: 2020-01-01 | End: 2020-01-01

## 2020-01-01 RX ADMIN — Medication 50 MILLIGRAM(S): at 06:59

## 2020-01-01 RX ADMIN — Medication 25 MILLIGRAM(S): at 11:32

## 2020-01-01 RX ADMIN — Medication 40 MILLIEQUIVALENT(S): at 20:17

## 2020-01-01 RX ADMIN — Medication 1000 MILLIGRAM(S): at 06:00

## 2020-01-01 RX ADMIN — RISPERIDONE 1 MILLIGRAM(S): 4 TABLET ORAL at 06:43

## 2020-01-01 RX ADMIN — PIPERACILLIN AND TAZOBACTAM 25 GRAM(S): 4; .5 INJECTION, POWDER, LYOPHILIZED, FOR SOLUTION INTRAVENOUS at 13:52

## 2020-01-01 RX ADMIN — POLYETHYLENE GLYCOL 3350 17 GRAM(S): 17 POWDER, FOR SOLUTION ORAL at 11:56

## 2020-01-01 RX ADMIN — Medication 3 MILLILITER(S): at 12:17

## 2020-01-01 RX ADMIN — FENTANYL CITRATE 200 MICROGRAM(S): 50 INJECTION INTRAVENOUS at 16:39

## 2020-01-01 RX ADMIN — Medication 100 MILLIGRAM(S): at 18:09

## 2020-01-01 RX ADMIN — Medication 50 MILLIGRAM(S): at 18:03

## 2020-01-01 RX ADMIN — Medication 30 MILLIGRAM(S): at 06:12

## 2020-01-01 RX ADMIN — Medication 25 MILLIGRAM(S): at 14:23

## 2020-01-01 RX ADMIN — ENOXAPARIN SODIUM 100 MILLIGRAM(S): 100 INJECTION SUBCUTANEOUS at 04:36

## 2020-01-01 RX ADMIN — Medication 25 MILLIGRAM(S): at 11:44

## 2020-01-01 RX ADMIN — Medication 10 MILLIGRAM(S): at 11:22

## 2020-01-01 RX ADMIN — Medication 80 MILLIGRAM(S): at 14:28

## 2020-01-01 RX ADMIN — Medication 100 MILLIGRAM(S): at 12:27

## 2020-01-01 RX ADMIN — LIDOCAINE 1 PATCH: 4 CREAM TOPICAL at 14:01

## 2020-01-01 RX ADMIN — PALONOSETRON HYDROCHLORIDE 0.25 MILLIGRAM(S): 0.25 INJECTION, SOLUTION INTRAVENOUS at 11:22

## 2020-01-01 RX ADMIN — Medication 50 MILLIGRAM(S): at 06:05

## 2020-01-01 RX ADMIN — Medication 50 MILLIGRAM(S): at 17:32

## 2020-01-01 RX ADMIN — Medication 1000 MILLIGRAM(S): at 08:50

## 2020-01-01 RX ADMIN — Medication 25 MILLIGRAM(S): at 11:46

## 2020-01-01 RX ADMIN — Medication 25 MILLIGRAM(S): at 11:06

## 2020-01-01 RX ADMIN — Medication 30 MILLIGRAM(S): at 14:17

## 2020-01-01 RX ADMIN — Medication 200 MILLIGRAM(S): at 12:26

## 2020-01-01 RX ADMIN — Medication 80 MILLIGRAM(S): at 21:20

## 2020-01-01 RX ADMIN — MORPHINE SULFATE 1 MILLIGRAM(S): 50 CAPSULE, EXTENDED RELEASE ORAL at 11:15

## 2020-01-01 RX ADMIN — ATORVASTATIN CALCIUM 10 MILLIGRAM(S): 80 TABLET, FILM COATED ORAL at 22:43

## 2020-01-01 RX ADMIN — DOXORUBICIN HYDROCHLORIDE 114 MILLIGRAM(S): 2 INJECTION, SOLUTION INTRAVENOUS at 13:09

## 2020-01-01 RX ADMIN — Medication 25 MILLIGRAM(S): at 13:52

## 2020-01-01 RX ADMIN — Medication 5 MILLIGRAM(S): at 01:24

## 2020-01-01 RX ADMIN — HALOPERIDOL DECANOATE 2 MILLIGRAM(S): 100 INJECTION INTRAMUSCULAR at 00:01

## 2020-01-01 RX ADMIN — Medication 500000 UNIT(S): at 11:43

## 2020-01-01 RX ADMIN — Medication 1 MILLIGRAM(S): at 11:56

## 2020-01-01 RX ADMIN — CEFTRIAXONE 100 MILLIGRAM(S): 500 INJECTION, POWDER, FOR SOLUTION INTRAMUSCULAR; INTRAVENOUS at 15:16

## 2020-01-01 RX ADMIN — Medication 250 MILLIGRAM(S): at 05:53

## 2020-01-01 RX ADMIN — MONTELUKAST 10 MILLIGRAM(S): 4 TABLET, CHEWABLE ORAL at 17:04

## 2020-01-01 RX ADMIN — RISPERIDONE 1 MILLIGRAM(S): 4 TABLET ORAL at 19:44

## 2020-01-01 RX ADMIN — DACARBAZINE 600 MILLIGRAM(S): 10 INJECTION, POWDER, FOR SOLUTION INTRAVENOUS at 13:47

## 2020-01-01 RX ADMIN — Medication 100 MILLIGRAM(S): at 11:35

## 2020-01-01 RX ADMIN — SODIUM CHLORIDE 125 MILLILITER(S): 9 INJECTION, SOLUTION INTRAVENOUS at 23:19

## 2020-01-01 RX ADMIN — DONEPEZIL HYDROCHLORIDE 5 MILLIGRAM(S): 10 TABLET, FILM COATED ORAL at 21:12

## 2020-01-01 RX ADMIN — PREGABALIN 1000 MICROGRAM(S): 225 CAPSULE ORAL at 11:17

## 2020-01-01 RX ADMIN — LIDOCAINE 1 PATCH: 4 CREAM TOPICAL at 20:24

## 2020-01-01 RX ADMIN — MORPHINE SULFATE 1 MILLIGRAM(S): 50 CAPSULE, EXTENDED RELEASE ORAL at 15:52

## 2020-01-01 RX ADMIN — Medication 3 MILLILITER(S): at 01:43

## 2020-01-01 RX ADMIN — Medication 1000 MILLIGRAM(S): at 09:17

## 2020-01-01 RX ADMIN — SODIUM CHLORIDE 60 MILLILITER(S): 9 INJECTION INTRAMUSCULAR; INTRAVENOUS; SUBCUTANEOUS at 08:55

## 2020-01-01 RX ADMIN — DACARBAZINE 290 MILLIGRAM(S): 10 INJECTION, POWDER, FOR SOLUTION INTRAVENOUS at 15:15

## 2020-01-01 RX ADMIN — PANTOPRAZOLE SODIUM 40 MILLIGRAM(S): 20 TABLET, DELAYED RELEASE ORAL at 06:27

## 2020-01-01 RX ADMIN — Medication 81 MILLIGRAM(S): at 16:37

## 2020-01-01 RX ADMIN — Medication 500000 UNIT(S): at 23:54

## 2020-01-01 RX ADMIN — Medication 25 MILLIGRAM(S): at 14:02

## 2020-01-01 RX ADMIN — OXYCODONE HYDROCHLORIDE 5 MILLIGRAM(S): 5 TABLET ORAL at 18:14

## 2020-01-01 RX ADMIN — Medication 40 MILLIGRAM(S): at 06:58

## 2020-01-01 RX ADMIN — Medication 1 MILLIGRAM(S): at 11:48

## 2020-01-01 RX ADMIN — Medication 1 MILLIGRAM(S): at 11:06

## 2020-01-01 RX ADMIN — PANTOPRAZOLE SODIUM 40 MILLIGRAM(S): 20 TABLET, DELAYED RELEASE ORAL at 06:05

## 2020-01-01 RX ADMIN — Medication 40 MILLIGRAM(S): at 05:57

## 2020-01-01 RX ADMIN — CHLORHEXIDINE GLUCONATE 1 APPLICATION(S): 213 SOLUTION TOPICAL at 12:44

## 2020-01-01 RX ADMIN — ENOXAPARIN SODIUM 100 MILLIGRAM(S): 100 INJECTION SUBCUTANEOUS at 18:02

## 2020-01-01 RX ADMIN — PREGABALIN 1000 MICROGRAM(S): 225 CAPSULE ORAL at 12:10

## 2020-01-01 RX ADMIN — ENOXAPARIN SODIUM 100 MILLIGRAM(S): 100 INJECTION SUBCUTANEOUS at 06:36

## 2020-01-01 RX ADMIN — Medication 1 MILLIGRAM(S): at 11:17

## 2020-01-01 RX ADMIN — ENOXAPARIN SODIUM 100 MILLIGRAM(S): 100 INJECTION SUBCUTANEOUS at 17:39

## 2020-01-01 RX ADMIN — Medication 25 MILLIGRAM(S): at 16:41

## 2020-01-01 RX ADMIN — Medication 500000 UNIT(S): at 17:21

## 2020-01-01 RX ADMIN — Medication 25 MILLIGRAM(S): at 11:48

## 2020-01-01 RX ADMIN — LIDOCAINE 1 PATCH: 4 CREAM TOPICAL at 11:48

## 2020-01-01 RX ADMIN — PREGABALIN 1000 MICROGRAM(S): 225 CAPSULE ORAL at 11:41

## 2020-01-01 RX ADMIN — Medication 50 MILLIGRAM(S): at 05:32

## 2020-01-01 RX ADMIN — BUDESONIDE AND FORMOTEROL FUMARATE DIHYDRATE 2 PUFF(S): 160; 4.5 AEROSOL RESPIRATORY (INHALATION) at 05:36

## 2020-01-01 RX ADMIN — ATORVASTATIN CALCIUM 10 MILLIGRAM(S): 80 TABLET, FILM COATED ORAL at 20:26

## 2020-01-01 RX ADMIN — Medication 30 MILLIGRAM(S): at 21:35

## 2020-01-01 RX ADMIN — DONEPEZIL HYDROCHLORIDE 5 MILLIGRAM(S): 10 TABLET, FILM COATED ORAL at 21:11

## 2020-01-01 RX ADMIN — Medication 50 MILLIGRAM(S): at 18:05

## 2020-01-01 RX ADMIN — ENOXAPARIN SODIUM 100 MILLIGRAM(S): 100 INJECTION SUBCUTANEOUS at 17:12

## 2020-01-01 RX ADMIN — Medication 300 UNIT(S): at 16:36

## 2020-01-01 RX ADMIN — Medication 1000 MILLIGRAM(S): at 18:05

## 2020-01-01 RX ADMIN — Medication 100 MILLIGRAM(S): at 17:06

## 2020-01-01 RX ADMIN — PALONOSETRON HYDROCHLORIDE 0.25 MILLIGRAM(S): 0.25 INJECTION, SOLUTION INTRAVENOUS at 14:51

## 2020-01-01 RX ADMIN — DOXORUBICIN HYDROCHLORIDE 114 MILLIGRAM(S): 2 INJECTION, SOLUTION INTRAVENOUS at 15:18

## 2020-01-01 RX ADMIN — BUDESONIDE AND FORMOTEROL FUMARATE DIHYDRATE 2 PUFF(S): 160; 4.5 AEROSOL RESPIRATORY (INHALATION) at 08:51

## 2020-01-01 RX ADMIN — LIDOCAINE 1 PATCH: 4 CREAM TOPICAL at 00:53

## 2020-01-01 RX ADMIN — Medication 80 MILLIGRAM(S): at 13:53

## 2020-01-01 RX ADMIN — Medication 25 MILLIGRAM(S): at 11:17

## 2020-01-01 RX ADMIN — PALONOSETRON HYDROCHLORIDE 0.25 MILLIGRAM(S): 0.25 INJECTION, SOLUTION INTRAVENOUS at 12:42

## 2020-01-01 RX ADMIN — Medication 252 MILLIGRAM(S): at 12:20

## 2020-01-01 RX ADMIN — SODIUM CHLORIDE 1000 MILLILITER(S): 9 INJECTION INTRAMUSCULAR; INTRAVENOUS; SUBCUTANEOUS at 05:53

## 2020-01-01 RX ADMIN — ONDANSETRON 8 MILLIGRAM(S): 8 TABLET, FILM COATED ORAL at 18:47

## 2020-01-01 RX ADMIN — Medication 50 MILLIGRAM(S): at 06:35

## 2020-01-01 RX ADMIN — Medication 100 MILLIGRAM(S): at 11:59

## 2020-01-01 RX ADMIN — Medication 1 MILLIGRAM(S): at 12:03

## 2020-01-01 RX ADMIN — LORATADINE 10 MILLIGRAM(S): 10 TABLET ORAL at 11:59

## 2020-01-01 RX ADMIN — Medication 25 MILLIGRAM(S): at 12:54

## 2020-01-01 RX ADMIN — Medication 200 MILLIGRAM(S): at 11:43

## 2020-01-01 RX ADMIN — Medication 50 MILLIGRAM(S): at 05:58

## 2020-01-01 RX ADMIN — PIPERACILLIN AND TAZOBACTAM 25 GRAM(S): 4; .5 INJECTION, POWDER, LYOPHILIZED, FOR SOLUTION INTRAVENOUS at 09:36

## 2020-01-01 RX ADMIN — SODIUM CHLORIDE 1000 MILLILITER(S): 9 INJECTION INTRAMUSCULAR; INTRAVENOUS; SUBCUTANEOUS at 06:09

## 2020-01-01 RX ADMIN — NYSTATIN CREAM 1 APPLICATION(S): 100000 CREAM TOPICAL at 22:06

## 2020-01-01 RX ADMIN — ATORVASTATIN CALCIUM 10 MILLIGRAM(S): 80 TABLET, FILM COATED ORAL at 21:20

## 2020-01-01 RX ADMIN — Medication 80 MILLIGRAM(S): at 06:25

## 2020-01-01 RX ADMIN — PANTOPRAZOLE SODIUM 40 MILLIGRAM(S): 20 TABLET, DELAYED RELEASE ORAL at 06:57

## 2020-01-01 RX ADMIN — Medication 100 MILLIEQUIVALENT(S): at 14:56

## 2020-01-01 RX ADMIN — Medication 10 MILLIGRAM(S): at 12:35

## 2020-01-01 RX ADMIN — DACARBAZINE 200 MILLIGRAM(S): 10 INJECTION, POWDER, FOR SOLUTION INTRAVENOUS at 16:03

## 2020-01-01 RX ADMIN — LIDOCAINE 1 PATCH: 4 CREAM TOPICAL at 19:58

## 2020-01-01 RX ADMIN — POLYETHYLENE GLYCOL 3350 17 GRAM(S): 17 POWDER, FOR SOLUTION ORAL at 11:48

## 2020-01-01 RX ADMIN — Medication 1 MILLIGRAM(S): at 11:36

## 2020-01-01 RX ADMIN — ENOXAPARIN SODIUM 150 MILLIGRAM(S): 100 INJECTION SUBCUTANEOUS at 07:56

## 2020-01-01 RX ADMIN — BUDESONIDE AND FORMOTEROL FUMARATE DIHYDRATE 2 PUFF(S): 160; 4.5 AEROSOL RESPIRATORY (INHALATION) at 19:24

## 2020-01-01 RX ADMIN — Medication 1 MILLIGRAM(S): at 12:10

## 2020-01-01 RX ADMIN — LORATADINE 10 MILLIGRAM(S): 10 TABLET ORAL at 11:31

## 2020-01-01 RX ADMIN — Medication 100 MILLIGRAM(S): at 11:31

## 2020-01-01 RX ADMIN — Medication 200 MILLIGRAM(S): at 21:17

## 2020-01-01 RX ADMIN — Medication 250 MILLIGRAM(S): at 07:05

## 2020-01-01 RX ADMIN — BUDESONIDE AND FORMOTEROL FUMARATE DIHYDRATE 2 PUFF(S): 160; 4.5 AEROSOL RESPIRATORY (INHALATION) at 21:10

## 2020-01-01 RX ADMIN — Medication 25 MILLIGRAM(S): at 11:31

## 2020-01-01 RX ADMIN — Medication 100 MILLIGRAM(S): at 04:05

## 2020-01-01 RX ADMIN — PIPERACILLIN AND TAZOBACTAM 25 GRAM(S): 4; .5 INJECTION, POWDER, LYOPHILIZED, FOR SOLUTION INTRAVENOUS at 02:24

## 2020-01-01 RX ADMIN — Medication 1 MILLIGRAM(S): at 11:02

## 2020-01-01 RX ADMIN — MONTELUKAST 10 MILLIGRAM(S): 4 TABLET, CHEWABLE ORAL at 14:01

## 2020-01-01 RX ADMIN — ENOXAPARIN SODIUM 100 MILLIGRAM(S): 100 INJECTION SUBCUTANEOUS at 17:23

## 2020-01-01 RX ADMIN — CHLORHEXIDINE GLUCONATE 1 APPLICATION(S): 213 SOLUTION TOPICAL at 11:30

## 2020-01-01 RX ADMIN — Medication 50 MILLIGRAM(S): at 05:40

## 2020-01-01 RX ADMIN — SODIUM CHLORIDE 125 MILLILITER(S): 9 INJECTION, SOLUTION INTRAVENOUS at 06:02

## 2020-01-01 RX ADMIN — ATORVASTATIN CALCIUM 10 MILLIGRAM(S): 80 TABLET, FILM COATED ORAL at 21:21

## 2020-01-01 RX ADMIN — Medication 100 MILLIGRAM(S): at 22:43

## 2020-01-01 RX ADMIN — Medication 25 MILLIGRAM(S): at 11:24

## 2020-01-01 RX ADMIN — Medication 20 UNIT(S): at 16:08

## 2020-01-01 RX ADMIN — Medication 200 MILLIGRAM(S): at 16:20

## 2020-01-01 RX ADMIN — DACARBAZINE 290 MILLIGRAM(S): 10 INJECTION, POWDER, FOR SOLUTION INTRAVENOUS at 14:26

## 2020-01-01 RX ADMIN — Medication 40 MILLIGRAM(S): at 05:50

## 2020-01-01 RX ADMIN — Medication 1 MILLIGRAM(S): at 11:40

## 2020-01-01 RX ADMIN — ATORVASTATIN CALCIUM 10 MILLIGRAM(S): 80 TABLET, FILM COATED ORAL at 22:22

## 2020-01-01 RX ADMIN — Medication 1 APPLICATION(S): at 17:09

## 2020-01-01 RX ADMIN — Medication 3 MILLIGRAM(S): at 02:12

## 2020-01-01 RX ADMIN — ATORVASTATIN CALCIUM 10 MILLIGRAM(S): 80 TABLET, FILM COATED ORAL at 21:47

## 2020-01-01 RX ADMIN — Medication 300 UNIT(S): at 14:22

## 2020-01-01 RX ADMIN — PROPOFOL 6.2 MICROGRAM(S)/KG/MIN: 10 INJECTION, EMULSION INTRAVENOUS at 20:27

## 2020-01-01 RX ADMIN — BUDESONIDE AND FORMOTEROL FUMARATE DIHYDRATE 2 PUFF(S): 160; 4.5 AEROSOL RESPIRATORY (INHALATION) at 09:07

## 2020-01-01 RX ADMIN — Medication 100 MILLIGRAM(S): at 01:12

## 2020-01-01 RX ADMIN — Medication 100 MILLIGRAM(S): at 12:02

## 2020-01-01 RX ADMIN — PIPERACILLIN AND TAZOBACTAM 25 GRAM(S): 4; .5 INJECTION, POWDER, LYOPHILIZED, FOR SOLUTION INTRAVENOUS at 06:43

## 2020-01-01 RX ADMIN — Medication 1 MILLIGRAM(S): at 11:31

## 2020-01-01 RX ADMIN — HEPARIN SODIUM 1700 UNIT(S)/HR: 5000 INJECTION INTRAVENOUS; SUBCUTANEOUS at 08:45

## 2020-01-01 RX ADMIN — MONTELUKAST 10 MILLIGRAM(S): 4 TABLET, CHEWABLE ORAL at 11:32

## 2020-01-01 RX ADMIN — ATORVASTATIN CALCIUM 10 MILLIGRAM(S): 80 TABLET, FILM COATED ORAL at 21:13

## 2020-01-01 RX ADMIN — Medication 50 MILLIGRAM(S): at 17:08

## 2020-01-01 RX ADMIN — Medication 25 MILLIGRAM(S): at 11:34

## 2020-01-01 RX ADMIN — Medication 1 MILLIGRAM(S): at 14:01

## 2020-01-01 RX ADMIN — BUDESONIDE AND FORMOTEROL FUMARATE DIHYDRATE 2 PUFF(S): 160; 4.5 AEROSOL RESPIRATORY (INHALATION) at 20:29

## 2020-01-01 RX ADMIN — Medication 3 MILLILITER(S): at 16:42

## 2020-01-01 RX ADMIN — DOXORUBICIN HYDROCHLORIDE 114 MILLIGRAM(S): 2 INJECTION, SOLUTION INTRAVENOUS at 11:28

## 2020-01-01 RX ADMIN — HEPARIN SODIUM 5000 UNIT(S): 5000 INJECTION INTRAVENOUS; SUBCUTANEOUS at 22:22

## 2020-01-01 RX ADMIN — PANTOPRAZOLE SODIUM 40 MILLIGRAM(S): 20 TABLET, DELAYED RELEASE ORAL at 04:16

## 2020-01-01 RX ADMIN — Medication 50 MILLIGRAM(S): at 17:21

## 2020-01-01 RX ADMIN — HALOPERIDOL DECANOATE 1 MILLIGRAM(S): 100 INJECTION INTRAMUSCULAR at 19:39

## 2020-01-01 RX ADMIN — HEPARIN SODIUM 1700 UNIT(S)/HR: 5000 INJECTION INTRAVENOUS; SUBCUTANEOUS at 06:43

## 2020-01-01 RX ADMIN — CEFEPIME 100 MILLIGRAM(S): 1 INJECTION, POWDER, FOR SOLUTION INTRAMUSCULAR; INTRAVENOUS at 17:04

## 2020-01-01 RX ADMIN — Medication 80 MILLIGRAM(S): at 06:05

## 2020-01-01 RX ADMIN — HEPARIN SODIUM 1700 UNIT(S)/HR: 5000 INJECTION INTRAVENOUS; SUBCUTANEOUS at 13:11

## 2020-01-01 RX ADMIN — Medication 100 MILLIGRAM(S): at 20:27

## 2020-01-01 RX ADMIN — Medication 50 MILLIGRAM(S): at 05:52

## 2020-01-01 RX ADMIN — Medication 500000 UNIT(S): at 11:24

## 2020-01-01 RX ADMIN — POLYETHYLENE GLYCOL 3350 17 GRAM(S): 17 POWDER, FOR SOLUTION ORAL at 11:30

## 2020-01-01 RX ADMIN — PREGABALIN 1000 MICROGRAM(S): 225 CAPSULE ORAL at 12:30

## 2020-01-01 RX ADMIN — CEFEPIME 100 MILLIGRAM(S): 1 INJECTION, POWDER, FOR SOLUTION INTRAMUSCULAR; INTRAVENOUS at 06:32

## 2020-01-01 RX ADMIN — ATORVASTATIN CALCIUM 10 MILLIGRAM(S): 80 TABLET, FILM COATED ORAL at 21:11

## 2020-01-01 RX ADMIN — BUDESONIDE AND FORMOTEROL FUMARATE DIHYDRATE 2 PUFF(S): 160; 4.5 AEROSOL RESPIRATORY (INHALATION) at 07:56

## 2020-01-01 RX ADMIN — ATORVASTATIN CALCIUM 10 MILLIGRAM(S): 80 TABLET, FILM COATED ORAL at 20:24

## 2020-01-01 RX ADMIN — Medication 1000 MILLIGRAM(S): at 16:04

## 2020-01-01 RX ADMIN — Medication 80 MILLIGRAM(S): at 22:43

## 2020-01-01 RX ADMIN — Medication 1 MILLIGRAM(S): at 11:50

## 2020-01-01 RX ADMIN — Medication 650 MILLIGRAM(S): at 19:15

## 2020-01-01 RX ADMIN — ATORVASTATIN CALCIUM 10 MILLIGRAM(S): 80 TABLET, FILM COATED ORAL at 22:45

## 2020-01-01 RX ADMIN — Medication 200 MILLIGRAM(S): at 01:13

## 2020-01-01 RX ADMIN — Medication 25 MILLIGRAM(S): at 10:18

## 2020-01-01 RX ADMIN — Medication 25 MILLIGRAM(S): at 12:04

## 2020-01-01 RX ADMIN — Medication 80 MILLIGRAM(S): at 13:25

## 2020-01-01 RX ADMIN — LIDOCAINE 1 PATCH: 4 CREAM TOPICAL at 19:54

## 2020-01-01 RX ADMIN — Medication 200 MILLIGRAM(S): at 09:04

## 2020-01-01 RX ADMIN — Medication 25 MILLIGRAM(S): at 12:10

## 2020-01-01 RX ADMIN — Medication 200 MILLIGRAM(S): at 18:11

## 2020-01-01 RX ADMIN — Medication 500000 UNIT(S): at 23:19

## 2020-01-01 RX ADMIN — Medication 25 MILLIGRAM(S): at 11:40

## 2020-01-01 RX ADMIN — Medication 25 MILLIGRAM(S): at 12:18

## 2020-01-01 RX ADMIN — CEFEPIME 100 MILLIGRAM(S): 1 INJECTION, POWDER, FOR SOLUTION INTRAMUSCULAR; INTRAVENOUS at 16:38

## 2020-01-01 RX ADMIN — Medication 50 MILLIGRAM(S): at 17:52

## 2020-01-01 RX ADMIN — Medication 100 MILLIGRAM(S): at 11:48

## 2020-01-01 RX ADMIN — Medication 30 MILLIGRAM(S): at 21:44

## 2020-01-01 RX ADMIN — Medication 1 MILLIGRAM(S): at 13:52

## 2020-01-01 RX ADMIN — BUDESONIDE AND FORMOTEROL FUMARATE DIHYDRATE 2 PUFF(S): 160; 4.5 AEROSOL RESPIRATORY (INHALATION) at 22:22

## 2020-01-01 RX ADMIN — PIPERACILLIN AND TAZOBACTAM 25 GRAM(S): 4; .5 INJECTION, POWDER, LYOPHILIZED, FOR SOLUTION INTRAVENOUS at 09:18

## 2020-01-01 RX ADMIN — ENOXAPARIN SODIUM 100 MILLIGRAM(S): 100 INJECTION SUBCUTANEOUS at 04:47

## 2020-01-01 RX ADMIN — PIPERACILLIN AND TAZOBACTAM 25 GRAM(S): 4; .5 INJECTION, POWDER, LYOPHILIZED, FOR SOLUTION INTRAVENOUS at 09:42

## 2020-01-01 RX ADMIN — CEFEPIME 100 MILLIGRAM(S): 1 INJECTION, POWDER, FOR SOLUTION INTRAMUSCULAR; INTRAVENOUS at 09:20

## 2020-01-01 RX ADMIN — NYSTATIN CREAM 1 APPLICATION(S): 100000 CREAM TOPICAL at 21:32

## 2020-01-01 RX ADMIN — PANTOPRAZOLE SODIUM 40 MILLIGRAM(S): 20 TABLET, DELAYED RELEASE ORAL at 06:00

## 2020-01-01 RX ADMIN — PIPERACILLIN AND TAZOBACTAM 25 GRAM(S): 4; .5 INJECTION, POWDER, LYOPHILIZED, FOR SOLUTION INTRAVENOUS at 17:47

## 2020-01-01 RX ADMIN — Medication 100 MILLIGRAM(S): at 12:18

## 2020-01-01 RX ADMIN — Medication 480 MICROGRAM(S): at 15:00

## 2020-01-01 RX ADMIN — Medication 80 MILLIGRAM(S): at 05:55

## 2020-01-01 RX ADMIN — Medication 80 MILLIGRAM(S): at 21:12

## 2020-01-01 RX ADMIN — Medication 40 MILLIGRAM(S): at 05:52

## 2020-01-01 RX ADMIN — DONEPEZIL HYDROCHLORIDE 5 MILLIGRAM(S): 10 TABLET, FILM COATED ORAL at 22:45

## 2020-01-01 RX ADMIN — Medication 10 MILLIGRAM(S): at 09:19

## 2020-01-01 RX ADMIN — Medication 5 MILLIGRAM(S): at 16:58

## 2020-01-01 RX ADMIN — Medication 40 MILLIGRAM(S): at 05:32

## 2020-01-01 RX ADMIN — Medication 50 MILLIGRAM(S): at 18:00

## 2020-01-01 RX ADMIN — PIPERACILLIN AND TAZOBACTAM 200 GRAM(S): 4; .5 INJECTION, POWDER, LYOPHILIZED, FOR SOLUTION INTRAVENOUS at 17:18

## 2020-01-01 RX ADMIN — Medication 30 MILLIGRAM(S): at 20:24

## 2020-01-01 RX ADMIN — ATORVASTATIN CALCIUM 10 MILLIGRAM(S): 80 TABLET, FILM COATED ORAL at 21:33

## 2020-01-01 RX ADMIN — CHLORHEXIDINE GLUCONATE 1 APPLICATION(S): 213 SOLUTION TOPICAL at 09:01

## 2020-01-01 RX ADMIN — Medication 1 MILLIGRAM(S): at 12:04

## 2020-01-01 RX ADMIN — PANTOPRAZOLE SODIUM 40 MILLIGRAM(S): 20 TABLET, DELAYED RELEASE ORAL at 12:18

## 2020-01-01 RX ADMIN — ATORVASTATIN CALCIUM 10 MILLIGRAM(S): 80 TABLET, FILM COATED ORAL at 22:29

## 2020-01-01 RX ADMIN — ENOXAPARIN SODIUM 150 MILLIGRAM(S): 100 INJECTION SUBCUTANEOUS at 16:07

## 2020-01-01 RX ADMIN — BUDESONIDE AND FORMOTEROL FUMARATE DIHYDRATE 2 PUFF(S): 160; 4.5 AEROSOL RESPIRATORY (INHALATION) at 06:21

## 2020-01-01 RX ADMIN — Medication 100 MILLIGRAM(S): at 05:40

## 2020-01-01 RX ADMIN — MONTELUKAST 10 MILLIGRAM(S): 4 TABLET, CHEWABLE ORAL at 11:55

## 2020-01-01 RX ADMIN — Medication 500000 UNIT(S): at 18:53

## 2020-01-01 RX ADMIN — HEPARIN SODIUM 1700 UNIT(S)/HR: 5000 INJECTION INTRAVENOUS; SUBCUTANEOUS at 22:24

## 2020-01-01 RX ADMIN — Medication 30 MILLIGRAM(S): at 20:14

## 2020-01-01 RX ADMIN — Medication 30 MILLIGRAM(S): at 13:14

## 2020-01-01 RX ADMIN — PIPERACILLIN AND TAZOBACTAM 25 GRAM(S): 4; .5 INJECTION, POWDER, LYOPHILIZED, FOR SOLUTION INTRAVENOUS at 21:21

## 2020-01-01 RX ADMIN — CEFEPIME 100 MILLIGRAM(S): 1 INJECTION, POWDER, FOR SOLUTION INTRAMUSCULAR; INTRAVENOUS at 12:16

## 2020-01-01 RX ADMIN — Medication 200 MILLIGRAM(S): at 18:24

## 2020-01-01 RX ADMIN — CHLORHEXIDINE GLUCONATE 1 APPLICATION(S): 213 SOLUTION TOPICAL at 11:33

## 2020-01-01 RX ADMIN — Medication 25 MILLIGRAM(S): at 13:21

## 2020-01-01 RX ADMIN — Medication 200 MILLIGRAM(S): at 17:08

## 2020-01-01 RX ADMIN — Medication 50 MILLIGRAM(S): at 05:50

## 2020-01-01 RX ADMIN — Medication 4.84 MICROGRAM(S)/KG/MIN: at 20:27

## 2020-01-01 RX ADMIN — Medication 480 MICROGRAM(S): at 21:47

## 2020-01-01 RX ADMIN — PANTOPRAZOLE SODIUM 40 MILLIGRAM(S): 20 TABLET, DELAYED RELEASE ORAL at 06:58

## 2020-01-01 RX ADMIN — HEPARIN SODIUM 2000 UNIT(S)/HR: 5000 INJECTION INTRAVENOUS; SUBCUTANEOUS at 20:48

## 2020-01-01 RX ADMIN — Medication 100 MILLIGRAM(S): at 17:04

## 2020-01-01 RX ADMIN — Medication 13 MILLIGRAM(S): at 12:36

## 2020-01-01 RX ADMIN — Medication 81 MILLIGRAM(S): at 13:01

## 2020-01-01 RX ADMIN — Medication 50 MILLIGRAM(S): at 06:00

## 2020-01-01 RX ADMIN — Medication 80 MILLIGRAM(S): at 21:08

## 2020-01-01 RX ADMIN — Medication 0.5 MILLIGRAM(S): at 02:22

## 2020-01-01 RX ADMIN — LIDOCAINE 1 PATCH: 4 CREAM TOPICAL at 12:03

## 2020-01-01 RX ADMIN — PIPERACILLIN AND TAZOBACTAM 25 GRAM(S): 4; .5 INJECTION, POWDER, LYOPHILIZED, FOR SOLUTION INTRAVENOUS at 01:40

## 2020-01-01 RX ADMIN — Medication 0.5 MILLIGRAM(S): at 07:33

## 2020-01-01 RX ADMIN — Medication 1 MILLIGRAM(S): at 12:30

## 2020-01-01 RX ADMIN — Medication 650 MILLIGRAM(S): at 11:31

## 2020-01-01 RX ADMIN — LORATADINE 10 MILLIGRAM(S): 10 TABLET ORAL at 13:21

## 2020-01-01 RX ADMIN — HEPARIN SODIUM 1900 UNIT(S)/HR: 5000 INJECTION INTRAVENOUS; SUBCUTANEOUS at 15:24

## 2020-01-01 RX ADMIN — Medication 1 MILLIGRAM(S): at 11:59

## 2020-01-01 RX ADMIN — BUDESONIDE AND FORMOTEROL FUMARATE DIHYDRATE 2 PUFF(S): 160; 4.5 AEROSOL RESPIRATORY (INHALATION) at 20:54

## 2020-01-01 RX ADMIN — ENOXAPARIN SODIUM 100 MILLIGRAM(S): 100 INJECTION SUBCUTANEOUS at 17:49

## 2020-01-01 RX ADMIN — PREGABALIN 1000 MICROGRAM(S): 225 CAPSULE ORAL at 11:24

## 2020-01-01 RX ADMIN — HALOPERIDOL DECANOATE 1 MILLIGRAM(S): 100 INJECTION INTRAMUSCULAR at 01:41

## 2020-01-01 RX ADMIN — SODIUM CHLORIDE 1000 MILLILITER(S): 9 INJECTION INTRAMUSCULAR; INTRAVENOUS; SUBCUTANEOUS at 06:27

## 2020-01-01 RX ADMIN — CEFTRIAXONE 1000 MILLIGRAM(S): 500 INJECTION, POWDER, FOR SOLUTION INTRAMUSCULAR; INTRAVENOUS at 06:50

## 2020-01-01 RX ADMIN — Medication 80 MILLIGRAM(S): at 05:15

## 2020-01-01 RX ADMIN — Medication 0.5 MILLIGRAM(S): at 21:33

## 2020-01-01 RX ADMIN — Medication 1000 MILLIGRAM(S): at 08:19

## 2020-01-01 RX ADMIN — Medication 500000 UNIT(S): at 06:27

## 2020-01-01 RX ADMIN — Medication 250 MILLIGRAM(S): at 18:40

## 2020-01-01 RX ADMIN — Medication 100 MILLIGRAM(S): at 11:40

## 2020-01-01 RX ADMIN — Medication 1 APPLICATION(S): at 17:48

## 2020-01-01 RX ADMIN — Medication 13 MILLIGRAM(S): at 15:30

## 2020-01-01 RX ADMIN — BUDESONIDE AND FORMOTEROL FUMARATE DIHYDRATE 2 PUFF(S): 160; 4.5 AEROSOL RESPIRATORY (INHALATION) at 17:24

## 2020-01-01 RX ADMIN — CHLORHEXIDINE GLUCONATE 1 APPLICATION(S): 213 SOLUTION TOPICAL at 11:51

## 2020-01-01 RX ADMIN — QUETIAPINE FUMARATE 25 MILLIGRAM(S): 200 TABLET, FILM COATED ORAL at 17:31

## 2020-01-01 RX ADMIN — SODIUM CHLORIDE 1000 MILLILITER(S): 9 INJECTION INTRAMUSCULAR; INTRAVENOUS; SUBCUTANEOUS at 20:07

## 2020-01-01 RX ADMIN — Medication 81 MILLIGRAM(S): at 11:55

## 2020-01-01 RX ADMIN — HEPARIN SODIUM 5000 UNIT(S): 5000 INJECTION INTRAVENOUS; SUBCUTANEOUS at 18:49

## 2020-01-01 RX ADMIN — PANTOPRAZOLE SODIUM 40 MILLIGRAM(S): 20 TABLET, DELAYED RELEASE ORAL at 11:32

## 2020-01-01 RX ADMIN — Medication 1000 MILLIGRAM(S): at 06:30

## 2020-01-01 RX ADMIN — MORPHINE SULFATE 1 MILLIGRAM(S): 50 CAPSULE, EXTENDED RELEASE ORAL at 16:00

## 2020-01-01 RX ADMIN — CHLORHEXIDINE GLUCONATE 1 APPLICATION(S): 213 SOLUTION TOPICAL at 05:32

## 2020-01-01 RX ADMIN — Medication 1000 MILLIGRAM(S): at 09:50

## 2020-01-01 RX ADMIN — MORPHINE SULFATE 1 MILLIGRAM(S): 50 CAPSULE, EXTENDED RELEASE ORAL at 15:38

## 2020-01-01 RX ADMIN — Medication 102 MILLIGRAM(S): at 12:18

## 2020-01-01 RX ADMIN — Medication 50 MILLIGRAM(S): at 18:06

## 2020-01-01 RX ADMIN — Medication 50 MILLIGRAM(S): at 22:45

## 2020-01-01 RX ADMIN — Medication 30 MILLIGRAM(S): at 04:36

## 2020-01-01 RX ADMIN — HEPARIN SODIUM 5000 UNIT(S): 5000 INJECTION INTRAVENOUS; SUBCUTANEOUS at 06:43

## 2020-01-01 RX ADMIN — Medication 40 MILLIGRAM(S): at 04:36

## 2020-01-01 RX ADMIN — Medication 1 MILLIGRAM(S): at 13:01

## 2020-01-01 RX ADMIN — Medication 40 MILLIGRAM(S): at 04:17

## 2020-01-01 RX ADMIN — ATORVASTATIN CALCIUM 10 MILLIGRAM(S): 80 TABLET, FILM COATED ORAL at 23:10

## 2020-01-01 RX ADMIN — MONTELUKAST 10 MILLIGRAM(S): 4 TABLET, CHEWABLE ORAL at 12:04

## 2020-01-01 RX ADMIN — Medication 30 MILLIGRAM(S): at 11:57

## 2020-01-01 RX ADMIN — ATORVASTATIN CALCIUM 10 MILLIGRAM(S): 80 TABLET, FILM COATED ORAL at 21:22

## 2020-01-01 RX ADMIN — Medication 102 MILLIGRAM(S): at 18:08

## 2020-01-01 RX ADMIN — DONEPEZIL HYDROCHLORIDE 5 MILLIGRAM(S): 10 TABLET, FILM COATED ORAL at 22:29

## 2020-01-01 RX ADMIN — SODIUM CHLORIDE 60 MILLILITER(S): 9 INJECTION INTRAMUSCULAR; INTRAVENOUS; SUBCUTANEOUS at 05:34

## 2020-01-01 RX ADMIN — Medication 100 MILLIGRAM(S): at 21:31

## 2020-01-01 RX ADMIN — PIPERACILLIN AND TAZOBACTAM 25 GRAM(S): 4; .5 INJECTION, POWDER, LYOPHILIZED, FOR SOLUTION INTRAVENOUS at 17:30

## 2020-01-01 RX ADMIN — Medication 100 MILLIGRAM(S): at 11:55

## 2020-01-01 RX ADMIN — SENNA PLUS 2 TABLET(S): 8.6 TABLET ORAL at 22:57

## 2020-01-01 RX ADMIN — Medication 1 MILLIGRAM(S): at 11:46

## 2020-01-01 RX ADMIN — Medication 81 MILLIGRAM(S): at 11:30

## 2020-01-01 RX ADMIN — ATORVASTATIN CALCIUM 10 MILLIGRAM(S): 80 TABLET, FILM COATED ORAL at 20:04

## 2020-01-01 RX ADMIN — PALONOSETRON HYDROCHLORIDE 0.25 MILLIGRAM(S): 0.25 INJECTION, SOLUTION INTRAVENOUS at 12:17

## 2020-01-01 RX ADMIN — AZITHROMYCIN 255 MILLIGRAM(S): 500 TABLET, FILM COATED ORAL at 06:52

## 2020-01-01 RX ADMIN — Medication 102 MILLIGRAM(S): at 12:03

## 2020-01-01 RX ADMIN — CEFTRIAXONE 100 MILLIGRAM(S): 500 INJECTION, POWDER, FOR SOLUTION INTRAMUSCULAR; INTRAVENOUS at 06:24

## 2020-01-01 RX ADMIN — Medication 81 MILLIGRAM(S): at 12:04

## 2020-01-01 RX ADMIN — Medication 252 MILLIGRAM(S): at 16:27

## 2020-01-01 RX ADMIN — ATOVAQUONE 1500 MILLIGRAM(S): 750 SUSPENSION ORAL at 11:28

## 2020-01-01 RX ADMIN — Medication 400 MILLIGRAM(S): at 17:28

## 2020-01-01 RX ADMIN — Medication 25 MILLIGRAM(S): at 11:55

## 2020-01-01 RX ADMIN — ENOXAPARIN SODIUM 100 MILLIGRAM(S): 100 INJECTION SUBCUTANEOUS at 18:01

## 2020-01-01 RX ADMIN — Medication 650 MILLIGRAM(S): at 06:24

## 2020-01-01 RX ADMIN — Medication 50 MILLIGRAM(S): at 05:38

## 2020-01-01 RX ADMIN — Medication 650 MILLIGRAM(S): at 21:22

## 2020-01-01 RX ADMIN — ATORVASTATIN CALCIUM 10 MILLIGRAM(S): 80 TABLET, FILM COATED ORAL at 21:16

## 2020-01-01 RX ADMIN — PREGABALIN 1000 MICROGRAM(S): 225 CAPSULE ORAL at 12:04

## 2020-01-01 RX ADMIN — POLYETHYLENE GLYCOL 3350 17 GRAM(S): 17 POWDER, FOR SOLUTION ORAL at 14:02

## 2020-01-01 RX ADMIN — CHLORHEXIDINE GLUCONATE 1 APPLICATION(S): 213 SOLUTION TOPICAL at 05:53

## 2020-01-01 RX ADMIN — PANTOPRAZOLE SODIUM 40 MILLIGRAM(S): 20 TABLET, DELAYED RELEASE ORAL at 06:02

## 2020-01-01 RX ADMIN — Medication 100 MILLIGRAM(S): at 05:38

## 2020-01-01 RX ADMIN — Medication 200 MILLIGRAM(S): at 05:40

## 2020-01-01 RX ADMIN — Medication 50 MILLIGRAM(S): at 05:34

## 2020-01-01 RX ADMIN — PANTOPRAZOLE SODIUM 40 MILLIGRAM(S): 20 TABLET, DELAYED RELEASE ORAL at 13:01

## 2020-01-01 RX ADMIN — Medication 50 MILLIGRAM(S): at 17:31

## 2020-01-01 RX ADMIN — Medication 40 MILLIEQUIVALENT(S): at 19:50

## 2020-01-01 RX ADMIN — SODIUM CHLORIDE 1000 MILLILITER(S): 9 INJECTION INTRAMUSCULAR; INTRAVENOUS; SUBCUTANEOUS at 05:05

## 2020-01-01 RX ADMIN — Medication 100 MILLIGRAM(S): at 04:16

## 2020-01-01 RX ADMIN — RISPERIDONE 1 MILLIGRAM(S): 4 TABLET ORAL at 17:16

## 2020-01-01 RX ADMIN — LIDOCAINE 1 PATCH: 4 CREAM TOPICAL at 22:58

## 2020-01-01 RX ADMIN — ENOXAPARIN SODIUM 100 MILLIGRAM(S): 100 INJECTION SUBCUTANEOUS at 04:43

## 2020-01-01 RX ADMIN — Medication 650 MILLIGRAM(S): at 10:18

## 2020-01-01 RX ADMIN — Medication 1 MILLIGRAM(S): at 11:34

## 2020-01-01 RX ADMIN — Medication 50 MILLIGRAM(S): at 17:11

## 2020-01-01 RX ADMIN — Medication 100 MILLIGRAM(S): at 17:08

## 2020-01-01 RX ADMIN — Medication 10 MILLIGRAM(S): at 12:55

## 2020-01-01 RX ADMIN — Medication 1000 MILLIGRAM(S): at 16:35

## 2020-01-01 RX ADMIN — Medication 252 MILLIGRAM(S): at 16:21

## 2020-01-01 RX ADMIN — PIPERACILLIN AND TAZOBACTAM 25 GRAM(S): 4; .5 INJECTION, POWDER, LYOPHILIZED, FOR SOLUTION INTRAVENOUS at 22:22

## 2020-01-01 RX ADMIN — PREGABALIN 1000 MICROGRAM(S): 225 CAPSULE ORAL at 11:54

## 2020-01-01 RX ADMIN — Medication 5 MILLIGRAM(S): at 21:20

## 2020-01-01 RX ADMIN — SENNA PLUS 2 TABLET(S): 8.6 TABLET ORAL at 21:47

## 2020-01-01 RX ADMIN — Medication 81 MILLIGRAM(S): at 12:18

## 2020-01-01 RX ADMIN — Medication 81 MILLIGRAM(S): at 11:33

## 2020-01-01 RX ADMIN — Medication 25 MILLIGRAM(S): at 11:50

## 2020-01-01 RX ADMIN — Medication 500000 UNIT(S): at 23:10

## 2020-01-01 RX ADMIN — POLYETHYLENE GLYCOL 3350 17 GRAM(S): 17 POWDER, FOR SOLUTION ORAL at 06:58

## 2020-01-01 RX ADMIN — RISPERIDONE 1 MILLIGRAM(S): 4 TABLET ORAL at 05:50

## 2020-01-01 RX ADMIN — Medication 3 MILLIGRAM(S): at 21:36

## 2020-01-01 RX ADMIN — Medication 650 MILLIGRAM(S): at 17:18

## 2020-01-01 RX ADMIN — Medication 50 MILLIGRAM(S): at 05:15

## 2020-01-01 RX ADMIN — ATORVASTATIN CALCIUM 10 MILLIGRAM(S): 80 TABLET, FILM COATED ORAL at 20:14

## 2020-01-01 RX ADMIN — Medication 30 MILLIGRAM(S): at 06:57

## 2020-01-01 RX ADMIN — PIPERACILLIN AND TAZOBACTAM 25 GRAM(S): 4; .5 INJECTION, POWDER, LYOPHILIZED, FOR SOLUTION INTRAVENOUS at 13:55

## 2020-01-01 RX ADMIN — ENOXAPARIN SODIUM 100 MILLIGRAM(S): 100 INJECTION SUBCUTANEOUS at 06:13

## 2020-01-01 RX ADMIN — PANTOPRAZOLE SODIUM 40 MILLIGRAM(S): 20 TABLET, DELAYED RELEASE ORAL at 05:32

## 2020-01-01 RX ADMIN — Medication 100 MILLIGRAM(S): at 11:56

## 2020-01-01 RX ADMIN — MIDAZOLAM HYDROCHLORIDE 4 MILLIGRAM(S): 1 INJECTION, SOLUTION INTRAMUSCULAR; INTRAVENOUS at 13:02

## 2020-01-01 RX ADMIN — Medication 80 MILLIGRAM(S): at 21:36

## 2020-01-01 RX ADMIN — Medication 40 MILLIEQUIVALENT(S): at 11:50

## 2020-01-01 RX ADMIN — Medication 22 UNIT(S): at 13:40

## 2020-01-01 RX ADMIN — ATOVAQUONE 1500 MILLIGRAM(S): 750 SUSPENSION ORAL at 12:03

## 2020-01-01 RX ADMIN — HEPARIN SODIUM 9000 UNIT(S): 5000 INJECTION INTRAVENOUS; SUBCUTANEOUS at 20:48

## 2020-01-01 RX ADMIN — DONEPEZIL HYDROCHLORIDE 5 MILLIGRAM(S): 10 TABLET, FILM COATED ORAL at 21:13

## 2020-01-01 RX ADMIN — Medication 80 MILLIGRAM(S): at 05:33

## 2020-01-01 RX ADMIN — Medication 1 APPLICATION(S): at 05:33

## 2020-01-01 RX ADMIN — CEFEPIME 100 MILLIGRAM(S): 1 INJECTION, POWDER, FOR SOLUTION INTRAMUSCULAR; INTRAVENOUS at 17:39

## 2020-01-01 RX ADMIN — BUDESONIDE AND FORMOTEROL FUMARATE DIHYDRATE 2 PUFF(S): 160; 4.5 AEROSOL RESPIRATORY (INHALATION) at 20:18

## 2020-01-01 RX ADMIN — Medication 252 MILLIGRAM(S): at 15:14

## 2020-01-01 RX ADMIN — Medication 100 MILLIEQUIVALENT(S): at 10:38

## 2020-01-01 RX ADMIN — Medication 80 MILLIGRAM(S): at 13:42

## 2020-01-01 RX ADMIN — Medication 50 MILLIGRAM(S): at 17:47

## 2020-01-01 RX ADMIN — Medication 40 MILLIEQUIVALENT(S): at 23:20

## 2020-01-01 RX ADMIN — PIPERACILLIN AND TAZOBACTAM 25 GRAM(S): 4; .5 INJECTION, POWDER, LYOPHILIZED, FOR SOLUTION INTRAVENOUS at 13:24

## 2020-01-01 RX ADMIN — Medication 2 UNIT(S): at 13:50

## 2020-01-01 RX ADMIN — Medication 500000 UNIT(S): at 16:01

## 2020-01-01 RX ADMIN — POLYETHYLENE GLYCOL 3350 17 GRAM(S): 17 POWDER, FOR SOLUTION ORAL at 12:04

## 2020-01-01 RX ADMIN — Medication 100 MILLIGRAM(S): at 04:36

## 2020-01-01 RX ADMIN — ENOXAPARIN SODIUM 150 MILLIGRAM(S): 100 INJECTION SUBCUTANEOUS at 17:08

## 2020-01-01 RX ADMIN — Medication 102 MILLIGRAM(S): at 14:14

## 2020-01-01 RX ADMIN — BUDESONIDE AND FORMOTEROL FUMARATE DIHYDRATE 2 PUFF(S): 160; 4.5 AEROSOL RESPIRATORY (INHALATION) at 20:55

## 2020-01-01 RX ADMIN — CHLORHEXIDINE GLUCONATE 1 APPLICATION(S): 213 SOLUTION TOPICAL at 11:59

## 2020-01-01 RX ADMIN — PIPERACILLIN AND TAZOBACTAM 25 GRAM(S): 4; .5 INJECTION, POWDER, LYOPHILIZED, FOR SOLUTION INTRAVENOUS at 09:00

## 2020-01-01 RX ADMIN — Medication 25 MILLIGRAM(S): at 16:02

## 2020-01-01 RX ADMIN — LIDOCAINE 1 PATCH: 4 CREAM TOPICAL at 19:59

## 2020-01-01 RX ADMIN — PREGABALIN 1000 MICROGRAM(S): 225 CAPSULE ORAL at 11:46

## 2020-01-01 RX ADMIN — Medication 1 MILLIGRAM(S): at 12:47

## 2020-01-01 RX ADMIN — DOXORUBICIN HYDROCHLORIDE 114 MILLIGRAM(S): 2 INJECTION, SOLUTION INTRAVENOUS at 13:23

## 2020-01-01 RX ADMIN — Medication 1 MILLIGRAM(S): at 13:21

## 2020-01-01 RX ADMIN — Medication 80 MILLIGRAM(S): at 21:21

## 2020-01-01 RX ADMIN — Medication 20 UNIT(S): at 15:01

## 2020-01-01 RX ADMIN — Medication 50 MILLIGRAM(S): at 17:16

## 2020-01-01 RX ADMIN — Medication 650 MILLIGRAM(S): at 18:45

## 2020-01-01 RX ADMIN — Medication 25 MILLIGRAM(S): at 12:02

## 2020-01-01 RX ADMIN — Medication 1 MILLIGRAM(S): at 11:42

## 2020-01-01 RX ADMIN — Medication 1000 MILLIGRAM(S): at 18:25

## 2020-01-01 RX ADMIN — Medication 50 MILLIGRAM(S): at 05:55

## 2020-01-01 RX ADMIN — Medication 40 MILLIGRAM(S): at 06:14

## 2020-01-01 RX ADMIN — LIDOCAINE 1 PATCH: 4 CREAM TOPICAL at 16:41

## 2020-01-01 RX ADMIN — Medication 80 MILLIGRAM(S): at 21:17

## 2020-01-01 RX ADMIN — MONTELUKAST 10 MILLIGRAM(S): 4 TABLET, CHEWABLE ORAL at 11:48

## 2020-01-01 RX ADMIN — DOXORUBICIN HYDROCHLORIDE 114 MILLIGRAM(S): 2 INJECTION, SOLUTION INTRAVENOUS at 15:20

## 2020-01-01 RX ADMIN — Medication 1 MILLIGRAM(S): at 16:02

## 2020-01-01 RX ADMIN — HEPARIN SODIUM 5000 UNIT(S): 5000 INJECTION INTRAVENOUS; SUBCUTANEOUS at 19:18

## 2020-01-01 RX ADMIN — HEPARIN SODIUM 5000 UNIT(S): 5000 INJECTION INTRAVENOUS; SUBCUTANEOUS at 05:51

## 2020-01-01 RX ADMIN — Medication 25 MILLIGRAM(S): at 12:30

## 2020-01-01 RX ADMIN — Medication 250 MILLIGRAM(S): at 05:31

## 2020-01-01 RX ADMIN — OLANZAPINE 5 MILLIGRAM(S): 15 TABLET, FILM COATED ORAL at 19:24

## 2020-01-01 RX ADMIN — SENNA PLUS 2 TABLET(S): 8.6 TABLET ORAL at 20:26

## 2020-01-01 RX ADMIN — CHLORHEXIDINE GLUCONATE 1 APPLICATION(S): 213 SOLUTION TOPICAL at 05:35

## 2020-01-01 RX ADMIN — Medication 40 MILLIGRAM(S): at 05:14

## 2020-01-01 RX ADMIN — Medication 50 MILLIGRAM(S): at 17:39

## 2020-01-01 RX ADMIN — CEFEPIME 100 MILLIGRAM(S): 1 INJECTION, POWDER, FOR SOLUTION INTRAMUSCULAR; INTRAVENOUS at 02:43

## 2020-01-01 RX ADMIN — SODIUM CHLORIDE 125 MILLILITER(S): 9 INJECTION, SOLUTION INTRAVENOUS at 06:05

## 2020-01-01 RX ADMIN — PANTOPRAZOLE SODIUM 40 MILLIGRAM(S): 20 TABLET, DELAYED RELEASE ORAL at 05:58

## 2020-01-01 RX ADMIN — Medication 40 MILLIEQUIVALENT(S): at 11:14

## 2020-01-01 RX ADMIN — ENOXAPARIN SODIUM 100 MILLIGRAM(S): 100 INJECTION SUBCUTANEOUS at 05:38

## 2020-01-01 RX ADMIN — LIDOCAINE 1 PATCH: 4 CREAM TOPICAL at 02:42

## 2020-01-01 RX ADMIN — Medication 100 MILLIGRAM(S): at 11:06

## 2020-01-01 RX ADMIN — Medication 40 MILLIGRAM(S): at 05:38

## 2020-01-01 RX ADMIN — Medication 100 MILLIGRAM(S): at 12:54

## 2020-01-01 RX ADMIN — ATORVASTATIN CALCIUM 10 MILLIGRAM(S): 80 TABLET, FILM COATED ORAL at 21:12

## 2020-01-01 RX ADMIN — ATORVASTATIN CALCIUM 10 MILLIGRAM(S): 80 TABLET, FILM COATED ORAL at 21:17

## 2020-01-01 RX ADMIN — HEPARIN SODIUM 0 UNIT(S)/HR: 5000 INJECTION INTRAVENOUS; SUBCUTANEOUS at 03:49

## 2020-01-01 RX ADMIN — PANTOPRAZOLE SODIUM 40 MILLIGRAM(S): 20 TABLET, DELAYED RELEASE ORAL at 06:43

## 2020-01-01 RX ADMIN — BUDESONIDE AND FORMOTEROL FUMARATE DIHYDRATE 2 PUFF(S): 160; 4.5 AEROSOL RESPIRATORY (INHALATION) at 19:42

## 2020-01-01 RX ADMIN — Medication 40 MILLIGRAM(S): at 05:35

## 2020-01-01 RX ADMIN — Medication 50 MILLIGRAM(S): at 17:30

## 2020-01-01 RX ADMIN — Medication 50 MILLIGRAM(S): at 06:27

## 2020-01-01 RX ADMIN — Medication 30 MILLIGRAM(S): at 21:47

## 2020-01-01 RX ADMIN — Medication 50 MILLILITER(S): at 00:38

## 2020-01-01 RX ADMIN — Medication 200 MILLIGRAM(S): at 10:29

## 2020-01-01 RX ADMIN — ATORVASTATIN CALCIUM 10 MILLIGRAM(S): 80 TABLET, FILM COATED ORAL at 21:30

## 2020-01-01 RX ADMIN — LIDOCAINE 1 PATCH: 4 CREAM TOPICAL at 11:32

## 2020-01-01 RX ADMIN — CEFEPIME 100 MILLIGRAM(S): 1 INJECTION, POWDER, FOR SOLUTION INTRAMUSCULAR; INTRAVENOUS at 02:36

## 2020-01-01 RX ADMIN — Medication 1 APPLICATION(S): at 05:40

## 2020-01-01 RX ADMIN — Medication 650 MILLIGRAM(S): at 10:29

## 2020-01-01 RX ADMIN — Medication 50 MILLIGRAM(S): at 06:02

## 2020-01-01 RX ADMIN — Medication 102 MILLIGRAM(S): at 14:05

## 2020-01-01 RX ADMIN — Medication 80 MILLIGRAM(S): at 06:43

## 2020-01-01 RX ADMIN — Medication 200 MILLIGRAM(S): at 11:48

## 2020-01-01 RX ADMIN — ATOVAQUONE 1500 MILLIGRAM(S): 750 SUSPENSION ORAL at 11:48

## 2020-01-01 RX ADMIN — Medication 650 MILLIGRAM(S): at 03:20

## 2020-01-01 RX ADMIN — Medication 500000 UNIT(S): at 06:13

## 2020-01-01 RX ADMIN — PANTOPRAZOLE SODIUM 40 MILLIGRAM(S): 20 TABLET, DELAYED RELEASE ORAL at 04:47

## 2020-01-01 RX ADMIN — Medication 30 MILLIGRAM(S): at 04:48

## 2020-01-01 RX ADMIN — PREGABALIN 1000 MICROGRAM(S): 225 CAPSULE ORAL at 11:06

## 2020-01-01 RX ADMIN — Medication 50 MILLIGRAM(S): at 06:43

## 2020-01-01 RX ADMIN — Medication 50 MILLIGRAM(S): at 05:14

## 2020-01-01 RX ADMIN — Medication 250 MILLIGRAM(S): at 17:47

## 2020-01-01 RX ADMIN — Medication 40 MILLIGRAM(S): at 05:54

## 2020-01-01 RX ADMIN — MONTELUKAST 10 MILLIGRAM(S): 4 TABLET, CHEWABLE ORAL at 13:01

## 2020-01-01 RX ADMIN — Medication 50 MILLIGRAM(S): at 04:43

## 2020-01-01 RX ADMIN — Medication 50 MILLIGRAM(S): at 05:01

## 2020-01-01 RX ADMIN — Medication 1 MILLIGRAM(S): at 16:39

## 2020-01-01 RX ADMIN — DACARBAZINE 582.5 MILLIGRAM(S): 10 INJECTION, POWDER, FOR SOLUTION INTRAVENOUS at 16:48

## 2020-01-01 RX ADMIN — PANTOPRAZOLE SODIUM 40 MILLIGRAM(S): 20 TABLET, DELAYED RELEASE ORAL at 05:48

## 2020-01-01 RX ADMIN — ATORVASTATIN CALCIUM 10 MILLIGRAM(S): 80 TABLET, FILM COATED ORAL at 21:36

## 2020-01-01 RX ADMIN — ENOXAPARIN SODIUM 100 MILLIGRAM(S): 100 INJECTION SUBCUTANEOUS at 18:56

## 2020-01-01 RX ADMIN — ENOXAPARIN SODIUM 100 MILLIGRAM(S): 100 INJECTION SUBCUTANEOUS at 17:15

## 2020-01-01 RX ADMIN — Medication 50 MILLIGRAM(S): at 17:22

## 2020-01-01 RX ADMIN — ONDANSETRON 4 MILLIGRAM(S): 8 TABLET, FILM COATED ORAL at 02:48

## 2020-01-01 RX ADMIN — BUDESONIDE AND FORMOTEROL FUMARATE DIHYDRATE 2 PUFF(S): 160; 4.5 AEROSOL RESPIRATORY (INHALATION) at 09:05

## 2020-01-01 RX ADMIN — Medication 1 APPLICATION(S): at 06:21

## 2020-01-01 RX ADMIN — BUDESONIDE AND FORMOTEROL FUMARATE DIHYDRATE 2 PUFF(S): 160; 4.5 AEROSOL RESPIRATORY (INHALATION) at 09:03

## 2020-01-01 RX ADMIN — Medication 40 MILLIGRAM(S): at 05:01

## 2020-01-01 RX ADMIN — Medication 50 MILLIGRAM(S): at 18:48

## 2020-01-01 RX ADMIN — Medication 40 MILLIEQUIVALENT(S): at 01:00

## 2020-01-01 RX ADMIN — Medication 30 MILLIGRAM(S): at 12:04

## 2020-01-01 RX ADMIN — Medication 25 MILLIGRAM(S): at 11:36

## 2020-01-01 RX ADMIN — Medication 1 APPLICATION(S): at 05:35

## 2020-01-01 RX ADMIN — Medication 100 MILLIGRAM(S): at 11:50

## 2020-01-01 RX ADMIN — BUDESONIDE AND FORMOTEROL FUMARATE DIHYDRATE 2 PUFF(S): 160; 4.5 AEROSOL RESPIRATORY (INHALATION) at 08:12

## 2020-01-01 RX ADMIN — PANTOPRAZOLE SODIUM 40 MILLIGRAM(S): 20 TABLET, DELAYED RELEASE ORAL at 05:55

## 2020-01-01 RX ADMIN — Medication 1 SPRAY(S): at 21:06

## 2020-01-01 RX ADMIN — PREGABALIN 1000 MICROGRAM(S): 225 CAPSULE ORAL at 16:01

## 2020-01-01 RX ADMIN — Medication 100 MILLIEQUIVALENT(S): at 13:14

## 2020-01-01 RX ADMIN — Medication 22 UNIT(S): at 16:02

## 2020-01-01 RX ADMIN — SODIUM CHLORIDE 70 MILLILITER(S): 9 INJECTION INTRAMUSCULAR; INTRAVENOUS; SUBCUTANEOUS at 09:36

## 2020-01-01 RX ADMIN — Medication 50 MILLIGRAM(S): at 04:17

## 2020-01-01 RX ADMIN — OXYCODONE HYDROCHLORIDE 5 MILLIGRAM(S): 5 TABLET ORAL at 19:10

## 2020-01-01 RX ADMIN — Medication 100 MILLIGRAM(S): at 21:21

## 2020-01-01 RX ADMIN — Medication 300 UNIT(S): at 16:23

## 2020-01-01 RX ADMIN — LIDOCAINE 1 PATCH: 4 CREAM TOPICAL at 00:08

## 2020-01-01 RX ADMIN — HEPARIN SODIUM 1700 UNIT(S)/HR: 5000 INJECTION INTRAVENOUS; SUBCUTANEOUS at 05:54

## 2020-01-01 RX ADMIN — Medication 81 MILLIGRAM(S): at 12:03

## 2020-01-01 RX ADMIN — HEPARIN SODIUM 5000 UNIT(S): 5000 INJECTION INTRAVENOUS; SUBCUTANEOUS at 05:50

## 2020-01-01 RX ADMIN — SODIUM CHLORIDE 60 MILLILITER(S): 9 INJECTION INTRAMUSCULAR; INTRAVENOUS; SUBCUTANEOUS at 11:03

## 2020-01-01 RX ADMIN — SENNA PLUS 2 TABLET(S): 8.6 TABLET ORAL at 20:04

## 2020-01-01 RX ADMIN — PIPERACILLIN AND TAZOBACTAM 25 GRAM(S): 4; .5 INJECTION, POWDER, LYOPHILIZED, FOR SOLUTION INTRAVENOUS at 18:04

## 2020-01-01 RX ADMIN — Medication 100 MILLIGRAM(S): at 11:32

## 2020-01-01 RX ADMIN — PANTOPRAZOLE SODIUM 40 MILLIGRAM(S): 20 TABLET, DELAYED RELEASE ORAL at 05:41

## 2020-01-01 RX ADMIN — PANTOPRAZOLE SODIUM 40 MILLIGRAM(S): 20 TABLET, DELAYED RELEASE ORAL at 04:43

## 2020-01-01 RX ADMIN — Medication 200 MILLIGRAM(S): at 21:07

## 2020-01-01 RX ADMIN — DONEPEZIL HYDROCHLORIDE 5 MILLIGRAM(S): 10 TABLET, FILM COATED ORAL at 21:17

## 2020-01-01 RX ADMIN — Medication 100 MILLIGRAM(S): at 13:01

## 2020-01-01 RX ADMIN — CEFEPIME 100 MILLIGRAM(S): 1 INJECTION, POWDER, FOR SOLUTION INTRAMUSCULAR; INTRAVENOUS at 22:57

## 2020-01-01 RX ADMIN — Medication 100 MILLIGRAM(S): at 14:01

## 2020-01-01 RX ADMIN — OLANZAPINE 10 MILLIGRAM(S): 15 TABLET, FILM COATED ORAL at 00:25

## 2020-01-01 RX ADMIN — ENOXAPARIN SODIUM 100 MILLIGRAM(S): 100 INJECTION SUBCUTANEOUS at 05:00

## 2020-01-01 RX ADMIN — Medication 1 MILLIGRAM(S): at 16:35

## 2020-01-01 RX ADMIN — PREGABALIN 1000 MICROGRAM(S): 225 CAPSULE ORAL at 13:52

## 2020-01-01 RX ADMIN — Medication 1 MILLIGRAM(S): at 12:18

## 2020-01-01 RX ADMIN — Medication 22 UNIT(S): at 12:00

## 2020-01-01 RX ADMIN — PREGABALIN 1000 MICROGRAM(S): 225 CAPSULE ORAL at 12:46

## 2020-01-01 RX ADMIN — Medication 100 MILLIGRAM(S): at 12:06

## 2020-01-01 RX ADMIN — NYSTATIN CREAM 1 APPLICATION(S): 100000 CREAM TOPICAL at 06:14

## 2020-01-01 RX ADMIN — ENOXAPARIN SODIUM 100 MILLIGRAM(S): 100 INJECTION SUBCUTANEOUS at 07:46

## 2020-01-01 RX ADMIN — DACARBAZINE 310 MILLIGRAM(S): 10 INJECTION, POWDER, FOR SOLUTION INTRAVENOUS at 12:42

## 2020-01-01 RX ADMIN — HEPARIN SODIUM 5000 UNIT(S): 5000 INJECTION INTRAVENOUS; SUBCUTANEOUS at 17:17

## 2020-01-01 RX ADMIN — Medication 50 MILLIGRAM(S): at 17:00

## 2020-01-01 RX ADMIN — Medication 80 MILLIGRAM(S): at 13:04

## 2020-01-01 RX ADMIN — Medication 81 MILLIGRAM(S): at 11:56

## 2020-01-01 RX ADMIN — DACARBAZINE 200 MILLIGRAM(S): 10 INJECTION, POWDER, FOR SOLUTION INTRAVENOUS at 15:10

## 2020-01-01 RX ADMIN — PIPERACILLIN AND TAZOBACTAM 25 GRAM(S): 4; .5 INJECTION, POWDER, LYOPHILIZED, FOR SOLUTION INTRAVENOUS at 05:14

## 2020-01-01 RX ADMIN — LORATADINE 10 MILLIGRAM(S): 10 TABLET ORAL at 12:54

## 2020-01-01 RX ADMIN — OLANZAPINE 10 MILLIGRAM(S): 15 TABLET, FILM COATED ORAL at 18:04

## 2020-01-01 RX ADMIN — HEPARIN SODIUM 1700 UNIT(S)/HR: 5000 INJECTION INTRAVENOUS; SUBCUTANEOUS at 06:56

## 2020-01-01 RX ADMIN — ATORVASTATIN CALCIUM 10 MILLIGRAM(S): 80 TABLET, FILM COATED ORAL at 22:57

## 2020-01-01 RX ADMIN — Medication 650 MILLIGRAM(S): at 12:16

## 2020-01-01 RX ADMIN — ATOVAQUONE 1500 MILLIGRAM(S): 750 SUSPENSION ORAL at 18:05

## 2020-01-01 RX ADMIN — HALOPERIDOL DECANOATE 1 MILLIGRAM(S): 100 INJECTION INTRAMUSCULAR at 13:40

## 2020-01-01 RX ADMIN — LORATADINE 10 MILLIGRAM(S): 10 TABLET ORAL at 11:40

## 2020-01-01 RX ADMIN — PANTOPRAZOLE SODIUM 40 MILLIGRAM(S): 20 TABLET, DELAYED RELEASE ORAL at 06:35

## 2020-01-01 RX ADMIN — PANTOPRAZOLE SODIUM 40 MILLIGRAM(S): 20 TABLET, DELAYED RELEASE ORAL at 05:34

## 2020-01-01 RX ADMIN — SODIUM CHLORIDE 125 MILLILITER(S): 9 INJECTION, SOLUTION INTRAVENOUS at 11:54

## 2020-01-01 RX ADMIN — Medication 25 MILLIGRAM(S): at 13:01

## 2020-01-01 RX ADMIN — CEFEPIME 100 MILLIGRAM(S): 1 INJECTION, POWDER, FOR SOLUTION INTRAMUSCULAR; INTRAVENOUS at 20:28

## 2020-01-01 RX ADMIN — Medication 25 MILLIGRAM(S): at 11:02

## 2020-01-01 RX ADMIN — Medication 81 MILLIGRAM(S): at 11:48

## 2020-01-01 RX ADMIN — CHLORHEXIDINE GLUCONATE 1 APPLICATION(S): 213 SOLUTION TOPICAL at 11:14

## 2020-01-01 RX ADMIN — CHLORHEXIDINE GLUCONATE 1 APPLICATION(S): 213 SOLUTION TOPICAL at 05:40

## 2020-01-01 RX ADMIN — Medication 102 MILLIGRAM(S): at 10:45

## 2020-01-01 RX ADMIN — Medication 40 MILLIGRAM(S): at 04:47

## 2020-01-01 RX ADMIN — ENOXAPARIN SODIUM 100 MILLIGRAM(S): 100 INJECTION SUBCUTANEOUS at 05:50

## 2020-01-01 RX ADMIN — Medication 50 MILLIGRAM(S): at 06:26

## 2020-01-01 RX ADMIN — PANTOPRAZOLE SODIUM 40 MILLIGRAM(S): 20 TABLET, DELAYED RELEASE ORAL at 06:26

## 2020-01-01 RX ADMIN — Medication 100 MILLIGRAM(S): at 12:03

## 2020-01-01 RX ADMIN — PANTOPRAZOLE SODIUM 40 MILLIGRAM(S): 20 TABLET, DELAYED RELEASE ORAL at 05:00

## 2020-01-01 RX ADMIN — Medication 50 MILLIGRAM(S): at 17:51

## 2020-01-01 RX ADMIN — Medication 40 MILLIGRAM(S): at 04:43

## 2020-01-01 RX ADMIN — Medication 50 MILLIGRAM(S): at 06:13

## 2020-01-01 RX ADMIN — MORPHINE SULFATE 1 MILLIGRAM(S): 50 CAPSULE, EXTENDED RELEASE ORAL at 10:43

## 2020-01-01 RX ADMIN — Medication 80 MILLIGRAM(S): at 05:34

## 2020-01-01 RX ADMIN — Medication 40 MILLIGRAM(S): at 06:35

## 2020-01-01 RX ADMIN — FENTANYL CITRATE 5.17 MICROGRAM(S)/KG/HR: 50 INJECTION INTRAVENOUS at 20:27

## 2020-01-01 RX ADMIN — Medication 1 MILLIGRAM(S): at 12:02

## 2020-01-01 RX ADMIN — Medication 81 MILLIGRAM(S): at 14:02

## 2020-01-01 RX ADMIN — CHLORHEXIDINE GLUCONATE 1 APPLICATION(S): 213 SOLUTION TOPICAL at 16:38

## 2020-01-01 RX ADMIN — HEPARIN SODIUM 1700 UNIT(S)/HR: 5000 INJECTION INTRAVENOUS; SUBCUTANEOUS at 05:45

## 2020-01-01 RX ADMIN — SODIUM CHLORIDE 1000 MILLILITER(S): 9 INJECTION INTRAMUSCULAR; INTRAVENOUS; SUBCUTANEOUS at 21:12

## 2020-01-01 RX ADMIN — Medication 50 MILLIGRAM(S): at 05:47

## 2020-01-01 RX ADMIN — Medication 1 APPLICATION(S): at 17:32

## 2020-01-01 RX ADMIN — PANTOPRAZOLE SODIUM 40 MILLIGRAM(S): 20 TABLET, DELAYED RELEASE ORAL at 17:04

## 2020-01-01 RX ADMIN — Medication 250 MILLIGRAM(S): at 20:07

## 2020-01-01 RX ADMIN — PREGABALIN 1000 MICROGRAM(S): 225 CAPSULE ORAL at 11:02

## 2020-01-01 RX ADMIN — PANTOPRAZOLE SODIUM 40 MILLIGRAM(S): 20 TABLET, DELAYED RELEASE ORAL at 05:14

## 2020-01-01 RX ADMIN — ENOXAPARIN SODIUM 100 MILLIGRAM(S): 100 INJECTION SUBCUTANEOUS at 04:17

## 2020-01-01 RX ADMIN — Medication 80 MILLIGRAM(S): at 04:40

## 2020-01-01 RX ADMIN — Medication 50 MILLIGRAM(S): at 18:11

## 2020-01-01 RX ADMIN — PIPERACILLIN AND TAZOBACTAM 25 GRAM(S): 4; .5 INJECTION, POWDER, LYOPHILIZED, FOR SOLUTION INTRAVENOUS at 21:12

## 2020-01-01 RX ADMIN — Medication 500000 UNIT(S): at 06:02

## 2020-01-01 RX ADMIN — Medication 50 MILLIGRAM(S): at 17:10

## 2020-01-01 RX ADMIN — CEFEPIME 100 MILLIGRAM(S): 1 INJECTION, POWDER, FOR SOLUTION INTRAMUSCULAR; INTRAVENOUS at 10:24

## 2020-01-01 RX ADMIN — Medication 50 MILLIGRAM(S): at 05:41

## 2020-01-01 RX ADMIN — Medication 500000 UNIT(S): at 12:45

## 2020-01-01 RX ADMIN — CHLORHEXIDINE GLUCONATE 1 APPLICATION(S): 213 SOLUTION TOPICAL at 18:50

## 2020-01-01 RX ADMIN — Medication 25 MILLIGRAM(S): at 12:47

## 2020-01-01 RX ADMIN — Medication 500000 UNIT(S): at 06:00

## 2020-01-01 RX ADMIN — Medication 50 MILLIGRAM(S): at 04:47

## 2020-01-01 RX ADMIN — BUDESONIDE AND FORMOTEROL FUMARATE DIHYDRATE 2 PUFF(S): 160; 4.5 AEROSOL RESPIRATORY (INHALATION) at 10:00

## 2020-01-01 RX ADMIN — Medication 80 MILLIGRAM(S): at 21:33

## 2020-01-01 RX ADMIN — MONTELUKAST 10 MILLIGRAM(S): 4 TABLET, CHEWABLE ORAL at 11:33

## 2020-01-01 RX ADMIN — Medication 80 MILLIGRAM(S): at 05:47

## 2020-01-01 RX ADMIN — PANTOPRAZOLE SODIUM 40 MILLIGRAM(S): 20 TABLET, DELAYED RELEASE ORAL at 05:50

## 2020-01-01 RX ADMIN — Medication 50 MILLIGRAM(S): at 17:13

## 2020-01-01 RX ADMIN — Medication 200 MILLIGRAM(S): at 06:50

## 2020-01-01 RX ADMIN — ATORVASTATIN CALCIUM 10 MILLIGRAM(S): 80 TABLET, FILM COATED ORAL at 21:44

## 2020-01-01 RX ADMIN — Medication 2 UNIT(S): at 15:47

## 2020-01-01 RX ADMIN — LORATADINE 10 MILLIGRAM(S): 10 TABLET ORAL at 12:02

## 2020-01-01 RX ADMIN — PANTOPRAZOLE SODIUM 40 MILLIGRAM(S): 20 TABLET, DELAYED RELEASE ORAL at 05:38

## 2020-01-01 RX ADMIN — NYSTATIN CREAM 1 APPLICATION(S): 100000 CREAM TOPICAL at 13:20

## 2020-01-01 RX ADMIN — Medication 100 MILLIGRAM(S): at 06:13

## 2020-01-01 RX ADMIN — Medication 1 MILLIGRAM(S): at 11:24

## 2020-01-01 RX ADMIN — Medication 50 MILLIGRAM(S): at 06:14

## 2020-01-01 RX ADMIN — LIDOCAINE 1 PATCH: 4 CREAM TOPICAL at 19:48

## 2020-01-01 RX ADMIN — Medication 30 MILLIGRAM(S): at 05:35

## 2020-01-01 RX ADMIN — PANTOPRAZOLE SODIUM 40 MILLIGRAM(S): 20 TABLET, DELAYED RELEASE ORAL at 04:38

## 2020-01-01 RX ADMIN — Medication 3 MILLILITER(S): at 08:03

## 2020-01-01 RX ADMIN — OLANZAPINE 10 MILLIGRAM(S): 15 TABLET, FILM COATED ORAL at 06:23

## 2020-01-01 RX ADMIN — CHLORHEXIDINE GLUCONATE 1 APPLICATION(S): 213 SOLUTION TOPICAL at 14:14

## 2020-01-01 RX ADMIN — SENNA PLUS 2 TABLET(S): 8.6 TABLET ORAL at 21:21

## 2020-01-01 RX ADMIN — Medication 50 MILLIGRAM(S): at 05:35

## 2020-01-01 RX ADMIN — PANTOPRAZOLE SODIUM 40 MILLIGRAM(S): 20 TABLET, DELAYED RELEASE ORAL at 05:15

## 2020-01-01 RX ADMIN — Medication 40 MILLIGRAM(S): at 06:43

## 2020-01-01 RX ADMIN — Medication 13 MILLIGRAM(S): at 14:17

## 2020-01-01 RX ADMIN — SODIUM CHLORIDE 500 MILLILITER(S): 9 INJECTION INTRAMUSCULAR; INTRAVENOUS; SUBCUTANEOUS at 18:00

## 2020-01-01 RX ADMIN — SODIUM CHLORIDE 125 MILLILITER(S): 9 INJECTION, SOLUTION INTRAVENOUS at 22:46

## 2020-01-01 RX ADMIN — CEFEPIME 100 MILLIGRAM(S): 1 INJECTION, POWDER, FOR SOLUTION INTRAMUSCULAR; INTRAVENOUS at 18:05

## 2020-01-01 RX ADMIN — ATOVAQUONE 1500 MILLIGRAM(S): 750 SUSPENSION ORAL at 16:38

## 2020-01-01 RX ADMIN — Medication 100 MILLIEQUIVALENT(S): at 11:31

## 2020-01-01 RX ADMIN — NYSTATIN CREAM 1 APPLICATION(S): 100000 CREAM TOPICAL at 06:27

## 2020-01-01 RX ADMIN — MONTELUKAST 10 MILLIGRAM(S): 4 TABLET, CHEWABLE ORAL at 11:56

## 2020-01-01 RX ADMIN — Medication 500000 UNIT(S): at 18:00

## 2020-01-01 RX ADMIN — Medication 50 MILLIGRAM(S): at 18:51

## 2020-01-01 RX ADMIN — Medication 300 UNIT(S): at 18:16

## 2020-01-01 RX ADMIN — Medication 40 MILLIGRAM(S): at 05:40

## 2020-01-01 RX ADMIN — MONTELUKAST 10 MILLIGRAM(S): 4 TABLET, CHEWABLE ORAL at 12:03

## 2020-01-01 RX ADMIN — ATORVASTATIN CALCIUM 10 MILLIGRAM(S): 80 TABLET, FILM COATED ORAL at 21:07

## 2020-01-01 RX ADMIN — Medication 50 MILLIGRAM(S): at 16:53

## 2020-01-01 RX ADMIN — CHLORHEXIDINE GLUCONATE 1 APPLICATION(S): 213 SOLUTION TOPICAL at 11:40

## 2020-01-01 RX ADMIN — Medication 50 MILLIGRAM(S): at 06:58

## 2020-01-01 RX ADMIN — CEFEPIME 100 MILLIGRAM(S): 1 INJECTION, POWDER, FOR SOLUTION INTRAMUSCULAR; INTRAVENOUS at 01:13

## 2020-01-01 RX ADMIN — Medication 50 MILLIGRAM(S): at 04:36

## 2020-01-01 RX ADMIN — Medication 1 MILLIGRAM(S): at 12:54

## 2020-01-01 RX ADMIN — Medication 50 MILLIGRAM(S): at 18:01

## 2020-01-01 RX ADMIN — PIPERACILLIN AND TAZOBACTAM 25 GRAM(S): 4; .5 INJECTION, POWDER, LYOPHILIZED, FOR SOLUTION INTRAVENOUS at 01:00

## 2020-01-01 RX ADMIN — PANTOPRAZOLE SODIUM 40 MILLIGRAM(S): 20 TABLET, DELAYED RELEASE ORAL at 05:35

## 2020-01-01 RX ADMIN — Medication 1 APPLICATION(S): at 18:10

## 2020-01-01 RX ADMIN — PANTOPRAZOLE SODIUM 40 MILLIGRAM(S): 20 TABLET, DELAYED RELEASE ORAL at 05:52

## 2020-01-01 RX ADMIN — Medication 252 MILLIGRAM(S): at 13:58

## 2020-01-01 RX ADMIN — Medication 25 MILLIGRAM(S): at 11:56

## 2020-01-01 RX ADMIN — Medication 480 MICROGRAM(S): at 20:26

## 2020-01-01 RX ADMIN — ATORVASTATIN CALCIUM 10 MILLIGRAM(S): 80 TABLET, FILM COATED ORAL at 22:56

## 2020-01-01 RX ADMIN — PANTOPRAZOLE SODIUM 40 MILLIGRAM(S): 20 TABLET, DELAYED RELEASE ORAL at 06:13

## 2020-01-01 RX ADMIN — Medication 100 MILLIGRAM(S): at 13:21

## 2020-01-01 RX ADMIN — ENOXAPARIN SODIUM 100 MILLIGRAM(S): 100 INJECTION SUBCUTANEOUS at 18:10

## 2020-01-01 RX ADMIN — Medication 25 MILLIGRAM(S): at 11:59

## 2020-01-01 RX ADMIN — NYSTATIN CREAM 1 APPLICATION(S): 100000 CREAM TOPICAL at 13:25

## 2020-01-01 RX ADMIN — Medication 100 MILLIGRAM(S): at 20:04

## 2020-01-01 RX ADMIN — PIPERACILLIN AND TAZOBACTAM 25 GRAM(S): 4; .5 INJECTION, POWDER, LYOPHILIZED, FOR SOLUTION INTRAVENOUS at 01:20

## 2020-01-01 RX ADMIN — PIPERACILLIN AND TAZOBACTAM 25 GRAM(S): 4; .5 INJECTION, POWDER, LYOPHILIZED, FOR SOLUTION INTRAVENOUS at 06:58

## 2020-01-03 NOTE — REVIEW OF SYSTEMS
[Fever] : fever [Fatigue] : fatigue [Depression] : depression [Anxiety] : anxiety [Chills] : no chills [Chest Pain] : no chest pain [Palpitations] : no palpitations [Lower Ext Edema] : no lower extremity edema [Wheezing] : no wheezing [Cough] : no cough [Shortness Of Breath] : no shortness of breath [FreeTextEntry2] : occ fever spike to 100 101 [Dysuria] : no dysuria [Abdominal Pain] : no abdominal pain

## 2020-01-03 NOTE — HISTORY OF PRESENT ILLNESS
[Post-hospitalization from ___ Hospital] : Post-hospitalization from [unfilled] Hospital [Patient Contacted By: ____] : and contacted by [unfilled] [FreeTextEntry2] : Pt admitted for AMS and fever and diagnosed with malignant lymphoma  Seeing Dr Majano and getting ready for chemo and placemant of port

## 2020-01-03 NOTE — PHYSICAL EXAM
[No Acute Distress] : no acute distress [Normal Sclera/Conjunctiva] : normal sclera/conjunctiva [No JVD] : no jugular venous distention [No Respiratory Distress] : no respiratory distress  [Normal Outer Ear/Nose] : the outer ears and nose were normal in appearance [No Accessory Muscle Use] : no accessory muscle use [Clear to Auscultation] : lungs were clear to auscultation bilaterally [Normal Rate] : normal rate  [Regular Rhythm] : with a regular rhythm [Normal S1, S2] : normal S1 and S2 [Soft] : abdomen soft [No Edema] : there was no peripheral edema [No Extremity Clubbing/Cyanosis] : no extremity clubbing/cyanosis [Non-distended] : non-distended [Non Tender] : non-tender

## 2020-01-06 NOTE — PHYSICAL EXAM
[Normal Appearance] : normal appearance [General Appearance - Well Developed] : well developed [General Appearance - Well Nourished] : well nourished [Normal Conjunctiva] : the conjunctiva exhibited no abnormalities [III] : III [Neck Appearance] : the appearance of the neck was normal [Heart Rate And Rhythm] : heart rate and rhythm were normal [Jugular Venous Distention Increased] : there was no jugular-venous distention [Heart Sounds] : normal S1 and S2 [Respiration, Rhythm And Depth] : normal respiratory rhythm and effort [Auscultation Breath Sounds / Voice Sounds] : lungs were clear to auscultation bilaterally [Abdomen Soft] : soft [Abdomen Mass (___ Cm)] : no abdominal mass palpated [Gait - Sufficient For Exercise Testing] : the gait was sufficient for exercise testing [No Focal Deficits] : no focal deficits [] : no rash [FreeTextEntry2] : no edema [FreeTextEntry1] : some memory loss, low affect

## 2020-01-06 NOTE — CONSULT LETTER
[Dear  ___] : Dear  [unfilled], [Consult Letter:] : I had the pleasure of evaluating your patient, [unfilled]. [Please see my note below.] : Please see my note below. [Consult Closing:] : Thank you very much for allowing me to participate in the care of this patient.  If you have any questions, please do not hesitate to contact me. [FreeTextEntry3] : Sincerely,\par \par Lulu Chun MD\par [facility]\par Pulmonary Medicine\par

## 2020-01-06 NOTE — ASSESSMENT
[FreeTextEntry1] : 75 yo male with COPD, DANIEL, multiple pulmonary nodules and newly diagnosed Hodgkin's lymphoma here for initial evaluation. \par #Pulmonary Nodules/GGO - multiple pulmonary nodules noted on CT chest - largest LLL is 12x10 cm (from 7 mm on 11/3/19) - could be 2/2 lymphoma vs less likely other malignancy. Patient is already scheduled for PET -CT. Will f/u results. Would favor repeating chest imaging 2-3 months after initiation of chemotherapy to evaluate. \par \par #COPD: remote hx of smoking. Will need to confirm dx with PFTs. Currently only using Symbicort. Gold GroupB. \par -- start Incruse, Albuterol prn. D/c Symbicort\par -- PFTs, exercise oximetry\par \par #Hx of tracheostomy - no e/o tracheal stenosis on most recent CT chest\par \par #DANIEL: - using CPAP nightly, sx well controlled\par \par #Lymphoma: newly diagnosed, planned for PET CT, BMbx and chemo. \par \par #HCM: UTD with vaccinations. \par \par All questions answered. Patient and wife in agreement with plan. \par Follow up in 3w  or sooner if needed.\par

## 2020-01-06 NOTE — DISCUSSION/SUMMARY
[FreeTextEntry1] : Ct c/a/p 12/14/19 - pelvic and abd LN, mild splenomegaly,  patchy GGO in lower lobes, 12x10 mm LLL lung nodule and multiple additional nodules.

## 2020-01-06 NOTE — REVIEW OF SYSTEMS
[Postnasal Drip] : postnasal drip [Sinus Problems] : sinus problems [Cough] : cough [Sputum] : sputum  [Dyspnea] : dyspnea [Memory Loss] : ~T memory lapses or loss [As Noted in HPI] : as noted in HPI [Negative] : Musculoskeletal [de-identified] : DANIEL, onCPAP

## 2020-01-06 NOTE — HISTORY OF PRESENT ILLNESS
[FreeTextEntry1] : Mr. TAYLOR MCFADDEN is here for initial evaluation. \par \par Patient was recently hospitalized twice at [facility] 2019 and again 2019 with fevers/chough, was treated for PNA/COPD exacerbation. Imaging showed LAD, splenomegaly and multiple pulmonary nodules - patient had R iliac LN bx and was diagnosed with Hodgkins' lymphoma.\par \par He is planned to start chemotherapy in a couple of weeks. Awaiting PET scan and BM bx. \par Patient has chronic cough since 2014 (cough mostly when laying down with some brownish phlegm, occasional streaks of blood). He can walk about a block if walks slowly 2/2 QU[place]ILLA, also has  night sweats, fevers. Has some sinus congestion/postnasal gtt. He has Symbicort which he is using bid. (was just started on d/c from most recent hospitalization). \par Was diagnosed with DANIEL in 2016, uses CPAP every night.  \par \par Prior pulm - Dr Tanner. \par \par PMH: COPD; HTN, HLD, CAD, s/p AVR, s/p PPM, hx of tracheostomy  (now decannulated), tracheal stenosis. \par Meds: per chart\par All: No drug allergies\par SH: Former smoker, started at age 18, quit at age 38 (smoked about 1/2 ppd). Worked making pallets. \par FH: daughter has asthma; father had lung ca ( at 62, was hearvy smoker) \par PMD: JARON DELEON\par Heme/Onc: Dr Majano  - [phone]; fax [phone]\par Immunizations: had  and 23; had flu shot this season. \par

## 2020-01-08 NOTE — CONSULT NOTE ADULT - ASSESSMENT
[ASSESSMENT and  PLAN]  Classical Hodgkins Lymphoma  CD30+, CD15+, CD20 neg  Stage II with GUERO below diaphragm.    Lung nodules of unclear etiology  Patchy GGO    Viral illness    Beta thal trait.     RECOMMENDATIONS  Infectious disease eval.     Check IgG levels    Nebs, steroids per pulm.  Since in hosp, if resp stable, consider BM bx inpt.   Abx if warranted.     Cardiac eval for ABVD.     Was to have PFTs.     DVT Prophylaxis: LMWH or SQ heparin    Thank you for consulting us.     I have discussed the above plan of care with patient in detail. They expressed understanding of the treatment plan . Risks, benefits and alternatives discussed in detail. I have asked if they have any questions or concerns and appropriately addressed them; all questions answered to their satisfactions and in lay terms. [ASSESSMENT and  PLAN]  Classical Hodgkins Lymphoma  CD30+, CD15+, CD20 neg  Stage II with GUERO below diaphragm.    Lung nodules of unclear etiology  Patchy GGO    Viral illness    Beta thal trait.     Coagulopathy with INR 1.4 - 1.8.   Mildly elevated PTT also.     Thrombocytopenia, since last admission, had improved to 60-70 outpt.     ? Liver disease. CT imaging prior unrevealing.     Cardiac comorbdities:  AVR, HTN, PPM for arrthymia.     RECOMMENDATIONS  Infectious disease eval.   Check IgG levels  Abx if warranted.   Nebs, steroids per pulm.    Undergoing staging workup and preparation for tx with chemo outpt.   Since in hosp, if resp stable, consider BM bx inpt. But can be performed outpt also.   Cardiac eval for, plan for optimization in preparation for chemo with ABVD.   Was to have PFTs with pulm, as usual chemo included Bleomycin. .     DVT Prophylaxis: LMWH or SQ heparin    Dose VitK 5mg  Followup mixing studies  Repeat coag panel in AM    Folic acid daily for thal trait.     Thank you for consulting us.     I have discussed the above plan of care with patient in detail. They expressed understanding of the treatment plan . Risks, benefits and alternatives discussed in detail. I have asked if they have any questions or concerns and appropriately addressed them; all questions answered to their satisfactions and in lay terms.     D/w Dr Bateman.

## 2020-01-08 NOTE — CONSULT NOTE ADULT - PROBLEM SELECTOR RECOMMENDATION 9
suspect mets Lymphoma - cough and fever likely related to Lymphoma  robitussin, NEBS prn,   cpap night time for DANIEL  will check procalcitonin and crp - need to eval RVP and infectious etiol  on room air - no resp distress - vs and HD and Sat reviewed  will check CTA chest now to eval PE - cough - pna -   pt was planned for BM bx as outpatient - poss bx while in the hospital  onc eval pending  will follow

## 2020-01-08 NOTE — ED ADULT NURSE REASSESSMENT NOTE - NS ED NURSE REASSESS COMMENT FT1
Straight catheterization performed using sterile technique with 2 RNs at the bedside.  Patient tolerated procedure well, comfort care measures performed.  Dry adult diaper placed, patient repositioned on stretcher to position of comfort.  Urine specimen sent to the lab as ordered.  Wife at the bedside, safety maintained.

## 2020-01-08 NOTE — ED ADULT NURSE NOTE - OBJECTIVE STATEMENT
Patient with history of dementia and new dx of Hodgkin's Lymphoma BIBA from home with c/o fever Patient with history of dementia, HTN and new dx of Hodgkin's Lymphoma BIBA from home with c/o fever, max 102.  Per wife, patient has been having intermittent fevers and increased confusion along with decrease in appetite.  Patient received tylenol and advil PTA.  Ecchymosis noted to R lower abdomen from recent biopsy per wife.  Patient scheduled for bone marrow biopsy later today.  No current treatment for lymphoma.  No sick contacts.  No n/v/d.  Patient denies pain. Patient with history of dementia, HTN and new dx of Hodgkin's Lymphoma BIBA from home with c/o fever, max 102.  Per wife, patient has been having intermittent fevers and increased confusion along with decrease in appetite.  Patient received tylenol and advil PTA.  Ecchymosis noted to R lower abdomen from recent biopsy per wife.  Patient scheduled for bone marrow biopsy later today.  No current treatment for lymphoma.  No sick contacts.  No n/v/d.  Patient denies pain.  Per wife, patient currently not taking daily ASA due to scheduled biopsy. Patient with history of dementia, HTN and new dx of Hodgkin's Lymphoma BIBA from home with c/o fever, max 102 and productive cough.  Per wife, patient has been having intermittent fevers and increased confusion along with decrease in appetite.  Patient received tylenol last night and advil PTA.  Ecchymosis noted to R lower abdomen from recent biopsy per wife.  Patient scheduled for bone marrow biopsy later today.  No current treatment for lymphoma.  No sick contacts.  No n/v/d.  Patient denies pain.  Per wife, patient currently not taking daily ASA due to scheduled biopsy. Patient with history of dementia, PM, HTN and new dx of Hodgkin's Lymphoma BIBA from home with c/o fever, max 102 and productive cough.  Per wife, patient has been having intermittent fevers and increased confusion along with decrease in appetite.  Patient received tylenol last night and advil PTA.  Ecchymosis noted to R lower abdomen from recent biopsy per wife.  Patient scheduled for bone marrow biopsy later today.  No current treatment for lymphoma.  No sick contacts.  No n/v/d.  Patient denies pain.  Per wife, patient currently not taking daily ASA due to scheduled biopsy.

## 2020-01-08 NOTE — CONSULT NOTE ADULT - SUBJECTIVE AND OBJECTIVE BOX
INCOMPLETE NOTE.  Documentation in Progress  PT SEEN AND EVALUATED.     FULL/ADDITIONAL RECOMMENDATIONS TO FOLLOW   ***************************************************************  ***************************************************************    Patient is a 74y old  Male who presents with a chief complaint of fever, persistent cough (2020 09:18)      HPI:  74M with h/o COPD, DANIEL, Depression, HLD, HTN, CAD s/p PCI, CHB s/p PPM, bioprosthetic AVR, dementia, recent diagnosis of Hodgkin's lymphoma in Dec 2019 who presents with intermittent fevers and persistent productive cough since discharge from [facility] on Dec 20.. During [place] hospitalization, pt was treated for PNA and was found to have Hodgkin's lymphoma. Since discharge, pt has been having intermittent fevers, chills (t max 102.2) for which his wife has been treating with Motrin and Tylenol. Pt denies chest pain, palpitations, abd pain, dysuria, urinary frequency or diarrhea. He has not been on abx since his last hospitalization. he was scheduled for BMBx today as outpt. His wife brought him into the ED because of high fever and periods of confusion which has happened previously and temprally associated with fever. He currently appears comfortable, pleasant and not confused. Wife is at bedside. (2020 08:11)      PAST MEDICAL & SURGICAL HISTORY:  Pacemaker  Hypertension  Lymphoma  Dementia  S/P AVR (aortic valve replacement)  Cardiac pacemaker      HEALTH ISSUES - PROBLEM Dx:  Lymphoma: Lymphoma  Coagulopathy: Coagulopathy  MLIO (acute kidney injury): MILO (acute kidney injury)  Vitamin B12 deficiency: Vitamin B12 deficiency  Prophylactic measure: Prophylactic measure  CAD (coronary artery disease): CAD (coronary artery disease)  Pancytopenia: Pancytopenia  Dementia with behavioral disturbance: Dementia with behavioral disturbance  Pneumonia, bacterial: Pneumonia, bacterial  Lymphoma, Hodgkin's: Lymphoma, Hodgkin&#x27;s          Pneumonia due to organism (J18.9)  Pacemaker (Z95.0)  Hypertension (I10)  Lymphoma (C85.90)  Dementia (F03.90)  S/P AVR (aortic valve replacement) (Z95.2)  Cardiac pacemaker (Z95.0)      FAMILY HISTORY:  No pertinent family history in first degree relatives        [SOCIAL HISTORY: ]     smoking:       EtOH:       illicit drugs:       occupation:       marital status:       Other:       [ALLERGIES/INTOLERANCES:]  Allergies    No Known Allergies    Intolerances          [MEDICATIONS]  MEDICATIONS  (STANDING):  atorvastatin 10 milliGRAM(s) Oral at bedtime  cyanocobalamin 1000 MICROGram(s) Oral daily  folic acid 1 milliGRAM(s) Oral daily  imipramine 25 milliGRAM(s) Oral daily  metoprolol tartrate 50 milliGRAM(s) Oral every 12 hours  pantoprazole    Tablet 40 milliGRAM(s) Oral before breakfast    MEDICATIONS  (PRN):  acetaminophen   Tablet .. 1000 milliGRAM(s) Oral every 6 hours PRN Temp greater or equal to 38C (100.4F), Mild Pain (1 - 3)  albuterol/ipratropium for Nebulization 3 milliLiter(s) Nebulizer every 6 hours PRN Shortness of Breath and/or Wheezing  benzonatate 100 milliGRAM(s) Oral every 8 hours PRN Cough  guaiFENesin   Syrup  (Sugar-Free) 200 milliGRAM(s) Oral every 6 hours PRN Cough        [REVIEW OF SYSTEMS: ]  CONSTITUTIONAL: normal, no fever, no shakes, no chills   EYES: No eye pain, no visual disturbances, no discharge  ENMT:  no discharge  NECK: No pain, no stiffness  BREASTS: No pain, no masses, no nipple discharge  RESPIRATORY: No cough, no wheezing, no chills, no hemoptysis; No shortness of breath  CARDIOVASCULAR: No chest pain, no palpitations, no dizziness, no leg swelling  GASTROINTESTINAL: No abdominal, no epigastric pain. No nausea, no vomiting, no hematemesis; No diarrhea , no constipation. No melena, no hematochezia.  GENITOURINARY: No dysuria, no frequency, no hematuria, no incontinence  NEUROLOGICAL: No headaches, no memory loss, no loss of strength, no numbness, no tremors  SKIN: No itching, no burning, no rashes, no lesions   LYMPH NODES: No enlarged glands  ENDOCRINE: No heat or cold intolerance; No hair loss  MUSCULOSKELETAL: No joint pain or swelling; No muscle, no back, no extremity pain  PSYCHIATRIC: No depression, no anxiety, no mood swings, no difficulty sleeping  HEME/LYMPH: No easy bruising, no bleeding gums      [VITALS SIGNS 24hrs]  Vital Signs Last 24 Hrs  T(C): 36.8 (2020 09:05), Max: 38.5 (2020 05:30)  T(F): 98.3 (2020 09:05), Max: 101.3 (2020 05:30)  HR: 84 (2020 09:05) (82 - 180)  BP: 106/62 (2020 09:05) (105/71 - 106/63)  BP(mean): --  RR: 18 (2020 09:05) (18 - 22)  SpO2: 96% (2020 09:05) (96% - 97%)    [PHYSICAL EXAM]  General: adult in NAD,  WN,  WD.  HEENT: clear oropharynx, anicteric sclera, pink conjunctivae.  Neck: supple, no masses.  CV: normal S1S2, no murmur, no rubs, no gallops.  Lungs: clear to auscultation, no wheezes, no rales, no rhonchi.  Abdomen: soft, non-tender, non-distended, no hepatosplenomegaly, normal BS, no guarding.  Ext: no clubbing, no cyanosis, no edema.  Skin: no rashes,  no petechiae, no venous stasis changes.  Neuro: alert and oriented X3, no focal motor deficits.  LN: no SC GUERO.      [LABS:]                        9.2    3.42  )-----------( 52       ( 2020 05:15 )             29.0     CBC Full  -  ( 2020 05:15 )  WBC Count : 3.42 K/uL  RBC Count : 4.52 M/uL  Hemoglobin : 9.2 g/dL  Hematocrit : 29.0 %  Platelet Count - Automated : 52 K/uL  Mean Cell Volume : 64.2 fl  Mean Cell Hemoglobin : 20.4 pg  Mean Cell Hemoglobin Concentration : 31.7 gm/dL  Auto Neutrophil # : 2.42 K/uL  Auto Lymphocyte # : 0.43 K/uL  Auto Monocyte # : 0.53 K/uL  Auto Eosinophil # : 0.01 K/uL  Auto Basophil # : 0.01 K/uL  Auto Neutrophil % : 70.7 %  Auto Lymphocyte % : 12.6 %  Auto Monocyte % : 15.5 %  Auto Eosinophil % : 0.3 %  Auto Basophil % : 0.3 %        137  |  106  |  22  ----------------------------<  106<H>  4.3   |  19<L>  |  1.40<H>    Ca    8.1<L>      2020 05:15    TPro  7.7  /  Alb  2.8<L>  /  TBili  1.2  /  DBili  x   /  AST  45<H>  /  ALT  30  /  AlkPhos  197<H>      PT/INR - ( 2020 11:37 )   PT: 21.6 sec;   INR: 1.86 ratio         PTT - ( 2020 05:15 )  PTT:38.4 sec  LIVER FUNCTIONS - ( 2020 05:15 )  Alb: 2.8 g/dL / Pro: 7.7 g/dL / ALK PHOS: 197 U/L / ALT: 30 U/L / AST: 45 U/L / GGT: x               Urinalysis Basic - ( 2020 05:53 )    Color: Yellow / Appearance: Slightly Turbid / S.020 / pH: x  Gluc: x / Ketone: Small  / Bili: Small / Urobili: 4   Blood: x / Protein: 75 mg/dL / Nitrite: Positive   Leuk Esterase: Trace / RBC: 0-2 /HPF / WBC 3-5   Sq Epi: x / Non Sq Epi: Occasional / Bacteria: Few        WBC  TREND (5 Days)  WBC Count: 3.42 K/uL ( @ 05:15)    HGB  TREND (5 Days)  Hemoglobin: 9.2 g/dL ( @ 05:15)    HCT  TREND (5 Days)  Hematocrit: 29.0 % ( @ 05:15)    PLT  TREND (5 Days)  Platelet Count - Automated: 52 K/uL ( @ 05:15)      Anemia Studies               [PATHOLOGY]       	          [RADIOLOGY & ADDITIONAL STUDIES:]    < from: CT Angio Chest w/ IV Cont (20 @ 10:09) >  EXAM:  CT ANGIO CHEST (W)AW IC                        PROCEDURE DATE:  2020    INTERPRETATION:  CLINICAL INFORMATION: Lymphoma, nodules evaluate for pulmonary embolism  COMPARISON: None.  PROCEDURE: CT Angiography of the Chest. 90 ml of Omnipaque 350 was injected intravenously. 10 ml were discarded. Sagittal and coronal reformats were performed as well as 3D (MIP) reconstructions.  FINDINGS:  ·	LUNGS AND AIRWAYS: Patent central airways.  Multiple randomly distributedbilateral parenchymal nodules mostly subcentimeter. The largest nodule left lower lobe measures up to 1.2 cm. Findings likely represent metastatic disease. Mild bibasilar traction bronchiectasis.  ·	PLEURA: No pleural effusion.  ·	MEDIASTINUM AND FOUZIA: AP window, pretracheal bilateral paratracheal subcarinal and bilateral hilar lymphadenopathy likely neoplastic. The largest lymph nodes in the pretracheal station measures up to 2.2 cm.  ·	VESSELS: No pulmonary emboli. Ectatic ascending thoracic aorta up to 4.9 cm. No dissection  ·	HEART: Heart size is normal. Status post CABG. Coronary artery calcification. In situ cardiac pacer. No pericardial effusion.  ·	CHEST WALL AND LOWER NECK: Within normal limits.  ·	VISUALIZED UPPER ABDOMEN: Calcific cholelithiasis. Spleen is enlarged, not visualized in its entirety.  ·	BONES: Within normal limits.  IMPRESSION:   ·	Negative for pulmonary emboli.  ·	Ectatic ascending thoracic aorta  ·	Multistation mediastinal adenopathy as described, likely neoplastic.  ·	Bilateral parenchymal nodularity likely representing metastatic disease.  SUSANNAH CHRISTIAN M.D., ATTENDING RADIOLOGIST This document has been electronically signed. 2020 10:24AM                    < end of copied text > Patient is a 74y old  Male who presents with a chief complaint of fever, persistent cough (2020 09:18)      HPI:  74M with h/o COPD, DANIEL, Depression, HLD, HTN, CAD s/p PCI, CHB s/p PPM, bioprosthetic AVR, dementia, recent diagnosis of Hodgkin's lymphoma in Dec 2019 who presents with intermittent fevers and persistent productive cough since discharge from [facility] on Dec 20.. During [place] hospitalization, pt was treated for PNA and was found to have Hodgkin's lymphoma. Since discharge, pt has been having intermittent fevers, chills (t max 102.2) for which his wife has been treating with Motrin and Tylenol. Pt denies chest pain, palpitations, abd pain, dysuria, urinary frequency or diarrhea. He has not been on abx since his last hospitalization. he was scheduled for BMBx today as outpt. His wife brought him into the ED because of high fever and periods of confusion which has happened previously and temprally associated with fever. He currently appears comfortable, pleasant and not confused. Wife is at bedside. (2020 08:11)    Recently dx of Classic Hodgkin Lymphoma.   Planned PET scan today.   C/o 1 day of cough, increased fever.   Baseline Fever from NHL.   FLu A/B neg.  Was in [facility] in Dec with same sx with cough, likely viral illness.       PAST MEDICAL & SURGICAL HISTORY:  Pacemaker  Hypertension  Lymphoma  Dementia  S/P AVR (aortic valve replacement)  Cardiac pacemaker      HEALTH ISSUES - PROBLEM Dx:  Lymphoma: Lymphoma  Coagulopathy: Coagulopathy  MILO (acute kidney injury): MILO (acute kidney injury)  Vitamin B12 deficiency: Vitamin B12 deficiency  Prophylactic measure: Prophylactic measure  CAD (coronary artery disease): CAD (coronary artery disease)  Pancytopenia: Pancytopenia  Dementia with behavioral disturbance: Dementia with behavioral disturbance  Pneumonia, bacterial: Pneumonia, bacterial  Lymphoma, Hodgkin's: Lymphoma, Hodgkin&#x27;s          Pneumonia due to organism (J18.9)  Pacemaker (Z95.0)  Hypertension (I10)  Lymphoma (C85.90)  Dementia (F03.90)  S/P AVR (aortic valve replacement) (Z95.2)  Cardiac pacemaker (Z95.0)      FAMILY HISTORY:  No pertinent family history in first degree relatives        [SOCIAL HISTORY: ]     smoking:  ex-smoker 35yrs ago     EtOH:  denies, except social     illicit drugs:  denies     occupation:  retired     marital status:       Other:       [ALLERGIES/INTOLERANCES:]  Allergies      No Known Allergies  Intolerances          [MEDICATIONS]  MEDICATIONS  (STANDING):  atorvastatin 10 milliGRAM(s) Oral at bedtime  cyanocobalamin 1000 MICROGram(s) Oral daily  folic acid 1 milliGRAM(s) Oral daily  imipramine 25 milliGRAM(s) Oral daily  metoprolol tartrate 50 milliGRAM(s) Oral every 12 hours  pantoprazole    Tablet 40 milliGRAM(s) Oral before breakfast    MEDICATIONS  (PRN):  acetaminophen   Tablet .. 1000 milliGRAM(s) Oral every 6 hours PRN Temp greater or equal to 38C (100.4F), Mild Pain (1 - 3)  albuterol/ipratropium for Nebulization 3 milliLiter(s) Nebulizer every 6 hours PRN Shortness of Breath and/or Wheezing  benzonatate 100 milliGRAM(s) Oral every 8 hours PRN Cough  guaiFENesin   Syrup  (Sugar-Free) 200 milliGRAM(s) Oral every 6 hours PRN Cough        [REVIEW OF SYSTEMS: ]  CONSTITUTIONAL: normal, +fever, no shakes, no chills   EYES: No eye pain, no visual disturbances, no discharge  ENMT:  no discharge  NECK: No pain, no stiffness  BREASTS: No pain, no masses, no nipple discharge  RESPIRATORY: +cough, no wheezing, no chills, no hemoptysis; No shortness of breath  CARDIOVASCULAR: No chest pain, no palpitations, no dizziness, no leg swelling  GASTROINTESTINAL: No abdominal, no epigastric pain. No nausea, no vomiting, no hematemesis; No diarrhea , no constipation. No melena, no hematochezia.  GENITOURINARY: No dysuria, no frequency, no hematuria, no incontinence  NEUROLOGICAL: No headaches, no memory loss, no loss of strength, no numbness, no tremors  SKIN: No itching, no burning, no rashes, no lesions   LYMPH NODES: No enlarged glands  ENDOCRINE: No heat or cold intolerance; No hair loss  MUSCULOSKELETAL: No joint pain or swelling; No muscle, no back, no extremity pain  PSYCHIATRIC: No depression, no anxiety, no mood swings, no difficulty sleeping  HEME/LYMPH: No easy bruising, no bleeding gums      [VITALS SIGNS 24hrs]  Vital Signs Last 24 Hrs  T(C): 36.8 (2020 09:05), Max: 38.5 (2020 05:30)  T(F): 98.3 (2020 09:05), Max: 101.3 (2020 05:30)  HR: 84 (2020 09:05) (82 - 180)  BP: 106/62 (2020 09:05) (105/71 - 106/63)  BP(mean): --  RR: 18 (2020 09:05) (18 - 22)  SpO2: 96% (2020 09:05) (96% - 97%)    [PHYSICAL EXAM]  General: adult in NAD,  WN,  WD.  HEENT: clear oropharynx, anicteric sclera, pink conjunctivae.  Neck: supple, no masses.  CV: normal S1S2, no murmur, no rubs, no gallops.  Lungs: clear to auscultation, no wheezes, no rales, no rhonchi.  Abdomen: soft, non-tender, non-distended, no hepatosplenomegaly, normal BS, no guarding.  Ext: no clubbing, no cyanosis, no edema.  Skin: no rashes,  no petechiae, no venous stasis changes.  Neuro: alert and oriented X3, no focal motor deficits.  LN: no SC GUERO.      [LABS:]                        9.2    3.42  )-----------( 52       ( 2020 05:15 )             29.0     CBC Full  -  ( 2020 05:15 )  WBC Count : 3.42 K/uL  RBC Count : 4.52 M/uL  Hemoglobin : 9.2 g/dL  Hematocrit : 29.0 %  Platelet Count - Automated : 52 K/uL  Mean Cell Volume : 64.2 fl  Mean Cell Hemoglobin : 20.4 pg  Mean Cell Hemoglobin Concentration : 31.7 gm/dL  Auto Neutrophil # : 2.42 K/uL  Auto Lymphocyte # : 0.43 K/uL  Auto Monocyte # : 0.53 K/uL  Auto Eosinophil # : 0.01 K/uL  Auto Basophil # : 0.01 K/uL  Auto Neutrophil % : 70.7 %  Auto Lymphocyte % : 12.6 %  Auto Monocyte % : 15.5 %  Auto Eosinophil % : 0.3 %  Auto Basophil % : 0.3 %        137  |  106  |  22  ----------------------------<  106<H>  4.3   |  19<L>  |  1.40<H>    Ca    8.1<L>      2020 05:15    TPro  7.7  /  Alb  2.8<L>  /  TBili  1.2  /  DBili  x   /  AST  45<H>  /  ALT  30  /  AlkPhos  197<H>      PT/INR - ( 2020 11:37 )   PT: 21.6 sec;   INR: 1.86 ratio         PTT - ( 2020 05:15 )  PTT:38.4 sec  LIVER FUNCTIONS - ( 2020 05:15 )  Alb: 2.8 g/dL / Pro: 7.7 g/dL / ALK PHOS: 197 U/L / ALT: 30 U/L / AST: 45 U/L / GGT: x               Urinalysis Basic - ( 2020 05:53 )    Color: Yellow / Appearance: Slightly Turbid / S.020 / pH: x  Gluc: x / Ketone: Small  / Bili: Small / Urobili: 4   Blood: x / Protein: 75 mg/dL / Nitrite: Positive   Leuk Esterase: Trace / RBC: 0-2 /HPF / WBC 3-5   Sq Epi: x / Non Sq Epi: Occasional / Bacteria: Few        WBC  TREND (5 Days)  WBC Count: 3.42 K/uL ( @ 05:15)    HGB  TREND (5 Days)  Hemoglobin: 9.2 g/dL ( @ 05:15)    HCT  TREND (5 Days)  Hematocrit: 29.0 % ( @ 05:15)    PLT  TREND (5 Days)  Platelet Count - Automated: 52 K/uL ( @ 05:15)      [PATHOLOGY]  Flow Cytometry Final Report  ________________________________________________________________________  Specimen: Right external iliac lymph node, core biopsy  Collected: 2019 12:30  Received: 2019 16:37  Processed: 2019 16:45  Reported: 2019 10:47  Accession #: [id number]  FL -MF  ________________________________________________________________________  CLINICAL DATA: Rule out non-hodgkin's lymphoma  DIAGNOSIS:  Right external iliac lymph node, core biopsy:       - Lymphocytes (1% of cells): Predominant population of T-cells (with normal CD4 to CD8 ratio),  and rare to absent B-cells.  Please see interpretation.    INTERPRETATION:  MORPHOLOGY:  CYTOSPIN: Few small lymphocytes and degenerated cells  IMMUNOPHENOTYPE: Lymphocytes (1% of cells): Predominant population of T-cells (with normal CD4 to  CD8 ratio), and rare to absent B-cells.  Comment: Due to a paucity of viable cells in the specimen, a limited lymphocyte panel was  performed. Correlation with core biopsy and clinical findings is necessary.  _____________________________________________________________________  Viability ................. 95.6%  Values reported are based on the lymphocyte gate. (Bright CD45 positive; low side scatter, low forward scatter).  1% of cells.  CD45 .......... 100 %  CD2 ........... 82 %  CD3 ........... 80 %  CD5 ........... 71 %  CD7 ........... 78 %  CD4 ........... 56 %  CD8 ........... 25 %  CD10 .......... 3 %  CD19 .......... 13 %  CD20 .......... 12 %  CD23 .......... 15 %  FMC-7 ......... 14 %  CD5+/CD19+ .... 3 %  kappa ......... 7 %  lambda ........ 7 %  HLA-DR ........ 71 %  CD38 .......... 37 %  Verified By: Dolores Alvares M.D. (Electronic Signature)      ACCESSION No:  [id number]  TAYLOR MCFADDEN                   [facility]  Surgical Final Report  Final Diagnosis:   Lymph node, external iliac, right, needle core biopsy: Classic Hodgkin's lymphoma.  See attached report and comment from [facility].    Dr. Vaughan informed of results on  2019 at 9:00 AM    Verified by: Keira Dos Santos M.D.  (Electronic Signature)  Reported on: 19 15:38 [facility], [address] [place] [address]  Phone: [phone]   Fax: [phone]  _________________________________________________________________  ·	Clinical History:   ·	Procedure:  Biopsy of right external iliac lymph node  ·	Specimen(s) Submitted: Right external iliac lymph node (other piece of tissue submitted for Flow Cytometry @ [place])  ·	Gross Description: The specimen is received in formalin and the specimen container is labeled: Right external iliac lymph node (other piece of tissue is submitted for Flow Cytometry @ [place]). It consists of two cores of white soft tissue measuring 0.7 and 1.0 cm in length x 0.1 cm in diameter. Entirely submitted. One cassette.  ·	In addition to other data that may appear on the specimen container, the label has been inspected to confirm the presence of the patient's name and date of birth.  ·	HYACINTH Cabral ([facility]) 2019 6:13 AM   ·	Perioperative Diagnosis: Enlarged lymph nodes  ·	Postoperative Diagnosis: Enlarged lymph nodes          [RADIOLOGY & ADDITIONAL STUDIES:]    < from: CT Angio Chest w/ IV Cont (20 @ 10:09) >  EXAM:  CT ANGIO CHEST (W)AW IC                        PROCEDURE DATE:  2020    INTERPRETATION:  CLINICAL INFORMATION: Lymphoma, nodules evaluate for pulmonary embolism  COMPARISON: None.  PROCEDURE: CT Angiography of the Chest. 90 ml of Omnipaque 350 was injected intravenously. 10 ml were discarded. Sagittal and coronal reformats were performed as well as 3D (MIP) reconstructions.  FINDINGS:   ·	LUNGS AND AIRWAYS: Patent central airways.  Multiple randomly distributed bilateral parenchymal nodules mostly subcentimeter. The largest nodule left lower lobe measures up to 1.2 cm. Findings likely represent metastatic disease. Mild bibasilar traction bronchiectasis.  ·	PLEURA: No pleural effusion.  ·	MEDIASTINUM AND FOUZIA: AP window, pretracheal bilateral paratracheal subcarinal and bilateral hilar lymphadenopathy likely neoplastic. The largest lymph nodes in the pretracheal station measures up to 2.2 cm.  ·	VESSELS: No pulmonary emboli. Ectatic ascending thoracic aorta up to 4.9 cm. No dissection  ·	HEART: Heart size is normal. Status post CABG. Coronary artery calcification. In situ cardiac pacer. No pericardial effusion.  ·	CHEST WALL AND LOWER NECK: Within normal limits.  ·	VISUALIZED UPPER ABDOMEN: Calcific cholelithiasis. Spleen is enlarged, not visualized in its entirety.  ·	BONES: Within normal limits.  IMPRESSION:   ·	Negative for pulmonary emboli.  ·	Ectatic ascending thoracic aorta  ·	Multistation mediastinal adenopathy as described, likely neoplastic.  ·	Bilateral parenchymal nodularity likely representing metastatic disease.  SUSANNAH CHRISTIAN M.D., ATTENDING RADIOLOGIST This document has been electronically signed. 2020 10:24AM  < end of copied text >        EXAM:  IR PROCEDURE NON PICC                        EXAM:  CT BX SOFT TISSUE NDL #                        PROCEDURE DATE:  2019    INTERPRETATION:    PROCEDURE: COMPUTED TOMOGRAPHY GUIDED CORE NEEDLE BIOPSY OF RIGHT   EXTERNAL ILIAC LYMPH NODE.  : FATOU Rhodes MD    CLINICAL INDICATION: 74-year-old male with lymphadenopathy. Percutaneous biopsy of right external iliac lymph node was requested    TECHNIQUE: Following discussions of the risks, benefits, and alternatives to the procedure informed consent was obtained. 1% lidocaine was utilized for local anesthesia.    The patient was placed supine on the CT examination table and connected to physiologic monitoring. Transaxial images of the pelvis were obtained without the use of oral or intravenous contrast materials to select the best path for percutaneous biopsy. The enlarged right external iliac lymph node that was seen on a recent abdominal CT was again identified and targeted for biopsy. The lower abdomen was prepped and draped in the usual sterile fashion.    Using CT guidance, a 17-gauge trocar needle was advanced to the targeted right external iliac lymph node. An 18-gauge core needle gun was advanced via the trocar into the right external iliac lymph node, and 3 core specimens were obtained. The obtained specimens were deemed adequate by the pathologist in attendance. Foam slurry was administered via trocar and compression was held to achieve hemostasis. Completion CT images demonstrated expected postbiopsy changes with no evidence of acute   complications. A dry sterile dressing was applied.    The patient tolerated the procedure well and was transferred to the recovery area in stable condition. There were no immediate complications.    IMPRESSION: SUCCESSFUL COMPUTED TOMOGRAPHY GUIDED CORE NEEDLE BIOPSY OF RIGHT EXTERNAL ILIAC LYMPH NODE.  IJEOMA RHODES M.D., ATTENDING RADIOLOGIST This document has been electronically signed. Dec 18 2019  1:38PM        EXAM:  CT ABDOMEN AND PELVIS OC                        EXAM:  CT CHEST OC                        PROCEDURE DATE:  2019    INTERPRETATION:  PROCEDURE INFORMATION:   Exam: CT Chest Without Contrast   Exam date and time: 2019 6:22 PM   Age: 74 years old   Clinical history: Other: Cough. Sepsis. Elevated lactate. Dyspnea.    TECHNIQUE:   Imaging protocol: Computed tomography of the chest without contrast.   3D rendering: MIP reconstructed images were created and reviewed.   Radiation optimization: All CT scans at this facility use at least one of these dose optimization techniques: automated exposure control; mA and/or kV adjustment per patient size (includes targeted exams where dose is matched to clinical indication); or iterative reconstruction.   Other contrast: Route: Oral, Material: OMNIPAQUE 240, Volume: 32CC;     COMPARISON: CT scan of chest dated 11/3/2019  FINDINGS:   Lungs: Mild stable patchy areas of groundglass changes are seen in bilateral lower lobes more prominent on the left.   Enlarging 12 x 10 mm nodule in left lung base (3:116). This previously measured 7 mm. Stable 5 mm nodule in right middle lobe seen in image 189, a 4 mm right lower lobe nodule (3:106), 6 mm nodule of the left upper lobe (3:54), a 5 mm nodule of the left upper lobe (3:43), 5 mm nodules of the right upper lobe (3:54, 48) and a 3 mm right lower lobe nodule (3:141) when compared to recent prior CT of 11/3/2019. Other subpleural nodules of the right midlung field also unchanged.  Pleural space: Mild pleural-based scarring in the lungs.   Heart:  No cardiomegaly. No pericardial effusion. Coronary arterial, aortic and mitral valvular calcification. Status post CABG.  Mediastinum: Surgical sternal wires in place with surgical changes in mediastinum.   Aorta: Unremarkable. No aortic aneurysm. Moderate atheromatous changes of the aorta and branch vessels.  Lymph nodes: Unremarkable. No enlarged lymph nodes.   Gallbladder and bile ducts: Large stone in the gallbladder. No gallbladder wall thickening.   Bones/joints: Degenerative changes in the spine. No evidence of fracture.   Degenerative changes in the spine. No evidence of fracture.   Soft tissues: Unremarkable.     IMPRESSION:   1. Mild patchy areas of groundglass changes are seen in bilateral lower lobes stable compared with prior study. No consolidating infiltrate.  2. Enlarging 12 x 10 mm nodule in left lung base. Highly suspicious nodule(s). Consider PET/CT, or tissue sampling.(Lamhocatie, et al., Fleischner Society, 2017).   Metastatic disease should be considered.  3. Multiple additional lung nodules are demonstrated which are unchanged compared with the recent prior chest CT.  For patients at low risk (minimal or absent history of smoking and of other known risk factors), no routine follow-up is indicated. For patients at high risk (history of smoking or of other known risk factors), consider optional CT at 12 months. (Lamhocatie et al., Fleischner Society, 2017)       =========================  PROCEDURE INFORMATION:   Exam: CT Abdomen Without Contrast   Exam date and time: 2019 6:22 PM   Age: 74 years old   Clinical history: Other: Cough     TECHNIQUE:   Imaging protocol: Computed tomography images of the abdomen and pelvis without contrast.   Radiation optimization: All CT scans at this facility use at least one of these dose optimization techniques: automated exposure control; mA and/or kV   adjustment per patient size (includes targeted exams where dose is matched to clinical indication); or iterative reconstruction.   Other contrast: Route: Oral, Material: OMNIPAQUE 240, Volume: 32CC;     COMPARISON: CT Colonoscopy 2019.    FINDINGS:   Liver: Calcified hepatic granuloma at the right dome. Gallbladder and bile ducts: Large stone in the gallbladder. No gallbladder wall thickening or pericholecystic fluid.   Pancreas: Normal. No ductal dilation.   Spleen: The spleen is mildly enlarged measuring 14.5 cm..   Adrenals: Normal. No mass.   Kidneys and ureters: Normal. No hydronephrosis.   Stomach and bowel: Sigmoid diverticulosis.No areas of wall thickening in the large bowel. No evidence of large bowel obstruction. No pericolonic inflammatory changes to suggest acute diverticulitis. No wall thickening in the small bowel. No evidence of small bowel obstruction. Stomach is unremarkable. The appendix is normal.  Intraperitoneal space: Unremarkable. No free air. No significant fluid collection.   Lymph nodes: Mild to moderate moderate abdominal and pelvic adenopathy increasing compared with previous examination. Adenopathy is demonstrated emeli-portal, para-aortic, and along both pelvic sidewalls. For reference :Distal right external iliac node (3:280) measuring 4.2 x 3.2 cm. A right distal common iliac node (3:243) measures 2.7 x 2.4 cm.  Vasculature: Moderate atheromatous disease of the abdominal aorta, iliac arteries and branch vessels. No sophie aneurysm. Mild ectasia of the distal abdominal aorta which maximally measures 2.7 cm unchanged.  Bones/joints: Degenerative changes in the spine. No evidence of fracture.   Soft tissues: Unremarkable.     IMPRESSION:   Mild to moderate adenopathy of the abdomen and pelvis. Mild splenomegaly.  Differential diagnosis includes lymphoma and metastatic disease.  Cholelithiasis. Diverticulosis.    LUCILA BARTON M.D., ATTENDING RADIOLOGIST  This document has been electronically signed. Dec 15 2019  8:46AM

## 2020-01-08 NOTE — H&P ADULT - ATTENDING COMMENTS
Spent 38 minutes from 7:25 to 8:03am reviewing patient's old medical records, which include consult, progress notes as well as review of CT imaging results and labwork. Pt had 2 recent hospitalizations at [place] in November and Dec 2019. During the latter hospitalization, he was treated for PNA and underwent a work up for pancytopenia and LAD seen on CT imaging. He underwent a CT guided LN biopsy of an externa iliac node whereby a diagnosis of Hodgkins lympoma was made. Pt also had a complicated hospital course with delirium and hallucinations and was evaluated by Behavioral Health at the time. He is now returning with intermittent fevers, cough which we suspect are related to his underlying lymphoma. The hospitalist team will continue to follow him Spent 38 minutes from 7:25 to 8:03am reviewing patient's old medical records, which include consult, progress notes as well as review of CT imaging results and labwork. Pt had 2 recent hospitalizations at [place] in November and Dec 2019. During the latter hospitalization, he was treated for PNA and underwent a work up for pancytopenia and LAD seen on CT imaging. He underwent a CT guided LN biopsy of an externa iliac node whereby a diagnosis of Hodgkins lymphoma was made. Pt also had a complicated hospital course with delirium and hallucinations and was evaluated by Behavioral Health at the time. He is now returning with intermittent fevers, cough which we suspect are related to his underlying lymphoma. The hospitalist team will continue to follow him. Spent 38 minutes from 7:25 to 8:03am reviewing patient's old medical records, which include consult, progress notes as well as review of CT imaging results and labwork. Pt had 2 recent hospitalizations at [place] in November and Dec 2019. During the latter hospitalization, he was treated for PNA and underwent a work up for pancytopenia and LAD seen on CT imaging. He underwent a CT guided LN biopsy of an externa iliac node whereby a diagnosis of Hodgkins lymphoma was made. Pt also had a complicated hospital course with delirium and hallucinations and was evaluated by Behavioral Health at the time. He is now returning with intermittent fevers, cough which we suspect are related to his underlying lymphoma. The hospitalist team will continue to follow him.  I spoke with wife at length this afternoon and provided an update. Also spoke with PCP, Dr. Walker and provided update

## 2020-01-08 NOTE — CONSULT NOTE ADULT - SUBJECTIVE AND OBJECTIVE BOX
Date/Time Patient Seen:  		  Referring MD:   Data Reviewed	       Patient is a 74y old  Male who presents with a chief complaint of fever, persistent cough (08 Jan 2020 08:11)      Subjective/HPI    in bed  seen and examined  vs and meds reviewed  labs reviewed  H and P reviewed  ER provider note reviewed    [place] notes reviewed -   known to me from prior hospital stay    History and Physical:   Source of Information	Patient, Spouse/Significant Other  Outpatient Providers	Dr. Topher Conde  heme: Dr. Hon Majano     Language:  · Patient/Family of Limited English Proficiency	No     Patient Identity:  · Birth Sex	Male  · Sexual Orientation	Straight, Heterosexual       History of Present Illness:  Reason for Admission: fever, persistent cough  History of Present Illness:   74M with h/o COPD, DANIEL, Depression, HLD, HTN, CAD s/p PCI, CHB s/p PPM, bioprosthetic AVR, dementia, recent diagnosis of Hodgkin's lymphoma in Dec 2019 who presents with intermittent fevers and persistent productive cough since discharge from [facility] on Dec 20.. During [place] hospitalization, pt was treated for PNA and was found to have Hodgkin's lymphoma. Since discharge, pt has been having intermittent fevers, chills (t max 102.2) for which his wife has been treating with Motrin and Tylenol. Pt denies chest pain, palpitations, abd pain, dysuria, urinary frequency or diarrhea. He has not been on abx since his last hospitalization. he was scheduled for BMBx today as outpt. His wife brought him into the ED because of high fever and periods of confusion which has happened previously and temprally associated with fever. He currently appears comfortable, pleasant and not confused. Wife is at bedside.    ex smoker    lives at home  retired  has children  recent stay at [facility]    PAST SURGICAL HISTORY:  Cardiac pacemaker     S/P AVR (aortic valve replacement).     No pertinent family history in first degree relatives.     No Pertinent Family History in first degree relatives of: Hodgkin's lymphoma.     Social History:  Social History (marital status, living situation, occupation, tobacco use, alcohol and drug use, and sexual history): denies smoking, ETOH, drug use  lives with wife     Tobacco Screening:  · Core Measure Site	Yes  · Has the patient used tobacco in the past 30 days?	No    Risk Assessment:    Present on Admission:  Deep Venous Thrombosis	no  Pulmonary Embolus	no     Heart Failure:  Does this patient have a history of or has been diagnosed with heart failure? no.      HISTORY OF PRESENT ILLNESS:    International Travel:  International Travel within 21 days? No(1)     Preferred Language to Address Healthcare:  · Preferred Language to Address Healthcare	English     Child Abuse Assessment (patients less than 13 yrs):  Chief Complaint: cough and fever.    · Chief Complaint: The patient is a 74y Male complaining of cough.  · HPI Objective Statement: 74 year old male with a history of  HTN, Hogdkin's Lymphoma, dementia p/w fever and cough.  According to wife, patient has been suffering from intermittent fevers x 1 week, Tmax 102.4.  She has been treating with Tylenol and Advil, last dose at 2am.  The medication works for a few hours and then patient again spike a fever, become diaphoretic with shaking chills.  She also reports a productive cough x 2 days and decreased PO intake.  Patient has early signs of dementia but with the fever, he becomes more confused.  He received his flu shot this year.  No sick contacts or foreign travel. He is scheduled for a bone marrow biopsy this afternoon.  PMD Beto Conde, Oncology Dr. Majano  · Presenting Symptoms: CHILLS, COUGH, DECREASED EATING/DRINKING, FEVER  · Negative Findings: no abdominal pain, no diarrhea, no rash, no vomiting  · Highest Temperature: fahrenheit  102.4   · Timing: gradual onset  · Duration: week(s)  1   · Quality: productive cough  · Severity: MODERATE   · Context: unknown  · Recent Exposure To: none known  · Relieving Factors: none       PAST MEDICAL & SURGICAL HISTORY:  Pacemaker  Hypertension  Lymphoma  Dementia  S/P AVR (aortic valve replacement)  Cardiac pacemaker        Medication list         MEDICATIONS  (STANDING):  atorvastatin 10 milliGRAM(s) Oral at bedtime  cyanocobalamin 1000 MICROGram(s) Oral daily  folic acid 1 milliGRAM(s) Oral daily  imipramine 25 milliGRAM(s) Oral daily  metoprolol tartrate 50 milliGRAM(s) Oral every 12 hours  pantoprazole    Tablet 40 milliGRAM(s) Oral before breakfast    MEDICATIONS  (PRN):  acetaminophen   Tablet .. 1000 milliGRAM(s) Oral every 6 hours PRN Temp greater or equal to 38C (100.4F), Mild Pain (1 - 3)  albuterol/ipratropium for Nebulization 3 milliLiter(s) Nebulizer every 6 hours PRN Shortness of Breath and/or Wheezing  benzonatate 100 milliGRAM(s) Oral every 8 hours PRN Cough  guaiFENesin   Syrup  (Sugar-Free) 200 milliGRAM(s) Oral every 6 hours PRN Cough         Vitals log        ICU Vital Signs Last 24 Hrs  T(C): 36.8 (08 Jan 2020 09:05), Max: 38.5 (08 Jan 2020 05:30)  T(F): 98.3 (08 Jan 2020 09:05), Max: 101.3 (08 Jan 2020 05:30)  HR: 84 (08 Jan 2020 09:05) (82 - 180)  BP: 106/62 (08 Jan 2020 09:05) (105/71 - 106/63)  BP(mean): --  ABP: --  ABP(mean): --  RR: 18 (08 Jan 2020 09:05) (18 - 22)  SpO2: 96% (08 Jan 2020 09:05) (96% - 97%)           Input and Output:  I&O's Detail      Lab Data                        9.2    3.42  )-----------( 52       ( 08 Jan 2020 05:15 )             29.0     01-08    137  |  106  |  22  ----------------------------<  106<H>  4.3   |  19<L>  |  1.40<H>    Ca    8.1<L>      08 Jan 2020 05:15    TPro  7.7  /  Alb  2.8<L>  /  TBili  1.2  /  DBili  x   /  AST  45<H>  /  ALT  30  /  AlkPhos  197<H>  01-08            Review of Systems	  fever  cough      Objective     Physical Examination    head at  heart s1s2  lung dec BS  abd soft  head nc      Pertinent Lab findings & Imaging      [place]:  NO   Adequate UO     I&O's Detail           Discussed with:     Cultures:	        Radiology    EXAM:  XR CHEST PORTABLE IMMED 1V                            PROCEDURE DATE:  01/08/2020          INTERPRETATION:  Cough, fever.    AP chest.    Status post median sternotomy. Heart magnified by AP film shallow inspiration. Unfolding calcination thoracic aorta. Grossly clear lungs. No consolidation or effusion. Left AICD in situ.    Impression: Grossly clear lungs                SUSANNAH CHRISTIAN M.D., ATTENDING RADIOLOGIST  This document has been electronically signed. Jan 8 2020  8:43AM

## 2020-01-08 NOTE — ED ADULT NURSE NOTE - NSIMPLEMENTINTERV_GEN_ALL_ED
Implemented All Fall Risk Interventions:  [place] to call system. Call bell, personal items and telephone within reach. Instruct patient to call for assistance. Room bathroom lighting operational. Non-slip footwear when patient is off stretcher. Physically safe environment: no spills, clutter or unnecessary equipment. Stretcher in lowest position, wheels locked, appropriate side rails in place. Provide visual cue, wrist band, yellow gown, etc. Monitor gait and stability. Monitor for mental status changes and reorient to person, place, and time. Review medications for side effects contributing to fall risk. Reinforce activity limits and safety measures with patient and family.

## 2020-01-08 NOTE — ED PROVIDER NOTE - CLINICAL SUMMARY MEDICAL DECISION MAKING FREE TEXT BOX
74 year old male with cough, fever, worsening confusion, and decreased PO intake.  Admitted for PNA to [facility] last month.  Labs, cultures, sepsis work up, antibiotics, likely admission

## 2020-01-08 NOTE — H&P ADULT - PROBLEM SELECTOR PLAN 6
pt has not been  taking replacement therapy at home  will start as inpatient mild MILO, possibly from NSAID use at home coupled with some dehydration  s/p 2L IVF in ED- would hold off on additional IVF  BMP in am for now

## 2020-01-08 NOTE — H&P ADULT - PROBLEM SELECTOR PLAN 3
h/o dementia with behavioral disturbance during previous hospitalization and at home when develops high fevers  emphasize re-orientation   fall precautions  currently pleasant

## 2020-01-08 NOTE — H&P ADULT - PROBLEM SELECTOR PLAN 9
PAS for now; can consider pharmacologic PPX if thrombocytopenia improves  PT eval to encourage ambulation PAS for now; spoke with Melecio Nino re: pharmacologic ppx- pt is high risk for DVt- will add heparin SQ, but if plt ct <50, need to consider stopping heparin  PT eval to encourage ambulation PAS for now; spoke with Melecio Nino re: pharmacologic ppx- pt is high risk for DVt- will add heparin SQ but will give one dose tonight only as pt may undego BMBx tomorrow. heparin will have to re-ordered after procedure.   In addition, if plt ct <50, need to consider stopping heparin  PT eval to encourage ambulation

## 2020-01-08 NOTE — H&P ADULT - PROBLEM SELECTOR PLAN 4
likely from Hodgkins lymphoma  pt's ASA has been on hold due to plan for BMBX but will also needs to be held given progression of thrombocytopenia. No epistaxis/hemoptysis or overt GIB noted by pt/wife likely from Hodgkins lymphoma  pt's ASA has been on hold due to plan for BMBx but will also needs to be held given progression of thrombocytopenia. No epistaxis/hemoptysis or overt GIB noted by pt/wife

## 2020-01-08 NOTE — ED PROVIDER NOTE - CONSTITUTIONAL, MLM
normal... Well appearing, awake, alert, oriented to person, place, time/situation cooperative, answers questions appropriately.  Obese, diaphoretic

## 2020-01-08 NOTE — ED ADULT NURSE REASSESSMENT NOTE - NS ED NURSE REASSESS COMMENT FT1
IV azithromycin discontinued per ED MD Kimball.  IV vancomycin in progress now as ordered on a pump.  Patient had recent hospitalization at [facility] in December under a different name.  Registration made aware of this.

## 2020-01-08 NOTE — H&P ADULT - ASSESSMENT
74M with h/o COPD, DANIEL, Depression, HLD, HTN, CAD s/p PCI, CHB s/p PPM, bioprosthetic AVR, dementia, recent diagnosis of Hodgkin's lymphoma in Dec 2019 who presents with fevers and peristent likely in setting of untreated Hodgkin's lymphoma

## 2020-01-08 NOTE — H&P ADULT - PROBLEM SELECTOR PLAN 7
PAS for now; can consider pharmacologic PPX if thrombocytopenia improves  PT eval to encourage ambulation suspect due to decreased appetite and Vit K deficiency  pt is not on anticoagulation  will check fibrinogen, d-dimer to eval for DIC  will give vit K if plan for imminent procedures

## 2020-01-08 NOTE — H&P ADULT - PROBLEM SELECTOR PLAN 1
pt was diagnosed in Dec 2019. Sees Dr. Majano (consult placed)  Was scheduled for BMbx this Friday  suspect symptoms of intermittent fevers, chills, cough are related to Hodgkin's lymphoma rather than an acute infectious process. Will check procalcitonin level  and obtain blood/urine cultures. pt was diagnosed in Dec 2019. Sees Dr. Majano (consult placed)  Was scheduled for BMbx this Friday; spoke with Dr. Majano. We will attempt to have BMbx done as inpt- I spoke with IR nurse, Aman, who will discuss case with Dr. Zhou tomorrow. Please call IR on thursday morning to confirm whether test will be done thurs or friday. Will keep NPO after MN and will hold Hep SQ dose for tomorrow morning in the event the procedure can be done tomorrow.  suspect symptoms of intermittent fevers, chills, cough are related to Hodgkin's lymphoma rather than an acute infectious process.   Will check procalcitonin level and obtain blood/urine cultures.

## 2020-01-08 NOTE — GOALS OF CARE CONVERSATION - ADVANCED CARE PLANNING - CONVERSATION DETAILS
Received referral for advanced directives.Health care proxy completed with patient and spouse Patricia Oden.They will discuss dnr/dni and molst completion.Copies made of health care proxy, placed on chart.

## 2020-01-08 NOTE — ED PROVIDER NOTE - OBJECTIVE STATEMENT
74 year old male with a history of  HTN, Hogdkin's Lymphoma, dementia p/w fever and cough.  According to wife, patient has been suffering from intermittent fevers x 1 week, Tmax 102.4.  She has been treating with Tylenol and Advil, last dose at 2am.  The medication works for a few hours and then patient again spike a fever, become diaphoretic with shaking chills.  She also reports a productive cough x 2 days and decreased PO intake.  Patient has early signs of dementia but with the fever, he becomes more confused.  He received his flu shot this year.  No sick contacts or foreign travel. He is scheduled for a bone marrow biopsy this afternoon.  PMD Beto Conde, Oncology Dr. Majano

## 2020-01-08 NOTE — PHYSICAL THERAPY INITIAL EVALUATION ADULT - ADDITIONAL COMMENTS
Pt lives with his spouse in an apt, no steps. Pt ambulates independently without device and was independent with ADLs. + driving.
No

## 2020-01-08 NOTE — H&P ADULT - PROBLEM SELECTOR PLAN 10
4.9 cm Ascending aortic aneurysm noted on CTA chest. spoke with Dr. Donald from [facility]- as pt is asymptomatic (no CP, BP) and is less than 5.0 cm, pt can be seen as outpt. Referral can be made to Dr. Estrada Solo at [place] for followup

## 2020-01-08 NOTE — CONSULT NOTE ADULT - SUBJECTIVE AND OBJECTIVE BOX
HPI:  74M with h/o COPD, DANIEL, Depression, HLD, HTN, CAD s/p PCI, CHB s/p PPM, bioprosthetic AVR, dementia, recent diagnosis of Hodgkin's lymphoma in Dec 2019 at [facility] presents to the hospital with CC of intermittent fevers and cough. Pt denies chest pain, palpitations, abd pain, dysuria, urinary frequency or diarrhea. He was scheduled for BM biopsy today as outpt for staging. His wife brought him into the ED because of high fever and periods of confusion which has happened previously and temprally associated with fever. He currently appears comfortable, pleasant and not confused. CTA of chest with no evidence for PE or pneumonia.    Infectious Disease consult was called to evaluate pt and for antibiotic management.    Past Medical & Surgical Hx:  PAST MEDICAL & SURGICAL HISTORY:  Pacemaker  Hypertension  Lymphoma  Dementia  S/P AVR (aortic valve replacement)  Cardiac pacemaker      Social History--  EtOH: denies *  Tobacco: denies   Drug Use: denies       FAMILY HISTORY:  No pertinent family history in first degree relatives      Allergies  No Known Allergies    Intolerances  NONE    Home Medications:  folic acid 1 mg oral tablet: 1 tab(s) orally once a day (08 Jan 2020 11:50)  Lipitor 10 mg oral tablet: 1 tab(s) orally once a day (08 Jan 2020 11:50)  Lopressor 50 mg oral tablet: 1 tab(s) orally 2 times a day (08 Jan 2020 11:50)  Protonix 40 mg oral delayed release tablet: 1 tab(s) orally once a day (08 Jan 2020 11:50)  Symbicort 160 mcg-4.5 mcg/inh inhalation aerosol: 1 puff(s) inhaled 2 times a day (08 Jan 2020 11:50)  Tofranil 25 mg oral tablet: 1 tab(s) orally once a day (08 Jan 2020 11:50)  Vitamin B1 100 mg oral tablet: 1 tab(s) orally once a day (08 Jan 2020 11:50)  ZyrTEC 10 mg oral tablet: 1 tab(s) orally once a day (08 Jan 2020 11:50)    Current Inpatient Medications :    ANTIBIOTICS:       OTHER RELEVANT MEDICATIONS :  acetaminophen   Tablet .. 1000 milliGRAM(s) Oral every 6 hours PRN  albuterol/ipratropium for Nebulization 3 milliLiter(s) Nebulizer every 6 hours PRN  atorvastatin 10 milliGRAM(s) Oral at bedtime  benzonatate 100 milliGRAM(s) Oral every 8 hours PRN  cyanocobalamin 1000 MICROGram(s) Oral daily  folic acid 1 milliGRAM(s) Oral daily  guaiFENesin   Syrup  (Sugar-Free) 200 milliGRAM(s) Oral every 6 hours PRN  heparin  Injectable 5000 Unit(s) SubCutaneous once  imipramine 25 milliGRAM(s) Oral daily  metoprolol tartrate 50 milliGRAM(s) Oral every 12 hours  pantoprazole    Tablet 40 milliGRAM(s) Oral before breakfast      ROS:  Unable to obtain due to :     ROS:  CONSTITUTIONAL:  Negative fever or chills, feels well, good appetite  EYES:  Negative  blurry vision or double vision  CARDIOVASCULAR:  Negative for chest pain or palpitations  RESPIRATORY:  Negative for cough, wheezing, or SOB   GASTROINTESTINAL:  Negative for nausea, vomiting, diarrhea, constipation, or abdominal pain  GENITOURINARY:  Negative frequency, urgency , dysuria or hematuria   NEUROLOGIC:  No headache, confusion, dizziness, lightheadedness  All other systems were reviewed and are negative          I&O's Detail      Physical Exam:  Vital Signs Last 24 Hrs  T(C): 38.2 (08 Jan 2020 16:00), Max: 38.5 (08 Jan 2020 05:30)  T(F): 100.8 (08 Jan 2020 16:00), Max: 101.3 (08 Jan 2020 05:30)  HR: 102 (08 Jan 2020 16:00) (82 - 180)  BP: 130/73 (08 Jan 2020 16:00) (105/71 - 130/73)  BP(mean): --  RR: 17 (08 Jan 2020 16:00) (17 - 22)  SpO2: 98% (08 Jan 2020 16:00) (96% - 98%)  Height (cm): 172.72 (01-08 @ 04:48)  Weight (kg): 136.1 (01-08 @ 04:48)  BMI (kg/m2): 45.6 (01-08 @ 04:48)  BSA (m2): 2.43 (01-08 @ 04:48)    General: no acute distress  Eyes: sclera anicteric, pupils equal and reactive to light  ENMT: buccal mucosa moist, pharynx not injected  Neck: supple, trachea midline  Lungs: Decreased no wheeze/rhonchi  Cardiovascular: regular rate and rhythm, S1 S2  Abdomen: soft, nontender, no organomegaly present, bowel sounds normal  Neurological:  alert and oriented x3, Cranial Nerves II-XII grossly intact  Skin:no increased ecchymosis/petechiae/purpura  Lymph Nodes: no palpable cervical/supraclavicular lymph nodes enlargements  Extremities: Trace edema    Labs:                         9.2    3.42  )-----------( 52       ( 08 Jan 2020 05:15 )             29.0   01-08    137  |  106  |  22  ----------------------------<  106<H>  4.3   |  19<L>  |  1.40<H>    Ca    8.1<L>      08 Jan 2020 05:15    TPro  7.7  /  Alb  2.8<L>  /  TBili  1.2  /  DBili  x   /  AST  45<H>  /  ALT  30  /  AlkPhos  197<H>  01-08      RECENT CULTURES:  pending    FLU A B RSV Detection by PCR (01.08.20 @ 05:18)    Flu A Result: [facility]: The Flu A B RSV assay is a Real-Time PCR test for the qualitative  detection and differentiation of Influenza A, Influenza B, and  Respiratory Syncytial Virus on nasopharyngeal swabs. The results should  be interpreted in the context of all clinical and laboratory findings.    Flu B Result: [facility]    [facility]      RADIOLOGY & ADDITIONAL STUDIES:    EXAM:  CT ANGIO CHEST (W)AW IC                            PROCEDURE DATE:  01/08/2020          INTERPRETATION:  CLINICAL INFORMATION: Lymphoma, nodules evaluate for pulmonary embolism    COMPARISON: None.    PROCEDURE:   CT Angiography of the Chest.  90 ml of Omnipaque 350 was injected intravenously. 10 ml were discarded.  Sagittal and coronal reformats were performed as well as 3D (MIP) reconstructions.      FINDINGS:    LUNGS AND AIRWAYS: Patent central airways.  Multiple randomly distributedbilateral parenchymal nodules mostly subcentimeter. The largest nodule left lower lobe measures up to 1.2 cm. Findings likely represent metastatic disease. Mild bibasilar traction bronchiectasis.    PLEURA: No pleural effusion.    MEDIASTINUM AND FOUZIA: AP window, pretracheal bilateral paratracheal subcarinal and bilateral hilar lymphadenopathy likely neoplastic. The largest lymph nodes in the pretracheal station measures up to 2.2 cm.    VESSELS: No pulmonary emboli. Ectatic ascending thoracic aorta up to 4.9 cm. No dissection    HEART: Heart size is normal. Status post CABG. Coronary artery calcification. In situ cardiac pacer. No pericardial effusion.    CHEST WALL AND LOWER NECK: Within normal limits.    VISUALIZED UPPER ABDOMEN: Calcific cholelithiasis. Spleen is enlarged, not visualized in its entirety.    BONES: Within normal limits.    IMPRESSION:     Negative for pulmonary emboli.  Ectatic ascending thoracic aorta  Multistation mediastinal adenopathy as described, likely neoplastic.  Bilateral parenchymal nodularity likely representing metastatic disease.      Assessment :   74M with h/o COPD, DANIEL, Depression, HLD, HTN, CAD s/p PCI, CHB s/p PPM, bioprosthetic AVR, dementia, recent diagnosis of Hodgkin's lymphoma in Dec 2019 at [facility] presents to the hospital with CC of intermittent fevers and cough likely sec NHL. He was scheduled for BM biopsy today as outpt for staging. CTA of chest with no evidence for PE or pneumonia. Pancytopenia sec NHL.    Plan :   Empiric Zosyn for now   Fu cultures  Trend temps and cbc  Heme/Onc following    D/w Hospitalist    Continue with present regime .  Approptiate use of antibiotics and adverse effects reviewed.      I have discussed the above plan of care with patient/family in detail. They expressed understanding of the treatment plan . Risks, benefits and alternatives discussed in detail. I have asked if they have any questions or concerns and appropriately addressed them to the best of my ability .      > 45 minutes spent in direct patient care reviewing  the notes, lab data/ imaging , discussion with multidisciplinary team. All questions were addressed and answered to the best of my capacity .    Thank you for allowing me to participate in the care of your patient .      Whitney Majano MD  Infectious Disease  [phone]

## 2020-01-08 NOTE — ED PROVIDER NOTE - PROGRESS NOTE DETAILS
Patient has previously been registered with the first name Rocky at [place].  He was admitted in Dec 2019 for PNA.  Azithromycin d/c'd shortly after initiation and vancomycin started to treat for hospital-acquired PNA

## 2020-01-08 NOTE — PHYSICAL THERAPY INITIAL EVALUATION ADULT - PERTINENT HX OF CURRENT PROBLEM, REHAB EVAL
75 y/o male adm 1/8 with fever and cough. Influenza negative. RSV negative. CTA: no PE, + nodules representing metastatic disease. Pt admitted with PNA

## 2020-01-08 NOTE — H&P ADULT - HISTORY OF PRESENT ILLNESS
74M with h/o COPD, Depression, HLD, HTN, CAD s/p PCI, CHB s/p PPM, bioprosthetic AVR, dementia, recent diagnosis of Hodgkin's lymphoma in Dec 2019 who presents with intermittent fevers and persistent productive cough since discharge from [facility] on Dec 20.. During [place] hospitalization, pt was treated for PNA and was found to have Hodgkin's lymphoma. Since discharge, pt has been having intermittent fevers, chills (t max 102.2) for which his wife has been treating with Motrin and Tylenol. Pt denies chest pain, palpitations, abd pain, dysuria, urinary frequency or diarrhea. He has not been on abx since his last hospitalization. he was scheduled for BMBx today as outpt. His wife brought him into the ED because of high fever and periods of confusion which has happened previously and temprally associated with fever. He currently appears comfortable, pleasant and not confused. Wife is at bedside. 74M with h/o COPD, DANIEL, Depression, HLD, HTN, CAD s/p PCI, CHB s/p PPM, bioprosthetic AVR, dementia, recent diagnosis of Hodgkin's lymphoma in Dec 2019 who presents with intermittent fevers and persistent productive cough since discharge from [facility] on Dec 20.. During [place] hospitalization, pt was treated for PNA and was found to have Hodgkin's lymphoma. Since discharge, pt has been having intermittent fevers, chills (t max 102.2) for which his wife has been treating with Motrin and Tylenol. Pt denies chest pain, palpitations, abd pain, dysuria, urinary frequency or diarrhea. He has not been on abx since his last hospitalization. he was scheduled for BMBx today as outpt. His wife brought him into the ED because of high fever and periods of confusion which has happened previously and temprally associated with fever. He currently appears comfortable, pleasant and not confused. Wife is at bedside.

## 2020-01-09 NOTE — PROGRESS NOTE ADULT - PROBLEM SELECTOR PLAN 7
pt is not on anticoagulation  fibrinogen, d dimer, PT, PTT, INR elevated- DIC cannot be ruled out. DIC score 7  fibrinogen split products >5 <20.   Heme/onc Dr. Majano consulted, recs appreciated   patient received one dose Vit K without decrease in INR mild MILO, possibly from NSAID use at home coupled with some dehydration  s/p 2L IVF in ED- would hold off on additional IVF  improving   fu AM BMP

## 2020-01-09 NOTE — PROGRESS NOTE ADULT - PROBLEM SELECTOR PLAN 4
likely from Hodgkins lymphoma  pt's ASA has been on hold due to plan for BMBx but will also needs to be held given progression of thrombocytopenia. No epistaxis/hemoptysis or overt GIB noted by pt/wife  monitor AM CBC-platelets 44 this AM  heme/on Dr. Majano following, recs appreciated continue zosyn for now though doubt infectious process, fever and cough likely related to metastatic Hodgkin's lymphoma.   ID Dr. Majano consulted  Dr. Moore (pulm) consulted  continue CPAP qhs  Received a CTA chest ordered by Pulm that did not show PNA, PE or pleural effusion.

## 2020-01-09 NOTE — CONSULT NOTE ADULT - ATTENDING COMMENTS
I saw and examined the patient personally. Spoke with above provider regarding this case. I reviewed the above findings completely.  I agree with the above history, physical, and plan which I have edited where appropriate.    74M with h/o COPD, DANIEL, Depression, HLD, HTN, CAD s/p PCI, CHB s/p PPM, bioprosthetic AVR, dementia, recent diagnosis of Hodgkin's lymphoma in Dec 2019 who presents   high fevers and confusion and inpatient BM biopsy  No signs of significant ischemia or volume overload.    on ekg  monitor platelets. f/u heme  Hold antiplatelets.   cont bb and statin  will f/u
75 yo M with CAD with PCI, COPD, DANIEL, bioAVR, recently diagnosed with classic Hodgkin's lymphoma during [place] hospitalization for fever and pneumonia.  Discharged 12/20 with plans for outpt BMBx and chemo but then developed recurrent fever and cough and presented to [place] 1/8.  Concern for DIC though high fibrogen is inconsistent.    Pt is alert with normal vital signs.  Possible DIC though discussed with Dr. Hilario and suspects this is more reactive than DIC and fever may be due to lymphoma rather than infection.  Not an ICU candidate at this time.  Discussed in detail with Dr. Hilario and Dr. Cruz.  Call if condition changes.

## 2020-01-09 NOTE — PROGRESS NOTE ADULT - PROBLEM SELECTOR PLAN 2
continue zosyn for now though doubt infectious process  ID Dr. Majano consulted  Dr. Moore (pulm) consulted  continue CPAP qhs  Received a CTA chest ordered by Pulm that did not show PNA, PE or pleural effusion. possible DIC 2/2 Hodgkins lymphoma  DIC score 7  elevated INR, PT, aPTT, fibrinogen, d dimer, fibrinogen split products.  Platelets 44 this AM  No epistaxis/hemoptysis or overt GIB noted by pt/wife  monitor AM CBC  heme/on Dr. Majano following, recs appreciated possible DIC 2/2 Hodgkins lymphoma  [facility] DIC score 7  elevated INR, PT, aPTT, fibrinogen, d dimer, fibrinogen split products.  Platelets 44 this AM  No epistaxis/hemoptysis or overt GIB noted by pt/wife  monitor AM CBC  heme/on Dr. Majano following, recs appreciated possible DIC 2/2 Hodgkins lymphoma  [facility] DIC score 7  elevated INR, PT, aPTT, fibrinogen, d dimer, fibrinogen split products.  Platelets 44 this AM  No epistaxis/hemoptysis or overt GIB noted by pt/wife  monitor AM CBC  heme/on Dr. Majano following, recs appreciated  ICU eval called, recs appreciated possible DIC 2/2 Hodgkins lymphoma  [facility] DIC score 7  elevated INR, PT, aPTT, fibrinogen, d dimer, fibrinogen split products.  Platelets 44 this AM  No epistaxis/hemoptysis or overt GIB noted by pt/wife  monitor AM CBC  heme/on Dr. agustin following, recs appreciated- as per him pt  hospisce appropriate this time .    ICU eval called, recs appreciated

## 2020-01-09 NOTE — PROGRESS NOTE ADULT - ASSESSMENT
[ASSESSMENT and  PLAN]  Classical Hodgkins Lymphoma  CD30+, CD15+, CD20 neg  Stage II with GUERO below diaphragm.    Lung nodules of unclear etiology  Patchy GGO    Viral illness    Beta thal trait.     Coagulopathy with INR 1.4 - 1.8.   Mildly elevated PTT also.     Thrombocytopenia, since last admission, had improved to 60-70 outpt.     ? Liver disease. CT imaging prior unrevealing.     Cardiac comorbdities:  AVR, HTN, PPM for arrthymia.     Concern about fever being related to hodgkins disease and possible bone marrow involvment    RECOMMENDATIONS  continueInfectious disease eval.   Check IgG levels  Abx if warranted.   Nebs, steroids per pulm.    Undergoing staging workup and preparation for tx with chemo outpt.   Since in hosp, if resp stable, consider BM bx inpt.  Cardiac eval for, plan for optimization in preparation for chemo with ABVD.   Was to have PFTs with pulm, as usual chemo included Bleomycin. .     DVT Prophylaxis: LMWH or SQ heparin    Dose VitK 5mg  Followup mixing studies  Repeat coag panel in AM    Folic acid daily for thal trait.     discussed with Dr. Cruz and patient's wife. to start steroids to see if effect on fever and blood counts.  to have cardiac evaluation and bone marrow on monday. Wife is potentially interested in an attempt of chemotherapy if possible.

## 2020-01-09 NOTE — PROGRESS NOTE ADULT - SUBJECTIVE AND OBJECTIVE BOX
Patient is a 74y old  Male who presents with a chief complaint of fever, persistent cough (2020 07:31)      HPI:  74M with h/o COPD, DANIEL, Depression, HLD, HTN, CAD s/p PCI, CHB s/p PPM, bioprosthetic AVR, dementia, recent diagnosis of Hodgkin's lymphoma in Dec 2019 who presents with intermittent fevers and persistent productive cough since discharge from [facility] on Dec 20.. During [place] hospitalization, pt was treated for PNA and was found to have Hodgkin's lymphoma. Since discharge, pt has been having intermittent fevers, chills (t max 102.2) for which his wife has been treating with Motrin and Tylenol. Pt denies chest pain, palpitations, abd pain, dysuria, urinary frequency or diarrhea. He has not been on abx since his last hospitalization. he was scheduled for BMBx today as outpt. His wife brought him into the ED because of high fever and periods of confusion which has happened previously and temprally associated with fever. He currently appears comfortable, pleasant and not confused. Wife is at bedside. (2020 08:11)      INTERVAL HPI/OVERNIGHT EVENTS: Seen and examined at bedside, patient remains confused this AM. Platelets continue to fall with associated rise in INR, PT, and aPTT. DIC score 7.         T(C): 36.9 (20 @ 20:21), Max: 38.2 (20 @ 16:00)  HR: 90 (20 @ 08:06) (79 - 108)  BP: 137/72 (20 @ 05:10) (106/62 - 137/74)  RR: 19 (20 @ 05:10) (17 - 19)  SpO2: 94% (20 @ 08:06) (94% - 98%)  Wt(kg): --  I&O's Summary    2020 07:01  -  2020 07:00  --------------------------------------------------------  IN: 0 mL / OUT: 75 mL / NET: -75 mL        REVIEW OF SYSTEMS:  patient remains confused today, unable to obtain detailed ROS. Denies pain, states he feels ok. Admits to cough.     PHYSICAL EXAM:  GENERAL: NAD, resting in bed on respiratory treatment   HEAD:  Atraumatic  EYES: EOMI  ENMT: MMM  NECK: Supple  NERVOUS SYSTEM:  Alert & Oriented X2  CHEST/LUNG: diffuse rales   HEART: Regular rate and rhythm; No murmurs, rubs, or gallops  ABDOMEN: Soft, Nontender, Nondistended; Bowel sounds present  EXTREMITIES:  2+ Peripheral Pulses, No clubbing, cyanosis, or edema  SKIN: good skin turgor     MEDICATIONS  (STANDING):  atorvastatin 10 milliGRAM(s) Oral at bedtime  cyanocobalamin 1000 MICROGram(s) Oral daily  folic acid 1 milliGRAM(s) Oral daily  imipramine 25 milliGRAM(s) Oral daily  metoprolol tartrate 50 milliGRAM(s) Oral every 12 hours  pantoprazole    Tablet 40 milliGRAM(s) Oral before breakfast  piperacillin/tazobactam IVPB.. 3.375 Gram(s) IV Intermittent every 8 hours    MEDICATIONS  (PRN):  acetaminophen   Tablet .. 1000 milliGRAM(s) Oral every 6 hours PRN Temp greater or equal to 38C (100.4F), Mild Pain (1 - 3)  albuterol/ipratropium for Nebulization 3 milliLiter(s) Nebulizer every 6 hours PRN Shortness of Breath and/or Wheezing  benzonatate 100 milliGRAM(s) Oral every 8 hours PRN Cough  guaiFENesin   Syrup  (Sugar-Free) 200 milliGRAM(s) Oral every 6 hours PRN Cough      LABS:                        9.2    3.42  )-----------( 52       ( 2020 05:15 )             29.0     01-08    137  |  106  |  22  ----------------------------<  106<H>  4.3   |  19<L>  |  1.40<H>    Ca    8.1<L>      2020 05:15    TPro  7.7  /  Alb  2.8<L>  /  TBili  1.2  /  DBili  x   /  AST  45<H>  /  ALT  30  /  AlkPhos  197<H>  -    PT/INR - ( 2020 08:22 )   PT: 23.4 sec;   INR: 2.03 ratio         PTT - ( 2020 08:22 )  PTT:38.0 sec  Urinalysis Basic - ( 2020 05:53 )    Color: Yellow / Appearance: Slightly Turbid / S.020 / pH: x  Gluc: x / Ketone: Small  / Bili: Small / Urobili: 4   Blood: x / Protein: 75 mg/dL / Nitrite: Positive   Leuk Esterase: Trace / RBC: 0-2 /HPF / WBC 3-5   Sq Epi: x / Non Sq Epi: Occasional / Bacteria: Few      CAPILLARY BLOOD GLUCOSE                  RADIOLOGY & ADDITIONAL TESTS:  < from: CT Angio Chest w/ IV Cont (20 @ 10:09) >  EXAM:  CT ANGIO CHEST (W)AW IC                            PROCEDURE DATE:  2020          INTERPRETATION:  CLINICAL INFORMATION: Lymphoma, nodules evaluate for pulmonary embolism    COMPARISON: None.    PROCEDURE:   CT Angiography of the Chest.  90 ml of Omnipaque 350 was injected intravenously. 10 ml were discarded.  Sagittal and coronal reformats were performed as well as 3D (MIP) reconstructions.      FINDINGS:    LUNGS AND AIRWAYS: Patent central airways.  Multiple randomly distributedbilateral parenchymal nodules mostly subcentimeter. The largest nodule left lower lobe measures up to 1.2 cm. Findings likely represent metastatic disease. Mild bibasilar traction bronchiectasis.    PLEURA: No pleural effusion.    MEDIASTINUM AND FOUZIA: AP window, pretracheal bilateral paratracheal subcarinal and bilateral hilar lymphadenopathy likely neoplastic. The largest lymph nodes in the pretracheal station measures up to 2.2 cm.    VESSELS: No pulmonary emboli. Ectatic ascending thoracic aorta up to 4.9 cm. No dissection    HEART: Heart size is normal. Status post CABG. Coronary artery calcification. In situ cardiac pacer. No pericardial effusion.    CHEST WALL AND LOWER NECK: Within normal limits.    VISUALIZED UPPER ABDOMEN: Calcific cholelithiasis. Spleen is enlarged, not visualized in its entirety.    BONES: Within normal limits.    IMPRESSION:     Negative for pulmonary emboli.  Ectatic ascending thoracic aorta  Multistation mediastinal adenopathy as described, likely neoplastic.  Bilateral parenchymal nodularity likely representing metastatic disease.                    SUSANNAH CHRISTIAN M.D., ATTENDING RADIOLOGIST  This document has been electronically signed. 2020 10:24AM          < end of copied text >        Imaging Personally Reviewed:       Advance Directives:      Palliative Care: Patient is a 74y old  Male who presents with a chief complaint of fever, persistent cough (2020 07:31)      HPI:  74M with h/o COPD, DANIEL, Depression, HLD, HTN, CAD s/p PCI, CHB s/p PPM, bioprosthetic AVR, dementia, recent diagnosis of Hodgkin's lymphoma in Dec 2019 who presents with intermittent fevers and persistent productive cough since discharge from [facility] on Dec 20.. During [place] hospitalization, pt was treated for PNA and was found to have Hodgkin's lymphoma. Since discharge, pt has been having intermittent fevers, chills (t max 102.2) for which his wife has been treating with Motrin and Tylenol. Pt denies chest pain, palpitations, abd pain, dysuria, urinary frequency or diarrhea. He has not been on abx since his last hospitalization. he was scheduled for BMBx today as outpt. His wife brought him into the ED because of high fever and periods of confusion which has happened previously and temprally associated with fever. He currently appears comfortable, pleasant and not confused. Wife is at bedside. (2020 08:11)      INTERVAL HPI/OVERNIGHT EVENTS: Seen and examined at bedside, patient remains confused this AM. Platelets continue to fall with associated rise in INR, PT, and aPTT. ISTH DIC score 7.         T(C): 36.9 (20 @ 20:21), Max: 38.2 (20 @ 16:00)  HR: 90 (20 @ 08:06) (79 - 108)  BP: 137/72 (20 @ 05:10) (106/62 - 137/74)  RR: 19 (20 @ 05:10) (17 - 19)  SpO2: 94% (20 @ 08:06) (94% - 98%)  Wt(kg): --  I&O's Summary    2020 07:01  -  2020 07:00  --------------------------------------------------------  IN: 0 mL / OUT: 75 mL / NET: -75 mL        REVIEW OF SYSTEMS:  patient remains confused today, unable to obtain detailed ROS. Denies pain, states he feels ok. Admits to cough.     PHYSICAL EXAM:  GENERAL: NAD, resting in bed on respiratory treatment   HEAD:  Atraumatic  EYES: EOMI  ENMT: MMM  NECK: Supple  NERVOUS SYSTEM:  Alert & Oriented X2  CHEST/LUNG: diffuse rales   HEART: Regular rate and rhythm; No murmurs, rubs, or gallops  ABDOMEN: Soft, Nontender, Nondistended; Bowel sounds present  EXTREMITIES:  2+ Peripheral Pulses, No clubbing, cyanosis, or edema  SKIN: good skin turgor     MEDICATIONS  (STANDING):  atorvastatin 10 milliGRAM(s) Oral at bedtime  cyanocobalamin 1000 MICROGram(s) Oral daily  folic acid 1 milliGRAM(s) Oral daily  imipramine 25 milliGRAM(s) Oral daily  metoprolol tartrate 50 milliGRAM(s) Oral every 12 hours  pantoprazole    Tablet 40 milliGRAM(s) Oral before breakfast  piperacillin/tazobactam IVPB.. 3.375 Gram(s) IV Intermittent every 8 hours    MEDICATIONS  (PRN):  acetaminophen   Tablet .. 1000 milliGRAM(s) Oral every 6 hours PRN Temp greater or equal to 38C (100.4F), Mild Pain (1 - 3)  albuterol/ipratropium for Nebulization 3 milliLiter(s) Nebulizer every 6 hours PRN Shortness of Breath and/or Wheezing  benzonatate 100 milliGRAM(s) Oral every 8 hours PRN Cough  guaiFENesin   Syrup  (Sugar-Free) 200 milliGRAM(s) Oral every 6 hours PRN Cough      LABS:                        9.2    3.42  )-----------( 52       ( 2020 05:15 )             29.0     01-08    137  |  106  |  22  ----------------------------<  106<H>  4.3   |  19<L>  |  1.40<H>    Ca    8.1<L>      2020 05:15    TPro  7.7  /  Alb  2.8<L>  /  TBili  1.2  /  DBili  x   /  AST  45<H>  /  ALT  30  /  AlkPhos  197<H>  -08    PT/INR - ( 2020 08:22 )   PT: 23.4 sec;   INR: 2.03 ratio         PTT - ( 2020 08:22 )  PTT:38.0 sec  Urinalysis Basic - ( 2020 05:53 )    Color: Yellow / Appearance: Slightly Turbid / S.020 / pH: x  Gluc: x / Ketone: Small  / Bili: Small / Urobili: 4   Blood: x / Protein: 75 mg/dL / Nitrite: Positive   Leuk Esterase: Trace / RBC: 0-2 /HPF / WBC 3-5   Sq Epi: x / Non Sq Epi: Occasional / Bacteria: Few      CAPILLARY BLOOD GLUCOSE                  RADIOLOGY & ADDITIONAL TESTS:  < from: CT Angio Chest w/ IV Cont (20 @ 10:09) >  EXAM:  CT ANGIO CHEST (W)AW IC                            PROCEDURE DATE:  2020          INTERPRETATION:  CLINICAL INFORMATION: Lymphoma, nodules evaluate for pulmonary embolism    COMPARISON: None.    PROCEDURE:   CT Angiography of the Chest.  90 ml of Omnipaque 350 was injected intravenously. 10 ml were discarded.  Sagittal and coronal reformats were performed as well as 3D (MIP) reconstructions.      FINDINGS:    LUNGS AND AIRWAYS: Patent central airways.  Multiple randomly distributedbilateral parenchymal nodules mostly subcentimeter. The largest nodule left lower lobe measures up to 1.2 cm. Findings likely represent metastatic disease. Mild bibasilar traction bronchiectasis.    PLEURA: No pleural effusion.    MEDIASTINUM AND FOUZIA: AP window, pretracheal bilateral paratracheal subcarinal and bilateral hilar lymphadenopathy likely neoplastic. The largest lymph nodes in the pretracheal station measures up to 2.2 cm.    VESSELS: No pulmonary emboli. Ectatic ascending thoracic aorta up to 4.9 cm. No dissection    HEART: Heart size is normal. Status post CABG. Coronary artery calcification. In situ cardiac pacer. No pericardial effusion.    CHEST WALL AND LOWER NECK: Within normal limits.    VISUALIZED UPPER ABDOMEN: Calcific cholelithiasis. Spleen is enlarged, not visualized in its entirety.    BONES: Within normal limits.    IMPRESSION:     Negative for pulmonary emboli.  Ectatic ascending thoracic aorta  Multistation mediastinal adenopathy as described, likely neoplastic.  Bilateral parenchymal nodularity likely representing metastatic disease.                    SUSANNAH CHRISTIAN M.D., ATTENDING RADIOLOGIST  This document has been electronically signed. 2020 10:24AM          < end of copied text >        Imaging Personally Reviewed:       Advance Directives:      Palliative Care: Patient is a 74y old  Male who presents with a chief complaint of fever, persistent cough (2020 07:31)      HPI:  74M with h/o COPD, DANIEL, Depression, HLD, HTN, CAD s/p PCI, CHB s/p PPM, bioprosthetic AVR, dementia, recent diagnosis of Hodgkin's lymphoma in Dec 2019 who presents with intermittent fevers and persistent productive cough since discharge from [facility] on Dec 20.. During [place] hospitalization, pt was treated for PNA and was found to have Hodgkin's lymphoma. Since discharge, pt has been having intermittent fevers, chills (t max 102.2) for which his wife has been treating with Motrin and Tylenol. Pt denies chest pain, palpitations, abd pain, dysuria, urinary frequency or diarrhea. He has not been on abx since his last hospitalization. he was scheduled for BMBx today as outpt. His wife brought him into the ED because of high fever and periods of confusion which has happened previously and temprally associated with fever. He currently appears comfortable, pleasant and not confused. Wife is at bedside. (2020 08:11)      INTERVAL HPI/OVERNIGHT EVENTS: Seen and examined at bedside, patient remains confused this AM. Platelets continue to fall with associated rise in INR, PT, and aPTT. ISTH DIC score 7.         T(C): 36.9 (20 @ 20:21), Max: 38.2 (20 @ 16:00)  HR: 90 (20 @ 08:06) (79 - 108)  BP: 137/72 (20 @ 05:10) (106/62 - 137/74)  RR: 19 (20 @ 05:10) (17 - 19)  SpO2: 94% (20 @ 08:06) (94% - 98%)  Wt(kg): --  I&O's Summary    2020 07:01  -  2020 07:00  --------------------------------------------------------  IN: 0 mL / OUT: 75 mL / NET: -75 mL        REVIEW OF SYSTEMS:  patient remains confused today, unable to obtain detailed ROS. Denies pain, states he feels ok. Admits to cough.     PHYSICAL EXAM:  GENERAL: NAD, resting in bed on respiratory treatment   HEAD:  Atraumatic  EYES: EOMI  ENMT: MMM  NECK: Supple  NERVOUS SYSTEM:  Alert & Oriented X2  CHEST/LUNG: diffuse rales   HEART: Regular rate and rhythm; + systolic murmur,no rubs, or gallops  ABDOMEN: Soft, Nontender, Nondistended; Bowel sounds present  EXTREMITIES:  2+ Peripheral Pulses, No clubbing, cyanosis, or edema  SKIN: good skin turgor     MEDICATIONS  (STANDING):  atorvastatin 10 milliGRAM(s) Oral at bedtime  cyanocobalamin 1000 MICROGram(s) Oral daily  folic acid 1 milliGRAM(s) Oral daily  imipramine 25 milliGRAM(s) Oral daily  metoprolol tartrate 50 milliGRAM(s) Oral every 12 hours  pantoprazole    Tablet 40 milliGRAM(s) Oral before breakfast  piperacillin/tazobactam IVPB.. 3.375 Gram(s) IV Intermittent every 8 hours    MEDICATIONS  (PRN):  acetaminophen   Tablet .. 1000 milliGRAM(s) Oral every 6 hours PRN Temp greater or equal to 38C (100.4F), Mild Pain (1 - 3)  albuterol/ipratropium for Nebulization 3 milliLiter(s) Nebulizer every 6 hours PRN Shortness of Breath and/or Wheezing  benzonatate 100 milliGRAM(s) Oral every 8 hours PRN Cough  guaiFENesin   Syrup  (Sugar-Free) 200 milliGRAM(s) Oral every 6 hours PRN Cough      LABS:                        9.2    3.42  )-----------( 52       ( 2020 05:15 )             29.0     01-08    137  |  106  |  22  ----------------------------<  106<H>  4.3   |  19<L>  |  1.40<H>    Ca    8.1<L>      2020 05:15    TPro  7.7  /  Alb  2.8<L>  /  TBili  1.2  /  DBili  x   /  AST  45<H>  /  ALT  30  /  AlkPhos  197<H>  01-08    PT/INR - ( 2020 08:22 )   PT: 23.4 sec;   INR: 2.03 ratio         PTT - ( 2020 08:22 )  PTT:38.0 sec  Urinalysis Basic - ( 2020 05:53 )    Color: Yellow / Appearance: Slightly Turbid / S.020 / pH: x  Gluc: x / Ketone: Small  / Bili: Small / Urobili: 4   Blood: x / Protein: 75 mg/dL / Nitrite: Positive   Leuk Esterase: Trace / RBC: 0-2 /HPF / WBC 3-5   Sq Epi: x / Non Sq Epi: Occasional / Bacteria: Few      CAPILLARY BLOOD GLUCOSE                  RADIOLOGY & ADDITIONAL TESTS:  < from: CT Angio Chest w/ IV Cont (20 @ 10:09) >  EXAM:  CT ANGIO CHEST (W)AW IC                            PROCEDURE DATE:  2020          INTERPRETATION:  CLINICAL INFORMATION: Lymphoma, nodules evaluate for pulmonary embolism    COMPARISON: None.    PROCEDURE:   CT Angiography of the Chest.  90 ml of Omnipaque 350 was injected intravenously. 10 ml were discarded.  Sagittal and coronal reformats were performed as well as 3D (MIP) reconstructions.      FINDINGS:    LUNGS AND AIRWAYS: Patent central airways.  Multiple randomly distributedbilateral parenchymal nodules mostly subcentimeter. The largest nodule left lower lobe measures up to 1.2 cm. Findings likely represent metastatic disease. Mild bibasilar traction bronchiectasis.    PLEURA: No pleural effusion.    MEDIASTINUM AND FOUZIA: AP window, pretracheal bilateral paratracheal subcarinal and bilateral hilar lymphadenopathy likely neoplastic. The largest lymph nodes in the pretracheal station measures up to 2.2 cm.    VESSELS: No pulmonary emboli. Ectatic ascending thoracic aorta up to 4.9 cm. No dissection    HEART: Heart size is normal. Status post CABG. Coronary artery calcification. In situ cardiac pacer. No pericardial effusion.    CHEST WALL AND LOWER NECK: Within normal limits.    VISUALIZED UPPER ABDOMEN: Calcific cholelithiasis. Spleen is enlarged, not visualized in its entirety.    BONES: Within normal limits.    IMPRESSION:     Negative for pulmonary emboli.  Ectatic ascending thoracic aorta  Multistation mediastinal adenopathy as described, likely neoplastic.  Bilateral parenchymal nodularity likely representing metastatic disease.                    SUSANNAH CHRISTIAN M.D., ATTENDING RADIOLOGIST  This document has been electronically signed. 2020 10:24AM          < end of copied text >        Imaging Personally Reviewed:       Advance Directives:      Palliative Care: Patient is a 74y old  Male who presents with a chief complaint of fever, persistent cough (2020 07:31)      HPI:  74M with h/o COPD, DANIEL, Depression, HLD, HTN, CAD s/p PCI, CHB s/p PPM, bioprosthetic AVR, dementia, recent diagnosis of Hodgkin's lymphoma in Dec 2019 who presents with intermittent fevers and persistent productive cough since discharge from [facility] on Dec 20.. During [place] hospitalization, pt was treated for PNA and was found to have Hodgkin's lymphoma. Since discharge, pt has been having intermittent fevers, chills (t max 102.2) for which his wife has been treating with Motrin and Tylenol. Pt denies chest pain, palpitations, abd pain, dysuria, urinary frequency or diarrhea. He has not been on abx since his last hospitalization. he was scheduled for BMBx today as outpt. His wife brought him into the ED because of high fever and periods of confusion which has happened previously and temprally associated with fever. He currently appears comfortable, pleasant and not confused. Wife is at bedside. (2020 08:11)     fever  , cough   pancytopenia  likely in setting of untreated Hodgkin's lymphoma. Now with possible early  DIC-     INTERVAL HPI/OVERNIGHT EVENTS: Seen and examined at bedside, patient remains confused this AM.   febrile  101 Platelets continue to fall with associated rise in INR, PT, and aPTT. ISTH DIC score 7.         T(C): 36.9 (20 @ 20:21), Max: 38.2 (20 @ 16:00)  HR: 90 (20 @ 08:06) (79 - 108)  BP: 137/72 (20 @ 05:10) (106/62 - 137/74)  RR: 19 (20 @ 05:10) (17 - 19)  SpO2: 94% (20 @ 08:06) (94% - 98%)  Wt(kg): --  I&O's Summary    2020 07:01  -  2020 07:00  --------------------------------------------------------  IN: 0 mL / OUT: 75 mL / NET: -75 mL        REVIEW OF SYSTEMS:  patient remains confused today with dementia , unable to obtain detailed ROS. Denies pain, states he feels ok. Admits to cough.     PHYSICAL EXAM:  GENERAL: NAD, resting in bed on respiratory treatment   HEAD:  Atraumatic , weak +   EYES: EOMI  ENMT:   dry mucous membrane   NECK: Supple , no jvd   NERVOUS SYSTEM:  Alert & Oriented X2 , no focal deficit , motor 5/5 all 4 ext   CHEST/LUNG: diffuse rales  bl lower bases  , pacemaker site intact   HEART: Regular rate and rhythm; + systolic murmur ,   ABDOMEN: Soft, Nontender, Nondistended; Bowel sounds present  EXTREMITIES:  2+ Peripheral Pulses, No clubbing, cyanosis, or edema  SKIN:  no rash   , no petechiae     MEDICATIONS  (STANDING):  atorvastatin 10 milliGRAM(s) Oral at bedtime  cyanocobalamin 1000 MICROGram(s) Oral daily  folic acid 1 milliGRAM(s) Oral daily  imipramine 25 milliGRAM(s) Oral daily  metoprolol tartrate 50 milliGRAM(s) Oral every 12 hours  pantoprazole    Tablet 40 milliGRAM(s) Oral before breakfast  piperacillin/tazobactam IVPB.. 3.375 Gram(s) IV Intermittent every 8 hours    MEDICATIONS  (PRN):  acetaminophen   Tablet .. 1000 milliGRAM(s) Oral every 6 hours PRN Temp greater or equal to 38C (100.4F), Mild Pain (1 - 3)  albuterol/ipratropium for Nebulization 3 milliLiter(s) Nebulizer every 6 hours PRN Shortness of Breath and/or Wheezing  benzonatate 100 milliGRAM(s) Oral every 8 hours PRN Cough  guaiFENesin   Syrup  (Sugar-Free) 200 milliGRAM(s) Oral every 6 hours PRN Cough      LABS:                        9.2    3.42  )-----------( 52       ( 2020 05:15 )             29.0     01-08    137  |  106  |  22  ----------------------------<  106<H>  4.3   |  19<L>  |  1.40<H>    Ca    8.1<L>      2020 05:15    TPro  7.7  /  Alb  2.8<L>  /  TBili  1.2  /  DBili  x   /  AST  45<H>  /  ALT  30  /  AlkPhos  197<H>      PT/INR - ( 2020 08:22 )   PT: 23.4 sec;   INR: 2.03 ratio         PTT - ( 2020 08:22 )  PTT:38.0 sec  Urinalysis Basic - ( 2020 05:53 )    Color: Yellow / Appearance: Slightly Turbid / S.020 / pH: x  Gluc: x / Ketone: Small  / Bili: Small / Urobili: 4   Blood: x / Protein: 75 mg/dL / Nitrite: Positive   Leuk Esterase: Trace / RBC: 0-2 /HPF / WBC 3-5   Sq Epi: x / Non Sq Epi: Occasional / Bacteria: Few                    Imaging Personally Reviewed:     RADIOLOGY & ADDITIONAL TESTS:  < from: CT Angio Chest w/ IV Cont (20 @ 10:09) >  EXAM:  CT ANGIO CHEST (W)AW IC                            PROCEDURE DATE:  2020          INTERPRETATION:  CLINICAL INFORMATION: Lymphoma, nodules evaluate for pulmonary embolism    COMPARISON: None.    PROCEDURE:   CT Angiography of the Chest.  90 ml of Omnipaque 350 was injected intravenously. 10 ml were discarded.  Sagittal and coronal reformats were performed as well as 3D (MIP) reconstructions.      FINDINGS:    LUNGS AND AIRWAYS: Patent central airways.  Multiple randomly distributedbilateral parenchymal nodules mostly subcentimeter. The largest nodule left lower lobe measures up to 1.2 cm. Findings likely represent metastatic disease. Mild bibasilar traction bronchiectasis.    PLEURA: No pleural effusion.    MEDIASTINUM AND FOUZIA: AP window, pretracheal bilateral paratracheal subcarinal and bilateral hilar lymphadenopathy likely neoplastic. The largest lymph nodes in the pretracheal station measures up to 2.2 cm.    VESSELS: No pulmonary emboli. Ectatic ascending thoracic aorta up to 4.9 cm. No dissection    HEART: Heart size is normal. Status post CABG. Coronary artery calcification. In situ cardiac pacer. No pericardial effusion.    CHEST WALL AND LOWER NECK: Within normal limits.    VISUALIZED UPPER ABDOMEN: Calcific cholelithiasis. Spleen is enlarged, not visualized in its entirety.    BONES: Within normal limits.    IMPRESSION:     Negative for pulmonary emboli.  Ectatic ascending thoracic aorta  Multistation mediastinal adenopathy as described, likely neoplastic.  Bilateral parenchymal nodularity likely representing metastatic disease.                    SUSANNAH CHRISTIAN M.D., ATTENDING RADIOLOGIST  This document has been electronically signed. 2020 10:24AM          < end of copied text >            Advance Directives:  full code     Palliative Care:  appropraite

## 2020-01-09 NOTE — PROGRESS NOTE ADULT - PROBLEM SELECTOR PLAN 3
h/o dementia with behavioral disturbance during previous hospitalization and at home when develops high fevers  emphasize re-orientation   fall precautions  currently confused  continue constant observation pt is not on anticoagulation  fibrinogen, d dimer, PT, PTT, INR elevated- DIC cannot be ruled out. DIC score 7  fibrinogen split products >5 <20.   Heme/onc Dr. Majano consulted, recs appreciated   patient received one dose Vit K without decrease in INR pt is not on anticoagulation  fibrinogen, d dimer, PT, PTT, INR elevated- DIC cannot be ruled out. ISTH DIC score 7  fibrinogen split products >5 <20.   Heme/onc Dr. Majano consulted, recs appreciated   patient received one dose Vit K without decrease in INR pt is not on anticoagulation  fibrinogen, d dimer, PT, PTT, INR elevated- DIC cannot be ruled out. ISTH DIC score 7  fibrinogen split products >5 <20.   Heme/onc Dr. agustin consulted, recs appreciated   patient received one dose Vit K without decrease in INR

## 2020-01-09 NOTE — PROGRESS NOTE ADULT - SUBJECTIVE AND OBJECTIVE BOX
Date/Time Patient Seen:  		  Referring MD:   Data Reviewed	       Patient is a 74y old  Male who presents with a chief complaint of fever, persistent cough (08 Jan 2020 14:04)      Subjective/HPI     PAST MEDICAL & SURGICAL HISTORY:  Pacemaker  Hypertension  Lymphoma  Dementia  S/P AVR (aortic valve replacement)  Cardiac pacemaker        Medication list         MEDICATIONS  (STANDING):  atorvastatin 10 milliGRAM(s) Oral at bedtime  cyanocobalamin 1000 MICROGram(s) Oral daily  folic acid 1 milliGRAM(s) Oral daily  imipramine 25 milliGRAM(s) Oral daily  metoprolol tartrate 50 milliGRAM(s) Oral every 12 hours  pantoprazole    Tablet 40 milliGRAM(s) Oral before breakfast  piperacillin/tazobactam IVPB.. 3.375 Gram(s) IV Intermittent every 8 hours    MEDICATIONS  (PRN):  acetaminophen   Tablet .. 1000 milliGRAM(s) Oral every 6 hours PRN Temp greater or equal to 38C (100.4F), Mild Pain (1 - 3)  albuterol/ipratropium for Nebulization 3 milliLiter(s) Nebulizer every 6 hours PRN Shortness of Breath and/or Wheezing  benzonatate 100 milliGRAM(s) Oral every 8 hours PRN Cough  guaiFENesin   Syrup  (Sugar-Free) 200 milliGRAM(s) Oral every 6 hours PRN Cough         Vitals log        ICU Vital Signs Last 24 Hrs  T(C): 36.9 (08 Jan 2020 20:21), Max: 38.2 (08 Jan 2020 16:00)  T(F): 98.4 (08 Jan 2020 20:21), Max: 100.8 (08 Jan 2020 16:00)  HR: 99 (09 Jan 2020 05:10) (79 - 108)  BP: 137/72 (09 Jan 2020 05:10) (106/62 - 137/74)  BP(mean): --  ABP: --  ABP(mean): --  RR: 19 (09 Jan 2020 05:10) (17 - 19)  SpO2: 97% (09 Jan 2020 05:10) (95% - 98%)           Input and Output:  I&O's Detail    08 Jan 2020 07:01  -  09 Jan 2020 07:00  --------------------------------------------------------  IN:  Total IN: 0 mL    OUT:    Voided: 75 mL  Total OUT: 75 mL    Total NET: -75 mL          Lab Data                        9.2    3.42  )-----------( 52       ( 08 Jan 2020 05:15 )             29.0     01-08    137  |  106  |  22  ----------------------------<  106<H>  4.3   |  19<L>  |  1.40<H>    Ca    8.1<L>      08 Jan 2020 05:15    TPro  7.7  /  Alb  2.8<L>  /  TBili  1.2  /  DBili  x   /  AST  45<H>  /  ALT  30  /  AlkPhos  197<H>  01-08            Review of Systems	      Objective     Physical Examination    obese  head at  heart s1s2  lung dec BS  abd soft      Pertinent Lab findings & Imaging      [place]:  NO   Adequate UO     I&O's Detail    08 Jan 2020 07:01  -  09 Jan 2020 07:00  --------------------------------------------------------  IN:  Total IN: 0 mL    OUT:    Voided: 75 mL  Total OUT: 75 mL    Total NET: -75 mL               Discussed with:     Cultures:	        Radiology

## 2020-01-09 NOTE — GOALS OF CARE CONVERSATION - ADVANCED CARE PLANNING - CONVERSATION DETAILS
+  Spoke to pt wife who is pt HC agent regarding his present condition and prognosis. She states that she sees that he is getting worse and has had frequent hospitalizations She knows that it is time to consider advance directives but wants to speak to her children before making any decisions. PC will follow up to continue discussion.

## 2020-01-09 NOTE — PROGRESS NOTE ADULT - SUBJECTIVE AND OBJECTIVE BOX
Interval History:  remains weak intermittently confused and continues to spike fever, wife at bedside  Chart reviewed and events noted;   Overnight events:    MEDICATIONS  (STANDING):  atorvastatin 10 milliGRAM(s) Oral at bedtime  cyanocobalamin 1000 MICROGram(s) Oral daily  folic acid 1 milliGRAM(s) Oral daily  imipramine 25 milliGRAM(s) Oral daily  methylPREDNISolone sodium succinate Injectable 80 milliGRAM(s) IV Push every 8 hours  metoprolol tartrate 50 milliGRAM(s) Oral every 12 hours  pantoprazole    Tablet 40 milliGRAM(s) Oral before breakfast  piperacillin/tazobactam IVPB.. 3.375 Gram(s) IV Intermittent every 8 hours  sodium chloride 0.9%. 1000 milliLiter(s) (70 mL/Hr) IV Continuous <Continuous>    MEDICATIONS  (PRN):  acetaminophen   Tablet .. 1000 milliGRAM(s) Oral every 6 hours PRN Temp greater or equal to 38C (100.4F), Mild Pain (1 - 3)  albuterol/ipratropium for Nebulization 3 milliLiter(s) Nebulizer every 6 hours PRN Shortness of Breath and/or Wheezing  benzonatate 100 milliGRAM(s) Oral every 8 hours PRN Cough  guaiFENesin   Syrup  (Sugar-Free) 200 milliGRAM(s) Oral every 6 hours PRN Cough      Vital Signs Last 24 Hrs  T(C): 37.1 (09 Jan 2020 17:01), Max: 39.4 (09 Jan 2020 09:17)  T(F): 98.8 (09 Jan 2020 17:01), Max: 103 (09 Jan 2020 09:17)  HR: 106 (09 Jan 2020 17:00) (79 - 108)  BP: 116/75 (09 Jan 2020 17:00) (104/64 - 137/72)  BP(mean): --  RR: 17 (09 Jan 2020 14:24) (16 - 19)  SpO2: 98% (09 Jan 2020 17:01) (94% - 99%)    PHYSICAL EXAM  General: adult in NAD  HEENT: clear oropharynx, anicteric sclera, pink conjunctivae  Neck: supple  CV: normal S1S2 with no murmur rubs or gallops  Lungs: clear to auscultation, no wheezes, no rhales  Abdomen: soft non-tender non-distended, no hepato/splenomegaly  Ext: no clubbing cyanosis or edema  Skin: no rashes and no petichiae  Neuro: alert       LABS:  CBC Full  -  ( 09 Jan 2020 08:22 )  WBC Count : 1.77 K/uL  RBC Count : 4.35 M/uL  Hemoglobin : 8.9 g/dL  Hematocrit : 28.4 %  Platelet Count - Automated : 44 K/uL  Mean Cell Volume : 65.3 fl  Mean Cell Hemoglobin : 20.5 pg  Mean Cell Hemoglobin Concentration : 31.3 gm/dL  Auto Neutrophil # : x  Auto Lymphocyte # : x  Auto Monocyte # : x  Auto Eosinophil # : x  Auto Basophil # : x  Auto Neutrophil % : x  Auto Lymphocyte % : x  Auto Monocyte % : x  Auto Eosinophil % : x  Auto Basophil % : x    01-09    139  |  108  |  16  ----------------------------<  90  3.9   |  19<L>  |  1.20    Ca    7.7<L>      09 Jan 2020 08:22    TPro  7.1  /  Alb  2.5<L>  /  TBili  1.1  /  DBili  x   /  AST  41<H>  /  ALT  28  /  AlkPhos  190<H>  01-09    PT/INR - ( 09 Jan 2020 08:22 )   PT: 23.4 sec;   INR: 2.03 ratio         PTT - ( 09 Jan 2020 08:22 )  PTT:38.0 sec    fe studies      WBC trend  1.77 K/uL (01-09-20 @ 08:22)  3.42 K/uL (01-08-20 @ 05:15)      Hgb trend  8.9 g/dL (01-09-20 @ 08:22)  9.2 g/dL (01-08-20 @ 05:15)      plt trend  44 K/uL (01-09-20 @ 08:22)  52 K/uL (01-08-20 @ 05:15)        RADIOLOGY & ADDITIONAL STUDIES:

## 2020-01-09 NOTE — CONSULT NOTE ADULT - ASSESSMENT
74M with h/o COPD, DANIEL, Depression, HLD, HTN, CAD s/p PCI, CHB s/p PPM, bioprosthetic AVR, dementia, recent diagnosis of Hodgkin's lymphoma in Dec 2019 who presents with intermittent fevers and persistent productive cough since discharge from [facility] on Dec 20. During [place] hospitalization, pt was treated for PNA and was found to have Hodgkin's lymphoma. Pt was scheduled for outpatient BM biopsy yesterday 1/8 but wife brought him to ED for high fevers and confusion. Pt will undergo BM biopsy inpatient. Cardiology consulted for pre-op optimization.     - Evidence of ischemia, trops? will f/u third set, pt asymptomatic  - CKs? flat versus trending up: argue for/against acute ischemia  - Evidence of volume overload   - Changes on EKG compared to previous  - Previous ECHO on _______ shows _______________ with EF: __%, ______________ valvular disease noted  - BP   , continue/hold home BP meds, monitor routine hemodynamics  - Monitor and replete lytes, keep K>4, Mg>2  - Pt has no evidence of ischemia, decompensated HF, unstable arrythmia, or severe stenotic valvular disease, and in the setting of low risk  procedure, patient is optimized from cardiovascular standpoint to proceed with planned procedure with routine hemodynamic monitoring.   - Other cardiovascular workup will depend on clinical course  - All other workup per primary team  - Will follow 74M with h/o COPD, DANIEL, Depression, HLD, HTN, CAD s/p PCI, CHB s/p PPM, bioprosthetic AVR, dementia, recent diagnosis of Hodgkin's lymphoma in Dec 2019 who presents with intermittent fevers and persistent productive cough since discharge from [facility] on Dec 20. During [place] hospitalization, pt was treated for PNA and was found to have Hodgkin's lymphoma. Pt was scheduled for outpatient BM biopsy yesterday 1/8 but wife brought him to ED for high fevers and confusion. Pt will undergo BM biopsy inpatient. Cardiology consulted for pre-op optimization.     - Pt in overt DIC, ISTH score 7 2/2 underlying malignancy. Not a candidate for BM biopsy  - Recommend ICU consult   - Would transfer to telemetry unit if ICU determines Pt not intensive care candidate   - Continue to hold all a/c, anti-platelet agents    - Guarded prognosis   - Other cardiovascular workup will depend on clinical course  - All other workup per primary team  - Will follow 74M with h/o COPD, DANILE, Depression, HLD, HTN, CAD s/p PCI, CHB s/p PPM, bioprosthetic AVR, dementia, recent diagnosis of Hodgkin's lymphoma in Dec 2019 who presents   high fevers and confusion and inpatient BM biopsy    - No signs of significant ischemia or volume overload.   - EKG Vpaced  - Cont metoprolol 50mg q12  - cont statin     - There is a concern for DIC  - F/u heme evaluation   - Continue to hold all a/c, anti-platelet agents    - Monitor closely for bleeding     - AMS likely from fevers and underlying metabolic derangements     - Once medically better optimized can proceed with BM biospy  - Other cardiovascular workup will depend on clinical course  - All other workup per primary team  - Will follow

## 2020-01-09 NOTE — PROGRESS NOTE ADULT - PROBLEM SELECTOR PLAN 1
pt was diagnosed in Dec 2019. Sees Dr. Majano (consult placed)  Was scheduled for BMbx this Friday; spoke with Dr. Majano. We will attempt to have BMbx done as inpt- call placed to IR, awaiting callback regarding timing of procedure.  Will keep NPO after MN and will hold Hep SQ dose for tomorrow morning in the event the procedure can not be done today and can be done tomorrow.  suspect symptoms of intermittent fevers, chills, cough are related to Hodgkin's lymphoma rather than an acute infectious process.   procal 1.15  fu blood cx  continue emperic zosyn per ID recs pt was diagnosed in Dec 2019. Sees Dr. Majano (consult placed)  Was scheduled for BMbx this Friday; spoke with Dr. Majano.  suspect symptoms of intermittent fevers, chills, cough are related to Hodgkin's lymphoma rather than an acute infectious process.   procal 1.15  fu blood cx  continue emperic zosyn per ID recs pt was diagnosed in Dec 2019. Sees Dr. Majano (consult placed)  Was scheduled for BMbx this Friday; by  Dr. Majano.  suspect symptoms of intermittent fevers, chills, cough are related to Hodgkin's lymphoma rather than an acute infectious process.   procal 1.15   blood cx pending , ucult neg   continue emperic zosyn per ID recs

## 2020-01-09 NOTE — PROGRESS NOTE ADULT - PROBLEM SELECTOR PLAN 9
PAS for now; spoke with Melecio Nino re: pharmacologic ppx- pt is high risk for DVt- however given platelet count <50 will not give heparin at this time.  PT eval to encourage ambulation PAS for now; spoke with Melecio Nino re: pharmacologic ppx- however given platelet count <50  and high inr  2 will not give heparin at this time.  PT eval to encourage ambulation

## 2020-01-09 NOTE — PROGRESS NOTE ADULT - ASSESSMENT
74M with h/o COPD, DANIEL, Depression, HLD, HTN, CAD s/p PCI, CHB s/p PPM, bioprosthetic AVR, dementia, recent diagnosis of Hodgkin's lymphoma in Dec 2019 who presents with fevers and peristent likely in setting of untreated Hodgkin's lymphoma. 74M with h/o COPD, DANIEL, Depression, HLD, HTN, CAD s/p PCI, CHB s/p PPM, bioprosthetic AVR, dementia, recent diagnosis of Hodgkin's lymphoma in Dec 2019 who presents with fevers and peristent likely in setting of untreated Hodgkin's lymphoma. Now with possible DIC.

## 2020-01-09 NOTE — PROGRESS NOTE ADULT - PROBLEM SELECTOR PLAN 1
no evidence of PNA on ct chest  ID eval and Heme eval noted  CT chest shows mets disease - nodules and LN  supportive measures  CPAP for night time  pt has hx of Sundowning in [place] and has some min confusion now  supportive care - work up and assist with ADL as needed  monitor sx - monitor vs and HD and Sat  maintained on Zosyn for now - ? utility   will follow  prognosis guarded  GOC documented - full code - at risk for decompensation

## 2020-01-09 NOTE — CONSULT NOTE ADULT - SUBJECTIVE AND OBJECTIVE BOX
[facility]    Jesús Kelley, Kodak, Nneka, Mike, Senthil, Irene      [phone]    Initial Consult Note     CHIEF COMPLAINT: Patient is a 73 y/o male who presents with a chief complaint of fever, persistent cough    Patient is a 74y old  Male who presents with a chief complaint of fever, persistent cough (2020 08:43)      HPI:  74M with h/o COPD, DANIEL, Depression, HLD, HTN, CAD s/p PCI, CHB s/p PPM, bioprosthetic AVR, dementia, recent diagnosis of Hodgkin's lymphoma in Dec 2019 who presents with intermittent fevers and persistent productive cough since discharge from [facility] on Dec 20.. During [place] hospitalization, pt was treated for PNA and was found to have Hodgkin's lymphoma. Since discharge, pt has been having intermittent fevers, chills (t max 102.2) for which his wife has been treating with Motrin and Tylenol. Pt denies chest pain, palpitations, abd pain, dysuria, urinary frequency or diarrhea. He has not been on abx since his last hospitalization. Ee was scheduled for BMBx today as outpt. His wife brought him into the ED because of high fever and periods of confusion which has happened previously and temporally associated with fever. He currently appears comfortable, pleasant and not confused. Wife is at bedside.     INTERIM HISTORY: Pt seen and examined at bedside. Overnight events reviewed.       PAST MEDICAL & SURGICAL HISTORY:  Pacemaker  Hypertension  Lymphoma  Dementia  S/P AVR (aortic valve replacement)  Cardiac pacemaker      ECHO FINDINGS:      MEDICATIONS  (STANDING):  atorvastatin 10 milliGRAM(s) Oral at bedtime  cyanocobalamin 1000 MICROGram(s) Oral daily  folic acid 1 milliGRAM(s) Oral daily  imipramine 25 milliGRAM(s) Oral daily  metoprolol tartrate 50 milliGRAM(s) Oral every 12 hours  pantoprazole    Tablet 40 milliGRAM(s) Oral before breakfast  piperacillin/tazobactam IVPB.. 3.375 Gram(s) IV Intermittent every 8 hours  sodium chloride 0.9%. 1000 milliLiter(s) (70 mL/Hr) IV Continuous <Continuous>    MEDICATIONS  (PRN):  acetaminophen   Tablet .. 1000 milliGRAM(s) Oral every 6 hours PRN Temp greater or equal to 38C (100.4F), Mild Pain (1 - 3)  albuterol/ipratropium for Nebulization 3 milliLiter(s) Nebulizer every 6 hours PRN Shortness of Breath and/or Wheezing  benzonatate 100 milliGRAM(s) Oral every 8 hours PRN Cough  guaiFENesin   Syrup  (Sugar-Free) 200 milliGRAM(s) Oral every 6 hours PRN Cough      FAMILY HISTORY:  No pertinent family history in first degree relatives; Denies Family history of CAD or early MI      Constitutional: denies fever, chills  HEENT: denies blurry vision, difficulty hearing  Respiratory: denies SOB, QUINTANILLA, cough  Cardiovascular: denies CP, palpitations, orthopnea, PND, LE edema  Gastrointestinal: denies nausea, vomiting, abdominal pain  Genitourinary: denies urinary changes  Skin: Denies rashes, itching  Neurologic: denies headache, weakness, dizziness  Hematology/Oncology: denies bleeding, easy bruising  ROS negative except as noted above      SOCIAL HISTORY: Denies tobacco or EtOH use, history       Vital Signs Last 24 Hrs  T(C): 39.4 (2020 09:17), Max: 39.4 (2020 09:17)  T(F): 103 (2020 09:17), Max: 103 (2020 09:17)  HR: 92 (2020 09:17) (79 - 108)  BP: 122/83 (2020 09:17) (119/73 - 137/74)  BP(mean): --  RR: 17 (2020 09:17) (17 - 19)  SpO2: 96% (2020 09:17) (94% - 98%)    Physical Exam:  General: Well developed, well nourished, NAD  HEENT: NCAT, PERRLA, EOMI bl, moist mucous membranes   Neck: Supple, nontender, no mass  Neurology: A&Ox3, nonfocal, sensation intact   Respiratory: CTA B/L, No W/R/R  CV: RRR, +S1/S2, no murmurs, rubs or gallops  Abdominal: Soft, NT, ND +BSx4, no palpable masses  Extremities: No C/C/E, + peripheral pulses  MSK: Normal ROM, no joint erythema or warmth, no joint swelling   Heme: No obvious ecchymosis or petechiae   Skin: warm, dry, normal color      ECG:    I&O's Detail    2020 07:01  -  2020 07:00  --------------------------------------------------------  IN:  Total IN: 0 mL    OUT:    Voided: 75 mL  Total OUT: 75 mL    Total NET: -75 mL          LABS:                        8.9    1.77  )-----------( 44       ( 2020 08:22 )             28.4         139  |  108  |  16  ----------------------------<  90  3.9   |  19<L>  |  1.20    Ca    7.7<L>      2020 08:22    TPro  7.1  /  Alb  2.5<L>  /  TBili  1.1  /  DBili  x   /  AST  41<H>  /  ALT  28  /  AlkPhos  190<H>          PT/INR - ( 2020 08:22 )   PT: 23.4 sec;   INR: 2.03 ratio         PTT - ( 2020 08:22 )  PTT:38.0 sec  Urinalysis Basic - ( 2020 05:53 )    Color: Yellow / Appearance: Slightly Turbid / S.020 / pH: x  Gluc: x / Ketone: Small  / Bili: Small / Urobili: 4   Blood: x / Protein: 75 mg/dL / Nitrite: Positive   Leuk Esterase: Trace / RBC: 0-2 /HPF / WBC 3-5   Sq Epi: x / Non Sq Epi: Occasional / Bacteria: Few      I&O's Summary    2020 07:01  -  2020 07:00  --------------------------------------------------------  IN: 0 mL / OUT: 75 mL / NET: -75 mL      BNP    RADIOLOGY & ADDITIONAL STUDIES:   < from: Xray Chest 1 View-PORTABLE IMMEDIATE (20 @ 05:29) >  Status post median sternotomy. Heart magnified by AP film shallow inspiration. Unfolding calcination thoracic aorta. Grossly clear lungs. No consolidation or effusion. Left AICD in situ.    Impression: Grossly clear lungs    < end of copied text >      < from: CT Angio Chest w/ IV Cont (20 @ 10:09) >    FINDINGS:    LUNGS AND AIRWAYS: Patent central airways.  Multiple randomly distributedbilateral parenchymal nodules mostly subcentimeter. The largest nodule left lower lobe measures up to 1.2 cm. Findings likely represent metastatic disease. Mild bibasilar traction bronchiectasis.    PLEURA: No pleural effusion.    MEDIASTINUM AND FOUZIA: AP window, pretracheal bilateral paratracheal subcarinal and bilateral hilar lymphadenopathy likely neoplastic. The largest lymph nodes in the pretracheal station measures up to 2.2 cm.    VESSELS: No pulmonary emboli. Ectatic ascending thoracic aorta up to 4.9 cm. No dissection    HEART: Heart size is normal. Status post CABG. Coronary artery calcification. In situ cardiac pacer. No pericardial effusion.    CHEST WALL AND LOWER NECK: Within normal limits.    VISUALIZED UPPER ABDOMEN: Calcific cholelithiasis. Spleen is enlarged, not visualized in its entirety.    BONES: Within normal limits.    IMPRESSION:     Negative for pulmonary emboli.  Ectatic ascending thoracic aorta  Multistation mediastinal adenopathy as described, likely neoplastic.  Bilateral parenchymal nodularity likely representing metastatic disease.      < end of copied text > [facility]    Jesús Kelley, Kodak, Nneka, Mike, Senthil, Irene      [phone]    Initial Consult Note     CHIEF COMPLAINT: Patient is a 75 y/o male who presents with a chief complaint of fever, persistent cough      HPI:  74M with h/o COPD, DANIEL, Depression, HLD, HTN, CAD s/p PCI, CHB s/p PPM, bioprosthetic AVR, dementia, recent diagnosis of Hodgkin's lymphoma in Dec 2019 who presents with intermittent fevers and persistent productive cough since discharge from [facility] on Dec 20.. During [place] hospitalization, pt was treated for PNA and was found to have Hodgkin's lymphoma. Since discharge, pt has been having intermittent fevers, chills (t max 102.2) for which his wife has been treating with Motrin and Tylenol. Pt denies chest pain, palpitations, abd pain, dysuria, urinary frequency or diarrhea. He has not been on abx since his last hospitalization. Ee was scheduled for BMBx today as outpt. His wife brought him into the ED because of high fever and periods of confusion which has happened previously and temporally associated with fever. He currently appears comfortable, pleasant and not confused. Wife is at bedside.     INTERIM HISTORY: Pt seen and examined at bedside. Overnight events reviewed. Unable to assess meaningful ROS 2/2 Pt's confusion in setting of high fevers. D/w Pt's wife at bedside that he will not be going for procedure today.       PAST MEDICAL & SURGICAL HISTORY:  Pacemaker  Hypertension  Lymphoma  Dementia  S/P AVR (aortic valve replacement)  Cardiac pacemaker      ECHO FINDINGS:      MEDICATIONS  (STANDING):  atorvastatin 10 milliGRAM(s) Oral at bedtime  cyanocobalamin 1000 MICROGram(s) Oral daily  folic acid 1 milliGRAM(s) Oral daily  imipramine 25 milliGRAM(s) Oral daily  metoprolol tartrate 50 milliGRAM(s) Oral every 12 hours  pantoprazole    Tablet 40 milliGRAM(s) Oral before breakfast  piperacillin/tazobactam IVPB.. 3.375 Gram(s) IV Intermittent every 8 hours  sodium chloride 0.9%. 1000 milliLiter(s) (70 mL/Hr) IV Continuous <Continuous>    MEDICATIONS  (PRN):  acetaminophen   Tablet .. 1000 milliGRAM(s) Oral every 6 hours PRN Temp greater or equal to 38C (100.4F), Mild Pain (1 - 3)  albuterol/ipratropium for Nebulization 3 milliLiter(s) Nebulizer every 6 hours PRN Shortness of Breath and/or Wheezing  benzonatate 100 milliGRAM(s) Oral every 8 hours PRN Cough  guaiFENesin   Syrup  (Sugar-Free) 200 milliGRAM(s) Oral every 6 hours PRN Cough      FAMILY HISTORY:  No pertinent family history in first degree relatives; Denies Family history of CAD or early MI      ROS: Unable to assess meaningful ROS 2/2 Pt's confusion in setting of high fevers.     SOCIAL HISTORY: Denies tobacco or EtOH use, history       Vital Signs Last 24 Hrs  T(C): 39.4 (2020 09:17), Max: 39.4 (2020 09:17)  T(F): 103 (2020 09:17), Max: 103 (2020 09:17)  HR: 92 (2020 09:17) (79 - 108)  BP: 122/83 (2020 09:17) (119/73 - 137/74)  BP(mean): --  RR: 17 (2020 09:17) (17 - 19)  SpO2: 96% (2020 09:17) (94% - 98%)    Physical Exam:  General: Well developed, NAD  HEENT: NCAT, PERRLA, EOMI bl, moist mucous membranes   Neck: Supple, nontender, no mass  Neurology: Awake, A&Ox1, motor strength grossly intact, no obvious sensory abnl    Respiratory: CTA B/L, No W/R/R  CV: RRR, +S1/S2, +systolic murmur LUSB. no rubs or gallops  Abdominal: Soft, NTND, hypoactive BS x4 quadrants, no palpable masses  Extremities: No C/C/E, 2+ peripheral pulses  MSK: Normal ROM, no joint erythema or warmth, no joint swelling   Heme: No obvious ecchymosis or petechiae   Skin: Warm, diaphoretic, pallor      ECG: Sinus rhythm with PACs @89bpm, A sense and V paced     I&O's Detail    2020 07:01  -  2020 07:00  --------------------------------------------------------  IN:  Total IN: 0 mL    OUT:    Voided: 75 mL  Total OUT: 75 mL    Total NET: -75 mL          LABS:                        8.9    1.77  )-----------( 44       ( 2020 08:22 )             28.4         139  |  108  |  16  ----------------------------<  90  3.9   |  19<L>  |  1.20    Ca    7.7<L>      2020 08:22    TPro  7.1  /  Alb  2.5<L>  /  TBili  1.1  /  DBili  x   /  AST  41<H>  /  ALT  28  /  AlkPhos  190<H>          PT/INR - ( 2020 08:22 )   PT: 23.4 sec;   INR: 2.03 ratio         PTT - ( 2020 08:22 )  PTT:38.0 sec  Urinalysis Basic - ( 2020 05:53 )    Color: Yellow / Appearance: Slightly Turbid / S.020 / pH: x  Gluc: x / Ketone: Small  / Bili: Small / Urobili: 4   Blood: x / Protein: 75 mg/dL / Nitrite: Positive   Leuk Esterase: Trace / RBC: 0-2 /HPF / WBC 3-5   Sq Epi: x / Non Sq Epi: Occasional / Bacteria: Few      I&O's Summary    2020 07:01  -  2020 07:00  --------------------------------------------------------  IN: 0 mL / OUT: 75 mL / NET: -75 mL      BNP    RADIOLOGY & ADDITIONAL STUDIES:   < from: Xray Chest 1 View-PORTABLE IMMEDIATE (20 @ 05:29) >  Status post median sternotomy. Heart magnified by AP film shallow inspiration. Unfolding calcination thoracic aorta. Grossly clear lungs. No consolidation or effusion. Left AICD in situ.    Impression: Grossly clear lungs    < end of copied text >      < from: CT Angio Chest w/ IV Cont (20 @ 10:09) >    FINDINGS:    LUNGS AND AIRWAYS: Patent central airways.  Multiple randomly distributedbilateral parenchymal nodules mostly subcentimeter. The largest nodule left lower lobe measures up to 1.2 cm. Findings likely represent metastatic disease. Mild bibasilar traction bronchiectasis.    PLEURA: No pleural effusion.    MEDIASTINUM AND FOUZIA: AP window, pretracheal bilateral paratracheal subcarinal and bilateral hilar lymphadenopathy likely neoplastic. The largest lymph nodes in the pretracheal station measures up to 2.2 cm.    VESSELS: No pulmonary emboli. Ectatic ascending thoracic aorta up to 4.9 cm. No dissection    HEART: Heart size is normal. Status post CABG. Coronary artery calcification. In situ cardiac pacer. No pericardial effusion.    CHEST WALL AND LOWER NECK: Within normal limits.    VISUALIZED UPPER ABDOMEN: Calcific cholelithiasis. Spleen is enlarged, not visualized in its entirety.    BONES: Within normal limits.    IMPRESSION:     Negative for pulmonary emboli.  Ectatic ascending thoracic aorta  Multistation mediastinal adenopathy as described, likely neoplastic.  Bilateral parenchymal nodularity likely representing metastatic disease.      < end of copied text > [facility]    Jesús Kelley, Kodak, Nneka, Mike, Senthil, Irene      [phone]    Initial Consult Note     CHIEF COMPLAINT: Patient is a 75 y/o male who presents with a chief complaint of fever, persistent cough      HPI:  74M with h/o COPD, DANIEL, Depression, HLD, HTN, CAD s/p PCI, CHB s/p PPM, bioprosthetic AVR, dementia, recent diagnosis of Hodgkin's lymphoma in Dec 2019 who presents with intermittent fevers and persistent productive cough since discharge from [facility] on Dec 20.. During [place] hospitalization, pt was treated for PNA and was found to have Hodgkin's lymphoma. Since discharge, pt has been having intermittent fevers, chills (t max 102.2) for which his wife has been treating with Motrin and Tylenol. Pt denies chest pain, palpitations, abd pain, dysuria, urinary frequency or diarrhea. He has not been on abx since his last hospitalization. Ee was scheduled for BMBx today as outpt. His wife brought him into the ED because of high fever and periods of confusion which has happened previously and temporally associated with fever. He currently appears comfortable, pleasant and not confused. Wife is at bedside.     INTERIM HISTORY: Pt seen and examined at bedside. Overnight events reviewed. Unable to assess meaningful ROS 2/2 Pt's confusion in setting of high fevers. D/w Pt's wife at bedside that he will not be going for procedure today.       PAST MEDICAL & SURGICAL HISTORY:  Pacemaker  Hypertension  Lymphoma  Dementia  S/P AVR (aortic valve replacement)  Cardiac pacemaker             MEDICATIONS  (STANDING):  atorvastatin 10 milliGRAM(s) Oral at bedtime  cyanocobalamin 1000 MICROGram(s) Oral daily  folic acid 1 milliGRAM(s) Oral daily  imipramine 25 milliGRAM(s) Oral daily  metoprolol tartrate 50 milliGRAM(s) Oral every 12 hours  pantoprazole    Tablet 40 milliGRAM(s) Oral before breakfast  piperacillin/tazobactam IVPB.. 3.375 Gram(s) IV Intermittent every 8 hours  sodium chloride 0.9%. 1000 milliLiter(s) (70 mL/Hr) IV Continuous <Continuous>    MEDICATIONS  (PRN):  acetaminophen   Tablet .. 1000 milliGRAM(s) Oral every 6 hours PRN Temp greater or equal to 38C (100.4F), Mild Pain (1 - 3)  albuterol/ipratropium for Nebulization 3 milliLiter(s) Nebulizer every 6 hours PRN Shortness of Breath and/or Wheezing  benzonatate 100 milliGRAM(s) Oral every 8 hours PRN Cough  guaiFENesin   Syrup  (Sugar-Free) 200 milliGRAM(s) Oral every 6 hours PRN Cough      FAMILY HISTORY:  No pertinent family history in first degree relatives; Denies Family history of CAD or early MI      ROS: Unable to assess meaningful ROS 2/2 Pt's confusion in setting of high fevers.     SOCIAL HISTORY: Denies tobacco or EtOH use, history       Vital Signs Last 24 Hrs  T(C): 39.4 (2020 09:17), Max: 39.4 (2020 09:17)  T(F): 103 (2020 09:17), Max: 103 (2020 09:17)  HR: 92 (2020 09:17) (79 - 108)  BP: 122/83 (2020 09:17) (119/73 - 137/74)  BP(mean): --  RR: 17 (2020 09:17) (17 - 19)  SpO2: 96% (2020 09:17) (94% - 98%)    Physical Exam:  General: Well developed, NAD  HEENT: NCAT, PERRLA, EOMI bl, moist mucous membranes   Neck: Supple, nontender, no mass  Neurology: Awake, A&Ox1, motor strength grossly intact, no obvious sensory abnl    Respiratory: CTA B/L, No W/R/R  CV: RRR, +S1/S2, +systolic murmur LUSB. no rubs or gallops  Abdominal: Soft, NTND, hypoactive BS x4 quadrants, no palpable masses  Extremities: No C/C/E, 2+ peripheral pulses  MSK: Normal ROM, no joint erythema or warmth, no joint swelling   Heme: No obvious ecchymosis or petechiae   Skin: Warm, diaphoretic, pallor      ECG: Sinus rhythm with PACs @89bpm, A sense and V paced     I&O's Detail    2020 07:01  -  2020 07:00  --------------------------------------------------------  IN:  Total IN: 0 mL    OUT:    Voided: 75 mL  Total OUT: 75 mL    Total NET: -75 mL          LABS:                        8.9    1.77  )-----------( 44       ( 2020 08:22 )             28.4         139  |  108  |  16  ----------------------------<  90  3.9   |  19<L>  |  1.20    Ca    7.7<L>      2020 08:22    TPro  7.1  /  Alb  2.5<L>  /  TBili  1.1  /  DBili  x   /  AST  41<H>  /  ALT  28  /  AlkPhos  190<H>          PT/INR - ( 2020 08:22 )   PT: 23.4 sec;   INR: 2.03 ratio         PTT - ( 2020 08:22 )  PTT:38.0 sec  Urinalysis Basic - ( 2020 05:53 )    Color: Yellow / Appearance: Slightly Turbid / S.020 / pH: x  Gluc: x / Ketone: Small  / Bili: Small / Urobili: 4   Blood: x / Protein: 75 mg/dL / Nitrite: Positive   Leuk Esterase: Trace / RBC: 0-2 /HPF / WBC 3-5   Sq Epi: x / Non Sq Epi: Occasional / Bacteria: Few      I&O's Summary    2020 07:01  -  2020 07:00  --------------------------------------------------------  IN: 0 mL / OUT: 75 mL / NET: -75 mL      BNP    RADIOLOGY & ADDITIONAL STUDIES:   < from: Xray Chest 1 View-PORTABLE IMMEDIATE (20 @ 05:29) >  Status post median sternotomy. Heart magnified by AP film shallow inspiration. Unfolding calcination thoracic aorta. Grossly clear lungs. No consolidation or effusion. Left AICD in situ.    Impression: Grossly clear lungs    < end of copied text >      < from: CT Angio Chest w/ IV Cont (20 @ 10:09) >    FINDINGS:    LUNGS AND AIRWAYS: Patent central airways.  Multiple randomly distributedbilateral parenchymal nodules mostly subcentimeter. The largest nodule left lower lobe measures up to 1.2 cm. Findings likely represent metastatic disease. Mild bibasilar traction bronchiectasis.    PLEURA: No pleural effusion.    MEDIASTINUM AND FOUZIA: AP window, pretracheal bilateral paratracheal subcarinal and bilateral hilar lymphadenopathy likely neoplastic. The largest lymph nodes in the pretracheal station measures up to 2.2 cm.    VESSELS: No pulmonary emboli. Ectatic ascending thoracic aorta up to 4.9 cm. No dissection    HEART: Heart size is normal. Status post CABG. Coronary artery calcification. In situ cardiac pacer. No pericardial effusion.    CHEST WALL AND LOWER NECK: Within normal limits.    VISUALIZED UPPER ABDOMEN: Calcific cholelithiasis. Spleen is enlarged, not visualized in its entirety.    BONES: Within normal limits.    IMPRESSION:     Negative for pulmonary emboli.  Ectatic ascending thoracic aorta  Multistation mediastinal adenopathy as described, likely neoplastic.  Bilateral parenchymal nodularity likely representing metastatic disease.      < end of copied text >

## 2020-01-09 NOTE — CHART NOTE - NSCHARTNOTEFT_GEN_A_CORE
Called by RN for patient confused and trying to get out of bed. Patient seen and evaluated at bedside. A&Ox2, does not know which hospital he is in, but constantly states that he needs to get out of bed. Patient appears anxious and scared. CPAP taken off because it was worsening patient's agitation. Patient agreed to not get out of bed if someone stays with him in the room. Placed patient on 1:1 for patient's safety because patient is at risk for falls if he gets out of bed. Will continue to monitor, RN to call if any changes.

## 2020-01-09 NOTE — GOALS OF CARE CONVERSATION - ADVANCED CARE PLANNING - NS PRO AD PATIENT TYPE
Health Care Proxy (HCP)
Health Care Proxy (HCP)
guardian/Medical Orders for Life-Sustaining Treatment (MOLST)

## 2020-01-09 NOTE — PROGRESS NOTE ADULT - ATTENDING COMMENTS
pt seen and examine see  above plan -  pancytopenia  possible early  DIC 2/2 Hodgkins lymphoma - pt over all prognosis  discuss with hem/onc dr agustin   will discuss with family for palliative  care  .
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
## 2020-01-09 NOTE — PROGRESS NOTE ADULT - PROBLEM SELECTOR PLAN 5
ASA on hold  c/w lopressor  c/w lipitor  cardio Dr. Ndiaye consulted, recs appreciated h/o dementia with behavioral disturbance during previous hospitalization and at home when develops high fevers  emphasize re-orientation   fall precautions  currently confused  continue constant observation

## 2020-01-09 NOTE — CONSULT NOTE ADULT - ASSESSMENT
Assessment:    Plan:    #Neuro:    -  #Pulm:    #CV:    #GI/:    #ID:    #FEN/ENDO/HEME:  -obtain CMP/Mg++/PO--4/CBC w diff/PT/PTT/INR  q. a.m.  -type and cross match for PRBC  -transfuse PRBC to maintain Hgb > 7 - 7.5  -Thrombocytopenic - several causes has to be thought of in this patient that includes  DIC , HLH, (TTP)  secondary to underlying malignancy  -Modified scoring for DIC dx calculated to be 6 - treat underlying cause  -to help r/o HLH - obtain ferritin level, trig            Critical Care Time: 35minutes   Reviewing data, imaging, discussing with multidisciplinary team, not inclusive of procedures, discussing goals of care with family Assessment:  74 yr old male with stated Hx significant for COPD/DANIEL  Plan:    #Neuro:  -Neuro checks q 4 hrs and prn for changes  -activity as tolerated  -obtain realistic GOC conversation with family  -physical therapy consult when stable    #Pulm:  -Supplemental O2 prn to maintain SPO2 > 92%  -Bronchodilators q 6 hrs prn for sob/wheezes  -HOB >/= 30 degree angle    #CV:  -continue to monitor  -vital signs within stable range  -workup as condition warrants    #GI/:  -diet as tolerated  -strict I & O's - keep even    #ID:  -Pt with recurrent febrile episodes - most likely due to malignancy  -obtain surveillance cultures in a.m.  -+ UA 1/8/20  -started on zosyn yesterday (1/9/20) - will continue at present for total of 5 to 7 day  -as differential may include HLH - will need bone marrow bx  -consider pulse dose steroids (ascertain HLH)    #FEN/ENDO/HEME:  -obtain CMP/Mg++/PO--4/CBC w diff/PT/PTT/INR  q. a.m.  -type and cross match for PRBC  -transfuse PRBC to maintain Hgb > 7 - 7.5  -Thrombocytopenic - several causes has to be thought of in this patient that includes  DIC , HLH, (TTP)  secondary to underlying malignancy  -Modified scoring for DIC dx calculated to be 6 - treat underlying cause  -to help r/o HLH - obtain ferritin level, triglyceride levels, CD25  in a.m.  -Transfuse platelets for plt count <15 when febrile, < 10 when afebrile  -INR currently 2.03 - consider vitamin K 10 mg IVSS               Critical Care Time: 35minutes   Reviewing data, imaging, discussing with multidisciplinary team, not inclusive of procedures, discussing goals of care with family Assessment:  74 yr old male with stated Hx significant for COPD/DANIEL,CAD, PPM, Bio Ao valve replacement, newly dx hodgkins lymphoma who presented to \[facility]\"" 1/8/20 with AMS, fever, cough. This hospital course c/b worsening pancytopenia, recurrent fever, AMS      Consult called for worsening thrombocytopenia, leukopenia and persistent fevers, AMS      Plan:    #Neuro:  -Neuro checks q 4 hrs and prn for changes  -activity as tolerated  -obtain realistic GOC conversation with family  -physical therapy consult when stable    #Pulm:  -Supplemental O2 prn to maintain SPO2 > 92%  -Bronchodilators q 6 hrs prn for sob/wheezes  -HOB >/= 30 degree angle    #CV:  -continue to monitor  -vital signs within stable range  -workup as condition warrants    #GI/:  -diet as tolerated  -strict I & O's - keep even    #ID:  -Pt with recurrent febrile episodes - most likely due to malignancy  -obtain surveillance cultures in a.m.  -+ UA 1/8/20  -started on zosyn yesterday (1/9/20) - will continue at present for total of 5 to 7 day  -as differential may include HLH - will need bone marrow bx  -consider pulse dose steroids (ascertain HLH)    #FEN/ENDO/HEME:  -obtain CMP/Mg++/PO--4/CBC w diff/PT/PTT/INR  q. a.m.  -type and cross match for PRBC  -transfuse PRBC to maintain Hgb > 7 - 7.5  -Thrombocytopenic - several causes has to be thought of in this patient that includes  DIC , HLH, (TTP)  secondary to underlying malignancy  -Modified scoring for DIC dx calculated to be 6 - treat underlying cause  -to help r/o HLH - obtain ferritin level, triglyceride levels, CD25  in a.m.  -Transfuse platelets for plt count <15 when febrile, < 10 when afebrile  -INR currently 2.03 - consider vitamin K 10 mg IVSS               Critical Care Time: 35minutes   Reviewing data, imaging, discussing with multidisciplinary team, not inclusive of procedures, discussing goals of care with family Assessment:  74 yr old male with stated Hx significant for COPD/DANIEL,CAD, PPM, Bio Ao valve replacement, newly dx hodgkins lymphoma who presented to [facility] 1/8/20 with AMS, fever, cough. This hospital course c/b worsening pancytopenia, recurrent fever, AMS      Consult called for worsening thrombocytopenia, leukopenia and persistent fevers, AMS      Plan:    #Neuro:  -Neuro checks q 4 hrs and prn for changes  -activity as tolerated  -obtain realistic GOC conversation with family  -physical therapy consult when stable    #Pulm:  -Supplemental O2 prn to maintain SPO2 > 92%  -Bronchodilators q 6 hrs prn for sob/wheezes  -HOB >/= 30 degree angle    #CV:  -continue to monitor  -vital signs within stable range  -workup as condition warrants    #GI/:  -diet as tolerated  -strict I & O's - keep even    #ID:  -Pt with recurrent febrile episodes - most likely due to malignancy  -obtain surveillance cultures in a.m.  -+ UA 1/8/20  -started on zosyn yesterday (1/9/20) - will continue at present for total of 5 to 7 day  -as differential may include HLH - will need bone marrow bx  -consider pulse dose steroids (ascertain HLH)    #FEN/ENDO/HEME:  -obtain CMP/Mg++/PO--4/CBC w diff/PT/PTT/INR  q. a.m.  -type and cross match for PRBC  -transfuse PRBC to maintain Hgb > 7 - 7.5  -Thrombocytopenic - several causes has to be thought of in this patient that includes  DIC , HLH, (TTP)  secondary to underlying malignancy  -possible DIC - awaiting heme/onc review  -to help r/o HLH - obtain ferritin level, triglyceride levels, CD25  in a.m.  -Transfuse platelets for plt count <15 when febrile, < 10 when afebrile  -INR currently 2.03 - consider vitamin K 10 mg IVSS               Critical Care Time: 35minutes   Reviewing data, imaging, discussing with multidisciplinary team, not inclusive of procedures, discussing goals of care with family

## 2020-01-09 NOTE — PROGRESS NOTE ADULT - PROBLEM SELECTOR PLAN 6
mild MILO, possibly from NSAID use at home coupled with some dehydration  s/p 2L IVF in ED- would hold off on additional IVF  improving   fu AM BMP ASA on hold  c/w lopressor  c/w lipitor  cardio Dr. Ndiaye consulted, recs appreciated

## 2020-01-09 NOTE — GOALS OF CARE CONVERSATION - ADVANCED CARE PLANNING - CONVERSATION DETAILS
Pt condition and prognosis discussed with her Guardian(sister). Pt is MRDD and has approval from mental Hygiene Legal services for comfort measures etc. Guardian agrees
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
## 2020-01-10 NOTE — PROGRESS NOTE ADULT - ASSESSMENT
[ASSESSMENT and  PLAN]  Classical Hodgkins Lymphoma  CD30+, CD15+, CD20 neg  Stage II with GUERO below diaphragm.    Lung nodules of unclear etiology  Patchy GGO    Viral illness    Beta thal trait.     Coagulopathy with INR 1.4 - 1.8.   Mildly elevated PTT also.     Thrombocytopenia, since last admission, had improved to 60-70 outpt.     ? Liver disease. CT imaging prior unrevealing.     Cardiac comorbdities:  AVR, HTN, PPM for arrthymia.     CT scan reviewed. POD since last eval vs infection and adenopathy.   Concern about fever being related to hodgkins disease and possible bone marrow involvement    Mixing studies with weak inhibitor with correction immediately but prolongation at 2hrs    RECOMMENDATIONS  continue Infectious disease eval.   ADequate IgG levels  Cotninue Abx, as warranted.   Nebs, steroids per pulm.    Undergoing staging workup and preparation for tx with chemo outpt.   But now might need inpt tx    Since in hosp, if resp stable, arranged for BM bx mon with IR.  TO have repeat Echo, last suboptimal 12/2019.   Plan for PICC line as need Dacarbazine and Adraimycin for chemo    Cardiac eval for, plan for optimization in preparation for chemo with ABVD. (Adrimaycin, Bleomycin, Vinblastine, Dacarbazine)    Was to have PFTs with pulm, as usual chemo included Bleomycin. .     DVT Prophylaxis: LMWH or SQ heparin    s/p Dose VitK 5mg  Followup mixing studies  Follow coag panel in AM    Folic acid daily for thal trait.     Continue Solumedrol.   Wife is aware of high risk for complication and death from treatment given possible advancing disease.    NPO past MN on Sun.   No AC before procedure. Not on AC  Check CBC in AM, May need plts in AM before procedure.

## 2020-01-10 NOTE — PROGRESS NOTE ADULT - ASSESSMENT
74M with h/o COPD, DANIEL, Depression, HLD, HTN, CAD s/p PCI, CHB s/p PPM, bioprosthetic AVR, dementia, recent diagnosis of Hodgkin's lymphoma in Dec 2019 who presents with fevers and peristent likely in setting of untreated Hodgkin's lymphoma. Now with possible DIC. 74M with h/o COPD, DANIEL, Depression, HLD, HTN, CAD s/p PCI, CHB s/p PPM, bioprosthetic AVR, dementia, recent diagnosis of Hodgkin's lymphoma in Dec 2019 who presents with fevers and peristent likely in setting of untreated Hodgkin's lymphoma. Now with possible early  DIC.

## 2020-01-10 NOTE — PROGRESS NOTE ADULT - SUBJECTIVE AND OBJECTIVE BOX
Patient is a 74y old  Male who presents with a chief complaint of fever, persistent cough (10 Warren 2020 06:47)      HPI:  74M with h/o COPD, DANIEL, Depression, HLD, HTN, CAD s/p PCI, CHB s/p PPM, bioprosthetic AVR, dementia, recent diagnosis of Hodgkin's lymphoma in Dec 2019 who presents with intermittent fevers and persistent productive cough since discharge from [facility] on Dec 20.. During [place] hospitalization, pt was treated for PNA and was found to have Hodgkin's lymphoma. Since discharge, pt has been having intermittent fevers, chills (t max 102.2) for which his wife has been treating with Motrin and Tylenol. Pt denies chest pain, palpitations, abd pain, dysuria, urinary frequency or diarrhea. He has not been on abx since his last hospitalization. he was scheduled for BMBx today as outpt. His wife brought him into the ED because of high fever and periods of confusion which has happened previously and temprally associated with fever. He currently appears comfortable, pleasant and not confused. Wife is at bedside. (08 Jan 2020 08:11)      INTERVAL HPI/OVERNIGHT EVENTS:  Patient started on solumedrol yesterday. Seen and examined at bedside. Patient mental status has improved. Afebrile overnight. States he feels well. Remains with cough.       T(C): 36.8 (01-10-20 @ 04:19), Max: 39.2 (01-09-20 @ 15:55)  HR: 81 (01-10-20 @ 04:19) (66 - 106)  BP: 125/82 (01-10-20 @ 04:19) (104/64 - 128/78)  RR: 19 (01-10-20 @ 04:19) (16 - 19)  SpO2: 100% (01-10-20 @ 04:19) (97% - 100%)  Wt(kg): --  I&O's Summary    09 Jan 2020 07:01  -  10 Warren 2020 07:00  --------------------------------------------------------  IN: 1015 mL / OUT: 0 mL / NET: 1015 mL        REVIEW OF SYSTEMS:  CONSTITUTIONAL: No fever, weight loss, or fatigue  ENMT:  No difficulty hearing, tinnitus, vertigo; No sinus or throat pain  RESPIRATORY: No cough, wheezing, chills or hemoptysis; No shortness of breath  CARDIOVASCULAR: No chest pain, palpitations, dizziness, or leg swelling  GASTROINTESTINAL: No abdominal or epigastric pain. No nausea, vomiting, or hematemesis; No diarrhea or constipation. No melena or hematochezia.  GENITOURINARY: No dysuria, frequency, hematuria, or incontinence  NEUROLOGICAL: No headaches, memory loss, loss of strength, numbness, or tremors  MUSCULOSKELETAL: No joint pain or swelling; No muscle, back, or extremity pain  PSYCHIATRIC: No depression, anxiety, mood swings, or difficulty sleeping      PHYSICAL EXAM:  GENERAL: NAD, well-groomed, well-developed  HEAD:  Atraumatic, Normocephalic  EYES: EOMI  ENMT: No tonsillar erythema, exudates, or enlargement; Moist mucous membranes, Good dentition, No lesions  NERVOUS SYSTEM:  Alert & Oriented X3, Good concentration  CHEST/LUNG: Clear to percussion bilaterally; No rales, rhonchi, wheezing, or rubs  HEART: Regular rate and rhythm; No murmurs, rubs, or gallops  ABDOMEN: Soft, Nontender, Nondistended; Bowel sounds present  EXTREMITIES:  2+ Peripheral Pulses, No clubbing, cyanosis, or edema      MEDICATIONS  (STANDING):  atorvastatin 10 milliGRAM(s) Oral at bedtime  cyanocobalamin 1000 MICROGram(s) Oral daily  folic acid 1 milliGRAM(s) Oral daily  imipramine 25 milliGRAM(s) Oral daily  methylPREDNISolone sodium succinate Injectable 80 milliGRAM(s) IV Push every 8 hours  metoprolol tartrate 50 milliGRAM(s) Oral every 12 hours  pantoprazole    Tablet 40 milliGRAM(s) Oral before breakfast  piperacillin/tazobactam IVPB.. 3.375 Gram(s) IV Intermittent every 8 hours  sodium chloride 0.9%. 1000 milliLiter(s) (60 mL/Hr) IV Continuous <Continuous>    MEDICATIONS  (PRN):  acetaminophen   Tablet .. 1000 milliGRAM(s) Oral every 6 hours PRN Temp greater or equal to 38C (100.4F), Mild Pain (1 - 3)  albuterol/ipratropium for Nebulization 3 milliLiter(s) Nebulizer every 6 hours PRN Shortness of Breath and/or Wheezing  benzonatate 100 milliGRAM(s) Oral every 8 hours PRN Cough  guaiFENesin   Syrup  (Sugar-Free) 200 milliGRAM(s) Oral every 6 hours PRN Cough      LABS:                        8.9    1.77  )-----------( 44       ( 09 Jan 2020 08:22 )             28.4     01-09    139  |  108  |  16  ----------------------------<  90  3.9   |  19<L>  |  1.20    Ca    7.7<L>      09 Jan 2020 08:22    TPro  7.1  /  Alb  2.5<L>  /  TBili  1.1  /  DBili  x   /  AST  41<H>  /  ALT  28  /  AlkPhos  190<H>  01-09    PT/INR - ( 09 Jan 2020 08:22 )   PT: 23.4 sec;   INR: 2.03 ratio         PTT - ( 09 Jan 2020 08:22 )  PTT:38.0 sec    CAPILLARY BLOOD GLUCOSE      POCT Blood Glucose.: 121 mg/dL (10 Warren 2020 07:40)      01-08 @ 08:48   No growth to date.  --  --  01-08 @ 08:26   No growth  --  --          RADIOLOGY & ADDITIONAL TESTS:    Imaging Personally Reviewed:       Advance Directives:      Palliative Care: Patient is a 74y old  Male who presents with a chief complaint of fever, persistent cough (10 Warren 2020 06:47)      HPI:  74M with h/o COPD, DANIEL, Depression, HLD, HTN, CAD s/p PCI, CHB s/p PPM, bioprosthetic AVR, dementia, recent diagnosis of Hodgkin's lymphoma in Dec 2019 who presents with intermittent fevers and persistent productive cough since discharge from [facility] on Dec 20.. During [place] hospitalization, pt was treated for PNA and was found to have Hodgkin's lymphoma. Since discharge, pt has been having intermittent fevers, chills (t max 102.2) for which his wife has been treating with Motrin and Tylenol. Pt denies chest pain, palpitations, abd pain, dysuria, urinary frequency or diarrhea. He has not been on abx since his last hospitalization. he was scheduled for BMBx today as outpt. His wife brought him into the ED because of high fever and periods of confusion which has happened previously and temprally associated with fever. He currently appears comfortable, pleasant and not confused. Wife is at bedside. (08 Jan 2020 08:11)   admitted   peristent likely in setting of untreated Hodgkin's lymphoma. Now with possible early  DIC.      fever  , cough   pancytopenia  likely in setting of untreated Hodgkin's lymphoma. Now with possible early  DIC-       INTERVAL HPI/OVERNIGHT EVENTS:    Patient started on solumedrol yesterday. Seen and examined at bedside. Patient mental status has improved. Afebrile overnight. States he feels well. Remains with cough.       T(C): 36.8 (01-10-20 @ 04:19), Max: 39.2 (01-09-20 @ 15:55)  HR: 81 (01-10-20 @ 04:19) (66 - 106)  BP: 125/82 (01-10-20 @ 04:19) (104/64 - 128/78)  RR: 19 (01-10-20 @ 04:19) (16 - 19)  SpO2: 100% (01-10-20 @ 04:19) (97% - 100%)  Wt(kg): --  I&O's Summary    09 Jan 2020 07:01  -  10 Warren 2020 07:00  --------------------------------------------------------  IN: 1015 mL / OUT: 0 mL / NET: 1015 mL        REVIEW OF SYSTEMS:  CONSTITUTIONAL: No fever, weight loss, or fatigue  ENMT: ; No sinus or throat pain  RESPIRATORY: No cough, wheezing, chills or hemoptysis; No shortness of breath  CARDIOVASCULAR: No chest pain, palpitations, dizziness, or leg swelling  GASTROINTESTINAL: No abdominal or epigastric pain. No nausea, vomiting, No diarrhea or constipation. No melena or hematochezia.  GENITOURINARY: No dysuria, frequency, hematuria, or incontinence  NEUROLOGICAL: No headaches, memory loss, loss of strength, numbness, or tremors  MUSCULOSKELETAL: No joint pain or swelling; No muscle, back, or extremity pain  PSYCHIATRIC: No depression, anxiety, mood swings, or difficulty sleeping      PHYSICAL EXAM:  GENERAL: NAD, well-groomed, well-developed  HEAD:  Atraumatic, Normocephalic  EYES: EOMI  ENMT:Moist mucous membranes,  No lesions  NERVOUS SYSTEM:  Alert & Oriented X3, Good concentration , motor intact 5/5   CHEST/LUNG: Clear to percussion bilaterally; No rales, rhonchi, wheezing,   HEART: Regular rate and rhythm; No murmurs,   ABDOMEN: Soft, Nontender, Nondistended; Bowel sounds present  EXTREMITIES:  2+ Peripheral Pulses, No clubbing, cyanosis, or edema  gu intact     MEDICATIONS  (STANDING):  atorvastatin 10 milliGRAM(s) Oral at bedtime  cyanocobalamin 1000 MICROGram(s) Oral daily  folic acid 1 milliGRAM(s) Oral daily  imipramine 25 milliGRAM(s) Oral daily  methylPREDNISolone sodium succinate Injectable 80 milliGRAM(s) IV Push every 8 hours  metoprolol tartrate 50 milliGRAM(s) Oral every 12 hours  pantoprazole    Tablet 40 milliGRAM(s) Oral before breakfast  piperacillin/tazobactam IVPB.. 3.375 Gram(s) IV Intermittent every 8 hours  sodium chloride 0.9%. 1000 milliLiter(s) (60 mL/Hr) IV Continuous <Continuous>    MEDICATIONS  (PRN):  acetaminophen   Tablet .. 1000 milliGRAM(s) Oral every 6 hours PRN Temp greater or equal to 38C (100.4F), Mild Pain (1 - 3)  albuterol/ipratropium for Nebulization 3 milliLiter(s) Nebulizer every 6 hours PRN Shortness of Breath and/or Wheezing  benzonatate 100 milliGRAM(s) Oral every 8 hours PRN Cough  guaiFENesin   Syrup  (Sugar-Free) 200 milliGRAM(s) Oral every 6 hours PRN Cough      LABS:                        8.9    1.77  )-----------( 44       ( 09 Jan 2020 08:22 )             28.4     01-09    139  |  108  |  16  ----------------------------<  90  3.9   |  19<L>  |  1.20    Ca    7.7<L>      09 Jan 2020 08:22    TPro  7.1  /  Alb  2.5<L>  /  TBili  1.1  /  DBili  x   /  AST  41<H>  /  ALT  28  /  AlkPhos  190<H>  01-09    PT/INR - ( 09 Jan 2020 08:22 )   PT: 23.4 sec;   INR: 2.03 ratio         PTT - ( 09 Jan 2020 08:22 )  PTT:38.0 sec    CAPILLARY BLOOD GLUCOSE      POCT Blood Glucose.: 121 mg/dL (10 Warren 2020 07:40)      01-08 @ 08:48   No growth to date.  --  --  01-08 @ 08:26   No growth  --  --          RADIOLOGY & ADDITIONAL TESTS:    Imaging Personally Reviewed:   no new test     Advance Directives:    full code   Palliative Care:      appropraite

## 2020-01-10 NOTE — PROGRESS NOTE ADULT - ASSESSMENT
Cathi Horn DNP, NP-C  Cardiology   Spectra #[phone] 74M with h/o COPD, DANIEL, Depression, HLD, HTN, CAD s/p PCI, CHB s/p PPM, bioprosthetic AVR, dementia, recent diagnosis of Hodgkin's lymphoma in Dec 2019 who presents high fevers and confusion and inpatient BM biopsy    CAD s/p PCI  - No signs of significant ischemia or volume overload.   - EKG Vpaced.  NSR on tele.  Can discontinue  - Hold ASA for now.  Resume when cleared by Heme  - Continue metoprolol 50mg q12  - Continue statin     Bioprosthetic AVR  - No evidence of volume overload    Thrombocytopenia  - F/u heme evaluation   - Workup for DIC per Heme  - Platelet stable.  Monitor for s/s bleeding  - Monitor closely for bleeding   - No planned for BM Bx  - CTA chest negative of PE but suspicious of lung mets    HLD  - Continue statin    AMS   - Likely from fevers and underlying metabolic derangements     Once medically better optimized can proceed with BM biospy  Other cardiovascular workup will depend on clinical course  All other workup per primary team  Will follow     Cathi Horn DNP, NP-C  Cardiology   Spectra #[phone]

## 2020-01-10 NOTE — PROGRESS NOTE ADULT - PROBLEM SELECTOR PLAN 7
mild MILO, possibly from NSAID use at home coupled with some dehydration  s/p 2L IVF in ED- would hold off on additional IVF  improving   fu AM BMP mild MILO, possibly from NSAID use at home coupled with some dehydration  s/p 2L IVF in ED-     with gentle hydration IVF  improving   fu AM BMP
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
## 2020-01-10 NOTE — PROGRESS NOTE ADULT - PROBLEM SELECTOR PLAN 9
PAS for now; spoke with Melecoi Nino re: pharmacologic ppx- however given platelet count <50  and high inr will not give heparin at this time.  PT eval to encourage ambulation
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
## 2020-01-10 NOTE — PROGRESS NOTE ADULT - PROBLEM SELECTOR PLAN 1
steroids as per heme onc for Lymphoma sx management  on NEBS prn  on emp ABX for poss secondary infection - immunosuppressed -   CPAP use nightly for DANIEL  supportive medical regimen  ID follow up reviewed  labs and imaging reviewed  GOC discussed with Wife and Patient  will follow  cough rx regimen prn  tylenol PRN

## 2020-01-10 NOTE — PROGRESS NOTE ADULT - ATTENDING COMMENTS
pt seen and examine see  above plan -   acute  on chr pancytopenia  possible early  DIC  with tumor related  fever  2/2 Hodgkins lymphoma -  iv solumedrol 80 mg iv q8hr  , low suspicion of any infectious process , no pna , no uti will dc abx  in 24 hr  , id dr pitts .  pt over all prognosis  discuss with hem/onc dr agustin     but will observe till Monday if pt tolerate bone marrow biopsy  . coagulopathy sec to lymphoma - fu inr  , cbc .

## 2020-01-10 NOTE — PROGRESS NOTE ADULT - PROBLEM SELECTOR PLAN 5
h/o dementia with behavioral disturbance during previous hospitalization and at home when develops high fevers  emphasize re-orientation   fall precautions  currently confused  continue constant observation

## 2020-01-10 NOTE — PROGRESS NOTE ADULT - PROBLEM SELECTOR PLAN 3
pt is not on anticoagulation  fibrinogen, d dimer, PT, PTT, INR elevated- DIC cannot be ruled out. ISTH DIC score 7  fibrinogen split products >5 <20.   Heme/onc Dr. agustin consulted, recs appreciated   patient received one dose Vit K without decrease in INR pt is not on anticoagulation  fibrinogen, d dimer, PT, PTT, INR elevated- DIC cannot be ruled out   possible early stage   now stable count . ISTH DIC score 7  fibrinogen split products >5 <20.   Heme/onc Dr. agustin consulted, recs appreciated   patient received one dose Vit K without decrease in INR

## 2020-01-10 NOTE — PROGRESS NOTE ADULT - PROBLEM SELECTOR PLAN 6
ASA on hold  c/w lopressor  c/w lipitor  cardio Dr. Ndiaye consulted, recs appreciated ASA on hold  c/w lopressor  c/w Lipitor  cardio Dr. Ndiaye consulted, recs appreciated

## 2020-01-10 NOTE — PROGRESS NOTE ADULT - PROBLEM SELECTOR PLAN 2
possible DIC 2/2 Hodgkins lymphoma  IS DIC score 7  elevated INR, PT, aPTT, fibrinogen, d dimer, fibrinogen split products.  No epistaxis/hemoptysis or overt GIB noted by pt/wife  monitor AM CBC  heme/on Dr. agustin following, recs appreciated  ICU eval called, patient not ICU candidate at this time  START solumedrol 80mg q8 to aide with inflammation and blood counts possible  early  DIC 2/2 Hodgkins lymphoma  with tumor related fever   ISTH DIC score 7   elevated INR, PT, aPTT, fibrinogen, d dimer, fibrinogen split products.  No epistaxis/hemoptysis or overt GIB noted by pt/wife  monitor AM CBC  heme/on Dr. agustin following, recs appreciated  ICU eval called, patient not ICU candidate at this time  START solumedrol 80mg q8 to aide with inflammation and blood counts

## 2020-01-10 NOTE — PROGRESS NOTE ADULT - PROBLEM SELECTOR PLAN 1
pt was diagnosed in Dec 2019. Sees Dr. Majano (consult placed)  Was scheduled for BMbx this Friday; by  Dr. Majano.  suspect symptoms of intermittent fevers, chills, cough are related to Hodgkin's lymphoma rather than an acute infectious process.   procal 1.15   blood cx pending , ucult neg   continue emperic zosyn per ID recs  START solumedrol 80mg q8 to aide with inflammation and blood counts  continue IVF at 60cc/hr  patient will be monitored on steroids over the weekend, if respiratory status improves and INR/platelets improve patient will have BMbx monday with IR

## 2020-01-10 NOTE — PROGRESS NOTE ADULT - PROBLEM SELECTOR PLAN 4
continue zosyn for now though doubt infectious process, fever and cough likely related to metastatic Hodgkin's lymphoma.   ID Dr. Majano consulted  Dr. Moore (pulm) consulted  continue CPAP qhs  Received a CTA chest ordered by Pulm that did not show PNA, PE or pleural effusion.

## 2020-01-10 NOTE — PROGRESS NOTE ADULT - SUBJECTIVE AND OBJECTIVE BOX
[INTERVAL HX: ]  Patient seen and examined;  Chart reviewed and events noted;   fever lower.   no CP, no SOB.   spoke with pt wife in detail over telephone this afternoon and laid out plan.   I spoke with cardio team.     Patient is a 74y Male with a known history of :  Pneumonia due to organism (J18.9) [Active]  Pacemaker (Z95.0) [Active]  Hypertension (I10) [Active]  Lymphoma (C85.90) [Active]  Dementia (F03.90) [Active]  COPD (chronic obstructive pulmonary disease) (J44.9) [Active]  Depression (F32.9) [Active]  DVT (deep venous thrombosis) (I82.409) [Active]  HLD (hyperlipidemia) (E78.5) [Active]  HTN (hypertension) (I10) [Active]  Aortic valve replaced (Z95.2) [Active]  Stented coronary artery (Z95.5) [Active]  CAD (coronary artery disease) (I25.10) [Active]  S/P AVR (aortic valve replacement) (Z95.2) [Active]  Cardiac pacemaker (Z95.0) [Active]  Artificial pacemaker (Z95.0) [Active]  S/P aortic valve replacement with porcine valve (Z95.3) [Active]    HPI:  74M with h/o COPD, DANIEL, Depression, HLD, HTN, CAD s/p PCI, CHB s/p PPM, bioprosthetic AVR, dementia, recent diagnosis of Hodgkin's lymphoma in Dec 2019 who presents with intermittent fevers and persistent productive cough since discharge from [facility] on Dec 20.. During [place] hospitalization, pt was treated for PNA and was found to have Hodgkin's lymphoma. Since discharge, pt has been having intermittent fevers, chills (t max 102.2) for which his wife has been treating with Motrin and Tylenol. Pt denies chest pain, palpitations, abd pain, dysuria, urinary frequency or diarrhea. He has not been on abx since his last hospitalization. he was scheduled for BMBx today as outpt. His wife brought him into the ED because of high fever and periods of confusion which has happened previously and temprally associated with fever. He currently appears comfortable, pleasant and not confused. Wife is at bedside. (08 Jan 2020 08:11)            MEDICATIONS  (STANDING):  atorvastatin 10 milliGRAM(s) Oral at bedtime  cyanocobalamin 1000 MICROGram(s) Oral daily  folic acid 1 milliGRAM(s) Oral daily  imipramine 25 milliGRAM(s) Oral daily  methylPREDNISolone sodium succinate Injectable 80 milliGRAM(s) IV Push every 8 hours  metoprolol tartrate 50 milliGRAM(s) Oral every 12 hours  pantoprazole    Tablet 40 milliGRAM(s) Oral before breakfast  piperacillin/tazobactam IVPB.. 3.375 Gram(s) IV Intermittent every 8 hours  sodium chloride 0.9%. 1000 milliLiter(s) (60 mL/Hr) IV Continuous <Continuous>    MEDICATIONS  (PRN):  acetaminophen   Tablet .. 1000 milliGRAM(s) Oral every 6 hours PRN Temp greater or equal to 38C (100.4F), Mild Pain (1 - 3)  albuterol/ipratropium for Nebulization 3 milliLiter(s) Nebulizer every 6 hours PRN Shortness of Breath and/or Wheezing  benzonatate 100 milliGRAM(s) Oral every 8 hours PRN Cough  guaiFENesin   Syrup  (Sugar-Free) 200 milliGRAM(s) Oral every 6 hours PRN Cough      Vital Signs Last 24 Hrs  T(C): 36.8 (10 Warren 2020 13:30), Max: 37.6 (10 Warren 2020 10:30)  T(F): 98.2 (10 Warren 2020 13:30), Max: 99.6 (10 Warren 2020 10:30)  HR: 78 (10 Warren 2020 13:30) (66 - 106)  BP: 119/78 (10 Warren 2020 13:30) (116/75 - 125/82)  BP(mean): --  RR: 20 (10 Warren 2020 13:30) (18 - 20)  SpO2: 98% (10 Warren 2020 13:30) (97% - 100%)      [PHYSICAL EXAM]  General: adult in NAD,  WN,  WD.  HEENT: clear oropharynx, anicteric sclera, pink conjunctivae.  Neck: supple, no masses.  CV: normal S1S2, no murmur, no rubs, no gallops.  Lungs: clear to auscultation, no wheezes, no rales, no rhonchi.  Abdomen: soft, non-tender, non-distended, no hepatosplenomegaly, normal BS, no guarding.  Ext: no clubbing, no cyanosis, no edema.  Skin: no rashes,  no petechiae, no venous stasis changes.  Neuro: alert and oriented X 3 , no focal motor deficits.  LN: no SC GUERO.      [LABS:]                        8.6    1.83  )-----------( 46       ( 10 Warren 2020 09:40 )             27.4     01-10    140  |  109<H>  |  20  ----------------------------<  110<H>  4.2   |  20<L>  |  1.20    Ca    7.9<L>      10 Warren 2020 09:40    TPro  6.9  /  Alb  2.4<L>  /  TBili  1.0  /  DBili  x   /  AST  37  /  ALT  29  /  AlkPhos  185<H>  01-10    PT/INR - ( 10 Warren 2020 09:40 )   PT: 21.1 sec;   INR: 1.82 ratio    PTT - ( 10 Warren 2020 09:40 )  PTT:41.1 sec        [RADIOLOGY STUDIES:]

## 2020-01-11 NOTE — PROGRESS NOTE ADULT - SUBJECTIVE AND OBJECTIVE BOX
All interim records and events noted.    wife present  feeling much improved since steroid      MEDICATIONS  (STANDING):  atorvastatin 10 milliGRAM(s) Oral at bedtime  cyanocobalamin 1000 MICROGram(s) Oral daily  folic acid 1 milliGRAM(s) Oral daily  imipramine 25 milliGRAM(s) Oral daily  methylPREDNISolone sodium succinate Injectable 80 milliGRAM(s) IV Push every 8 hours  metoprolol tartrate 50 milliGRAM(s) Oral every 12 hours  pantoprazole    Tablet 40 milliGRAM(s) Oral before breakfast  piperacillin/tazobactam IVPB.. 3.375 Gram(s) IV Intermittent every 8 hours  sodium chloride 0.9%. 1000 milliLiter(s) (60 mL/Hr) IV Continuous <Continuous>    MEDICATIONS  (PRN):  acetaminophen   Tablet .. 1000 milliGRAM(s) Oral every 6 hours PRN Temp greater or equal to 38C (100.4F), Mild Pain (1 - 3)  albuterol/ipratropium for Nebulization 3 milliLiter(s) Nebulizer every 6 hours PRN Shortness of Breath and/or Wheezing  benzonatate 100 milliGRAM(s) Oral every 8 hours PRN Cough  guaiFENesin   Syrup  (Sugar-Free) 200 milliGRAM(s) Oral every 6 hours PRN Cough      Vital Signs Last 24 Hrs  T(C): 38 (11 Jan 2020 14:38), Max: 38 (11 Jan 2020 14:38)  T(F): 100.4 (11 Jan 2020 14:38), Max: 100.4 (11 Jan 2020 14:38)  HR: 85 (11 Jan 2020 14:38) (76 - 90)  BP: 113/71 (11 Jan 2020 14:38) (113/71 - 150/90)  BP(mean): --  RR: 18 (11 Jan 2020 14:38) (18 - 18)  SpO2: 97% (11 Jan 2020 14:38) (96% - 97%)    PHYSICAL EXAM  General: well developed  well nourished, in no acute distress  Head: atraumatic, normocephalic  ENT: sclera anicteric, buccal mucosa moist  Neck: supple, trachea midline  CV: S1 S2, regular rate and rhythm  Lungs: clear to auscultation, no wheezes/rhonchi  Abdomen: soft, nontender, bowel sounds present, pouchy  Extrem: no clubbing/cyanosis/edema  Skin: no significant increased ecchymosis/petechiae  Neuro: alert and oriented X3,  no focal deficits      LABS:             8.2    2.86  )-----------( 53       ( 01-11 @ 09:04 )             26.1                8.6    1.83  )-----------( 46       ( 01-10 @ 09:40 )             27.4                8.9    1.77  )-----------( 44       ( 01-09 @ 08:22 )             28.4       01-11    140  |  109<H>  |  23  ----------------------------<  137<H>  4.2   |  22  |  1.10    Ca    8.3<L>      11 Jan 2020 09:04    TPro  6.9  /  Alb  2.4<L>  /  TBili  1.0  /  DBili  x   /  AST  37  /  ALT  29  /  AlkPhos  185<H>  01-10    01-10 @ 09:40  PT21.1 INR1.82  PTT41.1  01-09 @ 08:22  PT23.4 INR2.03  PTT38.0  01-08 @ 16:23  PT21.6 INR1.86  PTT--  01-08 @ 05:15  PT22.1 INR1.90  PTT38.4      RADIOLOGY & ADDITIONAL STUDIES:    IMPRESSION/RECOMMENDATIONS:
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
## 2020-01-11 NOTE — PROGRESS NOTE ADULT - ATTENDING COMMENTS
pt seen and examine see  above plan -   acute  on chr pancytopenia  possible early  DIC  with tumor related  fever  2/2 Hodgkins lymphoma -  iv solumedrol 80 mg iv q8hr  , low suspicion of any infectious process , no pna , no uti will dc abx  in 24 hr  , id dr pitts .  pt over all prognosis  discuss with hem/onc dr agustin     but will observe till Monday if pt tolerate bone marrow biopsy  . coagulopathy sec to lymphoma - fu inr  , cbc . pt seen and examine see  above plan -   acute  on chr pancytopenia  possible early  DIC  with tumor related  fever  2/2 Hodgkin lymphoma -  iv solumedrol 80 mg iv q8hr  , low suspicion of any infectious process , no pna , no uti will dc iv abx  abx  , id dr pitts .       but will observe till Monday if pt tolerate bone marrow biopsy  -     Monday  in am ,  coagulopathy sec to lymphoma - fu inr  , cbc  in am .

## 2020-01-11 NOTE — PROGRESS NOTE ADULT - PROBLEM SELECTOR PLAN 2
possible  early  DIC 2/2 Hodgkins lymphoma  with tumor related fever   ISTH DIC score 7   elevated INR, PT, aPTT, fibrinogen, d dimer, fibrinogen split products.  No epistaxis/hemoptysis or overt GIB noted by pt/wife  monitor AM CBC  heme/on Dr. agustin following, recs appreciated  ICU eval called, patient not ICU candidate at this time  START solumedrol 80mg q8 to aide with inflammation and blood counts possible  early  DIC 2/2 Hodgkins lymphoma  with tumor related fever   ISTH DIC score 7   elevated INR, PT, aPTT, fibrinogen, d dimer, fibrinogen split products.  No epistaxis/hemoptysis or overt GIB noted by pt/wife  monitor AM CBC  heme/on Dr. agustin   START solumedrol 80mg q8 to aide with inflammation
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
## 2020-01-11 NOTE — PROGRESS NOTE ADULT - ASSESSMENT
73 y/o man w PPM/AVR/COPD/beta thalassemia trait and recently dx'd Classical Hodgkins Lymphoma  CD30+, CD15+, CD20 neg,       -Hodgkins w below diaphragm ds when first presented but now CTchest also above diaphragm LADs, ?progression of ds even before first dose chemo  -Adm w fever and cough, clinically much improved w IV solumetrol  -Planned for infusoport but on hold as PT/PTT prolonged, w mixing studies suspicious for mild inhibitor. Post VIt K trial. Now for PICC and bone marrow bx w IR on Monday 12/2019 Echo suboptimal post repeat in hospital. Cardiology following 73 y/o man w PPM/AVR/COPD/beta thalassemia trait and recently dx'd Classical Hodgkins Lymphoma  CD30+, CD15+, CD20 neg,       -Hodgkins w below diaphragm ds when first presented but now CTchest also above diaphragm LADs, ?progression of ds even before first dose chemo  -thrombocytopenia ?bone marrow involvement  -Adm w fever and cough, clinically much improved w IV solumetrol  -Planned for infusoport but on hold as PT/PTT prolonged, w mixing studies suspicious for mild inhibitor. Post VIt K trial. Now for PICC and bone marrow bx w IR on Monday 12/2019 Echo suboptimal post repeat in hospital. Cardiology following

## 2020-01-11 NOTE — PROGRESS NOTE ADULT - PROBLEM SELECTOR PLAN 9
PAS for now; spoke with Melecio Nino re: pharmacologic ppx- however given platelet count <50  and high inr will not give heparin at this time.  PT eval to encourage ambulation

## 2020-01-11 NOTE — PROGRESS NOTE ADULT - SUBJECTIVE AND OBJECTIVE BOX
Patient is a 74y old  Male who presents with a chief complaint of fever, persistent cough (11 Jan 2020 10:45)      HPI:  74M with h/o COPD, DANIEL, Depression, HLD, HTN, CAD s/p PCI, CHB s/p PPM, bioprosthetic AVR, dementia, recent diagnosis of Hodgkin's lymphoma in Dec 2019 who presents with intermittent fevers and persistent productive cough since discharge from [facility] on Dec 20.. During [place] hospitalization, pt was treated for PNA and was found to have Hodgkin's lymphoma. Since discharge, pt has been having intermittent fevers, chills (t max 102.2) for which his wife has been treating with Motrin and Tylenol. Pt denies chest pain, palpitations, abd pain, dysuria, urinary frequency or diarrhea. He has not been on abx since his last hospitalization. he was scheduled for BMBx today as outpt. His wife brought him into the ED because of high fever and periods of confusion which has happened previously and temprally associated with fever. He currently appears comfortable, pleasant and not confused. Wife is at bedside. (08 Jan 2020 08:11)     admitted   peristent likely in setting of untreated Hodgkin's lymphoma. Now with possible early  DIC.     + fever  , cough   pancytopenia  likely in setting of untreated Hodgkin's lymphoma. Now with possible early  DIC-     INTERVAL HPI/OVERNIGHT EVENTS:  T(C): 36.8 (01-11-20 @ 05:36), Max: 37.1 (01-10-20 @ 21:20)  HR: 90 (01-11-20 @ 05:36) (76 - 90)  BP: 148/86 (01-11-20 @ 05:36) (119/78 - 150/90)  RR: 18 (01-11-20 @ 05:36) (18 - 20)  SpO2: 97% (01-11-20 @ 05:36) (96% - 98%)  Wt(kg): --  I&O's Summary    10 Warren 2020 07:01  -  11 Jan 2020 07:00  --------------------------------------------------------  IN: 1565 mL / OUT: 0 mL / NET: 1565 mL          REVIEW OF SYSTEMS:  CONSTITUTIONAL: No fever, weight loss, or fatigue  ENMT: ; No sinus or throat pain  RESPIRATORY: No cough, wheezing, chills or hemoptysis; No shortness of breath  CARDIOVASCULAR: No chest pain, palpitations, dizziness, or leg swelling  GASTROINTESTINAL: No abdominal or epigastric pain. No nausea, vomiting, No diarrhea ,  No melena or hematochezia.  GENITOURINARY: No dysuria, frequency, hematuria, or incontinence  NEUROLOGICAL: No headaches, memory loss, loss of strength, numbness, or tremors  MUSCULOSKELETAL: No joint pain or swelling; No muscle, back, or extremity pain        PHYSICAL EXAM:  GENERAL: NAD, well-groomed, well-developed  HEAD:  Atraumatic, Normocephalic  EYES: EOMI  ENMT:Moist mucous membranes,  No lesions  NERVOUS SYSTEM:  Alert & Oriented X3, Good concentration , motor intact 5/5   CHEST/LUNG: Clear to percussion bilaterally; No rales, rhonchi, wheezing,   HEART: Regular rate and rhythm; No murmurs,   ABDOMEN: Soft, Nontender, Nondistended; Bowel sounds present  EXTREMITIES:  2+ Peripheral Pulses, No clubbing, cyanosis, or edema  gu intact           MEDICATIONS  (STANDING):  atorvastatin 10 milliGRAM(s) Oral at bedtime  cyanocobalamin 1000 MICROGram(s) Oral daily  folic acid 1 milliGRAM(s) Oral daily  imipramine 25 milliGRAM(s) Oral daily  methylPREDNISolone sodium succinate Injectable 80 milliGRAM(s) IV Push every 8 hours  metoprolol tartrate 50 milliGRAM(s) Oral every 12 hours  pantoprazole    Tablet 40 milliGRAM(s) Oral before breakfast  piperacillin/tazobactam IVPB.. 3.375 Gram(s) IV Intermittent every 8 hours  sodium chloride 0.9%. 1000 milliLiter(s) (60 mL/Hr) IV Continuous <Continuous>    MEDICATIONS  (PRN):  acetaminophen   Tablet .. 1000 milliGRAM(s) Oral every 6 hours PRN Temp greater or equal to 38C (100.4F), Mild Pain (1 - 3)  albuterol/ipratropium for Nebulization 3 milliLiter(s) Nebulizer every 6 hours PRN Shortness of Breath and/or Wheezing  benzonatate 100 milliGRAM(s) Oral every 8 hours PRN Cough  guaiFENesin   Syrup  (Sugar-Free) 200 milliGRAM(s) Oral every 6 hours PRN Cough      LABS:                        8.2    2.86  )-----------( 53       ( 11 Jan 2020 09:04 )             26.1     01-11    140  |  109<H>  |  23  ----------------------------<  137<H>  4.2   |  22  |  1.10    Ca    8.3<L>      11 Jan 2020 09:04    TPro  6.9  /  Alb  2.4<L>  /  TBili  1.0  /  DBili  x   /  AST  37  /  ALT  29  /  AlkPhos  185<H>  01-10    PT/INR - ( 10 Warren 2020 09:40 )   PT: 21.1 sec;   INR: 1.82 ratio         PTT - ( 10 Warren 2020 09:40 )  PTT:41.1 sec    CAPILLARY BLOOD GLUCOSE          01-08 @ 08:48   No growth to date.  --  --  01-08 @ 08:26   No growth  --  --          RADIOLOGY & ADDITIONAL TESTS:    Imaging Personally Reviewed:       Advance Directives:      Palliative Care: Patient is a 74y old  Male who presents with a chief complaint of fever, persistent cough (11 Jan 2020 10:45)      HPI:  74M with h/o COPD, DANIEL, Depression, HLD, HTN, CAD s/p PCI, CHB s/p PPM, bioprosthetic AVR, dementia, recent diagnosis of Hodgkin's lymphoma in Dec 2019 who presents with intermittent fevers and persistent productive cough since discharge from [facility] on Dec 20.. During [place] hospitalization, pt was treated for PNA and was found to have Hodgkin's lymphoma. Since discharge, pt has been having intermittent fevers, chills (t max 102.2) for which his wife has been treating with Motrin and Tylenol. Pt denies chest pain, palpitations, abd pain, dysuria, urinary frequency or diarrhea. He has not been on abx since his last hospitalization. he was scheduled for BMBx today as outpt. His wife brought him into the ED because of high fever and periods of confusion which has happened previously and temprally associated with fever. He currently appears comfortable, pleasant and not confused. Wife is at bedside. (08 Jan 2020 08:11)     admitted   persistent likely in setting of untreated Hodgkin's lymphoma. Now with possible early  DIC  resolved , tumor related fever -   pt seen and examine today alert awake , no fever today , no confusion , no distress , tolraeting po.     INTERVAL HPI/OVERNIGHT EVENTS:  T(C): 36.8 (01-11-20 @ 05:36), Max: 37.1 (01-10-20 @ 21:20)  HR: 90 (01-11-20 @ 05:36) (76 - 90)  BP: 148/86 (01-11-20 @ 05:36) (119/78 - 150/90)  RR: 18 (01-11-20 @ 05:36) (18 - 20)  SpO2: 97% (01-11-20 @ 05:36) (96% - 98%)  Wt(kg): --  I&O's Summary    10 Warren 2020 07:01  -  11 Jan 2020 07:00  --------------------------------------------------------  IN: 1565 mL / OUT: 0 mL / NET: 1565 mL          REVIEW OF SYSTEMS:  CONSTITUTIONAL: No fever  today , weight loss,   + fatigue  ENMT: ; No sinus or throat pain  RESPIRATORY:   +dry  cough, wheezing, chills  , No shortness of breath  CARDIOVASCULAR: No chest pain, palpitations, dizziness, or leg swelling  GASTROINTESTINAL: No abdominal or epigastric pain. No nausea, vomiting, No diarrhea ,  No melena .  GENITOURINARY: No dysuria, frequency, hematuria, or incontinence  NEUROLOGICAL: No headaches, memory loss, loss of strength, numbness, or tremors  MUSCULOSKELETAL: No joint pain or swelling; No muscle, back, or extremity pain        PHYSICAL EXAM:  GENERAL: NAD, well-groomed, well-developed  HEAD:  Atraumatic, Normocephalic  EYES: EOMI  ENMT:Moist mucous membranes,  No lesions  NERVOUS SYSTEM:  Alert & Oriented X3, motor intact  / sensory intact  5/5 +  CHEST/LUNG:  percussion bilaterally; No rales, rhonchi, wheezing,   HEART: Regular rate and rhythm; No murmurs, no tachy   ABDOMEN: Soft, Nontender, Nondistended; Bowel sounds present  EXTREMITIES:  2+ Peripheral Pulses, No clubbing, cyanosis, or edema  gu intact           MEDICATIONS  (STANDING):  atorvastatin 10 milliGRAM(s) Oral at bedtime  cyanocobalamin 1000 MICROGram(s) Oral daily  folic acid 1 milliGRAM(s) Oral daily  imipramine 25 milliGRAM(s) Oral daily  methylPREDNISolone sodium succinate Injectable 80 milliGRAM(s) IV Push every 8 hours  metoprolol tartrate 50 milliGRAM(s) Oral every 12 hours  pantoprazole    Tablet 40 milliGRAM(s) Oral before breakfast  piperacillin/tazobactam IVPB.. 3.375 Gram(s) IV Intermittent every 8 hours  sodium chloride 0.9%. 1000 milliLiter(s) (60 mL/Hr) IV Continuous <Continuous>    MEDICATIONS  (PRN):  acetaminophen   Tablet .. 1000 milliGRAM(s) Oral every 6 hours PRN Temp greater or equal to 38C (100.4F), Mild Pain (1 - 3)  albuterol/ipratropium for Nebulization 3 milliLiter(s) Nebulizer every 6 hours PRN Shortness of Breath and/or Wheezing  benzonatate 100 milliGRAM(s) Oral every 8 hours PRN Cough  guaiFENesin   Syrup  (Sugar-Free) 200 milliGRAM(s) Oral every 6 hours PRN Cough      LABS:                        8.2    2.86  )-----------( 53       ( 11 Jan 2020 09:04 )             26.1     01-11    140  |  109<H>  |  23  ----------------------------<  137<H>  4.2   |  22  |  1.10    Ca    8.3<L>      11 Jan 2020 09:04    TPro  6.9  /  Alb  2.4<L>  /  TBili  1.0  /  DBili  x   /  AST  37  /  ALT  29  /  AlkPhos  185<H>  01-10    PT/INR - ( 10 Warren 2020 09:40 )   PT: 21.1 sec;   INR: 1.82 ratio         PTT - ( 10 Warren 2020 09:40 )  PTT:41.1 sec    CAPILLARY BLOOD GLUCOSE          01-08 @ 08:48   No growth to date.  --  --  01-08 @ 08:26   No growth  --  --          RADIOLOGY & ADDITIONAL TESTS:    Imaging Personally Reviewed:   no new test     Advance Directives:  full code
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
## 2020-01-11 NOTE — PROGRESS NOTE ADULT - PROBLEM SELECTOR PLAN 7
mild MILO, possibly from NSAID use at home coupled with some dehydration  s/p 2L IVF in ED-     with gentle hydration IVF  improving   fu AM BMP

## 2020-01-11 NOTE — PROGRESS NOTE ADULT - ASSESSMENT
74M with h/o COPD, DANIEL, Depression, HLD, HTN, CAD s/p PCI, CHB s/p PPM, bioprosthetic AVR, dementia, recent diagnosis of Hodgkin's lymphoma in Dec 2019 who presents with fevers and peristent likely in setting of untreated Hodgkin's lymphoma. Now with possible early  DIC.

## 2020-01-11 NOTE — PROGRESS NOTE ADULT - PROBLEM SELECTOR PLAN 3
pt is not on anticoagulation  fibrinogen, d dimer, PT, PTT, INR elevated- DIC cannot be ruled out   possible early stage   now stable count . ISTH DIC score 7  fibrinogen split products >5 <20.   Heme/onc Dr. agustin consulted, recs appreciated   patient received one dose Vit K without decrease in INR

## 2020-01-11 NOTE — PROGRESS NOTE ADULT - PROBLEM SELECTOR PLAN 1
steroids as per heme onc for Lymphoma sx management - not pulmonary indication  on NEBS prn   s/p ABX for poss secondary infection - immunosuppressed - stopped - ID follow up reviewed - no need for ABX - agree with ID  CPAP use nightly for DANIEL  supportive medical regimen  ID follow up reviewed  labs and imaging reviewed  GOC discussed with Wife and Patient  will follow  cough rx regimen prn  tylenol PRN.

## 2020-01-11 NOTE — PROGRESS NOTE ADULT - ASSESSMENT
74M with h/o COPD, DANIEL, Depression, HLD, HTN, CAD s/p PCI, CHB s/p PPM, bioprosthetic AVR, dementia, recent diagnosis of Hodgkin's lymphoma in Dec 2019 who presents high fevers and confusion and inpatient BM biopsy    CAD s/p PCI  - No signs of significant ischemia or volume overload.   - EKG Vpaced.  NSR while on tele, now off  - Continue to hold ASA for now.  Resume when cleared by Heme  - Continue metoprolol 50mg q12.  Can switch to long acting eventually  - Continue statin     Bioprosthetic AVR  - No evidence of volume overload  - TTE done showing normal LVSF with grade 1 DD  - No evidence of volume overload    Thrombocytopenia  - F/u heme evaluation   - Workup for DIC per Heme  - Platelet slowly improving.  Monitor for s/s bleeding  - Monitor closely for bleeding   - No planned for BM Bx.  For PICC line insertion on Monday for initiation of chemo per Heme  - CTA chest negative of PE but suspicious of lung mets    HLD  - Continue statin    AMS   - Likely from fevers and underlying metabolic derangements     Other cardiovascular workup will depend on clinical course  All other workup per primary team  Will follow     Cathi Horn DNP, NP-C  Cardiology   Spectra #[phone]

## 2020-01-11 NOTE — PROGRESS NOTE ADULT - PROBLEM SELECTOR PLAN 1
pt was diagnosed in Dec 2019. Sees Dr. Majano (consult placed)  Was scheduled for BMbx this Friday; by  Dr. Majano.  suspect symptoms of intermittent fevers, chills, cough are related to Hodgkin's lymphoma rather than an acute infectious process.   procal 1.15   blood cx pending , ucult neg   continue emperic zosyn per ID recs  START solumedrol 80mg q8 to aide with inflammation and blood counts  continue IVF at 60cc/hr  patient will be monitored on steroids over the weekend, if respiratory status improves and INR/platelets improve patient will have BMbx monday with IR pt was diagnosed in Dec 2019. Sees Dr. Majano (consult placed)  Was scheduled for BMbx this Friday; by  Dr. Majano.  suspect symptoms of intermittent fevers, chills, cough are related to Hodgkin's lymphoma rather than an acute infectious process.   procal 1.15   blood cx  neg  , ucult neg   continue emperic zosyn per ID recs  START solumedrol 80mg q8 to aide with inflammation and blood counts  continue IVF at 60cc/hr  patient will be monitored on steroids over the weekend, if respiratory status improves and INR/platelets improve patient will have BMbx monday with IR
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
## 2020-01-12 NOTE — PROGRESS NOTE ADULT - ASSESSMENT
74M with h/o COPD, DANIEL, Depression, HLD, HTN, CAD s/p PCI, CHB s/p PPM, bioprosthetic AVR, dementia, recent diagnosis of Hodgkin's lymphoma in Dec 2019 who presents high fevers and confusion and inpatient BM biopsy    CAD s/p PCI  - No signs of significant ischemia or volume overload.   - EKG Vpaced. No evidence of PPM malfunction or any arrhythmias while on tele  - Continue to hold ASA.  Resume when cleared by Heme  - Continue metoprolol 50mg q12.  Can switch to long acting eventually  - Continue statin     Bioprosthetic AVR  - No evidence of volume overload  - TTE done showing normal LVSF with grade 1 DD    Thrombocytopenia  - F/u heme evaluation   - Platelet continues to mprove.  Monitor for s/s bleeding  - No plan for BM Bx.  For PICC line insertion in am for initiation of chemo per Heme  - CTA chest negative of PE but suspicious of lung mets    HLD  - Continue statin    AMS   - Likely from fevers and underlying metabolic derangements   - Now alert and oriented    Other cardiovascular workup will depend on clinical course  All other workup per primary team  Will follow     Cathi Horn DNP, NP-C  Cardiology   Spectra #[phone]

## 2020-01-12 NOTE — PROGRESS NOTE ADULT - PROBLEM SELECTOR PLAN 3
pt is not on anticoagulation  fibrinogen, d dimer, PT, PTT, INR elevated- DIC cannot be ruled out   possible early stage   now stable count . ISTH DIC score 7  fibrinogen split products >5 <20.   Heme/onc Dr. agustin consulted,   patient received one dose Vit K without decrease in INR

## 2020-01-12 NOTE — PROGRESS NOTE ADULT - SUBJECTIVE AND OBJECTIVE BOX
Patient is a 74y old  Male who presents with a chief complaint of fever, persistent cough (12 Jan 2020 06:58)      HPI:  74M with h/o COPD, DANIEL, Depression, HLD, HTN, CAD s/p PCI, CHB s/p PPM, bioprosthetic AVR, dementia, recent diagnosis of Hodgkin's lymphoma in Dec 2019 who presents with intermittent fevers and persistent productive cough since discharge from [facility] on Dec 20.. During [place] hospitalization, pt was treated for PNA and was found to have Hodgkin's lymphoma. Since discharge, pt has been having intermittent fevers, chills (t max 102.2) for which his wife has been treating with Motrin and Tylenol. Pt denies chest pain, palpitations, abd pain, dysuria, urinary frequency or diarrhea. He has not been on abx since his last hospitalization. he was scheduled for BMBx today as outpt. His wife brought him into the ED because of high fever and periods of confusion which has happened previously and temprally associated with fever. He currently appears comfortable, pleasant and not confused. Wife is at bedside. (08 Jan 2020 08:11)   admitted   persistent likely in setting of untreated Hodgkin's lymphoma. Now with possible early  DIC  resolved , tumor related fever -   pt seen and examine today- alert awake  sitting on chair , no fever , no distress   , tolerating po  .     INTERVAL HPI/OVERNIGHT EVENTS:  T(C): 36.7 (01-12-20 @ 04:36), Max: 38 (01-11-20 @ 14:38)  HR: 77 (01-12-20 @ 04:36) (74 - 85)  BP: 130/77 (01-12-20 @ 04:36) (113/71 - 130/77)  RR: 18 (01-12-20 @ 04:36) (18 - 18)  SpO2: 99% (01-12-20 @ 04:36) (94% - 99%)  Wt(kg): --  I&O's Summary    11 Jan 2020 07:01  -  12 Jan 2020 07:00  --------------------------------------------------------  IN: 780 mL / OUT: 0 mL / NET: 780 mL    REVIEW OF SYSTEMS:  CONSTITUTIONAL: No fever  today , weight loss,   + fatigue  ENMT: ; No sinus or throat pain  RESPIRATORY:   +dry  cough, wheezing, chills  , No shortness of breath  CARDIOVASCULAR: No chest pain, palpitations, dizziness, or leg swelling  GASTROINTESTINAL: No abdominal or epigastric pain. No nausea, vomiting, No diarrhea ,  No melena .  GENITOURINARY: No dysuria, frequency, hematuria, or incontinence  NEUROLOGICAL: No headaches, memory loss, loss of strength, numbness, or tremors  MUSCULOSKELETAL: No joint pain or swelling; No muscle, back, or extremity pain        PHYSICAL EXAM:  GENERAL: NAD, well-groomed, well-developed  HEAD:  Atraumatic, Normocephalic  EYES: EOMI  ENMT:Moist mucous membranes,  No lesions  NERVOUS SYSTEM:  Alert & Oriented X3, motor intact  / sensory intact  5/5 +  CHEST/LUNG:  percussion bilaterally; No rales, rhonchi, wheezing,   HEART: Regular rate and rhythm; No murmurs, no tachy   ABDOMEN: Soft, Nontender, Nondistended; Bowel sounds present  EXTREMITIES:  2+ Peripheral Pulses, No clubbing, cyanosis, or edema  gu intact           MEDICATIONS  (STANDING):  atorvastatin 10 milliGRAM(s) Oral at bedtime  cyanocobalamin 1000 MICROGram(s) Oral daily  folic acid 1 milliGRAM(s) Oral daily  imipramine 25 milliGRAM(s) Oral daily  methylPREDNISolone sodium succinate Injectable 80 milliGRAM(s) IV Push every 8 hours  metoprolol tartrate 50 milliGRAM(s) Oral every 12 hours  pantoprazole    Tablet 40 milliGRAM(s) Oral before breakfast  sodium chloride 0.9%. 1000 milliLiter(s) (60 mL/Hr) IV Continuous <Continuous>    MEDICATIONS  (PRN):  acetaminophen   Tablet .. 1000 milliGRAM(s) Oral every 6 hours PRN Temp greater or equal to 38C (100.4F), Mild Pain (1 - 3)  albuterol/ipratropium for Nebulization 3 milliLiter(s) Nebulizer every 6 hours PRN Shortness of Breath and/or Wheezing  benzonatate 100 milliGRAM(s) Oral every 8 hours PRN Cough  guaiFENesin   Syrup  (Sugar-Free) 200 milliGRAM(s) Oral every 6 hours PRN Cough      LABS:                        7.6    2.41  )-----------( x        ( 12 Jan 2020 09:17 )             24.4     01-12    142  |  110<H>  |  23  ----------------------------<  162<H>  4.3   |  20<L>  |  1.10    Ca    8.2<L>      12 Jan 2020 09:17                        RADIOLOGY & ADDITIONAL TESTS:    Imaging Personally Reviewed:   no new test     Advance Directives:    full code   Palliative Care:    appropraite

## 2020-01-12 NOTE — PROGRESS NOTE ADULT - PROBLEM SELECTOR PLAN 4
ruled  out this time -  doubt infectious process, fever and cough likely related to metastatic Hodgkin's lymphoma.   ID Dr. Majano consulted  Dr. Moore (pulm) consulted  continue CPAP qhs  Received a CTA chest ordered by Pulm that did not show PNA, PE or pleural effusion.

## 2020-01-12 NOTE — PROGRESS NOTE ADULT - PROBLEM SELECTOR PLAN 1
low grade temp noted -   bone marrow bx planned for Monday - will likely need a Infusaport -    steroids as per heme onc for Lymphoma sx management - not pulmonary indication  on NEBS prn   s/p ABX for poss secondary infection - immunosuppressed - stopped - ID follow up reviewed - no need for ABX - agree with ID  CPAP use nightly for DANIEL  supportive medical regimen  ID follow up reviewed  labs and imaging reviewed  GOC discussed with Wife and Patient  will follow  cough rx regimen prn  tylenol PRN.

## 2020-01-12 NOTE — PROGRESS NOTE ADULT - PROBLEM SELECTOR PLAN 7
mild MILO, possibly from NSAID use at home coupled with some dehydration       with gentle hydration IVF  improving   fu AM BMP

## 2020-01-12 NOTE — PROGRESS NOTE ADULT - ATTENDING COMMENTS
pt seen and examine see  above plan -  ams   resolved  not metabolic  encephalopathy   but  sec to to fever releated  delirium and dementia exacerbation.    acute  on chr pancytopenia  possible early  DIC  with tumor related  fever  2/2 Hodgkin lymphoma -  iv solumedrol 80 mg iv q8hr  , low suspicion of any infectious process , no pna , no uti will   observe  off   abx  , id dr pitts .       possible  Mondays  bone marrow biopsy    and picc line by ir for starting  chemo  as per hem/onc -     - coagulopathy sec to lymphoma - fu inr  , cbc  in am .

## 2020-01-12 NOTE — PROGRESS NOTE ADULT - PROBLEM SELECTOR PLAN 6
Patient is a 66y old  Male who presents with a chief complaint of Bilateral leg swelling (14 Apr 2018 03:46)      HPI:  65yo M with PMH of HTN, BPH, spinal surgery x 2 c/b DVT and PE (2017) on Eliquis, right temporal migraines p/w acute onset bilateral lower extremities swelling. Pt has has left leg swelling since he was diagnosed with left femoral vein DVT in 9/2017. He reports being on prednisone for right temporal headache with visual changes, seen by Dr. Brito, ESR has been normal, so low suspicion of giant cell arteritis, suspicion of but awaiting an LP that is on hold since he is on anticoagulation, he is on Prednisone 50mg daily. He was also started on alfuzosin 3 days ago for BPH. Denies any orthopnea, dyspnea on exertion, anorexia, abdominal pain, constipation, leg trauma. 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      ED Course: VSS. CT Head was done for headache, no acute intracranial pathology. US lower extremities with persistent femoral vein thrombosis. (14 Apr 2018 03:46)    INTERVAL HPI/OVERNIGHT EVENTS:::comfortable on LOVENOX< decr edema    HEALTH ISSUES - PROBLEM Dx:  Prophylactic measure: Prophylactic measure  Nutrition, metabolism, and development symptoms: Nutrition, metabolism, and development symptoms  BPH (benign prostatic hyperplasia): BPH (benign prostatic hyperplasia)  Headache: Headache  HTN (hypertension): HTN (hypertension)  DVT (deep venous thrombosis): DVT (deep venous thrombosis)  Lower extremity edema: Lower extremity edema          PAST MEDICAL & SURGICAL HISTORY:  DVT (deep venous thrombosis)  Pulmonary embolism  HTN (hypertension)  Degeneration of intervertebral disc of thoracic region  History of foot surgery  H/O cervical spine surgery          Consultant NOTE  REVIEWED  (   )    REVIEW OF SYSTEMS:  [x] As per HPI  CONSTITUTIONAL: No fever, weight loss, or fatigue  RESPIRATORY: No cough, wheezing, chills or hemoptysis; No Shortness of Breath  CARDIOVASCULAR: No chest pain, palpitations, dizziness, or leg swelling  GASTROINTESTINAL: No abdominal or epigastric pain. No nausea, vomiting, or hematemesis; No diarrhea or constipation. No melena or hematochezia.  MUSCULOSKELETAL: back pain./DVT  PSYCH    awake, alert       [x] All others negative	  [ ] Unable to obtain          Vital Signs Last 24 Hrs  T(C): 36.2 (15 Apr 2018 08:59), Max: 37.1 (14 Apr 2018 15:38)  T(F): 97.2 (15 Apr 2018 08:59), Max: 98.7 (14 Apr 2018 15:38)  HR: 66 (15 Apr 2018 08:59) (62 - 69)  BP: 149/88 (15 Apr 2018 08:59) (128/74 - 149/88)  BP(mean): --  RR: 17 (15 Apr 2018 08:59) (16 - 18)  SpO2: 99% (15 Apr 2018 08:59) (96% - 99%)        04-14 @ 07:01  -  04-15 @ 07:00  --------------------------------------------------------  IN: 0 mL / OUT: 1500 mL / NET: -1500 mL    04-15 @ 07:01  -  04-15 @ 12:53  --------------------------------------------------------  IN: 0 mL / OUT: 200 mL / NET: -200 mL      PHYSICAL EXAMINATION:                                    (    )  NO CHANGE  Appearance: Normal	  HEENT:   Normal oral mucosa, PERRL, EOMI	  Neck: Supple, + JVD/ - JVD; Carotid Bruit   Cardiovascular: Normal S1 S2, No JVD, No murmurs,   Respiratory: Lungs clear to auscultation/Decreased Breath Sounds/No Rales, Rhonchi, Wheezing	  Gastrointestinal:  Soft, Non-tender, + BS	  Skin: No rashes, No ecchymoses, No cyanosis  Extremities: edema of LE  Vascular: Peripheral pulses palpable 2+ bilaterally  Neurologic: Non-focal  Psychiatry: A & O x 3, Mood & affect appropriate    acetaminophen   Tablet. 650 milliGRAM(s) Oral every 6 hours PRN  cyanocobalamin 1000 MICROGram(s) Oral daily  folic acid 1 milliGRAM(s) Oral daily  gabapentin 300 milliGRAM(s) Oral three times a day  lisinopril 10 milliGRAM(s) Oral daily  predniSONE   Tablet 50 milliGRAM(s) Oral daily  pyridoxine 50 milliGRAM(s) Oral daily  tamsulosin 0.8 milliGRAM(s) Oral at bedtime        PT/INR - ( 13 Apr 2018 22:30 )   PT: 13.6 sec;   INR: 1.22          PTT - ( 13 Apr 2018 22:30 )  PTT:24.9 sec    CARDIAC MARKERS ( 13 Apr 2018 17:50 )  x     / <0.01 ng/mL / 82 U/L / x     / 1.7 ng/mL                            13.3   8.7   )-----------( 124      ( 15 Apr 2018 12:39 )             39.6     04-15    142  |  102  |  21  ----------------------------<  107<H>  3.6   |  30  |  1.24    Ca    8.8      15 Apr 2018 07:39  Mg     2.2     04-15    TPro  7.4  /  Alb  4.4  /  TBili  1.1  /  DBili  x   /  AST  17  /  ALT  19  /  AlkPhos  47  04-13      CAPILLARY BLOOD GLUCOSE        < from: US Duplex Venous Lower Ext Complete, Bilateral (04.13.18 @ 19:03) >      INTERPRETATION:    VENOUS DUPLEX DOPPLER OF BOTH LOWER EXTREMITIES dated 4/13/2018 7:03 PM    INDICATION: 66-year-old male with lower extremity edema and history of   pulmonary embolism    TECHNIQUE: Duplex Doppler evaluation including gray-scale ultrasound   imaging, color flow Doppler imaging, and Doppler spectral analysis of the   veins of both lower extremities was performed.     COMPARISON: Bilateral lower shari Doppler dated 9/25/2017 with   extensive thrombus seen within the left femoral vein in its proximal and   midportion    FINDINGS:    Thigh veins: The common femoral, popliteal, proximal greater saphenous,   and proximal deep femoral veins are patent and free of thrombus   bilaterally. Right femoral vein is patent. The veins are normally   compressible and have normal phasic flow and augmentation response. There   is possible peripherally located thrombus seen in the left femoral vein.   Left femoral vein does not compress normally. Doppler interrogation of   this vessel is limited.    Calf veins: The paired peroneal and posterior tibial calf veins are   patent bilaterally.      IMPRESSION:  Peripherally located thrombus is suspected in the left femoral vein which   does not compress normally. Doppler interrogation of this vessel is   limited. Repeat study is suggested.              "Thank you for the opportunity to participate in the care of this   patient."    EMILY DELGADO M.D., RADIOLOGY RESIDENT  This document has been electronically signed.  GLADYS MACDONALD M.D., ATTENDING RADIOLOGIST  This document has been electronically signed. Apr 13 2018 11:08PM                  < end of copied text > s/p pci  ASA on hold- resume after hem/onc clearance   c/w lopressor  c/w Lipitor  cardio Dr. Ndiaye consulted.
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
## 2020-01-12 NOTE — PROGRESS NOTE ADULT - ASSESSMENT
75 y/o man w PPM/AVR/COPD/beta thalassemia trait and recently dx'd Classical Hodgkins Lymphoma initial w/u only below diaphragm disease  CD30+, CD15+, CD20 neg  in midst of staging and planning for chemotherapy as outpatient      -now CTchest also above diaphragm LADs, ?progression of ds   -thrombocytopenia ?bone marrow involvement, stable, asx  -Adm w fever and cough, clinically much improved w IV solumetrol  -Planned for infusoport but on hold as PT/PTT prolonged, w mixing studies suspicious for mild inhibitor. Post VIt K trial. Now for PICC and bone marrow bx w IR on Monday 12/2019 Echo suboptimal post repeat in hospital. Cardiology following
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
## 2020-01-12 NOTE — PROGRESS NOTE ADULT - SUBJECTIVE AND OBJECTIVE BOX
All interim records and events noted.    resting comfortably      MEDICATIONS  (STANDING):  atorvastatin 10 milliGRAM(s) Oral at bedtime  cyanocobalamin 1000 MICROGram(s) Oral daily  folic acid 1 milliGRAM(s) Oral daily  imipramine 25 milliGRAM(s) Oral daily  methylPREDNISolone sodium succinate Injectable 80 milliGRAM(s) IV Push every 8 hours  metoprolol tartrate 50 milliGRAM(s) Oral every 12 hours  pantoprazole    Tablet 40 milliGRAM(s) Oral before breakfast  sodium chloride 0.9%. 1000 milliLiter(s) (60 mL/Hr) IV Continuous <Continuous>    MEDICATIONS  (PRN):  acetaminophen   Tablet .. 1000 milliGRAM(s) Oral every 6 hours PRN Temp greater or equal to 38C (100.4F), Mild Pain (1 - 3)  albuterol/ipratropium for Nebulization 3 milliLiter(s) Nebulizer every 6 hours PRN Shortness of Breath and/or Wheezing  benzonatate 100 milliGRAM(s) Oral every 8 hours PRN Cough  guaiFENesin   Syrup  (Sugar-Free) 200 milliGRAM(s) Oral every 6 hours PRN Cough      Vital Signs Last 24 Hrs  T(C): 36.7 (12 Jan 2020 04:36), Max: 38 (11 Jan 2020 14:38)  T(F): 98 (12 Jan 2020 04:36), Max: 100.4 (11 Jan 2020 14:38)  HR: 77 (12 Jan 2020 04:36) (74 - 85)  BP: 130/77 (12 Jan 2020 04:36) (113/71 - 130/77)  BP(mean): --  RR: 18 (12 Jan 2020 04:36) (18 - 18)  SpO2: 99% (12 Jan 2020 04:36) (94% - 99%)    PHYSICAL EXAM  General: well developed  well nourished, in no acute distress  Head: atraumatic, normocephalic  Neck: supple, trachea midline  CV: S1 S2, regular rate and rhythm  Lungs: clear to auscultation, no wheezes/rhonchi  Abdomen: soft, nontender, bowel sounds present, pouchy  Skin: no significant increased ecchymosis/petechiae        LABS:             7.6    2.41  )-----------( 55       ( 01-12 @ 09:17 )             24.4                8.2    2.86  )-----------( 53       ( 01-11 @ 09:04 )             26.1                8.6    1.83  )-----------( 46       ( 01-10 @ 09:40 )             27.4       01-12    142  |  110<H>  |  23  ----------------------------<  162<H>  4.3   |  20<L>  |  1.10    Ca    8.2<L>      12 Jan 2020 09:17      01-12 @ 09:17  PT18.1 INR1.57  PTT--  01-10 @ 09:40  PT21.1 INR1.82  PTT41.1  01-09 @ 08:22  PT23.4 INR2.03  PTT38.0  01-08 @ 16:23  PT21.6 INR1.86  PTT--  01-08 @ 05:15  PT22.1 INR1.90  PTT38.4      RADIOLOGY & ADDITIONAL STUDIES:    IMPRESSION/RECOMMENDATIONS:

## 2020-01-12 NOTE — PROGRESS NOTE ADULT - PROBLEM SELECTOR PLAN 1
pt was diagnosed in Dec 2019. Sees Dr. Majano (consult placed)  Was scheduled for  out pt BMbx last  Friday; by  Dr. Majano.- with  recurrent fever   suspect symptoms of intermittent fevers, chills,dry  cough are related to Hodgkin's lymphoma rather than an acute infectious process.   procal 1.15   blood cx  neg  , ucult neg  / off abx   - solumedrol 80mg q8 to aide with inflammation and blood counts  continue IVF at 60cc/hr  patient will be monitored on steroids over the weekend, if respiratory status improves and INR/platelets improve patient will have BMbx -  monday with IR fu hb    closely , if hb below 7.5 in am  will give 1unit prbc.

## 2020-01-12 NOTE — PROGRESS NOTE ADULT - PROBLEM SELECTOR PLAN 2
possible  early  DIC 2/2 Hodgkins lymphoma    resolved  with tumor related fever   ISTH DIC score 7 was initially   elevated INR, PT, aPTT, fibrinogen, d dimer, fibrinogen split products.  No epistaxis/hemoptysis or overt GIB noted by pt/wife  monitor AM CBC  heme/on Dr. agustin    - on  solumedrol 80mg q8 to aide with inflammation

## 2020-01-13 NOTE — PROGRESS NOTE ADULT - ASSESSMENT
74M with h/o COPD, DANIEL, Depression, HLD, HTN, CAD s/p PCI, CHB s/p PPM, bioprosthetic AVR, dementia, recent diagnosis of Hodgkin's lymphoma in Dec 2019 who presents high fevers and confusion and inpatient BM biopsy    CAD s/p PCI  - No signs of significant ischemia or volume overload.   - EKG Vpaced. No evidence of PPM malfunction or any arrhythmias while on tele.  PPM to be interrogated this morning urgently in anticipation with his IR procedure.  Spoke to Adrián in [place]   - Continue to hold ASA.  Resume when cleared by Heme  - Continue metoprolol 50mg q12.  Can switch to long acting eventually  - Continue statin     Bioprosthetic AVR  - No evidence of volume overload  - TTE done showing normal LVSF with grade 1 DD    Thrombocytopenia  - F/u heme evaluation   - Platelet continues to improve.  Monitor for s/s bleeding  - For PICC line insertion and BM Bx today per Heme  - CTA chest negative of PE but suspicious of lung mets    HLD  - Continue statin    AMS   - Likely from fevers and underlying metabolic derangements   - Now resolved    Other cardiovascular workup will depend on clinical course  All other workup per primary team  Will follow     Cathi Horn DNP, NP-C  Cardiology   Spectra #[phone] 74M with h/o COPD, DANIEL, Depression, HLD, HTN, CAD s/p PCI, CHB s/p PPM, bioprosthetic AVR, dementia, recent diagnosis of Hodgkin's lymphoma in Dec 2019 who presents high fevers and confusion and inpatient BM biopsy    CAD s/p PCI  - No signs of significant ischemia or volume overload.   - EKG Vpaced. No evidence of PPM malfunction or any arrhythmias while on tele.  PPM to be interrogated this morning urgently in anticipation with his IR procedure.  Spoke to Adrián in [place] (Please see addendum below)  - Continue to hold ASA.  Resume when cleared by Heme  - Continue metoprolol 50mg q12.  Can switch to long acting eventually  - Continue statin     Bioprosthetic AVR  - No evidence of volume overload  - TTE done showing normal LVSF with grade 1 DD    Thrombocytopenia  - F/u heme evaluation   - Platelet continues to improve.  Monitor for s/s bleeding  - For PICC line insertion and BM Bx today per Heme  - CTA chest negative of PE but suspicious of lung mets    HLD  - Continue statin    AMS   - Likely from fevers and underlying metabolic derangements   - Now resolved    Other cardiovascular workup will depend on clinical course  All other workup per primary team  Will follow     Addendum:  PPM interrogation showed no events, Apaced 9% and Vpaced 99% with ~9 years of battery life.    Cathi Horn DNP, NP-C  Cardiology   Spectra #[phone]

## 2020-01-13 NOTE — BRIEF OPERATIVE NOTE - OPERATION/FINDINGS
11g x 6 inch on control needle in left posterior iliac crest.  10 cc marrow aspirated and bone marrow core taken. Given to pathology

## 2020-01-13 NOTE — BRIEF OPERATIVE NOTE - NSICDXBRIEFPROCEDURE_GEN_ALL_CORE_FT
PROCEDURES:  Biopsy, bone marrow, with imaging guidance 13-Jan-2020 16:15:03  Boston Chan  Insertion of peripherally inserted central catheter (PICC) with imaging guidance 13-Jan-2020 13:37:54  Boston Chan

## 2020-01-13 NOTE — CHART NOTE - NSCHARTNOTEFT_GEN_A_CORE
Vascular & Interventional Radiology Pre-Procedure Note    Procedure Name: ____PICC line and Bone marrow biopsy___    HPI: 74y Male with ___lymphoma, thrombocytopenia____    Allergies:   Medications (Abx/Cardiac/Anticoagulation/Blood Products)    metoprolol tartrate: 50 milliGRAM(s) Oral (01-13 @ 05:34)  piperacillin/tazobactam IVPB..: 25 mL/Hr IV Intermittent (01-11 @ 13:52)    Data:    T(C): 36.9  HR: 80  BP: 138/72  RR: 18  SpO2: 97%    Exam  General: ____wnl___  Chest: ____wnl___  Abdomen: ___wnl____  Extremities: ____wnl___    -WBC 2.90 / HgB 7.5 / Hct 24.3 / Plt 55  -Na 141 / Cl 109 / BUN 21 / Glucose 138  -K 4.2 / CO2 24 / Cr 1.00  -ALT -- / Alk Phos -- / T.Bili --  -INR1.80    Imaging: ____na___    Plan:   -74y Male presents for ___PICC line and bone marrow bx____  -Risks/Benefits/alternatives explained with the patient's wife and witnessed informed consent obtained.

## 2020-01-13 NOTE — BRIEF OPERATIVE NOTE - NSICDXBRIEFPROCEDURE_GEN_ALL_CORE_FT
PROCEDURES:  Insertion of peripherally inserted central catheter (PICC) with imaging guidance 13-Jan-2020 13:37:54  Boston Chan

## 2020-01-13 NOTE — PROGRESS NOTE ADULT - ATTENDING COMMENTS
The patient was personally seen and examined, in addition to being examined and evaluated by NP.  All elements of the note were edited where appropriate.    pacemaker interrogated, normal fxn  optimized for procedure

## 2020-01-13 NOTE — PROGRESS NOTE ADULT - PROBLEM SELECTOR PLAN 3
pt is not on anticoagulation  DIC cannot be ruled out   possible early stage   now stable count   resolved . ISTH DIC score 7  fibrinogen split products >5 <20.   Heme/onc Dr. agustin consulted,   patient received one dose Vit K without decrease in INR

## 2020-01-13 NOTE — PROGRESS NOTE ADULT - SUBJECTIVE AND OBJECTIVE BOX
Patient is a 74y old  Male who presents with a chief complaint of fever, persistent cough (13 Jan 2020 11:27)      HPI:  74M with h/o COPD, DANIEL, Depression, HLD, HTN, CAD s/p PCI, CHB s/p PPM, bioprosthetic AVR, dementia, recent diagnosis of Hodgkin's lymphoma in Dec 2019 who presents with intermittent fevers and persistent productive cough since discharge from [facility] on Dec 20.. During [place] hospitalization, pt was treated for PNA and was found to have Hodgkin's lymphoma. Since discharge, pt has been having intermittent fevers, chills (t max 102.2) for which his wife has been treating with Motrin and Tylenol. Pt denies chest pain, palpitations, abd pain, dysuria, urinary frequency or diarrhea. He has not been on abx since his last hospitalization. he was scheduled for BMBx today as outpt. His wife brought him into the ED because of high fever and periods of confusion which has happened previously and temprally associated with fever. He currently appears comfortable, pleasant and not confused. Wife is at bedside. (08 Jan 2020 08:11)  admitted   persistent likely in setting of untreated Hodgkin's lymphoma. Now with possible early  DIC  resolved , tumor related fever -   pt seen and examine today- alert awake   dry cough getting better ,   npo for procedure  , no fever , no distress   .      INTERVAL HPI/OVERNIGHT EVENTS:  T(C): 36.6 (01-13-20 @ 12:30), Max: 37.6 (01-12-20 @ 21:04)  HR: 77 (01-13-20 @ 12:30) (73 - 80)  BP: 129/62 (01-13-20 @ 12:30) (129/62 - 138/72)  RR: 18 (01-13-20 @ 12:30) (18 - 18)  SpO2: 97% (01-13-20 @ 04:45) (96% - 98%)  Wt(kg): --  I&O's Summary    12 Jan 2020 07:01  -  13 Jan 2020 07:00  --------------------------------------------------------  IN: 660 mL / OUT: 0 mL / NET: 660 mL    13 Jan 2020 07:01  -  13 Jan 2020 15:26  --------------------------------------------------------  IN: 0 mL / OUT: 500 mL / NET: -500 mL    REVIEW OF SYSTEMS:  CONSTITUTIONAL: No fever  today , weight loss,  no  fatigue  ENMT: ; No sinus or throat pain  RESPIRATORY:   +dry  cough, no  wheezing, no  chills  , No shortness of breath  CARDIOVASCULAR: No chest pain, palpitations, dizziness, or leg swelling  GASTROINTESTINAL: No abdominal or epigastric pain. No nausea, vomiting, No diarrhea ,  No melena .  GENITOURINARY: No dysuria, frequency, hematuria, or incontinence  NEUROLOGICAL: No headaches, memory loss, loss of strength, numbness, or tremors  MUSCULOSKELETAL: No joint pain or swelling; No muscle, back, or extremity pain        PHYSICAL EXAM:  GENERAL: NAD, well-groomed, well-developed  HEAD:  Atraumatic, Normocephalic  EYES: EOMI  ENMT:Moist mucous membranes,  No lesions  NERVOUS SYSTEM:  Alert & Oriented X3, motor intact  / sensory intact  5/5 +  CHEST/LUNG:  percussion bilaterally   No rales, rhonchi, wheezing,   HEART: Regular rate and rhythm; No murmurs, no tachy   ABDOMEN: Soft, Nontender, Nondistended; Bowel sounds present  EXTREMITIES:  2+ Peripheral Pulses, No clubbing, cyanosis, or edema  gu intact         MEDICATIONS  (STANDING):  atorvastatin 10 milliGRAM(s) Oral at bedtime  cyanocobalamin 1000 MICROGram(s) Oral daily  folic acid 1 milliGRAM(s) Oral daily  imipramine 25 milliGRAM(s) Oral daily  methylPREDNISolone sodium succinate Injectable 80 milliGRAM(s) IV Push every 8 hours  metoprolol tartrate 50 milliGRAM(s) Oral every 12 hours  pantoprazole    Tablet 40 milliGRAM(s) Oral before breakfast  sodium chloride 0.9%. 1000 milliLiter(s) (60 mL/Hr) IV Continuous <Continuous>    MEDICATIONS  (PRN):  acetaminophen   Tablet .. 1000 milliGRAM(s) Oral every 6 hours PRN Temp greater or equal to 38C (100.4F), Mild Pain (1 - 3)  albuterol/ipratropium for Nebulization 3 milliLiter(s) Nebulizer every 6 hours PRN Shortness of Breath and/or Wheezing  benzonatate 100 milliGRAM(s) Oral every 8 hours PRN Cough  guaiFENesin   Syrup  (Sugar-Free) 200 milliGRAM(s) Oral every 6 hours PRN Cough      LABS:                        7.5    2.90  )-----------( 55       ( 13 Jan 2020 09:02 )             24.3     01-13    141  |  109<H>  |  21  ----------------------------<  138<H>  4.2   |  24  |  1.00    Ca    7.8<L>      13 Jan 2020 09:02      PT/INR - ( 13 Jan 2020 09:02 )   PT: 20.7 sec;   INR: 1.80 ratio                               RADIOLOGY & ADDITIONAL TESTS:    Imaging Personally Reviewed:   no new test     Advance Directives:  full code

## 2020-01-13 NOTE — PROGRESS NOTE ADULT - ATTENDING COMMENTS
pt seen and examine see  above plan -  ams   resolved  not  acute metabolic  encephalopathy   but  sec to to tumor  fever related  delirium and dementia exacerbation.    acute  on chr pancytopenia  possible early  DIC   resolved  with tumor related  fever  2/2 Hodgkin lymphoma -  iv solumedrol 80 mg iv q8hr  , low suspicion of any infectious process , no pna , no uti will   observe  off   abx  , id dr pitts . npo for          bone marrow biopsy    and picc line  today -  by ir guided  for starting  chemo  as per hem/onc -     - coagulopathy sec to lymphoma - fu inr  , cbc  in am .
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
## 2020-01-13 NOTE — PROGRESS NOTE ADULT - PROBLEM SELECTOR PLAN 1
pt was diagnosed in Dec 2019. Sees Dr. Majano (consult placed)  Was scheduled for  out pt BMbx last  Friday; by  Dr. Majano.- with  recurrent fever   suspect symptoms of intermittent fevers, chills,dry  cough are related to Hodgkin's lymphoma rather than an acute infectious process.   procal 1.15   blood cx  neg  , ucult neg  / off abx   - solumedrol 80mg q8 to for tumor related fever.  continue IVF at 60cc/hr  - ir guided bone marrow biopsy today , and picc line for chemo. pt was diagnosed in Dec 2019. Sees Dr. Majano (consult placed)  Was scheduled for  out pt BMbx last  Friday; by  Dr. Majano.- with  recurrent fever   suspect symptoms of intermittent fevers, chills,dry  cough are related to Hodgkin's lymphoma rather than an acute infectious process.   procal 1.15   blood cx  neg  , ucult neg  / off abx   - solumedrol 80mg q8 to for tumor related fever.    - ir guided bone marrow biopsy today , and picc line for chemo.

## 2020-01-13 NOTE — PROGRESS NOTE ADULT - PROBLEM SELECTOR PLAN 5
h/o dementia with behavioral disturbance during previous hospitalization and at home when develops high fevers cause delirium - now resolved   emphasize re-orientation   fall precautions

## 2020-01-13 NOTE — PROGRESS NOTE ADULT - PROBLEM SELECTOR PLAN 1
on steroids - afebrile - Heme following, ID following - Planned for Bx this am -   bone marrow bx planned for Monday - will likely need a Infusaport -    steroids as per heme onc for Lymphoma sx management - not pulmonary indication  on NEBS prn   s/p ABX for poss secondary infection - immunosuppressed - stopped - ID follow up reviewed - no need for ABX - agree with ID  CPAP use nightly for DANIEL  supportive medical regimen  ID follow up reviewed  labs and imaging reviewed  GOC discussed with Wife and Patient  will follow  cough rx regimen prn  tylenol PRN.

## 2020-01-13 NOTE — PROGRESS NOTE ADULT - ASSESSMENT
74M with h/o COPD, DANIEL, Depression, HLD, HTN, CAD s/p PCI, CHB s/p PPM, bioprosthetic AVR, dementia, recent diagnosis of Hodgkin's lymphoma in Dec 2019 who presents with fevers and peristent likely in setting of untreated Hodgkin's lymphoma. Now with possible early  DIC  resolved .

## 2020-01-13 NOTE — PROGRESS NOTE ADULT - PROBLEM SELECTOR PLAN 6
s/p pci  ASA on hold- resume after hem/onc clearance   c/w lopressor  c/w Lipitor  cardio Dr. Ndiaye consulted.

## 2020-01-13 NOTE — PROGRESS NOTE ADULT - SUBJECTIVE AND OBJECTIVE BOX
Patient seen and examined;  Chart reviewed and events noted;   continues to have cough    MEDICATIONS  (STANDING):  atorvastatin 10 milliGRAM(s) Oral at bedtime  cyanocobalamin 1000 MICROGram(s) Oral daily  folic acid 1 milliGRAM(s) Oral daily  imipramine 25 milliGRAM(s) Oral daily  methylPREDNISolone sodium succinate Injectable 80 milliGRAM(s) IV Push every 8 hours  metoprolol tartrate 50 milliGRAM(s) Oral every 12 hours  pantoprazole    Tablet 40 milliGRAM(s) Oral before breakfast  sodium chloride 0.9%. 1000 milliLiter(s) (60 mL/Hr) IV Continuous <Continuous>    MEDICATIONS  (PRN):  acetaminophen   Tablet .. 1000 milliGRAM(s) Oral every 6 hours PRN Temp greater or equal to 38C (100.4F), Mild Pain (1 - 3)  albuterol/ipratropium for Nebulization 3 milliLiter(s) Nebulizer every 6 hours PRN Shortness of Breath and/or Wheezing  benzonatate 100 milliGRAM(s) Oral every 8 hours PRN Cough  guaiFENesin   Syrup  (Sugar-Free) 200 milliGRAM(s) Oral every 6 hours PRN Cough      Vital Signs Last 24 Hrs  T(C): 36.9 (13 Jan 2020 04:45), Max: 37.6 (12 Jan 2020 21:04)  T(F): 98.4 (13 Jan 2020 04:45), Max: 99.6 (12 Jan 2020 21:04)  HR: 80 (13 Jan 2020 04:45) (73 - 80)  BP: 138/72 (13 Jan 2020 04:45) (119/68 - 138/72)  BP(mean): --  RR: 18 (13 Jan 2020 04:45) (18 - 18)  SpO2: 97% (13 Jan 2020 04:45) (95% - 98%)    PHYSICAL EXAM  General: adult obese in NAD  HEENT: clear oropharynx, anicteric sclera, pink conjunctivae  Neck: supple  CV: normal S1S2 with no murmur rubs or gallops  Lungs: clear to auscultation, no wheezes, no rhales  Abdomen: soft non-tender non-distended, no hepato/splenomegaly  Ext: no clubbing cyanosis or edema  Skin: no rashes and no petichiae  Neuro: alert and oriented X3 no focal deficits      LABS:                        7.5    x     )-----------( x        ( 13 Jan 2020 09:02 )             24.3     Hemoglobin: 7.5 g/dL (01-13 @ 09:02)  Hemoglobin: 7.6 g/dL (01-12 @ 09:17)  Hemoglobin: 8.2 g/dL (01-11 @ 09:04)  Hemoglobin: 8.6 g/dL (01-10 @ 09:40)  Hemoglobin: 8.9 g/dL (01-09 @ 08:22)    Platelet Count - Automated: 55 K/uL (01-12 @ 09:17)  Platelet Count - Automated: 53 K/uL (01-11 @ 09:04)  Platelet Count - Automated: 46 K/uL (01-10 @ 09:40)  Platelet Count - Automated: 44 K/uL (01-09 @ 08:22)    01-13    141  |  109<H>  |  21  ----------------------------<  138<H>  4.2   |  24  |  1.00    Ca    7.8<L>      13 Jan 2020 09:02      PT/INR - ( 13 Jan 2020 09:02 )   PT: 20.7 sec;   INR: 1.80 ratio
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
## 2020-01-13 NOTE — PROGRESS NOTE ADULT - ASSESSMENT
75 y/o man w PPM/AVR/COPD/beta thalassemia trait and recently dx'd Classical Hodgkins Lymphoma initial w/u only below diaphragm disease  CD30+, CD15+, CD20 neg  in midst of staging and planning for chemotherapy as outpatient      -now CTchest also above diaphragm LADs, ?progression of ds   -thrombocytopenia ?bone marrow involvement, stable, asx  -Adm w fever and cough, clinically much improved w IV solumetrol  -Planned for infusoport but on hold as PT/PTT prolonged, w mixing studies suspicious for mild inhibitor. Post VIt K trial. Now for PICC and bone marrow bx w IR on 1/13/20 at 2pm; discussed with IR nursing including specimens that need to be sent (core biopsy for morphology, aspirate for FISH, cytogenetics, Flow cytometry)  -12/2019 Echo suboptimal post repeat in hospital. Cardiology following

## 2020-01-14 NOTE — DISCHARGE NOTE NURSING/CASE MANAGEMENT/SOCIAL WORK - PATIENT PORTAL LINK FT
You can access the FollowMyHealth Patient Portal offered by [facility] by registering at the following website: [email or web address]. By joining [employer]’s FollowMyHealth portal, you will also be able to view your health information using other applications (apps) compatible with our system.

## 2020-01-14 NOTE — DISCHARGE NOTE PROVIDER - HOSPITAL COURSE
FROM ADMISSION H+P:     HPI:    74M with h/o COPD, DANIEL, Depression, HLD, HTN, CAD s/p PCI, CHB s/p PPM, bioprosthetic AVR, dementia, recent diagnosis of Hodgkin's lymphoma in Dec 2019 who presents with intermittent fevers and persistent productive cough since discharge from [facility] on Dec 20.. During [place] hospitalization, pt was treated for PNA and was found to have Hodgkin's lymphoma. Since discharge, pt has been having intermittent fevers, chills (t max 102.2) for which his wife has been treating with Motrin and Tylenol. Pt denies chest pain, palpitations, abd pain, dysuria, urinary frequency or diarrhea. He has not been on abx since his last hospitalization. he was scheduled for BMBx today as outpt. His wife brought him into the ED because of high fever and periods of confusion which has happened previously and temporally associated with fever. He currently appears comfortable, pleasant and not confused. Wife is at bedside. (08 Jan 2020 08:11)            ---    HOSPITAL COURSE: Pt was admitted for fever workup. Pt was not found to have PNA, or other infectious source. ID Dr Majano was consulted for fevers. Blood and urine cultures were negative for growth. Flu swabs negative. Pt completed course of Zosyn during hospital stay. Heme/Onc [facility] were consulted, and pt was started on Solumedrol q8h for fevers, which were attributed to recent dx of Hodgkin's lymphoma. There was a concern for DIC, so all antiplatelet and anticoagulants were held. Per heme/onc, pt was to have BM bx as outpatient, but this was held due to these fevers. Once pt was afebrile, and without overt infectious process, pt had PICC placed on 1/13 for eventual chemotherapy. Pt also had BM bx performed by IR. Pt to receive 1x dose chemotherapy prior to discharge.        ---    CONSULTANTS:     Heme/Onc: Dr. Hilario    ID: Dr. Majano    Cardio: [facility]    Pulm: Dr. Moore        ---    TIME SPENT:    The total amount of time spent reviewing the hospital notes, laboratory values, imaging findings, assessing/counseling the patient, discussing with consultant physicians, social work, nursing staff took -- minutes        ---    Primary care provider was made aware of plan for discharge:      [  ] NO     [ x ] YES FROM ADMISSION H+P:     HPI:    74M with h/o COPD, DANIEL, Depression, HLD, HTN, CAD s/p PCI, CHB s/p PPM, bioprosthetic AVR, dementia, recent diagnosis of Hodgkin's lymphoma in Dec 2019 who presents with intermittent fevers and persistent productive cough since discharge from [facility] on Dec 20.. During [place] hospitalization, pt was treated for PNA and was found to have Hodgkin's lymphoma. Since discharge, pt has been having intermittent fevers, chills (t max 102.2) for which his wife has been treating with Motrin and Tylenol. Pt denies chest pain, palpitations, abd pain, dysuria, urinary frequency or diarrhea. He has not been on abx since his last hospitalization. he was scheduled for BMBx today as outpt. His wife brought him into the ED because of high fever and periods of confusion which has happened previously and temporally associated with fever. He currently appears comfortable, pleasant and not confused. Wife is at bedside. (08 Jan 2020 08:11)            ---    HOSPITAL COURSE: Pt was admitted for fever workup. Pt was not found to have PNA, or other infectious source. ID Dr Pitts was consulted for fevers. Blood and urine cultures were negative for growth. Flu swabs negative. Heme/Onc [facility] were consulted, and pt was started on Solumedrol q8h for fevers, which were attributed to recent dx of Hodgkin's lymphoma. There was a concern for possible  early  DIC, so all antiplatelet and anticoagulants were held.acute  on chr pancytopenia  possible early  DIC   resolved  with tumor related  fever  2/2 Hodgkin lymphoma -  iv solumedrol 80 mg iv q8hr  ,     low suspicion of any infectious process , no pna , no uti will   observe  off   abx   id dr pitts .     later tumor related fever resolved .     s/p        ams   resolved  not  acute metabolic  encephalopathy   but  sec to to tumor  fever related  delirium and dementia exacerbation.                  bone marrow biopsy    for  staging and picc line    for chemo   -  starting  chemo  tomorrow   as per hem/onc   dr pitts .        ---    CONSULTANTS:     Heme/Onc: Dr. Hilario    ID: Dr. Pitts    Cardio: [facility]    Pulm: Dr. Moore        ---    TIME SPENT:    The total amount of time spent reviewing the hospital notes, laboratory values, imaging findings, assessing/counseling the patient, discussing with consultant physicians, social work, nursing staff took --50  minutes        ---    Primary care provider was made aware of plan for discharge:      [  ] NO     [ x ] YES FROM ADMISSION H+P:     HPI:    74M with h/o COPD, DANIEL, Depression, HLD, HTN, CAD s/p PCI, CHB s/p PPM, bioprosthetic AVR, dementia, recent diagnosis of Hodgkin's lymphoma in Dec 2019 who presents with intermittent fevers and persistent productive cough since discharge from [facility] on Dec 20.. During [place] hospitalization, pt was treated for PNA and was found to have Hodgkin's lymphoma. Since discharge, pt has been having intermittent fevers, chills (t max 102.2) for which his wife has been treating with Motrin and Tylenol. Pt denies chest pain, palpitations, abd pain, dysuria, urinary frequency or diarrhea. He has not been on abx since his last hospitalization. he was scheduled for BMBx today as outpt. His wife brought him into the ED because of high fever and periods of confusion which has happened previously and temporally associated with fever. He currently appears comfortable, pleasant and not confused. Wife is at bedside. (08 Jan 2020 08:11)        ---    HOSPITAL COURSE: Pt was admitted for fever workup. Pt was not found to have PNA, or other infectious source. ID Dr Majano was consulted for fevers. Blood and urine cultures were negative for growth. Flu swabs negative. Cardio and pulm were consulted as pt had Heme/Onc [facility] were consulted, and pt was started on Solumedrol q8h for fevers, which were attributed to recent dx of Hodgkin's lymphoma. There was a concern for possible  early DIC, so all antiplatelet and anticoagulants were held. This acute  on chronic pancytopenia  with possible early DIC had resolved along with pt's tumor related  fever 2/2 Hodgkin lymphoma, while pt was on iv solumedrol 80 mg iv q8hr. There was low suspicion of any infectious process , no pna , no uti and so pt was observed off ABX. Pt had intermittent episodes of confusion and agitation that resolved. This was not acute metabolic  encephalopathy but sec to tumor, and fever related  delirium and dementia exacerbation. On 1/13/2020 pt had bone marrow biopsy for  staging and picc line for chemo. Pt received first dose of ABVD chemo on 1/16/2020 per Dr. Majano heme/onc. Pt was seen and examined on day of discharge, and was clinically stable to be discharged home with outpt f/u within one week.        T(C): 36.6 (01-17-20 @ 05:46), Max: 37.9 (01-16-20 @ 20:30)    HR: 73 (01-17-20 @ 05:46) (72 - 105)    BP: 131/75 (01-17-20 @ 05:46) (130/68 - 142/78)    RR: 17 (01-17-20 @ 05:46) (16 - 20)    SpO2: 97% (01-17-20 @ 05:46) (92% - 97%)        Physical Exam:    GENERAL: M in NAD    HEENT: EOMI, hearing normal, conjunctiva and sclera clear    Chest: CTA bilaterally, no wheezing    CV: S1S2, RRR,     GI: soft, +BS, NT/ND    Musculoskeletal: 1+ pitting edema b/l LE    Psychiatric: affect nL, mood nL    Skin: warm and dry. PICC present RUE, bandage overlying line            ---    CONSULTANTS:     Heme/Onc: Dr. Hilario    ID: Dr. Majano    Cardio: [facility]    Pulm: Dr. Moore        ---    TIME SPENT:    The total amount of time spent reviewing the hospital notes, laboratory values, imaging findings, assessing/counseling the patient, discussing with consultant physicians, social work, nursing staff took --50  minutes        ---    Primary care provider was made aware of plan for discharge:      [  ] NO     [ x ] YES FROM ADMISSION H+P:     HPI:    74M with h/o COPD, DANIEL, Depression, HLD, HTN, CAD s/p PCI, CHB s/p PPM, bioprosthetic AVR, dementia, recent diagnosis of Hodgkin's lymphoma in Dec 2019 who presents with intermittent fevers and persistent productive cough since discharge from [facility] on Dec 20.. During [place] hospitalization, pt was treated for PNA and was found to have Hodgkin's lymphoma. Since discharge, pt has been having intermittent fevers, chills (t max 102.2) for which his wife has been treating with Motrin and Tylenol. Pt denies chest pain, palpitations, abd pain, dysuria, urinary frequency or diarrhea. He has not been on abx since his last hospitalization. he was scheduled for BMBx today as outpt. His wife brought him into the ED because of high fever and periods of confusion which has happened previously and temporally associated with fever. He currently appears comfortable, pleasant and not confused. Wife is at bedside. (08 Jan 2020 08:11)        ---    HOSPITAL COURSE: Pt was admitted for fever workup. CTA chest was negative for pulmonary emboli.  Ectatic ascending thoracic aorta.  Multistation mediastinal adenopathy as described, likely neoplastic.    Bilateral parenchymal nodularity likely representing metastatic disease.  Pt was not found to have PNA, or other infectious source. ID Dr Majano was consulted for fevers. Blood and urine cultures were negative for growth. Flu swabs negative. Cardio and pulm were consulted as pt had Heme/Onc [facility] were consulted, and pt was started on Solumedrol q8h for fevers, which were attributed to recent dx of Hodgkin's lymphoma. There was a concern for possible  early DIC, so all antiplatelet and anticoagulants were held. This acute  on chronic pancytopenia  with possible early DIC had resolved along with pt's tumor related  fever 2/2 Hodgkin lymphoma, while pt was on iv solumedrol 80 mg iv q8hr. There was low suspicion of any infectious process , no pna , no uti and so pt was observed off ABX. Pt had intermittent episodes of confusion and agitation that resolved. This was not acute metabolic  encephalopathy but sec to tumor, and fever related  delirium and dementia exacerbation. On 1/13/2020 pt had bone marrow biopsy for  staging and picc line for chemo. Pt received first dose of ABVD chemo on 1/16/2020 per Dr. Majano heme/onc. Pt was seen and examined on day of discharge, and was clinically stable to be discharged home with outpt f/u in 3 days.        T(C): 36.6 (01-17-20 @ 05:46), Max: 37.9 (01-16-20 @ 20:30)    HR: 73 (01-17-20 @ 05:46) (72 - 105)    BP: 131/75 (01-17-20 @ 05:46) (130/68 - 142/78)    RR: 17 (01-17-20 @ 05:46) (16 - 20)    SpO2: 97% (01-17-20 @ 05:46) (92% - 97%)        Physical Exam:    GENERAL: M in NAD    HEENT: EOMI, hearing normal, conjunctiva and sclera clear    Chest: CTA bilaterally, no wheezing    CV: S1S2, RRR,     GI: soft, +BS, NT/ND    Musculoskeletal: 1+ pitting edema b/l LE    Psychiatric: affect nL, mood nL    Skin: warm and dry. PICC present RUE, bandage overlying line            ---    CONSULTANTS:     Heme/Onc: Dr. Hilario    ID: Dr. Majano    Cardio: [facility]    Pulm: Dr. Moore        ---    TIME SPENT:    The total amount of time spent reviewing the hospital notes, laboratory values, imaging findings, assessing/counseling the patient, discussing with consultant physicians, social work, nursing staff took --50  minutes FROM ADMISSION H+P:     HPI:    74M with h/o COPD, DANIEL, Depression, HLD, HTN, CAD s/p PCI, CHB s/p PPM, bioprosthetic AVR, dementia, recent diagnosis of Hodgkin's lymphoma in Dec 2019 who presents with intermittent fevers and persistent productive cough since discharge from [facility] on Dec 20.. During [place] hospitalization, pt was treated for PNA and was found to have Hodgkin's lymphoma. Since discharge, pt has been having intermittent fevers, chills (t max 102.2) for which his wife has been treating with Motrin and Tylenol. Pt denies chest pain, palpitations, abd pain, dysuria, urinary frequency or diarrhea. He has not been on abx since his last hospitalization. he was scheduled for BMBx today as outpt. His wife brought him into the ED because of high fever and periods of confusion which has happened previously and temporally associated with fever. He currently appears comfortable, pleasant and not confused. Wife is at bedside. (08 Jan 2020 08:11)        ---    HOSPITAL COURSE: Pt was admitted for fever workup. CTA chest was negative for pulmonary emboli.  Ectatic ascending thoracic aorta.  Multistation mediastinal adenopathy as described, likely neoplastic.    Bilateral parenchymal nodularity likely representing metastatic disease.  Pt was not found to have PNA, or other infectious source. ID Dr Majano was consulted for fevers. Blood and urine cultures were negative for growth. Flu swabs negative. Cardio and pulm were consulted as pt had Heme/Onc [facility] were consulted, and pt was started on Solumedrol q8h for fevers, which were attributed to recent dx of Hodgkin's lymphoma. There was a concern for possible  early DIC, so all antiplatelet and anticoagulants were held. This acute  on chronic pancytopenia  with possible early DIC had resolved along with pt's tumor related  fever 2/2 Hodgkin lymphoma, while pt was on iv solumedrol 80 mg iv q8hr. There was low suspicion of any infectious process , no pna , no uti and so pt was observed off ABX. Pt had intermittent episodes of confusion and agitation that resolved. This was not acute metabolic  encephalopathy but sec to tumor, and fever related  delirium and dementia exacerbation. On 1/13/2020 pt had bone marrow biopsy for  staging and picc line for chemo. Pt received first dose of ABVD chemo on 1/16/2020 per Dr. Melecio boston/onc. Pt. also received 1 unit PRBC and platelet transfusion.  Pt was seen and examined on day of discharge, and was clinically stable to be discharged home with outpt f/u in 2 days.                ---    CONSULTANTS:     Joshua/Onc: Dr. Hilario    ID: Dr. Majano    Cardio: [facility]    Pulm: Dr. Moore        ---    TIME SPENT:    The total amount of time spent reviewing the hospital notes, laboratory values, imaging findings, assessing/counseling the patient, discussing with consultant physicians, social work, nursing staff took --50  minutes FROM ADMISSION H+P:     HPI:    74M with h/o COPD, DANIEL, Depression, HLD, HTN, CAD s/p PCI, CHB s/p PPM, bioprosthetic AVR, dementia, recent diagnosis of Hodgkin's lymphoma in Dec 2019 who presents with intermittent fevers and persistent productive cough since discharge from [facility] on Dec 20.. During [place] hospitalization, pt was treated for PNA and was found to have Hodgkin's lymphoma. Since discharge, pt has been having intermittent fevers, chills (t max 102.2) for which his wife has been treating with Motrin and Tylenol. Pt denies chest pain, palpitations, abd pain, dysuria, urinary frequency or diarrhea. He has not been on abx since his last hospitalization. he was scheduled for BMBx today as outpt. His wife brought him into the ED because of high fever and periods of confusion which has happened previously and temporally associated with fever. He currently appears comfortable, pleasant and not confused. Wife is at bedside. (08 Jan 2020 08:11)        ---    HOSPITAL COURSE: Pt was admitted for fever workup. CTA chest was negative for pulmonary emboli.  Ectatic ascending thoracic aorta.  Multistation mediastinal adenopathy as described, likely neoplastic.    Bilateral parenchymal nodularity likely representing metastatic disease.  Pt was not found to have PNA, or other infectious source. ID Dr Majano was consulted for fevers. Blood and urine cultures were negative for growth. Flu swabs negative. Cardio and pulm were consulted as pt had Heme/Onc [facility] were consulted, and pt was started on Solumedrol q8h for fevers, which were attributed to recent dx of Hodgkin's lymphoma. There was a concern for possible  early DIC, so all antiplatelet and anticoagulants were held. This acute  on chronic pancytopenia  with possible early DIC had resolved along with pt's tumor related  fever 2/2 Hodgkin lymphoma, while pt was on iv solumedrol 80 mg iv q8hr. There was low suspicion of any infectious process , no pna , no uti and so pt was observed off ABX. Pt had intermittent episodes of confusion and agitation that resolved. This was not acute metabolic  encephalopathy but sec to tumor, and fever related  delirium and dementia exacerbation. On 1/13/2020 pt had bone marrow biopsy for  staging and picc line for chemo. Pt received first dose of ABVD chemo on 1/16/2020 per Dr. Melecio boston/onc. Pt. also received 1 unit PRBC and platelet transfusion. Pt. continued to exhibit sundowning behavior and delerium during the hospitalization.  Psychiatry was consulted and recommended PRN ativan and haldol.                  ---    CONSULTANTS:     Joshua/Onc: Dr. Hilario    ID: Dr. Majano    Cardio: [facility]    Pulm: Dr. Moore        ---    TIME SPENT:    The total amount of time spent reviewing the hospital notes, laboratory values, imaging findings, assessing/counseling the patient, discussing with consultant physicians, social work, nursing staff took --50  minutes

## 2020-01-14 NOTE — PROGRESS NOTE ADULT - PROBLEM SELECTOR PLAN 7
mild MILO, possibly from NSAID use at home coupled with some dehydration    - stable cr mild MILO  resolved , possibly from NSAID use at home coupled with some dehydration    - stable cr

## 2020-01-14 NOTE — PROGRESS NOTE ADULT - SUBJECTIVE AND OBJECTIVE BOX
Patient is a 74y old  Male who presents with a chief complaint of fever, persistent cough (14 Jan 2020 06:28)    HPI:  74M with h/o COPD, DANIEL, Depression, HLD, HTN, CAD s/p PCI, CHB s/p PPM, bioprosthetic AVR, dementia, recent diagnosis of Hodgkin's lymphoma in Dec 2019 who presents with intermittent fevers and persistent productive cough since discharge from [facility] on Dec 20.. During [place] hospitalization, pt was treated for PNA and was found to have Hodgkin's lymphoma. Since discharge, pt has been having intermittent fevers, chills (t max 102.2) for which his wife has been treating with Motrin and Tylenol. Pt denies chest pain, palpitations, abd pain, dysuria, urinary frequency or diarrhea. He has not been on abx since his last hospitalization. he was scheduled for BMBx today as outpt. His wife brought him into the ED because of high fever and periods of confusion which has happened previously and temprally associated with fever. He currently appears comfortable, pleasant and not confused. Wife is at bedside. (08 Jan 2020 08:11)      INTERVAL HPI/OVERNIGHT EVENTS: Pt seen and examined at bedside. Pt states he feels well overall, without acute complaints. Pt states he slept well overnight. Denies CP, or SOB. Tolerated PICC and bone marrow bx yesterday without complaint he states. States urinating well, had BM day before yesterday.    T(C): 36.8 (01-14-20 @ 05:33), Max: 36.9 (01-13-20 @ 20:35)  HR: 77 (01-14-20 @ 05:33) (71 - 94)  BP: 143/75 (01-14-20 @ 05:33) (116/75 - 152/87)  RR: 18 (01-14-20 @ 05:33) (18 - 19)  SpO2: 98% (01-14-20 @ 05:33) (94% - 98%)  Wt(kg): --  I&O's Summary    13 Jan 2020 07:01  -  14 Jan 2020 07:00  --------------------------------------------------------  IN: 660 mL / OUT: 500 mL / NET: 160 mL        REVIEW OF SYSTEMS:  CONSTITUTIONAL: No fever, weight loss, or fatigue  EYES: No eye pain, visual disturbances, or discharge  ENMT:  No difficulty hearing, tinnitus, vertigo; No sinus or throat pain  NECK: No pain or stiffness  BREASTS: No pain, no masses,   RESPIRATORY: No cough, wheezing, chills or hemoptysis; No shortness of breath  CARDIOVASCULAR: No chest pain, palpitations, dizziness, or leg swelling  GASTROINTESTINAL: No abdominal or epigastric pain. No nausea, vomiting, or hematemesis; No diarrhea or constipation. No melena or hematochezia.  GENITOURINARY: No dysuria, frequency, hematuria, or incontinence  NEUROLOGICAL: No headaches, memory loss, loss of strength, numbness, or tremors  SKIN: No itching, burning, rashes, or lesions   LYMPH NODES: No enlarged glands  MUSCULOSKELETAL: No joint pain or swelling; No muscle, back, or extremity pain  PSYCHIATRIC: No depression, anxiety, mood swings, or difficulty sleeping  ALLERY No hives or eczema    PHYSICAL EXAM:  GENERAL: elderly male in NAD  HEAD:  Atraumatic, Normocephalic  EYES: EOMI, PERRLA, conjunctiva and sclera clear  ENMT: No tonsillar erythema, exudates, or enlargement; Moist mucous membranes, Good dentition, No lesions. NC present.   NECK: Supple, No JVD, Normal thyroid  NERVOUS SYSTEM:  Alert & Oriented X3, Good concentration; Motor Strength 5/5 B/L upper and lower extremities; DTRs 2+ intact and symmetric  CHEST/LUNG: Clear to percussion bilaterally; No rales, rhonchi, wheezing, or rubs  HEART: Regular rate and rhythm; No murmurs, rubs, or gallops  ABDOMEN: Soft, Nontender, Nondistended; Bowel sounds present  EXTREMITIES:  1+ pitting edema, LLE  LYMPH: No lymphadenopathy noted  SKIN: No rashes or lesions    MEDICATIONS  (STANDING):  atorvastatin 10 milliGRAM(s) Oral at bedtime  cyanocobalamin 1000 MICROGram(s) Oral daily  folic acid 1 milliGRAM(s) Oral daily  imipramine 25 milliGRAM(s) Oral daily  methylPREDNISolone sodium succinate Injectable 80 milliGRAM(s) IV Push every 8 hours  metoprolol tartrate 50 milliGRAM(s) Oral every 12 hours  pantoprazole    Tablet 40 milliGRAM(s) Oral before breakfast  sodium chloride 0.9%. 1000 milliLiter(s) (60 mL/Hr) IV Continuous <Continuous>    MEDICATIONS  (PRN):  acetaminophen   Tablet .. 1000 milliGRAM(s) Oral every 6 hours PRN Temp greater or equal to 38C (100.4F), Mild Pain (1 - 3)  albuterol/ipratropium for Nebulization 3 milliLiter(s) Nebulizer every 6 hours PRN Shortness of Breath and/or Wheezing  benzonatate 100 milliGRAM(s) Oral every 8 hours PRN Cough  guaiFENesin   Syrup  (Sugar-Free) 200 milliGRAM(s) Oral every 6 hours PRN Cough  oxyCODONE    IR 5 milliGRAM(s) Oral every 6 hours PRN Moderate Pain (4 - 6)  oxyCODONE    IR 10 milliGRAM(s) Oral every 6 hours PRN Severe Pain (7 - 10)      LABS:                        7.5    2.90  )-----------( 55       ( 13 Jan 2020 09:02 )             24.3     01-13    141  |  109<H>  |  21  ----------------------------<  138<H>  4.2   |  24  |  1.00    Ca    7.8<L>      13 Jan 2020 09:02      PT/INR - ( 13 Jan 2020 09:02 )   PT: 20.7 sec;   INR: 1.80 ratio             CAPILLARY BLOOD GLUCOSE                  RADIOLOGY & ADDITIONAL TESTS:    Imaging Personally Reviewed: Yes      Advance Directives: none,      Palliative Care: no Patient is a 74y old  Male who presents with a chief complaint of fever, persistent cough (14 Jan 2020 06:28)    HPI:  74M with h/o COPD, DANIEL, Depression, HLD, HTN, CAD s/p PCI, CHB s/p PPM, bioprosthetic AVR, dementia, recent diagnosis of Hodgkin's lymphoma in Dec 2019 who presents with intermittent fevers and persistent productive cough since discharge from [facility] on Dec 20.. During [place] hospitalization, pt was treated for PNA and was found to have Hodgkin's lymphoma. Since discharge, pt has been having intermittent fevers, chills (t max 102.2) for which his wife has been treating with Motrin and Tylenol. Pt denies chest pain, palpitations, abd pain, dysuria, urinary frequency or diarrhea. He has not been on abx since his last hospitalization. he was scheduled for BMBx today as outpt. His wife brought him into the ED because of high fever and periods of confusion which has happened previously and temprally associated with fever. He currently appears comfortable, pleasant and not confused. Wife is at bedside. (08 Jan 2020 08:11)      INTERVAL HPI/OVERNIGHT EVENTS: Pt seen and examined at bedside. Pt states he feels well overall, without acute complaints. Pt states he slept well overnight. Denies CP, or SOB. Tolerated PICC and bone marrow bx yesterday without complaint he states. States urinating well, had BM day before yesterday.    T(C): 36.8 (01-14-20 @ 05:33), Max: 36.9 (01-13-20 @ 20:35)  HR: 77 (01-14-20 @ 05:33) (71 - 94)  BP: 143/75 (01-14-20 @ 05:33) (116/75 - 152/87)  RR: 18 (01-14-20 @ 05:33) (18 - 19)  SpO2: 98% (01-14-20 @ 05:33) (94% - 98%)  Wt(kg): --  I&O's Summary    13 Jan 2020 07:01  -  14 Jan 2020 07:00  --------------------------------------------------------  IN: 660 mL / OUT: 500 mL / NET: 160 mL      REVIEW OF SYSTEMS:  CONSTITUTIONAL: No fever  today , weight loss,  no  fatigue  ENMT: ; No sinus or throat pain  RESPIRATORY:   no cough, no  wheezing, no  chills  , No shortness of breath  CARDIOVASCULAR: No chest pain, palpitations, dizziness, or leg swelling  GASTROINTESTINAL: No abdominal or epigastric pain. No nausea, vomiting, No diarrhea ,  No melena .  GENITOURINARY: No dysuria, frequency, hematuria, or incontinence  NEUROLOGICAL: No headaches, memory loss, loss of strength, numbness, or tremors  MUSCULOSKELETAL: No joint pain or swelling; No muscle, back, or extremity pain      PHYSICAL EXAM:  GENERAL: NAD, well-groomed, well-developed  HEAD:  Atraumatic, Normocephalic  EYES: EOMI  ENMT: Moist mucous membranes,  No lesions. NC present  NERVOUS SYSTEM:  Alert & Oriented X3, motor intact  / sensory intact  5/5 +  CHEST/LUNG: CTA bilaterally   No rales, rhonchi, wheezing,   HEART: Regular rate and rhythm; No murmurs, no tachy   ABDOMEN: Soft, Nontender, Nondistended; Bowel sounds present  EXTREMITIES:  2+ Peripheral Pulses, No clubbing, cyanosis, 1+ pitting edema LLE    MEDICATIONS  (STANDING):  atorvastatin 10 milliGRAM(s) Oral at bedtime  cyanocobalamin 1000 MICROGram(s) Oral daily  folic acid 1 milliGRAM(s) Oral daily  imipramine 25 milliGRAM(s) Oral daily  methylPREDNISolone sodium succinate Injectable 80 milliGRAM(s) IV Push every 8 hours  metoprolol tartrate 50 milliGRAM(s) Oral every 12 hours  pantoprazole    Tablet 40 milliGRAM(s) Oral before breakfast  sodium chloride 0.9%. 1000 milliLiter(s) (60 mL/Hr) IV Continuous <Continuous>    MEDICATIONS  (PRN):  acetaminophen   Tablet .. 1000 milliGRAM(s) Oral every 6 hours PRN Temp greater or equal to 38C (100.4F), Mild Pain (1 - 3)  albuterol/ipratropium for Nebulization 3 milliLiter(s) Nebulizer every 6 hours PRN Shortness of Breath and/or Wheezing  benzonatate 100 milliGRAM(s) Oral every 8 hours PRN Cough  guaiFENesin   Syrup  (Sugar-Free) 200 milliGRAM(s) Oral every 6 hours PRN Cough  oxyCODONE    IR 5 milliGRAM(s) Oral every 6 hours PRN Moderate Pain (4 - 6)  oxyCODONE    IR 10 milliGRAM(s) Oral every 6 hours PRN Severe Pain (7 - 10)      LABS:                        7.5    2.90  )-----------( 55       ( 13 Jan 2020 09:02 )             24.3     01-13    141  |  109<H>  |  21  ----------------------------<  138<H>  4.2   |  24  |  1.00    Ca    7.8<L>      13 Jan 2020 09:02      PT/INR - ( 13 Jan 2020 09:02 )   PT: 20.7 sec;   INR: 1.80 ratio             CAPILLARY BLOOD GLUCOSE                  RADIOLOGY & ADDITIONAL TESTS:    Imaging Personally Reviewed: Yes      Advance Directives: none,      Palliative Care: no Patient is a 74y old  Male who presents with a chief complaint of fever, persistent cough (14 Jan 2020 06:28)    HPI:  74M with h/o COPD, DANIEL, Depression, HLD, HTN, CAD s/p PCI, CHB s/p PPM, bioprosthetic AVR, dementia, recent diagnosis of Hodgkin's lymphoma in Dec 2019 who presents with intermittent fevers and persistent productive cough since discharge from [facility] on Dec 20.. During [place] hospitalization, pt was treated for PNA and was found to have Hodgkin's lymphoma. Since discharge, pt has been having intermittent fevers, chills (t max 102.2) for which his wife has been treating with Motrin and Tylenol. Pt denies chest pain, palpitations, abd pain, dysuria, urinary frequency or diarrhea. He has not been on abx since his last hospitalization. he was scheduled for BMBx today as outpt. His wife brought him into the ED because of high fever and periods of confusion which has happened previously and temprally associated with fever. He currently appears comfortable, pleasant and not confused. Wife is at bedside. (08 Jan 2020 08:11)    admitted   persistent likely in setting of untreated Hodgkin's lymphoma. Now with possible early  DIC  resolved , tumor related fever -      INTERVAL HPI/OVERNIGHT EVENTS: Pt seen and examined at bedside. Pt states he feels well overall, without acute complaints. Pt states he slept well overnight. Denies CP, or SOB. Tolerated PICC and bone marrow bx yesterday without complaint he states. States urinating well, had BM day before yesterday.    T(C): 36.8 (01-14-20 @ 05:33), Max: 36.9 (01-13-20 @ 20:35)  HR: 77 (01-14-20 @ 05:33) (71 - 94)  BP: 143/75 (01-14-20 @ 05:33) (116/75 - 152/87)  RR: 18 (01-14-20 @ 05:33) (18 - 19)  SpO2: 98% (01-14-20 @ 05:33) (94% - 98%)  Wt(kg): --  I&O's Summary    13 Jan 2020 07:01  -  14 Jan 2020 07:00  --------------------------------------------------------  IN: 660 mL / OUT: 500 mL / NET: 160 mL      REVIEW OF SYSTEMS:  CONSTITUTIONAL: No fever  today , weight loss,  no  fatigue  ENMT: ; No sinus or throat pain  RESPIRATORY:   no cough, no  wheezing, no  chills  , No shortness of breath  CARDIOVASCULAR: No chest pain, palpitations, dizziness, or leg swelling  GASTROINTESTINAL: No abdominal or epigastric pain. No nausea, vomiting, No diarrhea ,  No melena .  GENITOURINARY: No dysuria, frequency, hematuria, or incontinence  NEUROLOGICAL: No headaches, memory loss, loss of strength, numbness, or tremors  MUSCULOSKELETAL: No joint pain or swelling; No muscle, back, or extremity pain      PHYSICAL EXAM:  GENERAL: NAD, well-groomed, well-developed  HEAD:  Atraumatic, Normocephalic  EYES: EOMI  ENMT: Moist mucous membranes,  No lesions. NC present  NERVOUS SYSTEM:  Alert & Oriented X3, motor intact  / sensory intact  5/5 +  CHEST/LUNG: CTA bilaterally   No rales, rhonchi, wheezing,   HEART: Regular rate and rhythm; No murmurs, no tachy   ABDOMEN: Soft, Nontender, Nondistended; Bowel sounds present  EXTREMITIES:  2+ Peripheral Pulses, No clubbing, cyanosis, 1+ pitting edema LLE ,  upper ext  picc line intact +     MEDICATIONS  (STANDING):  atorvastatin 10 milliGRAM(s) Oral at bedtime  cyanocobalamin 1000 MICROGram(s) Oral daily  folic acid 1 milliGRAM(s) Oral daily  imipramine 25 milliGRAM(s) Oral daily  methylPREDNISolone sodium succinate Injectable 80 milliGRAM(s) IV Push every 8 hours  metoprolol tartrate 50 milliGRAM(s) Oral every 12 hours  pantoprazole    Tablet 40 milliGRAM(s) Oral before breakfast  sodium chloride 0.9%. 1000 milliLiter(s) (60 mL/Hr) IV Continuous <Continuous>    MEDICATIONS  (PRN):  acetaminophen   Tablet .. 1000 milliGRAM(s) Oral every 6 hours PRN Temp greater or equal to 38C (100.4F), Mild Pain (1 - 3)  albuterol/ipratropium for Nebulization 3 milliLiter(s) Nebulizer every 6 hours PRN Shortness of Breath and/or Wheezing  benzonatate 100 milliGRAM(s) Oral every 8 hours PRN Cough  guaiFENesin   Syrup  (Sugar-Free) 200 milliGRAM(s) Oral every 6 hours PRN Cough  oxyCODONE    IR 5 milliGRAM(s) Oral every 6 hours PRN Moderate Pain (4 - 6)  oxyCODONE    IR 10 milliGRAM(s) Oral every 6 hours PRN Severe Pain (7 - 10)      LABS:                        7.5    2.90  )-----------( 55       ( 13 Jan 2020 09:02 )             24.3     01-13    141  |  109<H>  |  21  ----------------------------<  138<H>  4.2   |  24  |  1.00    Ca    7.8<L>      13 Jan 2020 09:02      PT/INR - ( 13 Jan 2020 09:02 )   PT: 20.7 sec;   INR: 1.80 ratio                           RADIOLOGY & ADDITIONAL TESTS:    Imaging Personally Reviewed: Yes      Advance Directives: none,      Palliative Care: no

## 2020-01-14 NOTE — PROGRESS NOTE ADULT - ASSESSMENT
74M with h/o COPD, DANIEL, Depression, HLD, HTN, CAD s/p PCI, CHB s/p PPM, bioprosthetic AVR, dementia, recent diagnosis of Hodgkin's lymphoma in Dec 2019 who presents with fevers and peristent likely in setting of untreated Hodgkin's lymphoma.

## 2020-01-14 NOTE — PROGRESS NOTE ADULT - NSHPATTENDINGPLANDISCUSS_GEN_ALL_CORE
pt , nurse / hem/onc/ wife
pt , nurse / hem/onc/ wife
pt , nurse / resident / hem/onc
pt , nurse / hem/onc dr vides un / wife
pt , nurse / resident / hem/onc
pt , nurse / resident / hem/onc

## 2020-01-14 NOTE — DISCHARGE NOTE PROVIDER - PROVIDER TOKENS
PROVIDER:[TOKEN:[[id number]]],PROVIDER:[TOKEN:[[id number]]] PROVIDER:[TOKEN:[[id number]]],PROVIDER:[TOKEN:[[id number]]],PROVIDER:[TOKEN:[[id number]]]
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
## 2020-01-14 NOTE — PROGRESS NOTE ADULT - PROBLEM SELECTOR PLAN 3
pt is not on anticoagulation  DIC cannot be ruled out   possible early stage   now stable count   resolved . ISTH DIC score 7  fibrinogen split products >5 <20.   Heme/onc Dr. agustin's group following consulted,   patient received one dose Vit K without decrease in INR pt is not on anticoagulation-  coagulopathy sec to lymphoma -    score was 7  possible early stage   now stable count   resolved .  fibrinogen split products >5 <20.   Heme/onc Dr. agustin's group   s/p  one dose Vit K without decrease in INR

## 2020-01-14 NOTE — PROGRESS NOTE ADULT - PROBLEM SELECTOR PLAN 1
pt was diagnosed in Dec 2019. Sees Dr. Majano (consult placed)  pt with  recurrent fever --suspect symptoms of intermittent fevers, chills,dry  cough are related to Hodgkin's lymphoma rather than an acute infectious process.   procal 1.15 on 1/8/20   blood cx  neg  , ucult neg  / off abx   - solumedrol 80mg q8 to for tumor related fever.  continue IVF at 60cc/hr  - s/p ir guided bone marrow biopsy yesterday , and picc line for chemo--tolerated procedure well.  pt to f/u with heme/onc for staging and chemo planning

## 2020-01-14 NOTE — DISCHARGE NOTE PROVIDER - NSDCACTIVITY_GEN_ALL_CORE
Walking - Indoors allowed/No heavy lifting/straining No heavy lifting/straining/Walking - Outdoors allowed/Walking - Indoors allowed
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
## 2020-01-14 NOTE — DISCHARGE NOTE PROVIDER - CARE PROVIDER_API CALL
Beto Walker)  Internal Medicine  [facility] [place] [address]  Phone: [phone]  Fax: [phone]  Follow Up Time:     Hon BEBO Majano)  Hematology; Internal Medicine; Medical Oncology  [address] [facility], [place] [address]  Phone: [phone]  Fax: [phone]  Follow Up Time: Beto Walker)  Internal Medicine  [facility] [place] [address]  Phone: [phone]  Fax: [phone]  Follow Up Time:     Hon BEBO Majano)  Hematology; Internal Medicine; Medical Oncology  [address] [facility], [address]  [place] [address]  Phone: [phone]  Fax: [phone]  Follow Up Time:     Estrada Solo)  Surgery; Thoracic and Cardiac Surgery  [address] [place] [address]  Phone: [phone]  Fax: [phone]  Follow Up Time:

## 2020-01-14 NOTE — PROGRESS NOTE ADULT - PROBLEM SELECTOR PLAN 2
possible  early  DIC 2/2 Hodgkins lymphoma    resolved  with tumor related fever   ISTH DIC score 7 was initially   elevated INR, PT, aPTT, fibrinogen, d dimer, fibrinogen split products.  No epistaxis/hemoptysis or overt GIB noted by pt/wife  monitor daily CBCs  heme/onc Dr. agustin's group following   - on  solumedrol 80mg q8 to aide with inflammation possible  early  DIC 2/2 Hodgkins lymphoma    resolved  with tumor related fever   ISTH DIC score 7 was initially   elevated INR, PT, aPTT, fibrinogen, d dimer, fibrinogen split products.  No epistaxis/hemoptysis or overt GIB noted by pt/wife  monitor daily CBCs  heme/onc Dr. agustin's group following   - on  solumedrol 80mg q8 for tumor related fever .

## 2020-01-14 NOTE — DISCHARGE NOTE PROVIDER - NSDCFUADDINST_GEN_ALL_CORE_FT
Follow up with PMD in 1 week and Heme/Onc on 1/20/2020 Follow up with PMD in 1 week and Heme/Onc on 1/20/2020    Flush PICC line daily. Follow up with PMD in 1 week and Heme/Onc on 1/30/2020    Flush PICC line with 10cc NS daily.

## 2020-01-14 NOTE — DISCHARGE NOTE PROVIDER - NSDCCPCAREPLAN_GEN_ALL_CORE_FT
PRINCIPAL DISCHARGE DIAGNOSIS  Diagnosis: Pneumonia  Assessment and Plan of Treatment: You came in with fevers. You were evaluated by infectious disease specialists who determined that these fevers were not from an infectious source. You had cultures drawn which did not show any growth in your blood or urine. These fevers were determined to be from your Hodgkin's lymphoma.      SECONDARY DISCHARGE DIAGNOSES  Diagnosis: Hodgkins lymphoma  Assessment and Plan of Treatment: You were recently diagnosed with lymphoma. You were evaluated by your heme/onc doctors during your stay. You had a PICC line placed for eventual chemotherapy. You also had a bone marrow biopsy performed. Be sure to follow up with your oncologist as an outpatient within 1 week of discharge. PRINCIPAL DISCHARGE DIAGNOSIS  Diagnosis: Hodgkins lymphoma  Assessment and Plan of Treatment: You were recently diagnosed with lymphoma. You were evaluated by your heme/onc doctors during your stay. You had a PICC line placed for chemotherapy. You had your first chemotherapy session on 1/16/2020. You also had a bone marrow biopsy performed. Be sure to follow up with your oncologist as an outpatient within 1 week of discharge.      SECONDARY DISCHARGE DIAGNOSES  Diagnosis: Pneumonia  Assessment and Plan of Treatment: You came in with fevers. You were evaluated by infectious disease specialists who determined that these fevers were not from an infectious source. You had cultures drawn which did not show any growth in your blood or urine. These fevers were determined to be from your Hodgkin's lymphoma. PRINCIPAL DISCHARGE DIAGNOSIS  Diagnosis: Hodgkins lymphoma  Assessment and Plan of Treatment: You were recently diagnosed with lymphoma. You were evaluated by your heme/onc doctors during your stay. You had a PICC line placed for chemotherapy. You had your first chemotherapy session on 1/16/2020. You also had a bone marrow biopsy performed. Be sure to follow up with your oncologist as an outpatient on Monday 1/20 @ 11:15AM.      SECONDARY DISCHARGE DIAGNOSES  Diagnosis: Pneumonia  Assessment and Plan of Treatment: You came in with fevers. You were evaluated by infectious disease specialists who determined that these fevers were not from an infectious source. You had cultures drawn which did not show any growth in your blood or urine. These fevers were determined to be from your Hodgkin's lymphoma.    Diagnosis: Thoracic aortic aneurysm without rupture  Assessment and Plan of Treatment: Follow up with Thoracic surgery Dr. Solo at [facility] PRINCIPAL DISCHARGE DIAGNOSIS  Diagnosis: Hodgkins lymphoma  Assessment and Plan of Treatment: You were recently diagnosed with lymphoma. You were evaluated by your heme/onc doctors during your stay. You had a PICC line placed for chemotherapy. You had your first chemotherapy session on 1/16/2020. You also had a bone marrow biopsy performed. Be sure to follow up with your oncologist on 1/30/2020.      SECONDARY DISCHARGE DIAGNOSES  Diagnosis: Pneumonia  Assessment and Plan of Treatment: You came in with fevers. You were evaluated by infectious disease specialists who determined that these fevers were not from an infectious source. You had cultures drawn which did not show any growth in your blood or urine. These fevers were determined to be from your Hodgkin's lymphoma.    Diagnosis: Thoracic aortic aneurysm without rupture  Assessment and Plan of Treatment: Follow up with Thoracic surgery Dr. Solo at [facility]
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
## 2020-01-14 NOTE — DISCHARGE NOTE PROVIDER - NSDCMRMEDTOKEN_GEN_ALL_CORE_FT
aspirin 81 mg oral delayed release tablet: 1 tab(s) orally once a day  atorvastatin 10 mg oral tablet: 1 tab(s) orally once a day (at bedtime)  budesonide-formoterol 160 mcg-4.5 mcg/inh inhalation aerosol: 1 puff(s) inhaled 2 times a day  Colace 100 mg oral capsule: 1 tab(s) orally 3 times a day  folic acid 1 mg oral tablet: 1 tab(s) orally once a day  folic acid 1 mg oral tablet: 1 tab(s) orally once a day  imipramine 25 mg oral tablet: 1 tab(s) orally once a day  ipratropium-albuterol 0.5 mg-2.5 mg/3 mLinhalation solution: 3 milliliter(s) inhaled every 6 hours  Lipitor 10 mg oral tablet: 1 tab(s) orally once a day  Lopressor 50 mg oral tablet: 1 tab(s) orally 2 times a day  predniSONE 10 mg oral tablet: 1 tab(s) orally once a day  Protonix 40 mg oral delayed release tablet: 1 tab(s) orally once a day  Protonix 40 mg oral delayed release tablet: 1 tab(s) orally once a day  ROLLING WALKER: 1 each buccal once a day   Symbicort 160 mcg-4.5 mcg/inh inhalation aerosol: 1 puff(s) inhaled 2 times a day  Tofranil 25 mg oral tablet: 1 tab(s) orally once a day  Vitamin B1 100 mg oral tablet: 1 tab(s) orally once a day  Vitamin B12 1000 mcg oral tablet: 1 tab(s) orally once a day  ZyrTEC 10 mg oral tablet: 1 tab(s) orally once a day  ZyrTEC 10 mg oral tablet: 1 tab(s) orally once a day aspirin 81 mg oral delayed release tablet: 1 tab(s) orally once a day  atorvastatin 10 mg oral tablet: 1 tab(s) orally once a day (at bedtime)  budesonide-formoterol 160 mcg-4.5 mcg/inh inhalation aerosol: 1 puff(s) inhaled 2 times a day  Colace 100 mg oral capsule: 1 tab(s) orally 3 times a day  folic acid 1 mg oral tablet: 1 tab(s) orally once a day  imipramine 25 mg oral tablet: 1 tab(s) orally once a day  ipratropium-albuterol 0.5 mg-2.5 mg/3 mLinhalation solution: 3 milliliter(s) inhaled every 6 hours  Lopressor 50 mg oral tablet: 1 tab(s) orally 2 times a day  predniSONE 10 mg oral tablet: 1 tab(s) orally once a day  Protonix 40 mg oral delayed release tablet: 1 tab(s) orally once a day  ROLLING WALKER: 1 each buccal once a day   Symbicort 160 mcg-4.5 mcg/inh inhalation aerosol: 1 puff(s) inhaled 2 times a day  Tofranil 25 mg oral tablet: 1 tab(s) orally once a day  Vitamin B1 100 mg oral tablet: 1 tab(s) orally once a day  Vitamin B12 1000 mcg oral tablet: 1 tab(s) orally once a day  ZyrTEC 10 mg oral tablet: 1 tab(s) orally once a day aspirin 81 mg oral delayed release tablet: 1 tab(s) orally once a day.  Resume when cleared by hematology/oncology  atorvastatin 10 mg oral tablet: 1 tab(s) orally once a day (at bedtime)  Colace 100 mg oral capsule: 1 tab(s) orally 3 times a day  folic acid 1 mg oral tablet: 1 tab(s) orally once a day  ipratropium-albuterol 0.5 mg-2.5 mg/3 mLinhalation solution: 3 milliliter(s) inhaled every 6 hours, As Needed  Lopressor 50 mg oral tablet: 1 tab(s) orally 2 times a day  Protonix 40 mg oral delayed release tablet: 1 tab(s) orally once a day  ROLLING WALKER: 1 each buccal once a day   Symbicort 160 mcg-4.5 mcg/inh inhalation aerosol: 1 puff(s) inhaled 2 times a day  Tofranil 25 mg oral tablet: 1 tab(s) orally once a day  Vitamin B1 100 mg oral tablet: 1 tab(s) orally once a day  Vitamin B12 1000 mcg oral tablet: 1 tab(s) orally once a day  ZyrTEC 10 mg oral tablet: 1 tab(s) orally once a day aspirin 81 mg oral delayed release tablet: 1 tab(s) orally once a day.  Resume when cleared by hematology/oncology  atorvastatin 10 mg oral tablet: 1 tab(s) orally once a day (at bedtime)  Colace 100 mg oral capsule: 1 tab(s) orally 3 times a day  folic acid 1 mg oral tablet: 1 tab(s) orally once a day  ipratropium-albuterol 0.5 mg-2.5 mg/3 mLinhalation solution: 3 milliliter(s) inhaled every 6 hours, As Needed  Lopressor 50 mg oral tablet: 1 tab(s) orally 2 times a day  nystatin 100,000 units/mL oral suspension: 5 milliliter(s) orally 4 times a day  Protonix 40 mg oral delayed release tablet: 1 tab(s) orally once a day  ROLLING WALKER: 1 each buccal once a day   Symbicort 160 mcg-4.5 mcg/inh inhalation aerosol: 1 puff(s) inhaled 2 times a day  Tofranil 25 mg oral tablet: 1 tab(s) orally once a day  Vitamin B1 100 mg oral tablet: 1 tab(s) orally once a day  Vitamin B12 1000 mcg oral tablet: 1 tab(s) orally once a day  ZyrTEC 10 mg oral tablet: 1 tab(s) orally once a day

## 2020-01-14 NOTE — DISCHARGE NOTE NURSING/CASE MANAGEMENT/SOCIAL WORK - NSSCNAMETXT_GEN_ALL_CORE
[facility] [phone]- Visiting Nurse/Physical Therapy

## 2020-01-14 NOTE — PROGRESS NOTE ADULT - PROBLEM SELECTOR PLAN 4
ruled  out this time -  doubt infectious process, fever and cough likely related to metastatic Hodgkin's lymphoma.   ID Dr. Majano consulted  Dr. Moore (pulm) consulted  continue CPAP qhs  Received a CTA chest ordered by Pulm that did not show PNA, PE or pleural effusion. ruled  out this time -  doubt infectious process, fever and cough likely related to metastatic Hodgkin's lymphoma.   ID Dr. Majano consulted  Dr. Moore (pulm) consulted  continue CPAP qhs as needed   Received a CTA chest ordered by Pulm that did not show PNA, PE or pleural effusion.

## 2020-01-14 NOTE — PROGRESS NOTE ADULT - ASSESSMENT
74M with h/o COPD, DANIEL, Depression, HLD, HTN, CAD s/p PCI, CHB s/p PPM, bioprosthetic AVR, dementia, recent diagnosis of Hodgkin's lymphoma in Dec 2019 who presents high fevers and confusion and inpatient BM biopsy    CAD s/p PCI  - No signs of significant ischemia or volume overload.   - EKG V paced. No evidence of PPM malfunction or any arrhythmias while on tele. [facility] PPM interrogation showed no events, Apaced 9% and Vpaced 99% with ~9 years of battery life.  - Continue to hold ASA.  Resume when cleared by Heme  - Continue metoprolol 50mg q12.  Can switch to long acting eventually  - Continue statin     Bioprosthetic AVR  - No evidence of volume overload  - TTE done showing normal LVSF with grade 1 DD    Thrombocytopenia  - F/u heme evaluation   - Platelet 49  Monitor for s/s bleeding  - Tolerated PICC and bone marrow bx yesterday  - CTA chest negative of PE but suspicious of lung mets    HLD  - Continue statin    AMS   - Likely from fevers and underlying metabolic derangements   - Now resolved    Hodgkin's lymphoma  Care per Heme/Primary    Other cardiovascular workup will depend on clinical course  All other workup per primary team  Will follow

## 2020-01-14 NOTE — DISCHARGE NOTE PROVIDER - CARE PROVIDERS DIRECT ADDRESSES
,[email or web address],DirectAddress_Unknown ,[email or web address],DirectAddress_Unknown,DirectAddress_Unknown

## 2020-01-14 NOTE — PROGRESS NOTE ADULT - ASSESSMENT
73 y/o man w PPM/AVR/COPD/beta thalassemia trait and recently dx'd Classical Hodgkins Lymphoma initial w/u only below diaphragm disease  CD30+, CD15+, CD20 neg  In midst of staging and planning for chemotherapy as outpatient  Adm w fever and cough, clinically much improved w IV solumedrol      -CT chest now also above diaphragm LADs, ?progression of ds vs reactive  -thrombocytopenia ?bone marrow involvement, asx  -unable to have infusoport via IR as  PT/PTT prolonged, w mixing studies suspicious for mild inhibitor. Post VIt K trial  -post PICC and bone marrow bx through IR yesterday, tolerated well

## 2020-01-14 NOTE — PROGRESS NOTE ADULT - ATTENDING COMMENTS
pt seen and examine see  above plan -  ams   resolved  not  acute metabolic  encephalopathy   but  sec to to tumor  fever related  delirium and dementia exacerbation.      acute  on chr pancytopenia  possible early  DIC   resolved  with tumor related  fever  2/2 Hodgkin lymphoma -  iv solumedrol 80 mg iv q8hr  , low suspicion of any infectious process , no pna , no uti will   observe  off   abx  , id dr pitts . s/p           bone marrow biopsy    for  staging and picc line    for chemo   -  starting  chemo  tomorrow   as per hem/onc .

## 2020-01-14 NOTE — PROGRESS NOTE ADULT - PROBLEM SELECTOR PLAN 1
s/p Bone Marrow - BX - s/p Picc - poss chemo for outpatient -   on steroids - afebrile - Heme following,  steroids as per heme onc for Lymphoma sx management - not pulmonary indication  on NEBS prn   s/p ABX for poss secondary infection - immunosuppressed - stopped - ID follow up reviewed - no need for ABX - agree with ID  CPAP use nightly for DANIEL  supportive medical regimen  labs and imaging reviewed  GOC discussed with Wife and Patient  will follow  cough rx regimen prn  tylenol PRN.

## 2020-01-14 NOTE — DISCHARGE NOTE PROVIDER - NSDCFUSCHEDAPPT_GEN_ALL_CORE_FT
TAYLOR MCFADDEN ; 01/21/2020 ; [facility] [address] ; 01/27/2020 ; [facility] [address] TAYLOR MCFADDEN ; 01/18/2020 ; [facility]  TAYLOR MCFADDEN ; 01/21/2020 ; [facility] [address] ; 01/21/2020 ; [facility]  TAYLOR MCFADDEN ; 01/27/2020 ; [facility] [address] ; 01/30/2020 ; [facility] TAYLOR MCFADDEN ; 01/27/2020 ; [facility] [address] ; 01/30/2020 ; [facility] [place]

## 2020-01-14 NOTE — PROGRESS NOTE ADULT - SUBJECTIVE AND OBJECTIVE BOX
All interim records and events noted.    post bone marrow bx and PICC line insertion w IR yesterday  tolerated well      MEDICATIONS  (STANDING):  atorvastatin 10 milliGRAM(s) Oral at bedtime  cyanocobalamin 1000 MICROGram(s) Oral daily  folic acid 1 milliGRAM(s) Oral daily  imipramine 25 milliGRAM(s) Oral daily  methylPREDNISolone sodium succinate Injectable 80 milliGRAM(s) IV Push every 8 hours  metoprolol tartrate 50 milliGRAM(s) Oral every 12 hours  pantoprazole    Tablet 40 milliGRAM(s) Oral before breakfast    MEDICATIONS  (PRN):  acetaminophen   Tablet .. 1000 milliGRAM(s) Oral every 6 hours PRN Temp greater or equal to 38C (100.4F), Mild Pain (1 - 3)  albuterol/ipratropium for Nebulization 3 milliLiter(s) Nebulizer every 6 hours PRN Shortness of Breath and/or Wheezing  benzonatate 100 milliGRAM(s) Oral every 8 hours PRN Cough  guaiFENesin   Syrup  (Sugar-Free) 200 milliGRAM(s) Oral every 6 hours PRN Cough  oxyCODONE    IR 5 milliGRAM(s) Oral every 6 hours PRN Moderate Pain (4 - 6)  oxyCODONE    IR 10 milliGRAM(s) Oral every 6 hours PRN Severe Pain (7 - 10)      Vital Signs Last 24 Hrs  T(C): 37.1 (14 Jan 2020 13:39), Max: 37.1 (14 Jan 2020 13:39)  T(F): 98.8 (14 Jan 2020 13:39), Max: 98.8 (14 Jan 2020 13:39)  HR: 83 (14 Jan 2020 13:39) (71 - 94)  BP: 154/73 (14 Jan 2020 13:39) (116/75 - 154/73)  BP(mean): --  RR: 18 (14 Jan 2020 13:39) (18 - 19)  SpO2: 96% (14 Jan 2020 13:39) (94% - 98%)    PHYSICAL EXAM  General: well developed  well nourished, in no acute distress  Head: atraumatic, normocephalic  ENT: sclera anicteric, buccal mucosa moist  Neck: supple, trachea midline, no palpable masses  CV: S1 S2, regular rate and rhythm  Lungs: clear to auscultation, no wheezes/rhonchi  Abdomen: soft, nontender, bowel sounds present, pouchy  Extrem: right arm in clean protective wrap  Skin: no significant increased ecchymosis/petechiae  Neuro: alert and oriented X3,  no focal deficits      LABS:             7.9    2.91  )-----------( 49       ( 01-14 @ 09:23 )             25.8                7.5    2.90  )-----------( 55       ( 01-13 @ 09:02 )             24.3                7.6    2.41  )-----------( 55       ( 01-12 @ 09:17 )             24.4       01-14    139  |  107  |  18  ----------------------------<  195<H>  4.2   |  24  |  0.91    Ca    7.8<L>      14 Jan 2020 09:23      01-13 @ 09:02  PT20.7 INR1.80  PTT--  01-12 @ 09:17  PT18.1 INR1.57  PTT--  01-10 @ 09:40  PT21.1 INR1.82  PTT41.1      RADIOLOGY & ADDITIONAL STUDIES:    IMPRESSION/RECOMMENDATIONS:

## 2020-01-15 NOTE — DIETITIAN INITIAL EVALUATION ADULT. - PROBLEM SELECTOR PLAN 9
PAS for now; spoke with Melecio Nino re: pharmacologic ppx- pt is high risk for DVt- will add heparin SQ but will give one dose tonight only as pt may undego BMBx tomorrow. heparin will have to re-ordered after procedure.   In addition, if plt ct <50, need to consider stopping heparin  PT eval to encourage ambulation

## 2020-01-15 NOTE — DIETITIAN INITIAL EVALUATION ADULT. - PROBLEM SELECTOR PLAN 2
upon my review of CXR, there is no appreciable consolidation- would favor observing off abx; check procal level  Dr. Moore (pulm) consulted  h/o DANIEL- will likely need CPAP QHS  Received a CTA chest ordered by Pulm that did not show PNA, PE or pleural effusion.

## 2020-01-15 NOTE — PROGRESS NOTE ADULT - PROBLEM SELECTOR PLAN 4
ruled out at this time -  doubt infectious process -- fever and cough likely related to metastatic Hodgkin's lymphoma.   - ID Dr. Majano consulted, agrees w/ plan  - pulm Dr. Moore consulted   - continue CPAP qhs as needed for DANIEL

## 2020-01-15 NOTE — PROGRESS NOTE ADULT - ATTENDING COMMENTS
Pt. seen and evaluated for fever 2/2 Hodgkins Lymphoma.  Pt. is in no distress.  Sitting in chair and denies any pain or SOB.  Physical examination as above.  Pt. to have chemotherapy tomorrow.      Plan:  -Fever 2/2 Hodgkin Lymphoma:  Flu/RSV negative.  Urine culture and blood cultures with no growth.  s/p bonemarrow biopsy and PICC line insertion.  Continue Solu-medrol 80mg IV Q8h.  1/2NS@125cc/hr in preparation for chemotherapy tomorrow.  Heme/Onc f/u  -Pancytopenia:  no gross bleeding noted.  Improving on IV steroids.   -Coagulapathy:  2/2 lymphoma.  s/p vitamin K 5mg PO once.  -CAD:  ASA held 2/2 thrombocytopenia.  Continue metoprolol 50mg PO Q12h and Lipitor 10mg PO QHS.  Cardiology f/u  -MILO:  improved.   -Thoracic aortic aneurysm:  outpatient follow up with Dr. Solo at [facility]  -Dementia:  supportive care  -VTE ppx: SCD

## 2020-01-15 NOTE — PROGRESS NOTE ADULT - PROBLEM SELECTOR PLAN 8
pt has not been  taking replacement therapy at home  continue supplementation
pt has not been  taking replacement therapy at home  will start as inpatient
pt has not been taking replacement therapy at home  continue supplementation

## 2020-01-15 NOTE — DIETITIAN INITIAL EVALUATION ADULT. - PROBLEM SELECTOR PLAN 7
suspect due to decreased appetite and Vit K deficiency  pt is not on anticoagulation  will check fibrinogen, d-dimer to eval for DIC  will give vit K if plan for imminent procedures

## 2020-01-15 NOTE — DIETITIAN INITIAL EVALUATION ADULT. - PROBLEM SELECTOR PLAN 10
4.9 cm Ascending aortic aneurysm noted on CTA chest. spoke with Dr. Donald from [facility]- as pt is asymptomatic (no CP, BP) and is less than 5.0 cm, pt can be seen as outpt. Referral can be made to Dr. Estrada Solo at [place] for followup
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
## 2020-01-15 NOTE — PROGRESS NOTE ADULT - SUBJECTIVE AND OBJECTIVE BOX
[INTERVAL HX: ]  Patient seen and examined;  Chart reviewed and events noted;   no CP, no SOB, no diarrhea.   Chemotx tomorrow.     Patient is a 74y Male with a known history of :  Pneumonia due to organism (J18.9) [Active]  Pacemaker (Z95.0) [Active]  Hypertension (I10) [Active]  Lymphoma (C85.90) [Active]  Dementia (F03.90) [Active]  COPD (chronic obstructive pulmonary disease) (J44.9) [Active]  Depression (F32.9) [Active]  DVT (deep venous thrombosis) (I82.409) [Active]  HLD (hyperlipidemia) (E78.5) [Active]  HTN (hypertension) (I10) [Active]  Aortic valve replaced (Z95.2) [Active]  Stented coronary artery (Z95.5) [Active]  CAD (coronary artery disease) (I25.10) [Active]  S/P AVR (aortic valve replacement) (Z95.2) [Active]  Cardiac pacemaker (Z95.0) [Active]  Artificial pacemaker (Z95.0) [Active]  S/P aortic valve replacement with porcine valve (Z95.3) [Active]    HPI:  74M with h/o COPD, DANIEL, Depression, HLD, HTN, CAD s/p PCI, CHB s/p PPM, bioprosthetic AVR, dementia, recent diagnosis of Hodgkin's lymphoma in Dec 2019 who presents with intermittent fevers and persistent productive cough since discharge from [facility] on Dec 20.. During [place] hospitalization, pt was treated for PNA and was found to have Hodgkin's lymphoma. Since discharge, pt has been having intermittent fevers, chills (t max 102.2) for which his wife has been treating with Motrin and Tylenol. Pt denies chest pain, palpitations, abd pain, dysuria, urinary frequency or diarrhea. He has not been on abx since his last hospitalization. he was scheduled for BMBx today as outpt. His wife brought him into the ED because of high fever and periods of confusion which has happened previously and temprally associated with fever. He currently appears comfortable, pleasant and not confused. Wife is at bedside. (08 Jan 2020 08:11)    MEDICATIONS  (STANDING):  atorvastatin 10 milliGRAM(s) Oral at bedtime  chlorhexidine 4% Liquid 1 Application(s) Topical daily  cyanocobalamin 1000 MICROGram(s) Oral daily  folic acid 1 milliGRAM(s) Oral daily  imipramine 25 milliGRAM(s) Oral daily  methylPREDNISolone sodium succinate Injectable 80 milliGRAM(s) IV Push every 8 hours  metoprolol tartrate 50 milliGRAM(s) Oral every 12 hours  pantoprazole    Tablet 40 milliGRAM(s) Oral before breakfast    MEDICATIONS  (PRN):  acetaminophen   Tablet .. 1000 milliGRAM(s) Oral every 6 hours PRN Temp greater or equal to 38C (100.4F), Mild Pain (1 - 3)  albuterol/ipratropium for Nebulization 3 milliLiter(s) Nebulizer every 6 hours PRN Shortness of Breath and/or Wheezing  benzonatate 100 milliGRAM(s) Oral every 8 hours PRN Cough  guaiFENesin   Syrup  (Sugar-Free) 200 milliGRAM(s) Oral every 6 hours PRN Cough  oxyCODONE    IR 5 milliGRAM(s) Oral every 6 hours PRN Moderate Pain (4 - 6)  oxyCODONE    IR 10 milliGRAM(s) Oral every 6 hours PRN Severe Pain (7 - 10)      Vital Signs Last 24 Hrs  T(C): 36.8 (15 Warren 2020 05:03), Max: 37.1 (14 Jan 2020 13:39)  T(F): 98.2 (15 Warren 2020 05:03), Max: 98.8 (14 Jan 2020 13:39)  HR: 96 (15 Warren 2020 05:03) (78 - 96)  BP: 128/83 (15 Warren 2020 05:03) (128/83 - 159/85)  BP(mean): --  RR: 17 (15 Warren 2020 05:03) (17 - 18)  SpO2: 93% (15 Warren 2020 05:03) (93% - 96%)      [PHYSICAL EXAM]  General: adult in NAD,  WN,  WD.  HEENT: clear oropharynx, anicteric sclera, pink conjunctivae.  Neck: supple, no masses.  CV: normal S1S2, no murmur, no rubs, no gallops.  Lungs: clear to auscultation, no wheezes, no rales, no rhonchi.  Abdomen: soft, non-tender, non-distended, no hepatosplenomegaly, normal BS, no guarding.  Ext: no clubbing, no cyanosis, no edema.  Skin: no rashes,  no petechiae, no venous stasis changes.  Neuro: alert and oriented X2  , no focal motor deficits.  LN: no SC GUERO.      [LABS:]                        8.4    4.39  )-----------( 56       ( 15 Warren 2020 08:52 )             27.6     01-15  139  |  103  |  20  ----------------------------<  191<H>  3.6   |  24  |  1.10  Ca    8.0<L>      15 Warren 2020 08:52                    [RADIOLOGY STUDIES:]

## 2020-01-15 NOTE — PROGRESS NOTE ADULT - PROBLEM SELECTOR PLAN 2
resolved--possible early DIC 2/2 Hodgkins lymphoma   - [facility] DIC score 7 was initially   - elevated INR, PT, aPTT, fibrinogen, d dimer, fibrinogen split products.  - No epistaxis/hemoptysis or overt GIB noted by pt/wife  - monitor daily CBCs--heme/onc Dr. Hilario's group following - on  solumedrol 80mg q8 for tumor related fever .

## 2020-01-15 NOTE — PROGRESS NOTE ADULT - PROBLEM SELECTOR PROBLEM 8
Vitamin B12 deficiency

## 2020-01-15 NOTE — PROGRESS NOTE ADULT - PROBLEM SELECTOR PLAN 10
4.9 cm Ascending aortic aneurysm noted on CTA chest. spoke with Dr. Donald from [facility]- as pt is asymptomatic (no CP, BP) and is less than 5.0 cm, pt can be seen as outpt. Referral can be made to Dr. Estrada Solo at [place] for followup
4.9 cm Ascending aortic aneurysm noted on CTA chest. spoke with Dr. Donald from [facility]- as pt is asymptomatic (no CP, BP) and is less than 5.0 cm, pt can be seen as outpt. Referral can be made to Dr. Estrada Solo at [place] for followup
4.9 cm Ascending aortic aneurysm noted on CTA chest. spoke with Dr. Donald from [facility]- as pt is asymptomatic (no CP, BP) and is less than 5.0 cm, pt can be seen as outpt. Referral can be made to Dr. Estrada Solo at [place] for followup
4.9 cm Ascending aortic aneurysm noted on CTA chest. spoke with Dr. Donald from [facility]- as pt is asymptomatic (no CP, BP) and is less than 5.0 cm, pt can be seen as outpt. Referral can be made to Dr. Estrada Solo at [place] for followup
4.9 cm Ascending aortic aneurysm noted on CTA chest. spoke with Dr. Donald from [facility]- as pt is asymptomatic (no CP, BP) and is less than 5.0 cm, pt can be seen as outpt. Referral can be made to Dr. Estrada Solo at [place] for followup
4.9 cm Ascending aortic aneurysm noted on CTA chest. spoke with Dr. Donald from [facility]- as pt is asymptomatic (no CP, BP) and is less than 5.0 cm, pt can be seen as outpt. Referral can be made to Dr. Estrada Solo at [place] for followup
4.9 cm Ascending aortic aneurysm noted on CTA chest. spoke with Dr. Donald from [facility]- as pt is asymptomatic (no CP, BP) and is less than 5.0 cm, pt can be seen as outpt. Referral can be made to Dr. Estrada Solo at [place] for followup

## 2020-01-15 NOTE — PROGRESS NOTE ADULT - PROBLEM SELECTOR PLAN 3
pt is not on anticoagulation-  coagulopathy sec to lymphoma -   - ISTH score was 7 -- possible early stage DIC, cell counts now stable  - fibrinogen split products >5 <20, Heme/onc Dr. agustin's group following pt is not on anticoagulation-  coagulopathy sec to lymphoma -   - ISTH score was 7 -- possible early stage DIC, cell counts now stable  - fibrinogen split products >5 <20, Heme/onc Dr. Hilario's group following

## 2020-01-15 NOTE — PROGRESS NOTE ADULT - PROBLEM SELECTOR PLAN 6
s/p PCI, ASA on hold- resume after hem/onc clearance   - c/w lopressor  - c/w Lipitor  - cardio Dr. Ndiaye following

## 2020-01-15 NOTE — PROGRESS NOTE ADULT - PROBLEM SELECTOR PROBLEM 10
Thoracic aortic aneurysm without rupture

## 2020-01-15 NOTE — PROGRESS NOTE ADULT - PROBLEM SELECTOR PLAN 1
pt was diagnosed in Dec 2019. Sees Dr. Majano (heme/onc)  - pt with recurrent fever --suspect symptoms of intermittent fevers, chills, dry cough are related to Hodgkin's lymphoma rather than an acute infectious process.   - procal 1.15 on 1/8/20,  blood cx neg, ucult neg, pt off ABX   - solumedrol 80mg q8h for tumor related fever.  - s/p ir guided bone marrow biopsy and picc line for chemo 1/13--tolerated procedure well.  - per heme/onc, pt to receive 1st dose chemotherapy here--will monitor for 24 hours after to assess response, adverse effects--f/u AM BMP pt was diagnosed in Dec 2019. Sees Dr. Majano (heme/onc)  - pt with recurrent fever --suspect symptoms of intermittent fevers, chills, dry cough are related to Hodgkin's lymphoma rather than an acute infectious process.   - procal 1.15 on 1/8/20,  blood cx neg, ucult neg, pt off ABX   - solumedrol 80mg q8h for tumor related fever.  - s/p ir guided bone marrow biopsy and picc line for chemo 1/13--tolerated procedure well.  - pt to receive 1st dose chemotherapy here tomorrow, can be discharged afterwards per heme/onc (Dr. Majano)

## 2020-01-15 NOTE — PROGRESS NOTE ADULT - ASSESSMENT
74M with h/o COPD, DANIEL, Depression, HLD, HTN, CAD s/p PCI, CHB s/p PPM, bioprosthetic AVR, dementia, recent diagnosis of Hodgkin's lymphoma in Dec 2019 who presents high fevers and confusion and inpatient BM biopsy, no planning to start chemotherapy. S/p PICC line     CAD s/p PCI  - No signs of significant ischemia or volume overload.   - EKG V paced. No evidence of PPM malfunction or any arrhythmias while on tele. [place] PPM interrogation showed no events, Apaced 9% and Vpaced 99% with ~9 years of battery life.  - Continue to hold ASA.  Resume when cleared by Heme  - Continue metoprolol 50mg q12.  Can switch to long acting eventually  - Continue statin     Bioprosthetic AVR  - No evidence of volume overload  - TTE done showing normal LVSF with grade 1 DD    Thrombocytopenia  - F/u heme evaluation   - Platelet 49  Monitor for s/s bleeding  - Tolerated PICC and bone marrow bx yesterday  - CTA chest negative of PE but suspicious of lung mets    HLD  - Continue statin    AMS   - Likely from fevers and underlying metabolic derangements   - Now resolved    Hodgkin's lymphoma  Care per Heme/Primary    Other cardiovascular workup will depend on clinical course  All other workup per primary team  Will follow       Ameya BENDER  Cardiology   Spectra #[phone]
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
## 2020-01-15 NOTE — PROVIDER CONTACT NOTE (OTHER) - SITUATION
patient with worsened confusion, sitting in the hallway frequently trying to getup without calling for help. Pt  high risk for falls and has a PICC line which patient tries to pull out.

## 2020-01-15 NOTE — CHART NOTE - NSCHARTNOTEFT_GEN_A_CORE
Called by RN for pt agitation and fall risk. Pt seen and evaluated. Pt sitting w/ nurse in hallway on chair. Pt confused at baseline.     T(C): 36.4 (01-15-20 @ 18:56), Max: 36.8 (01-14-20 @ 21:19)  HR: 84 (01-15-20 @ 18:56) (78 - 96)  BP: 155/83 (01-15-20 @ 18:56) (128/83 - 157/88)  RR: 17 (01-15-20 @ 18:56) (17 - 17)  SpO2: 94% (01-15-20 @ 18:56) (93% - 96%)    General: Pt sitting in chair in hallway, calm, mildly confused. states "he had a bad day".     A/P: 74M with h/o COPD, DANIEL, Depression, HLD, HTN, CAD s/p PCI, CHB s/p PPM, bioprosthetic AVR, dementia, recent diagnosis of Hodgkin's lymphoma in Dec 2019 who presents with fevers and peristent likely in setting of untreated Hodgkin's lymphoma. As per RN and nurse manager pt was not cooperating when attempting to sit in chair in hallway. Pt slid down chair and rested on ground on his knees. He had to be lifted back up into the chair. Spoke to nurse manager who feels he is a fall risk and recommended constant observation. As per chart review pt was on constant observation previously during this hospital stay. Agree w/ RN and nurse manager to reinstate constant observation, will order. In addition pt states he had no sleep last night, will order melatonin for bedtime x 1.
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
## 2020-01-15 NOTE — PROGRESS NOTE ADULT - PROBLEM SELECTOR PLAN 5
h/o dementia with behavioral disturbance during previous hospitalization and at home when develops high fevers cause delirium - now resolved   - emphasize re-orientation   - fall precautions h/o dementia with behavioral disturbance during previous hospitalization and at home when develops high fevers cause delirium - now resolved   - emphasize re-orientation  - fall precautions

## 2020-01-15 NOTE — PROGRESS NOTE ADULT - ASSESSMENT
[ASSESSMENT and  PLAN]  Classical Hodgkins Lymphoma  CD30+, CD15+, CD20 neg  Stage II with GUERO below diaphragm.  s/p BM bx  s/p PICC line    Lung nodules of unclear etiology  Patchy GGO  Viral illness    Beta thal trait.     Coagulopathy with INR 1.4 - 1.8.   Mildly elevated PTT also.   s/p trial of Willis, with minimal change.   Mixing studies with weak inhibitor with correction immediately but prolongation at 2hrs    Thrombocytopenia, since last admission, had improved to 60-70 outpt.   ? Liver disease. CT imaging prior unrevealing.     Cardiac comorbdities:  AVR, HTN, PPM for arrthymia.     CT scan reviewed. POD since last eval vs infection and adenopathy.   Concern about fever being related to Hodgkins disease and possible bone marrow involvement      RECOMMENDATIONS:   continue Infectious disease eval.   Adequate IgG levels  Continue Abx, as warranted.   Nebs, steroids per pulm.    ABVD chemotherapy tomorrow.   (Adrimaycin, Bleomycin, Vinblastine, Dacarbazine)  DC O2.   Cr adequate 1.10  Will hydrate 0.45NS at 125cc /hr in preparation for tx.   No GCSF with tx.     DVT Prophylaxis: LMWH or SQ heparin  OOB as natacha    Folic acid daily for thal trait.     Continue Solumedrol.   Wife is aware of high risk for complication and death from treatment given possible advancing disease.

## 2020-01-15 NOTE — PROGRESS NOTE ADULT - PROBLEM SELECTOR PLAN 7
resolved -- possibly from NSAID use at home coupled with some dehydration  - stable cr, monitor renal indices

## 2020-01-15 NOTE — DIETITIAN INITIAL EVALUATION ADULT. - PROBLEM SELECTOR PLAN 6
mild MILO, possibly from NSAID use at home coupled with some dehydration  s/p 2L IVF in ED- would hold off on additional IVF  BMP in am for now

## 2020-01-15 NOTE — DIETITIAN INITIAL EVALUATION ADULT. - OTHER INFO
74M with h/o COPD, DANIEL, Depression, HLD, HTN, CAD s/p PCI, CHB s/p PPM, bioprosthetic AVR, dementia, recent diagnosis of Hodgkin's lymphoma in Dec 2019 who presents with fevers and persistent likely in setting of untreated Hodgkin's lymphoma.  Pt reports eating well, denies any complaints.  Had bone marrow bx 1/14 and PICC 1/13.

## 2020-01-15 NOTE — DIETITIAN INITIAL EVALUATION ADULT. - PROBLEM SELECTOR PLAN 1
pt was diagnosed in Dec 2019. Sees Dr. Majano (consult placed)  Was scheduled for BMbx this Friday; spoke with Dr. Majano. We will attempt to have BMbx done as inpt- I spoke with IR nurse, Aman, who will discuss case with Dr. Zhou tomorrow. Please call IR on thursday morning to confirm whether test will be done thurs or friday. Will keep NPO after MN and will hold Hep SQ dose for tomorrow morning in the event the procedure can be done tomorrow.  suspect symptoms of intermittent fevers, chills, cough are related to Hodgkin's lymphoma rather than an acute infectious process.   Will check procalcitonin level and obtain blood/urine cultures.

## 2020-01-15 NOTE — DIETITIAN INITIAL EVALUATION ADULT. - PROBLEM SELECTOR PLAN 4
likely from Hodgkins lymphoma  pt's ASA has been on hold due to plan for BMBx but will also needs to be held given progression of thrombocytopenia. No epistaxis/hemoptysis or overt GIB noted by pt/wife

## 2020-01-16 NOTE — PROGRESS NOTE ADULT - ASSESSMENT
74M with h/o COPD, DANIEL, Depression, HLD, HTN, CAD s/p PCI, CHB s/p PPM, bioprosthetic AVR, dementia, recent diagnosis of Hodgkin's lymphoma in Dec 2019 who presents high fevers and confusion and inpatient BM biopsy, no planning to start chemotherapy. S/p PICC line     CAD s/p PCI  - Hold ASA in setting of low platelets of 47.  Resume when cleared by Heme  - Continue metoprolol 50mg q12.    - Continue lipitor 10    Bioprosthetic AVR  - No evidence of volume overload  - TTE done showing normal LVSF with grade 1 DD    Thrombocytopenia  - F/u heme evaluation   - Platelet 49  Monitor for s/s bleeding  - Tolerated PICC and bone marrow bx yesterday  - CTA chest negative of PE but suspicious of lung mets    HLD  - Continue statin    AMS   - Likely from fevers and underlying metabolic derangements   - Now resolved    Hodgkin's lymphoma  Care per Heme/Primary    Other cardiovascular workup will depend on clinical course  All other workup per primary team  Will follow
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
## 2020-01-16 NOTE — PROGRESS NOTE ADULT - ASSESSMENT
[ASSESSMENT and  PLAN]  73 yo male with Classical Hodgkins Lymphoma (CD30+, CD15+, CD20 neg)  Stage II with GUERO below diaphragm only initially however now noted to have patchy lung findings and intra-thoracic lymphadenopathy concerning for rapid disease progression.    - s/p BM bx on 1/13/20 to complete staging  - s/p PICC line placement for planned chemotherapy with ABVD; reviewed orders with nursing staff on 3W  - reviewed pulmonary function test prior to bleomycin - done with Dr. Genaro Polanco at [facility] and normal;   - procedure for test dose of bleomycin reviewed with nursing staff  - continue to avoid supplemental oxygen if possible due to increased risk of bleomycin toxicity  - Echo reviewed - "grossly normal left ventricular function"; okay to proceed with adriamycin  - clearly concern for progressive myelosuppression; may need transfusional support; would monitor CBC in a.m. prior to discharge if transfusion of platelets and pRBC are needed  - please note that neutropenia with treatment of Hodgkin's lymphoma is common and use of growth factor support contra-indicated as it can worsen bleomycin toxicity  - will follow with you  - case discussed with Dr. Celis (hospitalist)

## 2020-01-16 NOTE — PROGRESS NOTE ADULT - ASSESSMENT
74M with h/o COPD, DANILE, Depression, HLD, HTN, CAD s/p PCI, CHB s/p PPM, bioprosthetic AVR, dementia, recent diagnosis of Hodgkin's lymphoma in Dec 2019 who presents with fevers and peristent likely in setting of untreated Hodgkin's lymphoma.     1. Lymphoma, Hodgkin's.    - pt was diagnosed in Dec 2019. Sees Dr. Majano (heme/onc)  - pt with recurrent fever --suspect symptoms of intermittent fevers, chills, dry cough are related to Hodgkin's lymphoma rather than an acute infectious process.   - procal 1.15 on 1/8/20,  blood cx neg, ucult neg, pt off ABX   - solumedrol 80mg q8h for tumor related fever--will send Medrol dose josef on discharge  - s/p ir guided bone marrow biopsy and picc line for chemo 1/13--tolerated procedure well.  - pt to receive 1st dose chemotherapy here today, can be discharged afterwards per heme/onc (Dr. Majano).     2. Pancytopenia.    - resolved--possible early DIC 2/2 Hodgkins lymphoma   - ISTH DIC score 7 was initially   - elevated INR, PT, aPTT, fibrinogen, d dimer, fibrinogen split products.  - No epistaxis/hemoptysis or overt GIB noted by pt/wife  - monitor daily CBCs--heme/onc Dr. Hilario's group following - on  solumedrol 80mg q8 for tumor related fever .     3. Coagulopathy.    - pt is not on anticoagulation-  coagulopathy sec to lymphoma -   - ISTH score was 7 -- possible early stage DIC, cell counts now stable  - fibrinogen split products >5 <20, Heme/onc Dr. Hilario's group following.     4. Pneumonia, bacterial.   - ruled out at this time -  doubt infectious process -- fever and cough likely related to metastatic Hodgkin's lymphoma.   - ID Dr. Majano consulted, agrees w/ plan  - pulm Dr. Moore following  - continue CPAP qhs as needed for DANIEL.     5. Dementia with behavioral disturbance.   - h/o dementia with behavioral disturbance during previous hospitalization and at home when develops high fevers cause delirium - placed on 1:1 overnight  - emphasize re-orientation  - fall precautions.     6. CAD (coronary artery disease).   - s/p PCI, ASA on hold- resume after hem/onc clearance   - c/w lopressor  - c/w Lipitor  - cardio Dr. Ndiaye following.    7. Vitamin B12 deficiency.   - pt has not been taking replacement therapy at home  continue supplementation.     8. Thoracic aortic aneurysm without rupture.  - 4.9 cm Ascending aortic aneurysm noted on CTA chest. spoke with Dr. Donald from [facility]- as pt is asymptomatic (no CP, BP) and is less than 5.0 cm, pt can be seen as outpt. Referral can be made to Dr. Estrada Solo at [place] for followup.    9. Prophylactic measure.   - spoke with Melecio Nino re: pharmacologic ppx- however given platelet count <50  and high inr will not give heparin at this time.  PT eval to encourage ambulation. 74M with h/o COPD, DANIEL, Depression, HLD, HTN, CAD s/p PCI, CHB s/p PPM, bioprosthetic AVR, dementia, recent diagnosis of Hodgkin's lymphoma in Dec 2019 who presents with fevers and peristent likely in setting of untreated Hodgkin's lymphoma.     1. Lymphoma, Hodgkin's.    - pt was diagnosed in Dec 2019. Sees Dr. Majano (heme/onc)  - pt with recurrent fever --suspect symptoms of intermittent fevers, chills, dry cough are related to Hodgkin's lymphoma rather than an acute infectious process.   - procal 1.15 on 1/8/20,  blood cx neg, ucult neg, pt off ABX   - solumedrol 80mg q8h for tumor related fever--will send Medrol dose josef on discharge  - s/p ir guided bone marrow biopsy and picc line for chemo 1/13--tolerated procedure well.  - pt to receive 1st dose ABVD chemotherapy here today, can be discharged afterwards per heme/onc (Dr. Majano).     2. Pancytopenia.    - resolved--possible early DIC 2/2 Hodgkins lymphoma   - ISTH DIC score 7 was initially   - elevated INR, PT, aPTT, fibrinogen, d dimer, fibrinogen split products.  - No epistaxis/hemoptysis or overt GIB noted by pt/wife  - monitor daily CBCs--heme/onc Dr. Hilario's group following - on  solumedrol 80mg q8 for tumor related fever .     3. Coagulopathy.    - pt is not on anticoagulation-  coagulopathy sec to lymphoma -   - ISTH score was 7 -- possible early stage DIC, cell counts now stable  - fibrinogen split products >5 <20, Heme/onc Dr. Hilario's group following.     4. Pneumonia, bacterial.   - ruled out at this time -  doubt infectious process -- fever and cough likely related to metastatic Hodgkin's lymphoma.   - ID Dr. Majano consulted, agrees w/ plan  - pulm Dr. Moore following  - continue CPAP qhs as needed for DANIEL.     5. Dementia with behavioral disturbance.   - h/o dementia with behavioral disturbance during previous hospitalization and at home when develops high fevers cause delirium - placed on 1:1 overnight  - emphasize re-orientation  - fall precautions.     6. CAD (coronary artery disease).   - s/p PCI, ASA on hold- resume after hem/onc clearance   - c/w lopressor  - c/w Lipitor  - cardio Dr. Ndiaye following.    7. Vitamin B12 deficiency.   - pt has not been taking replacement therapy at home  continue supplementation.     8. Thoracic aortic aneurysm without rupture.  - 4.9 cm Ascending aortic aneurysm noted on CTA chest. spoke with Dr. Donald from [facility]- as pt is asymptomatic (no CP, BP) and is less than 5.0 cm, pt can be seen as outpt. Referral can be made to Dr. Estrada Solo at [place] for followup.    9. Prophylactic measure.   - given low platelet count and high INR, will not give heparin at this time.  - PT eval to encourage ambulation. 74M with h/o COPD, DANIEL, Depression, HLD, HTN, CAD s/p PCI, CHB s/p PPM, bioprosthetic AVR, dementia, recent diagnosis of Hodgkin's lymphoma in Dec 2019 who presents with fevers and peristent likely in setting of untreated Hodgkin's lymphoma.     1. Lymphoma, Hodgkin's.    - pt was diagnosed in Dec 2019. Sees Dr. Majano (heme/onc)  - pt with recurrent fever --suspect symptoms of intermittent fevers, chills, dry cough are related to Hodgkin's lymphoma rather than an acute infectious process.   - procal 1.15 on 1/8/20,  blood cx neg, ucult neg, pt off ABX   - solumedrol 80mg q8h for tumor related fever--will send Medrol dose josef on discharge  - s/p ir guided bone marrow biopsy and picc line for chemo 1/13--tolerated procedure well.  - pt to receive 1st dose ABVD chemotherapy here today, can be discharged afterwards per heme/onc (Dr. Majano).   - pt's wife requesting continuance of home PT 3x/week, will d/w  for approval    2. Pancytopenia.    - resolved--possible early DIC 2/2 Hodgkins lymphoma   - ISTH DIC score 7 was initially   - elevated INR, PT, aPTT, fibrinogen, d dimer, fibrinogen split products.  - No epistaxis/hemoptysis or overt GIB noted by pt/wife  - monitor daily CBCs--heme/onc Dr. Hilario's group following - on  solumedrol 80mg q8 for tumor related fever .     3. Coagulopathy.    - pt is not on anticoagulation-  coagulopathy sec to lymphoma -   - ISTH score was 7 -- possible early stage DIC, cell counts now stable  - fibrinogen split products >5 <20, Heme/onc Dr. Hilario's group following.     4. Pneumonia, bacterial.   - ruled out at this time -  doubt infectious process -- fever and cough likely related to metastatic Hodgkin's lymphoma.   - ID Dr. Majano consulted, agrees w/ plan  - pulm Dr. Moore following  - continue CPAP qhs as needed for DANIEL.     5. Dementia with behavioral disturbance.   - h/o dementia with behavioral disturbance during previous hospitalization and at home when develops high fevers cause delirium - placed on 1:1 overnight  - emphasize re-orientation  - fall precautions.     6. CAD (coronary artery disease).   - s/p PCI, ASA on hold- resume after hem/onc clearance   - c/w lopressor  - c/w Lipitor  - cardio Dr. Ndiaye following.    7. Vitamin B12 deficiency.   - pt has not been taking replacement therapy at home  continue supplementation.     8. Thoracic aortic aneurysm without rupture.  - 4.9 cm Ascending aortic aneurysm noted on CTA chest. spoke with Dr. Donald from [facility]- as pt is asymptomatic (no CP, BP) and is less than 5.0 cm, pt can be seen as outpt. Referral can be made to Dr. Estrada Solo at [place] for followup.    9. Prophylactic measure.   - given low platelet count and high INR, will not give heparin at this time.  - PT eval to encourage ambulation. 74M with h/o COPD, DANIEL, Depression, HLD, HTN, CAD s/p PCI, CHB s/p PPM, bioprosthetic AVR, dementia, recent diagnosis of Hodgkin's lymphoma in Dec 2019 who presents with fevers and peristent likely in setting of untreated Hodgkin's lymphoma.     1. Lymphoma, Hodgkin's.    - pt was diagnosed in Dec 2019. Sees Dr. Majano (heme/onc)  - pt with recurrent fever --suspect symptoms of intermittent fevers, chills, dry cough are related to Hodgkin's lymphoma rather than an acute infectious process.   - procal 1.15 on 1/8/20,  blood cx neg, ucult neg, pt off ABX   - solumedrol 80mg q8h for tumor related fever--will send Medrol dose josef on discharge  - s/p ir guided bone marrow biopsy and picc line for chemo 1/13--tolerated procedure well.  - pt to receive 1st dose ABVD chemotherapy here today.   - pt's wife requesting continuance of home PT 3x/week, will d/w  for approval    2. Pancytopenia.    - resolved--possible early DIC 2/2 Hodgkins lymphoma   - ISTH DIC score 7 was initially   - elevated INR, PT, aPTT, fibrinogen, d dimer, fibrinogen split products.  - No epistaxis/hemoptysis or overt GIB noted by pt/wife  - monitor daily CBCs--heme/onc Dr. Hilario's group following - on  solumedrol 80mg q8 for tumor related fever .     3. Coagulopathy.    - pt is not on anticoagulation-  coagulopathy sec to lymphoma -   - ISTH score was 7 -- possible early stage DIC, cell counts now stable  - fibrinogen split products >5 <20, Heme/onc Dr. Hilario's group following.     4. Pneumonia, bacterial.   - ruled out at this time -  doubt infectious process -- fever and cough likely related to metastatic Hodgkin's lymphoma.   - ID Dr. Majano consulted, agrees w/ plan  - pulm Dr. Moore following  - continue CPAP qhs as needed for DANIEL.     5. Dementia with behavioral disturbance.   - h/o dementia with behavioral disturbance during previous hospitalization and at home when develops high fevers cause delirium - placed on 1:1 overnight  - emphasize re-orientation  - fall precautions.     6. CAD (coronary artery disease).   - s/p PCI, ASA on hold- resume after hem/onc clearance   - c/w lopressor  - c/w Lipitor  - cardio Dr. Ndiaye following.    7. Vitamin B12 deficiency.   - pt has not been taking replacement therapy at home  continue supplementation.     8. Thoracic aortic aneurysm without rupture.  - 4.9 cm Ascending aortic aneurysm noted on CTA chest. spoke with Dr. Donald from [facility]- as pt is asymptomatic (no CP, BP) and is less than 5.0 cm, pt can be seen as outpt. Referral can be made to Dr. Estrada Solo at [place] for followup.    9. Prophylactic measure.   - given low platelet count and high INR, will not give heparin at this time.  - PT eval to encourage ambulation.
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
## 2020-01-16 NOTE — PROGRESS NOTE ADULT - ATTENDING COMMENTS
Pt. seen and evaluated for Hodgkins Lymphoma.  Pt. is in no distress.  Awaiting administration of chemotherapy.  Pt. is afebrile and hemodynamically stable.  Physical examination as above.      Plan:  -Fever 2/2 Hodgkin Lymphoma:  Flu/RSV negative.  Urine culture and blood cultures with no growth.  s/p bonemarrow biopsy and PICC line insertion.  Continue Solu-medrol 80mg IV Q8h.  1/2NS@125cc/hr.  Chemotherapy per Heme/Onc  -Pancytopenia:  no gross bleeding noted.  Will continue to monitor blood counts.   -Coagulapathy:  2/2 lymphoma.  s/p vitamin K 5mg PO once.  -CAD:  ASA held 2/2 thrombocytopenia.  Continue metoprolol 50mg PO Q12h and Lipitor 10mg PO QHS.  Cardiology f/u  -MILO:  improved.   -Thoracic aortic aneurysm:  outpatient follow up with Dr. Solo at [facility]  -Dementia:  supportive care  -VTE ppx: SCD

## 2020-01-16 NOTE — PROGRESS NOTE ADULT - SUBJECTIVE AND OBJECTIVE BOX
CHIEF COMPLAINT/INTERVAL HISTORY: Pt seen and examined at bedside with wife present in room. Pt states he did not sleep well overnight, but does not specify why. Per overnight team, pt was agitated overnight and was placed on constant observation. Pt on exam this morning appears calm, pleasant. Denies any CP, SOB, palpitations. Admits mild cough. States had BM 2 days ago, and is urinating well.    REVIEW OF SYSTEMS:  Constitutional: denies fever, chills, sweating  HEENT: denies headache, dizziness, or lightheadedness  Respiratory: denies SOB, (+) cough, or wheezing  Cardiovascular: denies CP, palpitations  Gastrointestinal: denies nausea, vomiting, diarrhea, constipation, abdominal pain, or bloody stools  Genitourinary: denies painful urination, increased frequency, urgency, or bloody urine  Skin/Breast: denies rashes or itching  Musculoskeletal: denies muscle aches, joint swelling, or muscle weakness  Neurologic: denies loss of sensation, numbness, or tingling  ROS negative except as noted above    Vital Signs Last 24 Hrs  T(C): 36.8 (16 Jan 2020 05:35), Max: 37.7 (15 Warren 2020 21:09)  T(F): 98.3 (16 Jan 2020 05:35), Max: 99.8 (15 Warren 2020 21:09)  HR: 72 (16 Jan 2020 05:35) (72 - 92)  BP: 143/75 (16 Jan 2020 05:35) (143/75 - 157/88)  BP(mean): --  RR: 16 (16 Jan 2020 05:35) (16 - 17)  SpO2: 99% (16 Jan 2020 05:35) (92% - 99%)    PHYSICAL EXAM:  GENERAL: M in NAD  HEENT: EOMI, hearing normal, conjunctiva and sclera clear  Chest: CTA bilaterally, no wheezing  CV: S1S2, RRR,   GI: soft, +BS, NT/ND  Musculoskeletal: 1+ pitting edema b/l LE  Psychiatric: affect nL, mood nL  Skin: warm and dry. PICC present RUE, bandage overlying line    LABS:                        8.4    4.39  )-----------( 56       ( 15 Warren 2020 08:52 )             27.6     01-15    139  |  103  |  20  ----------------------------<  191<H>  3.6   |  24  |  1.10    Ca    8.0<L>      15 Jan 2020 08:52

## 2020-01-16 NOTE — PROGRESS NOTE ADULT - SUBJECTIVE AND OBJECTIVE BOX
Patient seen and examined;  Chart reviewed and events noted;   more confused; now on 1:1 observation; emotionally labile ? due to steroids      MEDICATIONS  (STANDING):  atorvastatin 10 milliGRAM(s) Oral at bedtime  chlorhexidine 4% Liquid 1 Application(s) Topical daily  cyanocobalamin Injectable 1000 MICROGram(s) SubCutaneous daily  folic acid 1 milliGRAM(s) Oral daily  imipramine 25 milliGRAM(s) Oral daily  methylPREDNISolone sodium succinate Injectable 80 milliGRAM(s) IV Push every 8 hours  metoprolol tartrate 50 milliGRAM(s) Oral every 12 hours  pantoprazole    Tablet 40 milliGRAM(s) Oral before breakfast  sodium chloride 0.45%. 1000 milliLiter(s) (125 mL/Hr) IV Continuous <Continuous>    MEDICATIONS  (PRN):  acetaminophen   Tablet .. 1000 milliGRAM(s) Oral every 6 hours PRN Temp greater or equal to 38C (100.4F), Mild Pain (1 - 3)  albuterol/ipratropium for Nebulization 3 milliLiter(s) Nebulizer every 6 hours PRN Shortness of Breath and/or Wheezing  benzonatate 100 milliGRAM(s) Oral every 8 hours PRN Cough  guaiFENesin   Syrup  (Sugar-Free) 200 milliGRAM(s) Oral every 6 hours PRN Cough  oxyCODONE    IR 5 milliGRAM(s) Oral every 6 hours PRN Moderate Pain (4 - 6)  oxyCODONE    IR 10 milliGRAM(s) Oral every 6 hours PRN Severe Pain (7 - 10)      Vital Signs Last 24 Hrs  T(C): 36.8 (16 Jan 2020 05:35), Max: 37.7 (15 Warren 2020 21:09)  T(F): 98.3 (16 Jan 2020 05:35), Max: 99.8 (15 Warren 2020 21:09)  HR: 72 (16 Jan 2020 05:35) (72 - 92)  BP: 143/75 (16 Jan 2020 05:35) (143/75 - 157/88)  RR: 16 (16 Jan 2020 05:35) (16 - 17)  SpO2: 99% (16 Jan 2020 05:35) (92% - 99%)    PHYSICAL EXAM  General: adult obese male in NAD  HEENT: clear oropharynx, anicteric sclera, pink conjunctivae  Neck: supple  CV: normal S1S2 with no murmur rubs or gallops  Lungs: clear to auscultation, no wheezes, no rhales  Abdomen: soft non-tender non-distended, no hepato/splenomegaly  Ext: no clubbing cyanosis or edema  Skin: no rashes and no petichiae  Neuro: alert and oriented X3 no focal deficits      LABS:                        7.5    3.05  )-----------( 47       ( 16 Jan 2020 09:02 )             24.1     Hemoglobin: 7.5 g/dL (01-16 @ 09:02)  Hemoglobin: 8.4 g/dL (01-15 @ 08:52)  Hemoglobin: 7.9 g/dL (01-14 @ 09:23)  Hemoglobin: 7.5 g/dL (01-13 @ 09:02)  Hemoglobin: 7.6 g/dL (01-12 @ 09:17)    Platelet Count - Automated: 47 K/uL (01-16 @ 09:02)  Platelet Count - Automated: 56 K/uL (01-15 @ 08:52)  Platelet Count - Automated: 49 K/uL (01-14 @ 09:23)  Platelet Count - Automated: 55 K/uL (01-13 @ 09:02)  Platelet Count - Automated: 55 K/uL (01-12 @ 09:17)    01-16    136  |  102  |  18  ----------------------------<  184<H>  4.1   |  27  |  0.83    Ca    7.6<L>      16 Jan 2020 09:02      PT/INR - ( 16 Jan 2020 09:02 )   PT: 20.9 sec;   INR: 1.80 ratio      PULMONARY FUNCTION TESTS with Dr. Genaro Polanco at [facility] 8/1/19  - normal vital caacity, normal FEV1 with normal FEV1/FVC ratio  - Plateauing of the expiratory portion of flow volume loop is evidence of variable intra thoracic  upper airway obstruction

## 2020-01-17 PROBLEM — F03.90 UNSPECIFIED DEMENTIA WITHOUT BEHAVIORAL DISTURBANCE: Chronic | Status: ACTIVE | Noted: 2020-01-01

## 2020-01-17 PROBLEM — Z95.0 PRESENCE OF CARDIAC PACEMAKER: Chronic | Status: ACTIVE | Noted: 2020-01-01

## 2020-01-17 PROBLEM — I10 ESSENTIAL (PRIMARY) HYPERTENSION: Chronic | Status: ACTIVE | Noted: 2020-01-01

## 2020-01-17 PROBLEM — C85.90 NON-HODGKIN LYMPHOMA, UNSPECIFIED, UNSPECIFIED SITE: Chronic | Status: ACTIVE | Noted: 2020-01-01

## 2020-01-17 NOTE — PROGRESS NOTE ADULT - PROBLEM SELECTOR PLAN 1
started on Chemo - on Onc floor now -   heart and lung function reviewed prior to ABVD chemo  remains on Steroids as per ONC recs  avoid o2 support if possible as to avoid toxicity with Bleomycin   CPAP night time for DANIEL as tolerated - sleep hygiene discussed  supportive measures  serial LABS/ Counts  will follow  imaging and labs and reports reviewed  Onc recs reviewed
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
## 2020-01-17 NOTE — PROGRESS NOTE ADULT - SUBJECTIVE AND OBJECTIVE BOX
[INTERVAL HX: ]  Patient seen and examined;  Chart reviewed and events noted;   no CP, no SOb, no QUINTANILLA.     Patient is a 74y Male with a known history of :  Pneumonia due to organism (J18.9) [Active]  Pacemaker (Z95.0) [Active]  Hypertension (I10) [Active]  Lymphoma (C85.90) [Active]  Dementia (F03.90) [Active]  COPD (chronic obstructive pulmonary disease) (J44.9) [Active]  Depression (F32.9) [Active]  DVT (deep venous thrombosis) (I82.409) [Active]  HLD (hyperlipidemia) (E78.5) [Active]  HTN (hypertension) (I10) [Active]  Aortic valve replaced (Z95.2) [Active]  Stented coronary artery (Z95.5) [Active]  CAD (coronary artery disease) (I25.10) [Active]  S/P AVR (aortic valve replacement) (Z95.2) [Active]  Cardiac pacemaker (Z95.0) [Active]  Artificial pacemaker (Z95.0) [Active]  S/P aortic valve replacement with porcine valve (Z95.3) [Active]    HPI:  74M with h/o COPD, DANIEL, Depression, HLD, HTN, CAD s/p PCI, CHB s/p PPM, bioprosthetic AVR, dementia, recent diagnosis of Hodgkin's lymphoma in Dec 2019 who presents with intermittent fevers and persistent productive cough since discharge from [facility] on Dec 20.. During [place] hospitalization, pt was treated for PNA and was found to have Hodgkin's lymphoma. Since discharge, pt has been having intermittent fevers, chills (t max 102.2) for which his wife has been treating with Motrin and Tylenol. Pt denies chest pain, palpitations, abd pain, dysuria, urinary frequency or diarrhea. He has not been on abx since his last hospitalization. he was scheduled for BMBx today as outpt. His wife brought him into the ED because of high fever and periods of confusion which has happened previously and temprally associated with fever. He currently appears comfortable, pleasant and not confused. Wife is at bedside. (08 Jan 2020 08:11)            MEDICATIONS  (STANDING):  atorvastatin 10 milliGRAM(s) Oral at bedtime  chlorhexidine 4% Liquid 1 Application(s) Topical daily  cyanocobalamin Injectable 1000 MICROGram(s) SubCutaneous daily  folic acid 1 milliGRAM(s) Oral daily  imipramine 25 milliGRAM(s) Oral daily  metoprolol tartrate 50 milliGRAM(s) Oral every 12 hours  nystatin    Suspension 334800 Unit(s) Oral four times a day  pantoprazole    Tablet 40 milliGRAM(s) Oral before breakfast  sodium chloride 0.45%. 1000 milliLiter(s) (125 mL/Hr) IV Continuous <Continuous>    MEDICATIONS  (PRN):  acetaminophen   Tablet .. 1000 milliGRAM(s) Oral every 6 hours PRN Temp greater or equal to 38C (100.4F), Mild Pain (1 - 3)  albuterol/ipratropium for Nebulization 3 milliLiter(s) Nebulizer every 6 hours PRN Shortness of Breath and/or Wheezing  benzonatate 100 milliGRAM(s) Oral every 8 hours PRN Cough  guaiFENesin   Syrup  (Sugar-Free) 200 milliGRAM(s) Oral every 6 hours PRN Cough  ondansetron Injectable 8 milliGRAM(s) IV Push every 8 hours PRN Nausea  oxyCODONE    IR 5 milliGRAM(s) Oral every 6 hours PRN Moderate Pain (4 - 6)  oxyCODONE    IR 10 milliGRAM(s) Oral every 6 hours PRN Severe Pain (7 - 10)      Vital Signs Last 24 Hrs  T(C): 36.4 (17 Jan 2020 14:22), Max: 37.9 (16 Jan 2020 20:30)  T(F): 97.5 (17 Jan 2020 14:22), Max: 100.2 (16 Jan 2020 20:30)  HR: 70 (17 Jan 2020 14:22) (70 - 105)  BP: 110/66 (17 Jan 2020 14:22) (110/66 - 142/78)  BP(mean): --  RR: 18 (17 Jan 2020 14:22) (16 - 20)  SpO2: 97% (17 Jan 2020 14:22) (92% - 97%)      [PHYSICAL EXAM]  General: adult in NAD,  WN,  WD.  HEENT: +thrush in oropharynx, anicteric sclera, pink conjunctivae.  Neck: supple, no masses.  CV: normal S1S2, no murmur, no rubs, no gallops.  Lungs: clear to auscultation, no wheezes, no rales, no rhonchi.  Abdomen: soft, non-tender, non-distended, no hepatosplenomegaly, normal BS, no guarding.  Ext: no clubbing, no cyanosis, no edema. R PICC  Skin: no rashes,  no petechiae, no venous stasis changes.  Neuro: alert and oriented X2  , no focal motor deficits.  LN: no SC GUERO.      [LABS:]                        7.2    5.12  )-----------( 29       ( 17 Jan 2020 08:37 )             23.5     01-17    141  |  106  |  21  ----------------------------<  200<H>  4.4   |  28  |  1.00    Ca    7.4<L>      17 Jan 2020 08:37      PT/INR - ( 16 Jan 2020 09:02 )   PT: 20.9 sec;   INR: 1.80 ratio           [RADIOLOGY STUDIES:]

## 2020-01-17 NOTE — PROGRESS NOTE ADULT - ATTENDING COMMENTS
Pt. seen and evaluated for Hodgkins Lymphoma.  s/p ABVD chemotherapy yesterday.  In no distress.  Offers no new concerns.  No gross bleeding noted.  Physical examination as above.      Plan:  -Fever 2/2 Hodgkin Lymphoma:  Flu/RSV negative.  Urine culture and blood cultures with no growth.  s/p bonemarrow biopsy and PICC line insertion.  1/2NS@125cc/hr.  s/p ABVD Chemotherapy yesterday.  Heme/Onc f/u  -Pancytopenia:  no gross bleeding noted.  Pt. to have PRBC transfusion today.  Pt. will return to infusion center tomorrow for platelet transfusion and then follow up with Heme/Onc this coming Monday.    -Coagulapathy:  2/2 lymphoma.  s/p vitamin K 5mg PO once.  -CAD:  ASA held 2/2 thrombocytopenia.  Continue metoprolol 50mg PO Q12h and Lipitor 10mg PO QHS.  Cardiology f/u  -MILO:  improved.   -Thoracic aortic aneurysm:  outpatient follow up with Dr. Solo at [facility]  -Dementia:  supportive care  -VTE ppx: SCD  -Plan for discharge after PRBC transfusion today.
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
## 2020-01-17 NOTE — PROGRESS NOTE ADULT - SUBJECTIVE AND OBJECTIVE BOX
CHIEF COMPLAINT/INTERVAL HISTORY: Pt seen and examined at bedside. Pt states he feels well overall, without acute complaints. Denies any HA, dizziness, CP, SOB, palpitations, cough. States no BM for 2-3 days, but states this is normal for him. States urinating well.    REVIEW OF SYSTEMS:  Constitutional: denies fever, chills, sweating  HEENT: denies headache, dizziness, or lightheadedness  Respiratory: denies SOB, cough, or wheezing  Cardiovascular: denies CP, palpitations  Gastrointestinal: denies nausea, vomiting, diarrhea, constipation, abdominal pain, or bloody stools  Genitourinary: denies painful urination, increased frequency, urgency, or bloody urine  Skin/Breast: denies rashes or itching  Musculoskeletal: denies muscle aches, joint swelling, or muscle weakness  Neurologic: denies loss of sensation, numbness, or tingling  ROS negative except as noted above    Vital Signs Last 24 Hrs  T(C): 36.6 (17 Jan 2020 05:46), Max: 37.9 (16 Jan 2020 20:30)  T(F): 97.9 (17 Jan 2020 05:46), Max: 100.2 (16 Jan 2020 20:30)  HR: 73 (17 Jan 2020 05:46) (72 - 105)  BP: 131/75 (17 Jan 2020 05:46) (130/68 - 142/78)  BP(mean): --  RR: 17 (17 Jan 2020 05:46) (16 - 20)  SpO2: 97% (17 Jan 2020 05:46) (92% - 97%)    PHYSICAL EXAM:  GENERAL: obese M in NAD  HEENT: EOMI, hearing normal, conjunctiva and sclera clear  Chest: CTA bilaterally, no wheezing  CV: S1S2, RRR,   GI: soft, +BS, NT/ND  Musculoskeletal: 1+ pitting edema b/l LE  Psychiatric: affect nL, mood nL  Skin: warm and dry, PICC present RUE    LABS:                        7.2    5.12  )-----------( 29       ( 17 Jan 2020 08:37 )             23.5     01-17    141  |  106  |  21  ----------------------------<  200<H>  4.4   |  28  |  1.00    Ca    7.4<L>      17 Jan 2020 08:37      PT/INR - ( 16 Jan 2020 09:02 )   PT: 20.9 sec;   INR: 1.80 ratio

## 2020-01-17 NOTE — PROGRESS NOTE ADULT - ASSESSMENT
74M with h/o COPD, DANIEL, Depression, HLD, HTN, CAD s/p PCI, CHB s/p PPM, bioprosthetic AVR, dementia, recent diagnosis of Hodgkin's lymphoma in Dec 2019 who presents with fevers and persistent likely in setting of untreated Hodgkin's lymphoma.     1. Lymphoma, Hodgkin's.    - pt was diagnosed in Dec 2019. Sees Dr. Majano (heme/onc)  - pt with recurrent fever --suspect symptoms of intermittent fevers, chills, dry cough are related to Hodgkin's lymphoma rather than an acute infectious process.   - procal 1.15 on 1/8/20,  blood cx neg, ucult neg, pt off ABX   - solumedrol 80mg q8h for tumor related fever--will send Medrol dose josef on discharge  - s/p ir guided bone marrow biopsy and picc line for chemo 1/13--tolerated procedure well.  - s/p 1st dose ABVD chemotherapy on 1/16/2020--tolerated well, noted Hgb 7.2, Plt 29, will transfuse per heme/onc, otherwise stable for DC    2. Pancytopenia.    - resolved--possible early DIC 2/2 Hodgkins lymphoma   - ISTH DIC score 7 was initially   - elevated INR, PT, aPTT, fibrinogen, d dimer, fibrinogen split products.  - No epistaxis/hemoptysis or overt GIB noted by pt/wife  - monitor daily CBCs--heme/onc Dr. Hilario's group following - on  solumedrol 80mg q8 for tumor related fever .     3. Coagulopathy.    - pt is not on anticoagulation-  coagulopathy sec to lymphoma -   - ISTH score was 7 -- possible early stage DIC, cell counts now stable  - fibrinogen split products >5 <20, Heme/onc Dr. Hilario's group following.     4. Pneumonia, bacterial.   - ruled out at this time -  doubt infectious process -- fever and cough likely related to metastatic Hodgkin's lymphoma.   - ID Dr. Majano consulted, agrees w/ plan  - pulm Dr. Moore following  - continue CPAP qhs as needed for DANIEL.     5. Dementia with behavioral disturbance.   - h/o dementia with behavioral disturbance during previous hospitalization and at home when develops high fevers cause delirium - placed on 1:1 overnight  - emphasize re-orientation  - fall precautions.     6. CAD (coronary artery disease).   - s/p PCI, ASA on hold- resume after hem/onc clearance   - c/w lopressor  - c/w Lipitor  - cardio Dr. Ndiaye following.    7. Vitamin B12 deficiency.   - pt has not been taking replacement therapy at home  continue supplementation.     8. Thoracic aortic aneurysm without rupture.  - 4.9 cm Ascending aortic aneurysm noted on CTA chest. spoke with Dr. Donald from [facility]- as pt is asymptomatic (no CP, BP) and is less than 5.0 cm, pt can be seen as outpt. Referral can be made to Dr. Estrada Solo at [place] for followup.    9. Prophylactic measure.   - given low platelet count and high INR, will not give heparin at this time.  - PT eval to encourage ambulation, had home PT previously   - pt's wife requesting continuance of home PT 3x/week, will d/w  for approval 74M with h/o COPD, DANIEL, Depression, HLD, HTN, CAD s/p PCI, CHB s/p PPM, bioprosthetic AVR, dementia, recent diagnosis of Hodgkin's lymphoma in Dec 2019 who presents with fevers and persistent likely in setting of untreated Hodgkin's lymphoma.     1. Lymphoma, Hodgkin's.    - pt was diagnosed in Dec 2019. Sees Dr. Majano (heme/onc)  - pt with recurrent fever --suspect symptoms of intermittent fevers, chills, dry cough are related to Hodgkin's lymphoma rather than an acute infectious process.   - procal 1.15 on 1/8/20,  blood cx neg, ucult neg, pt off ABX   - solumedrol 80mg q8h for tumor related fever  - s/p ir guided bone marrow biopsy and picc line for chemo 1/13--tolerated procedure well.  - s/p 1st dose ABVD chemotherapy on 1/16/2020--tolerated well, noted Hgb 7.2, Plt 29, will transfuse per heme/onc, otherwise stable for DC    2. Pancytopenia.    - resolved--possible early DIC 2/2 Hodgkins lymphoma   - ISTH DIC score 7 was initially   - elevated INR, PT, aPTT, fibrinogen, d dimer, fibrinogen split products.  - No epistaxis/hemoptysis or overt GIB noted by pt/wife  - monitor daily CBCs--heme/onc Dr. Hilario's group following - on  solumedrol 80mg q8 for tumor related fever .     3. Coagulopathy.    - pt is not on anticoagulation-  coagulopathy sec to lymphoma -   - ISTH score was 7 -- possible early stage DIC, cell counts now stable  - fibrinogen split products >5 <20, Heme/onc Dr. Hilario's group following.     4. Pneumonia, bacterial.   - ruled out at this time -  doubt infectious process -- fever and cough likely related to metastatic Hodgkin's lymphoma.   - ID Dr. Majano consulted, agrees w/ plan  - pulm Dr. Moore following  - continue CPAP qhs as needed for DANIEL.     5. Dementia with behavioral disturbance.   - h/o dementia with behavioral disturbance during previous hospitalization and at home when develops high fevers cause delirium - placed on 1:1 overnight  - emphasize re-orientation  - fall precautions.     6. CAD (coronary artery disease).   - s/p PCI, ASA on hold- resume after hem/onc clearance   - c/w lopressor  - c/w Lipitor  - cardio Dr. Ndiaye following.    7. Vitamin B12 deficiency.   - pt has not been taking replacement therapy at home  continue supplementation.     8. Thoracic aortic aneurysm without rupture.  - 4.9 cm Ascending aortic aneurysm noted on CTA chest. spoke with Dr. Donald from [facility]- as pt is asymptomatic (no CP, BP) and is less than 5.0 cm, pt can be seen as outpt. Referral can be made to Dr. Estrada Solo at [place] for followup.    9. Prophylactic measure.   - given low platelet count and high INR, will not give heparin at this time.  - PT eval to encourage ambulation, had home PT previously   - pt's wife requesting continuance of home PT 3x/week, will d/w  for approval

## 2020-01-17 NOTE — PROGRESS NOTE ADULT - ASSESSMENT
[ASSESSMENT and  PLAN]  75 yo male with Classical Hodgkins Lymphoma (CD30+, CD15+, CD20 neg)  Stage II with GUERO below diaphragm only initially however now noted to have patchy lung findings and intra-thoracic lymphadenopathy concerning for rapid disease progression.    Weak unspecified coagulation factor inhibitor  s/p PICC and BM bx with no bleedig.     - s/p BM bx on 1/13/20 to complete staging  - s/p PICC line placement for planned chemotherapy with ABVD; reviewed orders with nursing staff on 3W  - reviewed pulmonary function test prior to bleomycin - done with Dr. Genaro Polanco at [facility] and normal;   - procedure for test dose of bleomycin reviewed with nursing staff  - continue to avoid supplemental oxygen if possible due to increased risk of bleomycin toxicity  - Echo reviewed - "grossly normal left ventricular function"; okay to proceed with adriamycin  - clearly concern for progressive myelosuppression; may need transfusional support; would monitor CBC in a.m. 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     Delerium, sundowning  Would do better at home.     RECOMMEND:   Keep off O2.   no GCSF  DC Steroids    Nystatain QID for 10d.     Transfuse 1U PRBC today.     DC home after.     Return tomorrow afternoon to infusion unit for outpt Plt txfusion.   orders placed.     Appt to see in office Mon [id number].   Arrangement for possible txfusion plts Tues if low.   Thereafter appt on Thus to see me in office to prepare for next dose chemo.   Next chemo outpt in infusion unit 01/30/19 Thurs.     OOB as natacha.       - will follow with you  - case discussed with Dr. Celis (hospitalist)
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
## 2020-01-18 NOTE — PROGRESS NOTE ADULT - SUBJECTIVE AND OBJECTIVE BOX
Patient seen and examined;  Chart reviewed and events noted;   Had cycle 1 Day 1 of ABVD with reaction to Dacarbazine after 75% of infusion completed (hypertension, tachycardia); reaction stopped after stopping infusion and Dex/Benadryl  Received 1 unit pRBC on 1/17/20      MEDICATIONS  (STANDING):  acetaminophen   Tablet .. 650 milliGRAM(s) Oral once  atorvastatin 10 milliGRAM(s) Oral at bedtime  chlorhexidine 4% Liquid 1 Application(s) Topical daily  cyanocobalamin Injectable 1000 MICROGram(s) SubCutaneous daily  diphenhydrAMINE 25 milliGRAM(s) Oral once  folic acid 1 milliGRAM(s) Oral daily  imipramine 25 milliGRAM(s) Oral daily  metoprolol tartrate 50 milliGRAM(s) Oral every 12 hours  nystatin    Suspension [id number] Unit(s) Oral four times a day  pantoprazole    Tablet 40 milliGRAM(s) Oral before breakfast  sodium chloride 0.45%. 1000 milliLiter(s) (125 mL/Hr) IV Continuous <Continuous>    MEDICATIONS  (PRN):  acetaminophen   Tablet .. 1000 milliGRAM(s) Oral every 6 hours PRN Temp greater or equal to 38C (100.4F), Mild Pain (1 - 3)  albuterol/ipratropium for Nebulization 3 milliLiter(s) Nebulizer every 6 hours PRN Shortness of Breath and/or Wheezing  benzonatate 100 milliGRAM(s) Oral every 8 hours PRN Cough  guaiFENesin   Syrup  (Sugar-Free) 200 milliGRAM(s) Oral every 6 hours PRN Cough  ondansetron Injectable 8 milliGRAM(s) IV Push every 8 hours PRN Nausea  oxyCODONE    IR 5 milliGRAM(s) Oral every 6 hours PRN Moderate Pain (4 - 6)  oxyCODONE    IR 10 milliGRAM(s) Oral every 6 hours PRN Severe Pain (7 - 10)      Vital Signs Last 24 Hrs  T(C): 36.4 (18 Jan 2020 05:30), Max: 36.6 (17 Jan 2020 22:01)  T(F): 97.5 (18 Jan 2020 05:30), Max: 97.8 (17 Jan 2020 22:01)  HR: 74 (18 Jan 2020 05:30) (70 - 74)  BP: 154/84 (18 Jan 2020 05:30) (110/66 - 163/79)  RR: 18 (18 Jan 2020 05:30) (18 - 18)  SpO2: 97% (18 Jan 2020 05:30) (97% - 98%)    PHYSICAL EXAM  General: adult in NAD  HEENT: clear oropharynx, anicteric sclera, pink conjunctivae  Neck: supple  CV: normal S1S2 with no murmur rubs or gallops  Lungs: clear to auscultation, no wheezes, no rhales  Abdomen: soft non-tender non-distended, no hepato/splenomegaly  Ext: no clubbing cyanosis or edema; PICC line in left arm  Skin: no rashes and no petichiae  Neuro: alert and oriented X3 no focal deficits      LABS:                        8.2    2.97  )-----------( 27       ( 18 Jan 2020 08:01 )             25.8     01-17    141  |  106  |  21  ----------------------------<  200<H>  4.4   |  28  |  1.00    Ca    7.4<L>      17 Jan 2020 08:37

## 2020-01-18 NOTE — PROGRESS NOTE ADULT - ASSESSMENT
74M with h/o COPD, DANIEL, Depression, HLD, HTN, CAD s/p PCI, CHB s/p PPM, bioprosthetic AVR, dementia, recent diagnosis of Hodgkin's lymphoma in Dec 2019 who presents high fevers and confusion and inpatient BM biopsy, no planning to start chemotherapy. S/p PICC line     CAD s/p PCI  - Hold ASA in setting of low platelets of 27 Resume when cleared by Heme  - Continue metoprolol 50mg q12.    - Continue lipitor 10    Bioprosthetic AVR  - No evidence of volume overload  - TTE done showing normal LVSF with grade 1 DD    Thrombocytopenia  - F/u heme evaluation   - Monitor for s/s bleeding  - Tolerated PICC and bone marrow bx   - CTA chest negative of PE but suspicious of lung mets    HLD  - Continue statin    AMS   - Likely from fevers and underlying metabolic derangements   - Now improving     Hodgkin's lymphoma  Care per Heme/Primary    Other cardiovascular workup will depend on clinical course  All other workup per primary team  Will follow     Sheyla Guevara FNP-C  Cardiology NP  [id number] [phone]
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
## 2020-01-18 NOTE — PROGRESS NOTE ADULT - PROBLEM SELECTOR PLAN 1
s/p ABVD - chemo  will need Pulm follow up - with Dr. Chun - in [place] office and Dr. Conde PMD -   on nebs and cough rx regimen for cough and COPD   cpap use nightly - for DANIEL  [facility] rx regimen  am labs pending - s/p CHEMO - transfusion recs as per HEME recommendations  imaging and labs reviewed  will follow  poss DC home today

## 2020-01-18 NOTE — PROGRESS NOTE ADULT - SUBJECTIVE AND OBJECTIVE BOX
CHIEF COMPLAINT/INTERVAL HISTORY:  Pt. seen and evaluated for Hodgkin Lymphoma.  Pt. is in no distress.  Tolerated PRBC transfusion yesterday with appropriate rise in hbg.  Scheduled to have platelet transfusion today.      REVIEW OF SYSTEMS:  No fever, CP, SOB, or abdominal paain.    Vital Signs Last 24 Hrs  T(C): 36.4 (18 Jan 2020 05:30), Max: 36.6 (17 Jan 2020 22:01)  T(F): 97.5 (18 Jan 2020 05:30), Max: 97.8 (17 Jan 2020 22:01)  HR: 74 (18 Jan 2020 05:30) (70 - 74)  BP: 154/84 (18 Jan 2020 05:30) (110/66 - 163/79)  BP(mean): --  RR: 18 (18 Jan 2020 05:30) (18 - 18)  SpO2: 97% (18 Jan 2020 05:30) (97% - 98%)    PHYSICAL EXAM:  GENERAL: NAD  HEENT: EOMI, hearing normal, conjunctiva and sclera clear  Chest: CTA bilaterally, no wheezing  CV: S1S2, RRR,   GI: soft, +BS, NT/ND  Musculoskeletal: trace LE edema  Psychiatric: affect nL, mood nL  Skin: warm and dry, PICC RUE    LABS:                        8.2    2.97  )-----------( 27       ( 18 Jan 2020 08:01 )             25.8     01-17    141  |  106  |  21  ----------------------------<  200<H>  4.4   |  28  |  1.00    Ca    7.4<L>      17 Jan 2020 08:37      Assessment and Plan:  -Fever 2/2 Hodgkin Lymphoma:  Flu/RSV negative.  Urine culture and blood cultures with no growth.  s/p bonemarrow biopsy and PICC line insertion.  s/p ABVD Chemotherapy on 1/16.  Heme/Onc f/u  -Pancytopenia:  no gross bleeding noted.  s/p PRBC transfusion yesterday. Pt. to receive 1 unit Platelet transfusion today.    -Coagulapathy:  2/2 lymphoma.  s/p vitamin K 5mg PO once.  -CAD:  ASA held 2/2 thrombocytopenia.  Continue metoprolol 50mg PO Q12h and Lipitor 10mg PO QHS.  Cardiology f/u  -MILO:  improved.   -Thoracic aortic aneurysm:  outpatient follow up with Dr. Solo at [facility]  -Dementia:  supportive care  -VTE ppx: SCD  -Plan for discharge after platelet transfusion today.

## 2020-01-18 NOTE — PROGRESS NOTE ADULT - ASSESSMENT
[ASSESSMENT and  PLAN]  73 yo male with Classical Hodgkins Lymphoma (CD30+, CD15+, CD20 neg)  Stage II with GUERO below diaphragm only initially however now noted to have patchy lung findings and intra-thoracic lymphadenopathy concerning for rapid disease progression.    Weak unspecified coagulation factor inhibitor  s/p PICC and BM bx with no bleedig.     - s/p BM bx on 1/13/20 to complete staging; tata cytometry thus far negative  - s/p PICC line placement for planned chemotherapy with ABVD; reviewed orders with nursing staff on 3W  - reviewed pulmonary function test prior to bleomycin - done with Dr. Genaro Polanco at [facility] 8/2019and normal;   - Echo reviewed - "grossly normal left ventricular function";  - completed ABVD on 1/16/20 - reaction to dacarbazine noted  - clearly concern for progressive myelosuppression;  - received transfusion pRBC on 1/17/20 with improved Hg  - transfuse 1 unit single donor platelets today (1/18/20) and plan for discharge home  - off steroids; continue nystatin mouthwash 10 day course  - to follow-up in office on 1/20/20 at 11:15 a.m. with Dr. Majano and again on 1/23/20; plan for next treatment on 1/30/20  - case discussed with Dr. 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    Weak unspecified coagulation factor inhibitor  s/p PICC and BM bx with no bleedig.     - s/p BM bx on 1/13/20 to complete staging; tata cytometry thus far negative  - s/p PICC line placement for planned chemotherapy with ABVD; reviewed orders with nursing staff on 3W  - reviewed pulmonary function test prior to bleomycin - done with Dr. Genaro Polanco at [facility] 8/2019and normal;   - Echo reviewed - "grossly normal left ventricular function";  - completed ABVD on 1/16/20 - reaction to dacarbazine noted  - clearly concern for progressive myelosuppression;  - received transfusion pRBC on 1/17/20 with improved Hg  - transfuse 1 unit single donor platelets today (1/18/20) and plan for discharge home  - off steroids; continue nystatin mouthwash 10 day course  - to follow-up in office on 1/20/20 at 11:15 a.m. with Dr. Majano and again on 1/23/20; plan for next treatment on 1/30/20  - case discussed with Dr. Celis (hospitalist)      ADDENDUM - patient does not have home-care set up for PICC line care/flushes; will remain in hospital until PICC line care can be arranged

## 2020-01-19 NOTE — PROGRESS NOTE ADULT - SUBJECTIVE AND OBJECTIVE BOX
Patient seen and examined;  Chart reviewed and events noted;   denies any fevers overnight; reports feeling light-headed when he tried to walk; denies any nausea    MEDICATIONS  (STANDING):  atorvastatin 10 milliGRAM(s) Oral at bedtime  chlorhexidine 4% Liquid 1 Application(s) Topical daily  cyanocobalamin Injectable 1000 MICROGram(s) SubCutaneous daily  folic acid 1 milliGRAM(s) Oral daily  imipramine 25 milliGRAM(s) Oral daily  metoprolol tartrate 50 milliGRAM(s) Oral every 12 hours  nystatin    Suspension [id number] Unit(s) Oral four times a day  pantoprazole    Tablet 40 milliGRAM(s) Oral before breakfast    MEDICATIONS  (PRN):  acetaminophen   Tablet .. 1000 milliGRAM(s) Oral every 6 hours PRN Temp greater or equal to 38C (100.4F), Mild Pain (1 - 3)  albuterol/ipratropium for Nebulization 3 milliLiter(s) Nebulizer every 6 hours PRN Shortness of Breath and/or Wheezing  benzonatate 100 milliGRAM(s) Oral every 8 hours PRN Cough  guaiFENesin   Syrup  (Sugar-Free) 200 milliGRAM(s) Oral every 6 hours PRN Cough  ondansetron Injectable 8 milliGRAM(s) IV Push every 8 hours PRN Nausea  oxyCODONE    IR 5 milliGRAM(s) Oral every 6 hours PRN Moderate Pain (4 - 6)  oxyCODONE    IR 10 milliGRAM(s) Oral every 6 hours PRN Severe Pain (7 - 10)      Vital Signs Last 24 Hrs  T(C): 36.3 (19 Jan 2020 11:18), Max: 36.8 (18 Jan 2020 20:22)  T(F): 97.3 (19 Jan 2020 11:18), Max: 98.2 (18 Jan 2020 20:22)  HR: 72 (19 Jan 2020 11:18) (65 - 77)  BP: 106/71 (19 Jan 2020 11:18) (106/71 - 135/72)  RR: 20 (19 Jan 2020 11:18) (18 - 20)  SpO2: 97% (19 Jan 2020 11:18) (94% - 97%)    PHYSICAL EXAM  General: adult in NAD  HEENT: clear oropharynx, anicteric sclera, pink conjunctivae  Neck: supple  CV: normal S1S2 with no murmur rubs or gallops  Lungs: clear to auscultation, no wheezes, no rhales  Abdomen: soft non-tender non-distended, no hepato/splenomegaly  Ext: PICC line in right arm  Skin: no rashes and no petichiae  Neuro: alert and oriented X3 no focal deficits      LABS:                        9.3    2.75  )-----------( 35       ( 19 Jan 2020 07:03 )             29.0     Hemoglobin: 9.3 g/dL (01-19 @ 07:03)  Hemoglobin: 8.2 g/dL (01-18 @ 08:01)  Hemoglobin: 7.2 g/dL (01-17 @ 08:37)  Hemoglobin: 7.5 g/dL (01-16 @ 09:02)  Hemoglobin: 8.4 g/dL (01-15 @ 08:52)    WBC Count: 2.75 K/uL (01-19 @ 07:03)  WBC Count: 2.97 K/uL (01-18 @ 08:01)  WBC Count: 5.12 K/uL (01-17 @ 08:37)  WBC Count: 3.05 K/uL (01-16 @ 09:02)  WBC Count: 4.39 K/uL (01-15 @ 08:52)    Platelet Count - Automated: 35 K/uL (01-19 @ 07:03)  Platelet Count - Automated: 27 K/uL (01-18 @ 08:01)  Platelet Count - Automated: 29 K/uL (01-17 @ 08:37)  Platelet Count - Automated: 47 K/uL (01-16 @ 09:02)  Platelet Count - Automated: 56 K/uL (01-15 @ 08:52)    01-19    138  |  103  |  20  ----------------------------<  90  4.0   |  27  |  0.68    Ca    7.6<L>      19 Jan 2020 07:03  Mg     2.4     01-19
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
## 2020-01-19 NOTE — PROGRESS NOTE ADULT - SUBJECTIVE AND OBJECTIVE BOX
CHIEF COMPLAINT/INTERVAL HISTORY:  Pt. seen and evaluated for Hodgkin's Lymphoma.  Pt. is in no distress.  Feels well.  Tolerated platelet transfusion yesterday.      REVIEW OF SYSTEMS:  No fever, CP, SOB, or abdominal pain.     Vital Signs Last 24 Hrs  T(C): 36.3 (19 Jan 2020 05:34), Max: 36.8 (18 Jan 2020 20:22)  T(F): 97.3 (19 Jan 2020 05:34), Max: 98.2 (18 Jan 2020 20:22)  HR: 77 (19 Jan 2020 05:34) (65 - 77)  BP: 131/80 (19 Jan 2020 05:34) (131/80 - 138/88)  BP(mean): --  RR: 18 (19 Jan 2020 05:34) (18 - 18)  SpO2: 94% (19 Jan 2020 05:34) (94% - 97%)    PHYSICAL EXAM:  GENERAL: NAD  HEENT: EOMI, hearing normal, conjunctiva and sclera clear  Chest: CTA bilaterally, no wheezing  CV: S1S2, RRR,   GI: soft, +BS, NT/ND  Musculoskeletal: trace LE edema  Psychiatric: affect nL, mood nL  Skin: warm and dry, RUE PICC line    LABS:                        9.3    2.75  )-----------( 35       ( 19 Jan 2020 07:03 )             29.0     01-19    138  |  103  |  20  ----------------------------<  90  4.0   |  27  |  0.68    Ca    7.6<L>      19 Jan 2020 07:03  Mg     2.4     01-19      Assessment and Plan:  -Fever 2/2 Hodgkin Lymphoma:  Flu/RSV negative.  Urine culture and blood cultures with no growth.  s/p bonemarrow biopsy and PICC line insertion.  s/p ABVD Chemotherapy on 1/16.  Heme/Onc f/u  -Pancytopenia:  no gross bleeding noted.  s/p 1 unit PRBC and 1 unit platelet transfusion.  Transfusions PRN.      -Coagulapathy:  2/2 lymphoma.  s/p vitamin K 5mg PO once.  -CAD:  ASA held 2/2 thrombocytopenia.  Continue metoprolol 50mg PO Q12h and Lipitor 10mg PO QHS.  Cardiology f/u  -MILO:  resolved.  -Thoracic aortic aneurysm:  outpatient follow up with Dr. Solo at [facility]  -Dementia:  supportive care  -VTE ppx: SCD

## 2020-01-19 NOTE — PROGRESS NOTE ADULT - PROBLEM SELECTOR PLAN 1
on room air - seen and examined - occ cough - did not use CPAP overnight  heme follow up reviewed - next Chemo planned for 1/30 - planned for Home Dc and follow up with Dr. Majano - in the office  will follow up with PMD - Dr. Walker and Pulm - Dr. Chun in [place] office  cpap for reji  cough rx regimen prn -   oral and skin care  on room air - vs and HD and Sat reviewed  new Dx of Hodgkin's Lymphoma  COPD, REJI, Depression, HLD, HTN, CAD s/p PCI, CHB s/p PPM, bioprosthetic AVR, dementia

## 2020-01-19 NOTE — PROVIDER CONTACT NOTE (CHANGE IN STATUS NOTIFICATION) - BACKGROUND
Pt had history of aggitation after chemo treatment 3 days ago and has been on a 1 to 1. Pt has been calm and cooperative and mostly oriented for 2 days.
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
## 2020-01-19 NOTE — CHART NOTE - NSCHARTNOTEFT_GEN_A_CORE
Resident Rapid Response Follow up Note    Patient is a 74y old  Male who presents with a chief complaint of fever, persistent cough (19 Jan 2020 12:48)    Rapid response was called at on this 74y Male patient for agitation, diaphoresis, attempting to gt out of bed.     Patient was seen and examined at the bedside by the rapid response team and primary team. Dr. Valverde (nocturnist) at bedside. Patient is asking for "help", "wanting to get out of bed", "wants eggs", "says his legs are boiling", "wants someone to break his legs". He is actively banging his fist on the bed rails.     FOLLOW UP: Patient seen with 1:1 at bedside. Patient sleeping. Did not wake patient up.     Vital Signs Last 24 Hrs  T(C): 36.4 (19 Jan 2020 20:42), Max: 36.4 (19 Jan 2020 17:53)  T(F): 97.5 (19 Jan 2020 20:42), Max: 97.6 (19 Jan 2020 17:53)  HR: 78 (19 Jan 2020 20:42) (70 - 93)  BP: 117/72 (19 Jan 2020 20:42) (106/71 - 149/90)  RR: 17 (19 Jan 2020 20:42) (17 - 20)  SpO2: 93% (19 Jan 2020 20:42) (93% - 98%)    Physical Exam:  General: Well developed, well nourished, sleeping   HEENT: NC/AT   Neurology: sleeping   Respiratory: CTA B/L, No wheezing, rales, or rhonchi  CV: RRR, S1/S2 present, no murmurs, rubs, or gallops  Extremities: No C/C/E. +PICC line of RUE     LABS:                        9.3    2.75  )-----------( 35       ( 19 Jan 2020 07:03 )             29.0     19 Jan 2020 07:03    138    |  103    |  20     ----------------------------<  90     4.0     |  27     |  0.68     Ca    7.6        19 Jan 2020 07:03  Mg     2.4       19 Jan 2020 07:03      CAPILLARY BLOOD GLUCOSE  POCT Blood Glucose.: 91 mg/dL (19 Jan 2020 19:02)    RADIOLOGY & ADDITIONAL TESTS:  Assessment/Plan: 74M with h/o COPD, DANIEL, Depression, HLD, HTN, CAD s/p PCI, CHB s/p PPM, bioprosthetic AVR, dementia, recent diagnosis of Hodgkin's lymphoma in Dec 2019 who presents high fevers and confusion and inpatient BM biopsy, no planning to start chemotherapy. S/p PICC line   - RR called for agitation, acute delirium, now calm and sleeping. Per chart review, patient has history of acute agitation when he has high fevers. Patient is afebrile at this time. Received 0.5mg Zyprexa IM without relief of agitation at 7:24PM. Received 1mg Haldol IM at 7:39PM.   - Wife, Patricia, was at bedside. As patient was sleeping, wife went home but is happy to return if patient becomes agitated again.   - EKG from sinus rhythm with PACs, , QTc 598ms on 1/8/20  - Continue constant observation.   - RN advised to notify providers if patient starts to become agitated again.   - Dr. Valverde aware of plan.   -Will continue to follow, RN to call if any changes.

## 2020-01-19 NOTE — PROGRESS NOTE ADULT - ASSESSMENT
74M with h/o COPD, DANIEL, Depression, HLD, HTN, CAD s/p PCI, CHB s/p PPM, bioprosthetic AVR, dementia, recent diagnosis of Hodgkin's lymphoma in Dec 2019 who presents high fevers and confusion and inpatient BM biopsy, no planning to start chemotherapy. S/p PICC line     CAD s/p PCI  - Hold ASA in setting of low platelets of 27 Resume when cleared by Heme  - Continue metoprolol 50mg q12.    - Continue lipitor 10    Bioprosthetic AVR  - No evidence of volume overload  - TTE done showing normal LVSF with grade 1 DD    Thrombocytopenia  - Heme following s/p Platelet infusion 1/18 and PRBC on 1/17  - Monitor for s/s bleeding  - Tolerated PICC and bone marrow bx   - CTA chest negative of PE but suspicious of lung mets    HLD  - Continue statin    AMS   - Likely from fevers and underlying metabolic derangements   - Now improving     Hodgkin's lymphoma  Care per Heme/Primary  Will start Chemo as o/p    Other cardiovascular workup will depend on clinical course  All other workup per primary team  Will follow   D/C planning to home with home care - awaiting home care to be set up for PICC line most likely Tuesday.    No objections from Cardiac POV for d/c.      MONY Wiseman-BC  Cardiology   [id number] [phone]

## 2020-01-19 NOTE — CONSULT NOTE ADULT - ASSESSMENT
A:    74yMale  HD # 12    Here for:    1. Acute agitated delerium in setting of  2. Dementia, and  3. Hodgkin's lymphoma    P/recommendation:    Pt hemodynamically stable, no respiratory distress.  AVSS      Recurrent agitated delerium in setting of dementia, prolonged hospitalization, acute on chronic illness (HL) on chemotherapy    Of note, Hx of marked agitated with his delerium, brought about by acute illnesses    Recommend:    Zyprexa 5mg IM x 1 now. PRN antipsychotics.  Low stimulation environment.  Wife called and at bedside; this seems to be having a calming influence  Avoid physical restraints  Consider psychiatric consultation to optimize agitation, dementia mgmt    May remains on the floor. No advantage to ICU mgmt at this time. Please call back with any additional concerns. Thank You.

## 2020-01-19 NOTE — CHART NOTE - NSCHARTNOTEFT_GEN_A_CORE
Resident Rapid Response Note    Patient is a 74y old  Male who presents with a chief complaint of fever, persistent cough (19 Jan 2020 12:48)      Rapid response was called at on this 74y Male patient for agitation, diaphoresis, attempting to gt out of bed.     Patient was seen and examined at the bedside by the rapid response team and primary team. Dr. Valverde (nocturnist) at bedside. Patient is asking for "help", "wanting to get out of bed", "wants eggs", "says his legs are boiling", "wants someone to break his legs". He is actively banging his fist on the bed rails.     Rapid Response Vital Signs:  BP: 137/71  HR: 104  RR: 32  SpO2: 100% on RA  Temp: 97.1  FS: 91    Vital Signs Last 24 Hrs  T(C): 36.4 (19 Jan 2020 17:53), Max: 36.8 (18 Jan 2020 20:22)  T(F): 97.6 (19 Jan 2020 17:53), Max: 98.2 (18 Jan 2020 20:22)  HR: 93 (19 Jan 2020 17:53) (65 - 93)  BP: 149/90 (19 Jan 2020 17:53) (106/71 - 149/90)  RR: 19 (19 Jan 2020 17:53) (18 - 20)  SpO2: 98% (19 Jan 2020 17:53) (94% - 98%)    Physical Exam:  General: Well developed, well nourished, in no acute distress  HEENT: NCAT, PERRLA, EOMI bl, moist mucous membranes   Neck: Supple, nontender, no mass  Neurology: A&Ox3, nonfocal, CN II-XII grossly intact, sensation intact, no gait abnormalities   Respiratory: CTA B/L, No wheezing, rales, or rhonchi  CV: RRR, S1/S2 present, no murmurs, rubs, or gallops  Abdominal: Soft, nontender, non-distended, normoactive bowel sounds  Extremities: No C/C/E, peripheral pulses present  MSK: Normal ROM, no joint erythema or warmth, no joint swelling   Skin: warm, dry, normal color, no obvious rash or abnormal lesions    LABS:                        9.3    2.75  )-----------( 35       ( 19 Jan 2020 07:03 )             29.0     19 Jan 2020 07:03    138    |  103    |  20     ----------------------------<  90     4.0     |  27     |  0.68     Ca    7.6        19 Jan 2020 07:03  Mg     2.4       19 Jan 2020 07:03          CAPILLARY BLOOD GLUCOSE  POCT Blood Glucose.: 91 mg/dL (19 Jan 2020 19:02)    RADIOLOGY & ADDITIONAL TESTS:  Assessment/Plan: 74M with h/o COPD, DANIEL, Depression, HLD, HTN, CAD s/p PCI, CHB s/p PPM, bioprosthetic AVR, dementia, recent diagnosis of Hodgkin's lymphoma in Dec 2019 who presents high fevers and confusion and inpatient BM biopsy, no planning to start chemotherapy. S/p PICC line   - RR called for agitation, acute delirium. Per chart review, patient has history of acute agitation when he has high fevers. Patient is afebrile at this time. Received 0.5mg Zyprexa IM without relief of agitation at 7:24PM. Received 1mg Haldol IM at 7:39PM.   - Wife, Patricia, was called to sit with patient.   - Calming and breathing exercises provided.   - EKG from sinus rhythm with PACs, , QTc 598ms on 1/8/20        -Will continue to follow, RN to call if any changes. Resident Rapid Response Note    Patient is a 74y old  Male who presents with a chief complaint of fever, persistent cough (19 Jan 2020 12:48)      Rapid response was called at on this 74y Male patient for agitation, diaphoresis, attempting to gt out of bed.     Patient was seen and examined at the bedside by the rapid response team and primary team. Dr. Valverde (nocturnist) at bedside. Patient is asking for "help", "wanting to get out of bed", "wants eggs", "says his legs are boiling", "wants someone to break his legs". He is actively banging his fist on the bed rails.     Rapid Response Vital Signs:  BP: 137/71  HR: 104  RR: 32  SpO2: 100% on RA  Temp: 97.1  FS: 91    Vital Signs Last 24 Hrs  T(C): 36.4 (19 Jan 2020 17:53), Max: 36.8 (18 Jan 2020 20:22)  T(F): 97.6 (19 Jan 2020 17:53), Max: 98.2 (18 Jan 2020 20:22)  HR: 93 (19 Jan 2020 17:53) (65 - 93)  BP: 149/90 (19 Jan 2020 17:53) (106/71 - 149/90)  RR: 19 (19 Jan 2020 17:53) (18 - 20)  SpO2: 98% (19 Jan 2020 17:53) (94% - 98%)    Physical Exam:  General: Well developed, well nourished, agitated, mildly diaphoretic.   HEENT: NC/AT   Neurology: Awake, alert, agitated   Respiratory: CTA B/L, No wheezing, rales, or rhonchi  CV: RRR, S1/S2 present, no murmurs, rubs, or gallops  Extremities: No C/C/E   MSK: moving all extremities   Skin: diaphoretic (from agitation)    LABS:                        9.3    2.75  )-----------( 35       ( 19 Jan 2020 07:03 )             29.0     19 Jan 2020 07:03    138    |  103    |  20     ----------------------------<  90     4.0     |  27     |  0.68     Ca    7.6        19 Jan 2020 07:03  Mg     2.4       19 Jan 2020 07:03      CAPILLARY BLOOD GLUCOSE  POCT Blood Glucose.: 91 mg/dL (19 Jan 2020 19:02)    RADIOLOGY & ADDITIONAL TESTS:  Assessment/Plan: 74M with h/o COPD, DANIEL, Depression, HLD, HTN, CAD s/p PCI, CHB s/p PPM, bioprosthetic AVR, dementia, recent diagnosis of Hodgkin's lymphoma in Dec 2019 who presents high fevers and confusion and inpatient BM biopsy, no planning to start chemotherapy. S/p PICC line   - RR called for agitation, acute delirium. Per chart review, patient has history of acute agitation when he has high fevers. Patient is afebrile at this time. Received 0.5mg Zyprexa IM without relief of agitation at 7:24PM. Received 1mg Haldol IM at 7:39PM.   - Wife, Patricia, was called to sit with patient.   - Calming and breathing exercises provided.   - EKG from sinus rhythm with PACs, , QTc 598ms on 1/8/20  - Continue constant observation.   - RN advised to notify providers if patient starts to become agitated again.   - Dr. Valverde at bedside, aware of current events and management plan.   -Will continue to follow, RN to call if any changes. Resident Rapid Response Note    Patient is a 74y old  Male who presents with a chief complaint of fever, persistent cough (19 Jan 2020 12:48)      Rapid response was called at on this 74y Male patient for agitation, diaphoresis, attempting to gt out of bed.     Patient was seen and examined at the bedside by the rapid response team and primary team. Dr. Valverde (nocturnist) at bedside. Patient is asking for "help", "wanting to get out of bed", "wants eggs", "says his legs are boiling", "wants someone to break his legs". He is actively banging his fist on the bed rails.     Rapid Response Vital Signs:  BP: 137/71  HR: 104  RR: 32  SpO2: 100% on RA  Temp: 97.1  FS: 91    Vital Signs Last 24 Hrs  T(C): 36.4 (19 Jan 2020 17:53), Max: 36.8 (18 Jan 2020 20:22)  T(F): 97.6 (19 Jan 2020 17:53), Max: 98.2 (18 Jan 2020 20:22)  HR: 93 (19 Jan 2020 17:53) (65 - 93)  BP: 149/90 (19 Jan 2020 17:53) (106/71 - 149/90)  RR: 19 (19 Jan 2020 17:53) (18 - 20)  SpO2: 98% (19 Jan 2020 17:53) (94% - 98%)    Physical Exam:  General: Well developed, well nourished, agitated, mildly diaphoretic.   HEENT: NC/AT   Neurology: Awake, alert, agitated   Respiratory: CTA B/L, No wheezing, rales, or rhonchi  CV: RRR, S1/S2 present, no murmurs, rubs, or gallops  Extremities: No C/C/E. +PICC line of RUE   MSK: moving all extremities   Skin: diaphoretic (from agitation)    LABS:                        9.3    2.75  )-----------( 35       ( 19 Jan 2020 07:03 )             29.0     19 Jan 2020 07:03    138    |  103    |  20     ----------------------------<  90     4.0     |  27     |  0.68     Ca    7.6        19 Jan 2020 07:03  Mg     2.4       19 Jan 2020 07:03      CAPILLARY BLOOD GLUCOSE  POCT Blood Glucose.: 91 mg/dL (19 Jan 2020 19:02)    RADIOLOGY & ADDITIONAL TESTS:  Assessment/Plan: 74M with h/o COPD, DANIEL, Depression, HLD, HTN, CAD s/p PCI, CHB s/p PPM, bioprosthetic AVR, dementia, recent diagnosis of Hodgkin's lymphoma in Dec 2019 who presents high fevers and confusion and inpatient BM biopsy, no planning to start chemotherapy. S/p PICC line   - RR called for agitation, acute delirium. Per chart review, patient has history of acute agitation when he has high fevers. Patient is afebrile at this time. Received 0.5mg Zyprexa IM without relief of agitation at 7:24PM. Received 1mg Haldol IM at 7:39PM.   - Wife, Particia, was called to sit with patient.   - Calming and breathing exercises provided.   - EKG from sinus rhythm with PACs, , QTc 598ms on 1/8/20  - Continue constant observation.   - RN advised to notify providers if patient starts to become agitated again.   - Dr. Valverde at bedside, aware of current events and management plan.   -Will continue to follow, RN to call if any changes.

## 2020-01-19 NOTE — PROVIDER CONTACT NOTE (CHANGE IN STATUS NOTIFICATION) - SITUATION
At 1850 pt became very nauseated, and was in a cold sweat. Pt aggitated and confused. Rapid response called
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
## 2020-01-19 NOTE — PROGRESS NOTE ADULT - ASSESSMENT
[ASSESSMENT and  PLAN]  75 yo male with Classical Hodgkins Lymphoma (CD30+, CD15+, CD20 neg)  Stage II with GUERO below diaphragm only initially however now noted to have patchy lung findings and intra-thoracic lymphadenopathy concerning for rapid disease progression.    Weak unspecified coagulation factor inhibitor  s/p PICC and BM bx with no bleedig.     - s/p BM bx on 1/13/20 to complete staging; tata cytometry thus far negative  - s/p PICC line placement for chemotherapy with ABVD Q2 weeks; initial dose on 1/16/20 complicated by Dacarbazine reaction  - reviewed pulmonary function test prior to bleomycin - done with Dr. Genaro Polanco at [facility] 8/2019 and normal;   - Echo reviewed - "grossly normal left ventricular function";  - received transfusion pRBC on 1/17/20 with improved Hg  -  received transfusion of single donor platelets on 1/18/20 with stable platelet count  - off steroids; continue nystatin mouthwash 10 day course  - discharge pending home care arrangements which include daily flushes of PICC line  - to follow-up in office on 1/23/20; plan for next treatment on 1/30/20  - case discussed with Dr. Celis (hospitalist)

## 2020-01-20 NOTE — CHART NOTE - NSCHARTNOTEFT_GEN_A_CORE
Called by RN for agitation. Pt seen and examined at bedside. Patient states he needs to breath and  asked to "wear latex gloves". Denies chest pain or discomfort.       Allergies  No Known Allergies  Intolerances        Vitals  Vital Signs Last 24 Hrs  T(C): 36.5 (20 Jan 2020 00:53), Max: 36.5 (20 Jan 2020 00:53)  T(F): 97.7 (20 Jan 2020 00:53), Max: 97.7 (20 Jan 2020 00:53)  HR: 92 (20 Jan 2020 00:53) (72 - 93)  BP: 109/72 (20 Jan 2020 00:53) (106/71 - 149/90)  BP(mean): --  RR: 18 (20 Jan 2020 00:53) (17 - 20)  SpO2: 100% (20 Jan 2020 00:53) (93% - 100%)      General: Well developed, well nourished, agitated, sitting up, on Room air  HEENT: NC/AT   Neurology: awake, agitated  Respiratory: CTA B/L, No wheezing, rales, or rhonchi  CV: RRR, S1/S2 present, no murmurs, rubs, or gallops  Extremities: No C/C/E. +PICC line of RUE   skin: color appropriate for ethnicity, dry, intact        A/P: Assessment/Plan: 74M with h/o COPD, DANIEL, Depression, HLD, HTN, CAD s/p PCI, CHB s/p PPM, bioprosthetic AVR, dementia, recent diagnosis of Hodgkin's lymphoma in Dec 2019 who presents high fevers and confusion and inpatient BM biopsy, no planning to start chemotherapy. S/p PICC line   - Agitation is likely due to acute delirium. As noted in chart, patient has ongoing history of acute agitation in this hospitalization  - Haldol 1 mg IM x 1 ordered  - Patient given Duoneb treatment by  respiratory therapist  - Continue constant observation and patient told to take slow deep breaths   - RN advised to notify for status changes

## 2020-01-20 NOTE — PROGRESS NOTE ADULT - SUBJECTIVE AND OBJECTIVE BOX
CHIEF COMPLAINT/INTERVAL HISTORY:  Pt. seen and evaluated for Hodgkin's Lymphoma.  Events from last night noted with patient being agitated.  Currently lying in bed in no distress and cooperative.  Denies any CP or SOB.     REVIEW OF SYSTEMS:  No fever, CP, SOB, or abdominal pain.     Vital Signs Last 24 Hrs  T(C): 36.4 (20 Jan 2020 05:15), Max: 36.5 (20 Jan 2020 00:53)  T(F): 97.5 (20 Jan 2020 05:15), Max: 97.7 (20 Jan 2020 00:53)  HR: 102 (20 Jan 2020 05:15) (72 - 102)  BP: 111/70 (20 Jan 2020 05:15) (106/71 - 149/90)  BP(mean): --  RR: 19 (20 Jan 2020 05:15) (17 - 20)  SpO2: 99% (20 Jan 2020 05:15) (93% - 100%)    PHYSICAL EXAM:  GENERAL: NAD  HEENT: EOMI, hearing normal, conjunctiva and sclera clear  Chest: CTA bilaterally, no wheezing  CV: S1S2, RRR,   GI: soft, +BS, NT/ND  Musculoskeletal: trace LE edema  Psychiatric: affect nL, mood nL  Skin: warm and dry, RUE PICC line    LABS:                        9.3    2.75  )-----------( 35       ( 19 Jan 2020 07:03 )             29.0     01-19    138  |  103  |  20  ----------------------------<  90  4.0   |  27  |  0.68    Ca    7.6<L>      19 Jan 2020 07:03  Mg     2.4     01-19      Assessment and Plan:  -Fever 2/2 Hodgkin Lymphoma:  Flu/RSV negative.  Urine culture and blood cultures with no growth.  s/p bonemarrow biopsy and PICC line insertion.  s/p ABVD Chemotherapy on 1/16.  Heme/Onc f/u  -Pancytopenia:  no gross bleeding noted.  s/p 1 unit PRBC and 1 unit platelet transfusion.  Transfusions PRN. Continue folic acid 1mg PO daily and cyanocobalamin 1000mcg SQ daily      -Coagulapathy:  2/2 lymphoma.  s/p vitamin K 5mg PO once.  -CAD:  ASA held 2/2 thrombocytopenia.  Continue metoprolol 50mg PO Q12h and Lipitor 10mg PO QHS.  Cardiology f/u  -MILO:  resolved.  -Thoracic aortic aneurysm:  outpatient follow up with Dr. Solo at [facility]  -Dementia:  supportive care  -VTE ppx: SCD

## 2020-01-20 NOTE — CHART NOTE - NSCHARTNOTEFT_GEN_A_CORE
Assessment: patient seen for follow up. food preferences per family. ate well at lunch . no BM today. patient with 1 on 1 in room appears confused    Factors impacting intake: [x ] none [ ] nausea  [ ] vomiting [ ] diarrhea [ ] constipation  [ ]chewing problems [ ] swallowing issues  [ ] other:     Diet Presciption: Diet, Regular:   DASH/TLC {Sodium & Cholesterol Restricted} (01-08-20 @ 18:13)    Intake: 50-75%    Current Weight: 1/17 wt 265.6#      Pertinent Medications: MEDICATIONS  (STANDING):  atorvastatin 10 milliGRAM(s) Oral at bedtime  chlorhexidine 4% Liquid 1 Application(s) Topical daily  cyanocobalamin Injectable 1000 MICROGram(s) SubCutaneous daily  folic acid 1 milliGRAM(s) Oral daily  imipramine 25 milliGRAM(s) Oral daily  metoprolol tartrate 50 milliGRAM(s) Oral every 12 hours  nystatin    Suspension 438721 Unit(s) Oral four times a day  nystatin Powder 1 Application(s) Topical every 8 hours  pantoprazole    Tablet 40 milliGRAM(s) Oral before breakfast    MEDICATIONS  (PRN):  acetaminophen   Tablet .. 1000 milliGRAM(s) Oral every 6 hours PRN Temp greater or equal to 38C (100.4F), Mild Pain (1 - 3)  albuterol/ipratropium for Nebulization 3 milliLiter(s) Nebulizer every 6 hours PRN Shortness of Breath and/or Wheezing  benzonatate 100 milliGRAM(s) Oral every 8 hours PRN Cough  guaiFENesin   Syrup  (Sugar-Free) 200 milliGRAM(s) Oral every 6 hours PRN Cough  ondansetron Injectable 8 milliGRAM(s) IV Push every 8 hours PRN Nausea  oxyCODONE    IR 5 milliGRAM(s) Oral every 6 hours PRN Moderate Pain (4 - 6)  oxyCODONE    IR 10 milliGRAM(s) Oral every 6 hours PRN Severe Pain (7 - 10)    Pertinent Labs: 01-20 Na137 mmol/L Glu 84 mg/dL K+ 3.9 mmol/L Cr  0.98 mg/dL BUN 20 mg/dL 01-10 Chol --    LDL --    HDL --    Trig 111 mg/dL     CAPILLARY BLOOD GLUCOSE      POCT Blood Glucose.: 91 mg/dL (19 Jan 2020 19:02)    Skin: jay 15 no pressure injury     Estimated Needs:   [x ] no change since previous assessment  [ ] recalculated:     Previous Nutrition Diagnosis:   [ ] Inadequate Energy Intake [ ]Inadequate Oral Intake [ ] Excessive Energy Intake   [ ] Underweight [ ] Increased Nutrient Needs [x ] Overweight/Obesity   [ ] Altered GI Function [ ] Unintended Weight Loss [ ] Food & Nutrition Related Knowledge Deficit [ ] Malnutrition     Nutrition Diagnosis is [x ] ongoing  [ ] resolved [ ] not applicable     New Nutrition Diagnosis: [ x] not applicable       Interventions:   Recommend  [ ] Change Diet To:  [ ] Nutrition Supplement  [ ] Nutrition Support  [x ] Other: continue diet as ordered with food preferences follow for BM stool softener as appropriate,     Monitoring and Evaluation:   [ x] PO intake [ x ] Tolerance to diet prescription [ x ] weights [ x ] labs[ x ] follow up per protocol  [ ] other:
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
## 2020-01-20 NOTE — PROGRESS NOTE ADULT - ASSESSMENT
[ASSESSMENT and  PLAN]  75 yo male with Classical Hodgkins Lymphoma (CD30+, CD15+, CD20 neg)  Stage II with GUERO below diaphragm only initially however now noted to have patchy lung findings and intra-thoracic lymphadenopathy concerning for rapid disease progression.    Weak unspecified coagulation factor inhibitor  s/p PICC and BM bx1/13/20 with no bleeding; Flow cytometry negative; s/p cycle 1 Day 1 of ABVD on 1/16/20; course complicated by dacarbazine reaction .     - reviewed pulmonary function test prior to bleomycin - done with Dr. Genaro Polanco at [facility] 8/2019 and normal;   - Echo reviewed - "grossly normal left ventricular function";  - received transfusion pRBC on 1/17/20 with improved Hg  -  received transfusion of single donor platelets on 1/18/20 with stable platelet count  - off steroids; continue nystatin mouthwash 10 day course  - discharge pending home care arrangements which include daily flushes of PICC line by visiting nurse  - to follow-up in office on 1/23/20; plan for next treatment on 1/30/20  - case discussed with Dr. Celis (hospitalist)

## 2020-01-20 NOTE — PROGRESS NOTE ADULT - SUBJECTIVE AND OBJECTIVE BOX
Patient seen and examined;  Chart reviewed and events noted;   continues to have [id number] confusion    MEDICATIONS  (STANDING):  atorvastatin 10 milliGRAM(s) Oral at bedtime  chlorhexidine 4% Liquid 1 Application(s) Topical daily  cyanocobalamin Injectable 1000 MICROGram(s) SubCutaneous daily  folic acid 1 milliGRAM(s) Oral daily  imipramine 25 milliGRAM(s) Oral daily  metoprolol tartrate 50 milliGRAM(s) Oral every 12 hours  nystatin    Suspension 516155 Unit(s) Oral four times a day  nystatin Powder 1 Application(s) Topical every 8 hours  pantoprazole    Tablet 40 milliGRAM(s) Oral before breakfast    MEDICATIONS  (PRN):  acetaminophen   Tablet .. 1000 milliGRAM(s) Oral every 6 hours PRN Temp greater or equal to 38C (100.4F), Mild Pain (1 - 3)  albuterol/ipratropium for Nebulization 3 milliLiter(s) Nebulizer every 6 hours PRN Shortness of Breath and/or Wheezing  benzonatate 100 milliGRAM(s) Oral every 8 hours PRN Cough  guaiFENesin   Syrup  (Sugar-Free) 200 milliGRAM(s) Oral every 6 hours PRN Cough  ondansetron Injectable 8 milliGRAM(s) IV Push every 8 hours PRN Nausea  oxyCODONE    IR 5 milliGRAM(s) Oral every 6 hours PRN Moderate Pain (4 - 6)  oxyCODONE    IR 10 milliGRAM(s) Oral every 6 hours PRN Severe Pain (7 - 10)      Vital Signs Last 24 Hrs  T(C): 36.4 (20 Jan 2020 05:15), Max: 36.5 (20 Jan 2020 00:53)  T(F): 97.5 (20 Jan 2020 05:15), Max: 97.7 (20 Jan 2020 00:53)  HR: 102 (20 Jan 2020 05:15) (78 - 102)  BP: 111/70 (20 Jan 2020 05:15) (109/72 - 149/90)  RR: 19 (20 Jan 2020 05:15) (17 - 19)  SpO2: 99% (20 Jan 2020 05:15) (93% - 100%)    PHYSICAL EXAM  General: adult obese male in NAD  HEENT: clear oropharynx, anicteric sclera, pink conjunctivae  Neck: supple  CV: normal S1S2 with no murmur rubs or gallops  Lungs: clear to auscultation, no wheezes, no rhales  Abdomen: soft non-tender non-distended, no hepato/splenomegaly  Ext: + right PICC line in place  Skin: no rashes and no petichiae  Neuro: alert and oriented but has episodes of sun-downing      LABS:                        9.1    3.70  )-----------( 38       ( 20 Jan 2020 08:31 )             28.5     Hemoglobin: 9.1 g/dL (01-20 @ 08:31)  Hemoglobin: 9.3 g/dL (01-19 @ 07:03)  Hemoglobin: 8.2 g/dL (01-18 @ 08:01)  Hemoglobin: 7.2 g/dL (01-17 @ 08:37)  Hemoglobin: 7.5 g/dL (01-16 @ 09:02)    WBC Count: 3.70 K/uL (01-20 @ 08:31)  WBC Count: 2.75 K/uL (01-19 @ 07:03)  WBC Count: 2.97 K/uL (01-18 @ 08:01)  WBC Count: 5.12 K/uL (01-17 @ 08:37)  WBC Count: 3.05 K/uL (01-16 @ 09:02)    Platelet Count - Automated: 38 K/uL (01-20 @ 08:31)  Platelet Count - Automated: 35 K/uL (01-19 @ 07:03)  Platelet Count - Automated: 27 K/uL (01-18 @ 08:01)  Platelet Count - Automated: 29 K/uL (01-17 @ 08:37)  Platelet Count - Automated: 47 K/uL (01-16 @ 09:02)    01-20    137  |  101  |  20  ----------------------------<  84  3.9   |  26  |  0.98    Ca    8.0<L>      20 Jan 2020 08:54  Mg     2.4     01-19
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
## 2020-01-20 NOTE — PROGRESS NOTE ADULT - PROBLEM SELECTOR PLAN 1
overnight events noted - delirium - RRT notes reviewed - did not use CPAP - on 1 to 1 obs - and managed PRN with Zyprexa -   heme follow up reviewed - next Chemo planned for 1/30 - planned for Home Dc and follow up with Dr. Majano - in the office  will follow up with PMD - Dr. Walker and Pulm - Dr. Chun in [place] office  cpap for reji  cough rx regimen prn -   oral and skin care  on room air - vs and HD and Sat reviewed  new Dx of Hodgkin's Lymphoma  COPD, REJI, Depression, HLD, HTN, CAD s/p PCI, CHB s/p PPM, bioprosthetic AVR, dementia.

## 2020-01-20 NOTE — PROGRESS NOTE ADULT - ASSESSMENT
74M with h/o COPD, DANIEL, Depression, HLD, HTN, CAD s/p PCI, CHB s/p PPM, bioprosthetic AVR, dementia, recent diagnosis of Hodgkin's lymphoma in Dec 2019 who presents high fevers and confusion and inpatient BM biopsy, no planning to start chemotherapy. S/p PICC line     CAD s/p PCI  - Hold ASA in setting of low platelets of 38 Resume when cleared by Heme  - Continue metoprolol 50mg q12.    - Continue lipitor 10mg    Bioprosthetic AVR  - No evidence of volume overload  - TTE done as aboe 1/10/2020   showing normal LVSF with grade 1 DD    Thrombocytopenia  - Heme following s/p Platelet infusion 1/18 and PRBC on 1/17  - Monitor for s/s bleeding  - Tolerated PICC and bone marrow bx   - CTA chest negative of PE but suspicious of lung mets    HLD  - Continue statin    AMS   - Likely from fevers and underlying metabolic derangements   - Now improving     Hodgkin's lymphoma  Care per Heme/Primary  Will start Chemo as o/p    Other cardiovascular workup will depend on clinical course  All other workup per primary team  Will follow     No objections from Cardiac POV for d/c.      Sheyla Guevara FNP-C  Cardiology NP  [id number] [phone]

## 2020-01-21 NOTE — BEHAVIORAL HEALTH ASSESSMENT NOTE - NSBHCHARTREVIEWVS_PSY_A_CORE FT
Vital Signs Last 24 Hrs  T(C): 36.4 (21 Jan 2020 06:10), Max: 36.6 (20 Jan 2020 20:31)  T(F): 97.6 (21 Jan 2020 06:10), Max: 97.8 (20 Jan 2020 20:31)  HR: 87 (21 Jan 2020 06:10) (87 - 117)  BP: 129/76 (21 Jan 2020 06:10) (112/70 - 143/86)  BP(mean): --  RR: 19 (21 Jan 2020 06:10) (18 - 20)  SpO2: 98% (21 Jan 2020 06:10) (96% - 99%)

## 2020-01-21 NOTE — BEHAVIORAL HEALTH ASSESSMENT NOTE - NSBHCHARTREVIEWLAB_PSY_A_CORE FT
7.9    1.92  )-----------( 33       ( 21 Jan 2020 08:26 )             25.2   01-21    139  |  104  |  18  ----------------------------<  94  4.3   |  28  |  0.93    Ca    7.9<L>      21 Jan 2020 08:26  Phos  4.5     01-21  Mg     2.3     01-21

## 2020-01-21 NOTE — PROGRESS NOTE ADULT - ASSESSMENT
74M with h/o COPD, DANIEL, Depression, HLD, HTN, CAD s/p PCI, CHB s/p PPM, bioprosthetic AVR, dementia, recent diagnosis of Hodgkin's lymphoma in Dec 2019 who presents with fevers and persistent likely in setting of untreated Hodgkin's lymphoma.      1. Lymphoma, Hodgkin's.    - pt was diagnosed in Dec 2019. Sees Dr. Majano as outpt (heme/onc)  - remains afebrile-- procal 1.15 on 1/8/20,  blood cx neg, ucult neg, pt off ABX   - s/p ir guided bone marrow biopsy and picc line for chemo 1/13--tolerated procedure well.  - s/p 1st dose ABVD chemotherapy on 1/16/2020--tolerated well. Awaiting home PICC care services.     2. Pancytopenia.    - no overt signs of bleeding noted  - now s/p 1 unit pRBCS and 1 unit platelets-- noted Hgb 7.9, Plt 33, will transfuse further per heme/onc recs   - Continue folic acid 1mg PO daily and cyanocobalamin 1000mcg SQ daily      - monitor daily CBCs--heme/onc Dr. Majano following     3. Coagulopathy.    - pt is not on anticoagulation-  coagulopathy sec to lymphoma -   - Heme/onc Dr. Hilario's group following.     4. DANIEL  - continue CPAP qhs  - pulm Dr. Mooer followin     5. Dementia with behavioral disturbance.   - h/o dementia with behavioral disturbance during previous hospitalization and at home when develops high fevers cause delirium - placed on 1:1 intermittently through hosp stay  - emphasize re-orientation--would prefer to avoid sedating medications if possible as pt noted to be lethargic this morning.  - fall precautions.      6. CAD (coronary artery disease).   - s/p PCI, ASA on hold due to low PLT- resume after heme/onc clearance   - c/w lopressor  - c/w Lipitor  - cardio Dr. Ndiaye following.    7. Vitamin B12 deficiency.   - pt has not been taking replacement therapy at home  - continue cyanocobalamin supplementation.     8. Thoracic aortic aneurysm without rupture.  - 4.9 cm Ascending aortic aneurysm noted on CTA chest. Primary team had spoken with Dr. Donald from [facility]- as pt is asymptomatic (no CP, BP) and is less than 5.0 cm, pt can be seen as outpt. Pt to see Dr. Estrada Solo at [place] for followup as outpt.    9. VTE ppx  - given low platelet count, will not give heparin at this time.  - PT eval to encourage ambulation, had home PT previously   - pt's wife requesting continuance of home PT 3x/week, approval pending 74M with h/o COPD, DANIEL, Depression, HLD, HTN, CAD s/p PCI, CHB s/p PPM, bioprosthetic AVR, dementia, recent diagnosis of Hodgkin's lymphoma in Dec 2019 who presents with fevers and persistent likely in setting of untreated Hodgkin's lymphoma.      1. Lymphoma, Hodgkin's.    - pt was diagnosed in Dec 2019. Sees Dr. Majano as outpt (heme/onc)  - remains afebrile-- procal 1.15 on 1/8/20,  blood cx neg, ucult neg, pt off ABX   - s/p ir guided bone marrow biopsy and picc line for chemo 1/13--tolerated procedure well.  - s/p 1st dose ABVD chemotherapy on 1/16/2020--tolerated well. Awaiting home PICC care services.     2. Pancytopenia.    - no overt signs of bleeding noted  - now s/p 1 unit pRBCS and 1 unit platelets-- noted Hgb 7.9, Plt 33, will transfuse further per heme/onc recs   - Continue folic acid 1mg PO daily and cyanocobalamin 1000mcg SQ daily      - monitor daily CBCs--heme/onc Dr. Majano following     3. Coagulopathy.    - pt is not on anticoagulation-  coagulopathy sec to lymphoma -   - Heme/onc Dr. Hilario's group following.     4. DANIEL  - continue CPAP qhs  - pulm Dr. Moore followin     5. Dementia with behavioral disturbance.   - h/o dementia with behavioral disturbance during previous hospitalization and at home when develops high fevers cause delirium - placed on 1:1 intermittently through hosp stay  - emphasize re-orientation--would prefer to avoid sedating medications if possible as pt noted to be lethargic this morning.  - psych Dr. Owens consulted  - fall precautions.      6. CAD (coronary artery disease).   - s/p PCI, ASA on hold due to low PLT- resume after heme/onc clearance   - c/w lopressor  - c/w Lipitor  - cardio Dr. Ndiaye following.    7. Vitamin B12 deficiency.   - pt has not been taking replacement therapy at home  - continue cyanocobalamin supplementation.     8. Thoracic aortic aneurysm without rupture.  - 4.9 cm Ascending aortic aneurysm noted on CTA chest. Primary team had spoken with Dr. Donald from [facility]- as pt is asymptomatic (no CP, BP) and is less than 5.0 cm, pt can be seen as outpt. Pt to see Dr. Estrada Solo at [place] for followup as outpt.    9. VTE ppx  - given low platelet count, will not give heparin at this time.  - PT eval to encourage ambulation, had home PT previously   - pt's wife requesting continuance of home PT 3x/week, approval pending

## 2020-01-21 NOTE — BEHAVIORAL HEALTH ASSESSMENT NOTE - SUMMARY
Pt is a 74M domiciled with wife with a PMH of obesity, CAD, COPD, complete heart block, DVT, HTN, and HLD and PPH of depression, worsening memory loss/dementia. He presents with visual hallucinations. Pt is alert and oriented x 3. Reports he saw a man being "cuffed" and a man "swimming in water." He asked if people have to "swim through dirt" in order to visit him. 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 Reports occasional bad dreams when sleeping. Pt received a score of 18/30 on the Mayo Cognitive Assessment (MOCA). Admits to past alcohol use. Denies depression, anxiety, and suicidal ideation.    Impression Delirium vs dementia   Plan: Pt may receive prns of Ativan 0.5mg for anxiety and Haldol  0.5mg po/ IM prn agitation

## 2020-01-21 NOTE — PROGRESS NOTE ADULT - ASSESSMENT
[ASSESSMENT and  PLAN]  75 yo male with Classical Hodgkins Lymphoma (CD30+, CD15+, CD20 neg)  Stage II with GUERO below diaphragm only initially however now noted to have patchy lung findings and intra-thoracic lymphadenopathy concerning for rapid disease progression.    Weak unspecified coagulation factor inhibitor  s/p PICC and BM bx1/13/20 with no bleeding; Flow cytometry negative; s/p cycle 1 Day 1 of ABVD on 1/16/20; course complicated by dacarbazine reaction .     -CBC showing nadiring from chemotherapy, no acute intervention needed  -confusion appear due to sundowning  -social service arranging for VNS and PICC management/maintenance  -will see pt in office for continuation of care
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
## 2020-01-21 NOTE — BEHAVIORAL HEALTH ASSESSMENT NOTE - AXIS III
COPD, DANIEL, HLD, HTN, CAD s/p PCI, CHB s/p PPM, bioprosthetic AVR, recent diagnosis of Hodgkin's lymphoma

## 2020-01-21 NOTE — BEHAVIORAL HEALTH ASSESSMENT NOTE - NSBHCHARTREVIEWIMAGING_PSY_A_CORE FT
< from: CT Biopsy Bone Deep (01.13.20 @ 16:31) >    Impression:  1. Successful CT-guided bone marrow biopsy                        < end of copied text >

## 2020-01-21 NOTE — BEHAVIORAL HEALTH ASSESSMENT NOTE - HPI (INCLUDE ILLNESS QUALITY, SEVERITY, DURATION, TIMING, CONTEXT, MODIFYING FACTORS, ASSOCIATED SIGNS AND SYMPTOMS)
Patient seen, evaluated and chart reviewed. Patient is a 74 MWM, with no prior psychiatric hospitalizations or significant treatment history, with h/o COPD, DANIEL, HLD, HTN, CAD s/p PCI, CHB s/p PPM, bioprosthetic AVR, dementia, recent diagnosis of Hodgkin's lymphoma in Dec 2019 who presents with intermittent fevers and persistent productive cough since discharge from [facility] on Dec 20.. During [facility] hospitalization, pt was treated for PNA and was found to have Hodgkin's lymphoma. Since discharge, pt has been having intermittent fevers, chills (t max 102.2) for which his wife has been treating with Motrin and Tylenol. Pt denies chest pain, palpitations, abd pain, dysuria, urinary frequency or diarrhea. He has not been on abx since his last hospitalization. he was scheduled for BMBx today as outpt. His wife brought him into the ED because of high fever and periods of confusion which has happened previously and temprally associated with fever. He currently appears comfortable, and pleasantly confused. Wife is at bedside.

## 2020-01-21 NOTE — PROGRESS NOTE ADULT - PROBLEM SELECTOR PLAN 1
seen and examined - overnight events noted - agitated overnight -   did not use CPAP - on 1 to 1 obs - and managed PRN with HALDOL  heme follow up reviewed - next Chemo planned for 1/30 - planned for Home Dc and follow up with Dr. Majano - in the office  will follow up with PMD - Dr. Walker and Pulm - Dr. Chun in [place] office  cpap for reji  cough rx regimen prn -   oral and skin care  on room air - vs and HD and Sat reviewed  new Dx of Hodgkin's Lymphoma  COPD, REJI, Depression, HLD, HTN, CAD s/p PCI, CHB s/p PPM, bioprosthetic AVR, dementia.

## 2020-01-21 NOTE — BEHAVIORAL HEALTH ASSESSMENT NOTE - DESCRIPTION
COPD, DANIEL, Depression, HLD, HTN, CAD s/p PCI, CHB s/p PPM, bioprosthetic AVR, recent diagnosis of Hodgkin's lymphoma
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
## 2020-01-21 NOTE — PROGRESS NOTE ADULT - SUBJECTIVE AND OBJECTIVE BOX
All interim records and events noted.    awake but confused, wife present and reports status similar to yesterday and occurred during previous hospitalizations      MEDICATIONS  (STANDING):  atorvastatin 10 milliGRAM(s) Oral at bedtime  chlorhexidine 4% Liquid 1 Application(s) Topical daily  cyanocobalamin Injectable 1000 MICROGram(s) SubCutaneous daily  folic acid 1 milliGRAM(s) Oral daily  imipramine 25 milliGRAM(s) Oral daily  metoprolol tartrate 50 milliGRAM(s) Oral every 12 hours  nystatin    Suspension [id number] Unit(s) Oral four times a day  nystatin Powder 1 Application(s) Topical every 8 hours  pantoprazole    Tablet 40 milliGRAM(s) Oral before breakfast    MEDICATIONS  (PRN):  acetaminophen   Tablet .. 1000 milliGRAM(s) Oral every 6 hours PRN Temp greater or equal to 38C (100.4F), Mild Pain (1 - 3)  albuterol/ipratropium for Nebulization 3 milliLiter(s) Nebulizer every 6 hours PRN Shortness of Breath and/or Wheezing  benzonatate 100 milliGRAM(s) Oral every 8 hours PRN Cough  guaiFENesin   Syrup  (Sugar-Free) 200 milliGRAM(s) Oral every 6 hours PRN Cough  ondansetron Injectable 8 milliGRAM(s) IV Push every 8 hours PRN Nausea      Vital Signs Last 24 Hrs  T(C): 36.4 (21 Jan 2020 06:10), Max: 36.6 (20 Jan 2020 20:31)  T(F): 97.6 (21 Jan 2020 06:10), Max: 97.8 (20 Jan 2020 20:31)  HR: 87 (21 Jan 2020 06:10) (87 - 117)  BP: 129/76 (21 Jan 2020 06:10) (129/76 - 143/86)  BP(mean): --  RR: 19 (21 Jan 2020 06:10) (19 - 20)  SpO2: 98% (21 Jan 2020 06:10) (98% - 99%)    PHYSICAL EXAM  General: well developed  well nourished, in no acute distress  Head: atraumatic, normocephalic  ENT: sclera anicteric, buccal mucosa moist  Neck: supple, trachea midline  CV: S1 S2, regular rate and rhythm  Lungs: clear to auscultation, no wheezes/rhonchi  Abdomen: soft, nontender, bowel sounds present, pouchy  Extrem: no clubbing/cyanosis/edema  Skin: no significant increased ecchymosis/petechiae  Neuro: alert and oriented X3, no focal deficits      LABS:             7.9    1.92  )-----------( 33       ( 01-21 @ 08:26 )             25.2                9.1    3.70  )-----------( 38       ( 01-20 @ 08:31 )             28.5                9.3    2.75  )-----------( 35       ( 01-19 @ 07:03 )             29.0       01-21    139  |  104  |  18  ----------------------------<  94  4.3   |  28  |  0.93    Ca    7.9<L>      21 Jan 2020 08:26  Phos  4.5     01-21  Mg     2.3     01-21          RADIOLOGY & ADDITIONAL STUDIES:    IMPRESSION/RECOMMENDATIONS:

## 2020-01-21 NOTE — PROGRESS NOTE ADULT - ATTENDING COMMENTS
Pt. seen and evaluated for Hodgkin's Lymphoma.  Pt. agitated last night and given haldol.  This morning is in no distress.  Cooperative with wife in the room.  Denies having any new concerns.  Physical examination as above.      Plan:  -Fever 2/2 Hodgkin Lymphoma:  Flu/RSV negative.  Urine culture and blood cultures with no growth.  s/p bonemarrow biopsy and PICC line insertion.  s/p ABVD Chemotherapy on 1/16.  Heme/Onc f/u  -Pancytopenia:  no gross bleeding noted.  s/p 1 unit PRBC and 1 unit platelet transfusion.  Transfusions PRN. Continue folic acid 1mg PO daily and cyanocobalamin 1000mcg SQ daily      -Coagulapathy:  2/2 lymphoma.  s/p vitamin K 5mg PO once eralier in admission.   -CAD:  ASA held 2/2 thrombocytopenia.  Continue metoprolol 50mg PO Q12h and Lipitor 10mg PO QHS.  Cardiology f/u  -MILO:  resolved.  -Thoracic aortic aneurysm:  outpatient follow up with Dr. Solo at [facility]  -Dementia and delerium:  supportive care.  Psychiatry consult.   -VTE ppx: SCD

## 2020-01-21 NOTE — PROGRESS NOTE ADULT - REASON FOR ADMISSION
fever, persistent cough

## 2020-01-21 NOTE — BEHAVIORAL HEALTH ASSESSMENT NOTE - NSBHCHARTREVIEWINVESTIGATE_PSY_A_CORE FT
< from: 12 Lead ECG (01.08.20 @ 05:30) >    Ventricular Rate 89 BPM    Atrial Rate 89 BPM    P-R Interval 176 ms    QRS Duration 186 ms    Q-T Interval 492 ms    QTC Calculation(Bezet) 598 ms    P Axis 87 degrees    R Axis 107 degrees    T Axis -10 degrees    Diagnosis Line Sinus rhythm withpremature atrial complexes  A sensed and V paced  Confirmed by [employer] PLEITEZ, James ([id number]) on 1/8/2020 1:04:30 PM    < end of copied text >

## 2020-01-21 NOTE — PROGRESS NOTE ADULT - ASSESSMENT
74M with h/o COPD, DANIEL, Depression, HLD, HTN, CAD s/p PCI, CHB s/p PPM, bioprosthetic AVR, dementia, recent diagnosis of Hodgkin's lymphoma in Dec 2019 who presents high fevers and confusion and inpatient BM biopsy, no planning to start chemotherapy. S/p PICC line     CAD s/p PCI  - Hold ASA in setting of low platelets of 33- Resume when cleared by Heme  - Continue metoprolol 50mg q12.    - Continue lipitor 10mg    Bioprosthetic AVR  - No evidence of volume overload  - TTE done as aboe 1/10/2020   showing normal LVSF with grade 1 DD    Thrombocytopenia  - Heme following s/p Platelet infusion 1/18 and PRBC on 1/17  - Monitor for s/s bleeding  - Tolerated PICC and bone marrow bx   - CTA chest negative of PE but suspicious of lung mets    HLD  - Continue statin    AMS   - Likely from fevers and underlying metabolic derangements   - Now improving     Hodgkin's lymphoma  Care per Heme/Primary  Will start Chemo as o/p    Other cardiovascular workup will depend on clinical course  All other workup per primary team  Will follow     No objections from Cardiac POV for d/c.      Sheyla Guevara FNP-C  Cardiology NP  [id number] [phone]
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
## 2020-01-21 NOTE — PROGRESS NOTE ADULT - PROBLEM SELECTOR PROBLEM 1
Dementia with behavioral disturbance
Dementia with behavioral disturbance
Hodgkins lymphoma
Lymphoma
Lymphoma
Lymphoma, Hodgkin's

## 2020-01-27 NOTE — HISTORY OF PRESENT ILLNESS
[de-identified] : Pt is currently undergoing chemo  Feels tired  Has right arm Picc line   Could not place infusaport because of low platelet

## 2020-01-27 NOTE — PHYSICAL EXAM
[General Appearance - Well Developed] : well developed [Normal Conjunctiva] : the conjunctiva exhibited no abnormalities [III] : III [Neck Appearance] : the appearance of the neck was normal [Jugular Venous Distention Increased] : there was no jugular-venous distention [Heart Rate And Rhythm] : heart rate and rhythm were normal [Heart Sounds] : normal S1 and S2 [Respiration, Rhythm And Depth] : normal respiratory rhythm and effort [Auscultation Breath Sounds / Voice Sounds] : lungs were clear to auscultation bilaterally [Abdomen Soft] : soft [Abdomen Mass (___ Cm)] : no abdominal mass palpated [Gait - Sufficient For Exercise Testing] : the gait was sufficient for exercise testing [] : no rash [No Focal Deficits] : no focal deficits [General Appearance - In No Acute Distress] : no acute distress [Nail Clubbing] : no clubbing of the fingernails [Cyanosis, Localized] : no localized cyanosis [Skin Turgor] : normal skin turgor [FreeTextEntry2] : +1 SHWETA [FreeTextEntry1] : some memory loss, low affect

## 2020-01-27 NOTE — ASSESSMENT
[FreeTextEntry1] : 75 yo male with emphysema, DANIEL, multiple pulmonary nodules and newly diagnosed Hodgkin's lymphoma here for initial evaluation. \par \par #Pulmonary Nodules/GGO - multiple pulmonary nodules noted on CT chest - largest LLL is 12x10 cm (from 7 mm on 11/3/19) - could be 2/2 lymphoma vs less likely other malignancy. Patient is already scheduled for PET -CT. Will f/u results. Would favor repeating chest imaging 2-3 months after initiation of chemotherapy to evaluate. \par \par #COPD: remote hx of smoking. will need PFTs\par -- not on Incuse 2/2 cost - will try Ipratropium nebs for now\par -- PFTs\par \par #Hx of tracheostomy - no e/o tracheal stenosis on most recent CT chest\par \par #DANIEL: - using CPAP nightly, sx not well controlled\par -- sleep specialist referral provided\par \par #Lymphoma: newly diagnosed, chemo later this week\par \par #HCM: UTD with vaccinations. \par \par All questions answered. Patient and wife in agreement with plan. \par Follow up in 2-3 mo or sooner if needed.\par

## 2020-01-27 NOTE — CONSULT LETTER
[Dear  ___] : Dear  [unfilled], [Consult Letter:] : I had the pleasure of evaluating your patient, [unfilled]. [Please see my note below.] : Please see my note below. [Consult Closing:] : Thank you very much for allowing me to participate in the care of this patient.  If you have any questions, please do not hesitate to contact me. [FreeTextEntry3] : Sincerely,\par \par Lulu Chun MD\par [facility]\par Pulmonary Medicine\par

## 2020-01-27 NOTE — PHYSICAL EXAM
[No Acute Distress] : no acute distress [No JVD] : no jugular venous distention [No Lymphadenopathy] : no lymphadenopathy [Supple] : supple [No Respiratory Distress] : no respiratory distress  [No Accessory Muscle Use] : no accessory muscle use [Clear to Auscultation] : lungs were clear to auscultation bilaterally [Normal Rate] : normal rate  [Regular Rhythm] : with a regular rhythm [Soft] : abdomen soft [Non Tender] : non-tender [Non-distended] : non-distended [No Focal Deficits] : no focal deficits [de-identified] : 1+ melvina edema  [de-identified] : slow in response at times

## 2020-01-27 NOTE — HISTORY OF PRESENT ILLNESS
[Obstructive Sleep Apnea] : obstructive sleep apnea [Awakes Unrefreshed] : awakes unrefreshed [Awakes with Dry Mouth] : awakes with dry mouth [Daytime Somnolence] : daytime somnolence [FreeTextEntry1] : Mr. TAYLOR MCFADDEN is a 75 yo gentleman with recently diagnosed Hodgkins lymphoma and emphysema is here for follow up.\par \par Was hospitalized again at [place] -2020 with fevers. Was started on ABVD chemo. Scheduled for next round of chemo this Friday\par \par Was diagnosed with DANIEL in , uses CPAP every night but now reports dry mouth and not good sx relieve. Unable to comply w treatment well\par \par \par PMH: COPD; HTN, HLD, CAD, s/p AVR, s/p PPM, hx of tracheostomy  (now decannulated), tracheal stenosis. \par Meds: per chart\par All: No drug allergies\par SH: Former smoker, started at age 18, quit at age 38 (smoked about 1/2 ppd). Worked making pallets. \par FH: daughter has asthma; father had lung ca ( at 62, was hearvy smoker) \par PMD: JARON DELEON\par Heme/Onc: Dr Majano  - [phone]; fax [phone]\par Immunizations: had  and 23; had flu shot this season. \par

## 2020-01-27 NOTE — ADDENDUM
[FreeTextEntry1] : [facility] - patient is currently using Symbicort.\par I asked to d/c Symbicort. Will Start Stiolto and Albuterol prn\par If unable to get Stiolto - can use Ipratropium/Albeterol nebs q6 hrs

## 2020-01-27 NOTE — REVIEW OF SYSTEMS
[Fatigue] : fatigue [Recent Wt Loss (___ Lbs)] : ~T recent [unfilled] lb weight loss [Memory Loss] : memory loss [Confusion] : confusion [Negative] : Dermatologic [TextBox_110] : lymphoma [TextBox_119] : unsteady gait

## 2020-01-27 NOTE — PLAN
[FreeTextEntry1] : improve protein in diet \par chemo\par neuro follow up\par no diuretics needed for now

## 2020-02-05 NOTE — CONSULT NOTE ADULT - PROBLEM SELECTOR RECOMMENDATION 9
COPD, DANIEL, Depression, HLD, HTN, CAD s/p PCI, CHB s/p PPM, bioprosthetic AVR, dementia, recent diagnosis of classic Hodgkin's lymphoma in Dec 2019 who presents with SOB and altered mental status.   s/p BIPAP therapy - will assess off BIPAP  DANIEL - cont BIPAP as tolerated night time for DANIEL and prn resp distress  Lymphoma - stage IV - last chemo 1/31/20 - Onc eval noted -   COPD - Duoneb PRN - monitor vs and HD and Sat - will assess off BIPAP  Obesity - DANIEL -   Dementia - with episodic Delirium and Agitation - Ativan PRN low dose - IVP  HFpEF - HTN - hx of valv heart disease - old TTE reviewed - on LASIX - Cardio eval   prognosis guarded  LE doppler NEG, CXR shows old changes, CT head neg,   will follow  tele admit  GOC discussion  supportive medical regimen and care and assist with ADL
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
## 2020-02-05 NOTE — CONSULT NOTE ADULT - ATTENDING COMMENTS
The patient was personally seen and examined, in addition to being examined and evaluated by housestaff.  All elements of the note were edited where appropriate.    severe sob/hypoxia, requiring bipap  no clear left heart failure, though has mild edema  resp status seems out of proportion to any abnormality on cxr, labs  consider pe in the setting of resp insuff and malignancy
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
## 2020-02-05 NOTE — H&P ADULT - PROBLEM SELECTOR PLAN 8
Coagulopathy sec to lymphoma  - Patient is not on anticoagulation  - Heme/onc Dr. Majano consulted, recs appreciated Coagulopathy sec to lymphoma  - Heme/onc Dr. Majano consulted, recs appreciated

## 2020-02-05 NOTE — ED PROVIDER NOTE - OBJECTIVE STATEMENT
73 yo M HTN, HLD, s/p aortic valve replacement, CAD, COPD, DANIEL, dementia, Hodgkin's lymphoma on chemotherapy, pacemaker BIBEMS for hypoxia and altered mental status x this AM. As per wife, pt woke up at 4AM, not himself- was very agitated, confused and "fidgety"-- visiting nurse came to evaluation pt and recommended ED workup. Upon arrival to ED, Dr. Moore evaluated pt, placed him on BIPAP and ordered morphine 1 mg with improvement of symptoms.  PCP- Aaron  Hem/Onc- Majano
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
## 2020-02-05 NOTE — H&P ADULT - PROBLEM SELECTOR PLAN 6
Chronic, stable  - Supportive care CAD s/p PCI  - Continue ASA, metoprolol 50mg q12, lipitor 10mg  - TTE done as aboe 1/10/2020 showing normal LVSF with grade 1 DD  - Cardiology ([facility]) consulted, f/u recs CAD s/p PCI  - No longer taking ASA. Continue metoprolol 50mg q12, lipitor 10mg  - TTE done as aboe 1/10/2020 showing normal LVSF with grade 1 DD  - Cardiology ([facility]) consulted, f/u recs CAD s/p PCI  - No longer taking ASA. Continue metoprolol 50mg q12, lipitor 10mg  - TTE done as above 1/10/2020 showing normal LVSF with grade 1 DD  - Cardiology ([facility]) consulted, f/u recs

## 2020-02-05 NOTE — CONSULT NOTE ADULT - ASSESSMENT
74M with h/o COPD, DANIEL, Depression, HLD, HTN, CAD s/p PCI, CHB s/p PPM, bioprosthetic AVR, dementia, recent diagnosis of classic Hodgkin's lymphoma in Dec 2019 who presents with SOB and altered mental status.    SOB  -Likely 2/2     CAD s/p PCI  - Continue home aspirin  - Continue metoprolol 50mg q12.    - Continue lipitor 10mg    Bioprosthetic AVR  -TTE: 1/10/2020 shows normal LVSF with grade 1 DD    HLD  -Continue statin    Hodgkin's lymphoma  -Care per Heme/Primary    Other cardiovascular workup will depend on clinical course  All other workup per primary team  Will follow 74M with h/o COPD, DANIEL, Depression, HLD, HTN, CAD s/p PCI, CHB s/p PPM, bioprosthetic AVR, dementia, recent diagnosis of classic Hodgkin's lymphoma in Dec 2019 who presents with SOB and altered mental status. Cardio consulted for SOB    SOB  -Likely 2/2 infection vs malignancy vs CHF  -Unlikely CHF as echo in 1/2020 showed grade 1 diastolic dysfunction with LVEF WNL.  -Patient on BIPAP now, tolerating well  -CXR Prominent right hilus, appearance is unchanged since the prior exam of 1/31/2020. No lobar consolidation vascular congestion or effusion.  -Continue home lasix    CAD s/p PCI  - Continue home aspirin  - Continue metoprolol 50mg q12.    - Continue lipitor 10mg    Bioprosthetic AVR  -TTE: 1/10/2020 shows normal LVSF with grade 1 DD    HLD  -Continue statin    Hodgkin's lymphoma  -Care per Heme/Primary    Other cardiovascular workup will depend on clinical course  All other workup per primary team  Will follow 74M with h/o COPD, DANIEL, Depression, HLD, HTN, CAD s/p PCI, CHB s/p PPM, bioprosthetic AVR, dementia, recent diagnosis of classic Hodgkin's lymphoma in Dec 2019 who presents with SOB and altered mental status. Cardio consulted for SOB    SOB  -Likely 2/2 infection vs malignancy vs CHF  -Unlikely CHF as echo in 1/2020 showed grade 1 diastolic dysfunction with LVEF WNL.  -Patient on BIPAP now, tolerating well  -CXR Prominent right hilus, appearance is unchanged since the prior exam of 1/31/2020. No lobar consolidation vascular congestion or effusion.  -Continue home lasix  -Pulmonology consulted, f/u    CAD s/p PCI  - Continue home aspirin  - Continue metoprolol 50mg q12.    - Continue lipitor 10mg    CHB s/p PPM  -First placed in 2015  -New one placed in 2019 2/2 lead malfunction  -Unknown last interrogation    Bioprosthetic AVR  -TTE: 1/10/2020 shows normal LVSF with grade 1 DD    HLD  -Continue statin    Hodgkin's lymphoma  -Care per Heme/Primary    Other cardiovascular workup will depend on clinical course  All other workup per primary team  Will follow 74M with h/o COPD, DANIEL, Depression, HLD, HTN, CAD s/p PCI, CHB s/p PPM, bioprosthetic AVR, dementia, recent diagnosis of classic Hodgkin's lymphoma in Dec 2019 who presents with SOB and altered mental status. Cardio consulted for SOB    SOB  -Likely 2/2 infection vs malignancy vs CHF  -Unlikely CHF as echo in 1/2020 showed grade 1 diastolic dysfunction with LVEF WNL.  -Patient on BIPAP now, tolerating well  -CXR Prominent right hilus, appearance is unchanged since the prior exam of 1/31/2020. No lobar consolidation vascular congestion or effusion.  -Continue home lasix  -Pulmonology consulted, f/u    CAD s/p PCI  - Continue home aspirin  - Continue metoprolol 50mg q12.    - Continue lipitor 10mg    CHB s/p PPM  -First placed in 2015  -New one placed in 2019 2/2 lead malfunction  -Unknown last interrogation    Bioprosthetic AVR  -TTE: 1/10/2020 shows normal LVSF with grade 1 DD    HLD  -Continue statin    Hodgkin's lymphoma  -Stage IV with GUERO below diaphragm.  -BM bx with involvement of Bone marrow.   -Care per Heme/Primary    Other cardiovascular workup will depend on clinical course  All other workup per primary team  Will follow 74M with h/o COPD, DANIEL, Depression, HLD, HTN, CAD s/p PCI, CHB s/p PPM, bioprosthetic AVR, dementia, recent diagnosis of classic Hodgkin's lymphoma in Dec 2019 who presents with SOB and altered mental status. Cardio consulted for SOB.    SOB  -Likely 2/2 infection vs malignancy vs CHF  -Unlikely CHF as echo in 1/2020 showed grade 1 diastolic dysfunction with LVEF WNL  -CXR Prominent right hilus, appearance is unchanged since the prior exam of 1/31/2020. No lobar consolidation vascular congestion or effusion.  -No clear evidence of volume overload  -EKG Vpaced, no evidence of arrhythmia  -Patient on BIPAP now, tolerating well  -Continue home lasix for trace LE edema  -proBNP ordered, f/u  -Recommend CTA to R/O PE  -Pulmonology consulted, f/u    CAD s/p PCI  -Continue home aspirin  -Continue metoprolol 50mg q12.    -Continue lipitor 10mg    CHB s/p PPM  -First placed in 2015  -New one placed in 2019 2/2 lead malfunction  -EKG Vpaced at this time    Bioprosthetic AVR  -TTE: 1/10/2020 shows normal LVSF with grade 1 DD    HLD  -Continue statin    Hodgkin's lymphoma  -Stage IV with GUERO below diaphragm.  -BM bx with involvement of Bone marrow.   -Care per Heme/Primary    Other cardiovascular workup will depend on clinical course  All other workup per primary team  Will follow 74M with h/o COPD, DANIEL, Depression, HLD, HTN, CAD s/p PCI, CHB s/p PPM, bioprosthetic AVR, dementia, recent diagnosis of classic Hodgkin's lymphoma in Dec 2019 who presents with SOB and altered mental status. Cardio consulted for SOB.    SOB  -Likely 2/2 infection vs malignancy vs CHF  -Unlikely CHF as echo in 1/2020 showed grade 1 diastolic dysfunction with LVEF WNL  -CXR Prominent right hilus, appearance is unchanged since the prior exam of 1/31/2020. No lobar consolidation vascular congestion or effusion.  -No clear evidence of volume overload  -EKG Vpaced, no evidence of arrhythmia  -Trop negative x1, will continue to trend  -Patient on BIPAP now, tolerating well  -Continue home lasix for trace LE edema  -proBNP ordered, f/u  -Recommend CTA to R/O PE in setting of dyspnea and malignancy  -Pulmonology consulted, f/u    CAD s/p PCI  -Continue home aspirin  -Continue metoprolol 50mg q12.    -Continue lipitor 10mg    CHB s/p PPM  -First placed in 2015  -New one placed in 2019 2/2 lead malfunction  -EKG Vpaced at this time    Bioprosthetic AVR  -TTE: 1/10/2020 shows normal LVSF with grade 1 DD    HLD  -Continue statin    Hodgkin's lymphoma  -Stage IV with GUERO below diaphragm.  -BM bx with involvement of Bone marrow.   -Care per Heme/Primary    Other cardiovascular workup will depend on clinical course  All other workup per primary team  Will follow 74M with h/o COPD, DANIEL, Depression, HLD, HTN, CAD s/p PCI, CHB s/p PPM, bioprosthetic AVR, dementia, recent diagnosis of classic Hodgkin's lymphoma in Dec 2019 who presents with SOB and altered mental status. Cardio consulted for SOB.    SOB  -Likely 2/2 infection vs malignancy vs CHF  -Unlikely CHF as echo in 1/2020 showed grade 1 diastolic dysfunction with LVEF WNL  -CXR Prominent right hilus, appearance is unchanged since the prior exam of 1/31/2020. No lobar consolidation vascular congestion or effusion.  -No clear evidence of volume overload  -EKG Vpaced, no evidence of arrhythmia  -Trop negative x1, will continue to trend  -Patient on BIPAP now, tolerating well  -Continue home lasix for trace LE edema  -proBNP ordered, f/u  -Recommend CTA to R/O PE in setting of dyspnea and malignancy  -Pulmonology consulted, f/u    CAD s/p PCI  -Continue home aspirin  -Continue metoprolol 50mg q12.    -Continue lipitor 10mg    CHB s/p PPM  -First placed in 2015  -New one placed in 2019 2/2 lead malfunction  -EKG Vpaced at this time    Bioprosthetic AVR  -TTE: 1/10/2020 shows normal LVSF with grade 1 DD    HLD  -Continue statin    Hodgkin's lymphoma  -Stage IV with GUERO below diaphragm.  -BM bx with involvement of Bone marrow.   -Would hold chemotx until SOB's etiology is found and resolved  -Care per Heme/Primary    Other cardiovascular workup will depend on clinical course  All other workup per primary team  Will follow 74M with h/o COPD, DANIEL, Depression, HLD, HTN, CAD s/p PCI, CHB s/p PPM, bioprosthetic AVR, dementia, recent diagnosis of classic Hodgkin's lymphoma in Dec 2019 who presents with SOB and altered mental status. Cardio consulted for SOB.    SOB  -unclear etiol of dyspnea, but has been hypoxic and with resp distress req bipap  - diff dx includes infection vs malignancy vs CHF  -overall seems unlikely CHF as echo in 1/2020 showed grade 1 diastolic dysfunction with LVEF WNL, and there is no clear hf on cxr.  He has had some edema recently for which lasix was prescribed yesterday   - bnp level only mildly elevated  -would not diurese aggressively    -EKG Vpaced, no evidence of arrhythmia  -monitor on tele    -no acute ischemia  -Trop negative x1, will continue to trend    -Patient on BIPAP now, tolerating well  -Continue home lasix for trace LE edema   -Recommend CTA to R/O PE in setting of dyspnea, clear lungs and malignancy  -Pulmonology consulted, f/u    CAD s/p PCI  -Continue home aspirin  -Continue metoprolol 50mg q12.    -Continue lipitor 10mg    CHB s/p PPM  -First placed in 2015  -New one placed in 2019 2/2 lead malfunction  -EKG Vpaced at this time    Bioprosthetic AVR  -TTE: 1/10/2020 shows normal LVSF with grade 1 DD    HLD  -Continue statin    Hodgkin's lymphoma  -Stage IV with GUERO below diaphragm.  -BM bx with involvement of Bone marrow.   -Would hold chemotx until SOB's etiology is found and resolved  -Care per Heme/Primary    Other cardiovascular workup will depend on clinical course  All other workup per primary team  Will follow

## 2020-02-05 NOTE — H&P ADULT - HISTORY OF PRESENT ILLNESS
74M with h/o COPD, DANIEL, Depression, HLD, HTN, CAD s/p PCI, CHB s/p PPM, bioprosthetic AVR, dementia, recent diagnosis of Hodgkin's lymphoma in Dec 2019 who presents with --     In the ED:  Vitals:  Labs:   Given 74M with h/o COPD, DANIEL, Depression, HLD, HTN, CAD s/p PCI, CHB s/p PPM, bioprosthetic AVR, dementia, recent diagnosis of Hodgkin's lymphoma in Dec 2019 who presents with --     In the ED:  Vitals:  /96 on BIPAP  Labs:   Given 74M with h/o COPD, DANIEL, Depression, HLD, HTN, CAD s/p PCI, CHB s/p PPM placed in 2015, bioprosthetic AVR, dementia, recent diagnosis of Hodgkin's lymphoma in Dec 2019 who presents with confusion. History obtained from wife Patricia. Patient woke up this morning and had oatmeal and a cookie for breakfast at 4 AM. Patient's wife states that patient had sudden difficulty speaking and couldn't sit still. He asked for ice cream repeatedly. Patient's wife report she had an anxiety attack because she had never experienced an episode like this before. Patient's nurse arrived at 10 AM this morning and patient would not allow nurse to take his temperature. Wife decided to seek further evaluation at [facility]. Patient is restless in bed on examination. Of note, patient with recent admission to [facility] in January 2020 for fevers 2/2 Hodgkin's lymphoma. Denies fever, chills, chest pain, abdominal pain, dizziness, lightheadedness, nausea, or vomiting.      In the ED:  Vitals: , /96, on BIPAP  Labs: RBC 3.60, H/H 9.8/30.4, MCV 68.3, MCH 22.0, RCDW 20.8, Lymphocyte 0.87, Granulocyte 5.8%, PT/INR 14/1.24, PTT 25.3, CO2 21, Albumin 3.2, Alk Phos 186, CPK 3.9  UA negative  CT head: unremarkable  CXR Prominent right hilus, appearance is unchanged since the prior exam of 1/31/2020. No lobar consolidation vascular congestion or effusion.  Doppler LE b/l: no DVT  EKG: NSR, ventricular paced rhythm

## 2020-02-05 NOTE — H&P ADULT - PROBLEM SELECTOR PLAN 2
Patient was diagnosed in Dec 2019. Sees Dr. Majano as outpt (heme/onc)  - remains afebrile-- f/u procal, blood cx, urine cx  - s/p ir guided bone marrow biopsy and picc line for chemo 1/13--tolerated procedure well.  - s/p 1st dose ABVD chemotherapy on 1/16/2020--tolerated well Patient was diagnosed with Classic Hodgkin's Lymphoma Dec 2019. CD30+, CD15+, CD20 neg. Sees Dr. Majano as outpt (heme/onc)  - remains afebrile-- f/u procal, blood cx, urine cx  - s/p ir guided bone marrow biopsy and picc line for chemo 1/13--tolerated procedure well.  - Started chemo with ABVD 01/16/20 Thurs. Had rxn when 3/4 way trough administration of dacarbazine. Last chemo on Fri 1/31/20  - Heme/onc Dr. Majano consulted, recs appreciated Patient was diagnosed with Classic Hodgkin's Lymphoma Dec 2019. CD30+, CD15+, CD20 neg. Sees Dr. Majano as outpt (heme/onc)  - F/u blood cx, urine cx  - s/p ir guided bone marrow biopsy and picc line for chemo 1/13--tolerated procedure well.  - Started chemo with ABVD 01/16/20 Thurs. Had rxn when 3/4 way trough administration of dacarbazine. Last chemo on Fri 1/31/20  - Heme/onc Dr. Majano consulted, recs appreciated
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
## 2020-02-05 NOTE — ED ADULT NURSE NOTE - PSH
Artificial pacemaker  for complete heart block  Cardiac pacemaker    S/P aortic valve replacement with porcine valve    S/P AVR (aortic valve replacement)

## 2020-02-05 NOTE — ED ADULT NURSE NOTE - OBJECTIVE STATEMENT
patient comes to patient comes to ED via ambulance to ED 5 with wife in attendance who reports patient awoke at 4am hungry states she gave pt his breakfast and then became incoherent patient presents in ED agitated disoriented with hx of dementia in resp distress Dr. Moore @ bedside meds and BiPap ordered and applied by RT

## 2020-02-05 NOTE — H&P ADULT - NSHPPHYSICALEXAM_GEN_ALL_CORE
T(C): --  HR: 85 (02-05-20 @ 14:01) (82 - 106)  BP: 128/67 (02-05-20 @ 13:00) (128/67 - 166/96)  RR: --  SpO2: 100% (02-05-20 @ 14:01) (100% - 100%)    GENERAL: patient appears well, no acute distress, appropriate, pleasant  EYES: sclera clear, no exudates  ENMT: oropharynx clear without erythema, no exudates, moist mucous membranes  NECK: supple, soft, no thyromegaly noted  LUNGS: good air entry bilaterally, clear to auscultation, symmetric breath sounds, no wheezing or rhonchi appreciated  HEART: soft S1/S2, regular rate and rhythm, no murmurs noted, no lower extremity edema  GASTROINTESTINAL: abdomen is soft, nontender, nondistended, normoactive bowel sounds, no palpable masses  INTEGUMENT: good skin turgor, no lesions noted  MUSCULOSKELETAL: no clubbing or cyanosis, no obvious deformity  NEUROLOGIC: awake, alert, oriented x3, good muscle tone in 4 extremities, no obvious sensory deficits  PSYCHIATRIC: mood is good, affect is congruent, linear and logical thought process  HEME/LYMPH: no palpable supraclavicular nodules, no obvious ecchymosis or petechiae T(C): --  HR: 85 (02-05-20 @ 14:01) (82 - 106)  BP: 128/67 (02-05-20 @ 13:00) (128/67 - 166/96)  RR: --  SpO2: 100% (02-05-20 @ 14:01) (100% - 100%)    GENERAL: patient appears well, no acute distress, appropriate, pleasant  EYES: sclera clear, no exudates  ENMT: oropharynx clear without erythema, no exudates, moist mucous membranes  NECK: supple, soft, no thyromegaly noted  LUNGS: good air entry bilaterally, clear to auscultation, symmetric breath sounds, no wheezing or rhonchi appreciated  HEART: soft S1/S2, regular rate and rhythm, systolic murmurs noted, +1-2 lower extremity edema  GASTROINTESTINAL: abdomen is soft, nontender, nondistended, normoactive bowel sounds, no palpable masses  INTEGUMENT: good skin turgor, venous stasis changes  MUSCULOSKELETAL: no clubbing or cyanosis, no obvious deformity  NEUROLOGIC: awake, alert, oriented x3, good muscle tone in 4 extremities, no obvious sensory deficits  PSYCHIATRIC: mood is good, affect is congruent, linear and logical thought process  HEME/LYMPH: no palpable supraclavicular nodules, no obvious ecchymosis or petechiae T(C): --  HR: 85 (02-05-20 @ 14:01) (82 - 106)  BP: 128/67 (02-05-20 @ 13:00) (128/67 - 166/96)  RR: --  SpO2: 100% (02-05-20 @ 14:01) (100% - 100%)    GENERAL: patient appears well, no acute distress, appropriate, pleasant  EYES: sclera clear, no exudates  ENMT: oropharynx clear without erythema, no exudates, moist mucous membranes  NECK: supple, soft, no thyromegaly noted  LUNGS: good air entry bilaterally, clear to auscultation, symmetric breath sounds, no wheezing or rhonchi appreciated  HEART: soft S1/S2, regular rate and rhythm, systolic murmurs noted, +1-2 lower extremity edema  GASTROINTESTINAL: abdomen is soft, nontender, nondistended, normoactive bowel sounds, no palpable masses  INTEGUMENT: good skin turgor, venous stasis changes  MUSCULOSKELETAL: no clubbing or cyanosis, no obvious deformity  NEUROLOGIC: awake, alert, oriented x2 (person and place), good muscle tone in 4 extremities, no obvious sensory deficits  PSYCHIATRIC: mood is good, affect is congruent, linear and logical thought process  HEME/LYMPH: no palpable supraclavicular nodules, no obvious ecchymosis or petechiae T(C): --  HR: 85 (02-05-20 @ 14:01) (82 - 106)  BP: 128/67 (02-05-20 @ 13:00) (128/67 - 166/96)  RR: --  SpO2: 100% (02-05-20 @ 14:01) (100% - 100%)    GENERAL: patient appears well, moderate respiratory distress, appropriate, pleasant  EYES: sclera clear, no exudates  ENMT: oropharynx clear without erythema, no exudates, moist mucous membranes  NECK: supple, soft, no thyromegaly noted  LUNGS: good air entry bilaterally, clear to auscultation, symmetric breath sounds, no wheezing or rhonchi appreciated  HEART: soft S1/S2, regular rate and rhythm, systolic murmurs noted, +1-2 lower extremity edema  GASTROINTESTINAL: abdomen is soft, nontender, nondistended, normoactive bowel sounds, no palpable masses  INTEGUMENT: good skin turgor, venous stasis changes  MUSCULOSKELETAL: no clubbing or cyanosis, no obvious deformity  NEUROLOGIC: awake, alert, oriented x2 (person and place), good muscle tone in 4 extremities, no obvious sensory deficits  PSYCHIATRIC: mood is good, affect is congruent, linear and logical thought process  HEME/LYMPH: no palpable supraclavicular nodules, no obvious ecchymosis or petechiae

## 2020-02-05 NOTE — H&P ADULT - PROBLEM SELECTOR PLAN 3
- No overt signs of bleeding noted   - Continue folic acid 1mg PO daily and cyanocobalamin 1000mcg SQ daily      - Monitor daily CBCs  - Heme/onc Dr. Majano following - No overt signs of bleeding noted   - Continue folic acid 1mg PO daily and cyanocobalamin 1000mcg SQ daily      - Monitor daily CBCs  - Heme/onc Dr. Majano consulted, recs appreciated - No overt signs of bleeding noted   - Continue folic acid 1mg PO daily and cyanocobalamin 1000mcg SQ daily      - Monitor daily CBCs  - Heme/onc Dr. Majano consulted, recs appreciated  -has thalassemia trait and is on folic acid H/o thalassemia trait and Hodgkin's lymphoma  - No overt signs of bleeding noted   - Continue folic acid 1mg PO daily and thiamine PO daily  - Monitor daily CBCs  - Heme/onc Dr. Majano consulted, recs appreciated

## 2020-02-05 NOTE — H&P ADULT - PROBLEM SELECTOR PLAN 7
H/o dementia with behavioral disturbance during previous hospitalization and at home when develops high fevers cause delirium  - Fall precautions H/o dementia with behavioral disturbance during previous hospitalization and at home  - Fall precautions  - Constant observation 1:1

## 2020-02-05 NOTE — ED PROVIDER NOTE - ATTENDING CONTRIBUTION TO CARE
75 y/o M brought in by ambulance from home with hx of dementia and Hodgkin lymphoma with AMS and hypoxia.  When pt arrived Dr. Moore who is familiar with the patients states pt presented with similar symptoms 2 weeks ago and ordered BIPAP and morphine with improvement of symptoms.  Wife states pt woke up in the morning agitated and not himself.  Visiting nurse observed pt this morning and recommended ER evaluations.  labs, BIPAP, admit.

## 2020-02-05 NOTE — H&P ADULT - NSICDXPASTSURGICALHX_GEN_ALL_CORE_FT
PAST SURGICAL HISTORY:  Artificial pacemaker for complete heart block    Cardiac pacemaker     S/P aortic valve replacement with porcine valve     S/P AVR (aortic valve replacement)

## 2020-02-05 NOTE — ED PROVIDER NOTE - CADM POA URETHRAL CATHETER
Patient attended Phase 2 Cardiac Rehab Exercise Session. Further documentation will be completed in Cardiac Science/[employer] System and will be scanned into the medical record upon discharge.\    
No
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
## 2020-02-05 NOTE — ED PROVIDER NOTE - CLINICAL SUMMARY MEDICAL DECISION MAKING FREE TEXT BOX
73 yo M p/w episode of AMS/agitation and hypoxia x this AM---> improved on BIPAP-- check labs, EKG, CT brain, CXR, UA, consult pulm/hem-onc, [facility]

## 2020-02-05 NOTE — CONSULT NOTE ADULT - PROBLEM/RECOMMENDATION-3
Encephalopathy   WHAT YOU NEED TO KNOW:   Encephalopathy is a term used to describe brain disease or brain damage  It usually develops because of a health condition such as cirrhosis, or a brain injury  Symptoms may be mild or severe, and may be short-term or permanent  WHILE YOU ARE HERE:   Informed consent  is a legal document that explains the tests, treatments, or procedures that you may need  Informed consent means you understand what will be done and can make decisions about what you want  You give your permission when you sign the consent form  You can have someone sign this form for you if you are not able to sign it  You have the right to understand your medical care in words you know  Before you sign the consent form, understand the risks and benefits of what will be done  Make sure all your questions are answered  Medicines:   · Blood pressure medicine  may be given to raise or lower your blood pressure  · Antibiotics  help treat an infection caused by bacteria  · Medicine  may be given to increase or decrease the amount of glucose (sugar) in your blood  You may also need medicine to correct your potassium, sodium, or calcium level  · A vitamin B supplement  may be given if the level in your blood is too low  Tests:   · Blood or urine tests  may be used to check for an infection or a chemical, such as lead  Your glucose, sodium, and vitamin B1 levels may also be tested  Your blood may be tested for alcohol, drugs, or medicines  Blood tests may also be used to check your liver or kidney function  · CT or MRI pictures  may show swelling, an injury, or an infection in your brain  · An EEG  may be used to check how your brain is working  Small pads or metal discs are put on your head  Each has a wire that is hooked to a machine  This machine prints a paper tracing of brain wave activity from different parts of your brain       · A neurologic exam  can show how well your brain works after an injury or illness  Your healthcare provider will check how your pupils react to light  He may check your memory and how easily you wake up  Your hand grasp and balance may also be tested  · A lumbar puncture,  or spinal tap, may be used to check for an infection  Your healthcare provider will take a sample of spinal fluid to be tested  Treatment:   · A ventilator  is a machine that gives you oxygen and breathes for you when you cannot breathe well on your own  An endotracheal (ET) tube is put into your mouth or nose and attached to the ventilator  You may need a trach if an ET tube cannot be placed  A trach is a tube put through an incision and into your windpipe  · Liquids  may be given through your IV to increase your blood pressure  Liquids may also be given to correct your potassium, sodium, or calcium level  · Artificial liver support  may be needed  A machine is used to clean your blood when your liver cannot  Chemicals and waste products are removed from your blood by a filtering machine  Your blood is passed through a filter and then returned to your body  · Surgery  to correct blood flow to the liver, or a liver transplant may be needed  RISKS:   Even with treatment, you may have permanent damage to your brain  This may cause problems in your mood, behavior, and the way you think  Severe brain damage could lead to a coma and may be life-threatening  CARE AGREEMENT:   You have the right to help plan your care  Learn about your health condition and how it may be treated  Discuss treatment options with your caregivers to decide what care you want to receive  You always have the right to refuse treatment  © 2017 [facility] Information is for End User's use only and may not be sold, redistributed or otherwise used for commercial purposes  All illustrations and images included in CareNotes® are the copyrighted property of A D A M , Inc  or Prabhu Serrano    The above information is an  only  It is not intended as medical advice for individual conditions or treatments  Talk to your doctor, nurse or pharmacist before following any medical regimen to see if it is safe and effective for you  Epilepsy   AMBULATORY CARE:   Epilepsy  is a brain disorder that causes seizures  It is also called a seizure disorder  A seizure means an abnormal area in your brain sometimes sends bursts of electrical activity  A seizure may start in one part of your brain, or both sides may be affected  Depending on the type of seizure, you may have movements you cannot control, lose consciousness, or stare straight ahead  You may be confused or tired after the seizure  A seizure may last a few seconds or longer than 5 minutes  Epilepsy is usually diagnosed if you have at least 2 seizures within 24 hours  A birth defect, tumor, stroke, dementia, injury, or infection may cause epilepsy  The cause of your epilepsy may not be known  If your seizures are not controlled, epilepsy may become life-threatening  Call 911 for any of the following:   · Your seizure lasts longer than 5 minutes  · You have trouble breathing after a seizure  · You have diabetes or are pregnant and have a seizure  · You have a seizure in water, such as a swimming pool or bathtub  Seek care immediately if:   · You have a second seizure within 24 hours of the first      · You are injured during a seizure  Contact your healthcare provider if:   · You feel you are not able to cope with your condition  · Your seizures start to happen more often  · You are confused longer than usual after a seizure  · You are planning to get pregnant or are currently pregnant  · You have questions or concerns about your condition or care  Treatment for epilepsy:  The goal of treatment is to try to stop your seizures completely   You may need any of the following:  · Antiseizure  medicine may be given to shorten or prevent seizures  Do not stop taking this medicine  unless your healthcare provider tells you to  · Surgery  may help reduce how often you have seizures if medicine does not help  Ask your healthcare provider for more information about surgery for epilepsy  What you can do to prevent a seizure: You may not be able to prevent every seizure  The following can help you manage triggers that may make a seizure start:  · Take your medicine every day at the same time  This will also help prevent medicine side effects  Set an alarm to help remind you to take your medicine every day  · Manage stress  Stress can be a trigger for epilepsy  Exercise can help you reduce stress  Talk to your healthcare provider about exercise that is safe for you  Illness can be a form of stress  Eat a variety of healthy foods and drink plenty of liquids during an illness  Talk to your healthcare provider about other ways to manage stress  · Set a regular sleep schedule  A lack of sleep can trigger a seizure  Try to go to sleep and wake up at the same time every day  Keep your bedroom quiet and dark  Talk to your healthcare provider if you are having trouble sleeping  · Limit or do not drink alcohol as directed  Alcohol can trigger a seizure, especially if you drink a large amount at one time  A drink of alcohol is 12 ounces of beer, 1½ ounces of liquor, or 5 ounces of wine  Talk to your healthcare provider about a safe amount of alcohol for you  Your provider may recommend that you do not drink any alcohol  Tell him or her if you need help to quit drinking  What you can do to manage epilepsy:   · Keep a seizure diary  This can help you find your triggers and avoid them  Write down the dates of your seizures, where you were, and what you were doing  Include how you felt before and after  Possible triggers include illness, lack of sleep, hormonal changes, alcohol, drugs, lights, or stress       · Record any auras you have before a seizure  An aura is a sign that you are about to have a seizure  Auras happen before certain types of seizures that are in only 1 part of the brain  The aura may happen seconds before a seizure, or up to an hour before  You may feel, see, hear, or smell something  Examples include part of your body becoming hot  You may see a flash of light or hear something  You may have anxiety or déjà vu  If you have an aura, include it in your seizure diary  · Create a care plan  Tell family, friends, and coworkers about your epilepsy  Give them instructions that tell them how they can keep you safe if you have a seizure  · Find support  You may be referred to a psychologist or   Ask your healthcare provider about support groups for people with epilepsy  · Ask what safety precautions you should take  Talk with your healthcare provider about driving  You may not be able to drive until you are seizure-free for a period of time  You will need to check the law where you live  Also talk to your healthcare provider about swimming and bathing  You may drown or develop life-threatening heart or lung damage if you have a seizure in water  · Carry medical alert identification  Wear medical alert jewelry or carry a card that says you have epilepsy  Ask your healthcare provider where to get these items  How others can keep you safe during a seizure:  Give the following instructions to family, friends, and coworkers:  · Do not panic  · Do not hold me down or put anything in my mouth  · Gently guide me to the floor or a soft surface  · Place me on my side to help prevent me from swallowing saliva or vomit  · Protect me from injury  Remove sharp or hard objects from the area surrounding me, or cushion my head  · Loosen the clothing around my head and neck  · Time how long my seizure lasts   Call 911 if my seizure lasts longer than 5 minutes or if I have a second seizure  · Stay with me until my seizure ends  Let me rest until I am fully awake  · Perform CPR if I stop breathing or you cannot feel my pulse  · Do not give me anything to eat or drink until I am fully awake  Follow up with your neurologist as directed: You may need tests to check the level of antiseizure medicine in your blood  Your neurologist may need to change or adjust your medicine  Write down your questions so you remember to ask them during your visits  © 2017 [employer] [employer] Information is for End User's use only and may not be sold, redistributed or otherwise used for commercial purposes  All illustrations and images included in CareNotes® are the copyrighted property of A D A M , Inc  or Prabhu Serrano  The above information is an  only  It is not intended as medical advice for individual conditions or treatments  Talk to your doctor, nurse or pharmacist before following any medical regimen to see if it is safe and effective for you  DISPLAY PLAN FREE TEXT

## 2020-02-05 NOTE — H&P ADULT - PROBLEM SELECTOR PLAN 1
Patient presents with dyspnea and AMS  - Likely from underlying metabolic derangements  - Check LE duplex  - On BIPAP  - Nebs, steroids per pulm.  - Avoid high FiO2 as pt on Belomycin based chemo  - Pulmonary ([facility]) consulted, f/u recs Patient presents with acute dyspnea and AMS  - Likely from underlying metabolic derangements  - Check LE duplex  - On BIPAP, tolerating well   - Avoid high FiO2 as pt on Belomycin based chemo  - CXR Prominent right hilus, appearance is unchanged since the prior exam of 1/31/2020. No lobar consolidation vascular congestion or effusion.  - Pulmonary ([facility]) consulted, f/u recs Patient presents with acute dyspnea and AMS  - Likely from underlying metabolic derangements  - Dopplers LE b/l: no evidence of DVT  - Check CTA to r/o PE  - On BIPAP, tolerating well   - Avoid high FiO2 as pt on Belomycin based chemo  - CXR Prominent right hilus, appearance is unchanged since the prior exam of 1/31/2020. No lobar consolidation vascular congestion or effusion.  - Pulmonary ([facility]) consulted, f/u recs

## 2020-02-05 NOTE — H&P ADULT - PROBLEM SELECTOR PLAN 10
IMPROVE VTE Individual Risk Assessment          RISK                                                          Points  [  ] Previous VTE                                                3  [ x ] Thrombophilia                                             2  [  ] Lower limb paralysis                                   2        (unable to hold up >15 seconds)    [x  ] Current Cancer                                             2         (within 6 months)  [  ] Immobilization > 24 hrs                              1  [  ] ICU/CCU stay > 24 hours                             1  [ x ] Age > 60                                                         1    IMPROVE VTE Score:         [    5     ]    DVT Prophylaxis: SQ heparin IMPROVE VTE Individual Risk Assessment          RISK                                                          Points  [  ] Previous VTE                                                3  [ x ] Thrombophilia                                             2  [  ] Lower limb paralysis                                   2        (unable to hold up >15 seconds)    [x  ] Current Cancer                                             2         (within 6 months)  [  ] Immobilization > 24 hrs                              1  [  ] ICU/CCU stay > 24 hours                             1  [ x ] Age > 60                                                         1    IMPROVE VTE Score:         [    5     ]    DVT Prophylaxis: SQ heparin    Peripheral edema: Continue Lasix 40 mg PO qd

## 2020-02-05 NOTE — H&P ADULT - PROBLEM SELECTOR PLAN 5
4.9 cm Ascending aortic aneurysm noted on CTA chest. Pt is asymptomatic (no CP, BP stable) and is less than 5.0 cm, pt can be seen as outpt. Pt to see Dr. Estrada Solo at [place] for followup as outpt.  - Cardiology ([facility]) consulted, f/u recs 4.9 cm Ascending aortic aneurysm noted on CTA chest on1/8/20. Pt is asymptomatic (no CP, BP stable) and is less than 5.0 cm, pt will follow up outpatient  - Cardiology ([facility]) consulted, f/u recs

## 2020-02-05 NOTE — H&P ADULT - ATTENDING COMMENTS
Agree with the above.   Pt seen and examined at bedside.   HPI as above.     T(C): --  HR: 85 (02-05-20 @ 14:01) (82 - 106)  BP: 128/67 (02-05-20 @ 13:00) (128/67 - 166/96)  RR: --  SpO2: 100% (02-05-20 @ 14:01) (100% - 100%)  Wt(kg): --    Physical Exam:   GENERAL: well-groomed, well-developed, NAD  HEENT: head NC/AT; EOM intact, PERRLA, conjunctiva & sclera clear; hearing grossly intact, moist mucous membranes  NECK: supple, no JVD  RESPIRATORY: CTA B/L, no wheezing, rales, rhonchi or rubs  CARDIOVASCULAR: S1&S2, RRR, no murmurs or gallops  ABDOMEN: soft, non-tender, non-distended, + Bowel sounds x4 quadrants, no guarding, rebound or rigidity  MUSCULOSKELETAL:  no clubbing, cyanosis , b/l LE pitting edema  LYMPH: no cervical lymphadenopathy  VASCULAR: Radial pulses 2+ bilaterally, no varicose veins   SKIN: warm and dry, color normal  NEUROLOGIC: AA&O X2 (person/place), CN2-12 intact w/ no focal deficits, no sensory loss, motor Strength 5/5 in UE & LE B/L  Psych: Normal mood and affect, normal behavior        Acute encephalopathy: improved as reported by wife. Baseline mental status is A and O x2. Will repeat ABG now given that he has been on BiPAP. CT head reviewed. No focal deficits on exam.     Pancytopenia: likely due to his Hodgins lymphoma treatment. Also has thalasemia trait.     Remainder as above.

## 2020-02-05 NOTE — CONSULT NOTE ADULT - ASSESSMENT
[ASSESSMENT and  PLAN]  Classical Hodgkin's Lymphoma.   CD30+, CD15+, CD20 neg.   Stage IV with GUERO below diaphragm.  BM bx with involvement of Bone marrow.   Started chemo with ABVD 01/16/20 Thurs.   Had rxn when 3/4 way trough administration of dacarbazine.   Last chemo on Fri 01/31/20  Today C1D20    Admitted with acute dyspena this AM.   Breathing better now on BIPAP, with no supplemental O2, sat 100%.       hx Lung nodules of unclear etiology. Patchy GGO prior. ? lung involvement vs infectious causes.     Beta thal trait. Baseline chronic anemia.     Prior weak factor inhibitor.   Hx mild coagulopathy with INR 1.4 - 1.8. Elevated PTT also.   Workup last admission with weak factor inhibitor.   Since Normalization of PT and PTT with first cycle of chemo.     Thrombocytopenia, due to lymphoma.   Since improved after C1 chemo.     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   Better since on chemo.     Anemia due to lymphoma and chemo.   Stable. Imrpoved with chemotx.       Cardiac comorbidities  AVR, CAD, HTN, PPM for arrthymia.   ? Liver disease. CT imaging prior unrevealing.       RECOMMENDATIONS:  Check LE duplex.   Had negative CAT 01/08/20.     Nebs, steroids per pulm.  Avoid high FiO2 as pt on Belomycin based chemo    Cardiac eval for, plan for optimization in preparation for chemo with ABVD.   Was to have PFTs with pulm, as usual chemo included Bleomycin. .     DVT Prophylaxis: LMWH or SQ heparin    Repeat coag panel in AM    Continue Folic acid daily for thal trait.     Cardiac eval for acute dyspena, given co-morbidities.       Thank you for consulting us.   I have discussed the above plan of care with patient in detail. They expressed understanding of the treatment plan . Risks, benefits and alternatives discussed in detail. I have asked if they have any questions or concerns and appropriately addressed them; all questions answered to their satisfactions and in lay terms.

## 2020-02-05 NOTE — H&P ADULT - ASSESSMENT
74M with h/o COPD, DANIEL, Depression, HLD, HTN, CAD s/p PCI, CHB s/p PPM placed in 2015, bioprosthetic AVR, dementia, recent diagnosis of Hodgkin's lymphoma in Dec 2019 who presents with confusion admitted for acute respiratory failure.

## 2020-02-05 NOTE — H&P ADULT - PROBLEM SELECTOR PLAN 4
Chronic, stable  - Continue home Metoprolol 50 mg PO q12h  - Monitor routine hemodynamics Chronic, stable  - Continue home Metoprolol 50 mg PO q12h  - Monitor routine hemodynamics  - Cardiology ([facility]) consulted, f/u recs

## 2020-02-06 NOTE — PROGRESS NOTE ADULT - PROBLEM SELECTOR PLAN 10
IMPROVE VTE Individual Risk Assessment          RISK                                                          Points  [  ] Previous VTE                                                3  [ x ] Thrombophilia                                             2  [  ] Lower limb paralysis                                   2        (unable to hold up >15 seconds)    [x  ] Current Cancer                                             2         (within 6 months)  [  ] Immobilization > 24 hrs                              1  [  ] ICU/CCU stay > 24 hours                             1  [ x ] Age > 60                                                         1    IMPROVE VTE Score:         [    5     ]    DVT Prophylaxis: SQ heparin    Peripheral edema: Continue Lasix 40 mg PO qd

## 2020-02-06 NOTE — PROGRESS NOTE ADULT - SUBJECTIVE AND OBJECTIVE BOX
Patient is a 74y old  Male who presents with a chief complaint of Confusion (2020 11:34)    HPI:  74M with h/o COPD, DANIEL, Depression, HLD, HTN, CAD s/p PCI, CHB s/p PPM placed in , bioprosthetic AVR, dementia, recent diagnosis of Hodgkin's lymphoma in Dec 2019 who presents with confusion. History obtained from wife Patricia. Patient woke up this morning and had oatmeal and a cookie for breakfast at 4 AM. Patient's wife states that patient had sudden difficulty speaking and couldn't sit still. He asked for ice cream repeatedly. Patient's wife report she had an anxiety attack because she had never experienced an episode like this before. Patient's nurse arrived at 10 AM this morning and patient would not allow nurse to take his temperature. Wife decided to seek further evaluation at [facility]. Patient is restless in bed on examination. Of note, patient with recent admission to [facility] in 2020 for fevers 2/2 Hodgkin's lymphoma. Denies fever, chills, chest pain, abdominal pain, dizziness, lightheadedness, nausea, or vomiting.      In the ED:  Vitals: , /96, on BIPAP  Labs: RBC 3.60, H/H 9.8/30.4, MCV 68.3, MCH 22.0, RCDW 20.8, Lymphocyte 0.87, Granulocyte 5.8%, PT/INR 14/1.24, PTT 25.3, CO2 21, Albumin 3.2, Alk Phos 186, CPK 3.9  UA negative  CT head: unremarkable  CXR Prominent right hilus, appearance is unchanged since the prior exam of 2020. No lobar consolidation vascular congestion or effusion.  Doppler LE b/l: no DVT  EKG: NSR, ventricular paced rhythm (2020 14:08)      OVERNIGHT EVENTS:    Home Medications:  atorvastatin 10 mg oral tablet: 1 tab(s) orally once a day (at bedtime) (2020 14:59)  folic acid 1 mg oral tablet: 1 tab(s) orally once a day (2020 14:59)  Lasix 40 mg oral tablet: 1 tab(s) orally once a day (2020 14:59)  Lopressor 50 mg oral tablet: 1 tab(s) orally once a day (2020 14:59)  Protonix 40 mg oral delayed release tablet: 1 tab(s) orally once a day (2020 14:59)  Tofranil 25 mg oral tablet: 1 tab(s) orally once a day (2020 14:59)  Vitamin B1 100 mg oral tablet: 1 tab(s) orally once a day (2020 14:59)  ZyrTEC 10 mg oral tablet: 1 tab(s) orally once a day (2020 14:59)      MEDICATIONS  (STANDING):  atorvastatin 10 milliGRAM(s) Oral at bedtime  folic acid 1 milliGRAM(s) Oral daily  furosemide    Tablet 40 milliGRAM(s) Oral daily  heparin  Injectable 5000 Unit(s) SubCutaneous every 12 hours  imipramine 25 milliGRAM(s) Oral daily  loratadine 10 milliGRAM(s) Oral daily  metoprolol tartrate 50 milliGRAM(s) Oral daily  morphine  - Injectable 2 milliGRAM(s) IV Push once  pantoprazole    Tablet 40 milliGRAM(s) Oral before breakfast  thiamine 100 milliGRAM(s) Oral daily    MEDICATIONS  (PRN):  albuterol/ipratropium for Nebulization 3 milliLiter(s) Nebulizer every 4 hours PRN Shortness of Breath and/or Wheezing  LORazepam   Injectable 0.5 milliGRAM(s) IV Push every 4 hours PRN for sx of anxiety - agitation - distress - dyspnea -      Allergies    No Known Allergies    Intolerances        REVIEW OF SYSTEMS:  CONSTITUTIONAL: No fever, No chills, No fatigue, No myalgia, No Body ache, No Weakness  EYES: No eye pain,  No visual disturbances, No discharge, NO Redness  ENMT:  No ear pain, No nose bleed, No vertigo; No sinus or throat pain, No Congestion  NECK: No pain, No stiffness  RESPIRATORY: No cough, wheezing, No  hemoptysis, No shortness of breath  CARDIOVASCULAR: No chest pain, palpitations  GASTROINTESTINAL: No abdominal or epigastric pain. No nausea, No vomiting; No diarrhea or constipation. [  ] BM  GENITOURINARY: No dysuria, No frequency, No urgency, No hematuria, or incontinence  NEUROLOGICAL: No headaches, No dizziness, No numbness, No tingling, No tremors, No weakness  EXT: No Swelling, No Pain, No Edema  SKIN:  [  ] No itching, burning, rashes, or lesions   MUSCULOSKELETAL: No joint pain or swelling; No muscle pain, No back pain, No extremity pain  PSYCHIATRIC: No depression, anxiety, mood swings or difficulty sleeping at night  PAIN SCALE: [  ] None  [  ] Other-  ROS Unable to obtain due to - [  ] Dementia  [  ] Lethargy  [  ] Sedated [  ] non verbal  REST OF REVIEW Of SYSTEM - [  ] Normal     Vital Signs Last 24 Hrs  T(C): 36.8 (2020 08:51), Max: 36.8 (2020 18:45)  T(F): 98.2 (2020 08:51), Max: 98.2 (2020 18:45)  HR: 77 (2020 08:51) (77 - 101)  BP: 126/77 (2020 08:51) (117/60 - 129/69)  BP(mean): --  RR: 18 (2020 08:51) (18 - 28)  SpO2: 97% (2020 08:51) (96% - 100%)  Finger Stick         @ 07:  -  06 @ 07:00  --------------------------------------------------------  IN: 0 mL / OUT: 400 mL / NET: -400 mL     @ 07:01  06 @ 13:09  --------------------------------------------------------  IN: 0 mL / OUT: 850 mL / NET: -850 mL        PHYSICAL EXAM:  GENERAL:  [  ] NAD , [  ] well appearing, [  ] Agitated, [  ] Drowsy,  [  ] Lethargy, [  ] confused   HEAD:  [  ] Normal, [  ] Other  EYES:  [  ] EOMI, [  ] PERRLA, [  ] conjunctiva and sclera clear normal, [  ] Other,  [  ] Pallor,[  ] Discharge  ENMT:  [  ] Normal, [  ] Moist mucous membranes, [  ] Good dentition, [  ] No Thrush  NECK:  [  ] Supple, [  ] No JVD, [  ] Normal thyroid, [  ] Lymphadenopathy [  ] Other  CHEST/LUNG:  [  ] Clear to auscultation bilaterally, [  ] Breath Sounds equal OR Decrease,  [  ] No rales, [  ] No rhonchi  [  ]  No wheezing  HEART:  [  ] Regular rate and rhythm, [  ] tachycardia, [  ] Bradycardia,  [  ] irregular  [  ] No murmurs, No rubs, No gallops, [  ] PPM in place (Mfr:  )  ABDOMEN:  [  ] Soft, [  ] Nontender, [  ] Nondistended, [  ] No mass, [  ] Bowel sounds present, [  ] obese  NERVOUS SYSTEM:  [  ] Alert & Oriented X3, [  ] Nonfocal  [  ] Confusion  [  ] Encephalopathic [  ] Sedated [  ] Unable to assess, [  ] Other-  EXTREMITIES: [  ] 2+ Peripheral Pulses, No clubbing, No cyanosis,  [  ] edema B/L lower EXT. [  ] PVD stasis skin changes B/L Lower EXT  LYMPH: No lymphadenopathy noted  SKIN:  [  ] No rashes or lesions, [  ] Pressure Ulcers, [  ] ecchymosis, [  ] Skin Tears, [  ] Other    DIET:     LABS:                        7.9    1.88  )-----------( 138      ( 2020 07:18 )             24.8     2020 07:18    137    |  103    |  13     ----------------------------<  115    3.5     |  24     |  0.82     Ca    8.4        2020 07:18    TPro  6.4    /  Alb  2.9    /  TBili  0.9    /  DBili  x      /  AST  21     /  ALT  43     /  AlkPhos  151    2020 07:18    PT/INR - ( 2020 07:18 )   PT: 14.9 sec;   INR: 1.32 ratio         PTT - ( 2020 07:18 )  PTT:31.9 sec  Urinalysis Basic - ( 2020 11:46 )    Color: Yellow / Appearance: Clear / S.005 / pH: x  Gluc: x / Ketone: Negative  / Bili: Negative / Urobili: Negative   Blood: x / Protein: Negative / Nitrite: Negative   Leuk Esterase: Negative / RBC: x / WBC x   Sq Epi: x / Non Sq Epi: x / Bacteria: x                CARDIAC MARKERS ( 2020 07:18 )  .034 ng/mL / x     / 43 U/L / x     / 2.0 ng/mL  CARDIAC MARKERS ( 2020 18:24 )  .025 ng/mL / x     / 43 U/L / x     / 1.6 ng/mL  CARDIAC MARKERS ( 2020 11:07 )  .017 ng/mL / x     / 49 U/L / x     / 1.9 ng/mL             Anemia Panel:      Thyroid Panel:            Serum Pro-Brain Natriuretic Peptide: 724 pg/mL (20 @ 11:07)      RADIOLOGY & ADDITIONAL TESTS:      HEALTH ISSUES - PROBLEM Dx:  Need for prophylactic measure: Need for prophylactic measure  CAD (coronary artery disease): CAD (coronary artery disease)  Thoracic aortic aneurysm (TAA): Thoracic aortic aneurysm (TAA)  Coagulopathy: Coagulopathy  Dementia: Dementia  Hyperlipidemia: Hyperlipidemia  HTN (hypertension): HTN (hypertension)  Pancytopenia: Pancytopenia  Hodgkin lymphoma: Hodgkin lymphoma  Altered mental status: Altered mental status  DVT (deep venous thrombosis): DVT (deep venous thrombosis)  Chronic diastolic congestive heart failure: Chronic diastolic congestive heart failure  Coronary artery disease without angina pectoris, unspecified vessel or lesion type, unspecified whether native or transplanted heart: Coronary artery disease without angina pectoris, unspecified vessel or lesion type, unspecified whether native or transplanted heart  Obstructive sleep apnea syndrome: Obstructive sleep apnea syndrome  Dementia without behavioral disturbance, unspecified dementia type: Dementia without behavioral disturbance, unspecified dementia type  Other classical Hodgkin lymphoma of intra-abdominal lymph nodes: Other classical Hodgkin lymphoma of intra-abdominal lymph nodes  Aortic valve replaced: Aortic valve replaced  Pacemaker: Pacemaker          Consultant(s) Notes Reviewed:  [  ] YES     Care Discussed with [X] Consultants  [  ] Patient  [  ] Family  [  ]   [  ] Social Service  [  ] RN, [  ] Physical Therapy  DVT PPX: [  ] Lovenox, [  ] S C Heparin, [  ] Coumadin, [  ] Xarelto, [  ] Eliquis, [  ] Pradaxa, [  ] IV Heparin drip, [  ] SCD [  ] Contraindication 2 to GI Bleed,[  ] Ambulation  Advanced directive: [  ] None, [  ] DNR/DNI Patient is a 74y old  Male who presents with a chief complaint of Confusion (2020 11:34)    HPI:  74M with h/o COPD, DANIEL, Depression, HLD, HTN, CAD s/p PCI, CHB s/p PPM placed in , bioprosthetic AVR, dementia, recent diagnosis of Hodgkin's lymphoma in Dec 2019 who presents with confusion. History obtained from wife Patricia. Patient woke up this morning and had oatmeal and a cookie for breakfast at 4 AM. Patient's wife states that patient had sudden difficulty speaking and couldn't sit still. He asked for ice cream repeatedly. Patient's wife report she had an anxiety attack because she had never experienced an episode like this before. Patient's nurse arrived at 10 AM this morning and patient would not allow nurse to take his temperature. Wife decided to seek further evaluation at [facility]. Patient is restless in bed on examination. Of note, patient with recent admission to [facility] in 2020 for fevers 2/2 Hodgkin's lymphoma. Denies fever, chills, chest pain, abdominal pain, dizziness, lightheadedness, nausea, or vomiting.    admitted for acute  hypoxic  respiratory failure   stage 4 Hodgkin lymphoma  post chemo    -  pt seen and examine today -   pt awake , talking , no fever , no resp distress , tolerating po.   Vital Signs Last 24 Hrs  T(C): 36.8 (2020 08:51), Max: 36.8 (2020 18:45)  T(F): 98.2 (2020 08:51), Max: 98.2 (2020 18:45)  HR: 77 (2020 08:51) (77 - 101)  BP: 126/77 (2020 08:51) (117/60 - 129/69)  BP(mean): --  RR: 18 (2020 08:51) (18 - 28)  SpO2: 97% (2020 08:51) (96% - 100%)  Finger Stick    - @ 07:01  -  02-06 @ 07:00  --------------------------------------------------------  IN: 0 mL / OUT: 400 mL / NET: -400 mL     @ 07:01  -  02-06 @ 13:09  --------------------------------------------------------  IN: 0 mL / OUT: 850 mL / NET: -850 mL        REVIEW OF SYSTEMS:  CONSTITUTIONAL: No fever, No chills, , No Weakness  EYES: No eye pain,  No discharge  ENMT:  , No vertigo;  NECK: No pain, No stiffness  RESPIRATORY: No cough, wheezing, No  hemoptysis, No shortness of breath  CARDIOVASCULAR: No chest pain, palpitations  GASTROINTESTINAL: No abdominal or epigastric pain. No nausea, No vomiting; No diarrhea or constipation.  GENITOURINARY: No dysuria, No frequency, No urgency, No hematuria,   NEUROLOGICAL: No headaches, No dizziness, No numbness No weakness  EXT: No Swelling, No Pain, No Edema  SKIN:  No itching, burning, rashes, or lesions   MUSCULOSKELETAL: No joint pain or swelling; No muscle pain, No back pain, No extremity pain      PHYSICAL EXAM:  GENERAL:  NAD ,     HEAD:  Normal,   EYES:   EOMI, PERRLA ,conjunctiva and sclera clear normal  ENMT:   Moist mucous membranes,  NECK:  Supple,  No JVD,  CHEST/LUNG:   auscultation bilaterally,  No rales,  No rhonchi    No wheezing  HEART:   Regular rate and rhythm,   no tachy  ,   No murmurs  ABDOMEN:   Soft, Nontender, Nondistended, no mass Bowel sounds present, obese  NERVOUS SYSTEM:   Alert & Oriented X  2    , no focal deficit motor intact 5/5 all4 ext   EXTREMITIES:  2+ Peripheral Pulses, No clubbing, No cyanosis,  no edema   SKIN:  No rashes or lesions,    DIET:     LABS:                        7.9    1.88  )-----------( 138      ( 2020 07:18 )             24.8     2020 07:18    137    |  103    |  13     ----------------------------<  115    3.5     |  24     |  0.82     Ca    8.4        2020 07:18    TPro  6.4    /  Alb  2.9    /  TBili  0.9    /  DBili  x      /  AST  21     /  ALT  43     /  AlkPhos  151    2020 07:18    PT/INR - ( 2020 07:18 )   PT: 14.9 sec;   INR: 1.32 ratio         PTT - ( 2020 07:18 )  PTT:31.9 sec  Urinalysis Basic - ( 2020 11:46 )    Color: Yellow / Appearance: Clear / S.005 / pH: x  Gluc: x / Ketone: Negative  / Bili: Negative / Urobili: Negative   Blood: x / Protein: Negative / Nitrite: Negative   Leuk Esterase: Negative / RBC: x / WBC x   Sq Epi: x / Non Sq Epi: x / Bacteria: x

## 2020-02-06 NOTE — PROGRESS NOTE ADULT - PROBLEM SELECTOR PLAN 8
Coagulopathy sec to lymphoma  - Heme/onc Dr. Majano consulted, recs appreciated Coagulopathy sec to  hodgkin's  lymphoma  - Heme/onc Dr. Majano consulted, recs appreciated

## 2020-02-06 NOTE — DISCHARGE NOTE PROVIDER - CARE PROVIDERS DIRECT ADDRESSES
,[email or web address]

## 2020-02-06 NOTE — BEHAVIORAL HEALTH ASSESSMENT NOTE - SUMMARY
Pt is a 74M domiciled with wife with a PMH of obesity, CAD, COPD, complete heart block, DVT, HTN, and HLD and PPH of depression, worsening memory loss/dementia who presents with change in mental status and agitation.    Impression Delirium vs dementia     Plan: Pt may receive prns of Ativan 0.5mg for anxiety and Haldol  0.5mg po/ IM prn agitation

## 2020-02-06 NOTE — PROGRESS NOTE ADULT - PROBLEM SELECTOR PLAN 3
H/o thalassemia trait and Hodgkin's lymphoma  - No overt signs of bleeding noted   - Continue folic acid 1mg PO daily and thiamine PO daily  - Monitor daily CBCs  - Heme/onc Dr. Majano consulted, recs appreciated

## 2020-02-06 NOTE — PROGRESS NOTE ADULT - PROBLEM SELECTOR PLAN 1
CT chest - neg for PE -   will repeat VBG  on room air  NEBS for COPD  Onc follow up today   s/p last chemo in the office - 1.31.2019  [facility] rx regimen and BP control - on LASIX - Cardio following
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
## 2020-02-06 NOTE — PROGRESS NOTE ADULT - PROBLEM SELECTOR PLAN 9
- TTE done as aboe 1/10/2020 showing normal LVSF with grade 1 DD  - Cardiology ([facility]) consulted, f/u recs hx pacemaker - TTE done as aboe 1/10/2020 showing normal LVSF with grade 1 DD- on lasix 40 mg daily   - Cardiology ([facility]) consulted,

## 2020-02-06 NOTE — PHYSICAL THERAPY INITIAL EVALUATION ADULT - ADDITIONAL COMMENTS
Pt lives with his spouse in the 1st floor of an apt, no steps. Pt ambulates independently with RW and is independent with ADLs.

## 2020-02-06 NOTE — PATIENT PROFILE ADULT - NSPROGENOTHERPROVIDER_GEN_A_NUR
Dr Conley primary DR/none/medical specialist home care/medical specialist/Dr Giovanni Walker primary  [phone]  Dr Majano [phone] oncologist  Dr Lulu Chun [phone]  pulmonary  Pt was receiving homecare from [facility] prior to admission to the hospital per spouse.

## 2020-02-06 NOTE — DISCHARGE NOTE PROVIDER - PROVIDER TOKENS
PROVIDER:[TOKEN:[[id number]]] PROVIDER:[TOKEN:[[id number]]],PROVIDER:[TOKEN:[[id number]]],PROVIDER:[TOKEN:[[id number]]]

## 2020-02-06 NOTE — PROGRESS NOTE ADULT - PROBLEM SELECTOR PLAN 1
Patient presents with acute dyspnea and AMS  - Likely from underlying metabolic derangements  - Dopplers LE b/l: no evidence of DVT  - Check CTA to r/o PE  - On BIPAP, tolerating well   - Avoid high FiO2 as pt on Belomycin based chemo  - CXR Prominent right hilus, appearance is unchanged since the prior exam of 1/31/2020. No lobar consolidation vascular congestion or effusion.  - Pulmonary ([facility]) consulted, f/u recs Patient presents with acute dyspnea and AMS  - Dopplers LE b/l: no evidence of DVT  - Check CTA  no pe   - On BIPAP, tolerating well   - Avoid high FiO2 as pt on Belomycin based chemo  - CXR Prominent right hilus, appearance is unchanged since the prior exam of 1/31/2020. No lobar consolidation vascular congestion or effusion.  - Pulmonary ([facility]) consulted, Patient presents with acute dyspnea and AMS  - Unclear etiology; likely from underlying metabolic derangements vs chemotherapy (last session 1/31- pt did not tolerate dacarbazine per heme onc)  - ABG remarkable for respiratory alkalosis. VBG done this AM WNL, pH improved, no evidence of CO2 retention   - Dopplers LE b/l: no evidence of DVT  - Check CTA to r/o PE- negative  - On BIPAP, tolerating well   - Avoid high FiO2 as pt on Belomycin based chemo  - CXR Prominent right hilus, appearance is unchanged since the prior exam of 1/31/2020. No lobar consolidation vascular congestion or effusion.  - Pulmonary ([facility]) consulted, f/u recs

## 2020-02-06 NOTE — PATIENT PROFILE ADULT - NSPROGENSOURCEINFO_GEN_A_NUR
patient/health record/pt poor historian. Spouse assisted with History  H&P reviewed  Copy Forward Admit profile from January 8,2020,same reviewed and updated to accurately reflect the status of the patient at this time./family

## 2020-02-06 NOTE — DISCHARGE NOTE PROVIDER - HOSPITAL COURSE
HPI:    74M with h/o COPD, DANIEL, Depression, HLD, HTN, CAD s/p PCI, CHB s/p PPM placed in 2015, bioprosthetic AVR, dementia, recent diagnosis of Hodgkin's lymphoma in Dec 2019 who presents with confusion. History obtained from wife Patricia. Patient woke up this morning and had oatmeal and a cookie for breakfast at 4 AM. Patient's wife states that patient had sudden difficulty speaking and couldn't sit still. He asked for ice cream repeatedly. Patient's wife report she had an anxiety attack because she had never experienced an episode like this before. Patient's nurse arrived at 10 AM this morning and patient would not allow nurse to take his temperature. Wife decided to seek further evaluation at [facility]. Patient is restless in bed on examination. Of note, patient with recent admission to [facility] in January 2020 for fevers 2/2 Hodgkin's lymphoma. Denies fever, chills, chest pain, abdominal pain, dizziness, lightheadedness, nausea, or vomiting.          In the ED:    Vitals: , /96, on BIPAP    Labs: RBC 3.60, H/H 9.8/30.4, MCV 68.3, MCH 22.0, RCDW 20.8, Lymphocyte 0.87, Granulocyte 5.8%, PT/INR 14/1.24, PTT 25.3, CO2 21, Albumin 3.2, Alk Phos 186, CPK 3.9    UA negative    CT head: unremarkable    CXR Prominent right hilus, appearance is unchanged since the prior exam of 1/31/2020. No lobar consolidation vascular congestion or effusion.    Doppler LE b/l: no DVT    EKG: NSR, ventricular paced rhythm (05 Feb 2020 14:08)        Hospital course    Pt worked up in hospital for possible causes of altered mental status and tachypnea. ABG on admission was remarkable for respiratory alkalosis. VBG done on 2/6 was within normal limits. Pt had CT chest done in hospital which was unremarkable for an acute PE.         Consultants    Dr. Cornelius [facility]    Dr. Early- cardiology    Dr. Moore- pul HPI:    74M with h/o COPD, DANIEL, Depression, HLD, HTN, CAD s/p PCI, CHB s/p PPM placed in 2015, bioprosthetic AVR, dementia, recent diagnosis of Hodgkin's lymphoma in Dec 2019 who presents with confusion. History obtained from wife Patricia. Patient woke up this morning and had oatmeal and a cookie for breakfast at 4 AM. Patient's wife states that patient had sudden difficulty speaking and couldn't sit still. He asked for ice cream repeatedly. Patient's wife report she had an anxiety attack because she had never experienced an episode like this before. Patient's nurse arrived at 10 AM this morning and patient would not allow nurse to take his temperature. Wife decided to seek further evaluation at [facility]. Patient is restless in bed on examination. Of note, patient with recent admission to [facility] in January 2020 for fevers 2/2 Hodgkin's lymphoma. Denies fever, chills, chest pain, abdominal pain, dizziness, lightheadedness, nausea, or vomiting.          In the ED:    Vitals: , /96, on BIPAP    Labs: RBC 3.60, H/H 9.8/30.4, MCV 68.3, MCH 22.0, RCDW 20.8, Lymphocyte 0.87, Granulocyte 5.8%, PT/INR 14/1.24, PTT 25.3, CO2 21, Albumin 3.2, Alk Phos 186, CPK 3.9    UA negative    CT head: unremarkable    CXR Prominent right hilus, appearance is unchanged since the prior exam of 1/31/2020. No lobar consolidation vascular congestion or effusion.    Doppler LE b/l: no DVT    EKG: NSR, ventricular paced rhythm (05 Feb 2020 14:08)        Hospital course    Pt worked up in hospital for possible causes of altered mental status and tachypnea. ABG on admission was remarkable for respiratory alkalosis. VBG done on 2/6 was within normal limits. Pt had CT chest done in hospital which was unremarkable for an acute PE. Pt had a course of chemotherapy done on 1/31, which had to be stopped as pt could not tolerate dacarbazine. Pt has a hx of dementia, and his altered mental status deemed secondary to dementia with behavioral features in combination with metabolic disturbance possibly secondary to chemotherapy. Pt was deemed medically optimized for discharge with close outpatient follow up.         Consultants    Dr. Majano- [facility]    Dr. Early- cardiology    Dr. Real mackey HPI:    74M with h/o COPD, DANIEL, Depression, HLD, HTN, CAD s/p PCI, CHB s/p PPM placed in 2015, bioprosthetic AVR, dementia, recent diagnosis of Hodgkin's lymphoma in Dec 2019 who presents with confusion. History obtained from wife Patricia. Patient woke up this morning and had oatmeal and a cookie for breakfast at 4 AM. Patient's wife states that patient had sudden difficulty speaking and couldn't sit still. He asked for ice cream repeatedly. Patient's wife report she had an anxiety attack because she had never experienced an episode like this before. Patient's nurse arrived at 10 AM this morning and patient would not allow nurse to take his temperature. Wife decided to seek further evaluation at [facility] [place]. Patient is restless in bed on examination. Of note, patient with recent admission to [facility] in January 2020 for fevers 2/2 Hodgkin's lymphoma. Denies fever, chills, chest pain, abdominal pain, dizziness, lightheadedness, nausea, or vomiting.          Hospital course    Pt worked up in hospital for possible causes of altered mental status       - likely from underlying   acute  metabolic encephalopathy   possible  recent  chemotherapy (last session 1/31- pt did not tolerate dacarbazine per heme onc). Likely also component of dementia with behavioral features contributing as pt had hallucinations yesterday afternoon  also possibility sec to  hx hypercarbia  as hx sleep apnea  pt use  cpap in home  also .      ABG on admission was remarkable for respiratory alkalosis. VBG done on 2/6 was within normal limits.     Pt had CT chest done in hospital which was unremarkable for an acute PE.     Pt had a course of chemotherapy done on 1/31   with midline ,     which had to be stopped as pt could not tolerate dacarbazine.       Hodgkin lymphoma.  Patient was diagnosed with Classic Hodgkin's Lymphoma Dec 2019. CD30+, CD15+, CD20 neg. Sees Dr. Majano as outpt (heme/onc)    - Started chemo with ABVD 01/16/20 Thurs. Had rxn when 3/4 way trough administration of dacarbazine.     Last chemo on Fri 1/31/20, and  blood cx, urine cx- NGTD  ruled out bacteremia     - Heme/onc Dr. Majano consulted, recs appreciated. Pancytopenia.  H/o thalassemia trait and Hodgkin's lymphoma    - No overt signs of bleeding noted   and  Continue folic acid 1mg PO daily and thiamine PO daily    : HTN (hypertension).  Chronic, stable    - Continue home Metoprolol 50 mg PO bid.     - Cardiology ([facility]) consulted, f/u recs. Thoracic aortic aneurysm (TAA).   4.9 cm Ascending aortic aneurysm noted on CTA chest on1/8/20. Pt is asymptomatic (no CP, BP stable) and is less than 5.0 cm, pt will follow up outpatient,    pancytopenia post chemo  discussed   with hem/onc dr agustin no need   Neupogen  with  hodgkin lymphoma tt .  dc plan in am if pt stable.     Chronic diastolic congestive heart failure.   hx pacemaker - TTE done as above 1/10/2020 showing normal LVSF with grade 1 DD- on lasix 40 mg daily     Pt was medically optimized for discharge with close outpatient follow up.     physical exam 2-8-20 day of dc  Vital Signs Last 24 Hrs    T(C): 36.7 (08 Feb 2020 10:00), Max: 36.8 (08 Feb 2020 05:30)    T(F): 98 (08 Feb 2020 10:00), Max: 98.2 (08 Feb 2020 05:30)    HR: 78 (08 Feb 2020 10:00) (78 - 109)    BP: 113/77 (08 Feb 2020 10:00) (94/56 - 139/82)    BP(mean): --    RR: 17 (08 Feb 2020 10:00) (17 - 17)    SpO2: 98% (08 Feb 2020 10:00) (96% - 98%)     GEN no distress , HEENT nt/nc , perrla , CVS s1s2 no tachy , CHEST bl air entery  present  ,  no wheezing     CNS aao/3   no focal deficit , GI soft , bs present ,  EXT no edema, pedal pulse present , SKIN no rash.    Consultants    Dr. Majano- [facility]    Dr. Early- cardiology    Dr. Real mackey

## 2020-02-06 NOTE — PATIENT PROFILE ADULT - NSTRANSFERBELONGINGSDISPO_GEN_A_NUR
patient has 1 yellow metal necklace, 1 yellow metal ring, I black arm sock like cover for his pick line with him at this time/with patient

## 2020-02-06 NOTE — PHYSICAL THERAPY INITIAL EVALUATION ADULT - PERTINENT HX OF CURRENT PROBLEM, REHAB EVAL
75 y/o male adm 2/5 with altered mental status. CTA: no PE. CT head: negative. Dopplers: negative DVTBLE

## 2020-02-06 NOTE — PROGRESS NOTE ADULT - PROBLEM SELECTOR PLAN 5
4.9 cm Ascending aortic aneurysm noted on CTA chest on1/8/20. Pt is asymptomatic (no CP, BP stable) and is less than 5.0 cm, pt will follow up outpatient  - Cardiology ([facility]) consulted, f/u recs

## 2020-02-06 NOTE — ED ADULT NURSE REASSESSMENT NOTE - NS ED NURSE REASSESS COMMENT FT1
Assumed care of patient at 1100.  Pt A&Ox4, NAD, denies pain.  Pt is currently on BIPAP, sat 100%, no confusion or agitation.  Wife at bedside states patient became incoherent around 0400 and on arrival was agitated and completely confused.  Pt has PICC line right arm and gauge # 20 left AC, patent, no s/s of redness or swelling.  Pt SR on CM.  Vitals recorded, skin assessed, pt has no open wounds, but has persistent stage I redness to both buttocks.  Respiratory therapist at bedside to draw ABG.  Pt voided to urinal, sample sent to lab.
At 1600 despite morphine given, pt very agitated, pulling off BIPAP mask, Dr. Burkett in to eval, pt given ativan.
PVR performed after patient ambulated to the bathroom with independent, steady gait.  Volume less than 36cc noted.
Patient ambulated to bathroom with steady gait.
Patient ambulated to the bathroom independently.  Patient placed back on bedside cardiac monitor.
Patient drinking water, tolerating PO intake.  Hospital bed in lowest position with guard rails up, bed alarm in place.  Safety maintained.
Patient is reporting that he is seeing " Guns and is afraid for his life and he is stating that there are people around in this room praying him." " patient is afraid" Patient made aware that he is safe in our facility but continues to report that he is unsafe here. Dr. Cruz notified of patient change in mental status and ANM notified. Will monitor support and safety maintained.
Patient is sleeping on hospital bed in room, NAD.  Bed is in the lowest position with guard rails up and bed alarm in place.  Safety maintained.  Still pending bed assignment upstairs.
Received patient in ER room, resting in bed appears comfortable, md aware of vitals. safety maintained. Patient mentating at baseline. there are no change in neuro status. pt denies any chest  pain at this time. patient placed in position of comfort, on cardiac monitor given warm blankets, core bell in place, patient advised to make Primary RN aware know if he needs any help, patient verbalized understanding safety maintained. will continue to monitor.
Report received from TIMOTHY LOPEZ in [facility].  Patient is admitted and pending bed assignment upstairs.
pt placed on hospital bed for comfort
Pt extremely uncomfortable and getting agitated, unable to urinate in urinal.  Bladder scan shows >400 in bladder, pt straight cath, emptied 450mL urine.  Pt much more comfortable, Dr. Jan Burkett made aware.

## 2020-02-06 NOTE — PROGRESS NOTE ADULT - ASSESSMENT
[ASSESSMENT and  PLAN]  Classical Hodgkin's Lymphoma.   CD30+, CD15+, CD20 neg.   Stage IV with GUERO below diaphragm.  BM bx with involvement of Bone marrow.   Started chemo with ABVD 01/16/20 Thurs.   Had rxn when 3/4 way trough administration of dacarbazine.   Last chemo on Fri 01/31/20  Today C1D20    Admitted with acute dyspena this AM.   Breathing better now on BIPAP, with no supplemental O2, sat 100%.       hx Lung nodules of unclear etiology. Patchy GGO prior. ? lung involvement vs infectious causes.     Beta thal trait. Baseline chronic anemia.     Prior weak factor inhibitor.   Hx mild coagulopathy with INR 1.4 - 1.8. Elevated PTT also.   Workup last admission with weak factor inhibitor.   Since Normalization of PT and PTT with first cycle of chemo.     Thrombocytopenia, due to lymphoma.   Since improved after C1 chemo.     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   Better since on chemo.     Anemia due to lymphoma and chemo.   Stable. Imrpoved with chemotx.       Cardiac comorbidities  AVR, CAD, HTN, PPM for arrthymia.   ? Liver disease. CT imaging prior unrevealing.     appears stable today with improvement in respiratory status      RECOMMENDATIONS:  Check LE duplex.   Had negative CAT 01/08/20.     Nebs, steroids per pulm.  Avoid high FiO2 as pt on Belomycin based chemo    Cardiac eval for, plan for optimization in preparation for chemo with ABVD.   Was to have PFTs with pulm, as usual chemo included Bleomycin. .     DVT Prophylaxis: LMWH or SQ heparin    Repeat coag panel in AM    Continue Folic acid daily for thal trait.     Cardiac eval for acute dyspena, given co-morbidities.     follow CBC  hold G-CSF

## 2020-02-06 NOTE — PROGRESS NOTE ADULT - ASSESSMENT
74M with h/o COPD, DANIEL, Depression, HLD, HTN, CAD s/p PCI, CHB s/p PPM placed in 2015, bioprosthetic AVR, dementia, recent diagnosis of Hodgkin's lymphoma in Dec 2019 who presents with confusion admitted for acute respiratory failure. 74M with h/o COPD, DANIEL, Depression, HLD, HTN, CAD s/p PCI, CHB s/p PPM placed in 2015, bioprosthetic AVR, dementia, recent diagnosis of Hodgkin's lymphoma in Dec 2019 who presents with confusion admitted for acute  hypoxic  respiratory failure   stage 4 Hodgkin lymphoma  post chemo    .

## 2020-02-06 NOTE — PROGRESS NOTE ADULT - PROBLEM SELECTOR PLAN 2
Patient was diagnosed with Classic Hodgkin's Lymphoma Dec 2019. CD30+, CD15+, CD20 neg. Sees Dr. Majano as outpt (heme/onc)  - F/u blood cx, urine cx  - s/p ir guided bone marrow biopsy and picc line for chemo 1/13--tolerated procedure well.  - Started chemo with ABVD 01/16/20 Thurs. Had rxn when 3/4 way trough administration of dacarbazine. Last chemo on Fri 1/31/20  - Heme/onc Dr. Majano consulted, recs appreciated Patient was diagnosed with Classic Hodgkin's Lymphoma Dec 2019. CD30+, CD15+, CD20 neg. Sees Dr. Majano as outpt (heme/onc)  - F/u blood cx, urine cx  - s/p ir guided bone marrow biopsy and picc line for chemo 1/13--tolerated procedure well.  - Started chemo with ABVD 01/16/20 Thurs. Had rxn when 3/4 way trough administration of dacarbazine. Last chemo on Fri 1/31/20  - Pt pancytopenic- would consider epogen   - Heme/onc Dr. Majano consulted, recs appreciated

## 2020-02-06 NOTE — BEHAVIORAL HEALTH ASSESSMENT NOTE - NSBHCHARTREVIEWLAB_PSY_A_CORE FT
7.9    1.88  )-----------( 138      ( 06 Feb 2020 07:18 )             24.8   02-06    137  |  103  |  13  ----------------------------<  115<H>  3.5   |  24  |  0.82    Ca    8.4<L>      06 Feb 2020 07:18    TPro  6.4  /  Alb  2.9<L>  /  TBili  0.9  /  DBili  x   /  AST  21  /  ALT  43  /  AlkPhos  151<H>  02-06

## 2020-02-06 NOTE — PATIENT PROFILE ADULT - NSPROMUTINFOINDIVIDFT_GEN_A_NUR
Patient is forgetful at times Patient is forgetful at times. Pt expressing a "fear of guns" needs reinforcement that he is safe.

## 2020-02-06 NOTE — PATIENT PROFILE ADULT - NSPROIMPLANTSMEDDEV_GEN_A_NUR
AVR/Pacemaker/Heart valve/Vascular stents/Clips Heart valve/Pacemaker/AVR, PPM/Vascular stents/Clips

## 2020-02-06 NOTE — PROGRESS NOTE ADULT - PROBLEM SELECTOR PLAN 7
H/o dementia with behavioral disturbance during previous hospitalization and at home  - Fall precautions  - Constant observation 1:1

## 2020-02-06 NOTE — PROGRESS NOTE ADULT - SUBJECTIVE AND OBJECTIVE BOX
Interval History:  still in ER.. had 1 on 1.  remains afebrile  Chart reviewed and events noted;   Overnight events:    MEDICATIONS  (STANDING):  atorvastatin 10 milliGRAM(s) Oral at bedtime  folic acid 1 milliGRAM(s) Oral daily  furosemide    Tablet 40 milliGRAM(s) Oral daily  heparin  Injectable 5000 Unit(s) SubCutaneous every 12 hours  imipramine 25 milliGRAM(s) Oral daily  metoprolol tartrate 50 milliGRAM(s) Oral daily  pantoprazole    Tablet 40 milliGRAM(s) Oral before breakfast  risperiDONE   Tablet 1 milliGRAM(s) Oral two times a day  thiamine 100 milliGRAM(s) Oral daily    MEDICATIONS  (PRN):  albuterol/ipratropium for Nebulization 3 milliLiter(s) Nebulizer every 4 hours PRN Shortness of Breath and/or Wheezing  OLANZapine Injectable 10 milliGRAM(s) IntraMuscular every 6 hours PRN Agitation      Vital Signs Last 24 Hrs  T(C): 36.8 (06 Feb 2020 16:39), Max: 36.8 (05 Feb 2020 18:45)  T(F): 98.3 (06 Feb 2020 16:39), Max: 98.3 (06 Feb 2020 16:39)  HR: 78 (06 Feb 2020 16:39) (77 - 101)  BP: 131/79 (06 Feb 2020 16:39) (120/72 - 143/86)  BP(mean): --  RR: 17 (06 Feb 2020 16:39) (17 - 28)  SpO2: 98% (06 Feb 2020 16:39) (96% - 98%)    PHYSICAL EXAM  General: adult in NAD  HEENT: clear oropharynx, anicteric sclera, pink conjunctivae  Neck: supple  CV: normal S1S2 with no murmur rubs or gallops  Lungs: clear to auscultation, no wheezes, no rhales  Abdomen: soft non-tender non-distended, no hepato/splenomegaly  Ext: no clubbing cyanosis or edema  Skin: no rashes and no petichiae  Neuro: alert       LABS:  CBC Full  -  ( 06 Feb 2020 07:18 )  WBC Count : 1.88 K/uL  RBC Count : 3.60 M/uL  Hemoglobin : 7.9 g/dL  Hematocrit : 24.8 %  Platelet Count - Automated : 138 K/uL  Mean Cell Volume : 68.9 fl  Mean Cell Hemoglobin : 21.9 pg  Mean Cell Hemoglobin Concentration : 31.9 gm/dL  Auto Neutrophil # : 1.45 K/uL  Auto Lymphocyte # : 0.30 K/uL  Auto Monocyte # : 0.06 K/uL  Auto Eosinophil # : 0.02 K/uL  Auto Basophil # : 0.00 K/uL  Auto Neutrophil % : 72.0 %  Auto Lymphocyte % : 16.0 %  Auto Monocyte % : 3.0 %  Auto Eosinophil % : 1.0 %  Auto Basophil % : 0.0 %    02-06    137  |  103  |  13  ----------------------------<  115<H>  3.5   |  24  |  0.82    Ca    8.4<L>      06 Feb 2020 07:18    TPro  6.4  /  Alb  2.9<L>  /  TBili  0.9  /  DBili  x   /  AST  21  /  ALT  43  /  AlkPhos  151<H>  02-06    PT/INR - ( 06 Feb 2020 07:18 )   PT: 14.9 sec;   INR: 1.32 ratio         PTT - ( 06 Feb 2020 07:18 )  PTT:31.9 sec    fe studies      WBC trend  1.88 K/uL (02-06-20 @ 07:18)  3.60 K/uL (02-05-20 @ 11:07)      Hgb trend  7.9 g/dL (02-06-20 @ 07:18)  9.8 g/dL (02-05-20 @ 11:07)      plt trend  138 K/uL (02-06-20 @ 07:18)  216 K/uL (02-05-20 @ 11:07)        RADIOLOGY & ADDITIONAL STUDIES:
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
## 2020-02-06 NOTE — DISCHARGE NOTE PROVIDER - NSDCCPCAREPLAN_GEN_ALL_CORE_FT
PRINCIPAL DISCHARGE DIAGNOSIS  Diagnosis: Altered mental status  Assessment and Plan of Treatment: You were admitted for altered mental status and respiratory distress. Your symptoms resolved with oxygen therapy, supportive nebulizer treatments, and medical managment. Your mental status change was deemed likely due to a metabolic disturbance related to your chemotherapy treatment on 1/31. You were deemed medically optimized for discharge. Please follow up with your primary care doctor within 1-2 weeks

## 2020-02-06 NOTE — DISCHARGE NOTE PROVIDER - CARE PROVIDER_API CALL
Beto Walker)  Internal Medicine  [facility] [place] [address]  Phone: [phone]  Fax: [phone]  Follow Up Time: Beto Walker)  Internal Medicine  [facility] [place] [address]  Phone: [phone]  Fax: [phone]  Follow Up Time:     Ky Mckeon)  Internal Medicine  [address] [place] [address]  Phone: [phone]  Fax: [phone]  Follow Up Time:     Hon BEBO Majano)  Hematology; Internal Medicine; Medical Oncology  [address], [address]  [place] [address]  Phone: [phone]  Fax: [phone]  Follow Up Time:

## 2020-02-06 NOTE — PROGRESS NOTE ADULT - PROBLEM SELECTOR PLAN 4
Chronic, stable  - Continue home Metoprolol 50 mg PO q12h  - Monitor routine hemodynamics  - Cardiology ([facility]) consulted, f/u recs Chronic, stable  - Continue home Metoprolol 50 mg PO qd    - Cardiology ([facility]) consulted, f/u recs

## 2020-02-06 NOTE — BEHAVIORAL HEALTH ASSESSMENT NOTE - NSBHCHARTREVIEWVS_PSY_A_CORE FT
Vital Signs Last 24 Hrs  T(C): 36.8 (06 Feb 2020 08:51), Max: 36.8 (05 Feb 2020 18:45)  T(F): 98.2 (06 Feb 2020 08:51), Max: 98.2 (05 Feb 2020 18:45)  HR: 77 (06 Feb 2020 08:51) (77 - 101)  BP: 126/77 (06 Feb 2020 08:51) (117/60 - 129/69)  BP(mean): --  RR: 18 (06 Feb 2020 08:51) (18 - 28)  SpO2: 97% (06 Feb 2020 08:51) (96% - 100%)
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
## 2020-02-06 NOTE — DISCHARGE NOTE PROVIDER - NSDCFUADDAPPT_GEN_ALL_CORE_FT
-Please follow up with your PMD within one week.  -Please follow up with outpatient (information below).  -Continue taking your medications as directed above.  -If symptoms persist/worsen, please call your PMD or return to the ED. -Please follow up with your PMD within one week  dr marks .  fu  hem/onc  dr pitts out pt with in 1wk / hem/onch.  .

## 2020-02-06 NOTE — DISCHARGE NOTE PROVIDER - NSDCMRMEDTOKEN_GEN_ALL_CORE_FT
atorvastatin 10 mg oral tablet: 1 tab(s) orally once a day (at bedtime)  folic acid 1 mg oral tablet: 1 tab(s) orally once a day  Lasix 40 mg oral tablet: 1 tab(s) orally once a day  Lopressor 50 mg oral tablet: 1 tab(s) orally once a day  Protonix 40 mg oral delayed release tablet: 1 tab(s) orally once a day  ROLLING WALKER: 1 each buccal once a day   Tofranil 25 mg oral tablet: 1 tab(s) orally once a day  Vitamin B1 100 mg oral tablet: 1 tab(s) orally once a day  ZyrTEC 10 mg oral tablet: 1 tab(s) orally once a day atorvastatin 10 mg oral tablet: 1 tab(s) orally once a day (at bedtime)  folic acid 1 mg oral tablet: 1 tab(s) orally once a day  imipramine 25 mg oral tablet: 1 tab(s) orally once a day  Lasix 40 mg oral tablet: 1 tab(s) orally once a day  metoprolol tartrate 50 mg oral tablet: 1 tab(s) orally 2 times a day  Protonix 40 mg oral delayed release tablet: 1 tab(s) orally once a day  risperiDONE 1 mg oral tablet: 1 tab(s) orally 2 times a day   Tofranil 25 mg oral tablet: 1 tab(s) orally once a day  Vitamin B1 100 mg oral tablet: 1 tab(s) orally once a day  ZyrTEC 10 mg oral tablet: 1 tab(s) orally once a day

## 2020-02-07 NOTE — PROGRESS NOTE ADULT - SUBJECTIVE AND OBJECTIVE BOX
Patient is a 74y old  Male who presents with a chief complaint of Confusion (2020 11:34)    HPI:  74M with h/o COPD, DANIEL, Depression, HLD, HTN, CAD s/p PCI, CHB s/p PPM placed in , bioprosthetic AVR, dementia, recent diagnosis of Hodgkin's lymphoma in Dec 2019 who presents with confusion. History obtained from wife Patricia. Patient woke up this morning and had oatmeal and a cookie for breakfast at 4 AM. Patient's wife states that patient had sudden difficulty speaking and couldn't sit still. He asked for ice cream repeatedly. Patient's wife report she had an anxiety attack because she had never experienced an episode like this before. Patient's nurse arrived at 10 AM this morning and patient would not allow nurse to take his temperature. Wife decided to seek further evaluation at [facility]. Patient is restless in bed on examination. Of note, patient with recent admission to [facility] in 2020 for fevers 2/2 Hodgkin's lymphoma. Denies fever, chills, chest pain, abdominal pain, dizziness, lightheadedness, nausea, or vomiting.    admitted for acute  hypoxic  respiratory failure   stage 4 Hodgkin lymphoma  post chemo    -    OVERNIGHT EVENTS: Pt seen and examined at bedside. no acute events overnight. Wife at bedside, states that his AMS has improved. Pt resting comfortably in bed. Has cough, which wife states is chronic for him. Bringing up brownish yellow mucous. Denies CP, NVD, SOB, weakness, fevers, chills.       I&O's Summary    2020 07:01  -  2020 07:00  --------------------------------------------------------  IN: 0 mL / OUT: 1450 mL / NET: -1450 mL      CONSTITUTIONAL: denies fever, chills, fatigue, weakness  HEENT: denies blurred vision, sore throat  SKIN: denies new lesions, rash  CARDIOVASCULAR: denies chest pain, chest pressure, palpitations  RESPIRATORY: denies shortness of breath, admits sputum production  GASTROINTESTINAL: denies nausea, vomiting, diarrhea, abdominal pain  GENITOURINARY: denies dysuria, discharge  NEUROLOGICAL: denies numbness, headache, focal weakness  MUSCULOSKELETAL: denies new joint pain, muscle aches  HEMATOLOGIC: denies gross bleeding, bruising  LYMPHATICS: denies enlarged lymph nodes, extremity swelling    T(C): 36.6 (20 @ 07:55), Max: 36.9 (20 @ 23:37)  HR: 84 (20 @ 07:55) (78 - 109)  BP: 120/77 (20 @ 07:55) (118/74 - 143/86)  RR: 18 (20 @ 07:55) (17 - 19)  SpO2: 98% (20 @ 07:55) (96% - 98%)    GENERAL: patient appears well, no acute distress, appropriate  EYES: sclera clear, no exudates, PERRLA  ENMT: oropharynx clear without erythema, no exudates, moist mucous membranes  NECK: supple, soft, no thyromegaly noted  LUNGS:  clear to auscultation,  no rales, wheezing or rhonchi appreciated  HEART: S1/S2, regular rate and rhythm, no murmurs noted, no lower extremity edema  GASTROINTESTINAL: abdomen is soft, nontender, nondistended, normoactive bowel sounds, no palpable masses  INTEGUMENT: good skin turgor, warm, dry and intact  MUSCULOSKELETAL: no clubbing or cyanosis, no obvious deformity  NEUROLOGIC: awake, alert, oriented x2, strength 5/5 in all extremities, no obvious sensory deficits       LABS:                        8.5    1.85  )-----------( 144      ( 2020 07:38 )             26.9     02-07    138  |  104  |  11  ----------------------------<  171<H>  3.4<L>   |  23  |  0.98    Ca    8.9      2020 07:38    TPro  6.4  /  Alb  2.9<L>  /  TBili  0.9  /  DBili  x   /  AST  21  /  ALT  43  /  AlkPhos  151<H>  02-06    Urinalysis Basic - ( 2020 11:46 )    Color: Yellow / Appearance: Clear / S.005 / pH: x  Gluc: x / Ketone: Negative  / Bili: Negative / Urobili: Negative   Blood: x / Protein: Negative / Nitrite: Negative   Leuk Esterase: Negative / RBC: x / WBC x   Sq Epi: x / Non Sq Epi: x / Bacteria: x Patient is a 74y old  Male who presents with a chief complaint of Confusion (2020 11:34)    HPI:  74M with h/o COPD, DANIEL, Depression, HLD, HTN, CAD s/p PCI, CHB s/p PPM placed in , bioprosthetic AVR, dementia, recent diagnosis of Hodgkin's lymphoma in Dec 2019 who presents with confusion. History obtained from wife Patricia. Patient woke up this morning and had oatmeal and a cookie for breakfast at 4 AM. Patient's wife states that patient had sudden difficulty speaking and couldn't sit still. He asked for ice cream repeatedly. Patient's wife report she had an anxiety attack because she had never experienced an episode like this before. Patient's nurse arrived at 10 AM this morning and patient would not allow nurse to take his temperature. Wife decided to seek further evaluation at [facility]. Patient is restless in bed on examination. Of note, patient with recent admission to [facility] in 2020 for fevers 2/2 Hodgkin's lymphoma. Denies fever, chills, chest pain, abdominal pain, dizziness, lightheadedness, nausea, or vomiting.    admitted for acute  hypoxic  respiratory failure   stage 4 Hodgkin lymphoma  post chemo     cause weakness and confusion  -    OVERNIGHT EVENTS: Pt seen and examined at bedside. no acute events overnight. Wife at bedside, states that his AMS has improved. Pt resting comfortably in bed. Has cough, which wife states is chronic for him+ mucous. Denies CP, NVD, SOB, weakness, fevers, chills.       I&O's Summary    2020 07:01  -  2020 07:00  --------------------------------------------------------  IN: 0 mL / OUT: 1450 mL / NET: -1450 mL      CONSTITUTIONAL: denies fever, chills, fatigue,  +  weakness  HEENT: denies blurred vision, sore throat  SKIN: denies new lesions, rash  CARDIOVASCULAR: denies chest pain, chest pressure, palpitations  RESPIRATORY: denies shortness of breath, admits sputum production  GASTROINTESTINAL: denies nausea, vomiting, diarrhea, abdominal pain  GENITOURINARY: denies dysuria, discharge  NEUROLOGICAL: denies numbness, headache, focal weakness  MUSCULOSKELETAL: denies new joint pain, muscle aches      T(C): 36.6 (20 @ 07:55), Max: 36.9 (20 @ 23:37)  HR: 84 (20 @ 07:55) (78 - 109)  BP: 120/77 (20 @ 07:55) (118/74 - 143/86)  RR: 18 (20 @ 07:55) (17 - 19)  SpO2: 98% (20 @ 07:55) (96% - 98%)    GENERAL: no acute distress,   EYES: sclera clear, no exudates, PERRLA  ENMT:  moist mucous membranes  NECK: supple, soft,   LUNGS:   auscultation,  no rales, wheezing or rhonchi  , chest pacemkaer +  HEART: S1/S2, regular rate and rhythm, no murmurs noted, no lower extremity edema  GASTROINTESTINAL: abdomen is soft, nontender, nondistended, normoactive bowel sounds, no palpable masses  INTEGUMENT:  warm, dry and intact  MUSCULOSKELETAL: no clubbing or cyanosis, no obvious deformity , mid line upper ext   NEUROLOGIC: awake, alert, oriented x2, strength 5/5 in all extremities, no obvious sensory deficits   gu intct     LABS:                        8.5    1.85  )-----------( 144      ( 2020 07:38 )             26.9     02-07    138  |  104  |  11  ----------------------------<  171<H>  3.4<L>   |  23  |  0.98    Ca    8.9      2020 07:38    TPro  6.4  /  Alb  2.9<L>  /  TBili  0.9  /  DBili  x   /  AST  21  /  ALT  43  /  AlkPhos  151<H>  02-    Urinalysis Basic - ( 2020 11:46 )    Color: Yellow / Appearance: Clear / S.005 / pH: x  Gluc: x / Ketone: Negative  / Bili: Negative / Urobili: Negative   Blood: x / Protein: Negative / Nitrite: Negative   Leuk Esterase: Negative / RBC: x / WBC x   Sq Epi: x / Non Sq Epi: x / Bacteria: x

## 2020-02-07 NOTE — PROGRESS NOTE ADULT - PROBLEM SELECTOR PLAN 9
hx pacemaker - TTE done as above 1/10/2020 showing normal LVSF with grade 1 DD- on lasix 40 mg daily   - Cardiology ([facility]) consulted,

## 2020-02-07 NOTE — PROGRESS NOTE ADULT - PROBLEM SELECTOR PROBLEM 5
Obstructive sleep apnea syndrome
Obstructive sleep apnea syndrome
Thoracic aortic aneurysm (TAA)
Thoracic aortic aneurysm (TAA)

## 2020-02-07 NOTE — PROGRESS NOTE ADULT - PROBLEM SELECTOR PLAN 7
H/o dementia with behavioral disturbance during previous hospitalization and at home. Pt w hallucinations yesterday afternoon, likely contributing to AMS.  - Fall precautions  - Constant observation 1:1
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
## 2020-02-07 NOTE — PROGRESS NOTE ADULT - PROBLEM SELECTOR PROBLEM 4
Dementia without behavioral disturbance, unspecified dementia type
Dementia without behavioral disturbance, unspecified dementia type
HTN (hypertension)
HTN (hypertension)

## 2020-02-07 NOTE — GOALS OF CARE CONVERSATION - ADVANCED CARE PLANNING - CONVERSATION DETAILS
met pt, A&O x2, knew president name & yr, wife present: pt has hcp, wife is hcp,   will provide a copy for chart. inquired pt resuscitation wishes: at this time, pt is full code.  PC RN contact # given

## 2020-02-07 NOTE — HISTORY OF PRESENT ILLNESS
[TextBox_4] : 75 yo gentleman with recently diagnosed Hodgkins lymphoma and emphysema is here for follow up.\par \par Was seen after PFTs given severe QUINTANILLA. PFTs with normal spirometry, normal lung volumes, moderately decreased DLCO, No BD response. \par CTA was negative 2020. \par Was diagnosed with DANIEL in , uses CPAP every night but now reports dry mouth and not good sx relieve. Unable to comply w treatment well\par \par PMH: COPD; HTN, HLD, CAD, s/p AVR (bioprosthetic), s/p PPM, hx of tracheostomy  (now decannulated), tracheal stenosis. \par Meds: per chart\par All: No drug allergies\par SH: Former smoker, started at age 18, quit at age 38 (smoked about 1/2 ppd). Worked making pallets. \par FH: daughter has asthma; father had lung ca ( at 62, was hearvy smoker) \par PMD: JARON DELEON\par Heme/Onc: Dr Majano - [phone]; fax [phone]\par Immunizations: had  and 23; had flu shot this season.

## 2020-02-07 NOTE — ASSESSMENT
[FreeTextEntry1] : 75 yo male with recently diagnosed Hodgkin's lymphoma with mediastinal LN and multiple pulmonary nodules here for follow up. \par \par #Pulmonary Nodules/GGO - multiple pulmonary nodules noted on CT chest - largest LLL is 12x10 cm (from 7 mm on 11/3/19) - could be 2/2 lymphoma vs less likely other malignancy. Patient is already scheduled for PET -CT. Will f/u results. Would favor repeating chest imaging 2-3 months after initiation of chemotherapy to evaluate. \par \par #SOB: remote hx of smoking. PFTs today with no e/o obstruction. \par Low DLCO noted. Recent TTE with no e/o pHTN, CTA with no PE\par -- patient appears clinically volume overloaded\par -- will start Lasix - repeat BMP in 7-10 days\par \par #Hx of tracheostomy - no e/o tracheal stenosis on most recent CT chest\par \par #DANIEL: f/u with sleep specialist. On CPAP\par \par #HCM: UTD with vaccinations. \par \par All questions answered. Patient and wife in agreement with plan. \par Follow up in 3-4 weeks or sooner if needed.\par

## 2020-02-07 NOTE — PROGRESS NOTE ADULT - PROBLEM SELECTOR PLAN 5
4.9 cm Ascending aortic aneurysm noted on CTA chest on1/8/20. Pt is asymptomatic (no CP, BP stable) and is less than 5.0 cm, pt will follow up outpatient  - Cardiology ([facility]) consulted, f/u recs 4.9 cm Ascending aortic aneurysm noted on CTA chest on1/8/20. Pt is asymptomatic (no CP, BP stable) and is less than 5.0 cm, pt will follow up outpatient  - Cardiology ([facility]) consulted,

## 2020-02-07 NOTE — PROGRESS NOTE ADULT - SUBJECTIVE AND OBJECTIVE BOX
[INTERVAL HX: ]  Patient seen and examined;  Chart reviewed and events noted;   no CP, no SOB  no diarrhea.       Patient is a 74y Male with a known history of :  Altered mental status (R41.82) [Active]  Pacemaker (Z95.0) [Active]  Hypertension (I10) [Active]  Lymphoma (C85.90) [Active]  Dementia (F03.90) [Active]  COPD (chronic obstructive pulmonary disease) (J44.9) [Active]  Depression (F32.9) [Active]  DVT (deep venous thrombosis) (I82.409) [Active]  HLD (hyperlipidemia) (E78.5) [Active]  HTN (hypertension) (I10) [Active]  Aortic valve replaced (Z95.2) [Active]  Stented coronary artery (Z95.5) [Active]  CAD (coronary artery disease) (I25.10) [Active]  S/P AVR (aortic valve replacement) (Z95.2) [Active]  Cardiac pacemaker (Z95.0) [Active]  Artificial pacemaker (Z95.0) [Active]  S/P aortic valve replacement with porcine valve (Z95.3) [Active]    HPI:  74M with h/o COPD, DANIEL, Depression, HLD, HTN, CAD s/p PCI, CHB s/p PPM placed in 2015, bioprosthetic AVR, dementia, recent diagnosis of Hodgkin's lymphoma in Dec 2019 who presents with confusion. History obtained from wife Patricia. Patient woke up this morning and had oatmeal and a cookie for breakfast at 4 AM. Patient's wife states that patient had sudden difficulty speaking and couldn't sit still. He asked for ice cream repeatedly. Patient's wife report she had an anxiety attack because she had never experienced an episode like this before. Patient's nurse arrived at 10 AM this morning and patient would not allow nurse to take his temperature. Wife decided to seek further evaluation at [facility]. Patient is restless in bed on examination. Of note, patient with recent admission to [facility] in January 2020 for fevers 2/2 Hodgkin's lymphoma. Denies fever, chills, chest pain, abdominal pain, dizziness, lightheadedness, nausea, or vomiting.      In the ED:  Vitals: , /96, on BIPAP  Labs: RBC 3.60, H/H 9.8/30.4, MCV 68.3, MCH 22.0, RCDW 20.8, Lymphocyte 0.87, Granulocyte 5.8%, PT/INR 14/1.24, PTT 25.3, CO2 21, Albumin 3.2, Alk Phos 186, CPK 3.9  UA negative  CT head: unremarkable  CXR Prominent right hilus, appearance is unchanged since the prior exam of 1/31/2020. No lobar consolidation vascular congestion or effusion.  Doppler LE b/l: no DVT  EKG: NSR, ventricular paced rhythm (05 Feb 2020 14:08)            MEDICATIONS  (STANDING):  atorvastatin 10 milliGRAM(s) Oral at bedtime  chlorhexidine 2% Cloths 1 Application(s) Topical daily  folic acid 1 milliGRAM(s) Oral daily  furosemide    Tablet 40 milliGRAM(s) Oral daily  heparin  Injectable 5000 Unit(s) SubCutaneous every 12 hours  imipramine 25 milliGRAM(s) Oral daily  metoprolol tartrate 50 milliGRAM(s) Oral two times a day  pantoprazole    Tablet 40 milliGRAM(s) Oral before breakfast  risperiDONE   Tablet 1 milliGRAM(s) Oral two times a day  thiamine 100 milliGRAM(s) Oral daily    MEDICATIONS  (PRN):  albuterol/ipratropium for Nebulization 3 milliLiter(s) Nebulizer every 4 hours PRN Shortness of Breath and/or Wheezing  OLANZapine Injectable 10 milliGRAM(s) IntraMuscular every 6 hours PRN Agitation      Vital Signs Last 24 Hrs  T(C): 36.5 (07 Feb 2020 12:40), Max: 36.9 (06 Feb 2020 23:37)  T(F): 97.7 (07 Feb 2020 12:40), Max: 98.5 (06 Feb 2020 23:37)  HR: 88 (07 Feb 2020 12:40) (78 - 109)  BP: 103/58 (07 Feb 2020 13:04) (99/66 - 143/86)  BP(mean): --  RR: 18 (07 Feb 2020 12:40) (17 - 19)  SpO2: 98% (07 Feb 2020 12:40) (96% - 98%)      [PHYSICAL EXAM]  General: adult in NAD,  WN,  WD.  HEENT: clear oropharynx, anicteric sclera, pink conjunctivae.  Neck: supple, no masses.  CV: normal S1S2, no murmur, no rubs, no gallops.  Lungs: clear to auscultation, no wheezes, no rales, no rhonchi.  Abdomen: soft, non-tender, non-distended, no hepatosplenomegaly, normal BS, no guarding.  Ext: no clubbing, no cyanosis, no edema.  Skin: no rashes,  no petechiae, no venous stasis changes.  Neuro: alert and oriented X  , no focal motor deficits.  LN: no SC GUERO.      [LABS:]                        8.5    1.85  )-----------( 144      ( 07 Feb 2020 07:38 )             26.9     02-07    138  |  104  |  11  ----------------------------<  171<H>  3.4<L>   |  23  |  0.98    Ca    8.9      07 Feb 2020 07:38    TPro  6.4  /  Alb  2.9<L>  /  TBili  0.9  /  DBili  x   /  AST  21  /  ALT  43  /  AlkPhos  151<H>  02-06    PT/INR - ( 06 Feb 2020 07:18 )   PT: 14.9 sec;   INR: 1.32 ratio         PTT - ( 06 Feb 2020 07:18 )  PTT:31.9 sec              [RADIOLOGY STUDIES:]    < from: US Duplex Venous Lower Ext Complete, Bilateral (02.05.20 @ 14:55) >  EXAM:  US DPLX LWR EXT VEINS COMPL BI                            PROCEDURE DATE:  02/05/2020          INTERPRETATION:  History: Short of breath.    Bilateral lower extremity venous Doppler.    Slightly limited. The distal left femoral vein could not be adequately interrogated as the patient could not tolerate compression. Otherwise the remainder of the left femoral vein, the right femoral vein bilateral popliteal and posterior tibial veins are patent compressible with normal flow, no unusual echoes.    Impression: Slightly limited. No ultrasound evidence of DVT bilateral lower extremities.      SUSANNAH CHRISTIAN M.D., ATTENDING RADIOLOGIST  This document has been electronically signed. Feb 5 2020  3:00PM  < end of copied text >      < from: CT Angio Chest w/ IV Cont (02.05.20 @ 18:04) >    PROCEDURE DATE:  02/05/2020          INTERPRETATION:  History: Lymphoma, rule out pulmonary emboli    CTA chest.    90 cc Omnipaque 350 injected intravenously.    Axial images coronal sagittal reformats, 3-D MIP images. Prior 1/8/2020.    Satisfactory contrast bolus. No pulmonary emboli. Ectatic ascending thoracic aorta similar to prior. No dissection. Central airways patent.  Improvement in mediastinal adenopathy. Scattered nonspecific subcentimeter mediastinal lymph nodes persist.  Scattered bilateral small parenchymal nodules similar to prior likely reflecting metastatic disease. No acute lobar consolidation or pleural collection.  Normal heart size. No pericardial effusion. Left cardiac pacer in situ.  Calcified gallstone.  Stable osseous structures.    Impression:    No pulmonary emboli or other acute finding.  Nonacute findings as discussed    < end of copied text >
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
## 2020-02-07 NOTE — PROGRESS NOTE ADULT - PROBLEM SELECTOR PLAN 8
Coagulopathy sec to hodgkin's  lymphoma  - Heme/onc Dr. Majano consulted, recs appreciated Coagulopathy sec to hodgkin's  lymphoma  - Heme/onc Dr. Majano consulted.

## 2020-02-07 NOTE — PROGRESS NOTE ADULT - PROBLEM SELECTOR PROBLEM 3
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
Pancytopenia
Pancytopenia

## 2020-02-07 NOTE — PROGRESS NOTE ADULT - PROBLEM SELECTOR PROBLEM 6
Coronary artery disease without angina pectoris, unspecified vessel or lesion type, unspecified whether native or transplanted heart
Coronary artery disease without angina pectoris, unspecified vessel or lesion type, unspecified whether native or transplanted heart
CAD (coronary artery disease)
CAD (coronary artery disease)

## 2020-02-07 NOTE — REVIEW OF SYSTEMS
[Fatigue] : fatigue [Dyspnea] : dyspnea [SOB on Exertion] : sob on exertion [Negative] : Endocrine [Cough] : no cough [Sputum] : no sputum [TextBox_110] : on chemo

## 2020-02-07 NOTE — PROGRESS NOTE ADULT - PROBLEM SELECTOR PLAN 4
Chronic, stable  - Continue home Metoprolol 50 mg PO qd  - Cardiology ([facility]) consulted, f/u recs Chronic, stable  - Continue home Metoprolol 50 mg PO bid.   - Cardiology ([facility]) consulted, f/u recs

## 2020-02-07 NOTE — PROGRESS NOTE ADULT - ASSESSMENT
74M with h/o COPD, DANIEL, Depression, HLD, HTN, CAD s/p PCI, CHB s/p PPM placed in 2015, bioprosthetic AVR, dementia, recent diagnosis of Hodgkin's lymphoma in Dec 2019 who presents with confusion admitted for acute  hypoxic  respiratory failure   stage 4 Hodgkin lymphoma  post chemo    .

## 2020-02-07 NOTE — DISCUSSION/SUMMARY
[FreeTextEntry1] : CTA 1/8/2020 - jeff PE, mediastinal adenopathy, bilateral pulmonary nodules\par \par Ct c/a/p 12/14/19 - pelvic and abd LN, mild splenomegaly,  patchy GGO in lower lobes, 12x10 mm LLL lung nodule and multiple additional nodules. \par \par TTE 1/11/2020 - NLV, bioprosthetic AV, mild LA enlargement, diastolic dysfunction.\par \par

## 2020-02-07 NOTE — PROGRESS NOTE ADULT - PROBLEM SELECTOR PLAN 1
remains with confusion - on 1 to 1 obs -   on room air  LE doppler and CT chest neg for VTE - neg for acute thoracic path  not using NIPPV - has hx of DANIEL -  Onc follow up reviewed  plan for further Chemo Rx as per Onc  NEBS prn for COPD  anti psychotics on order for agitation and outbursts  otherwise, looks better  vs and Meds and HD reviewed  DC planning  out of bed  nutrition  Psych eval noted

## 2020-02-07 NOTE — PHYSICAL EXAM
[Normal Oropharynx] : normal oropharynx [Normal Appearance] : normal appearance [IV] : Mallampati Class: IV [Normal Rate/Rhythm] : normal rate/rhythm [No Neck Mass] : no neck mass [No Murmurs] : no murmurs [Normal S1, S2] : normal s1, s2 [No Abnormalities] : no abnormalities [Benign] : benign [Gait - Sufficient For Exercise Testing] : gait sufficient for exercise testing [No Clubbing] : no clubbing [No Cyanosis] : no cyanosis [FROM] : FROM [Normal Color/ Pigmentation] : normal color/ pigmentation [1+ Pitting] : 1+ pitting [No Focal Deficits] : no focal deficits [Normal Affect] : normal affect [Oriented x3] : oriented x3 [TextBox_2] : QUINTANILLA; comfortable at rest [TextBox_68] : crackles at the bases bilaterally
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
## 2020-02-07 NOTE — ADDENDUM
[FreeTextEntry1] : Patient's aPap is not working (per technician note - it's broken beyond repair). \par New prescription will be sent.\par Patient needs to follow up with a sleep specialist.

## 2020-02-07 NOTE — PROGRESS NOTE ADULT - PROBLEM SELECTOR PLAN 1
Patient presents with acute dyspnea and AMS  - Unclear etiology; likely from underlying metabolic derangements vs chemotherapy (last session 1/31- pt did not tolerate dacarbazine per heme onc). Likely component of dementia with behavioral features contributing as pt had hallucinations yesterday afternoon.   Pt also on impiramine for many years; possible factor in AMS   AMS improved today per wife.   - ABG remarkable for respiratory alkalosis. VBG done this AM WNL, pH improved, no evidence of CO2 retention   - Dopplers LE b/l: no evidence of DVT  - Check CTA to r/o PE- negative  - On BIPAP, tolerating well   - Avoid high FiO2 as pt on Belomycin based chemo  - CXR Prominent right hilus, appearance is unchanged since the prior exam of 1/31/2020. No lobar consolidation vascular congestion or effusion.  - Pulmonary ([facility]) consulted, f/u recs Patient presents with acute dyspnea and AMS  - likely from underlying metabolic derangements possible  chemotherapy (last session 1/31- pt did not tolerate dacarbazine per heme onc). Likely component of dementia with behavioral features contributing as pt had hallucinations yesterday afternoon.  AMS improved today per wife.   - ABG remarkable for respiratory alkalosis. VBG done this AM WNL, pH improved, no evidence of CO2 retention   - Dopplers LE b/l: no evidence of DVT  - Check CTA to r/o PE- negative  - On BIPAP, tolerating well   - Avoid high FiO2 as pt on Belomycin based chemo  - CXR Prominent right hilus, appearance is unchanged since the prior exam of 1/31/2020. No lobar consolidation vascular congestion or effusion.  - Pulmonary ([facility]) consulted,

## 2020-02-07 NOTE — PROGRESS NOTE ADULT - PROBLEM SELECTOR PROBLEM 7
Chronic diastolic congestive heart failure
Chronic diastolic congestive heart failure
Dementia
Dementia

## 2020-02-07 NOTE — PROGRESS NOTE ADULT - PROBLEM SELECTOR PLAN 6
Urgency, frequency & low abd pressure.
CAD s/p PCI  - No longer taking ASA. Continue metoprolol 50mg q12, lipitor 10mg  - TTE done as above 1/10/2020 showing normal LVSF with grade 1 DD  - Cardiology ([facility]) consulted, f/u recs
CAD s/p PCI  - No longer taking ASA. Continue metoprolol 50mg q12, lipitor 10mg  - TTE done as above 1/10/2020 showing normal LVSF with grade 1 DD  - Cardiology ([facility]) consulted, f/u recs

## 2020-02-07 NOTE — PROGRESS NOTE ADULT - PROBLEM SELECTOR PLAN 3
H/o thalassemia trait and Hodgkin's lymphoma  - No overt signs of bleeding noted   - Continue folic acid 1mg PO daily and thiamine PO daily  - Monitor daily CBCs  - Heme/onc Dr. Majano consulted, recs appreciated H/o thalassemia trait and Hodgkin's lymphoma  - No overt signs of bleeding noted   - Continue folic acid 1mg PO daily and thiamine PO daily  - Monitor daily CBCs  - Heme/onc Dr. Majano consulted,

## 2020-02-07 NOTE — PROGRESS NOTE ADULT - ASSESSMENT
[ASSESSMENT and  PLAN]  Classical Hodgkin's Lymphoma.   CD30+, CD15+, CD20 neg.   Stage IV with GUERO below diaphragm.  BM bx with involvement of Bone marrow.   Started chemo with ABVD 01/16/20 Thurs.   Had rxn when 3/4 way trough administration of dacarbazine.   Last chemo on Fri 01/31/20  Today C1D22    Admitted with acute dyspena Wed 02/05 AM.   Breathing better now on BIPAP, with no supplemental O2, sat 100%.   Had negative CAT 01/08/20.   02/05 Repeat CTA negative for PE, improved mediastinal GUERO, stable lung nodules.   02/05 LE duplex neg.   ? hypercarbia, and CO2 retention?    hx Lung nodules of unclear etiology. Patchy GGO prior. ? lung involvement vs infectious causes.     Beta thal trait. Baseline chronic anemia.     Prior weak factor inhibitor.   Hx mild coagulopathy with INR 1.4 - 1.8. Elevated PTT also.   Workup last admission with weak factor inhibitor.   Since Normalization of PT and PTT s/p first cycle of chemo.     Thrombocytopenia, due to lymphoma.   Since improved after C1 chemo.     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   Better since on chemo.     Anemia due to lymphoma and chemo.   Stable. Imrpoved with chemotx.       Cardiac comorbidities  AVR, CAD, HTN, PPM for arrythmia   ? Liver disease. CT imaging prior unrevealing.       RECOMMENDATIONS:  DC planning    Nebs, steroids per pulm.  Avoid high FiO2 as pt on Belomycin based chemo    DVT Prophylaxis: LMWH or SQ heparin    Continue Folic acid daily for thal trait.     Dressing change for PICC  Reinstitute home nursing care.     Follow in office next week 02/13 Thurs    Thank you for consulting us.   No additional recommendations at current time.   Will sign off on case for now.   Please call, or re-consult if needed.

## 2020-02-07 NOTE — PROGRESS NOTE ADULT - PROBLEM SELECTOR PLAN 2
Patient was diagnosed with Classic Hodgkin's Lymphoma Dec 2019. CD30+, CD15+, CD20 neg. Sees Dr. Majano as outpt (heme/onc)  - F/u blood cx, urine cx- NGTD  - s/p ir guided bone marrow biopsy and picc line for chemo 1/13--tolerated procedure well.  - Started chemo with ABVD 01/16/20 Thurs. Had rxn when 3/4 way trough administration of dacarbazine. Last chemo on Fri 1/31/20  - Pt pancytopenic- would consider epogen   - Heme/onc Dr. Majano consulted, recs appreciated Patient was diagnosed with Classic Hodgkin's Lymphoma Dec 2019. CD30+, CD15+, CD20 neg. Sees Dr. Majano as outpt (heme/onc)  - blood cx, urine cx- NGTD  - s/p ir guided bone marrow biopsy and picc line for chemo 1/13--tolerated procedure well.  - Started chemo with ABVD 01/16/20 Thurs. Had rxn when 3/4 way trough administration of dacarbazine. Last chemo on Fri 1/31/20  - Pt pancytopenic- would consider epogen   - Heme/onc Dr. Majano consulted, recs appreciated

## 2020-02-08 NOTE — PROGRESS NOTE ADULT - PROBLEM SELECTOR PROBLEM 1
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
Altered mental status
Altered mental status
Pacemaker
Pacemaker

## 2020-02-08 NOTE — DISCHARGE NOTE NURSING/CASE MANAGEMENT/SOCIAL WORK - PATIENT PORTAL LINK FT
You can access the FollowMyHealth Patient Portal offered by [facility] by registering at the following website: [email or web address]. By joining [employer]’s FollowMyHealth portal, you will also be able to view your health information using other applications (apps) compatible with our system.

## 2020-02-08 NOTE — PROGRESS NOTE ADULT - ATTENDING COMMENTS
I personally saw and examined the patient in detail.  I have spoken to the above provider regarding the assessment and plan of care.  I reviewed the above assessment and plan of care, and agree.  I have made changes in the body of the note where appropriate.
Chart reviewed    Patient seen and examined    Agree with plan as outlined above
I personally saw and examined the patient in detail.  I have spoken to the above provider regarding the assessment and plan of care.  I reviewed the above assessment and plan of care, and agree.  I have made changes in the body of the note where appropriate.
pt seen and examine see above plan - admitted for acute  hypoxic  respiratory failure   stage 4 Hodgkin lymphoma  post chemo    - s/p bipap  , o2 via nc , fu  closely . pancytopenia post chemo  discussed   with hem/onc dr agustin no need   Neupogen  with  hodgkin lymphoma tt .
pt seen and examine see above plan - admitted for acute  hypoxic  respiratory failure   stage 4 Hodgkin lymphoma  post chemo    - s/p bipap   improved  . pancytopenia post chemo  discussed   with hem/onc dr agustin no need   Neupogen  with  hodgkin lymphoma tt .  dc plan in am if pt stable. AMS  - likely from underlying metabolic derangements possible  chemotherapy (last session 1/31- pt did not tolerate dacarbazine per heme onc)  and Likely component of dementia with behavioral features - Risperdal 1 tab mg po bid  , dr perez psych  .

## 2020-02-08 NOTE — PROGRESS NOTE ADULT - PROBLEM SELECTOR PLAN 1
no event overnight  on room air  on 1 to 1 obs  LE doppler and CT chest neg for VTE - neg for acute thoracic path  not using NIPPV - has hx of DANIEL -  Onc follow up reviewed  plan for further Chemo Rx as per Onc  NEBS prn for COPD  anti psychotics on order for agitation and outbursts  otherwise, looks better  vs and Meds and HD reviewed  DC planning  out of bed  nutrition  Psych eval noted.

## 2020-02-08 NOTE — PROGRESS NOTE ADULT - ASSESSMENT
74M with h/o COPD, DANIEL, Depression, HLD, HTN, CAD s/p PCI, CHB s/p PPM, bioprosthetic AVR, dementia, recent diagnosis of classic Hodgkin's lymphoma in Dec 2019 who presents with SOB and altered mental status. Cardio consulted for SOB.  Patient with  Hodgkin's lymphoma Stage IV with GUERO below diaphragm. BM bx with involvement of Bone marrow.  Patient presents with a symptomatic respiratory alkalosis, confusion and delerium and SOB with QUINTANILLA. Objective data and physical exam do not reflect gross volume overload or active ischemia.    HFpEF with with DD   - Presented with proBNP 724.  He is euvolemic on exam.  Tolerating RA  - Repeat ECHO w/ concentric LVH grossly Nl LVSF relatively unchanged   - Hold off of diuresis for now pt w/ soft SBP at times.  If SBP maintains >100 can resume tomorrow   - Monitor electrolytes, replete to keep K>4 and Mag>2    SOB/respiratory alkalosis on blood gas:  - Follow up with pulm and hem/onc regarding etiology of respiratory alkalosis, malignancy vs. infection  - CT angio negative for PE  - On home CPAP.  However, he has not been using it x 2 week PTA as it was broken  - Supplemental O2 prn    CAD s/p PCI  - No evidence of active CAD   - Recommend resuming ASA  - Continue metoprolol 50mg q12.    - Continue Lipitor 10mg    CHB s/p PPM  - EKG Vpaced at this time    Bioprosthetic AVR  -TTE: 1/10/2020 shows normal LVSF with grade 1 DD with gradient of 28 mmHg which is likely normal in the setting of his prosthetic valve  - Monitmor volume status    HLD  -Continue statin    DVT ppx  - Per Primary    Other cardiovascular workup will depend on clinical course  All other workup per primary team  Will follow     James Gan [facility]  Cardiology   Spectra #[phone]

## 2020-02-08 NOTE — DISCHARGE NOTE NURSING/CASE MANAGEMENT/SOCIAL WORK - NSDCPEWEB_GEN_ALL_CORE
[place] website --- [email or web address] for [email or web address] website --- [email or web address]
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
## 2020-02-08 NOTE — DISCHARGE NOTE NURSING/CASE MANAGEMENT/SOCIAL WORK - NSDCPEEMAIL_GEN_ALL_CORE
[facility] for Tobacco [facility] email [email or web address]

## 2020-02-11 PROBLEM — Z87.09 HISTORY OF CHRONIC OBSTRUCTIVE LUNG DISEASE: Status: RESOLVED | Noted: 2020-01-01 | Resolved: 2020-01-01

## 2020-02-11 PROBLEM — K59.00 CONSTIPATION: Status: ACTIVE | Noted: 2020-01-01

## 2020-02-11 PROBLEM — Z87.891 FORMER SMOKER: Status: ACTIVE | Noted: 2019-01-01

## 2020-02-11 NOTE — ASSESSMENT
[FreeTextEntry1] : 74 RHM with history of ocular migraines and family history of dementia, with concern for mild cognitive impairment with memory loss vs. early dementia, characterized by deficits in short-term memory, visuospatial function, executive function, and fluency; also with bruxism, vivid dreams, en bloc turning, and retropulsion, which may be early signs of idiopathic Parkinson's disease.

## 2020-02-11 NOTE — PHYSICAL EXAM
[No Acute Distress] : no acute distress [Normal Oropharynx] : normal oropharynx [IV] : Mallampati Class: IV [No Neck Mass] : no neck mass [Normal Appearance] : normal appearance [Normal Rate/Rhythm] : normal rate/rhythm [No Murmurs] : no murmurs [Normal S1, S2] : normal s1, s2 [Clear to Auscultation Bilaterally] : clear to auscultation bilaterally [No Resp Distress] : no resp distress [Normal Gait] : normal gait [Benign] : benign [No Abnormalities] : no abnormalities [No Cyanosis] : no cyanosis [No Clubbing] : no clubbing [No Edema] : no edema [FROM] : FROM [Normal Color/ Pigmentation] : normal color/ pigmentation [No Focal Deficits] : no focal deficits [Oriented x3] : oriented x3 [Normal Affect] : normal affect

## 2020-02-11 NOTE — DISCUSSION/SUMMARY
[FreeTextEntry1] : CTA negative 1/8/2020. \par \par TTE 1/10/2020 - normal LV function; grade 1 diastolic dysfunction

## 2020-02-11 NOTE — DATA REVIEWED
[de-identified] : CT Head & CTA Head/Neck (11/2/19):\par - No acute intracranial abnormality\par - Chronic microvascular disease\par - Diffuse atrophy present\par - No significant intracranial or neck vascular abnormality

## 2020-02-11 NOTE — HISTORY OF PRESENT ILLNESS
[Former] : former [< 30 pack-years] : < 30 pack-years [Never] : never [TextBox_4] : 73 yo gentleman with recently diagnosed Hodgkins lymphoma and emphysema is here for follow up.\par \par Was seen last week for severe QUINTANILLA and started on Lasix. Subsequently, was hospitalized again at [place] with agitation/confusion. He was continued on diuretic therapy. CTA was negative for PE. Initial ABG showed significant metabolic alkalosis ( I suspect 2/2 hyperventilation during the stick). Subsequent VBG was normal. \par It was also discovered that patient's CPAP machine is not working properly. currently not using. \par Today, patient reports feeling well. He is not confused. His respiratory status is improved. He has not needed his nebulizers or inhalers since discharge. Scheduled for next round of chemotherapy this friday.\par \par PMH: emphysema, DANIEL (diagnosed in ) HTN, HLD, CAD, s/p AVR (bioprosthetic), s/p PPM, hx of tracheostomy  (now decannulated), tracheal stenosis. \par Meds: per chart, reviewed\par All: No drug allergies\par SH: Former smoker, started at age 18, quit at age 38 (smoked about 1/2 ppd). Worked making pallets. \par FH: daughter has asthma; father had lung ca ( at 62, was hearvy smoker) \par PMD: JARON DELEON\par Heme/Onc: Dr Majano - [phone]; fax [phone]\par Immunizations: had  and 23; had flu shot this season.  [TextBox_11] : 0.5 [TextBox_13] : 18 [TextBox_15] : 1985

## 2020-02-11 NOTE — PROCEDURE
[FreeTextEntry1] : EXTENDED NEUROBEHAVIORAL STATUS TESTING\par \par [This is a separate procedure note for the Neurobehavioral Status Examination performed during this encounter.]\par \par Mr. MCFADDEN was awake and alert, well groomed, and in no acute distress. He had fluent speech and no paraphasic errors. There was no anomia in conversation. Comprehension was intact. He was engaged in the discussion. He recounted his history well. He did not appear anxious or depressed.\par \par Recent memory: Mr. MCFADDEN can name the president and can say what he did yesterday (watched a [place] [employer] marathon on television).\par \par MoCA (Version 7.2) score out of 30: \par \par Memory Index Score (MIS) out of 15: 5\par \par Visuospatial/Executive\par           Trails: (-1) B-C-3-D-4-5\par           Rectangle: (-1) Two-dimensional figure\par           Clock: (-1) Hands are equal in length\par \par Naming: (-1) Says "rhinoceros" instead of "hippopotamus"\par \par Memory (Registration): Intact 4/5 on both trials\par \par Attention:\par           Digit span 5 Forward: Intact\par           Digit span 3 Reverse: Intact\par           Letter A test: Intact\par           Serial 7 subtraction: 83 --> 66 --> 59 --> 43 --> 36\par \par Language:\par           Phrase repetition: (-1) Missed the word "closed" in the first sentence\par           Word fluency (F): (-1) 9 words\par \par Abstraction:\par           Stacy/Vero: "A gem"\par           Vázquez/Rifle: (-1) "Guns"\par \par Delayed recall score out of 5: 0 words\par           Additional words with category cue: 2 words (incorrectly recalls 2 other words with category cues)\par           Additional words with multiple choice cue: 1 word (incorrectly recalls 2 other words with category cues)\par \par Orientation: (-1) States date is "3rd" instead of "11th"\par \par \par Additional Neurobehavioral status tests:\par \par Animal naming fluency: 11 words\par \par Praxis:\par \par Ideomotor limb\par Transitive:\par           Brush teeth: Intact b/l\par           Comb hair: Intact b/l\par Intransitive:\par           Wave goodbye: Intact b/l\par           Motion "come here": Intact b/l\par Meaningless gestures: Intact to 4/4\par \par Right/Left Orientation:\par           "Show me your right hand": Correct\par           "Show me your left hand": Correct\par           "With your right hand, touch your left ear": Correct\par           "With your right hand, point to my left shoulder": Correct\par           "With your left hand, point to my left ear": Correct\par \par \par INTERPRETATION:\par \par I have carefully reviewed the above neurobehavioral status testing results. The cognitive domains are listed below, as well as my interpretation of whether or not there is an impairment or if performance was within normal limits. This differs from standard neuropsychological testing, since the raw scores alone on different validated batteries of tests may not detect or may overestimate subtle deficits.\par \par Cognitive domains:\par           Attention: Intact\par           Working Memory: Intact\par           Executive Function: Decreased\par           Language: Phonemic and Semantic Fluency Decreased\par           Memory: Decreased\par           Visuospatial Function: Decreased\par           Praxis: Intact\par           Behavior/Mood: Normal behavior and mood\par           Other comments: None\par \par 50 minutes were spent administering and interpreting the extended neurobehavioral status testing, as well as preparing this report.\par \par Testing start time: 11:15 am\par Testing end time: 11:45 am\par Report time: 20 minutes\par \par

## 2020-02-11 NOTE — PHYSICAL EXAM
[General Appearance - Alert] : alert [General Appearance - In No Acute Distress] : in no acute distress [Oriented To Time, Place, And Person] : oriented to person, place, and time [Impaired Insight] : insight and judgment were intact [Place] : oriented to place [Person] : oriented to person [Time] : oriented to time [Visual Intact] : visual attention was ~T not ~L decreased [Naming Objects] : no difficulty naming common objects [Fluency] : fluency intact [Comprehension] : comprehension intact [___ / 5] : Visuospatial / Executive: [unfilled] / 5 [0 / 0] : Memory: 0 / 0 [___ / 5] : Delayed Recall: [unfilled] / 5 [___ / 2] : Abstraction: [unfilled] / 2 [___ / 6] : Orientation: [unfilled] / 6 [Cranial Nerves Optic (II)] : visual acuity intact bilaterally,  visual fields full to confrontation, pupils equal round and reactive to light [Cranial Nerves Trigeminal (V)] : facial sensation intact symmetrically [Cranial Nerves Oculomotor (III)] : extraocular motion intact [Cranial Nerves Vestibulocochlear (VIII)] : hearing was intact bilaterally [Cranial Nerves Glossopharyngeal (IX)] : tongue and palate midline [Cranial Nerves Facial (VII)] : face symmetrical [Cranial Nerves Accessory (XI - Cranial And Spinal)] : head turning and shoulder shrug symmetric [Motor Tone] : muscle tone was normal in all four extremities [Cranial Nerves Hypoglossal (XII)] : there was no tongue deviation with protrusion [No Muscle Atrophy] : normal bulk in all four extremities [Motor Strength] : muscle strength was normal in all four extremities [Motor Handedness Right-Handed] : the patient is right hand dominant [Sensation Pain / Temperature Decrease] : pain and temperature was intact [Sensation Tactile Decrease] : light touch was intact [Abnormal Walk] : normal gait [Balance] : balance was intact [2+] : Ankle jerk left 2+ [PERRL With Normal Accommodation] : pupils were equal in size, round, reactive to light, with normal accommodation [Extraocular Movements] : extraocular movements were intact [Sclera] : the sclera and conjunctiva were normal [Outer Ear] : the ears and nose were normal in appearance [Oropharynx] : the oropharynx was normal [Neck Appearance] : the appearance of the neck was normal [Auscultation Breath Sounds / Voice Sounds] : lungs were clear to auscultation bilaterally [Heart Rate And Rhythm] : heart rate was normal and rhythm regular [Heart Sounds] : normal S1 and S2 [Arterial Pulses Carotid] : carotid pulses were normal with no bruits [Full Pulse] : the pedal pulses are present [Abdomen Soft] : soft [Bowel Sounds] : normal bowel sounds [Abdomen Tenderness] : non-tender [No CVA Tenderness] : no ~M costovertebral angle tenderness [No Spinal Tenderness] : no spinal tenderness [Skin Color & Pigmentation] : normal skin color and pigmentation [Skin Turgor] : normal skin turgor [] : no rash [___ / 30] : the patient achieved a total score of [unfilled] /30 [___ / 1] : Attention (Read list of letters): [unfilled] / 1 [___ / 3] : Naming: [unfilled] / 3 [4] : knee extension 4/5 [5] : ankle plantar flexion 5/5 [Nail Clubbing] : no clubbing  or cyanosis of the fingernails [Short Term Intact] : short term memory impaired [Remote Intact] : remote memory impaired [Repeating Phrases] : difficulty repeating a phrase [Paresis Pronator Drift Right-Sided] : no pronator drift on the right [Paresis Pronator Drift Left-Sided] : no pronator drift on the left [Motor Strength Upper Extremities Bilaterally] : strength was normal in both upper extremities [Romberg's Sign] : Romberg's sign was negtive [Allodynia] : no ~T allodynia present [Past-pointing] : there was no past-pointing [Tremor] : no tremor present [Dysdiadochokinesia Bilaterally] : not present [Coordination - Dysmetria Impaired Finger-to-Nose Bilateral] : not present [Coordination - Dysmetria Impaired Heel-to-Shin Bilateral] : not present [Plantar Reflex Right Only] : normal on the right [Plantar Reflex Left Only] : normal on the left [FreeTextEntry4] : Memory Impairment Screen: 5 / 15 [FreeTextEntry8] : Slow, narrow-based gait. En bloc turning and retropulsion present. Difficulty with tiptoe gait, but not with heel gait.  Lists to right with tandem gait, and loses balance. [FreeTextEntry1] : Paced rhythm

## 2020-02-11 NOTE — ASSESSMENT
[FreeTextEntry1] : 73 yo male with recently diagnosed Hodgkin's lymphoma with mediastinal LN and multiple pulmonary nodules, DANIEL, emphysema, s/p bioprosthetic AVR here for follow up. \par \par #Pulmonary Nodules/GGO - multiple pulmonary nodules noted on CT chest - largest LLL is 12x10 cm (from 7 mm on 11/3/19) - could be 2/2 lymphoma vs less likely other malignancy. Patient is already scheduled for PET -CT. Will f/u results. Would favor repeating chest imaging 2-3 months after initiation of chemotherapy to evaluate. \par \par #SOB: remote hx of smoking. PFTs  with no e/o obstruction but low DLCO e/o emphysema on imaging. \par Recent TTE with no e/o pHTN, CTA with no PE. \par He is not SOB at rest, some QUINTANILLA (overall improved) - suspect multifactorial in etiology 2/2 volume overload/deconditioning/emphysema.\par --much improved since initiation of Lasix - will increase dose to 60 mg daily. f/u BMP in one week\par -- no desaturations with ambulation - no need for home O2 at this point\par -- can use ipratropium prn\par \par #Hx of tracheostomy - no e/o tracheal stenosis on most recent CT chest\par \par #DANIEL: f/u with sleep specialist. CPAP machine currently not worling\par \par #HCM: UTD with vaccinations. \par \par All questions answered. Patient and wife in agreement with plan. \par Follow up in 3-4 weeks or sooner if needed.\par

## 2020-02-11 NOTE — HISTORY OF PRESENT ILLNESS
[FreeTextEntry1] : FROM 10/28/19:\par This is a 74-year-old right-handed man who states that he is fine, but that over the past 1-2 years, he has noticed that he forgets things, specifically the addresses and locations of familiar places. His wife states that, in fact,he has been forgetting familiar places for the past 5 years, since he had an aortic valve replacement and permanent pacemaker placement at [facility], with a complicated recovery period lasting 4-5 weeks before discharge. During this time, she has also observed him saying random or tangential things and then forgetting that he said anything. However, she notes that over the past 1-2 months, he seems to have had a slight improvement in his memory and speech.\par The patient has a diagnosis of obstructive sleep apnea, and regularly uses a CPAP machine at night. Nevertheless, the patient's wife has noticed that he has intermittent teeth gnashing at night (sometimes also during the day), as well as frequent vivid dreams that can wake him out of sleep. She has observed these for over 10 years, and they predate the patient's use of CPAP and his cardiac surgical procedures 5 years ago.\par The patient was previously diagnosed with ocular migraines, and states that he has not had any significant headaches in the last 1-2 years, although about once per month he does have episodes lasting a few seconds each of seeing colorful spirals with either or both eyes, which seem to get closer and bigger before they resolve, typically not associated with eye or head pain, nor with alteration of consciousness.\par \par FROM 2/11/20:\par The patient was admitted to [facility] last week, after his second round of chemotherapy on 1/31/20. A few days after that round, he developed new confusion, and was aware of his confusion. He was recommended by a visiting nurse to go to the hospital, where he received additional medications to keep him calm, but which also made him even more confused, even upon discharge. However, other than these recent issues with his orientation and attention, he has had no significant changes to his memory or mental status otherwise. He is no longer driving.

## 2020-02-12 NOTE — PHYSICAL EXAM
[Normal Sclera/Conjunctiva] : normal sclera/conjunctiva [No Acute Distress] : no acute distress [Normal Outer Ear/Nose] : the outer ears and nose were normal in appearance [No Respiratory Distress] : no respiratory distress  [No JVD] : no jugular venous distention [Normal Rate] : normal rate  [No Accessory Muscle Use] : no accessory muscle use [Regular Rhythm] : with a regular rhythm [de-identified] : essentially clear [de-identified] : right upper arm palpable edema

## 2020-02-12 NOTE — REVIEW OF SYSTEMS
[Cough] : cough [Fever] : no fever [Chills] : no chills [Earache] : no earache [Sore Throat] : no sore throat [Chest Pain] : no chest pain [Lower Ext Edema] : no lower extremity edema [Palpitations] : no palpitations [Shortness Of Breath] : no shortness of breath [Wheezing] : no wheezing [Abdominal Pain] : no abdominal pain

## 2020-02-12 NOTE — HISTORY OF PRESENT ILLNESS
[de-identified] : Pt comes for discussion of events of hospital  Saw neuro and pulm yesterday and results reviewed.  Pt seems to get anxious and then confused  NO fever  Rt upper arm has some edema related to PICC which needs to be changed  WIll be seeing Dr Majano tomorrow

## 2020-02-12 NOTE — PLAN
[FreeTextEntry1] : d/w wife trying aricept 5 mg at bedtime which Dr Upton prescried\par Keep anxiety down\par will need new picc or infusaport

## 2020-02-13 NOTE — H&P ADULT - ASSESSMENT
74M with h/o COPD, DANIEL on CPAP, Depression, HLD, HTN, CAD s/p PCI, CHB s/p PPM placed in 2015, bioprosthetic AVR, dementia, recent diagnosis of Hodgkin's lymphoma in Dec 2019 coming in for PICC line infection. Admit for PICC line removal/infection.       Patient noted bump on right arm on Tuesday 2/11/2020 and went to his PMD, Dr. Walker, for further evaluation who recommended patient be seen by oncologist.  Patient saw his oncologist, Dr. Majano, today who stated that PICC line was infected and needed ED evaluation.  Last chemo section was 1 week ago. PICC line in right bicep is infected, +itchy, denies burning throbbing or pain associated with the site. Pt denies fever, chills, confusion, dysuria, CP, SOB, abdominal pain, HA. Of note, patient was recently admitted to [facility] on 2/5/2020 for acute respiratory failure and metabolic encephalopathy in setting of electrolyte derangements possibly from chemotherapy.  In the ED, T 98F, , /82, RR 18, SpO2 99% on RA.  Labs significant for WBC 3.37, H/H 9.5/30.1, MCV 70.2, lactate 2, K 3.3, Tbili 1.3.  Patient received tylenol 650mg PO x1, zosyn x1 in the ED, NS bolus x1, vanc x1. Dr. Majano consulted by the ER. EKG:  bpm NSR, paced rhythm, RR' in lead III II but seen on prior EKGs. Doppler RUE: The right internal jugular, subclavian, brachial veins are patent and compressible where applicable.  The basilic vein demonstrates thrombus throughout its course. The cephalic vein is not seen. Radial and ulnar veins are also not seen. A PICC line is noted.   CXR: no acute lung pathology (CT scan 2/5/2020 showed occasional rather than small nodules in the lungs suggesting metastasis) 74M with h/o COPD, DANIEL on CPAP, Depression, HLD, HTN, CAD s/p PCI, CHB s/p PPM placed in 2015, bioprosthetic AVR, dementia, recent diagnosis of Hodgkin's lymphoma in Dec 2019 coming in for PICC line infection. Admit for PICC line removal/infection.

## 2020-02-13 NOTE — ED PROVIDER NOTE - CLINICAL SUMMARY MEDICAL DECISION MAKING FREE TEXT BOX
pt with probable picc line infection. will do labs per sepsis protocol, abx, cxr, us to r/o dvt, re-eval and admit.

## 2020-02-13 NOTE — H&P ADULT - NSICDXPASTSURGICALHX_GEN_ALL_CORE_FT
PAST SURGICAL HISTORY:  Artificial pacemaker for complete heart block    Cardiac pacemaker     S/P aortic valve replacement with porcine valve     S/P AVR (aortic valve replacement) bovine valve

## 2020-02-13 NOTE — ED PROVIDER NOTE - PROGRESS NOTE DETAILS
consulted dr pitts advised sepsis work up, iv abx, IR will replace picc tomorrow us shows dvt. consulted jorge baires for admission. will admit. advised admitted team will order anticoagulation and consult vascular. will admit.

## 2020-02-13 NOTE — ED PROVIDER NOTE - OBJECTIVE STATEMENT
pt is a 75yo male with pmhx of htn, copd and hodgkin's lymphoma on chemo presents with picc line infection. pt noted right arm "bump" tuesday this week, followed up with pmd who advised pt to monitor and follow up with oncologist. pt followed up with dr pitts oncologist today who advised pt his picc line is infected and needs ed eval. pt did not take anything for symptoms. last chemo 1 week ago. pt denies fever, cp, sob.  pmd: kendrick

## 2020-02-13 NOTE — H&P ADULT - NSHPREVIEWOFSYSTEMS_GEN_ALL_CORE
CONSTITUTIONAL: denies fever, chills, fatigue, weakness  HEENT: denies blurred visions, sore throat  SKIN: denies new lesions, rash  CARDIOVASCULAR: denies chest pain, chest pressure, palpitations  RESPIRATORY: denies shortness of breath, sputum production  GASTROINTESTINAL: denies nausea, vomiting, diarrhea, abdominal pain  GENITOURINARY: denies dysuria, discharge  NEUROLOGICAL: denies numbness, headache, focal weakness  MUSCULOSKELETAL: denies new joint pain, muscle aches  HEMATOLOGIC: denies gross bleeding, bruising  SKIN/LINES: ___erythema edema surrounding PICC line, exudates CONSTITUTIONAL: denies fever, chills, fatigue, weakness  HEENT: denies blurred visions, sore throat  CARDIOVASCULAR: denies chest pain, chest pressure, palpitations  RESPIRATORY: denies shortness of breath, sputum production  GASTROINTESTINAL: denies nausea, vomiting, diarrhea, abdominal pain  GENITOURINARY: obese, denies dysuria, discharge  NEUROLOGICAL: denies numbness, headache, focal weakness  MUSCULOSKELETAL: denies new joint pain, muscle aches  HEMATOLOGIC: denies gross bleeding, bruising  SKIN/LINES: right bicep picc line pruritis erythema CONSTITUTIONAL: denies fever, chills, fatigue, weakness  HEENT: denies blurred visions, sore throat  CARDIOVASCULAR: denies chest pain, chest pressure, palpitations  RESPIRATORY: denies shortness of breath, sputum production  GASTROINTESTINAL: denies nausea, vomiting, diarrhea, abdominal pain  GENITOURINARY: obese, denies dysuria, discharge  NEUROLOGICAL: denies numbness, headache, focal weakness  MUSCULOSKELETAL: denies new joint pain, muscle aches  HEMATOLOGIC: denies gross bleeding, bruising  SKIN/LINES: admits to right bicep picc line pruritis and erythema CONSTITUTIONAL: denies fever, chills, fatigue, weakness  HEENT: denies blurred visions, sore throat  CARDIOVASCULAR: denies chest pain, chest pressure, palpitations  RESPIRATORY: denies shortness of breath, sputum production  GASTROINTESTINAL: denies nausea, vomiting, diarrhea, abdominal pain  GENITOURINARY: denies dysuria, hesitancy  NEUROLOGICAL: denies numbness, headache, focal weakness  MUSCULOSKELETAL: denies joint pain, muscle aches  HEMATOLOGIC: denies gross bleeding, bruising  SKIN/LINES: admits to right bicep picc line pruritis and erythema

## 2020-02-13 NOTE — H&P ADULT - PROBLEM SELECTOR PLAN 5
-stable chronic  -anemia of chronic disease  -h/h H/H 9.5/30.1, baseline ~8.5 -stable chronic  -cont home dose metropolol

## 2020-02-13 NOTE — H&P ADULT - ATTENDING COMMENTS
1) Cellulitis surrounding site of PICC line- s/p 1 dose of vanco; c/w Zosyn for now; f/u blood cx's; ID consult- Dr. Majano  2) RUE DVT at site of PICC line- started on IV heparin for now given plan by IR to remove PICC line. Please coordinate with IR tomorrow to determine for how long IV heparin will need to be held prior to procedure.  3) Active hodgkins lymphoma on chemotherapy- Dr. Majano consulted. Leukopenia and anemia from chemotherapy  4) COPD with DANIEL- will order CPAP qhs, but pt historically has refused. Denies SOB and no signs of acute resp distress. Appears comfortable and non-toxic 1) Cellulitis surrounding site of PICC line- s/p 1 dose of vanco; c/w Zosyn for now; f/u blood cx's; ID consult- Dr. Majano  2) RUE DVT at site of PICC line- started on IV heparin for now given plan by IR to remove PICC line. Please coordinate with IR tomorrow to determine for how long IV heparin will need to be held prior to procedure. monitor PTT per protocol and monitor CBC given anemia.  3) Active hodgkins lymphoma on chemotherapy- Dr. Majano consulted. Leukopenia and anemia from chemotherapy. Monitor CBC daily  4) COPD with DANIEL- will order CPAP qhs, but pt historically has refused. Denies SOB and no signs of acute resp distress. Appears comfortable and non-toxic

## 2020-02-13 NOTE — H&P ADULT - PROBLEM SELECTOR PLAN 2
-recent diagnosis of Hodgkin's lymphoma in Dec 2019   -on chemo via PICC line, now infected  -heme/onc  pitts -Doppler RUE: The right internal jugular, subclavian, brachial veins are patent and compressible where applicable.  The basilic vein demonstrates thrombus throughout its course. The cephalic vein is not seen. Radial and ulnar veins are also not seen. A PICC line is noted.   -likely occurred in setting of active cancer causing increased thrombotic risk, and now PICC line infected  -will start on heparin gtt due to IR removal of PICC line on 2/14/2020, will need to hold at least 4 hours prior to procedure.  Day team to follow up with IR regarding timing of procedure and hold appropriately  -consulted heme/onc: dr pitts  -consulted vascular: alfonso -Doppler RUE: The right internal jugular, subclavian, brachial veins are patent and compressible where applicable.  The basilic vein demonstrates thrombus throughout its course. The cephalic vein is not seen. Radial and ulnar veins are also not seen. A PICC line is noted.   -will start on heparin gtt due to IR removal of PICC line on 2/14/2020, will need to hold at least 4 hours prior to procedure.  Day team to follow up with IR regarding timing of procedure and hold appropriately  -consulted heme/onc: dr pitts  -consulted vascular: alfonso

## 2020-02-13 NOTE — H&P ADULT - PROBLEM SELECTOR PLAN 4
-stable chronic  -cont home dose metropolol -stable, chronic  -risperidone started by psych on previous admission, will continue  -cont home med risperidone, donepezil -stable, chronic  per wife, p has not been receiving risperidone at home- does not want it started here, so will not to do so  c/w donepezil

## 2020-02-13 NOTE — H&P ADULT - HISTORY OF PRESENT ILLNESS
bpm NSR _______ 74M with h/o COPD, DANIEL on CPAP, Depression, HLD, HTN, CAD s/p PCI, CHB s/p PPM placed in 2015, bioprosthetic AVR, dementia, recent diagnosis of Hodgkin's lymphoma in Dec 2019 coming in for PICC line infection.  Patient noted bump on right arm on Tuesday 2/11/2020 and went to his PMD, Dr. Walker, for further evaluation who recommended patient be seen by oncologist.  Patient saw his oncologist, Dr. Majano, today who stated that PICC line was infected and needed ED evaluation.  Last chemo section was 1 week ago. PICC line in right bicep is infected, +itchy, denies burning throbbing or pain associated with the site. Pt denies fever, chills, confusion, dysuria, CP, SOB, abdominal pain, HA. Of note, patient was recently admitted to [facility] on 2/5/2020 for acute respiratory failure and metabolic encephalopathy in setting of electrolyte derangements possibly from chemotherapy.  In the ED, T 98F, , /82, RR 18, SpO2 99% on RA.  Labs significant for WBC 3.37, H/H 9.5/30.1, MCV 70.2, lactate 2, K 3.3, Tbili 1.3.  Patient received tylenol 650mg PO x1, zosyn x1 in the ED, NS bolus x1, vanc x1. Dr. Majano consulted by the ER. EKG:  bpm NSR, paced rhythm, RR' in lead III II but seen on prior EKGs. Doppler RUE: The right internal jugular, subclavian, brachial veins are patent and compressible where applicable.  The basilic vein demonstrates thrombus throughout its course. The cephalic vein is not seen. Radial and ulnar veins are also not seen. A PICC line is noted.   CXR: no acute lung pathology (CT scan 2/5/2020 showed occasional rather than small nodules in the lungs suggesting metastasis) 74M with h/o COPD, DANIEL on CPAP, Depression, HLD, HTN, CAD s/p PCI, CHB s/p PPM placed in , bioprosthetic AVR, dementia, recent diagnosis of Hodgkin's lymphoma in Dec 2019 coming in for PICC line infection.  Patient noted bump on right arm on 2020 and went to his PMD, Dr. Walker, for further evaluation who recommended patient be seen by oncologist.  Patient saw his oncologist, Dr. Majano, today who stated that PICC line was infected and needed ED evaluation.  Last chemo section was 1 week ago. PICC line in right bicep is infected, +itchy, denies burning throbbing or pain associated with the site. Pt denies fever, chills, confusion, dysuria, CP, SOB, abdominal pain, HA. Of note, patient was recently admitted to [facility] on 2020 for acute respiratory failure and metabolic encephalopathy in setting of electrolyte derangements possibly from chemotherapy.  In the ED, T 98F, , /82, RR 18, SpO2 99% on RA.  Labs significant for WBC 3.37, H/H 9.5/30.1, MCV 70.2, lactate 2, K 3.3, Tbili 1.3.  Patient received tylenol 650mg PO x1, zosyn x1 in the ED, NS bolus x1, vanc x1. Dr. Majano consulted by the ER.   EK bpm NSR, paced rhythm, RR' in lead III II but seen on prior EKGs.   Doppler RUE: The right internal jugular, subclavian, brachial veins are patent and compressible where applicable.  The basilic vein demonstrates thrombus throughout its course. The cephalic vein is not seen. Radial and ulnar veins are also not seen. A PICC line is noted.   CXR: no acute lung pathology (CT scan 2020 showed occasional rather than small nodules in the lungs suggesting metastasis)

## 2020-02-13 NOTE — H&P ADULT - NSICDXFAMILYHX_GEN_ALL_CORE_FT
FAMILY HISTORY:  No pertinent family history in first degree relatives FAMILY HISTORY:  FH: liver cancer  FH: ovarian cancer

## 2020-02-13 NOTE — H&P ADULT - NSHPPHYSICALEXAM_GEN_ALL_CORE
Vital Signs Last 24 Hrs  T(C): 36.7 (13 Feb 2020 15:47), Max: 36.7 (13 Feb 2020 15:47)  T(F): 98 (13 Feb 2020 15:47), Max: 98 (13 Feb 2020 15:47)  HR: 103 (13 Feb 2020 15:47) (103 - 103)  BP: 124/82 (13 Feb 2020 15:47) (124/82 - 124/82)  BP(mean): --  RR: 18 (13 Feb 2020 15:47) (18 - 18)  SpO2: 99% (13 Feb 2020 15:47) (99% - 99%)    Physical Exam:  General: Well developed, well nourished, No Acute Distress  HEENT: Normocephallic Atraumatic, PERRLA, EOMI bl, moist mucous membranes   Neck: Supple, nontender, no cervical lymphadenopathy  Neurology: A&Ox3, no focal neurological deficits: CNII-XII intact  Respiratory: Clear To Auscultation B/L, No Wheezes, rhonchi or rales  CV: Regular Rate and Rhythm, +S1/S2, no murmurs, rubs or gallops  Abdominal: Soft, Non-Tender, Non-Distended +Bowel Soundsx4  Extremities: No Clubbing, cyanosis or edema, + peripheral pulses: cap refill <2 secs LE bilaterally  Skin: warm, dry, normal color Vital Signs Last 24 Hrs  T(C): 36.7 (13 Feb 2020 15:47), Max: 36.7 (13 Feb 2020 15:47)  T(F): 98 (13 Feb 2020 15:47), Max: 98 (13 Feb 2020 15:47)  HR: 103 (13 Feb 2020 15:47) (103 - 103)  BP: 124/82 (13 Feb 2020 15:47) (124/82 - 124/82)  BP(mean): --  RR: 18 (13 Feb 2020 15:47) (18 - 18)  SpO2: 99% (13 Feb 2020 15:47) (99% - 99%)    Physical Exam:  General: Well developed, well nourished, No Acute Distress  HEENT: Normocephallic Atraumatic, PERRLA, EOMI bl, moist mucous membranes   Neck: Supple, nontender, no cervical lymphadenopathy  Neurology: A&Ox3, no focal neurological deficits: CNII-XII intact  Respiratory: Clear To Auscultation B/L, No Wheezes, rhonchi or rales  CV: pacemaker, Regular Rate and Rhythm, +S1/S2, no murmurs, rubs or gallops  Abdominal: obese, Soft, Non-Tender, Non-Distended +Bowel Soundsx4  Extremities: No Clubbing, cyanosis or edema, + peripheral pulses: cap refill <2 secs LE bilaterally  skin/lines: right bicep 7in x 4 inch area of erythema, edema, surrounding PICC line, skin mildly torn at surface, no exudates Vital Signs Last 24 Hrs  T(C): 36.7 (13 Feb 2020 15:47), Max: 36.7 (13 Feb 2020 15:47)  T(F): 98 (13 Feb 2020 15:47), Max: 98 (13 Feb 2020 15:47)  HR: 103 (13 Feb 2020 15:47) (103 - 103)  BP: 124/82 (13 Feb 2020 15:47) (124/82 - 124/82)  BP(mean): --  RR: 18 (13 Feb 2020 15:47) (18 - 18)  SpO2: 99% (13 Feb 2020 15:47) (99% - 99%)    Physical Exam:  General: Well developed, well nourished, No Acute Distress  HEENT: Normocephallic Atraumatic, PERRLA, EOMI bl, moist mucous membranes   Neck: Supple, nontender, no cervical lymphadenopathy  Neurology: A&Ox3, no focal neurological deficits: CNII-XII intact  Respiratory: Clear To Auscultation B/L, No Wheezes, rhonchi or rales  CV: pacemaker, Regular Rate and Rhythm, +S1/S2, no murmurs, rubs or gallops  Abdominal: obese, Soft, Non-Tender, Non-Distended +Bowel Soundsx4  Extremities: No Clubbing, cyanosis, 1+ edema b/l LE, + peripheral pulses: cap refill <2 secs LE bilaterally  skin/lines: right bicep 7in x 4 inch area of erythema, edema, surrounding PICC line, skin mildly torn at surface, no exudates, no tenderness to palpation

## 2020-02-13 NOTE — ED ADULT NURSE NOTE - NURSING MUSC JOINTS
right upper arm pain, swelling and redness for the last couple of days at Picc line site/no pain, swelling or deformity of joints

## 2020-02-13 NOTE — H&P ADULT - NSHPSOCIALHISTORY_GEN_ALL_CORE
lives at home with wife, performs all ADLs, uses walker, quit smoking when 33 years old, 5 pack years, denies etoh or wine use

## 2020-02-13 NOTE — H&P ADULT - PROBLEM SELECTOR PLAN 1
-right bicep PICC line surrounded by erythema, edema, mild skin tear, tachycardic   -IR will remove PICC line tomorrow   -s/p received tylenol 650mg PO x1, zosyn x1 in the ED, NS bolus x1, vanc x1  -cont zosyn    -heparin drip   -PT  -fall prec. aspiration prec, ambulate with assistance  -Doppler RUE: The right internal jugular, subclavian, brachial veins are patent and compressible where applicable.  The basilic vein demonstrates thrombus throughout its course. The cephalic vein is not seen. Radial and ulnar veins are also not seen. A PICC line is noted.   -CXR: no acute lung pathology (CT scan 2/5/2020 showed occasional rather than small nodules in the lungs suggesting metastasis)  -consult IR  -consult heme/onc: dr pitts  -consult vascular: alfonso -admit to [facility]  -right bicep PICC line surrounded by erythema, edema, mild skin tear, tachycardic   -IR will remove PICC line tomorrow, consulted by ER  -s/p received tylenol 650mg PO x1, zosyn x1 in the ED, will continue with zosyn    -consulted IR -admit to [facility]  -right bicep PICC line surrounded by erythema, edema, mild skin tear, tachycardic   -IR will remove PICC line tomorrow, consulted by ER  -s/p received tylenol 650mg PO x1, zosyn x1 in the ED, will continue with zosyn    -consulted IR  f/u blood cx's

## 2020-02-13 NOTE — ED ADULT NURSE NOTE - CHPI ED NUR SYMPTOMS POS
Xeroxed copy of meds that patient received while in rehab on 4/7 placed in patient's chart along with copy of chart from [facility]. This information was given to this nurse by the patient's mother. REDNESS/right upper arm pain, swelling and redness for the last couple of days at Picc line site/PAIN

## 2020-02-13 NOTE — H&P ADULT - PROBLEM SELECTOR PLAN 9
-stable chronic dvt ppx: heparin drip   -fall prec. aspiration prec, ambulate with assistance    problem 11: hypokalemia: K 3.3 , replete, f/u K in AM  problem 12: depression: cont imipramine,   problem 13: HLD: cont atorvastatin,   problem 14 CHF: cont home med lasix, not fluid overloaded at this time, only trace b/l LE edema    IMPROVE VTE Individual Risk Assessment        RISK                                                          Points  [  ] Previous VTE                                                3  [  ] Thrombophilia                                             2  [  ] Lower limb paralysis                                   2        (unable to hold up >15 seconds)    [ X ] Current Cancer                                            2         (within 6 months)  [  ] Immobilization > 24 hrs                              1  [  ] ICU/CCU stay > 24 hours                            1  [ X ] Age > 60                                                    1  IMPROVE VTE Score ____3_____

## 2020-02-13 NOTE — ED ADULT NURSE NOTE - ED STAT RN HANDOFF DETAILS
Pt stable and resting in bed. Pt denies any pain or discomfort as of this time. Pt admitted and handoff report giver to TIMOTHY Nunn for continuum of care. No sign of distress noted

## 2020-02-13 NOTE — ED PROVIDER NOTE - ATTENDING CONTRIBUTION TO CARE
73yo male with pmhx of htn, copd and hodgkin's lymphoma on chemo presents with picc line infection/arm swelling, labs and sono noted, will admit.

## 2020-02-13 NOTE — H&P ADULT - PROBLEM SELECTOR PLAN 10
dvt ppx:   [id number]: hypokalemia: K 3.3 , replete, f/u K in AM  IMPROVE VTE Individual Risk Assessment        RISK                                                          Points  [  ] Previous VTE                                                3  [  ] Thrombophilia                                             2  [  ] Lower limb paralysis                                   2        (unable to hold up >15 seconds)    [  ] Current Cancer                                            2         (within 6 months)  [  ] Immobilization > 24 hrs                              1  [  ] ICU/CCU stay > 24 hours                            1  [  ] Age > 60                                                    1  IMPROVE VTE Score _________ dvt ppx: heparin drip   problem 11: hypokalemia: K 3.3 , replete, f/u K in AM  problem 12: depression: cont imipramine,   problem 13: HLD: cont atorvastatin,   problem 14 CHF: cont home med lasix  IMPROVE VTE Individual Risk Assessment        RISK                                                          Points  [  ] Previous VTE                                                3  [  ] Thrombophilia                                             2  [  ] Lower limb paralysis                                   2        (unable to hold up >15 seconds)    [  ] Current Cancer                                            2         (within 6 months)  [  ] Immobilization > 24 hrs                              1  [  ] ICU/CCU stay > 24 hours                            1  [  ] Age > 60                                                    1  IMPROVE VTE Score _________ dvt ppx: heparin drip   -fall prec. aspiration prec, ambulate with assistance    problem 11: hypokalemia: K 3.3 , replete, f/u K in AM  problem 12: depression: cont imipramine,   problem 13: HLD: cont atorvastatin,   problem 14 CHF: cont home med lasix, not fluid overloaded at this time, only trace b/l LE edema    IMPROVE VTE Individual Risk Assessment        RISK                                                          Points  [  ] Previous VTE                                                3  [  ] Thrombophilia                                             2  [  ] Lower limb paralysis                                   2        (unable to hold up >15 seconds)    [ X ] Current Cancer                                            2         (within 6 months)  [  ] Immobilization > 24 hrs                              1  [  ] ICU/CCU stay > 24 hours                            1  [ X ] Age > 60                                                    1  IMPROVE VTE Score ____3_____

## 2020-02-13 NOTE — H&P ADULT - PROBLEM SELECTOR PLAN 3
-stable, chronic  -cont home med risperidone, donepezil -recent diagnosis of Hodgkin's lymphoma in Dec 2019   -on chemo via PICC line, now infected  -heme/onc  pitts

## 2020-02-13 NOTE — H&P ADULT - NSHPOUTPATIENTPROVIDERS_GEN_ALL_CORE
pcp dr marcie boston/onc   [facility] [place] marcie boston/onc   [facility] [place] dr stephy boston/onc   [facility] [place]

## 2020-02-13 NOTE — PATIENT PROFILE ADULT - NSPROGENSOURCEINFO_GEN_A_NUR
patient/health record/pt poor historian. Spouse assisted with History  H&P reviewed  Copy Forward Admit profile from January 8,2020,same reviewed and updated to accurately reflect the status of the patient at this time.

## 2020-02-13 NOTE — PATIENT PROFILE ADULT - NSPROGENOTHERPROVIDER_GEN_A_NUR
Dr Giovanni Walker primary  [phone]  Dr Majano [phone] oncologist  Dr Lulu Chun [phone]  pulmonary  Pt was receiving homecare from [facility] prior to admission to the hospital per spouse./home care/medical specialist

## 2020-02-13 NOTE — ED ADULT NURSE NOTE - OBJECTIVE STATEMENT
Pt received in bed alert and oriented and resting in bed with the c/o right upper arm pain, swelling and redness for the last couple of days at Picc line site. As per Md's orders IV yunior placed blood specimen obtained and sent to the lab. Pt stable and IV meds given and tolerated well.

## 2020-02-14 NOTE — PROGRESS NOTE ADULT - PROBLEM SELECTOR PLAN 3
-recent diagnosis of Hodgkin's lymphoma in Dec 2019   -on chemo via PICC line on right arm, now infected  -heme/onc dr pitts -recent diagnosis of Hodgkin's lymphoma in Dec 2019   -on chemo via PICC line on right arm, now infected  -s/p new left picc line insertion by IR today. Patient was scheduled for chemo tomorrow, will f/u heme/onc for rec  -heme/onc dr pitts

## 2020-02-14 NOTE — PROGRESS NOTE ADULT - PROBLEM SELECTOR PLAN 1
-right bicep PICC line surrounded by erythema, edema, mild skin tear, tachycardic on initial presentation  -IR will remove PICC line today and replace with a new one in left arm, no need to hold heparin  -s/p received tylenol 650mg PO x1, zosyn x1 in the ED, will continue with zosyn    f/u blood cx's -right bicep PICC line surrounded by erythema, edema, mild skin tear, tachycardic on initial presentation  -IR will remove PICC line today and replace with a new one in left arm, per IR, no need to hold heparin prior to procedure  -s/p received tylenol 650mg PO x1, zosyn x1 in the ED, will continue with zosyn    f/u blood cx's
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
## 2020-02-14 NOTE — PROGRESS NOTE ADULT - PROBLEM SELECTOR PLAN 8
-stable chronic  -denies CP -stable chronic  -continue atorvastatin 10mg, metoprolol bid   -denies CP

## 2020-02-14 NOTE — CHART NOTE - NSCHARTNOTEFT_GEN_A_CORE
patient had right upper arm 38cm PICC placed in January. Has insertion site cellulitis. DVT right axillary vein. Right PICC to be removed. New left upper arm single lumen 4fr PICC to be placed. Patient has left subclavian pacemaker. An informed consent obtained for PICC placement and left upper arm central venography.

## 2020-02-14 NOTE — BRIEF OPERATIVE NOTE - OPERATION/FINDINGS
left upper arm 4fr single lumen PICC placement through basilic vein. Tip in SVC. 15cc non ionic contrast used to visualize PICC catheter behind the pacemaker wires.

## 2020-02-14 NOTE — PROGRESS NOTE ADULT - ASSESSMENT
74M with h/o COPD, DANIEL on CPAP, Depression, HLD, HTN, CAD s/p PCI, CHB s/p PPM placed in 2015, bioprosthetic AVR, dementia, recent diagnosis of Hodgkin's lymphoma in Dec 2019 coming in for PICC line infection. Admit for PICC line removal/infection. 74M with h/o COPD, DANIEL on CPAP, Depression, HLD, HTN, CAD s/p PCI, CHB s/p PPM placed in 2015, bioprosthetic AVR, dementia, recent diagnosis of Hodgkin's lymphoma in Dec 2019 coming in for PICC line infection. Admit for PICC line removal/infection and RUE dvt.

## 2020-02-14 NOTE — PROGRESS NOTE ADULT - PROBLEM SELECTOR PLAN 2
-Doppler RUE: The right internal jugular, subclavian, brachial veins are patent and compressible where applicable.  The basilic vein demonstrates thrombus throughout its course. The cephalic vein is not seen. Radial and ulnar veins are also not seen. A PICC line is noted.   -continue with heparin gtt, IR consulted and no need to stop heparin for procedure  -consulted heme/onc: dr pitts  -consulted vascular: alfonso
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
## 2020-02-14 NOTE — PROGRESS NOTE ADULT - PROBLEM SELECTOR PLAN 9
dvt ppx: heparin drip   -fall prec. aspiration prec, ambulate with assistance    problem 11: hypokalemia: K 3.3 , replete, f/u K in AM  problem 12: depression: cont imipramine,   problem 13: HLD: cont atorvastatin,   problem 14 CHF: cont home med lasix, not fluid overloaded at this time, only trace b/l LE edema    IMPROVE VTE Individual Risk Assessment        RISK                                                          Points  [  ] Previous VTE                                                3  [  ] Thrombophilia                                             2  [  ] Lower limb paralysis                                   2        (unable to hold up >15 seconds)    [ X ] Current Cancer                                            2         (within 6 months)  [  ] Immobilization > 24 hrs                              1  [  ] ICU/CCU stay > 24 hours                            1  [ X ] Age > 60                                                    1  IMPROVE VTE Score ____3_____ dvt ppx - patient on heparin drip for dvt   -fall prec. aspiration prec, ambulate with assistance    problem 11: hypokalemia: K 3.3 , replete, f/u K in AM  problem 12: depression: cont imipramine,   problem 13: HLD: cont atorvastatin,   problem 14 CHF: cont home med lasix, not fluid overloaded at this time, only trace b/l LE edema    IMPROVE VTE Individual Risk Assessment        RISK                                                          Points  [  ] Previous VTE                                                3  [  ] Thrombophilia                                             2  [  ] Lower limb paralysis                                   2        (unable to hold up >15 seconds)    [ X ] Current Cancer                                            2         (within 6 months)  [  ] Immobilization > 24 hrs                              1  [  ] ICU/CCU stay > 24 hours                            1  [ X ] Age > 60                                                    1  IMPROVE VTE Score ____3_____

## 2020-02-14 NOTE — PROGRESS NOTE ADULT - PROBLEM SELECTOR PLAN 4
-stable, chronic  per wife, pt has not been receiving risperidone at home- does not want it started here, so will not to do so  c/w donepezil

## 2020-02-14 NOTE — PROGRESS NOTE ADULT - PROBLEM SELECTOR PLAN 6
-pacemaker placed 2015  - bpm NSR, paced rhythm, RR' in lead III II but seen on prior EKGs. -pacemaker placed 2015  - bpm NSR, paced rhythm, RR' in lead III II but seen on prior EKGs.  -remote tele monitoring
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
## 2020-02-15 NOTE — PROGRESS NOTE ADULT - ASSESSMENT
74M with h/o COPD, DANIEL on CPAP, Depression, HLD, HTN, CAD s/p PCI, CHB s/p PPM placed in 2015, bioprosthetic AVR, dementia, recent diagnosis of Hodgkin's lymphoma in Dec 2019 coming in for PICC line infection. Admit for PICC line removal/infection and RUE dvt.

## 2020-02-15 NOTE — PROGRESS NOTE ADULT - SUBJECTIVE AND OBJECTIVE BOX
Patient is a 74y old  Male who presents with a chief complaint of picc line infection (15 Feb 2020 11:26)        HPI:  74M with h/o COPD, DANIEL on CPAP, Depression, HLD, HTN, CAD s/p PCI, CHB s/p PPM placed in , bioprosthetic AVR, dementia, recent diagnosis of Hodgkin's lymphoma in Dec 2019 coming in for PICC line infection.  Patient noted bump on right arm on 2020 and went to his PMD, Dr. Walker, for further evaluation who recommended patient be seen by oncologist.  Patient saw his oncologist, Dr. Majano, today who stated that PICC line was infected and needed ED evaluation.  Last chemo section was 1 week ago. PICC line in right bicep is infected, +itchy, denies burning throbbing or pain associated with the site. Pt denies fever, chills, confusion, dysuria, CP, SOB, abdominal pain, HA. Of note, patient was recently admitted to [facility] on 2020 for acute respiratory failure and metabolic encephalopathy in setting of electrolyte derangements possibly from chemotherapy.  In the ED, T 98F, , /82, RR 18, SpO2 99% on RA.  Labs significant for WBC 3.37, H/H 9.5/30.1, MCV 70.2, lactate 2, K 3.3, Tbili 1.3.  Patient received tylenol 650mg PO x1, zosyn x1 in the ED, NS bolus x1, vanc x1. Dr. Majano consulted by the ER.   EK bpm NSR, paced rhythm, RR' in lead III II but seen on prior EKGs.   Doppler RUE: The right internal jugular, subclavian, brachial veins are patent and compressible where applicable.  The basilic vein demonstrates thrombus throughout its course. The cephalic vein is not seen. Radial and ulnar veins are also not seen. A PICC line is noted.   CXR: no acute lung pathology (CT scan 2020 showed occasional rather than small nodules in the lungs suggesting metastasis) (2020 18:52)      SUBJECTIVE & OBJECTIVE: Pt seen and examined at bedside, arm appears betetr slight delirum     PHYSICAL EXAM:  T(C): 36.8 (02-15-20 @ 04:02), Max: 36.8 (20 @ 14:54)  HR: 88 (02-15-20 @ 04:02) (88 - 90)  BP: 119/70 (02-15-20 @ 04:02) (115/75 - 130/72)  RR: 17 (02-15-20 @ 04:02) (17 - 18)  SpO2: 96% (02-15-20 @ 04:02) (96% - 96%)  Wt(kg): --   GENERAL: NAD, well-groomed, well-developed  HEAD:  Atraumatic, Normocephalic  NERVOUS SYSTEM:  Alert & Oriented X3,   CHEST/LUNG: decrease air entry at the bases   HEART: Regular rate and rhythm; No murmurs, rubs, or gallops  ABDOMEN: Soft, Nontender, Nondistended; Bowel sounds present  EXTREMITIES:  2+ Peripheral Pulses, No clubbing, cyanosis, or edema        MEDICATIONS  (STANDING):  atorvastatin 10 milliGRAM(s) Oral at bedtime  chlorhexidine 2% Cloths 1 Application(s) Topical daily  donepezil 5 milliGRAM(s) Oral at bedtime  folic acid 1 milliGRAM(s) Oral daily  furosemide    Tablet 40 milliGRAM(s) Oral daily  heparin  Infusion. 1700 Unit(s)/Hr (17 mL/Hr) IV Continuous <Continuous>  hydrocortisone 1% Cream 1 Application(s) Topical two times a day  imipramine 25 milliGRAM(s) Oral daily  loratadine 10 milliGRAM(s) Oral daily  metoprolol tartrate 50 milliGRAM(s) Oral two times a day  pantoprazole    Tablet 40 milliGRAM(s) Oral before breakfast  piperacillin/tazobactam IVPB.. 3.375 Gram(s) IV Intermittent every 8 hours  thiamine 100 milliGRAM(s) Oral daily    MEDICATIONS  (PRN):  heparin  Injectable 9000 Unit(s) IV Push every 6 hours PRN For aPTT less than 40  heparin  Injectable 4500 Unit(s) IV Push every 6 hours PRN For aPTT between 40 - 57      LABS:                        8.3    3.14  )-----------( 141      ( 15 Feb 2020 04:53 )             27.0     02-15    139  |  105  |  11  ----------------------------<  112<H>  3.5   |  26  |  1.10    Ca    8.4<L>      15 Feb 2020 04:53  Mg     1.9     02-14    TPro  7.1  /  Alb  3.0<L>  /  TBili  1.3<H>  /  DBili  x   /  AST  17  /  ALT  21  /  AlkPhos  106  02-13    PT/INR - ( 15 Feb 2020 04:53 )   PT: 16.6 sec;   INR: 1.46 ratio         PTT - ( 15 Feb 2020 04:53 )  PTT:87.9 sec      CAPILLARY BLOOD GLUCOSE          CAPILLARY BLOOD GLUCOSE        CAPILLARY BLOOD GLUCOSE                RECENT CULTURES:      RADIOLOGY & ADDITIONAL TESTS:                        DVT/GI ppx  Discussed with pt @ bedside

## 2020-02-15 NOTE — PROGRESS NOTE ADULT - SUBJECTIVE AND OBJECTIVE BOX
[INTERVAL HX: ]  Patient seen and examined;  Chart reviewed and events noted;   wife at bedside    No CP, no SOB, no nausea      Patient is a 74y Male with a known history of :  Acute embolism and thrombosis of deep vein of lower extremity (I82.409) [Active]  Pacemaker (Z95.0) [Active]  Hypertension (I10) [Active]  Lymphoma (C85.90) [Active]  Dementia (F03.90) [Active]  COPD (chronic obstructive pulmonary disease) (J44.9) [Active]  Depression (F32.9) [Active]  DVT (deep venous thrombosis) (I82.409) [Active]  HLD (hyperlipidemia) (E78.5) [Active]  HTN (hypertension) (I10) [Active]  Aortic valve replaced (Z95.2) [Active]  Stented coronary artery (Z95.5) [Active]  CAD (coronary artery disease) (I25.10) [Active]  S/P AVR (aortic valve replacement) (Z95.2) [Active]  Cardiac pacemaker (Z95.0) [Active]  Artificial pacemaker (Z95.0) [Active]  S/P aortic valve replacement with porcine valve (Z95.3) [Active]    HPI:  74M with h/o COPD, DANIEL on CPAP, Depression, HLD, HTN, CAD s/p PCI, CHB s/p PPM placed in , bioprosthetic AVR, dementia, recent diagnosis of Hodgkin's lymphoma in Dec 2019 coming in for PICC line infection.  Patient noted bump on right arm on 2020 and went to his PMD, Dr. Walker, for further evaluation who recommended patient be seen by oncologist.  Patient saw his oncologist, Dr. Majano, today who stated that PICC line was infected and needed ED evaluation.  Last chemo section was 1 week ago. PICC line in right bicep is infected, +itchy, denies burning throbbing or pain associated with the site. Pt denies fever, chills, confusion, dysuria, CP, SOB, abdominal pain, HA. Of note, patient was recently admitted to [facility] on 2020 for acute respiratory failure and metabolic encephalopathy in setting of electrolyte derangements possibly from chemotherapy.  In the ED, T 98F, , /82, RR 18, SpO2 99% on RA.  Labs significant for WBC 3.37, H/H 9.5/30.1, MCV 70.2, lactate 2, K 3.3, Tbili 1.3.  Patient received tylenol 650mg PO x1, zosyn x1 in the ED, NS bolus x1, vanc x1. Dr. Majano consulted by the ER.   EK bpm NSR, paced rhythm, RR' in lead III II but seen on prior EKGs.   Doppler RUE: The right internal jugular, subclavian, brachial veins are patent and compressible where applicable.  The basilic vein demonstrates thrombus throughout its course. The cephalic vein is not seen. Radial and ulnar veins are also not seen. A PICC line is noted.   CXR: no acute lung pathology (CT scan 2020 showed occasional rather than small nodules in the lungs suggesting metastasis) (2020 18:52)            MEDICATIONS  (STANDING):  atorvastatin 10 milliGRAM(s) Oral at bedtime  chlorhexidine 2% Cloths 1 Application(s) Topical daily  donepezil 5 milliGRAM(s) Oral at bedtime  folic acid 1 milliGRAM(s) Oral daily  furosemide    Tablet 40 milliGRAM(s) Oral daily  heparin  Infusion. 1700 Unit(s)/Hr (17 mL/Hr) IV Continuous <Continuous>  hydrocortisone 1% Cream 1 Application(s) Topical two times a day  imipramine 25 milliGRAM(s) Oral daily  loratadine 10 milliGRAM(s) Oral daily  metoprolol tartrate 50 milliGRAM(s) Oral two times a day  pantoprazole    Tablet 40 milliGRAM(s) Oral before breakfast  piperacillin/tazobactam IVPB.. 3.375 Gram(s) IV Intermittent every 8 hours  thiamine 100 milliGRAM(s) Oral daily    MEDICATIONS  (PRN):  heparin  Injectable 9000 Unit(s) IV Push every 6 hours PRN For aPTT less than 40  heparin  Injectable 4500 Unit(s) IV Push every 6 hours PRN For aPTT between 40 - 57      Vital Signs Last 24 Hrs  T(C): 36.8 (15 Feb 2020 04:02), Max: 36.8 (2020 14:54)  T(F): 98.2 (15 Feb 2020 04:02), Max: 98.3 (2020 14:54)  HR: 88 (15 Feb 2020 04:02) (88 - 90)  BP: 119/70 (15 Feb 2020 04:02) (115/75 - 130/72)  BP(mean): --  RR: 17 (15 Feb 2020 04:02) (17 - 18)  SpO2: 96% (15 Feb 2020 04:02) (96% - 96%)      [PHYSICAL EXAM]  General: adult in NAD,  WN,  WD.  HEENT: clear oropharynx, anicteric sclera, pink conjunctivae.  Neck: supple, no masses.  CV: normal S1S2, no murmur, no rubs, no gallops.  Lungs: clear to auscultation, no wheezes, no rales, no rhonchi.  Abdomen: soft, non-tender, non-distended, no hepatosplenomegaly, normal BS, no guarding.  Ext: no clubbing, no cyanosis, no edema.  Skin: no rashes,  no petechiae, no venous stasis changes.  Neuro: alert and oriented X  , no focal motor deficits.  LN: no SC GUERO.      [LABS:]                        8.3    3.14  )-----------( 141      ( 15 Feb 2020 04:53 )             27.0     02-15    139  |  105  |  11  ----------------------------<  112<H>  3.5   |  26  |  1.10    Ca    8.4<L>      15 Feb 2020 04:53  Mg     1.9     02-14    TPro  7.1  /  Alb  3.0<L>  /  TBili  1.3<H>  /  DBili  x   /  AST  17  /  ALT  21  /  AlkPhos  106  02-13    PT/INR - ( 15 Feb 2020 04:53 )   PT: 16.6 sec;   INR: 1.46 ratio         PTT - ( 15 Feb 2020 04:53 )  PTT:87.9 sec              [RADIOLOGY STUDIES:]

## 2020-02-15 NOTE — CHART NOTE - NSCHARTNOTEFT_GEN_A_CORE
Called for Pt w/ elevated lactate. Pt febrile. No acute other acute complaints at this time.    Vital Signs Last 24 Hrs  T(C): 39.2 (15 Feb 2020 19:53), Max: 39.2 (15 Feb 2020 19:53)  T(F): 102.6 (15 Feb 2020 19:53), Max: 102.6 (15 Feb 2020 19:53)  HR: 96 (15 Feb 2020 19:53) (88 - 97)  BP: 115/75 (15 Feb 2020 19:53) (115/75 - 119/78)  BP(mean): --  RR: 18 (15 Feb 2020 19:53) (17 - 18)  SpO2: 95% (15 Feb 2020 19:53) (95% - 100%)    Physical :  Gen- NAD, following commands  Cardio - rrr  Lung - cta b/l  Abdomen- +BS, NT/ND, no guarding, no rebound,  Ext- no edema, 2+ pulses b/l  Neuro- CN grossly intact, moving all extremities    LABS:                        8.4    2.93  )-----------( 172      ( 15 Feb 2020 19:22 )             26.9     02-15    139  |  104  |  10  ----------------------------<  180<H>  3.3<L>   |  26  |  1.20    Ca    8.6      15 Feb 2020 19:22  Mg     1.9     02-14    TPro  6.5  /  Alb  2.5<L>  /  TBili  0.7  /  DBili  x   /  AST  17  /  ALT  19  /  AlkPhos  94  02-15    PT/INR - ( 15 Feb 2020 04:53 )   PT: 16.6 sec;   INR: 1.46 ratio    PTT - ( 15 Feb 2020 11:43 )  PTT:93.3 sec        Assessment/Plan  74yMale w/ elev lac + fever    1. Will give 1L IVF bolus

## 2020-02-15 NOTE — PROGRESS NOTE ADULT - PROBLEM SELECTOR PLAN 9
dvt ppx - patient on heparin drip for dvt   -fall prec. aspiration prec, ambulate with assistance    problem 11: hypokalemia: K 3.3 , replete, f/u K in AM  problem 12: depression: cont imipramine,   problem 13: HLD: cont atorvastatin,   problem 14 CHF: cont home med lasix, not fluid overloaded at this time, only trace b/l LE edema    IMPROVE VTE Individual Risk Assessment        RISK                                                          Points  [  ] Previous VTE                                                3  [  ] Thrombophilia                                             2  [  ] Lower limb paralysis                                   2        (unable to hold up >15 seconds)    [ X ] Current Cancer                                            2         (within 6 months)  [  ] Immobilization > 24 hrs                              1  [  ] ICU/CCU stay > 24 hours                            1  [ X ] Age > 60                                                    1  IMPROVE VTE Score ____3_____

## 2020-02-15 NOTE — CONSULT NOTE ADULT - PROBLEM SELECTOR RECOMMENDATION 9
DVT UE - on Heparin gtt  Lymphoma - on Chemo regimen - Onc following  COPD - NEBS prn  DANIEL - CPAP use nightly - enc use - Education - sleep hygiene  STSI - on zosyn  IR procedure note reviewed  supportive medical regimen and care  on room air at present  min confused  will follow

## 2020-02-15 NOTE — PROGRESS NOTE ADULT - ASSESSMENT
[ASSESSMENT and  PLAN]  Classical Hodgkin's Lymphoma.   CD30+, CD15+, CD20 neg.   Stage IV with GUERO below diaphragm.  BM bx with involvement of Bone marrow.   Started chemo with ABVD 01/16/20 Thurs. [had rxn when 3/4 infusion of dacarbazine. ]  Last chemo on Fri 01/31/20    Admitted with PICC line thrombosis +/- cellulitis.   Slight migration of line since last imaged. Now removed.   Area/site better since on heparin and abx.   New PICC placed on L    hx Lung nodules of unclear etiology. Patchy GGO prior. ? lung involvement vs infectious causes.   Beta thal trait. Baseline chronic anemia.   Prior weak factor inhibitor.   ·	Hx mild coagulopathy with INR 1.4 - 1.8. Elevated PTT also.   ·	Workup last admission with weak factor inhibitor.   ·	Since Normalization of PT and PTT with first cycle of chemo.     Thrombocytopenia, due to lymphoma.   Since improved after C1 chemo.   Now worsening since off chemo.     Leukopenia due to lymphoma   Better since on chemo.   Improved, now starting to decline also.     Anemia due to lymphoma and chemo.   Stable. Improved with chemotx.       Cardiac comorbidities  AVR, CAD, HTN, PPM for arrythmia   ? Liver disease. CT imaging prior unrevealing.         RECOMMENDATIONS:  Continue Antibiotics today  Had negative CTA 01/08/20 and 02/05/20  Avoid high FiO2 as pt on Bleomycin chemo    Txfer to 3rd floor in anticipation for need for chemo tomorrow  Chemotx tomorrow Sun. Discussed with nursing and pharmacy  Due for chemotx Fri but will hold due to recent events.   However downside of continue holding chemo in pt, likely to result in more cytoepnia  Planned chemo tomorrow, unless significant change in how arm looks tomorrow. Looks better since initially seen Thurs.     Reassess tomorrow.     DVT Prophylaxis:  On IV heparin.   Change to  Lovenox 1.5mg SQ daily today or tomorrow.   Wife can administer outpt as had been on shot prior for her own cancer.     Continue Folic acid daily for thal trait.     Continue mgmt of cardiac comorbidities , HTN, CAD, HLD.     Thank you for consulting us.   I have discussed the above plan of care with patient in detail. They expressed understanding of the treatment plan . Risks, benefits and alternatives discussed in detail. I have asked if they have any questions or concerns and appropriately addressed them; all questions answered to their satisfactions and in lay terms.

## 2020-02-15 NOTE — PROGRESS NOTE ADULT - PROBLEM SELECTOR PLAN 6
-pacemaker placed 2015  - bpm NSR, paced rhythm, RR' in lead III II but seen on prior EKGs.  -remote tele monitoring

## 2020-02-16 NOTE — CONSULT NOTE ADULT - ASSESSMENT
74M with h/o COPD, DANIEL on CPAP, Depression, HLD, HTN, CAD s/p PCI, CHB s/p PPM placed in 2015, bioprosthetic AVR, dementia, recent diagnosis of Hodgkin's lymphoma in Dec 2019 coming in for PICC line infection with Deep venous thrombosis right axillary vein and thrombus at the right basilic vein.
[ASSESSMENT and  PLAN]  Classical Hodgkin's Lymphoma.   CD30+, CD15+, CD20 neg.   Stage IV with GUERO below diaphragm.  BM bx with involvement of Bone marrow.   Started chemo with ABVD 01/16/20 Thurs. [had rxn when 3/4 infusion of dacarbazine. ]  Last chemo on Fri 01/31/20    Admitted with PICC line thrombosis. Slight migration of line since last imaged.   Now removed.   Area/site better since on heparin and abx.     hx Lung nodules of unclear etiology. Patchy GGO prior. ? lung involvement vs infectious causes.   Beta thal trait. Baseline chronic anemia.   Prior weak factor inhibitor.   ·	Hx mild coagulopathy with INR 1.4 - 1.8. Elevated PTT also.   ·	Workup last admission with weak factor inhibitor.   ·	Since Normalization of PT and PTT with first cycle of chemo.     Thrombocytopenia, due to lymphoma.   Since improved after C1 chemo.     Leukopenia due to lymphoma   Better since on chemo.     Anemia due to lymphoma and chemo.   Stable. Improved with chemotx.       Cardiac comorbidities  AVR, CAD, HTN, PPM for arrythmia   ? Liver disease. CT imaging prior unrevealing.         RECOMMENDATIONS:  Continue Antibiotics  Had negative CTA 01/08/20 and 02/05/20  Avoid high FiO2 as pt on Belomycin based chemo    Txfer to 3rd floor in anticipation for need for chemo over weekend.   Due for chemotx today, but will hold due to recent events.   Possible chemo tomorrow, but dependent on how arm looks tomorrow.   Reassess tomorrow.     DVT Prophylaxis:  On IV heparin.   Change to  Lovenox 1.5mg SQ daily tomorrow, or Lovenox 1mg/kg SQ q12h    Continue Folic acid daily for thal trait.     Continue mgmt of cardiac comorbidities , HTN, CAD, HLD.     Thank you for consulting us.   I have discussed the above plan of care with patient in detail. They expressed understanding of the treatment plan . Risks, benefits and alternatives discussed in detail. I have asked if they have any questions or concerns and appropriately addressed them; all questions answered to their satisfactions and in lay terms.
Janes Patten), Surgery  [address] [place] [address]  Phone: [phone]  Fax: [phone]    Gus Ayala), Urology  [address] [place] [address]  [address] [place] [address]  Phone: [phone]  Fax: [phone]

## 2020-02-16 NOTE — PROVIDER CONTACT NOTE (OTHER) - SITUATION
Pt went for CXR - per report tip of PICC in axilla.  MD aware- instructed RN not to use PICC line until further testing can be done. Second IV initiated.

## 2020-02-16 NOTE — CONSULT NOTE ADULT - PROBLEM SELECTOR RECOMMENDATION 4
per concology.    Thank you for consulting us and involving us in the management of this most interesting and challenging case.     We will follow along in the care of this patient.

## 2020-02-16 NOTE — PROGRESS NOTE ADULT - ASSESSMENT
[ASSESSMENT and  PLAN]  Classical Hodgkin's Lymphoma.   CD30+, CD15+, CD20 neg.   Stage IV with GUERO below diaphragm.  BM bx with involvement of Bone marrow.   Started chemo with ABVD 01/16/20 Thurs. [had rxn when 3/4 infusion of dacarbazine. ]  Last chemo on Fri 01/31/20    Admitted with PICC line thrombosis +/- cellulitis.   Slight migration of line since last imaged. Now removed.   Area/site better since on heparin and abx Fri-Sat  To get CT scan today [02/16/20]    New PICC placed on L. Not visualized on AM Portable CXR.   Will send for PA-Lat obilques if needed    hx Lung nodules of unclear etiology. Patchy GGO prior. ? lung involvement vs infectious causes.   Beta thal trait. Baseline chronic anemia.   Prior weak factor inhibitor.   ·	Hx mild coagulopathy with INR 1.4 - 1.8. Elevated PTT also.   ·	Workup last admission with weak factor inhibitor.   ·	Since Normalization of PT and PTT with first cycle of chemo.     Thrombocytopenia, due to lymphoma.   Since improved after C1 chemo.   Now worsening since off chemo.     Leukopenia due to lymphoma   Better since on chemo.   Improved, now starting to decline also.     Anemia due to lymphoma and chemo.   Stable. Improved with chemotx.       Cardiac comorbidities  AVR, CAD, HTN, PPM for arrythmia   ? Liver disease. CT imaging prior unrevealing.         RECOMMENDATIONS:  CT scan with IV contrast to eval RUE.   Eval for abscess infected thrombus.   Vascular surgery eval.     Continue Antibiotics  Had negative CTA 01/08/20 and 02/05/20  Avoid high FiO2 as pt on Bleomycin chemo    Txfer to 3rd floor in anticipation for need for chemo tomorrow  Chemotx today, possible, but with recent events today, pending resolution of PICC tip location, and R arm status.   Discussed with nursing and pharmacy  Was due for chemotx Fri but will hold due to recent events.   Downside of continue holding chemo in pt, likely to result in more cytoepnia  Planned chemo today.  Looks better since initially seen Thurs.     Reassess daily    DVT Prophylaxis:  On IV heparin.   Change to  Lovenox 1.5mg SQ daily today.   Wife can administer outpt as had been on shot prior for her own cancer.     Continue Folic acid daily for thal trait.     Continue mgmt of cardiac comorbidities , HTN, CAD, HLD.     Thank you for consulting us.   I have discussed the above plan of care with patient in detail. They expressed understanding of the treatment plan . Risks, benefits and alternatives discussed in detail. I have asked if they have any questions or concerns and appropriately addressed them; all questions answered to their satisfactions and in lay terms.

## 2020-02-16 NOTE — PROGRESS NOTE ADULT - PROBLEM SELECTOR PLAN 1
due for Chemo - however - has Fever - on Zosyn ABX - cx pending  consider ID eval -   Heme Onc follow up reviewed  DVT UE - on Heparin gtt  Lymphoma - on Chemo regimen - as per ONC recs -   COPD - NEBS prn -   DANIEL - CPAP use nightly - enc use - Education - sleep hygiene  STSI - on zosyn  IR procedure note reviewed  supportive medical regimen and care  on room air at present  min confused
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
## 2020-02-16 NOTE — PROGRESS NOTE ADULT - SUBJECTIVE AND OBJECTIVE BOX
Patient is a 74y old  Male who presents with a chief complaint of picc line infection (2020 11:31)        HPI:  74M with h/o COPD, DANIEL on CPAP, Depression, HLD, HTN, CAD s/p PCI, CHB s/p PPM placed in , bioprosthetic AVR, dementia, recent diagnosis of Hodgkin's lymphoma in Dec 2019 coming in for PICC line infection.  Patient noted bump on right arm on 2020 and went to his PMD, Dr. Walker, for further evaluation who recommended patient be seen by oncologist.  Patient saw his oncologist, Dr. Majano, today who stated that PICC line was infected and needed ED evaluation.  Last chemo section was 1 week ago. PICC line in right bicep is infected, +itchy, denies burning throbbing or pain associated with the site. Pt denies fever, chills, confusion, dysuria, CP, SOB, abdominal pain, HA. Of note, patient was recently admitted to [facility] on 2020 for acute respiratory failure and metabolic encephalopathy in setting of electrolyte derangements possibly from chemotherapy.  In the ED, T 98F, , /82, RR 18, SpO2 99% on RA.  Labs significant for WBC 3.37, H/H 9.5/30.1, MCV 70.2, lactate 2, K 3.3, Tbili 1.3.  Patient received tylenol 650mg PO x1, zosyn x1 in the ED, NS bolus x1, vanc x1. Dr. Majano consulted by the ER.   EK bpm NSR, paced rhythm, RR' in lead III II but seen on prior EKGs.   Doppler RUE: The right internal jugular, subclavian, brachial veins are patent and compressible where applicable.  The basilic vein demonstrates thrombus throughout its course. The cephalic vein is not seen. Radial and ulnar veins are also not seen. A PICC line is noted.   CXR: no acute lung pathology (CT scan 2020 showed occasional rather than small nodules in the lungs suggesting metastasis) (2020 18:52)      SUBJECTIVE & OBJECTIVE: Pt seen and examined at bedside. spiking fever     PHYSICAL EXAM:  T(C): 37 (20 @ 03:29), Max: 39.2 (02-15-20 @ 19:53)  HR: 83 (20 @ 03:29) (83 - 97)  BP: 114/70 (20 @ 03:29) (114/70 - 119/78)  RR: 17 (20 @ 03:29) (17 - 18)  SpO2: 98% (20 @ 03:29) (95% - 100%)  Wt(kg): --   GENERAL: NAD, well-groomed, well-developed  HEAD:  Atraumatic, Normocephalic  NERVOUS SYSTEM:  Alert & Oriented X3,   CHEST/LUNG: decrease air entr roger the bases   HEART: Regular rate and rhythm; No murmurs, rubs, or gallops  ABDOMEN: Soft, Nontender, Nondistended; Bowel sounds present  EXTREMITIES:  2+ Peripheral Pulses, No clubbing, cyanosis, or edema        MEDICATIONS  (STANDING):  atorvastatin 10 milliGRAM(s) Oral at bedtime  chlorhexidine 2% Cloths 1 Application(s) Topical daily  donepezil 5 milliGRAM(s) Oral at bedtime  folic acid 1 milliGRAM(s) Oral daily  furosemide    Tablet 40 milliGRAM(s) Oral daily  heparin  Infusion. 1700 Unit(s)/Hr (17 mL/Hr) IV Continuous <Continuous>  hydrocortisone 1% Cream 1 Application(s) Topical two times a day  imipramine 25 milliGRAM(s) Oral daily  loratadine 10 milliGRAM(s) Oral daily  metoprolol tartrate 50 milliGRAM(s) Oral two times a day  pantoprazole    Tablet 40 milliGRAM(s) Oral before breakfast  piperacillin/tazobactam IVPB.. 3.375 Gram(s) IV Intermittent every 8 hours  thiamine 100 milliGRAM(s) Oral daily  vancomycin  IVPB 1000 milliGRAM(s) IV Intermittent every 12 hours    MEDICATIONS  (PRN):  albuterol/ipratropium for Nebulization 3 milliLiter(s) Nebulizer every 4 hours PRN Shortness of Breath and/or Wheezing  heparin  Injectable 9000 Unit(s) IV Push every 6 hours PRN For aPTT less than 40  heparin  Injectable 4500 Unit(s) IV Push every 6 hours PRN For aPTT between 40 - 57      LABS:                        8.1    2.83  )-----------( 179      ( 2020 06:12 )             26.3     02-16    142  |  107  |  9   ----------------------------<  102<H>  3.6   |  27  |  1.00    Ca    8.5      2020 06:12    TPro  6.3  /  Alb  2.4<L>  /  TBili  0.8  /  DBili  x   /  AST  20  /  ALT  18  /  AlkPhos  92  02-16    PT/INR - ( 2020 06:12 )   PT: 15.7 sec;   INR: 1.39 ratio         PTT - ( 2020 06:12 )  PTT:63.3 sec  Urinalysis Basic - ( 2020 07:11 )    Color: Yellow / Appearance: Clear / S.015 / pH: x  Gluc: x / Ketone: Negative  / Bili: Negative / Urobili: 1   Blood: x / Protein: 15 / Nitrite: Negative   Leuk Esterase: Negative / RBC: Negative /HPF / WBC Negative   Sq Epi: x / Non Sq Epi: Occasional / Bacteria: Occasional        CAPILLARY BLOOD GLUCOSE          CAPILLARY BLOOD GLUCOSE        CAPILLARY BLOOD GLUCOSE                RECENT CULTURES:      RADIOLOGY & ADDITIONAL TESTS:                        DVT/GI ppx  Discussed with pt @ bedside

## 2020-02-16 NOTE — PROGRESS NOTE ADULT - PROBLEM SELECTOR PLAN 1
with high lactate, and temp of 102  cxr no acute findings   right arm cellutlies still swollen/tender on palptaiton, concern for abscess   will add vanco , and consulted with plastic for possible drainage  ID consulted with high lactate, and temp of 102  cxr no acute findings   right arm cellutlies still swollen/tender on palptaiton, concern for abscess   will add vanco , and consulted with plastic for possible drainage  ID consulted  salma cultures

## 2020-02-16 NOTE — CONSULT NOTE ADULT - PROBLEM SELECTOR RECOMMENDATION 9
highest suspicion for septic thrombophlebitis of right axillary vein and recommend  -imaging to clarify extent and any need for local site control  -abx targeting staph/strept/GNR and in this context MRSA and resistant organisms so recommend Vanco with goal trough 15-20 (fine with dosing per pharmacy protocol, and Zosyn but may be able to de-escalate ceftriaxone highest suspicion for septic thrombophlebitis of right axillary vein and recommend  -imaging to clarify extent and any need for local site control  -warm compresses (ordered)  -vascular surgery consultation for any potential need for site control  -abx targeting staph/strept/GNR and in this context MRSA and resistant organisms so recommend Vanco with goal trough 15-20 (fine with dosing per pharmacy protocol, and Zosyn but may be able to de-escalate ceftriaxone

## 2020-02-16 NOTE — PROGRESS NOTE ADULT - SUBJECTIVE AND OBJECTIVE BOX
[INTERVAL HX: ]  Patient seen and examined;  Chart reviewed and events noted;   No CP, no OSB.   had fever overnight    CXR with no infiltrate, but PICC tip not seen      Patient is a 74y Male with a known history of :  Acute embolism and thrombosis of deep vein of lower extremity (I82.409) [Active]  Pacemaker (Z95.0) [Active]  Hypertension (I10) [Active]  Lymphoma (C85.90) [Active]  Dementia (F03.90) [Active]  COPD (chronic obstructive pulmonary disease) (J44.9) [Active]  Depression (F32.9) [Active]  DVT (deep venous thrombosis) (I82.409) [Active]  HLD (hyperlipidemia) (E78.5) [Active]  HTN (hypertension) (I10) [Active]  Aortic valve replaced (Z95.2) [Active]  Stented coronary artery (Z95.5) [Active]  CAD (coronary artery disease) (I25.10) [Active]  S/P AVR (aortic valve replacement) (Z95.2) [Active]  Cardiac pacemaker (Z95.0) [Active]  Artificial pacemaker (Z95.0) [Active]  S/P aortic valve replacement with porcine valve (Z95.3) [Active]      HPI:  74M with h/o COPD, DANIEL on CPAP, Depression, HLD, HTN, CAD s/p PCI, CHB s/p PPM placed in , bioprosthetic AVR, dementia, recent diagnosis of Hodgkin's lymphoma in Dec 2019 coming in for PICC line infection.  Patient noted bump on right arm on 2020 and went to his PMD, Dr. Walker, for further evaluation who recommended patient be seen by oncologist.  Patient saw his oncologist, Dr. Majano, today who stated that PICC line was infected and needed ED evaluation.  Last chemo section was 1 week ago. PICC line in right bicep is infected, +itchy, denies burning throbbing or pain associated with the site. Pt denies fever, chills, confusion, dysuria, CP, SOB, abdominal pain, HA. Of note, patient was recently admitted to [facility] on 2020 for acute respiratory failure and metabolic encephalopathy in setting of electrolyte derangements possibly from chemotherapy.  In the ED, T 98F, , /82, RR 18, SpO2 99% on RA.  Labs significant for WBC 3.37, H/H 9.5/30.1, MCV 70.2, lactate 2, K 3.3, Tbili 1.3.  Patient received tylenol 650mg PO x1, zosyn x1 in the ED, NS bolus x1, vanc x1. Dr. Majano consulted by the ER.   EK bpm NSR, paced rhythm, RR' in lead III II but seen on prior EKGs.   Doppler RUE: The right internal jugular, subclavian, brachial veins are patent and compressible where applicable.  The basilic vein demonstrates thrombus throughout its course. The cephalic vein is not seen. Radial and ulnar veins are also not seen. A PICC line is noted.   CXR: no acute lung pathology (CT scan 2020 showed occasional rather than small nodules in the lungs suggesting metastasis) (2020 18:52)        MEDICATIONS  (STANDING):  atorvastatin 10 milliGRAM(s) Oral at bedtime  chlorhexidine 2% Cloths 1 Application(s) Topical daily  donepezil 5 milliGRAM(s) Oral at bedtime  folic acid 1 milliGRAM(s) Oral daily  furosemide    Tablet 40 milliGRAM(s) Oral daily  heparin  Infusion. 1700 Unit(s)/Hr (17 mL/Hr) IV Continuous <Continuous>  hydrocortisone 1% Cream 1 Application(s) Topical two times a day  imipramine 25 milliGRAM(s) Oral daily  loratadine 10 milliGRAM(s) Oral daily  metoprolol tartrate 50 milliGRAM(s) Oral two times a day  pantoprazole    Tablet 40 milliGRAM(s) Oral before breakfast  piperacillin/tazobactam IVPB.. 3.375 Gram(s) IV Intermittent every 8 hours  thiamine 100 milliGRAM(s) Oral daily  vancomycin  IVPB 1000 milliGRAM(s) IV Intermittent every 12 hours    MEDICATIONS  (PRN):  albuterol/ipratropium for Nebulization 3 milliLiter(s) Nebulizer every 4 hours PRN Shortness of Breath and/or Wheezing  heparin  Injectable 9000 Unit(s) IV Push every 6 hours PRN For aPTT less than 40  heparin  Injectable 4500 Unit(s) IV Push every 6 hours PRN For aPTT between 40 - 57      Vital Signs Last 24 Hrs  T(C): 37 (2020 03:29), Max: 39.2 (15 Feb 2020 19:53)  T(F): 98.6 (2020 03:29), Max: 102.6 (15 Feb 2020 19:53)  HR: 83 (2020 03:29) (83 - 97)  BP: 114/70 (2020 03:29) (114/70 - 119/78)  BP(mean): --  RR: 17 (2020 03:29) (17 - 18)  SpO2: 98% (2020 03:29) (95% - 100%)      [PHYSICAL EXAM]  General: adult in NAD,  WN,  WD.  HEENT: clear oropharynx, anicteric sclera, pink conjunctivae.  Neck: supple, no masses.  CV: normal S1S2, no murmur, no rubs, no gallops.  Lungs: clear to auscultation, no wheezes, no rales, no rhonchi.  Abdomen: soft, non-tender, non-distended, no hepatosplenomegaly, normal BS, no guarding.  Ext: no clubbing, no cyanosis, slight edema RUE. Dec erythema/swelling.   Skin: no rashes,  no petechiae, no venous stasis changes.  Neuro: alert and oriented X2, no focal motor deficits.  LN: no SC GUERO.      [LABS:]                        8.1    2.83  )-----------( 179      ( 2020 06:12 )             26.3     02-16    142  |  107  |  9   ----------------------------<  102<H>  3.6   |  27  |  1.00    Ca    8.5      2020 06:12    TPro  6.3  /  Alb  2.4<L>  /  TBili  0.8  /  DBili  x   /  AST  20  /  ALT  18  /  AlkPhos  92  02-16  PT/INR - ( 2020 06:12 )   PT: 15.7 sec;   INR: 1.39 ratio    PTT - ( 2020 06:12 )  PTT:63.3 sec      [RADIOLOGY STUDIES:]
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
## 2020-02-16 NOTE — PROGRESS NOTE ADULT - ASSESSMENT
74M with h/o COPD, DANIEL on CPAP, Depression, HLD, HTN, CAD s/p PCI, CHB s/p PPM placed in 2015, bioprosthetic AVR, dementia, recent diagnosis of Hodgkin's lymphoma in Dec 2019 coming in for PICC line infection. Admit for PICC line removal/infection and RUE dvt. , spiking fever of 101

## 2020-02-17 NOTE — PROGRESS NOTE ADULT - SUBJECTIVE AND OBJECTIVE BOX
infectious diseases progress note:    TAYLOR MCFADDEN is a 74y y. o. Male patient    Patient reports: "my arm is much better"    ROS:    EYES:  Negative  blurry vision or double vision  GASTROINTESTINAL:  Negative for nausea, vomiting, diarrhea  -otherwise negative except for subjective    Allergies    No Known Allergies    Intolerances        ANTIBIOTICS/RELEVANT:  antimicrobials  cefTRIAXone   IVPB 1000 milliGRAM(s) IV Intermittent every 24 hours  vancomycin  IVPB 1000 milliGRAM(s) IV Intermittent every 12 hours    immunologic:    OTHER:  albuterol/ipratropium for Nebulization 3 milliLiter(s) Nebulizer every 4 hours PRN  atorvastatin 10 milliGRAM(s) Oral at bedtime  chlorhexidine 2% Cloths 1 Application(s) Topical daily  donepezil 5 milliGRAM(s) Oral at bedtime  folic acid 1 milliGRAM(s) Oral daily  furosemide    Tablet 40 milliGRAM(s) Oral daily  heparin  Infusion. 1700 Unit(s)/Hr IV Continuous <Continuous>  heparin  Injectable 9000 Unit(s) IV Push every 6 hours PRN  heparin  Injectable 4500 Unit(s) IV Push every 6 hours PRN  hydrocodone/homatropine Syrup 5 milliLiter(s) Oral at bedtime PRN  hydrocortisone 1% Cream 1 Application(s) Topical two times a day  imipramine 25 milliGRAM(s) Oral daily  loratadine 10 milliGRAM(s) Oral daily  metoprolol tartrate 50 milliGRAM(s) Oral two times a day  pantoprazole    Tablet 40 milliGRAM(s) Oral before breakfast  thiamine 100 milliGRAM(s) Oral daily      Objective:  Vital Signs Last 24 Hrs  T(C): 36.6 (2020 04:39), Max: 37.4 (2020 17:31)  T(F): 97.9 (2020 04:39), Max: 99.3 (2020 17:31)  HR: 70 (2020 04:39) (70 - 97)  BP: 136/74 (2020 04:39) (114/66 - 136/74)  BP(mean): --  RR: 17 (2020 04:39) (17 - 18)  SpO2: 96% (2020 04:39) (95% - 98%)    T(C): 36.6 (20 @ 04:39), Max: 39.2 (02-15-20 @ 19:53)  T(C): 36.6 (20 @ 04:39), Max: 39.2 (02-15-20 @ 19:53)  T(C): 36.6 (20 @ 04:39), Max: 39.2 (02-15-20 @ 19:53)    PHYSICAL EXAM:  Constitutional: Well-developed, well nourished  Eyes: PERRLA, EOMI  Ear/Nose/Throat: oropharynx normal	  Neck: no JVD, no lymphadenopathy, supple  Respiratory: no accessory muscle use  Cardiovascular: RRR,   Gastrointestinal: soft, NT  Extremities: no clubbing, no cyanosis, right arm with decreased swelling, resolved erythema, less tender, firm cord still felt but decreased in size      LABS:                        8.3    3.62  )-----------( 154      ( 2020 08:03 )             26.4       3.62  @ 08:03  2.83  @ 06:12  2.93 02-15 @ 19:22  3.14 02-15 @ 04:53  3.33  @ 13:42  2.79  @ 08:17  3.23  @ 03:03  3.37  @ 17:30      02-    139  |  103  |  9   ----------------------------<  125<H>  3.0<L>   |  25  |  1.10    Ca    8.8      2020 08:03    TPro  6.5  /  Alb  2.4<L>  /  TBili  0.7  /  DBili  x   /  AST  17  /  ALT  16  /  AlkPhos  88        Creatinine, Serum: 1.10 mg/dL (20 @ 08:03)  Creatinine, Serum: 1.00 mg/dL (20 @ 06:12)  Creatinine, Serum: 1.20 mg/dL (02-15-20 @ 19:22)  Creatinine, Serum: 1.10 mg/dL (02-15-20 @ 04:53)  Creatinine, Serum: 1.10 mg/dL (20 @ 08:17)  Creatinine, Serum: 1.10 mg/dL (20 @ 17:30)      PT/INR - ( 2020 06:12 )   PT: 15.7 sec;   INR: 1.39 ratio         PTT - ( 2020 08:03 )  PTT:68.2 sec  Urinalysis Basic - ( 2020 07:11 )    Color: Yellow / Appearance: Clear / S.015 / pH: x  Gluc: x / Ketone: Negative  / Bili: Negative / Urobili: 1   Blood: x / Protein: 15 / Nitrite: Negative   Leuk Esterase: Negative / RBC: Negative /HPF / WBC Negative   Sq Epi: x / Non Sq Epi: Occasional / Bacteria: Occasional            MICROBIOLOGY:      Culture - Urine (collected 2020 11:25)  Source: .Urine Clean Catch (Midstream)  Final Report (2020 07:03):    No growth    Culture - Blood (collected 15 Feb 2020 23:21)  Source: .Blood Blood-Peripheral  Preliminary Report (2020 01:03):    No growth to date.    Culture - Blood (collected 15 Feb 2020 23:21)  Source: .Blood Blood-Peripheral  Preliminary Report (2020 01:03):    No growth to date.        RADIOLOGY & ADDITIONAL STUDIES:  < from: CT Upper Extremity w/ IV Cont, Right (20 @ 14:03) >    EXAM:  CT UPR EXT IC RT                            PROCEDURE DATE:  2020          INTERPRETATION:  HISTORY: Right upper extremity swelling and pain. Right axial and basilic vein thrombosis.    Helical CT imaging of the right upper extremity from the level of the shoulder to the level of the distal humerus was performed after the intravenous administration of contrast. 100 cc of Omnipaque 350 was administered intravenously. 0 cc was discarded. Sagittal and coronal reformats were provided.    Correlation is made with prior upper extremity venous duplex from 2020.    Findings:    There is redemonstration of a deep vein thrombosis extending from the right axillary vein into the right basilic vein as seen on prior ultrasound. There is surrounding edema within the surrounding perivascular soft tissues. There is mild subcutaneous edema throughout the subcutaneous fat. There is no peripherally enhancing fluid collection to suggest abscess. There is no subcutaneous air.    There is no evidence of acute fracture or dislocation. There is no osseous erosion, destruction, or periosteal reaction to suggest osteomyelitis. There is mild glenohumeral arthrosis. There is moderate acromioclavicular joint arthrosis. There is no fattyatrophy of the musculature.    Leads of a cardiac device are partially imaged. Within the right upper lobe there is a 2 mm nodule which is grossly unchanged in appearance compared to prior chest CT from 2020.    Impression:    Redemonstration of deep vein thrombosis within the right axillary and basilic vein. Surrounding soft tissue swelling is noted. There is no drainable fluid collection. No subcutaneous air. No CT evidence of osteomyelitis.    < end of copied text >

## 2020-02-17 NOTE — PHYSICAL THERAPY INITIAL EVALUATION ADULT - ADDITIONAL COMMENTS
Pt lives in an apt with his spouse, no steps. Pt ambulates independently with RW and is independent with ADLs. + driving

## 2020-02-17 NOTE — PROGRESS NOTE ADULT - PROBLEM SELECTOR PLAN 1
highest suspicion for septic thrombophlebitis of right axillary vein and recommend  -warm compresses (ordered)  -abx targeting staph/strept/GNR and in this context MRSA and resistant organisms so Vancomycin with goal trough 15-20 fine with dosing per pharmacy protocol and Ceftriaxone 1 gram IV Q-day with potential to de-escalate to PO as pt improves

## 2020-02-17 NOTE — PROGRESS NOTE ADULT - PROBLEM SELECTOR PLAN 1
ID eval noted  Chemo - due - as per Onc -   remains on Heparin gtt  on room air  cough reported - Hycodan night time PRN - Duoneb PRN for COPD -   DANIEL - CPAP use nightly - enc use - Education - sleep hygiene  STSI - VANCO  IR procedure note reviewed  supportive medical regimen and care  on room air at present  min confused.

## 2020-02-17 NOTE — PROGRESS NOTE ADULT - SUBJECTIVE AND OBJECTIVE BOX
Patient is a 74y old  Male who presents with a chief complaint of picc line infection (2020 06:41)        HPI:  74M with h/o COPD, DANIEL on CPAP, Depression, HLD, HTN, CAD s/p PCI, CHB s/p PPM placed in , bioprosthetic AVR, dementia, recent diagnosis of Hodgkin's lymphoma in Dec 2019 coming in for PICC line infection.  Patient noted bump on right arm on 2020 and went to his PMD, Dr. Walker, for further evaluation who recommended patient be seen by oncologist.  Patient saw his oncologist, Dr. Majano, today who stated that PICC line was infected and needed ED evaluation.  Last chemo section was 1 week ago. PICC line in right bicep is infected, +itchy, denies burning throbbing or pain associated with the site. Pt denies fever, chills, confusion, dysuria, CP, SOB, abdominal pain, HA. Of note, patient was recently admitted to [facility] on 2020 for acute respiratory failure and metabolic encephalopathy in setting of electrolyte derangements possibly from chemotherapy.  In the ED, T 98F, , /82, RR 18, SpO2 99% on RA.  Labs significant for WBC 3.37, H/H 9.5/30.1, MCV 70.2, lactate 2, K 3.3, Tbili 1.3.  Patient received tylenol 650mg PO x1, zosyn x1 in the ED, NS bolus x1, vanc x1. Dr. Majano consulted by the ER.   EK bpm NSR, paced rhythm, RR' in lead III II but seen on prior EKGs.   Doppler RUE: The right internal jugular, subclavian, brachial veins are patent and compressible where applicable.  The basilic vein demonstrates thrombus throughout its course. The cephalic vein is not seen. Radial and ulnar veins are also not seen. A PICC line is noted.   CXR: no acute lung pathology (CT scan 2020 showed occasional rather than small nodules in the lungs suggesting metastasis) (2020 18:52)      SUBJECTIVE & OBJECTIVE: Pt seen and examined at bedside. afebrile for 24 hours     PHYSICAL EXAM:  T(C): 36.6 (20 @ 04:39), Max: 37.4 (20 @ 17:31)  HR: 70 (20 @ 04:39) (70 - 97)  BP: 136/74 (20 @ 04:39) (114/66 - 136/74)  RR: 17 (20 @ 04:39) (17 - 18)  SpO2: 96% (20 @ 04:39) (95% - 98%)  Wt(kg): --   GENERAL: NAD, well-groomed, well-developed  HEAD:  Atraumatic, Normocephalic  NERVOUS SYSTEM:  Alert & Oriented X3,   CHEST/LUNG: decrease air entry at the base s  HEART: Regular rate and rhythm; No murmurs, rubs, or gallops  ABDOMEN: Soft, Nontender, Nondistended; Bowel sounds present  EXTREMITIES:  2+ Peripheral Pulses, No clubbing, cyanosis, or edema        MEDICATIONS  (STANDING):  atorvastatin 10 milliGRAM(s) Oral at bedtime  chlorhexidine 2% Cloths 1 Application(s) Topical daily  donepezil 5 milliGRAM(s) Oral at bedtime  folic acid 1 milliGRAM(s) Oral daily  furosemide    Tablet 40 milliGRAM(s) Oral daily  heparin  Infusion. 1700 Unit(s)/Hr (17 mL/Hr) IV Continuous <Continuous>  hydrocortisone 1% Cream 1 Application(s) Topical two times a day  imipramine 25 milliGRAM(s) Oral daily  loratadine 10 milliGRAM(s) Oral daily  metoprolol tartrate 50 milliGRAM(s) Oral two times a day  pantoprazole    Tablet 40 milliGRAM(s) Oral before breakfast  piperacillin/tazobactam IVPB.. 3.375 Gram(s) IV Intermittent every 8 hours  thiamine 100 milliGRAM(s) Oral daily  vancomycin  IVPB 1000 milliGRAM(s) IV Intermittent every 12 hours    MEDICATIONS  (PRN):  albuterol/ipratropium for Nebulization 3 milliLiter(s) Nebulizer every 4 hours PRN Shortness of Breath and/or Wheezing  heparin  Injectable 9000 Unit(s) IV Push every 6 hours PRN For aPTT less than 40  heparin  Injectable 4500 Unit(s) IV Push every 6 hours PRN For aPTT between 40 - 57  hydrocodone/homatropine Syrup 5 milliLiter(s) Oral at bedtime PRN Cough      LABS:                        8.3    3.62  )-----------( 154      ( 2020 08:03 )             26.4     02-    139  |  103  |  9   ----------------------------<  125<H>  3.0<L>   |  25  |  1.10    Ca    8.8      2020 08:03    TPro  6.5  /  Alb  2.4<L>  /  TBili  0.7  /  DBili  x   /  AST  17  /  ALT  16  /  AlkPhos  88  -    PT/INR - ( 2020 06:12 )   PT: 15.7 sec;   INR: 1.39 ratio         PTT - ( 2020 08:03 )  PTT:68.2 sec  Urinalysis Basic - ( 2020 07:11 )    Color: Yellow / Appearance: Clear / S.015 / pH: x  Gluc: x / Ketone: Negative  / Bili: Negative / Urobili: 1   Blood: x / Protein: 15 / Nitrite: Negative   Leuk Esterase: Negative / RBC: Negative /HPF / WBC Negative   Sq Epi: x / Non Sq Epi: Occasional / Bacteria: Occasional        CAPILLARY BLOOD GLUCOSE          CAPILLARY BLOOD GLUCOSE        CAPILLARY BLOOD GLUCOSE                RECENT CULTURES:      RADIOLOGY & ADDITIONAL TESTS:                        DVT/GI ppx  Discussed with pt @ bedside

## 2020-02-17 NOTE — PROGRESS NOTE ADULT - ASSESSMENT
74M with h/o COPD, DANIEL on CPAP, Depression, HLD, HTN, CAD s/p PCI, CHB s/p PPM placed in 2015, bioprosthetic AVR, dementia, recent diagnosis of Hodgkin's lymphoma in Dec 2019 coming in for PICC line infection with Deep venous thrombosis right axillary vein and thrombus at the right basilic vein.
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
## 2020-02-17 NOTE — PROGRESS NOTE ADULT - PROBLEM SELECTOR PLAN 1
secondary to thrombophlebitis with underyling cellulties  ct of right UE, no findings of abscess  conitnue on vanco and zosyn   cxr no acute findings   right arm cellutlies still swollen/tender on palptaiton, concern for abscess   awaiting repeat bc , since spiked fever

## 2020-02-18 NOTE — CHART NOTE - NSCHARTNOTEFT_GEN_A_CORE
Pt placed on one to one due to repetitively trying to get out of bed and history of confusion.    -christina  [id number]
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
## 2020-02-18 NOTE — PROGRESS NOTE ADULT - PROBLEM SELECTOR PLAN 1
secondary to thrombophlebitis with underlying cellulitis  ct of right UE, no findings of abscess  conitnue on vanco and zosyn   cxr no acute findings   right arm cellulitis still swollen/tender on palpation, concern for abscess   awaiting repeat bc , since spiked fever secondary to thrombophlebitis with underlying cellulitis  ct of right UE, no findings of abscess  conitnue on vanco and zosyn   cxr no acute findings   right arm cellulitis still swollen/tender on palpation, concern for abscess   No fevers for at least two days secondary to thrombophlebitis with underlying cellulitis  ct of right UE, no findings of abscess  s/p vanco and zosyn  ID consulted, start on vantin and doxy x 5 days  cxr no acute findings   right arm cellulitis still swollen/tender on palpation, concern for abscess   No fevers for at least two days  f/u repeat blood cx

## 2020-02-18 NOTE — PROGRESS NOTE ADULT - PROBLEM SELECTOR PLAN 8
-stable chronic  -not on home medications for COPD  - Pulm recs appreciated- Dr. Moore: Hycodan night time PRN, Duoneb PRN for COPD, CPAP use nightly for DANIEL

## 2020-02-18 NOTE — PROGRESS NOTE ADULT - ASSESSMENT
[ASSESSMENT and  PLAN]  Classical Hodgkin's Lymphoma. dx 12/2019 CD30+, CD15+, CD20 neg.   Stage IV with GUERO below diaphragm and bone marrow involvement  Started chemo with ABVD 01/16/20  Adm w right arm PICC assoc DVT and cellulitis/infection, on antibiotics and anticoagulation  Seen by ID.  Post left PICC  Proceed w chemo today, ABVD,   Continue Lovenox for PICC assoc DVT, wife able to administser at home. [ASSESSMENT and  PLAN]  Classical Hodgkin's Lymphoma. dx 12/2019 CD30+, CD15+, CD20 neg.   Stage IV with GUERO below diaphragm and bone marrow involvement  Started chemo with ABVD 01/16/20  Adm w right arm PICC assoc DVT and cellulitis/infection, on antibiotics and anticoagulation  Seen by ID.  Post left PICC  Proceed w chemo today, ABVD,   Continue Lovenox for PICC assoc DVT, wife able to administer at home.

## 2020-02-18 NOTE — PROGRESS NOTE ADULT - SUBJECTIVE AND OBJECTIVE BOX
All interim records and events noted.    feeling well, wife present  for chemo today    MEDICATIONS  (STANDING):  atorvastatin 10 milliGRAM(s) Oral at bedtime  cefpodoxime 200 milliGRAM(s) Oral every 12 hours  chlorhexidine 2% Cloths 1 Application(s) Topical daily  donepezil 5 milliGRAM(s) Oral at bedtime  doxycycline hyclate Capsule 100 milliGRAM(s) Oral every 12 hours  folic acid 1 milliGRAM(s) Oral daily  furosemide    Tablet 40 milliGRAM(s) Oral daily  hydrocortisone 1% Cream 1 Application(s) Topical two times a day  imipramine 25 milliGRAM(s) Oral daily  loratadine 10 milliGRAM(s) Oral daily  metoprolol tartrate 50 milliGRAM(s) Oral two times a day  pantoprazole    Tablet 40 milliGRAM(s) Oral before breakfast  thiamine 100 milliGRAM(s) Oral daily    MEDICATIONS  (PRN):  albuterol/ipratropium for Nebulization 3 milliLiter(s) Nebulizer every 4 hours PRN Shortness of Breath and/or Wheezing  heparin  Injectable 9000 Unit(s) IV Push every 6 hours PRN For aPTT less than 40  heparin  Injectable 4500 Unit(s) IV Push every 6 hours PRN For aPTT between 40 - 57  hydrocodone/homatropine Syrup 5 milliLiter(s) Oral at bedtime PRN Cough      Vital Signs Last 24 Hrs  T(C): 36.7 (18 Feb 2020 05:28), Max: 36.7 (17 Feb 2020 13:11)  T(F): 98.1 (18 Feb 2020 05:28), Max: 98.1 (18 Feb 2020 05:28)  HR: 86 (18 Feb 2020 11:35) (83 - 92)  BP: 125/68 (18 Feb 2020 05:28) (107/66 - 150/81)  BP(mean): --  RR: 17 (18 Feb 2020 05:28) (16 - 17)  SpO2: 97% (18 Feb 2020 05:28) (96% - 97%)    PHYSICAL EXAM  General: well developed  well nourished, in no acute distress  Head: atraumatic, normocephalic  ENT: sclera anicteric, buccal mucosa moist  Neck: supple, trachea midline  CV: S1 S2, regular rate and rhythm  Lungs: clear to auscultation, no wheezes/rhonchi  Abdomen: soft, nontender, bowel sounds present, no palpable masses, pouchy  Extrem: right arm trace edema and erythema, left in clean dressing w new PICC intact  Skin: no significant increased ecchymosis/petechiae  Neuro: alert and oriented X3,  no focal deficits      LABS:             8.4    5.58  )-----------( 198      ( 02-18 @ 05:05 )             27.3                8.3    3.62  )-----------( 154      ( 02-17 @ 08:03 )             26.4                8.1    2.83  )-----------( 179      ( 02-16 @ 06:12 )             26.3                8.4    2.93  )-----------( 172      ( 02-15 @ 19:22 )             26.9       02-18    139  |  104  |  9   ----------------------------<  102<H>  3.9   |  25  |  0.98    Ca    8.7      18 Feb 2020 05:05    TPro  6.8  /  Alb  2.5<L>  /  TBili  0.6  /  DBili  x   /  AST  23  /  ALT  20  /  AlkPhos  87  02-18 02-18 @ 05:05  PT-- INR--  PTT64.6  02-17 @ 08:03  PT-- INR--  PTT68.2  02-16 @ 06:12  PT15.7 INR1.39  PTT63.3  02-15 @ 11:43  PT-- INR--  PTT93.3  02-15 @ 04:53  PT16.6 INR1.46  PTT87.9  02-14 @ 21:39  PT-- INR--  SVE072.1  02-14 @ 13:41  PT-- INR--  PTT57.2  02-14 @ 08:17  PT16.8 INR1.48  PTT--  02-14 @ 03:03  PT-- INR--  ILP949.9  02-13 @ 18:40  PT16.7 INR1.47  PTT31.8      RADIOLOGY & ADDITIONAL STUDIES:    IMPRESSION/RECOMMENDATIONS: All interim records and events noted.    patient feeling well, wife present  Scheduled for chemo today    MEDICATIONS  (STANDING):  atorvastatin 10 milliGRAM(s) Oral at bedtime  cefpodoxime 200 milliGRAM(s) Oral every 12 hours  chlorhexidine 2% Cloths 1 Application(s) Topical daily  donepezil 5 milliGRAM(s) Oral at bedtime  doxycycline hyclate Capsule 100 milliGRAM(s) Oral every 12 hours  folic acid 1 milliGRAM(s) Oral daily  furosemide    Tablet 40 milliGRAM(s) Oral daily  hydrocortisone 1% Cream 1 Application(s) Topical two times a day  imipramine 25 milliGRAM(s) Oral daily  loratadine 10 milliGRAM(s) Oral daily  metoprolol tartrate 50 milliGRAM(s) Oral two times a day  pantoprazole    Tablet 40 milliGRAM(s) Oral before breakfast  thiamine 100 milliGRAM(s) Oral daily    MEDICATIONS  (PRN):  albuterol/ipratropium for Nebulization 3 milliLiter(s) Nebulizer every 4 hours PRN Shortness of Breath and/or Wheezing  heparin  Injectable 9000 Unit(s) IV Push every 6 hours PRN For aPTT less than 40  heparin  Injectable 4500 Unit(s) IV Push every 6 hours PRN For aPTT between 40 - 57  hydrocodone/homatropine Syrup 5 milliLiter(s) Oral at bedtime PRN Cough      Vital Signs Last 24 Hrs  T(C): 36.7 (18 Feb 2020 05:28), Max: 36.7 (17 Feb 2020 13:11)  T(F): 98.1 (18 Feb 2020 05:28), Max: 98.1 (18 Feb 2020 05:28)  HR: 86 (18 Feb 2020 11:35) (83 - 92)  BP: 125/68 (18 Feb 2020 05:28) (107/66 - 150/81)  BP(mean): --  RR: 17 (18 Feb 2020 05:28) (16 - 17)  SpO2: 97% (18 Feb 2020 05:28) (96% - 97%)    PHYSICAL EXAM  General: well developed  well nourished, in no acute distress  Head: atraumatic, normocephalic  ENT: sclera anicteric, buccal mucosa moist  Neck: supple, trachea midline  CV: S1 S2, regular rate and rhythm  Lungs: clear to auscultation, no wheezes/rhonchi  Abdomen: soft, nontender, bowel sounds present, no palpable masses, pouchy  Extrem: right arm trace edema and erythema, left in clean dressing w new PICC intact  Skin: no significant increased ecchymosis/petechiae  Neuro: alert and oriented X3,  no focal deficits      LABS:             8.4    5.58  )-----------( 198      ( 02-18 @ 05:05 )             27.3                8.3    3.62  )-----------( 154      ( 02-17 @ 08:03 )             26.4                8.1    2.83  )-----------( 179      ( 02-16 @ 06:12 )             26.3                8.4    2.93  )-----------( 172      ( 02-15 @ 19:22 )             26.9       02-18    139  |  104  |  9   ----------------------------<  102<H>  3.9   |  25  |  0.98    Ca    8.7      18 Feb 2020 05:05    TPro  6.8  /  Alb  2.5<L>  /  TBili  0.6  /  DBili  x   /  AST  23  /  ALT  20  /  AlkPhos  87  02-18 02-18 @ 05:05  PT-- INR--  PTT64.6  02-17 @ 08:03  PT-- INR--  PTT68.2  02-16 @ 06:12  PT15.7 INR1.39  PTT63.3  02-15 @ 11:43  PT-- INR--  PTT93.3  02-15 @ 04:53  PT16.6 INR1.46  PTT87.9  02-14 @ 21:39  PT-- INR--  PMU272.1  02-14 @ 13:41  PT-- INR--  PTT57.2  02-14 @ 08:17  PT16.8 INR1.48  PTT--  02-14 @ 03:03  PT-- INR--  SNZ098.9  02-13 @ 18:40  PT16.7 INR1.47  PTT31.8      RADIOLOGY & ADDITIONAL STUDIES:    IMPRESSION/RECOMMENDATIONS:

## 2020-02-18 NOTE — PROGRESS NOTE ADULT - SUBJECTIVE AND OBJECTIVE BOX
infectious diseases progress note:    TAYLOR MCFADDEN is a 74y y. o. Male patient    Patient reports: "arm is much better"    ROS:    EYES:  Negative  blurry vision or double vision  GASTROINTESTINAL:  Negative for nausea, vomiting, diarrhea  -otherwise negative except for subjective    Allergies    No Known Allergies    Intolerances        ANTIBIOTICS/RELEVANT:  antimicrobials  cefTRIAXone   IVPB 1000 milliGRAM(s) IV Intermittent every 24 hours  vancomycin  IVPB 1000 milliGRAM(s) IV Intermittent every 12 hours    immunologic:    OTHER:  albuterol/ipratropium for Nebulization 3 milliLiter(s) Nebulizer every 4 hours PRN  atorvastatin 10 milliGRAM(s) Oral at bedtime  chlorhexidine 2% Cloths 1 Application(s) Topical daily  donepezil 5 milliGRAM(s) Oral at bedtime  folic acid 1 milliGRAM(s) Oral daily  furosemide    Tablet 40 milliGRAM(s) Oral daily  heparin  Infusion. 1700 Unit(s)/Hr IV Continuous <Continuous>  heparin  Injectable 9000 Unit(s) IV Push every 6 hours PRN  heparin  Injectable 4500 Unit(s) IV Push every 6 hours PRN  hydrocodone/homatropine Syrup 5 milliLiter(s) Oral at bedtime PRN  hydrocortisone 1% Cream 1 Application(s) Topical two times a day  imipramine 25 milliGRAM(s) Oral daily  loratadine 10 milliGRAM(s) Oral daily  metoprolol tartrate 50 milliGRAM(s) Oral two times a day  pantoprazole    Tablet 40 milliGRAM(s) Oral before breakfast  thiamine 100 milliGRAM(s) Oral daily      Objective:  Vital Signs Last 24 Hrs  T(C): 36.7 (18 Feb 2020 05:28), Max: 36.7 (17 Feb 2020 13:11)  T(F): 98.1 (18 Feb 2020 05:28), Max: 98.1 (18 Feb 2020 05:28)  HR: 89 (18 Feb 2020 05:28) (83 - 92)  BP: 125/68 (18 Feb 2020 05:28) (107/66 - 150/81)  BP(mean): --  RR: 17 (18 Feb 2020 05:28) (16 - 17)  SpO2: 97% (18 Feb 2020 05:28) (96% - 97%)    T(C): 36.7 (02-18-20 @ 05:28), Max: 37.4 (02-16-20 @ 17:31)  T(C): 36.7 (02-18-20 @ 05:28), Max: 39.2 (02-15-20 @ 19:53)  T(C): 36.7 (02-18-20 @ 05:28), Max: 39.2 (02-15-20 @ 19:53)    PHYSICAL EXAM:  Constitutional: Well-developed, well nourished  Eyes: PERRLA, EOMI  Ear/Nose/Throat: oropharynx normal	  Neck: no JVD, no lymphadenopathy, supple  Respiratory: no accessory muscle use  Cardiovascular: RRR,   Gastrointestinal: soft, NT  Extremities: no clubbing, no cyanosis, edema absent, right arm with palpable cord but nontender, no erythema      LABS:                        8.4    5.58  )-----------( 198      ( 18 Feb 2020 05:05 )             27.3       5.58 02-18 @ 05:05  3.62 02-17 @ 08:03  2.83 02-16 @ 06:12  2.93 02-15 @ 19:22  3.14 02-15 @ 04:53  3.33 02-14 @ 13:42  2.79 02-14 @ 08:17  3.23 02-14 @ 03:03  3.37 02-13 @ 17:30      02-18    139  |  104  |  9   ----------------------------<  102<H>  3.9   |  25  |  0.98    Ca    8.7      18 Feb 2020 05:05    TPro  6.8  /  Alb  2.5<L>  /  TBili  0.6  /  DBili  x   /  AST  23  /  ALT  20  /  AlkPhos  87  02-18      Creatinine, Serum: 0.98 mg/dL (02-18-20 @ 05:05)  Creatinine, Serum: 1.10 mg/dL (02-17-20 @ 08:03)  Creatinine, Serum: 1.00 mg/dL (02-16-20 @ 06:12)  Creatinine, Serum: 1.20 mg/dL (02-15-20 @ 19:22)  Creatinine, Serum: 1.10 mg/dL (02-15-20 @ 04:53)  Creatinine, Serum: 1.10 mg/dL (02-14-20 @ 08:17)  Creatinine, Serum: 1.10 mg/dL (02-13-20 @ 17:30)      PTT - ( 18 Feb 2020 05:05 )  PTT:64.6 sec          MICROBIOLOGY:              RADIOLOGY & ADDITIONAL STUDIES:

## 2020-02-18 NOTE — PROGRESS NOTE ADULT - ASSESSMENT
74M with h/o COPD, DANIEL on CPAP, Depression, HLD, HTN, CAD s/p PCI, CHB s/p PPM placed in 2015, bioprosthetic AVR, dementia, recent diagnosis of Hodgkin's lymphoma in Dec 2019 coming in for PICC line infection. Admit for PICC line removal/infection and RUE dvt. , spiking fever of 101 and right arm cellulitis.

## 2020-02-18 NOTE — PROGRESS NOTE ADULT - PROBLEM SELECTOR PLAN 9
Dr Finnegan: called Dr Mcdaniel who wants vanc zosyn and admission -stable chronic  -continue atorvastatin 10mg, metoprolol bid   -denies CP

## 2020-02-18 NOTE — PROGRESS NOTE ADULT - PROBLEM SELECTOR PLAN 1
today will de-escalate to oral abx and recommend:  vantin 200mg PO BID and doxycycline 100mg PO BID with last day 2/22

## 2020-02-18 NOTE — PROVIDER CONTACT NOTE (OTHER) - ACTION/TREATMENT ORDERED:
Will continue to monitor and notify MD of change in status.
 made aware.  Awaiting further interventions.  Will continue to monitor

## 2020-02-18 NOTE — PROGRESS NOTE ADULT - PROBLEM SELECTOR PLAN 4
was scheduled for chemo , given has been afebrile for 24 hours   f/u onc for chemo Scheduled for Chemo today 2/18  f/u onc for chemo

## 2020-02-18 NOTE — PROGRESS NOTE ADULT - PROBLEM SELECTOR PLAN 3
afebrile and no issue from ID with starting chemo  Thank you for consulting us and involving us in the management of this most interesting and challenging case.     Please Call with any further questions

## 2020-02-18 NOTE — PROGRESS NOTE ADULT - PROBLEM SELECTOR PLAN 2
with acute dvt  on heparin drip  picc line removed, treating upper right cellulitis with acute dvt  on heparin drip  picc line removed, on abx for upper right cellulitis

## 2020-02-18 NOTE — PROGRESS NOTE ADULT - SUBJECTIVE AND OBJECTIVE BOX
Patient is a 74y old  Male who presents with a chief complaint of picc line infection (2020 06:39)        HPI:  74M with h/o COPD, DANIEL on CPAP, Depression, HLD, HTN, CAD s/p PCI, CHB s/p PPM placed in , bioprosthetic AVR, dementia, recent diagnosis of Hodgkin's lymphoma in Dec 2019 coming in for PICC line infection.  Patient noted bump on right arm on 2020 and went to his PMD, Dr. Walker, for further evaluation who recommended patient be seen by oncologist.  Patient saw his oncologist, Dr. Majano, today who stated that PICC line was infected and needed ED evaluation.  Last chemo section was 1 week ago. PICC line in right bicep is infected, +itchy, denies burning throbbing or pain associated with the site. Pt denies fever, chills, confusion, dysuria, CP, SOB, abdominal pain, HA. Of note, patient was recently admitted to [facility] on 2020 for acute respiratory failure and metabolic encephalopathy in setting of electrolyte derangements possibly from chemotherapy.  In the ED, T 98F, , /82, RR 18, SpO2 99% on RA.  Labs significant for WBC 3.37, H/H 9.5/30.1, MCV 70.2, lactate 2, K 3.3, Tbili 1.3.  Patient received tylenol 650mg PO x1, zosyn x1 in the ED, NS bolus x1, vanc x1. Dr. Majano consulted by the ER.   EK bpm NSR, paced rhythm, RR' in lead III II but seen on prior EKGs.   Doppler RUE: The right internal jugular, subclavian, brachial veins are patent and compressible where applicable.  The basilic vein demonstrates thrombus throughout its course. The cephalic vein is not seen. Radial and ulnar veins are also not seen. A PICC line is noted.   CXR: no acute lung pathology (CT scan 2020 showed occasional rather than small nodules in the lungs suggesting metastasis) (2020 18:52)      SUBJECTIVE & OBJECTIVE: Pt seen and examined at bedside. no acute complaints     PHYSICAL EXAM:  T(C): 36.7 (20 @ 05:28), Max: 36.7 (20 @ 13:11)  HR: 89 (20 @ 05:28) (83 - 92)  BP: 125/68 (20 @ 05:28) (107/66 - 150/81)  RR: 17 (20 @ 05:28) (16 - 17)  SpO2: 97% (20 @ 05:28) (96% - 97%)  Wt(kg): --   GENERAL: NAD, well-groomed, well-developed  HEAD:  Atraumatic, Normocephalic  EYES: EOMI, PERRLA, conjunctiva and sclera clear  ENMT: Moist mucous membranes  NECK: Supple, No JVD  NERVOUS SYSTEM:  Alert & Oriented X3, Motor Strength 5/5 B/L upper and lower extremities; DTRs 2+ intact and symmetric  CHEST/LUNG: Clear to auscultation bilaterally; No rales, rhonchi, wheezing, or rubs  HEART: Regular rate and rhythm; No murmurs, rubs, or gallops  ABDOMEN: Soft, Nontender, Nondistended; Bowel sounds present  EXTREMITIES:  2+ Peripheral Pulses, No clubbing, cyanosis, or edema        MEDICATIONS  (STANDING):  atorvastatin 10 milliGRAM(s) Oral at bedtime  cefTRIAXone   IVPB 1000 milliGRAM(s) IV Intermittent every 24 hours  chlorhexidine 2% Cloths 1 Application(s) Topical daily  donepezil 5 milliGRAM(s) Oral at bedtime  folic acid 1 milliGRAM(s) Oral daily  furosemide    Tablet 40 milliGRAM(s) Oral daily  heparin  Infusion. 1700 Unit(s)/Hr (17 mL/Hr) IV Continuous <Continuous>  hydrocortisone 1% Cream 1 Application(s) Topical two times a day  imipramine 25 milliGRAM(s) Oral daily  loratadine 10 milliGRAM(s) Oral daily  metoprolol tartrate 50 milliGRAM(s) Oral two times a day  pantoprazole    Tablet 40 milliGRAM(s) Oral before breakfast  thiamine 100 milliGRAM(s) Oral daily  vancomycin  IVPB 1000 milliGRAM(s) IV Intermittent every 12 hours    MEDICATIONS  (PRN):  albuterol/ipratropium for Nebulization 3 milliLiter(s) Nebulizer every 4 hours PRN Shortness of Breath and/or Wheezing  heparin  Injectable 9000 Unit(s) IV Push every 6 hours PRN For aPTT less than 40  heparin  Injectable 4500 Unit(s) IV Push every 6 hours PRN For aPTT between 40 - 57  hydrocodone/homatropine Syrup 5 milliLiter(s) Oral at bedtime PRN Cough      LABS:                        8.4    5.58  )-----------( 198      ( 2020 05:05 )             27.3     02-    139  |  104  |  9   ----------------------------<  102<H>  3.9   |  25  |  0.98    Ca    8.7      2020 05:05    TPro  6.8  /  Alb  2.5<L>  /  TBili  0.6  /  DBili  x   /  AST  23  /  ALT  20  /  AlkPhos  87  -    PTT - ( 2020 05:05 )  PTT:64.6 sec      CAPILLARY BLOOD GLUCOSE          CAPILLARY BLOOD GLUCOSE        CAPILLARY BLOOD GLUCOSE                RECENT CULTURES:      RADIOLOGY & ADDITIONAL TESTS:                        DVT/GI ppx  Discussed with pt @ bedside

## 2020-02-18 NOTE — PROGRESS NOTE ADULT - PROBLEM SELECTOR PLAN 1
ID romelia noted - on Rocephin and VANCO -   Chemo - due - as per Onc -   remains on Heparin gtt  on room air  cough reported - Hycodan night time PRN - Duoneb PRN for COPD -   DANIEL - CPAP use nightly - enc use - Education - sleep hygiene  IR procedure note reviewed  supportive medical regimen and care  on room air at present  min confused.
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
## 2020-02-18 NOTE — PROGRESS NOTE ADULT - PROBLEM SELECTOR PLAN 2
with acute dvt  on heparin drip  picc line removed, treating upper right cellultiies, plastics consulted as induration icreasing, will apply warm compress, f/u plastics if drainge needed

## 2020-02-19 NOTE — DISCHARGE NOTE NURSING/CASE MANAGEMENT/SOCIAL WORK - NSTRANSFERBELONGINGSDISPO_GEN_A_NUR
with patient/patient has 1 yellow metal necklace, 1 yellow metal ring, I black arm sock like cover for his pick line with him at this time

## 2020-02-19 NOTE — DISCHARGE NOTE NURSING/CASE MANAGEMENT/SOCIAL WORK - PATIENT PORTAL LINK FT
You can access the FollowMyHealth Patient Portal offered by [facility] by registering at the following website: [email or web address]. By joining [facility]’s FollowMyHealth portal, you will also be able to view your health information using other applications (apps) compatible with our system.

## 2020-02-19 NOTE — DISCHARGE NOTE NURSING/CASE MANAGEMENT/SOCIAL WORK - NSFLUVACAGEDISCH_IMM_ALL_CORE
PROGRESS NOTE    SESSION TIME: 55 minutes    EXISTING HISTORY:  There is no problem list on file for this patient.    No current outpatient medications on file.     No current facility-administered medications for this visit.              IDENTIFYING DATA/CHIEF COMPLAINT     Patient is a 25 year old  male who presents for counseling with a complaint of[JM1.1T] depression and anxiety[JM1.1M].  The patient is referred by[JM1.1T] [facility][JM1.1M] for[JM1.1T] psychotherapy and possible medication management.[JM1.1M]  He resides in[place] by himself.[JM1.1M]         HISTORY OF PRESENT ILLNESS:  The patient reports that:[JM1.1T]     · Reports he has depression every couple years since he was in [facility], no history of any treatment.    · He reports the bouts are about the same in severity however he has been feeling more anxious lately as well.  He feels sometimes there is an incident that sets it off, break up etc.   · He reports current symptoms as feeling tired all the time, depressed mood, doesn't care and has no motivation, has to force himself to do anything. Gets sad, cries, cried in session, some feelings of hopelessness/wothless, denies S/H ideations, use to have ideations but not now and no previous attempts .   · He reports anxiety as worry, tension, restless, more easily irritated, reports usually laid back.    · Works 50 -60 hours average for years, has been in furniture sales for 10 years.  Reports either sleeps to little or too much but reports average as 5-6hrs.    · Feels he is in an existential crisis.  He has been on his own since he was 15 and has been supporting his mother who lives in [place].    · Wellbutrin last 2 weeks, no effects noted.[JM1.1M]               BIOPSYCHOSOCIAL FACTORS  MEDICAL:  Patient Active Problem List   Diagnosis   • Depression                          Current outpatient prescriptions prior to encounter   Medication Sig Dispense Refill   • buPROPion (WELLBUTRIN XL) 150 MG 24 hr  tablet Take 1 Tab by mouth every morning. 30 Tab 0   • econazole nitrate 1 % cream Apply  topically 2 (two) times daily. 30 g 0      PSYCHIATRIC[JM1.1T] HX[JM1.1M]:[JM1.1T] Denied[JM1.1M]  FAMILY HISTORY:[JM1.1T] Denied[JM1.1M]     PAST HISTORY:[JM1.1T] He reports he lived with dad after divorce parents  when he was 1, Mom drank and smoked and took sleeping pills. Dad  in [employer]JM1.1M]Pt grew up in[JM1.1T] [place].  He has 1 step brother and two step sisters and reports is close to them.[JM1.1M]    MARITAL/SIGNIFICANT OTHER:[JM1.1T] Not devoted to relationship at this time.[JM1.1M]    CURRENT FAMILY SITUATION:[JM1.1T] lives alone.  close with step siblings and Dad, helps mom out.[JM1.1M]    JOB/SCHOOL:[JM1.1T] Some college has his bachelors degree, works as furniture sales likes it but works a lot.[JM1.1M]   SOCIAL SYSTEM:[JM1.1T] limited due to work[JM1.1M]  FINANCIAL:[JM1.1T] Endorsed stress.[JM1.1M]   LEGAL:[JM1.1T] Denied[JM1.1M]   EFFECTS OF PAST ABUSE OR TRAUMA:[JM1.1T] Denied[JM1.1M]    HISTORY:[JM1.1T] Denied[JM1.1M]   SIGNIFICANT LOSSES:[JM1.1T] Denied[JM1.1M]            ALCOHOL AND DRUG SCREENING[JM1.1T]     Occasional alcohol, denies tobacco and other drugs.[JM1.1M]        TODAY'S SESSION: Kevin reports he has gotten considerable symptom relief from medication and from change in position.  We continue to explore relevant hx and current psychosocial stressors.  He is much relieved with new position and feels good esteem, also has a date. Sleep is good.  Considered Depression hx, Kevin has had bouts of major depression but also during these times he has had stressful psychosocial stressors with less support and coping skills then he has now.  Recognized sleep as very important to maintain.           Mental Status Exam  Behavior:[JM1.1T] cooperative[JM1.1M]  Eye Contact:[JM1.1T] appropriate[JM1.1M]   Speech:[JM1.1T] logical, coherent and appropriate rate, rhythm, and  volume[JM1.1M]  Gait, movement and Motor Coordination: Within normal limits  Alert and Oriented:  to Person, Place, and Time  Mood/Affect: good/ still a little guarded    Organization of thought:[JM1.1T] logical and coherent[JM1.1M]   Cognition:  He[JM1.1T] did not[JM1.1M] appear to have any gross cognitive difficulties that would have prevented him from understanding or interacting with this .  The Patient's cognitive abilities are estimated to be average.  Insight and Judgment:[JM1.1T] good[JM1.1M]   Self-Harm: Suicidal ideation, plan, or intent reported?[JM1.1T] Denied[JM1.1M]   Homicidal Ideation: Homicidal ideation, plan, or intent reported?[JM1.1T]  Denied[JM1.1M]   Altered Thought Processes: Hallucinations or delusions reported or observed?[JM1.1T]  Denied[JM1.1M]   Domestic Violence:[JM1.1T] Denied[JM1.1M]   Physical/Sexual Abuse:[JM1.1T] Denied[JM1.1M]      APA DIAGNOSIS:        AXIS I:[JM1.1T]            Major depression recurrent moderate., Anxiety, unspecified.,[JM1.1M]                     STRATEGIES/INTERVENTIONS:  --Shari-Establishing an agreement with the patient for the purpose of personal growth and development; or safety., --Goal Setting-Assist patient to establish goals that are realistic, specific, behavioral, based upon the patient's values., --Waahfcl-Kbtocdm-Pvhpzwbptp identification of problems and solutions that are effective., --Insight-Assisting the patient to gain understanding into the patient's own motivations, perceptions, behavior choices, thoughts/feelings in a specific situation in order to increase personal power., --Social Skills Training-Teaching and practicing skills that foster effective interpersonal relationships.  and --Stress Management--Faciliate the patient's understanding, awareness, and tolerance of external and internal demands or threats to the patient's psychological well-being.  Assist the patient to be aware of and tolerate their feelings; rather  than avoiding them in maladaptive ways.    PLAN/GOALS  -- The patient/caregiver will be provided emotional support and coping skills will be assessed. -- The patient's mental status will be monitored for signs and symptoms of depression/anxiety -- The patient will be assessed for social and emotional factors -- The patient/caregiver will verbalize signs and symptoms of anxiety -- The patient/caregiver will demonstrate more effective coping skills as evidenced by verbalization of feelings, participation in decision-making, and identification of own problems and possible solutions    SUMMARY:  Based upon the above information the following recommendations are made:    · Continue Individual Therapy   · He was made aware of emergency contact and procedures, should their symptoms worsen.  · He was made aware of these recommendations and agreed to them.     · He was made aware of how to contact the undersigned in case of an emergenc    Roque Lyons LCSW       Adult

## 2020-02-19 NOTE — DISCHARGE NOTE PROVIDER - NSDCMRMEDTOKEN_GEN_ALL_CORE_FT
atorvastatin 10 mg oral tablet: 1 tab(s) orally once a day (at bedtime)  cefpodoxime 200 mg oral tablet: 1 tab(s) orally every 12 hours  donepezil 5 mg oral tablet: 1 tab(s) orally once a day (at bedtime)  doxycycline monohydrate 100 mg oral capsule: 1 cap(s) orally every 12 hours  folic acid 1 mg oral tablet: 1 tab(s) orally once a day  furosemide 40 mg oral tablet: 1 tab(s) orally once a day  imipramine 25 mg oral tablet: 1 tab(s) orally once a day  Lovenox 150 mg/mL injectable solution: 150 milligram(s) subcutaneously once a day   metoprolol tartrate 50 mg oral tablet: 1 tab(s) orally 2 times a day  Protonix 40 mg oral delayed release tablet: 1 tab(s) orally once a day  risperiDONE 1 mg oral tablet: 1 tab(s) orally 2 times a day  thiamine 100 mg oral tablet: 1 tab(s) orally once a day  ZyrTEC 10 mg oral tablet: 1 tab(s) orally once a day

## 2020-02-19 NOTE — PROGRESS NOTE ADULT - REASON FOR ADMISSION
picc line infection

## 2020-02-19 NOTE — PROGRESS NOTE ADULT - ASSESSMENT
[ASSESSMENT and  PLAN]  Classical Hodgkin's Lymphoma. dx 12/2019 CD30+, CD15+, CD20 neg.   Stage IV with GUERO below diaphragm and bone marrow involvement  Started chemo with ABVD 01/16/20    Adm w right arm PICC assoc DVT and cellulitis/infection, on antibiotics and anticoagulation.     Seen by ID. s/p  Vanco IV.     Post new left PICC. Tip placement confirmed yesterday by IR/radiology.   Undewent chemo yesterday  ABV(-D), tolerated well.     PLAN  Continue Lovenox for PICC assoc DVT, wife able to administer at home. has administered SQ injections prior.   Dose 1mg/kg SQ q24h  Follow in office in 1 wk.     DC today if not other acute issues.
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
## 2020-02-19 NOTE — PROGRESS NOTE ADULT - PROBLEM SELECTOR PLAN 3
-Doppler RUE: The right internal jugular, subclavian, brachial veins are patent and compressible where applicable.  The basilic vein demonstrates thrombus throughout its course. The cephalic vein is not seen. Radial and ulnar veins are also not seen. A PICC line is noted.   -continue with heparin gtt, IR consulted and no need to stop heparin for procedure  -consulted heme/onc: dr pitts  -consulted vascular: alfonso -Doppler RUE: The right internal jugular, subclavian, brachial veins are patent and compressible where applicable.  The basilic vein demonstrates thrombus throughout its course. The cephalic vein is not seen. Radial and ulnar veins are also not seen. A PICC line is noted.   -s/p heparin drip  -consulted heme/onc: dr pitts  -consulted vascular: alfonso

## 2020-02-19 NOTE — DISCHARGE NOTE PROVIDER - NSDCFUSCHEDAPPT_GEN_ALL_CORE_FT
TAYLOR MCFADDEN ; 02/21/2020 ; [facility] [place] ; 03/10/2020 ; NPP Pul[facility]anco [address] [place] ; 05/12/2020 ; NPP Neurology [address] TAYLOR MCFADDEN ; 02/21/2020 ; [facility] [place] ; 03/10/2020 ; NPP [facility]anco [address] ; 05/12/2020 ; NPP Neurology [address] TAYLOR MCFADDEN ; 02/21/2020 ; [facility] [place] ; 03/10/2020 ; NPP Pul[facility]anco [address] ; 05/12/2020 ; NPP Neurology [address] TAYLOR MCFADDEN ; 02/21/2020 ; [facility] ; 03/10/2020 ; NPP Pul[facility]anco [address] ; 05/12/2020 ; NPP Neurology [address] TAYLOR MCFADDEN ; 02/21/2020 ; [facility] ; 03/10/2020 ; NPP [facility]anco [address] ; 05/12/2020 ; NPP Neurology [address]

## 2020-02-19 NOTE — PROGRESS NOTE ADULT - ASSESSMENT
74M with h/o COPD, DANIEL on CPAP, Depression, HLD, HTN, CAD s/p PCI, CHB s/p PPM placed in 2015, bioprosthetic AVR, dementia, recent diagnosis of Hodgkin's lymphoma in Dec 2019 coming in for PICC line infection. Admit for PICC line removal/infection and RUE dvt. , spiking fever of 101 and right arm cellulitis. 74M with h/o COPD, DANIEL on CPAP, Depression, HLD, HTN, CAD s/p PCI, CHB s/p PPM placed in 2015, bioprosthetic AVR, dementia, recent diagnosis of Hodgkin's lymphoma in Dec 2019 coming in for PICC line infection. Admit for PICC line removal/infection and RUE dvt. , spiking fever of 101 and right arm cellulitis. New PICC line placed LUE. Patient restarted chemo 2/18/20 and became very agitated and confused overnight.

## 2020-02-19 NOTE — PROGRESS NOTE ADULT - PROBLEM SELECTOR PROBLEM 1
Lymphoma
Thrombophlebitis of right arm
PICC line infection
PICC line infection
Sepsis
Thrombophlebitis of right arm
Thrombophlebitis of right arm

## 2020-02-19 NOTE — PROGRESS NOTE ADULT - PROBLEM SELECTOR PLAN 1
overnight events noted - on 1 to 1 - delirium - confusion - hx of Dementia -   Chemo dosing as per Onc recs -   ID follow up reviewed - ABX de - escalated to PO regimen -   on Lovenox - thrombophlebitis -   on room air  cough reported - Hycodan night time PRN - Duoneb PRN for COPD -   DANIEL - CPAP use nightly - enc use - Education - sleep hygiene  IR procedure note reviewed  supportive medical regimen and care  on room air at present  min confused.

## 2020-02-19 NOTE — PROGRESS NOTE ADULT - SUBJECTIVE AND OBJECTIVE BOX
Patient is a 74y old  Male who presents with a chief complaint of picc line infection (2020 09:38)    HPI:  74M with h/o COPD, DANIEL on CPAP, Depression, HLD, HTN, CAD s/p PCI, CHB s/p PPM placed in , bioprosthetic AVR, dementia, recent diagnosis of Hodgkin's lymphoma in Dec 2019 coming in for PICC line infection.  Patient noted bump on right arm on 2020 and went to his PMD, Dr. Walker, for further evaluation who recommended patient be seen by oncologist.  Patient saw his oncologist, Dr. Majano, today who stated that PICC line was infected and needed ED evaluation.  Last chemo section was 1 week ago. PICC line in right bicep is infected, +itchy, denies burning throbbing or pain associated with the site. Pt denies fever, chills, confusion, dysuria, CP, SOB, abdominal pain, HA. Of note, patient was recently admitted to [facility] on 2020 for acute respiratory failure and metabolic encephalopathy in setting of electrolyte derangements possibly from chemotherapy.  In the ED, T 98F, , /82, RR 18, SpO2 99% on RA.  Labs significant for WBC 3.37, H/H 9.5/30.1, MCV 70.2, lactate 2, K 3.3, Tbili 1.3.  Patient received tylenol 650mg PO x1, zosyn x1 in the ED, NS bolus x1, vanc x1. Dr. Majano consulted by the ER.   EK bpm NSR, paced rhythm, RR' in lead III II but seen on prior EKGs.   Doppler RUE: The right internal jugular, subclavian, brachial veins are patent and compressible where applicable.  The basilic vein demonstrates thrombus throughout its course. The cephalic vein is not seen. Radial and ulnar veins are also not seen. A PICC line is noted.   CXR: no acute lung pathology (CT scan 2020 showed occasional rather than small nodules in the lungs suggesting metastasis) (2020 18:52)    -----------------------------------------------  INTERVAL HPI/OVERNIGHT EVENTS:  -----------------------------------------------    Patient examined at bedside.  Patient denies chest pain, abdominal pain, N/V/D at this time. Patient admits to *******. No other complaints at this time. No other overnight events appreciated.    T(C): 36.6 (20 @ 05:08), Max: 37.1 (20 @ 12:38)  HR: 87 (20 @ 07:26) (85 - 90)  BP: 143/80 (20 @ 05:08) (118/75 - 169/88)  RR: 17 (20 @ 05:08) (14 - 17)  SpO2: 94% (20 @ 05:08) (94% - 97%)  Wt(kg): --  I&O's Summary    2020 07:01  -  2020 07:00  --------------------------------------------------------  IN: 226.5 mL / OUT: 1600 mL / NET: -1373.5 mL      ----------------------------  REVIEW OF SYSTEMS:  CONSTITUTIONAL: No fever, fatigue  EYES: No visual disturbances  ENMT:  No difficulty hearing; No throat pain  RESPIRATORY: No shortness of breath. No cough, wheezing, chills or hemoptysis  CARDIOVASCULAR: No chest pain, palpitations,   GASTROINTESTINAL: No abdominal or epigastric pain. No nausea, vomiting, or hematemesis; No diarrhea or constipation. No melena or hematochezia.  GENITOURINARY: No dysuria, frequency, hematuria, or incontinence  NEUROLOGICAL: No headaches, dizziness, numbness, or tremors  MUSCULOSKELETAL: No joint pain or swelling;     ----------------------  PHYSICAL EXAM:  GENERAL: NAD, well-groomed, well-developed  HEAD:  Atraumatic, Normocephalic  NERVOUS SYSTEM:  Alert & Oriented X3  CHEST/LUNG: Clear to ausculation bilaterally; No rales, rhonchi, wheezing, or rubs  HEART: Regular rate and rhythm; No murmurs, rubs, or gallops  ABDOMEN: Soft, Nontender, Nondistended; Bowel sounds present  EXTREMITIES:  2+ Peripheral Pulses, No clubbing, cyanosis, or edema  SKIN: No rashes or lesions    ----------------------  MEDICATIONS  (STANDING):  atorvastatin 10 milliGRAM(s) Oral at bedtime  cefpodoxime 200 milliGRAM(s) Oral every 12 hours  chlorhexidine 2% Cloths 1 Application(s) Topical daily  diphenhydramine 2%/zinc acetate 0.1% Cream 1 Application(s) Topical two times a day  donepezil 5 milliGRAM(s) Oral at bedtime  doxycycline hyclate Capsule 100 milliGRAM(s) Oral every 12 hours  enoxaparin Injectable 150 milliGRAM(s) SubCutaneous every 24 hours  folic acid 1 milliGRAM(s) Oral daily  furosemide    Tablet 40 milliGRAM(s) Oral daily  hydrocortisone 1% Cream 1 Application(s) Topical two times a day  imipramine 25 milliGRAM(s) Oral daily  loratadine 10 milliGRAM(s) Oral daily  metoprolol tartrate 50 milliGRAM(s) Oral two times a day  pantoprazole    Tablet 40 milliGRAM(s) Oral before breakfast  thiamine 100 milliGRAM(s) Oral daily    MEDICATIONS  (PRN):  albuterol/ipratropium for Nebulization 3 milliLiter(s) Nebulizer every 4 hours PRN Shortness of Breath and/or Wheezing  hydrocodone/homatropine Syrup 5 milliLiter(s) Oral at bedtime PRN Cough      --------  LABS:                        10.0   8.56  )-----------( 229      ( 2020 10:38 )             32.1     02-    138  |  103  |  18  ----------------------------<  225<H>  3.9   |  21<L>  |  1.20    Ca    9.4      2020 10:38    TPro  7.6  /  Alb  2.7<L>  /  TBili  0.6  /  DBili  x   /  AST  24  /  ALT  23  /  AlkPhos  102  19    PT/INR - ( 2020 10:38 )   PT: 14.5 sec;   INR: 1.28 ratio         PTT - ( 2020 10:38 )  PTT:35.5 sec    CAPILLARY BLOOD GLUCOSE           @ 11:25   No growth  --  --  02-15 @ 23:21   No growth to date.  --  --          ----------------------------------------------  RADIOLOGY & ADDITIONAL TESTS: Patient is a 74y old  Male who presents with a chief complaint of picc line infection (2020 09:38)    HPI:  74M with h/o COPD, DANIEL on CPAP, Depression, HLD, HTN, CAD s/p PCI, CHB s/p PPM placed in , bioprosthetic AVR, dementia, recent diagnosis of Hodgkin's lymphoma in Dec 2019 coming in for PICC line infection.  Patient noted bump on right arm on 2020 and went to his PMD, Dr. Walker, for further evaluation who recommended patient be seen by oncologist.  Patient saw his oncologist, Dr. Majano, today who stated that PICC line was infected and needed ED evaluation.  Last chemo section was 1 week ago. PICC line in right bicep is infected, +itchy, denies burning throbbing or pain associated with the site. Pt denies fever, chills, confusion, dysuria, CP, SOB, abdominal pain, HA. Of note, patient was recently admitted to [facility] on 2020 for acute respiratory failure and metabolic encephalopathy in setting of electrolyte derangements possibly from chemotherapy.  In the ED, T 98F, , /82, RR 18, SpO2 99% on RA.  Labs significant for WBC 3.37, H/H 9.5/30.1, MCV 70.2, lactate 2, K 3.3, Tbili 1.3.  Patient received tylenol 650mg PO x1, zosyn x1 in the ED, NS bolus x1, vanc x1. Dr. Majano consulted by the ER.   EK bpm NSR, paced rhythm, RR' in lead III II but seen on prior EKGs.   Doppler RUE: The right internal jugular, subclavian, brachial veins are patent and compressible where applicable.  The basilic vein demonstrates thrombus throughout its course. The cephalic vein is not seen. Radial and ulnar veins are also not seen. A PICC line is noted.   CXR: no acute lung pathology (CT scan 2020 showed occasional rather than small nodules in the lungs suggesting metastasis) (2020 18:52)    -----------------------------------------------  INTERVAL HPI/OVERNIGHT EVENTS:  -----------------------------------------------    Patient examined at bedside.  Patient denies chest pain, abdominal pain, N/V/D at this time. Patient admits to itching of left wrist. Right arm is minimally erythematous and mild swelling No other complaints at this time. Patient also admits to shortness of breath when walking down the hallway. Overnight patient was very agitated and confused s/p chemo. Pt is on one to one.     T(C): 36.6 (20 @ 05:08), Max: 37.1 (20 @ 12:38)  HR: 87 (20 @ 07:26) (85 - 90)  BP: 143/80 (20 @ 05:08) (118/75 - 169/88)  RR: 17 (20 @ 05:08) (14 - 17)  SpO2: 94% (20 @ 05:08) (94% - 97%)  Wt(kg): --  I&O's Summary    2020 07:01  -  2020 07:00  --------------------------------------------------------  IN: 226.5 mL / OUT: 1600 mL / NET: -1373.5 mL      ----------------------------  REVIEW OF SYSTEMS:  CONSTITUTIONAL: No fever, fatigue  EYES: No visual disturbances  ENMT:  No difficulty hearing; No throat pain  RESPIRATORY: Admits to shortness of breath when ambulating. No cough, wheezing, chills or hemoptysis  CARDIOVASCULAR: No chest pain, palpitations,   GASTROINTESTINAL: No abdominal or epigastric pain. No nausea, vomiting, or hematemesis; No diarrhea or constipation. No melena or hematochezia.  GENITOURINARY: No dysuria, frequency, hematuria, or incontinence  NEUROLOGICAL: No headaches, dizziness, numbness, or tremors  MUSCULOSKELETAL: No joint pain or swelling; admits to itchiness of left wrist    ----------------------  PHYSICAL EXAM:  GENERAL: NAD, well-groomed, well-developed  HEAD:  Atraumatic, Normocephalic  NERVOUS SYSTEM:  Alert & Oriented X3  CHEST/LUNG: Clear to ausculation bilaterally; No rales, rhonchi, wheezing, or rubs  HEART: Regular rate and rhythm; No murmurs, rubs, or gallops  ABDOMEN: Soft, Nontender, Nondistended; Bowel sounds present  EXTREMITIES:  2+ Peripheral Pulses, No clubbing, cyanosis, or edema  SKIN: Some discoloration of left extremity    ----------------------  MEDICATIONS  (STANDING):  atorvastatin 10 milliGRAM(s) Oral at bedtime  cefpodoxime 200 milliGRAM(s) Oral every 12 hours  chlorhexidine 2% Cloths 1 Application(s) Topical daily  diphenhydramine 2%/zinc acetate 0.1% Cream 1 Application(s) Topical two times a day  donepezil 5 milliGRAM(s) Oral at bedtime  doxycycline hyclate Capsule 100 milliGRAM(s) Oral every 12 hours  enoxaparin Injectable 150 milliGRAM(s) SubCutaneous every 24 hours  folic acid 1 milliGRAM(s) Oral daily  furosemide    Tablet 40 milliGRAM(s) Oral daily  hydrocortisone 1% Cream 1 Application(s) Topical two times a day  imipramine 25 milliGRAM(s) Oral daily  loratadine 10 milliGRAM(s) Oral daily  metoprolol tartrate 50 milliGRAM(s) Oral two times a day  pantoprazole    Tablet 40 milliGRAM(s) Oral before breakfast  thiamine 100 milliGRAM(s) Oral daily    MEDICATIONS  (PRN):  albuterol/ipratropium for Nebulization 3 milliLiter(s) Nebulizer every 4 hours PRN Shortness of Breath and/or Wheezing  hydrocodone/homatropine Syrup 5 milliLiter(s) Oral at bedtime PRN Cough      --------  LABS:                        10.0   8.56  )-----------( 229      ( 2020 10:38 )             32.1         138  |  103  |  18  ----------------------------<  225<H>  3.9   |  21<L>  |  1.20    Ca    9.4      2020 10:38    TPro  7.6  /  Alb  2.7<L>  /  TBili  0.6  /  DBili  x   /  AST  24  /  ALT  23  /  AlkPhos  102      PT/INR - ( 2020 10:38 )   PT: 14.5 sec;   INR: 1.28 ratio         PTT - ( 2020 10:38 )  PTT:35.5 sec    CAPILLARY BLOOD GLUCOSE           @ 11:25   No growth  --  --  02-15 @ 23:21   No growth to date.  --  --          ----------------------------------------------  RADIOLOGY & ADDITIONAL TESTS: Patient is a 74y old  Male who presents with a chief complaint of picc line infection (2020 09:38)    HPI:  74M with h/o COPD, DANIEL on CPAP, Depression, HLD, HTN, CAD s/p PCI, CHB s/p PPM placed in , bioprosthetic AVR, dementia, recent diagnosis of Hodgkin's lymphoma in Dec 2019 coming in for PICC line infection.  Patient noted bump on right arm on 2020 and went to his PMD, Dr. Walker, for further evaluation who recommended patient be seen by oncologist.  Patient saw his oncologist, Dr. Majano, today who stated that PICC line was infected and needed ED evaluation.  Last chemo section was 1 week ago. PICC line in right bicep is infected, +itchy, denies burning throbbing or pain associated with the site. Pt denies fever, chills, confusion, dysuria, CP, SOB, abdominal pain, HA. Of note, patient was recently admitted to [facility] on 2020 for acute respiratory failure and metabolic encephalopathy in setting of electrolyte derangements possibly from chemotherapy.  In the ED, T 98F, , /82, RR 18, SpO2 99% on RA.  Labs significant for WBC 3.37, H/H 9.5/30.1, MCV 70.2, lactate 2, K 3.3, Tbili 1.3.  Patient received tylenol 650mg PO x1, zosyn x1 in the ED, NS bolus x1, vanc x1. Dr. Majano consulted by the ER.   EK bpm NSR, paced rhythm, RR' in lead III II but seen on prior EKGs.   Doppler RUE: The right internal jugular, subclavian, brachial veins are patent and compressible where applicable.  The basilic vein demonstrates thrombus throughout its course. The cephalic vein is not seen. Radial and ulnar veins are also not seen. A PICC line is noted.   CXR: no acute lung pathology (CT scan 2020 showed occasional rather than small nodules in the lungs suggesting metastasis) (2020 18:52)    -----------------------------------------------  INTERVAL HPI/OVERNIGHT EVENTS:  -----------------------------------------------    Patient examined at bedside.  Patient denies chest pain, abdominal pain, N/V/D at this time. Patient admits to itching of left wrist. Right arm is minimally erythematous and mild swelling No other complaints at this time. Patient also admits to shortness of breath when walking down the hallway. Overnight patient was very agitated and confused s/p chemo. Pt is on one to one.     T(C): 36.6 (20 @ 05:08), Max: 37.1 (20 @ 12:38)  HR: 87 (20 @ 07:26) (85 - 90)  BP: 143/80 (20 @ 05:08) (118/75 - 169/88)  RR: 17 (20 @ 05:08) (14 - 17)  SpO2: 94% (20 @ 05:08) (94% - 97%)  Wt(kg): --  I&O's Summary    2020 07:01  -  2020 07:00  --------------------------------------------------------  IN: 226.5 mL / OUT: 1600 mL / NET: -1373.5 mL      ----------------------------  REVIEW OF SYSTEMS:  CONSTITUTIONAL: No fever, fatigue  EYES: No visual disturbances  ENMT:  No difficulty hearing; No throat pain  RESPIRATORY: Admits to shortness of breath when ambulating. No cough, wheezing, chills or hemoptysis  CARDIOVASCULAR: No chest pain, palpitations,   GASTROINTESTINAL: No abdominal or epigastric pain. No nausea, vomiting, or hematemesis; No diarrhea or constipation. No melena or hematochezia.  GENITOURINARY: No dysuria, frequency, hematuria, or incontinence  NEUROLOGICAL: No headaches, dizziness, numbness, or tremors  MUSCULOSKELETAL: No joint pain or swelling; admits to itchiness of left wrist    ----------------------  PHYSICAL EXAM:  GENERAL: NAD, well-groomed, well-developed  HEAD:  Atraumatic, Normocephalic  NERVOUS SYSTEM:  Alert & Oriented X3  CHEST/LUNG: Clear to ausculation bilaterally; No rales, rhonchi, wheezing, or rubs  HEART: Regular rate and rhythm; No murmurs, rubs, or gallops  ABDOMEN: Soft, Nontender, Nondistended; Bowel sounds present  EXTREMITIES:  2+ Peripheral Pulses, No clubbing, cyanosis, or edema  SKIN: Some discoloration of left extremity    ----------------------  MEDICATIONS  (STANDING):  atorvastatin 10 milliGRAM(s) Oral at bedtime  cefpodoxime 200 milliGRAM(s) Oral every 12 hours  chlorhexidine 2% Cloths 1 Application(s) Topical daily  diphenhydramine 2%/zinc acetate 0.1% Cream 1 Application(s) Topical two times a day  donepezil 5 milliGRAM(s) Oral at bedtime  doxycycline hyclate Capsule 100 milliGRAM(s) Oral every 12 hours  enoxaparin Injectable 150 milliGRAM(s) SubCutaneous every 24 hours  folic acid 1 milliGRAM(s) Oral daily  furosemide    Tablet 40 milliGRAM(s) Oral daily  hydrocortisone 1% Cream 1 Application(s) Topical two times a day  imipramine 25 milliGRAM(s) Oral daily  loratadine 10 milliGRAM(s) Oral daily  metoprolol tartrate 50 milliGRAM(s) Oral two times a day  pantoprazole    Tablet 40 milliGRAM(s) Oral before breakfast  thiamine 100 milliGRAM(s) Oral daily    MEDICATIONS  (PRN):  albuterol/ipratropium for Nebulization 3 milliLiter(s) Nebulizer every 4 hours PRN Shortness of Breath and/or Wheezing  hydrocodone/homatropine Syrup 5 milliLiter(s) Oral at bedtime PRN Cough      --------  LABS:                        10.0   8.56  )-----------( 229      ( 2020 10:38 )             32.1         138  |  103  |  18  ----------------------------<  225<H>  3.9   |  21<L>  |  1.20    Ca    9.4      2020 10:38    TPro  7.6  /  Alb  2.7<L>  /  TBili  0.6  /  DBili  x   /  AST  24  /  ALT  23  /  AlkPhos  102      PT/INR - ( 2020 10:38 )   PT: 14.5 sec;   INR: 1.28 ratio         PTT - ( 2020 10:38 )  PTT:35.5 sec    CAPILLARY BLOOD GLUCOSE           @ 11:25   No growth  --  --  02-15 @ 23:21   No growth to date.  --  --          ----------------------------------------------  RADIOLOGY & ADDITIONAL TESTS:  < from: Xray Chest PA + Lateral + Oblique Bilat 4 Views (20 @ 10:50) >  INTERPRETATION:  Clinical Information: Eval with oblique view. Eval tip of PICC for chemo.    Technique: AP chest, lateral chest, and 2 oblique radiographs of the chest were obtained.    Comparison: Multiple prior studies, most recent chest radiograph from 2020.    Findings/  Impression: Mild cardiomegaly. Bibasilar atelectasis. Sternotomy wires. Left chest wall pacemaker. The left PICC tipprojects over the region of the cavoatrial junction. Ectasia of the thoracic aorta. No focal consolidation. No pneumothorax or pleural effusions. Suspicion for old bilateral rib fractures.    < end of copied text >

## 2020-02-19 NOTE — DISCHARGE NOTE PROVIDER - CARE PROVIDER_API CALL
Hon BEBO Majano (MD)  Hematology; Internal Medicine; Medical Oncology  [address], [address]  [place] [address]  Phone: [phone]  Fax: [phone]  Follow Up Time: 1 week    Beto Walker)  Internal Medicine  [facility] [place] [address]  Phone: [phone]  Fax: [phone]  Follow Up Time:

## 2020-02-19 NOTE — PROGRESS NOTE ADULT - PROBLEM SELECTOR PROBLEM 9
This is a recent snapshot of the patient's [facility] medical record.  For current drug dose and complete information and questions, call [phone] or In Basket pool, [facility] home infusion ([id number])  CSN Number:  [id number]       CAD (coronary artery disease)
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
## 2020-02-19 NOTE — PROGRESS NOTE ADULT - PROBLEM SELECTOR PLAN 2
with acute dvt  on heparin drip  picc line removed, on abx for upper right cellulitis with acute dvt  on heparin drip  picc line removed, on abx for upper right cellulitis  new picc line placed in left arm- patient admits to itchiness of L wrist- benadryl ordered With acute dvt  - s/p heparin drip, now on Lovenox 150mg SubQ  - old picc line removed, on abx for upper right cellulitis  - new picc line placed in left arm- patient admits to itchiness of L wrist- benadryl ordered

## 2020-02-19 NOTE — DISCHARGE NOTE PROVIDER - INSTRUCTIONS
You should limit your salt intake to 2000mg of sodium daily. By limiting your sodium intake this will help control your hypertension and prevent further complications. You can limit the amount you intake by avoiding adding extra salt to your foods, avoiding eating pre-made frozen meals, and avoid eating canned foods. These food products contain high quantities of preservatives including sodium which can raise your blood pressure. Eat a diet rich in fresh foods including fruits, vegetables, lean meats and fish. Please follow up with a dietician or your PMD with any questions.

## 2020-02-19 NOTE — DISCHARGE NOTE PROVIDER - NSDCHHPTASSISTHOME_GEN_ALL_CORE
"Requested Prescriptions   Pending Prescriptions Disp Refills     norethindrone (MICRONOR) 0.35 MG per tablet [Pharmacy Med Name: NORETHINDRONE 0.35 MG TABLET] 28 tablet 11    Last Written Prescription Date:  5.31.17  Last Fill Quantity: 28,  # refills: 11   Last Office Visit: 9/19/2017   Future Office Visit:      Sig: TAKE 1 TABLET (0.35 MG) BY MOUTH DAILY    Contraceptives Protocol Passed    5/3/2018  3:02 AM       Passed - Patient is not a current smoker if age is 35 or older       Passed - Recent (12 mo) or future (30 days) visit within the authorizing provider's specialty    Patient had office visit in the last 12 months or has a visit in the next 30 days with authorizing provider or within the authorizing provider's specialty.  See \"Patient Info\" tab in inbasket, or \"Choose Columns\" in Meds & Orders section of the refill encounter.           Passed - No active pregnancy on record       Passed - No positive pregnancy test in past 12 months          " Patient Needs Assistance to Leave Residence...

## 2020-02-19 NOTE — DISCHARGE NOTE PROVIDER - HOSPITAL COURSE
HPI:    74M with h/o COPD, DANIEL on CPAP, Depression, HLD, HTN, CAD s/p PCI, CHB s/p PPM placed in , bioprosthetic AVR, dementia, recent diagnosis of Hodgkin's lymphoma in Dec 2019 coming in for PICC line infection.  Patient noted bump on right arm on 2020 and went to his PMD, Dr. Walker, for further evaluation who recommended patient be seen by oncologist.  Patient saw his oncologist, Dr. Majano, today who stated that PICC line was infected and needed ED evaluation.  Last chemo section was 1 week ago. PICC line in right bicep is infected, +itchy, denies burning throbbing or pain associated with the site. Pt denies fever, chills, confusion, dysuria, CP, SOB, abdominal pain, HA. Of note, patient was recently admitted to [facility], [place] on 2020 for acute respiratory failure and metabolic encephalopathy in setting of electrolyte derangements possibly from chemotherapy.    In the ED, T 98F, , /82, RR 18, SpO2 99% on RA.  Labs significant for WBC 3.37, H/H 9.5/30.1, MCV 70.2, lactate 2, K 3.3, Tbili 1.3.  Patient received tylenol 650mg PO x1, zosyn x1 in the ED, NS bolus x1, vanc x1. Dr. Majano consulted by the ER.     EK bpm NSR, paced rhythm, RR' in lead III II but seen on prior EKGs.     Doppler RUE: The right internal jugular, subclavian, brachial veins are patent and compressible where applicable.  The basilic vein demonstrates thrombus throughout its course. The cephalic vein is not seen. Radial and ulnar veins are also not seen. A PICC line is noted.     CXR: no acute lung pathology (CT scan 2020 showed occasional rather than small nodules in the lungs suggesting metastasis) (2020 18:52)            ---    HOSPITAL COURSE:         ---    CONSULTANTS: HPI:    74M with h/o COPD, DANIEL on CPAP, Depression, HLD, HTN, CAD s/p PCI, CHB s/p PPM placed in , bioprosthetic AVR, dementia, recent diagnosis of Hodgkin's lymphoma in Dec 2019 coming in for PICC line infection.  Patient noted bump on right arm on 2020 and went to his PMD, Dr. Walker, for further evaluation who recommended patient be seen by oncologist.  Patient saw his oncologist, Dr. Majano, today who stated that PICC line was infected and needed ED evaluation.  Last chemo section was 1 week ago. PICC line in right bicep is infected, +itchy, denies burning throbbing or pain associated with the site. Pt denies fever, chills, confusion, dysuria, CP, SOB, abdominal pain, HA. Of note, patient was recently admitted to [facility] on 2020 for acute respiratory failure and metabolic encephalopathy in setting of electrolyte derangements possibly from chemotherapy.    In the ED, T 98F, , /82, RR 18, SpO2 99% on RA.  Labs significant for WBC 3.37, H/H 9.5/30.1, MCV 70.2, lactate 2, K 3.3, Tbili 1.3.  Patient received tylenol 650mg PO x1, zosyn x1 in the ED, NS bolus x1, vanc x1. Dr. Majano consulted by the ER.     EK bpm NSR, paced rhythm, RR' in lead III II but seen on prior EKGs.     Doppler RUE: The right internal jugular, subclavian, brachial veins are patent and compressible where applicable.  The basilic vein demonstrates thrombus throughout its course. The cephalic vein is not seen. Radial and ulnar veins are also not seen. A PICC line is noted.     CXR: no acute lung pathology (CT scan 2020 showed occasional rather than small nodules in the lungs suggesting metastasis)     ---    HOSPITAL COURSE: Patient was admitted to the general medical floor for a PICC line infection, complicated with a right DVT of the upper extremity. Patient started on a heparin drip. Patient continued on IV Zosyn. Heme/onc (Dr. Majano) evaluated patient for recent diagnosis of Hodgkin's lymphoma, for which he recieves chemo via PICC line. IR (Dr. Puri)        ---    CONSULTANTS:     Heme/onc: Dr. Majano    Pulm: Dr. Moore    ID: Dr. Arroyo     Interventional Radiology: Dr. Puri HPI:    74M with h/o COPD, DANIEL on CPAP, Depression, HLD, HTN, CAD s/p PCI, CHB s/p PPM placed in , bioprosthetic AVR, dementia, recent diagnosis of Hodgkin's lymphoma in Dec 2019 coming in for PICC line infection.  Patient noted bump on right arm on 2020 and went to his PMD, Dr. Walker, for further evaluation who recommended patient be seen by oncologist.  Patient saw his oncologist, Dr. Majano, today who stated that PICC line was infected and needed ED evaluation.  Last chemo section was 1 week ago. PICC line in right bicep is infected, +itchy, denies burning throbbing or pain associated with the site. Pt denies fever, chills, confusion, dysuria, CP, SOB, abdominal pain, HA. Of note, patient was recently admitted to [facility] on 2020 for acute respiratory failure and metabolic encephalopathy in setting of electrolyte derangements possibly from chemotherapy.    In the ED, T 98F, , /82, RR 18, SpO2 99% on RA.  Labs significant for WBC 3.37, H/H 9.5/30.1, MCV 70.2, lactate 2, K 3.3, Tbili 1.3.  Patient received tylenol 650mg PO x1, zosyn x1 in the ED, NS bolus x1, vanc x1. Dr. Majano consulted by the ER.     EK bpm NSR, paced rhythm, RR' in lead III II but seen on prior EKGs.     Doppler RUE: The right internal jugular, subclavian, brachial veins are patent and compressible where applicable.  The basilic vein demonstrates thrombus throughout its course. The cephalic vein is not seen. Radial and ulnar veins are also not seen. A PICC line is noted.     CXR: no acute lung pathology (CT scan 2020 showed occasional rather than small nodules in the lungs suggesting metastasis)     ---    HOSPITAL COURSE: Patient was admitted to the general medical floor for a PICC line infection, complicated with a right DVT of the upper extremity. Patient continued on IV Zosyn.  Patient started on a heparin drip. IR (Dr. Puri) was consulted and evaluated patient for PICC line removal and new PICC line placement. R side infected PICC line removed by IR, and new PICC line through L basilic vein. Heme/onc (Dr. Majano) evaluated patient for recent diagnosis of Hodgkin's lymphoma, for which he recieves chemo via PICC line. Chemotherapy restarted. ID (Dr. Arroyo) was consulted and evaluated patient. Abx changed to Vancomycin + Ceftriaxone, and eventually deescalated to Vantin + Doxycycline for a 5 day course. Heparin drip changed to SubQ Lovenox, wife of patient able to administer at home. Patient continued to improve over his hospital stay.        Patient seen and examined on day of discharge. Patient improved clinically over the course of their hospitalization. Patient is medically optimized for discharge home.        Vital Signs Last 24 Hrs    T(C): 36.7 (2020 13:25), Max: 36.8 (2020 17:02)    T(F): 98.1 (2020 13:25), Max: 98.2 (2020 17:02)    HR: 84 (2020 13:25) (84 - 90)    BP: 167/99 (2020 13:25) (138/84 - 167/99)    BP(mean): --    RR: 18 (2020 13:25) (16 - 18)    SpO2: 98% (2020 13:25) (94% - 98%)        PHYSICAL EXAM:    GENERAL: NAD, well-groomed, well-developed    HEAD:  Atraumatic, Normocephalic    NERVOUS SYSTEM:  Alert & Oriented X3    CHEST/LUNG: Clear to ausculation bilaterally; No rales, rhonchi, wheezing, or rubs    HEART: Regular rate and rhythm; No murmurs, rubs, or gallops    ABDOMEN: Soft, Nontender, Nondistended; Bowel sounds present    EXTREMITIES:  2+ Peripheral Pulses, No clubbing, cyanosis, or edema    SKIN: Some discoloration of left extremity            ---    CONSULTANTS:     Heme/onc: Dr. Majano    Pulm: Dr. Moore    ID: Dr. Arroyo     Interventional Radiology: Dr. Puri

## 2020-02-19 NOTE — PROGRESS NOTE ADULT - SUBJECTIVE AND OBJECTIVE BOX
[INTERVAL HX: ]  Patient seen and examined;  Chart reviewed and events noted;   on PO Cefpodoxime. Arm better still  no pain  Wife has given SQ injections prior.   Can have RN teach in house.     Patient is a 74y Male with a known history of :  Acute embolism and thrombosis of deep vein of lower extremity (I82.409) [Active]  Pacemaker (Z95.0) [Active]  Hypertension (I10) [Active]  Lymphoma (C85.90) [Active]  Dementia (F03.90) [Active]  COPD (chronic obstructive pulmonary disease) (J44.9) [Active]  Depression (F32.9) [Active]  DVT (deep venous thrombosis) (I82.409) [Active]  HLD (hyperlipidemia) (E78.5) [Active]  HTN (hypertension) (I10) [Active]  Aortic valve replaced (Z95.2) [Active]  Stented coronary artery (Z95.5) [Active]  CAD (coronary artery disease) (I25.10) [Active]  S/P AVR (aortic valve replacement) (Z95.2) [Active]  Cardiac pacemaker (Z95.0) [Active]  Artificial pacemaker (Z95.0) [Active]  S/P aortic valve replacement with porcine valve (Z95.3) [Active]    HPI:  74M with h/o COPD, DANIEL on CPAP, Depression, HLD, HTN, CAD s/p PCI, CHB s/p PPM placed in , bioprosthetic AVR, dementia, recent diagnosis of Hodgkin's lymphoma in Dec 2019 coming in for PICC line infection.  Patient noted bump on right arm on 2020 and went to his PMD, Dr. Walker, for further evaluation who recommended patient be seen by oncologist.  Patient saw his oncologist, Dr. Majano, today who stated that PICC line was infected and needed ED evaluation.  Last chemo section was 1 week ago. PICC line in right bicep is infected, +itchy, denies burning throbbing or pain associated with the site. Pt denies fever, chills, confusion, dysuria, CP, SOB, abdominal pain, HA. Of note, patient was recently admitted to [facility] on 2020 for acute respiratory failure and metabolic encephalopathy in setting of electrolyte derangements possibly from chemotherapy.  In the ED, T 98F, , /82, RR 18, SpO2 99% on RA.  Labs significant for WBC 3.37, H/H 9.5/30.1, MCV 70.2, lactate 2, K 3.3, Tbili 1.3.  Patient received tylenol 650mg PO x1, zosyn x1 in the ED, NS bolus x1, vanc x1. Dr. Majano consulted by the ER.   EK bpm NSR, paced rhythm, RR' in lead III II but seen on prior EKGs.   Doppler RUE: The right internal jugular, subclavian, brachial veins are patent and compressible where applicable.  The basilic vein demonstrates thrombus throughout its course. The cephalic vein is not seen. Radial and ulnar veins are also not seen. A PICC line is noted.   CXR: no acute lung pathology (CT scan 2020 showed occasional rather than small nodules in the lungs suggesting metastasis) (2020 18:52)            MEDICATIONS  (STANDING):  atorvastatin 10 milliGRAM(s) Oral at bedtime  cefpodoxime 200 milliGRAM(s) Oral every 12 hours  chlorhexidine 2% Cloths 1 Application(s) Topical daily  diphenhydramine 2%/zinc acetate 0.1% Cream 1 Application(s) Topical two times a day  donepezil 5 milliGRAM(s) Oral at bedtime  doxycycline hyclate Capsule 100 milliGRAM(s) Oral every 12 hours  enoxaparin Injectable 150 milliGRAM(s) SubCutaneous every 24 hours  folic acid 1 milliGRAM(s) Oral daily  furosemide    Tablet 40 milliGRAM(s) Oral daily  hydrocortisone 1% Cream 1 Application(s) Topical two times a day  imipramine 25 milliGRAM(s) Oral daily  loratadine 10 milliGRAM(s) Oral daily  metoprolol tartrate 50 milliGRAM(s) Oral two times a day  pantoprazole    Tablet 40 milliGRAM(s) Oral before breakfast  thiamine 100 milliGRAM(s) Oral daily    MEDICATIONS  (PRN):  albuterol/ipratropium for Nebulization 3 milliLiter(s) Nebulizer every 4 hours PRN Shortness of Breath and/or Wheezing  hydrocodone/homatropine Syrup 5 milliLiter(s) Oral at bedtime PRN Cough      Vital Signs Last 24 Hrs  T(C): 36.6 (2020 05:08), Max: 37.1 (2020 12:38)  T(F): 97.8 (2020 05:08), Max: 98.8 (2020 16:18)  HR: 87 (2020 07:26) (85 - 90)  BP: 143/80 (2020 05:08) (118/75 - 169/88)  BP(mean): --  RR: 17 (2020 05:08) (14 - 17)  SpO2: 94% (2020 05:08) (94% - 97%)      [PHYSICAL EXAM]  General: adult in NAD,  WN,  WD.  HEENT: clear oropharynx, anicteric sclera, pink conjunctivae.  Neck: supple, no masses.  CV: normal S1S2, no murmur, no rubs, no gallops.  Lungs: clear to auscultation, no wheezes, no rales, no rhonchi.  Abdomen: soft, non-tender, non-distended, no hepatosplenomegaly, normal BS, no guarding.  Ext: no clubbing, no cyanosis, no edema.  Skin: no rashes,  no petechiae, no venous stasis changes.  Neuro: alert and oriented X  , no focal motor deficits.  LN: no SC GUERO.      [LABS:]                        10.0   8.56  )-----------( 229      ( 2020 10:38 )             32.1     02-19    138  |  103  |  18  ----------------------------<  225<H>  3.9   |  21<L>  |  1.20    Ca    9.4      2020 10:38    TPro  7.6  /  Alb  2.7<L>  /  TBili  0.6  /  DBili  x   /  AST  24  /  ALT  23  /  AlkPhos  102  02-19    PT/INR - ( 2020 10:38 )   PT: 14.5 sec;   INR: 1.28 ratio         PTT - ( 2020 10:38 )  PTT:35.5 sec              [RADIOLOGY STUDIES:]

## 2020-02-19 NOTE — PROGRESS NOTE ADULT - PROBLEM SELECTOR PLAN 10
dvt ppx - patient on heparin drip for dvt   -fall prec. aspiration prec, ambulate with assistance    problem 11: hypokalemia: K 3.3 , replete, f/u K in AM  problem 12: depression: cont imipramine,   problem 13: HLD: cont atorvastatin,   problem 14 CHF: cont home med lasix, not fluid overloaded at this time, only trace b/l LE edema    IMPROVE VTE Individual Risk Assessment        RISK                                                          Points  [  ] Previous VTE                                                3  [  ] Thrombophilia                                             2  [  ] Lower limb paralysis                                   2        (unable to hold up >15 seconds)    [ X ] Current Cancer                                            2         (within 6 months)  [  ] Immobilization > 24 hrs                              1  [  ] ICU/CCU stay > 24 hours                            1  [ X ] Age > 60                                                    1  IMPROVE VTE Score ____3_____ dvt ppx - patient s/p heparin drip. Now on lovenox 150mg SubQ daily  -fall prec. aspiration prec, ambulate with assistance    problem 11: hypokalemia: wnl  problem 12: depression: cont imipramine,   problem 13: HLD: cont atorvastatin,   problem 14 CHF: cont home med lasix, not fluid overloaded at this time, only trace b/l LE edema    IMPROVE VTE Individual Risk Assessment        RISK                                                          Points  [  ] Previous VTE                                                3  [  ] Thrombophilia                                             2  [  ] Lower limb paralysis                                   2        (unable to hold up >15 seconds)    [ X ] Current Cancer                                            2         (within 6 months)  [  ] Immobilization > 24 hrs                              1  [  ] ICU/CCU stay > 24 hours                            1  [ X ] Age > 60                                                    1  IMPROVE VTE Score ____3_____

## 2020-02-22 PROBLEM — R91.1 PULMONARY NODULE: Status: ACTIVE | Noted: 2020-01-01

## 2020-02-22 NOTE — CONSULT LETTER
[Dear  ___] : Dear  [unfilled], [Courtesy Letter:] : I had the pleasure of seeing your patient, [unfilled], in my office today. [Please see my note below.] : Please see my note below. [Consult Closing:] : Thank you very much for allowing me to participate in the care of this patient.  If you have any questions, please do not hesitate to contact me. [Sincerely,] : Sincerely, [DrSuly  ___] : Dr. RASCON [FreeTextEntry3] : Dr. Teressa Baugh

## 2020-02-22 NOTE — DISCUSSION/SUMMARY
[FreeTextEntry1] : Rocky is a patient with excessive daytime sleepiness snoring secondary to history of obstructive sleep apnea. Secondly, he is a patient with history of lung nodules/COPD

## 2020-02-22 NOTE — ASSESSMENT
[FreeTextEntry1] : I am ordering a home sleep study to confirm the diagnosis of obstructive sleep apnea. Once confirmed, I will start him on nocturnal APAP for DANIEL. \par I advised him to follow with you in regarding to lung nodules and COPD

## 2020-02-22 NOTE — PROCEDURE
[FreeTextEntry1] : Pulmonary Function Test: Lung Volume: Within normal limits; Spirometry: Within normal limits with no improvement post bronchodilator; Diffusion: moderate impairment. SpO2 at rest in room air is 97%\par

## 2020-02-22 NOTE — HISTORY OF PRESENT ILLNESS
[TextBox_4] : Rocky is a pleasant 74-year-old gentleman with history of Hodgkin's lymphoma, COPD, lung nodules, tracheal stenosis, history of obstructive sleep apnea on nocturnal CPAP, his CPAP machine is broken beyond repair, he came in complaining of excessive daytime sleepiness and snoring at this point, no chest pain or shortness of breath

## 2020-02-24 PROBLEM — F32.9 DEPRESSION: Status: ACTIVE | Noted: 2019-01-01

## 2020-02-24 NOTE — REVIEW OF SYSTEMS
[Nosebleeds] : nosebleeds [Abdominal Pain] : abdominal pain [Fever] : no fever [Vision Problems] : no vision problems [Earache] : no earache [Chills] : no chills [Hearing Loss] : no hearing loss [Chest Pain] : no chest pain [Palpitations] : no palpitations [Shortness Of Breath] : no shortness of breath [Lower Ext Edema] : no lower extremity edema [Cough] : no cough [Nausea] : no nausea [Constipation] : no constipation [Vomiting] : no vomiting [Heartburn] : no heartburn [Dysuria] : no dysuria

## 2020-02-24 NOTE — HISTORY OF PRESENT ILLNESS
[de-identified] : Pt is s/p hospitalization for rt arm DVT and infection  Seeing Dr Melecio boston/onc  Has been sleeping during day and awake at night.  no fever  Confusion is much better

## 2020-02-24 NOTE — PHYSICAL EXAM
[Normal Sclera/Conjunctiva] : normal sclera/conjunctiva [Ill-Appearing] : ill-appearing [No JVD] : no jugular venous distention [Normal Outer Ear/Nose] : the outer ears and nose were normal in appearance [No Respiratory Distress] : no respiratory distress  [No Lymphadenopathy] : no lymphadenopathy [No Accessory Muscle Use] : no accessory muscle use [Normal Rate] : normal rate  [Regular Rhythm] : with a regular rhythm [No Edema] : there was no peripheral edema [de-identified] : rt upper amr less swollen

## 2020-03-14 PROBLEM — K21.9 GERD (GASTROESOPHAGEAL REFLUX DISEASE): Status: ACTIVE | Noted: 2019-01-01

## 2020-03-14 PROBLEM — C81.90 HODGKINS LYMPHOMA: Status: ACTIVE | Noted: 2020-01-01

## 2020-03-14 PROBLEM — I25.10 CAD (CORONARY ARTERY DISEASE): Status: ACTIVE | Noted: 2019-01-01

## 2020-03-14 PROBLEM — G31.84 MILD COGNITIVE IMPAIRMENT WITH MEMORY LOSS: Status: ACTIVE | Noted: 2019-01-01

## 2020-03-14 PROBLEM — E78.5 HYPERLIPIDEMIA: Status: ACTIVE | Noted: 2019-01-01

## 2020-03-14 NOTE — REVIEW OF SYSTEMS
[Fever] : no fever [Chills] : no chills [Chest Pain] : no chest pain [Lower Ext Edema] : no lower extremity edema [Shortness Of Breath] : no shortness of breath [Wheezing] : no wheezing [Cough] : no cough [Abdominal Pain] : no abdominal pain [Dysuria] : no dysuria [Incontinence] : no incontinence [Joint Pain] : no joint pain [Joint Stiffness] : no joint stiffness [Back Pain] : no back pain [Joint Swelling] : no joint swelling [FreeTextEntry5] : wearing JUDD stockings

## 2020-03-14 NOTE — HISTORY OF PRESENT ILLNESS
[de-identified] : Pt comes for recheck and FBW  Wife has noticed that if he gets up too quickl he becomes dizzy and weak  Diuretic increased to 60 mg recently

## 2020-03-14 NOTE — PHYSICAL EXAM
[No Acute Distress] : no acute distress [Well Nourished] : well nourished [Normal Sclera/Conjunctiva] : normal sclera/conjunctiva [PERRL] : pupils equal round and reactive to light [Normal Outer Ear/Nose] : the outer ears and nose were normal in appearance [Normal Oropharynx] : the oropharynx was normal [No JVD] : no jugular venous distention [No Lymphadenopathy] : no lymphadenopathy [Supple] : supple [No Respiratory Distress] : no respiratory distress  [No Accessory Muscle Use] : no accessory muscle use [Clear to Auscultation] : lungs were clear to auscultation bilaterally [Normal Rate] : normal rate  [Regular Rhythm] : with a regular rhythm [No Extremity Clubbing/Cyanosis] : no extremity clubbing/cyanosis [Soft] : abdomen soft [Non Tender] : non-tender [Non-distended] : non-distended [Normal Posterior Cervical Nodes] : no posterior cervical lymphadenopathy [Normal Anterior Cervical Nodes] : no anterior cervical lymphadenopathy [Grossly Normal Strength/Tone] : grossly normal strength/tone [No Rash] : no rash

## 2020-04-06 PROBLEM — G47.33 OSA (OBSTRUCTIVE SLEEP APNEA): Status: ACTIVE | Noted: 2019-01-01

## 2020-04-06 PROBLEM — I10 HTN (HYPERTENSION): Status: ACTIVE | Noted: 2019-01-01

## 2020-04-06 PROBLEM — R06.02 SOB (SHORTNESS OF BREATH): Status: ACTIVE | Noted: 2020-01-01

## 2020-04-06 NOTE — DISCUSSION/SUMMARY
[FreeTextEntry1] : Rocky is a patient with excessive daytime sleepiness and snoring secondary to just diagnosed severe obstructive sleep apnea

## 2020-04-06 NOTE — ASSESSMENT
[FreeTextEntry1] : Discussed with patient at length regarding the aforementioned sleep study findings and all treatment options in the office today, will start patient on APAP(Auto-titrating positive airway pressure) at 5-20 cm H2O via face mask at this point.\par Advised to return for sleep followup in 6 weeks\par \par Time spent in TeleHealth consultation and charting is 25 minutes

## 2020-04-06 NOTE — PROCEDURE
[FreeTextEntry1] : Home sleep study showed very severe obstructive sleep apnea with overall AHI of 58 events per hour of sleep
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
## 2020-04-20 NOTE — CONSULT NOTE ADULT - SUBJECTIVE AND OBJECTIVE BOX
History of Present Illness: The patient is a 74 year old male with a history of COPD, DANIEL, depression, HL, HTN, CAD s/p PCI, CHB s/p PPM, bioprosthetic AVR, dementia, Hodgkin's lymphoma who presents with respiratory distress. He states he has had a cough over the last few days. He has had progressive shortness of breath. He denies fevers, chest pain, GI symptoms, lower extremity edema. He was about to be intubated in ED but his sats were 100% so decision was to place on BIPAP.    Past Medical/Surgical History:  COPD, DANIEL, depression, HL, HTN, CAD s/p PCI, CHB s/p PPM, bioprosthetic AVR, dementia, Hodgkin's lymphoma    Medications:  Home Medications:  atorvastatin 10 mg oral tablet: 1 tab(s) orally once a day (at bedtime) (20 Apr 2020 04:49)  donepezil 5 mg oral tablet: 1 tab(s) orally once a day (at bedtime) (20 Apr 2020 04:49)  folic acid 1 mg oral tablet: 1 tab(s) orally once a day (20 Apr 2020 04:49)  furosemide 40 mg oral tablet: 1 tab(s) orally once a day (20 Apr 2020 04:49)  imipramine 25 mg oral tablet: 1 tab(s) orally once a day (20 Apr 2020 04:49)  Lasix 20 mg oral tablet: orally once a day (at bedtime) (20 Apr 2020 04:49)  metoprolol tartrate 50 mg oral tablet: 1 tab(s) orally 2 times a day (20 Apr 2020 04:49)  Protonix 40 mg oral delayed release tablet: 1 tab(s) orally once a day (20 Apr 2020 04:49)  risperiDONE 1 mg oral tablet: 1 tab(s) orally 2 times a day (20 Apr 2020 04:49)  thiamine 100 mg oral tablet: 1 tab(s) orally once a day (20 Apr 2020 04:49)  ZyrTEC 10 mg oral tablet: 1 tab(s) orally once a day (20 Apr 2020 04:49)      Family History: Non-contributory family history of premature cardiovascular atherosclerotic disease    Social History: No tobacco, alcohol or drug use    Review of Systems:  General: No fevers, chills, weight loss or gain  Skin: No rashes, color changes  Cardiovascular: No chest pain, orthopnea  Respiratory: No shortness of breath, cough  Gastrointestinal: No nausea, abdominal pain  Genitourinary: No incontinence, pain with urination  Musculoskeletal: No pain, swelling, decreased range of motion  Neurological: No headache, weakness  Psychiatric: No depression, anxiety  Endocrine: No weight loss or gain, increased thirst  All other systems are comprehensively negative.    Physical Exam:  Vitals:        Vital Signs Last 24 Hrs  T(C): 37.2 (20 Apr 2020 04:35), Max: 37.2 (20 Apr 2020 04:35)  T(F): 98.9 (20 Apr 2020 04:35), Max: 98.9 (20 Apr 2020 04:35)  HR: 101 (20 Apr 2020 06:05) (100 - 106)  BP: 142/74 (20 Apr 2020 06:05) (115/74 - 142/74)  BP(mean): --  RR: 25 (20 Apr 2020 06:05) (25 - 30)  SpO2: 99% (20 Apr 2020 06:05) (97% - 100%)  General: NAD  HEENT: MMM  Neck: No JVD, no carotid bruit  Lungs: CTAB  CV: RRR, nl S1/S2, no M/R/G  Abdomen: S/NT/ND, +BS  Extremities: No LE edema, no cyanosis  Neuro: AAOx3, non-focal  Skin: No rash    Labs:                        9.7    4.45  )-----------( 191      ( 20 Apr 2020 04:53 )             30.9     04-20    134<L>  |  98  |  17  ----------------------------<  169<H>  3.1<L>   |  29  |  1.17    Ca    8.2<L>      20 Apr 2020 04:53    TPro  6.2  /  Alb  2.7<L>  /  TBili  0.8  /  DBili  x   /  AST  19  /  ALT  11  /  AlkPhos  77  04-20    CARDIAC MARKERS ( 20 Apr 2020 04:53 )  .180 ng/mL / x     / 41 U/L / x     / x          PT/INR - ( 20 Apr 2020 04:53 )   PT: 16.6 sec;   INR: 1.48 ratio         PTT - ( 20 Apr 2020 04:53 )  PTT:25.9 sec    ECG: NSR, ventricular paced

## 2020-04-20 NOTE — ED PROVIDER NOTE - PSH
Artificial pacemaker  for complete heart block  Cardiac pacemaker    S/P aortic valve replacement with porcine valve    S/P AVR (aortic valve replacement)  bovine valve

## 2020-04-20 NOTE — CHART NOTE - NSCHARTNOTEFT_GEN_A_CORE
73 y/o M w/ a PMHx of lymphoma, AVR, CAD, COPD, HTN, HLD, PPM, and depression who presented to [facility] ED tonight c/o cough and SOB x 3 days. Pt w/ CXR revealing of B/L patchy opacities. Labs revealing of lymphopenia and elevated D dimer. Pending COVID-19 PCR. Alerted by ED attending that they were planning for intubation, was discussed w/ pt and his wife. Pt seen and examined at the bedside. At the time of my evaluation pt w/ SpO2 100%  on 100% NRB, able to talk in full sentences, and mental status intact. Pt w/ labored breathing, respiratory rate 25-30. Anesthesia at bedside. Decision was made to hold off on intubation at this time. Pt will likely benefit from prone or lateral decubitus positioning vs BIPAP if COVID-19 PCR is negative. S/p Lasix 80mg IV push. Given clinical picture, more likely COVID-19 rather than CHF exacerbation. Pt w/ grossly normal LV function in 2/2020. Would treat as COVID-19. Obtain CT angio to R/O PE given he has multiple risk factors for clot.

## 2020-04-20 NOTE — CONSULT NOTE ADULT - ASSESSMENT
74  year old gentleman with Hodgkins disease with multiple comorbidities on chemotherapy with ABVD now admitted with increased shortness of breath.  right arm DVT related to prior PICC line.  CXR with diffuse bilateral opacities.  first covid test negative.  differential diagnosis includes pulmonary embolism and bleomycin toxicity as well as covid 19 with negative first test.  Elevated didimer and fibrinogen consistent with acute illness and related to increase risk of thrombotic event (had documented right axillary and basilic thrombosis)    Recommendations:  1.  continue current medical evaluation and treatment. await repeat covoid testing  2.  continue full dose anticoagulation   3.  follow CBC. at significant risk of becoming more pancytopenic over next few days related to prior chemotherapy and acute illness  4.  to hold additional chemotherapy until stable and has re-evaluation  5.  further heme recommendations pending above

## 2020-04-20 NOTE — H&P ADULT - ASSESSMENT
suspected Covid-19 virus 74 year old male with PMHx of COPD, DANIEL on CPAP, depression, HLD, HTN, CAD s/p PCI, CHB s/p PPM, bioprosthetic AVR, dementia, Stage IV Hodgkin's lymphoma with lung nodule, tracheal stenosis admitted with acute hypoxic respiratory failure secondary to suspected COVID-19 pneumonia.    Acute hypoxic respiratory failure 2/2 bilateral pneumona 2/2 suspected COVID-19 infection  - admit to SICU  - supplemental O2 prn to maintain O2 sats >93%  - COVID testing preliminarily negative - will follow up official PCR  - will limit NSAID use  - monitor volume status closely, will exercise caution with aggressive hydration  - tylenol prn myalgias, fever  - daily labs to include: CBC with differential along with a CMP  - ferritin, crp, d-dimer, procal at admission then will repeat as clinically warranted  - will initiate full dose lmwh  - advanced care planning/code status:   - NLR 6.42    HFpEF  - given lasix 80mg IV push in ED  - on 60mg daily at home  - does not appear to be in acute exacerbation - mildly elevated proBNP 74 year old male with PMHx of COPD, DANIEL on CPAP, depression, HLD, HTN, CAD s/p PCI, CHB s/p PPM, bioprosthetic AVR, dementia, Stage IV Hodgkin's lymphoma with lung nodule, tracheal stenosis admitted with acute hypoxic respiratory failure secondary to suspected COVID-19 pneumonia.    Acute hypoxic respiratory failure 2/2 bilateral pneumona 2/2 suspected COVID-19 infection  - admit to SICU  - supplemental O2 prn to maintain O2 sats >93%  - COVID testing preliminarily negative - will follow up official PCR  - on BIPAP for now - ill closed room with aranda on  - will limit NSAID use  - monitor volume status closely, will exercise caution with aggressive hydration  - tylenol prn myalgias, fever  - daily labs to include: CBC with differential along with a CMP  - ferritin, crp, d-dimer, procal at admission then will repeat as clinically warranted  - will initiate full dose lmwh  - advanced care planning/code status:   - NLR 6.42    HFpEF  - given lasix 80mg IV push in ED  - on 60mg daily at home  - does not appear to be in acute exacerbation - mildly elevated proBNP    CHB s/p PPM  - monitor telemetry    DANIEL on CPAP  - continue BIPAP    HTN/Elevated troponin/CAD s/p PCI  - continue asa/statin  - continue lopressor  - without anginal complaints - trend cardiac enzymes  - monitor hemodynamics  - Cardio O[place]da    Stage IV Hodgkin Lymphoma  - on treatment with Dr. Majano  - with known lung nodule  - heme/onc consulted    Dementia/Depression  - continue donepezil  - continue imipramine    DVT  - known DVT associated with PICC line  - on lovenox 150mg daily at home  - will give lovenox 100mg BID while here given elevated D-dimer/hypoxia    Need for prophylaxis  - on full dose lmwh    Dispo: Full Code/Inpatient, to be watched in SICU  discussed plan of care with wife - states patient is full code, intubate if needed 74 year old male with PMHx of COPD, DANIEL on CPAP, depression, HLD, HTN, CAD s/p PCI, CHB s/p PPM, bioprosthetic AVR, dementia, Stage IV Hodgkin's lymphoma with lung nodule, tracheal stenosis admitted with acute hypoxic respiratory failure secondary to suspected COVID-19 pneumonia.    Acute hypoxic respiratory failure 2/2 bilateral pneumona 2/2 suspected COVID-19 infection  - admit to SICU  - supplemental O2 prn to maintain O2 sats >93%  - COVID testing preliminarily negative - will follow up official PCR  - on BIPAP for now - ill closed room with aranda on  - will limit NSAID use  - monitor volume status closely, will exercise caution with aggressive hydration  - tylenol prn myalgias, fever  - daily labs to include: CBC with differential along with a CMP  - ferritin, crp, d-dimer, procal at admission then will repeat as clinically warranted  - will initiate full dose lmwh  - advanced care planning/code status:   - NLR 6.42    HFpEF  - given lasix 80mg IV push in ED  - on lasix 60mg daily at home (40mg in AM and 20mg qhs)  - does not appear to be in acute exacerbation - mildly elevated proBNP  - continue lasix 40mg daily for now per cards    CHB s/p PPM  - monitor telemetry    DANIEL on CPAP  - continue BIPAP    HTN/Elevated troponin/CAD s/p PCI  - continue asa/statin  - continue lopressor  - without anginal complaints - trend cardiac enzymes  - monitor hemodynamics  - James Call    Stage IV Hodgkin Lymphoma  - on treatment with Dr. Majano  - with known lung nodule  - heme/onc consulted    Dementia/Depression  - continue donepezil  - continue imipramine    DVT  - known DVT associated with PICC line  - on lovenox 150mg daily at home  - will give lovenox 100mg BID while here given elevated D-dimer/hypoxia    Need for prophylaxis  - on full dose lmwh    Dispo: Full Code/Inpatient, to be watched in SICU  discussed plan of care with wife - states patient is full code, intubate if needed
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
## 2020-04-20 NOTE — H&P ADULT - HISTORY OF PRESENT ILLNESS
Pt is a 74 year old male who presents today complaining of trouble breathing. He was brought in  via EMS due to worsening shortness of breath. It has been reported that this started the other day but had gotten worse. He is unable to give a complete history due to his shortness of breath. 74 year old male with PMHx of COPD, DANIEL, depression, HLD, HTN, CAD s/p PCI, CHB s/p PPM, bioprosthetic AVR, dementia, Stage IV Hodgkin's lymphoma with lung nodule, tracheal stenosis presented to the ED complaining of trouble breathing. He was brought in via EMS due to worsening shortness of breath. It has been reported that this started the other day but had gotten worse. History is limited due to his current medical state. He was very dyspneic in the ED and was about to be intubated on admission, but the decision was made to trial him on BiLevel PAP as his oxygen saturation improved. He has been having progressive shortness of breath over the last 3 days. He is currently being treated by Dr. Majano (heme/onc) for his Hodgkin lymphoma. On lovenox subq for a PICC line associated DVT. No fevers, chills, chest pain, palpitations, abdominal pain, n/v/d. Spoke to his wife, states that he was on lasix 40mg in AM and 20mg at night - just stopped on Saturday. Also on CPAP at night for DANIEL - his pulmonologist was supposed to order a new mask. Wife states that his cough is chronic, as he has tracheal stenosis. On tessalon perles for cough. They have taken every precaution to prevent COVID exposure and he has been home this entire time.    In the ED, pt was tachypneic on NRB and switched to BIPAP. Labs significant for: H/H 9.7/30.9, D-dimer 1588, Na 134, K 3.1, troponin .180, pro-BNP 3877. CXR: bilateral patchy opacities. EKG V-Paced.

## 2020-04-20 NOTE — ED ADULT NURSE REASSESSMENT NOTE - NS ED NURSE REASSESS COMMENT FT1
pt placed on BIPAP and aranda intact as per MD orders. Pt appears to respond well on BIPAP. Pt currently in a negative pressure room.
received patient in trauma room, denies any pain at this time, Pt is in no acute distress. pt's bed changed due to wet sheet from urine, well tolerated with bipap. will continue to monitor.

## 2020-04-20 NOTE — H&P ADULT - NSHPREVIEWOFSYSTEMS_GEN_ALL_CORE
CONSTITUTIONAL: denies fever, chills  HEENT: denies blurred vision, sore throat  SKIN: denies new lesions, rash  CARDIOVASCULAR: denies chest pain, chest pressure, palpitations  RESPIRATORY: admits shortness of breath, admits cough  GASTROINTESTINAL: denies nausea, vomiting, diarrhea, abdominal pain  GENITOURINARY: denies dysuria, discharge  NEUROLOGICAL: denies numbness, headache, focal weakness  MUSCULOSKELETAL: denies new joint pain, muscle aches  HEMATOLOGIC: denies gross bleeding, bruising  LYMPHATICS: denies enlarged lymph nodes, extremity swelling  PSYCHIATRIC: denies recent changes in anxiety, depression  ENDOCRINOLOGIC: denies sweating, cold or heat intolerance

## 2020-04-20 NOTE — CONSULT NOTE ADULT - ASSESSMENT
The patient is a 74 year old male with a history of COPD, DANIEL, depression, HL, HTN, CAD s/p PCI, CHB s/p PPM, bioprosthetic AVR, dementia, Hodgkin's lymphoma who presents with respiratory distress in the setting of presumed COVID-19, possible acute diastolic heart failure.    Plan:  - Troponin mildly elevated at 0.180 likely demand ischemia from respiratory failure. Trend.  - BNP mildly elevated at 3877  - CXR with bilateral infiltrates  - Echo in 2/20 with normal LV systolic function, normal bioprosthetic AVR, mild MS  - Received furosemide 80 mg IV. Will re-evaluate diuretics after. For now can continue furosemide 40 mg PO daily.  - Continue metoprolol tartrate 50 mg bid  - Continue aspirin 81 mg daily  - Continue atorvastatin 10 mg daily  - D-dimer 1588. Continue enoxaparin treatment dose.  - On BIPAP; to be weaned off

## 2020-04-20 NOTE — ED PROVIDER NOTE - CRITICAL CARE PROVIDED
additional history taking/interpretation of diagnostic studies/direct patient care (not related to procedure)/documentation/consultation with other physicians/conducted a detailed discussion of DNR status/telephone consultation with the patient's family

## 2020-04-20 NOTE — ED PROVIDER NOTE - NEUROLOGICAL, MLM
Pt sat up on side of bed for thoracentesis. Consent obtained. Time out performed. Pts vitals monitored throughout procedure. Right ultrasound done and pic taken of pleural fluid. ~450 ml pleural fluid from right. Pt tolerated procedure fair dropped BP so procedure stopped. Specimens sent to the lab x 1 (cytology with CARIS) and labeled appropriately. Site dressed appropriately. Lung sliding done and ultrasound findings reviewed by MD.       Discharge instructions reviewed with pt and sister, then pt discharged into sister's care via wheel chair . Alert and oriented, no focal deficits, no motor or sensory deficits.

## 2020-04-20 NOTE — CONSULT NOTE ADULT - PROBLEM SELECTOR RECOMMENDATION 9
suspected covid - eval in progress - testing in progress -   multiple medical issues - copd - reji - chr cough - Lymphoma - obesity - valvular heart disease  I and O  lovenox full dose - d dimer reviewed - covid risk - hypercoagulation  solumedrol TID   proventil PRN and Symbicort  cough rx regimen - tessalon and robitussin and hycodan for severe cough  assess off Bipap and titrate fio2 - assess on Venti mask and trial of High Flow NC if needed - with aranda  prognosis guarded  discussed GOC and Code Status - discussion ongoing

## 2020-04-20 NOTE — ED PROVIDER NOTE - PROGRESS NOTE DETAILS
Spoke with pt's wife Patricia Akshat - pt feels some improvement of breathing, still tachypneic, pt feels some improvement of breathing, still tachypneic, lasix given pt feeling more SOB, continues to appear tachypneic, using accessory muscles, discussed intubation with pt, pt agrees bedside discussion with SPCU PA, Anesthesiologist, respiratory therapist - pt is in respiratory distress, not severe, prefer not to intubate, pt would have benefit of positive pressure, pt is in a negative pressure room, will start bipap, however, until COVID test is back and negative, pt cannot be transported out of the negative pressure room on bipap - cannot go for CTA chest/CT head, cannot be transported to floor - would need to be disconnected from bipap for this - will try bipap for next hour, observe for changes in status/improvement, see if at that time has improved enough for CT, or if decompensates and needs intubation Spoke with pt's wife Patricia Oden -pt has h/o CHF and cardiac disease, also has Stg 4 Lymphoma, last chemo was 2d ago, pt has chronic cough from tracheal stenosis, pt has had decreased appetite and not eating as much over the past 2d, fell yesterday, seemed more weak, spoke to pt's doctor, held 1 dose lasix for concern of dehydration; Per wife, pt is full code, understands pt may require intubation; per wife after a couple of days in the hospital, pt tends to develop some psychosis/delirium d/w SPCU PA - there is a negative pressure room in PACU - will have pt admitted to this room on BIPAP, covid is pending, expect final result today; pt is on full dose lovenox daily, h/o dimer elevations, last cta less than 2 months ago negative for PE, will give lovenox dose this morning

## 2020-04-20 NOTE — ED ADULT NURSE NOTE - CHPI ED NUR SYMPTOMS NEG
no chest pain/no body aches/no diaphoresis/no fever/no headache/no hemoptysis/no wheezing/no chills/no edema

## 2020-04-20 NOTE — ED PROVIDER NOTE - OBJECTIVE STATEMENT
74 y.o. M BIBEMS from home for difficulty breathing, pt is 74 y.o. M BIBEMS from home for difficulty breathing, pt is unable to provide history at this time due to respiratory distress, per EMS pt started to become weak yesterday and had a fall and increasing SOB, Sat on RA at home was in 70s, improved to 90s with NRB    PCP = Elton  Heme/Onc = Melecio

## 2020-04-20 NOTE — GOALS OF CARE CONVERSATION - ADVANCED CARE PLANNING - CONVERSATION DETAILS
Spoke to pt wife Patricia who states pt has her make all of his decisions.  She states that she did have a conversation with the pt and the both agree that they would want all resuscitative efforts made and that she would determine when to change the course of treatment as pts condition warranted.

## 2020-04-20 NOTE — H&P ADULT - NSHPPHYSICALEXAM_GEN_ALL_CORE
T(C): 37.1 (04-20-20 @ 08:24), Max: 37.2 (04-20-20 @ 04:35)  HR: 113 (04-20-20 @ 09:10) (100 - 113)  BP: 127/76 (04-20-20 @ 08:24) (115/74 - 142/74)  RR: 42 (04-20-20 @ 08:24) (25 - 42)  SpO2: 92% (04-20-20 @ 09:10) (90% - 100%)    GENERAL: patient appears non labored, on BIPAP machine  EYES: sclera clear, no exudates  ENMT: oropharynx clear without erythema, no exudates, moist mucous membranes  NECK: supple, soft, no thyromegaly noted  LUNGS: decreased breath sounds bilaterally  HEART: soft S1/S2, regular rate and rhythm, no murmurs noted, no lower extremity edema  GASTROINTESTINAL: abdomen is soft, nontender, nondistended, normoactive bowel sounds, no palpable masses  INTEGUMENT: good skin turgor, warm skin, appears well perfused  MUSCULOSKELETAL: no clubbing or cyanosis, no obvious deformity  NEUROLOGIC: awake and alert, good muscle tone in 4 extremities, no obvious sensory deficits  PSYCHIATRIC: mood is good, affect is congruent  HEME/LYMPH: no palpable supraclavicular nodules, no obvious ecchymosis or petechiae

## 2020-04-20 NOTE — ED PROVIDER NOTE - CLINICAL SUMMARY MEDICAL DECISION MAKING FREE TEXT BOX
74 y.o. M with respiratory distress - h/o CHF, stg 4 lymphoma on chemo, no known covid exposure or fever - iv, lasix, cxr, cardiac and covid work up, spoke with pt and wife - is full code, may require early intubation

## 2020-04-20 NOTE — DIETITIAN INITIAL EVALUATION ADULT. - OTHER INFO
74 year old male with PMHx of COPD, DANIEL on CPAP, depression, HLD, HTN, CAD s/p PCI, CHB s/p PPM, bioprosthetic AVR, dementia, Stage IV Hodgkin's lymphoma with lung nodule, tracheal stenosis admitted with acute hypoxic respiratory failure secondary to suspected COVID-19 pneumonia (test pending).  Pt admitted to ICU level care.  No diet order active as pt is noted to be on venturi mask and po meals are contraindicated at this time. Noted GOC/code status discussed and is ongoing.  Skin intact.  NFKA.  If were to become medically warranted, recommend bolus feeds of Jevity 1.5 200ml every 4 hrs (6x/day) with 60ml free water flush before and after each feeding (to provide 1200ml TV, 1800kcal, 76g protein).  RD to follow closely.

## 2020-04-20 NOTE — H&P ADULT - NSHPSOCIALHISTORY_GEN_ALL_CORE
, lives at home  Ambulates with walker  Former smoker quit at age 33; 5 pack years,  Denies etoh or illicit drugs

## 2020-04-21 NOTE — PROGRESS NOTE ADULT - ASSESSMENT
74 year old male with PMHx of COPD, DANIEL on CPAP, depression, HLD, HTN, CAD s/p PCI, CHB s/p PPM, bioprosthetic AVR, dementia, Stage IV Hodgkin's lymphoma with lung nodule, tracheal stenosis admitted with acute hypoxic respiratory failure secondary to suspected COVID-19 pneumonia.    Acute hypoxic respiratory failure 2/2 bilateral pneumona 2/2 suspected COVID-19 infection  - supplemental O2 prn to maintain O2 sats >93%  - COVID 19-negative  -on VM  - monitor volume status closely, will exercise caution with aggressive hydration  - tylenol prn myalgias, fever  - daily labs to include: CBC with differential along with a CMP  - will initiate full dose lmwh  - advanced care planning/code status:   - NLR 6.42    HFpEF  - continue lasix 40mg daily for now per cards    CHB s/p PPM  - monitor telemetry    DANIEL on CPAP  - continue BIPAP    HTN/Elevated troponin/CAD s/p PCI  - continue asa/statin  - continue lopressor  - monitor hemodynamics      Stage IV Hodgkin Lymphoma  - on treatment with Dr. Majano  - with known lung nodule  as per hematology-    follow CBC. at significant risk of becoming more pancytopenic over next few days related to prior chemotherapy and acute illness.    to hold additional chemotherapy until stable and has re-evaluation    Dementia/Depression  - continue donepezil  - continue imipramine    DVT  - known DVT associated with PICC line  - on lovenox 150mg daily at home  - will give lovenox 100mg BID while here given elevated D-dimer/hypoxia    Need for prophylaxis  - on full dose lmwh  Plan of care was discuss with patient, all questions were answered, seems understand and in agreement. 74 year old male with PMHx of COPD, DANIEL on CPAP, depression, HLD, HTN, CAD s/p PCI, CHB s/p PPM, bioprosthetic AVR, dementia, Stage IV Hodgkin's lymphoma with lung nodule, tracheal stenosis admitted with acute hypoxic respiratory failure secondary to suspected COVID-19 pneumonia.    Acute hypoxic respiratory failure 2/2 bilateral pneumona 2/2 suspected COVID-19 infection  - supplemental O2 prn to maintain O2 sats >93%  - COVID 19-negative  -on VM  - monitor volume status closely, will exercise caution with aggressive hydration  - tylenol prn myalgias, fever  - daily labs to include: CBC with differential along with a CMP  - will initiate full dose lmwh  - advanced care planning/code status:   - NLR 6.42    HFpEF  - continue lasix 40mg daily for now per cards    CHB s/p PPM  - monitor telemetry    DANIEL on CPAP  - continue BIPAP    HTN/Elevated troponin/CAD s/p PCI  - continue asa/statin  - continue lopressor  - monitor hemodynamics  -- Troponin mildly elevated at 0.180 likely demand ischemia from respiratory failure. Trend.  - BNP mildly elevated at 3877  - CXR with bilateral infiltrates  - Echo in 2/20 with normal LV systolic function, normal bioprosthetic AVR, mild MS  continue lasix.    Stage IV Hodgkin Lymphoma  - on treatment with Dr. Majano  - with known lung nodule  as per hematology-    follow CBC. at significant risk of becoming more pancytopenic over next few days related to prior chemotherapy and acute illness.    to hold additional chemotherapy until stable and has re-evaluation    Dementia/Depression  - continue donepezil  - continue imipramine    DVT  - known DVT associated with PICC line  - on lovenox 150mg daily at home  - will give lovenox 100mg BID while here given elevated D-dimer/hypoxia    Need for prophylaxis  - on full dose lmwh  Plan of care was discuss with patient, all questions were answered, seems understand and in agreement.

## 2020-04-21 NOTE — DISCHARGE NOTE NURSING/CASE MANAGEMENT/SOCIAL WORK - PATIENT PORTAL LINK FT
You can access the FollowMyHealth Patient Portal offered by [facility] by registering at the following website: [email or web address]. By joining [employer]’s FollowMyHealth portal, you will also be able to view your health information using other applications (apps) compatible with our system.

## 2020-04-21 NOTE — PROGRESS NOTE ADULT - ASSESSMENT
The patient is a 74 year old male with a history of COPD, DANIEL, depression, HL, HTN, CAD s/p PCI, CHB s/p PPM, bioprosthetic AVR, dementia, Hodgkin's lymphoma who presents with respiratory distress in the setting of presumed COVID-19, possible acute diastolic heart failure.    Plan:  - Troponin mildly elevated at 0.180 likely demand ischemia from respiratory failure. Trend.  - BNP mildly elevated at 3877  - CXR with bilateral infiltrates  - Echo in 2/20 with normal LV systolic function, normal bioprosthetic AVR, mild MS  - Continue furosemide 40 mg PO daily  - Continue metoprolol tartrate 50 mg bid  - Continue aspirin 81 mg daily  - Continue atorvastatin 10 mg daily  - COVID-19 negative  - D-dimer 1588. Continue enoxaparin treatment dose. Check CTA chest.  - Onc and pulm follow-up

## 2020-04-21 NOTE — PROGRESS NOTE ADULT - SUBJECTIVE AND OBJECTIVE BOX
Chief Complaint: Shortness of breath    Interval Events: Weaned off of BIPAP. Feels improved.    Review of Systems:  General: No fevers, chills, weight loss or gain  Skin: No rashes, color changes  Cardiovascular: No chest pain, orthopnea  Respiratory: No shortness of breath, cough  Gastrointestinal: No nausea, abdominal pain  Genitourinary: No incontinence, pain with urination  Musculoskeletal: No pain, swelling, decreased range of motion  Neurological: No headache, weakness  Psychiatric: No depression, anxiety  Endocrine: No weight loss or gain, increased thirst  All other systems are comprehensively negative.    Physical Exam:  Vitals:        Vital Signs Last 24 Hrs  T(C): 36.6 (21 Apr 2020 08:46), Max: 37 (20 Apr 2020 14:00)  T(F): 97.9 (21 Apr 2020 08:46), Max: 98.6 (20 Apr 2020 14:00)  HR: 88 (21 Apr 2020 08:28) (78 - 113)  BP: 109/68 (21 Apr 2020 08:28) (84/72 - 127/68)  BP(mean): 80 (21 Apr 2020 08:28) (68 - 86)  RR: 33 (21 Apr 2020 08:28) (16 - 38)  SpO2: 86% (21 Apr 2020 08:28) (86% - 100%)  General: NAD  HEENT: MMM  Neck: No JVD, no carotid bruit  Lungs: CTAB  CV: RRR, nl S1/S2, no M/R/G  Abdomen: S/NT/ND, +BS  Extremities: No LE edema, no cyanosis  Neuro: AAOx3, non-focal  Skin: No rash    Labs:                        9.3    3.65  )-----------( 129      ( 21 Apr 2020 07:14 )             30.9     04-21    137  |  100  |  19  ----------------------------<  181<H>  4.0   |  33<H>  |  1.04    Ca    9.0      21 Apr 2020 07:20  Phos  3.3     04-21  Mg     2.5     04-21    TPro  7.0  /  Alb  2.5<L>  /  TBili  0.5  /  DBili  x   /  AST  17  /  ALT  14  /  AlkPhos  71  04-21    CARDIAC MARKERS ( 20 Apr 2020 18:23 )  .478 ng/mL / x     / x     / x     / 1.5 ng/mL  CARDIAC MARKERS ( 20 Apr 2020 11:54 )  .486 ng/mL / x     / x     / x     / x      CARDIAC MARKERS ( 20 Apr 2020 04:53 )  .180 ng/mL / x     / 41 U/L / x     / x          PT/INR - ( 21 Apr 2020 07:20 )   PT: 14.4 sec;   INR: 1.29 ratio         PTT - ( 21 Apr 2020 07:20 )  PTT:31.2 sec    Telemetry: Sinus rhythm

## 2020-04-21 NOTE — PROGRESS NOTE ADULT - ASSESSMENT
74  year old gentleman with Hodgkins disease with multiple comorbidities on chemotherapy with ABVD now admitted with increased shortness of breath.  right arm DVT related to prior PICC line.  CXR with diffuse bilateral opacities.  first covid test negative.  differential diagnosis includes pulmonary embolism and bleomycin toxicity as well as covid 19 with negative first test.  Elevated didimer and fibrinogen consistent with acute illness and related to increase risk of thrombotic event (had documented right axillary and basilic thrombosis)  Now being treated for CHF/COPD exacerbation, clinically improving  mild pancyotpenia secondary to recent chemotherapy    Recommendations:  1.  continue current medical evaluation and treatment. await repeat covoid testing  2.  continue full dose anticoagulation   3.  follow CBC. at significant risk of becoming more pancytopenic over next few days related to prior chemotherapy and acute illness  4.  to hold additional chemotherapy until stable and has re-evaluation of both cardiac and pulmonary function  5.  discussed with Dr. Ndiaye, based on current events to try to transfer to [facility]  5.  further heme recommendations pending above

## 2020-04-21 NOTE — PROGRESS NOTE ADULT - SUBJECTIVE AND OBJECTIVE BOX
Interval History:  breathing is better today. now in [facility]. covid is negative  Chart reviewed and events noted;   Overnight events:    MEDICATIONS  (STANDING):  aspirin enteric coated 81 milliGRAM(s) Oral daily  atorvastatin 10 milliGRAM(s) Oral at bedtime  budesonide 160 MICROgram(s)/formoterol 4.5 MICROgram(s) Inhaler 2 Puff(s) Inhalation two times a day  enoxaparin Injectable 100 milliGRAM(s) SubCutaneous two times a day  folic acid 1 milliGRAM(s) Oral daily  furosemide    Tablet 40 milliGRAM(s) Oral daily  imipramine 25 milliGRAM(s) Oral daily  methylPREDNISolone sodium succinate Injectable 30 milliGRAM(s) IV Push three times a day  metoprolol tartrate 50 milliGRAM(s) Oral two times a day  montelukast 10 milliGRAM(s) Oral daily  pantoprazole    Tablet 40 milliGRAM(s) Oral daily  thiamine 100 milliGRAM(s) Oral daily    MEDICATIONS  (PRN):  acetaminophen   Tablet .. 975 milliGRAM(s) Oral every 6 hours PRN Temp greater or equal to 38C (100.4F), Mild Pain (1 - 3)  ALBUTerol    90 MICROgram(s) HFA Inhaler 2 Puff(s) Inhalation every 4 hours PRN Shortness of Breath and/or Wheezing  benzonatate 100 milliGRAM(s) Oral three times a day PRN Cough  guaiFENesin   Syrup  (Sugar-Free) 200 milliGRAM(s) Oral every 6 hours PRN Cough  hydrocodone/homatropine Syrup 5 milliLiter(s) Oral at bedtime PRN for severe cough -      Vital Signs Last 24 Hrs  T(C): 36.4 (21 Apr 2020 16:30), Max: 36.9 (21 Apr 2020 12:00)  T(F): 97.6 (21 Apr 2020 16:30), Max: 98.4 (21 Apr 2020 12:00)  HR: 97 (21 Apr 2020 14:53) (78 - 97)  BP: 110/75 (21 Apr 2020 14:53) (84/72 - 127/68)  BP(mean): 84 (21 Apr 2020 14:53) (77 - 86)  RR: 34 (21 Apr 2020 14:53) (16 - 38)  SpO2: 95% (21 Apr 2020 14:53) (86% - 98%)    PHYSICAL EXAM  General: adult in NADO2 in place  HEENT: clear oropharynx, anicteric sclera, pink conjunctivae  Neck: supple  CV: normal S1S2 with no murmur rubs or gallops  Lungs: clear to auscultation, no wheezes, no rhales  Abdomen: soft non-tender non-distended, no hepato/splenomegaly  Ext: no clubbing cyanosis or edema  Skin: no rashes and no petichiae  Neuro: alert, knows he is in [facility]      LABS:  CBC Full  -  ( 21 Apr 2020 07:14 )  WBC Count : 3.65 K/uL  RBC Count : 4.22 M/uL  Hemoglobin : 9.3 g/dL  Hematocrit : 30.9 %  Platelet Count - Automated : 129 K/uL  Mean Cell Volume : 73.2 fl  Mean Cell Hemoglobin : 22.0 pg  Mean Cell Hemoglobin Concentration : 30.1 gm/dL  Auto Neutrophil # : 2.97 K/uL  Auto Lymphocyte # : 0.28 K/uL  Auto Monocyte # : 0.05 K/uL  Auto Eosinophil # : 0.00 K/uL  Auto Basophil # : 0.08 K/uL  Auto Neutrophil % : 81.3 %  Auto Lymphocyte % : 7.7 %  Auto Monocyte % : 1.4 %  Auto Eosinophil % : 0.0 %  Auto Basophil % : 2.2 %    04-21    137  |  100  |  19  ----------------------------<  181<H>  4.0   |  33<H>  |  1.04    Ca    9.0      21 Apr 2020 07:20  Phos  3.3     04-21  Mg     2.5     04-21    TPro  7.0  /  Alb  2.5<L>  /  TBili  0.5  /  DBili  x   /  AST  17  /  ALT  14  /  AlkPhos  71  04-21    PT/INR - ( 21 Apr 2020 07:20 )   PT: 14.4 sec;   INR: 1.29 ratio         PTT - ( 21 Apr 2020 07:20 )  PTT:31.2 sec    fe studies  Ferritin, Serum: 1220 ng/mL (04-21 @ 12:32)  Ferritin, Serum: 1038 ng/mL (04-20 @ 13:04)  Ferritin, Serum: 1050 ng/mL (04-20 @ 13:02)      WBC trend  3.65 K/uL (04-21-20 @ 07:14)  4.45 K/uL (04-20-20 @ 04:53)      Hgb trend  9.3 g/dL (04-21-20 @ 07:14)  9.7 g/dL (04-20-20 @ 04:53)      plt trend  129 K/uL (04-21-20 @ 07:14)  191 K/uL (04-20-20 @ 04:53)        RADIOLOGY & ADDITIONAL STUDIES:

## 2020-04-21 NOTE — DISCHARGE NOTE NURSING/CASE MANAGEMENT/SOCIAL WORK - NSDCPELOVENOX_GEN_ALL_CORE
Steve San)  Otolaryngology  [address] [place] [id number]  Phone: [phone]  Fax: [phone]  Follow Up Time: Enoxaparin/Lovenox - Follow up monitoring/Enoxaparin/Lovenox - Potential for adverse drug reactions and interactions/Enoxaparin/Lovenox - Compliance/Enoxaparin/Lovenox - Dietary Advice

## 2020-04-21 NOTE — PROGRESS NOTE ADULT - SUBJECTIVE AND OBJECTIVE BOX
Patient is a 74y old  Male who presents with a chief complaint of Shortness of Breath (2020 21:04)      INTERVAL HPI/OVERNIGHT EVENTS:  Pt is seen and examined.  feeling better.  breathing has been improving.    Pain Location & Control:     MEDICATIONS  (STANDING):  aspirin enteric coated 81 milliGRAM(s) Oral daily  atorvastatin 10 milliGRAM(s) Oral at bedtime  budesonide 160 MICROgram(s)/formoterol 4.5 MICROgram(s) Inhaler 2 Puff(s) Inhalation two times a day  enoxaparin Injectable 100 milliGRAM(s) SubCutaneous two times a day  folic acid 1 milliGRAM(s) Oral daily  furosemide    Tablet 40 milliGRAM(s) Oral daily  imipramine 25 milliGRAM(s) Oral daily  methylPREDNISolone sodium succinate Injectable 30 milliGRAM(s) IV Push three times a day  metoprolol tartrate 50 milliGRAM(s) Oral two times a day  montelukast 10 milliGRAM(s) Oral daily  pantoprazole    Tablet 40 milliGRAM(s) Oral daily  thiamine 100 milliGRAM(s) Oral daily    MEDICATIONS  (PRN):  acetaminophen   Tablet .. 975 milliGRAM(s) Oral every 6 hours PRN Temp greater or equal to 38C (100.4F), Mild Pain (1 - 3)  ALBUTerol    90 MICROgram(s) HFA Inhaler 2 Puff(s) Inhalation every 4 hours PRN Shortness of Breath and/or Wheezing  benzonatate 100 milliGRAM(s) Oral three times a day PRN Cough  guaiFENesin   Syrup  (Sugar-Free) 200 milliGRAM(s) Oral every 6 hours PRN Cough  hydrocodone/homatropine Syrup 5 milliLiter(s) Oral at bedtime PRN for severe cough -      Allergies    No Known Allergies    Intolerances      Vital Signs Last 24 Hrs  T(C): 36.6 (2020 08:46), Max: 37 (2020 14:00)  T(F): 97.9 (2020 08:46), Max: 98.6 (2020 14:00)  HR: 88 (2020 08:28) (78 - 104)  BP: 109/68 (2020 08:28) (84/72 - 127/68)  BP(mean): 80 (2020 08:28) (68 - 86)  RR: 33 (2020 08:28) (16 - 38)  SpO2: 86% (2020 08:28) (86% - 100%)        LABS:                        9.3    3.65  )-----------( 129      ( 2020 07:14 )             30.9     2020 07:20    137    |  100    |  19     ----------------------------<  181    4.0     |  33     |  1.04     Ca    9.0        2020 07:20  Phos  3.3       2020 07:20  Mg     2.5       2020 07:20    TPro  7.0    /  Alb  2.5    /  TBili  0.5    /  DBili  x      /  AST  17     /  ALT  14     /  AlkPhos  71     2020 07:20    PT/INR - ( 2020 07:20 )   PT: 14.4 sec;   INR: 1.29 ratio         PTT - ( 2020 07:20 )  PTT:31.2 sec  Urinalysis Basic - ( 2020 14:14 )    Color: Yellow / Appearance: Clear / S.010 / pH: x  Gluc: x / Ketone: Negative  / Bili: Negative / Urobili: Negative mg/dL   Blood: x / Protein: Negative mg/dL / Nitrite: Negative   Leuk Esterase: Negative / RBC: 0-2 /HPF / WBC x   Sq Epi: x / Non Sq Epi: x / Bacteria: Occasional    CAPILLARY BLOOD GLUCOSE      CARDIAC MARKERS ( 2020 18:23 )  .478 ng/mL / x     / x     / x     / 1.5 ng/mL  CARDIAC MARKERS ( 2020 11:54 )  .486 ng/mL / x     / x     / x     / x      CARDIAC MARKERS ( 2020 04:53 )  .180 ng/mL / x     / 41 U/L / x     / x        RADIOLOGY & ADDITIONAL TESTS:    Imaging Personally Reviewed:  [ ] YES  [ ] NO    Consultant(s) Notes Reviewed:  [ ] YES  [ ] NO    Care Discussed with Consultants/Other Providers [x ] YES  [ ] NO

## 2020-04-22 NOTE — PROGRESS NOTE ADULT - PROBLEM SELECTOR PLAN 1
lymphoma - dementia - delirium - copd - reji - obesity - HFpEF -   clinically may be covid - ct chest shows GGO - which may also be suggestive of covid - pt tested neg for covid - but biomarkers - clinical course and imaging suggestive of covid  supportive measures  inhalers and steroids for copd - covid  on AC full dose for PE - see CTA chest  on LASIX for HF - see ECHO  cardio and onc eval noted  may need NIPPV prn in Neg pressure room - under a aranda  at present on Venti Mask  keep sat > 86 pct  mobilize - prone if able to cooperate  pt has dementia and delirium  prognosis guarded  labs and imaging reviewed  family aware of status  pt is full code

## 2020-04-22 NOTE — DISCHARGE NOTE PROVIDER - HOSPITAL COURSE
74 year old male with PMHx of COPD, DANIEL on CPAP, depression, HLD, HTN, CAD s/p PCI, CHB s/p PPM, bioprosthetic AVR, dementia, Stage IV Hodgkin's lymphoma with lung nodule, tracheal stenosis admitted with acute hypoxic respiratory failure secondary to suspected COVID-19 pneumonia.     covid 19 negative.        Acute hypoxic respiratory failure likely     due to acute exacerbation of cCOPD.     - supplemental O2 prn to maintain O2 sats >93%    - COVID 19-negative    -on VM    - monitor volume status closely, will exercise caution with aggressive hydration    - tylenol prn myalgias, fever    - daily labs to include: CBC with differential along with a CMP    - will initiate full dose lmwh    - advanced care planning/code status:             Pulmonary embolism         -lovenox.        CHB s/p PPM    - monitor telemetry        DANIEL on CPAP    - continue BIPAP        HTN/Elevated troponin/CAD s/p PCI    - continue asa/statin    - continue lopressor    - monitor hemodynamics    -- Troponin mildly elevated at 0.180 likely demand ischemia from respiratory failure. Trend.    - BNP mildly elevated at 3877    - CXR with bilateral infiltrates    - Echo in 2/20 with normal LV systolic function, normal bioprosthetic AVR, mild MS    continue lasix.        Stage IV Hodgkin Lymphoma    - on treatment with Dr. Majano    - with known lung nodule    as per hematology-      follow CBC. at significant risk of becoming more pancytopenic over next few days related to prior chemotherapy and acute illness.      to hold additional chemotherapy until stable and has re-evaluation        Dementia/Depression    - continue donepezil    - continue imipramine        DVT    - known DVT associated with PICC line    - on lovenox 150mg daily at home    - will give lovenox 100mg BID while here given elevated D-dimer/hypoxia        clinically may be covid - ct chest shows GGO - which may also be suggestive of covid - pt tested neg for covid - but biomarkers - clinical course and imaging suggestive of covid.    will repeat COVID test today.

## 2020-04-22 NOTE — DISCHARGE NOTE PROVIDER - NSDCCPCAREPLAN_GEN_ALL_CORE_FT
PRINCIPAL DISCHARGE DIAGNOSIS  Diagnosis: Acute respiratory failure with hypoxemia  Assessment and Plan of Treatment: likly due to OPD exa.    bronchodilaters.      SECONDARY DISCHARGE DIAGNOSES  Diagnosis: DANIEL on CPAP  Assessment and Plan of Treatment: obesity.    Diagnosis: COPD with respiratory failure, acute  Assessment and Plan of Treatment:   bronchodilaters.    Diagnosis: Pacemaker  Assessment and Plan of Treatment:     Diagnosis: Hodgkins disease  Assessment and Plan of Treatment: hematoloy following.  as per hematology-    follow CBC. at significant risk of becoming more pancytopenic over next few days related to prior chemotherapy and acute illness.    to hold additional chemotherapy until stable and has re-evaluation

## 2020-04-22 NOTE — H&P ADULT - PROBLEM SELECTOR PLAN 1
suspect secondary to COVID   COVID 19 Negative from 4/20   Given high clinical and radiological suspicion, repeat COVID sent  FU results   Pt saturating 97-08% on Venturi mask   continue supplemental Oxygen to maintain saturation above 92-94%

## 2020-04-22 NOTE — H&P ADULT - PROBLEM SELECTOR PLAN 7
continue home med imipramine continue home med imipramine for depression   pt reports he does not take Dementia meds regularly and was told by his MD to stop the med

## 2020-04-22 NOTE — PROGRESS NOTE ADULT - ASSESSMENT
The patient is a 74 year old male with a history of COPD, DANIEL, depression, HL, HTN, CAD s/p PCI, CHB s/p PPM, bioprosthetic AVR, dementia, Hodgkin's lymphoma who presents with respiratory distress in the setting of presumed COVID-19, possible acute diastolic heart failure.    Plan:  - Troponin mildly elevated at 0.180 likely demand ischemia from respiratory failure. Trend.  - BNP mildly elevated at 3877  - CXR with bilateral infiltrates  - Echo in 2/20 with normal LV systolic function, normal bioprosthetic AVR, mild MS  - Continue furosemide 40 mg PO daily  - Continue metoprolol tartrate 50 mg bid  - Continue aspirin 81 mg daily  - Continue atorvastatin 10 mg daily  - COVID-19 negative. Repeat?  - CTA chest with small PE and GGO suggestive of COVID-19  - Continue enoxaparin treatment dose  - Onc and pulm follow-up

## 2020-04-22 NOTE — PROGRESS NOTE ADULT - ASSESSMENT
74 year old male with PMHx of COPD, DANIEL on CPAP, depression, HLD, HTN, CAD s/p PCI, CHB s/p PPM, bioprosthetic AVR, dementia, Stage IV Hodgkin's lymphoma with lung nodule, tracheal stenosis admitted with acute hypoxic respiratory failure secondary to suspected COVID-19 pneumonia.    Acute hypoxic respiratory failure 2/2 to Pulmonary Embolism.  r/o suspected COVID-19 infection +   - supplemental O2 prn to maintain O2 sats >93%  - COVID 19-negative  x1  -on full dose a/c  -on VM  -CT scan ++ GGO    Plan:  -repeat COVID  - monitor volume status closely, will exercise caution with aggressive hydration  - tylenol prn myalgias, fever  - daily labs to include: CBC with differential along with a CMP  - advanced care planning/code status:   -  HFpEF  - continue lasix 40mg daily for now per cards    CHB s/p PPM  - monitor telemetry    DANIEL on CPAP  - continue BIPAP    HTN/Elevated troponin/CAD s/p PCI  - continue asa/statin  - continue lopressor  - monitor hemodynamics  -- Troponin mildly elevated at 0.180 likely demand ischemia from respiratory failure. Trend.  - BNP mildly elevated at 3877  - CXR with bilateral infiltrates  - Echo in 2/20 with normal LV systolic function, normal bioprosthetic AVR, mild MS  continue lasix.    Stage IV Hodgkin Lymphoma  - on treatment with Dr. Majano  - with known lung nodule  as per hematology-    follow CBC. at significant risk of becoming more pancytopenic over next few days related to prior chemotherapy and acute illness.    to hold additional chemotherapy until stable and has re-evaluation    Dementia/Depression  - continue donepezil  - continue imipramine    DVT  - known DVT associated with PICC line  - on lovenox 150mg daily at home  -on 100 q12h while in house    Need for prophylaxis  full dose

## 2020-04-22 NOTE — H&P ADULT - PROBLEM SELECTOR PLAN 4
as per hematology-  follow CBC. at significant risk of becoming more pancytopenic over next few days related to prior chemotherapy and acute illness. To hold additional chemotherapy until stable and has re-evaluation

## 2020-04-22 NOTE — H&P ADULT - NSHPLABSRESULTS_GEN_ALL_CORE
CT chest showed pattern of GGO suggests infection including atypical pneumonia/viral infection from atypical agents including COVID-19 .Small embolus within the anterobasilar subsegmental right lower lobe pulmonary artery

## 2020-04-22 NOTE — H&P ADULT - HISTORY OF PRESENT ILLNESS
74 year old male with PMHx of COPD, DANIEL, depression, HLD, HTN, CAD s/p PCI, CHB s/p PPM, bioprosthetic AVR, dementia, Stage IV Hodgkin's lymphoma with lung nodule, tracheal stenosis was admitted to [place]  ED 4/20-4/2 for SOB x 3-4 days , hypoxia and was about to be intubated on admission at [facility], but his O2 sats improved with BiLevel PAP which was later transitioned to a venit mask   CT chest showed pattern of GGO suggests infection including atypical pneumonia/viral infection from atypical agents including COVID-19 .Small embolus within the anterobasilar subsegmental right lower lobe pulmonary artery. COVID test was negative  though  clinically was suspicious for  covid – CT  chest shows GGO, biomarkers elevated   Pt was transferred to [facility]  for continuation of medical care 74 year old male with PMHx of COPD, DANIEL on CPAP, depression, HLD, HTN, CAD s/p PCI, CHB s/p PPM, bioprosthetic AVR, dementia, Stage IV Hodgkin's lymphoma with lung nodule, tracheal stenosis was admitted to [place]  ED 4/20-4/2 for SOB x 3-4 days , hypoxia and was about to be intubated on admission at [facility], but his O2 sats improved with BiLevel PAP which was later transitioned to a venit mask   CT chest showed pattern of GGO suggests infection including atypical pneumonia/viral infection from atypical agents including COVID-19 .Small embolus within the anterobasilar subsegmental right lower lobe pulmonary artery. COVID test was negative  though  clinically was suspicious for  covid – CT  chest shows GGO, biomarkers elevated   Pt was transferred to [facility]  for continuation of medical care   During my interview, pt awake, conversant, reports improvement in SOB   Intermittent cough

## 2020-04-22 NOTE — PROGRESS NOTE ADULT - SUBJECTIVE AND OBJECTIVE BOX
Chief Complaint: Shortness of breath    Interval Events: No events overnight.    Review of Systems:  General: No fevers, chills, weight loss or gain  Skin: No rashes, color changes  Cardiovascular: No chest pain, orthopnea  Respiratory: No shortness of breath, cough  Gastrointestinal: No nausea, abdominal pain  Genitourinary: No incontinence, pain with urination  Musculoskeletal: No pain, swelling, decreased range of motion  Neurological: No headache, weakness  Psychiatric: No depression, anxiety  Endocrine: No weight loss or gain, increased thirst  All other systems are comprehensively negative.    Physical Exam:  Vitals:        Vital Signs Last 24 Hrs  T(C): 36.6 (21 Apr 2020 08:46), Max: 37 (20 Apr 2020 14:00)  T(F): 97.9 (21 Apr 2020 08:46), Max: 98.6 (20 Apr 2020 14:00)  HR: 88 (21 Apr 2020 08:28) (78 - 113)  BP: 109/68 (21 Apr 2020 08:28) (84/72 - 127/68)  BP(mean): 80 (21 Apr 2020 08:28) (68 - 86)  RR: 33 (21 Apr 2020 08:28) (16 - 38)  SpO2: 86% (21 Apr 2020 08:28) (86% - 100%)  General: NAD  HEENT: MMM  Neck: No JVD, no carotid bruit  Lungs: CTAB  CV: RRR, nl S1/S2, no M/R/G  Abdomen: S/NT/ND, +BS  Extremities: No LE edema, no cyanosis  Neuro: AAOx3, non-focal  Skin: No rash    Labs:                        9.3    3.65  )-----------( 129      ( 21 Apr 2020 07:14 )             30.9     04-21    137  |  100  |  19  ----------------------------<  181<H>  4.0   |  33<H>  |  1.04    Ca    9.0      21 Apr 2020 07:20  Phos  3.3     04-21  Mg     2.5     04-21    TPro  7.0  /  Alb  2.5<L>  /  TBili  0.5  /  DBili  x   /  AST  17  /  ALT  14  /  AlkPhos  71  04-21    CARDIAC MARKERS ( 20 Apr 2020 18:23 )  .478 ng/mL / x     / x     / x     / 1.5 ng/mL  CARDIAC MARKERS ( 20 Apr 2020 11:54 )  .486 ng/mL / x     / x     / x     / x      CARDIAC MARKERS ( 20 Apr 2020 04:53 )  .180 ng/mL / x     / 41 U/L / x     / x          PT/INR - ( 21 Apr 2020 07:20 )   PT: 14.4 sec;   INR: 1.29 ratio         PTT - ( 21 Apr 2020 07:20 )  PTT:31.2 sec    Telemetry: Sinus rhythm

## 2020-04-22 NOTE — H&P ADULT - PROBLEM SELECTOR PLAN 2
pt was seen by Pulmonology at [facility] -Continue Methylprednisolone inhalers and steroids for copd  Continue Lasix   continue to monitor

## 2020-04-22 NOTE — H&P ADULT - NSICDXPASTSURGICALHX_GEN_ALL_CORE_FT
Report faxed to office
PAST SURGICAL HISTORY:  Artificial pacemaker for complete heart block    Cardiac pacemaker     S/P aortic valve replacement with porcine valve     S/P AVR (aortic valve replacement) bovine valve

## 2020-04-22 NOTE — DISCHARGE NOTE PROVIDER - NSDCFUSCHEDAPPT_GEN_ALL_CORE_FT
TAYLOR MCFADDEN ; 05/12/2020 ; NPP Neurology [address] ; 05/16/2020 ; NPP IntMed [address] ; 06/01/2020 ; NPP [address] [address] MCFADDEN ; 05/12/2020 ; NPP Neurology [address] ; 05/16/2020 ; NPP IntMed [address] ; 06/01/2020 ; NPP [address] [place]

## 2020-04-22 NOTE — PROGRESS NOTE ADULT - SUBJECTIVE AND OBJECTIVE BOX
Patient seen and examined at bedside.  on venti mask  reports cough +shortness of breath  CT reviewed  Pulm eval reviewed  +++GGO ddx include Hodkin vs COVID?    Review of Systems:  General:denies fever chills, headache, weakness  HEENT: denies blurry vision,diffculty swallowing, difficulty hearing, tinnitus  Cardiovascular: denies chest pain  ,palpitations  Pulmonary:+++shortness of breath, ++cough, wheezing, hemoptysis  Gastrointestinal: denies abdominal pain, constipation, diarrhea,nausea , vomiting, hematochezia  : denies hematuria, dysuria, or incontinence  Neurological: denies weakness, numbness , tingling, dizziness, tremors  MSK: denies muscle pain, difficulty ambulating, swelling, back pain  skin: denies skin rash, itching, burning, or  skin lesions  Psychiatrical: denies mood disturbances, anxierty, feeling depressed, depression , or difficulty sleeping    Objective:  Vitals  T(C): 36.4 (04-22-20 @ 09:24), Max: 36.9 (04-21-20 @ 12:00)  HR: 79 (04-22-20 @ 09:24) (79 - 107)  BP: 135/82 (04-22-20 @ 09:24) (110/75 - 135/82)  RR: 18 (04-22-20 @ 09:24) (15 - 34)  SpO2: 98% (04-22-20 @ 09:24) (95% - 100%)    Physical Exam:  General: comfortable, no acute distress, well nourished  HEENT: Atraumatic, no LAD, trachea midline, PERRLA  Cardiovascular: normal s1s2, no murmurs, gallops or fricition rubs  Pulmonary: decreased no wheezing , rhonchi  Gastrointestinal: soft non tender non distended, no masses felt, no organomegally  Muscloskeletal: no lower extremity edema, intact bilateral lower extremity pulses  Neurological: CN II-12 intact. No focal weakness  Psychiatrical: normal mood, cooperative  SKIN: no rash, lesions or ulcers    Labs:                          9.5    4.86  )-----------( 183      ( 22 Apr 2020 07:06 )             30.3     04-22    137  |  99  |  22  ----------------------------<  163<H>  3.6   |  29  |  1.10    Ca    8.9      22 Apr 2020 07:06  Phos  3.3     04-21  Mg     2.5     04-21    TPro  7.0  /  Alb  2.5<L>  /  TBili  0.5  /  DBili  x   /  AST  17  /  ALT  14  /  AlkPhos  71  04-21    LIVER FUNCTIONS - ( 21 Apr 2020 07:20 )  Alb: 2.5 g/dL / Pro: 7.0 g/dL / ALK PHOS: 71 U/L / ALT: 14 U/L DA / AST: 17 U/L / GGT: x           PT/INR - ( 21 Apr 2020 07:20 )   PT: 14.4 sec;   INR: 1.29 ratio         PTT - ( 21 Apr 2020 07:20 )  PTT:31.2 sec      Active Medications  MEDICATIONS  (STANDING):  aspirin enteric coated 81 milliGRAM(s) Oral daily  atorvastatin 10 milliGRAM(s) Oral at bedtime  budesonide 160 MICROgram(s)/formoterol 4.5 MICROgram(s) Inhaler 2 Puff(s) Inhalation two times a day  enoxaparin Injectable 100 milliGRAM(s) SubCutaneous two times a day  folic acid 1 milliGRAM(s) Oral daily  furosemide    Tablet 40 milliGRAM(s) Oral daily  imipramine 25 milliGRAM(s) Oral daily  methylPREDNISolone sodium succinate Injectable 30 milliGRAM(s) IV Push three times a day  metoprolol tartrate 50 milliGRAM(s) Oral two times a day  montelukast 10 milliGRAM(s) Oral daily  pantoprazole    Tablet 40 milliGRAM(s) Oral daily  thiamine 100 milliGRAM(s) Oral daily    MEDICATIONS  (PRN):  acetaminophen   Tablet .. 975 milliGRAM(s) Oral every 6 hours PRN Temp greater or equal to 38C (100.4F), Mild Pain (1 - 3)  ALBUTerol    90 MICROgram(s) HFA Inhaler 2 Puff(s) Inhalation every 4 hours PRN Shortness of Breath and/or Wheezing  benzonatate 100 milliGRAM(s) Oral three times a day PRN Cough  guaiFENesin   Syrup  (Sugar-Free) 200 milliGRAM(s) Oral every 6 hours PRN Cough  hydrocodone/homatropine Syrup 5 milliLiter(s) Oral at bedtime PRN for severe cough -

## 2020-04-22 NOTE — H&P ADULT - ASSESSMENT
74 year old male with PMHx of COPD, DANIEL on CPAP, depression, HLD, HTN, CAD s/p PCI, CHB s/p PPM, bioprosthetic AVR, dementia, Stage IV Hodgkin's lymphoma with lung nodule, tracheal stenosis admitted with acute hypoxic respiratory failure secondary to suspected COVID-19 pneumonia.
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
## 2020-04-22 NOTE — DISCHARGE NOTE PROVIDER - NSDCMRMEDTOKEN_GEN_ALL_CORE_FT
acetaminophen 325 mg oral tablet: 3 tab(s) orally every 6 hours, As needed, Temp greater or equal to 38C (100.4F), Mild Pain (1 - 3)  albuterol 90 mcg/inh inhalation aerosol: 2 puff(s) inhaled every 4 hours, As needed, Shortness of Breath and/or Wheezing  aspirin 81 mg oral delayed release tablet: 1 tab(s) orally once a day  atorvastatin 10 mg oral tablet: 1 tab(s) orally once a day (at bedtime)  atorvastatin 10 mg oral tablet: 1 tab(s) orally once a day (at bedtime)  benzonatate 100 mg oral capsule: 1 cap(s) orally 3 times a day, As needed, Cough  budesonide-formoterol 160 mcg-4.5 mcg/inh inhalation aerosol:  inhaled   donepezil 5 mg oral tablet: 1 tab(s) orally once a day (at bedtime)  enoxaparin: 100 milligram(s) subcutaneous every 12 hours  folic acid 1 mg oral tablet: 1 tab(s) orally once a day  folic acid 1 mg oral tablet: 1 tab(s) orally once a day  guaiFENesin 100 mg/5 mL oral liquid: 10 milliliter(s) orally every 6 hours, As needed, Cough  hydrocodone-homatropine 5 mg-1.5 mg/5 mL oral syrup: 5 milliliter(s) orally once a day (at bedtime), As needed, for severe cough -  imipramine 25 mg oral tablet: 1 tab(s) orally once a day  imipramine 25 mg oral tablet: 1 tab(s) orally once a day  Lasix 20 mg oral tablet: orally once a day (at bedtime)  Lasix 40 mg oral tablet: 1 tab(s) orally once a day  Lovenox: 150 milligram(s) subcutaneous once a day  methylPREDNISolone: 30 milligram(s) intravenous 3 times a day  metoprolol tartrate 50 mg oral tablet: 1 tab(s) orally 2 times a day  metoprolol tartrate 50 mg oral tablet: 1 tab(s) orally 2 times a day  montelukast 10 mg oral tablet: 1 tab(s) orally once a day  montelukast 10 mg oral tablet: 1 tab(s) orally once a day  pantoprazole 40 mg oral delayed release tablet: 1 tab(s) orally once a day  Protonix 40 mg oral delayed release tablet: 1 tab(s) orally once a day  thiamine 100 mg oral tablet: 1 tab(s) orally once a day  thiamine 100 mg oral tablet: 1 tab(s) orally once a day  ZyrTEC 10 mg oral tablet: 1 tab(s) orally once a day acetaminophen 325 mg oral tablet: 3 tab(s) orally every 6 hours, As needed, Temp greater or equal to 38C (100.4F), Mild Pain (1 - 3)  albuterol 90 mcg/inh inhalation aerosol: 2 puff(s) inhaled every 4 hours, As needed, Shortness of Breath and/or Wheezing  aspirin 81 mg oral delayed release tablet: 1 tab(s) orally once a day  atorvastatin 10 mg oral tablet: 1 tab(s) orally once a day (at bedtime)  benzonatate 100 mg oral capsule: 1 cap(s) orally 3 times a day, As needed, Cough  budesonide-formoterol 160 mcg-4.5 mcg/inh inhalation aerosol:  inhaled   enoxaparin: 100 milligram(s) subcutaneous every 12 hours  folic acid 1 mg oral tablet: 1 tab(s) orally once a day  guaiFENesin 100 mg/5 mL oral liquid: 10 milliliter(s) orally every 6 hours, As needed, Cough  hydrocodone-homatropine 5 mg-1.5 mg/5 mL oral syrup: 5 milliliter(s) orally once a day (at bedtime), As needed, for severe cough -  imipramine 25 mg oral tablet: 1 tab(s) orally once a day  Lasix 40 mg oral tablet: 1 tab(s) orally once a day  methylPREDNISolone: 30 milligram(s) intravenous 3 times a day  metoprolol tartrate 50 mg oral tablet: 1 tab(s) orally 2 times a day  montelukast 10 mg oral tablet: 1 tab(s) orally once a day  pantoprazole 40 mg oral delayed release tablet: 1 tab(s) orally once a day  thiamine 100 mg oral tablet: 1 tab(s) orally once a day

## 2020-04-22 NOTE — H&P ADULT - PROBLEM SELECTOR PLAN 5
History of CHB s/p PPM   Echo in 2/20 with normal LV systolic function, normal bioprosthetic AVR, mild MS  continue asa/Lipitor/Metoprolol   continue Lasix    monitor vitals

## 2020-04-23 NOTE — PROGRESS NOTE ADULT - PROBLEM SELECTOR PLAN 1
suspect secondary to COVID but could be due to other viral etiologies  COVID 19 Negative from 4/20   Given high clinical and radiological suspicion, repeat COVID sent -> (-) 2nd time  continue supplemental Oxygen to maintain saturation above 92-94%  Will hold off empiric Rx for COVID-19 for now.  Appreciate pulmonology

## 2020-04-23 NOTE — PROGRESS NOTE ADULT - PROBLEM SELECTOR PLAN 2
pt was seen by Pulmonology at [facility] -Continue Methylprednisolone inhalers and steroids for copd  Continue Lasix   continue to monitor

## 2020-04-23 NOTE — PROGRESS NOTE ADULT - SUBJECTIVE AND OBJECTIVE BOX
Patient is a 74y old  Male who presents with a chief complaint of     SUBJECTIVE / OVERNIGHT EVENTS:    Sitting in chair, 92-93% on 6LNCO2, comfortable    Vital Signs Last 24 Hrs  T(C): 36.4 (23 Apr 2020 11:51), Max: 36.9 (22 Apr 2020 12:48)  T(F): 97.5 (23 Apr 2020 11:51), Max: 98.5 (22 Apr 2020 12:48)  HR: 101 (23 Apr 2020 11:51) (98 - 105)  BP: 120/49 (23 Apr 2020 11:51) (112/70 - 143/92)  BP(mean): --  RR: 20 (23 Apr 2020 11:51) (18 - 24)  SpO2: 92% (23 Apr 2020 11:51) (92% - 100%)  I&O's Summary    22 Apr 2020 07:01  -  23 Apr 2020 07:00  --------------------------------------------------------  IN: 600 mL / OUT: 0 mL / NET: 600 mL        GENERAL: NAD, well-developed  HEAD:  Atraumatic, Normocephalic  EYES: EOMI, PERRLA, conjunctiva and sclera clear  NECK: Supple, No JVD, No LAD, No carotid bruits  CHEST/LUNG: Decreased BS @ both bases  HEART: Regular rate and rhythm; No murmurs, rubs, or gallops  ABDOMEN: Soft, Nontender, Nondistended; Bowel sounds present  EXTREMITIES:  2+ Peripheral Pulses, No clubbing, cyanosis, or edema  SKIN: No rashes or lesions; rt CW chemoport  NEURO: A+O x 3; nonfocal CN/motor/sensory/reflexes  PSYCH: Nl affect; no agitation or delirium; no suicidal or homicidal ideation    LABS:                        9.3    2.52  )-----------( 129      ( 23 Apr 2020 05:45 )             29.3     04-23    138  |  97<L>  |  25<H>  ----------------------------<  203<H>  3.8   |  26  |  0.87    Ca    9.0      23 Apr 2020 05:45        CAPILLARY BLOOD GLUCOSE                RADIOLOGY & ADDITIONAL TESTS:    Imaging Personally Reviewed:  [x] YES  [ ] NO    Consultant(s) Notes Reviewed:  [x] YES  [ ] NO      MEDICATIONS  (STANDING):  aspirin enteric coated 81 milliGRAM(s) Oral daily  atorvastatin 10 milliGRAM(s) Oral at bedtime  budesonide 160 MICROgram(s)/formoterol 4.5 MICROgram(s) Inhaler 2 Puff(s) Inhalation two times a day  enoxaparin Injectable 100 milliGRAM(s) SubCutaneous every 12 hours  folic acid 1 milliGRAM(s) Oral daily  furosemide    Tablet 40 milliGRAM(s) Oral daily  imipramine 25 milliGRAM(s) Oral daily  methylPREDNISolone sodium succinate Injectable 30 milliGRAM(s) IV Push three times a day  metoprolol tartrate 50 milliGRAM(s) Oral two times a day  montelukast 10 milliGRAM(s) Oral daily  pantoprazole    Tablet 40 milliGRAM(s) Oral before breakfast  thiamine 100 milliGRAM(s) Oral daily    MEDICATIONS  (PRN):  acetaminophen   Tablet .. 650 milliGRAM(s) Oral every 6 hours PRN Temp greater or equal to 38C (100.4F)  ALBUTerol    90 MICROgram(s) HFA Inhaler 2 Puff(s) Inhalation every 4 hours PRN Shortness of Breath and/or Wheezing  benzonatate 100 milliGRAM(s) Oral three times a day PRN Cough  guaiFENesin   Syrup  (Sugar-Free) 200 milliGRAM(s) Oral every 6 hours PRN Cough  hydrocodone/homatropine Syrup 5 milliLiter(s) Oral at bedtime PRN for severe cough -      Care Discussed with Consultants/Other Providers [x] YES  [ ] NO    HEALTH ISSUES - PROBLEM Dx:  Pulmonary thromboembolism  Need for prophylactic measure: Need for prophylactic measure  HLD (hyperlipidemia): HLD (hyperlipidemia)  Dementia: Dementia  Hypertension: Hypertension  CAD (coronary artery disease): CAD (coronary artery disease)  Lymphoma: Lymphoma  Pulmonary embolism: Pulmonary embolism  COPD (chronic obstructive pulmonary disease): COPD (chronic obstructive pulmonary disease)  Respiratory failure with hypoxia: Respiratory failure with hypoxia
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
## 2020-04-23 NOTE — PROGRESS NOTE ADULT - PROBLEM SELECTOR PLAN 7
continue home med imipramine for depression   pt reports he does not take Dementia meds regularly and was told by his MD to stop the med
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
## 2020-04-24 NOTE — DIETITIAN INITIAL EVALUATION ADULT. - OTHER INFO
75 y/o Male with medical history inclusive of COPD, DANIEL on CPAP, depression, HLD, HTN, CAD, dementia, Stage IV Hodgkin's lymphoma with nodule, tracheal stenosis admitted with acute hypoxic respiratory failure 2/2 to suspected COVID 19 pneumonia. Covid neg X2 per chart review. Overall, lack of subjective data at this time, unable to obtain prior weight history and/or diet history. Collateral obtained from chart review. Patient currently on Regular diet, however, adequacy of patient's PO intake not recorded at this time. No intolerance to PO diet, nausea/vomiting/diarrhea, or issues chewing/swallowing noted at this time. No nausea/vomiting/diarrhea or difficulty chewing and swallowing reported. Per flowsheet documentation, patient with  +Constipation noted- on bowel regimen on 4/24. Last bowel movement on 4/18 recorded.

## 2020-04-24 NOTE — PROGRESS NOTE ADULT - PROBLEM SELECTOR PLAN 1
suspect secondary to COVID but could be due to other viral etiologies  COVID 19 Negative from 4/20   Given high clinical and radiological suspicion, repeat COVID sent -> (-) 2nd time  continue supplemental Oxygen to maintain saturation above 92-94%  Proning, consider a dose of tocizulimab  Low-threshold for intubation  Appreciate pulmonology

## 2020-04-24 NOTE — DIETITIAN INITIAL EVALUATION ADULT. - ETIOLOGY
related to physiological causes related to physiological causes, decreased ability to consume sufficient energy

## 2020-04-24 NOTE — DISCHARGE NOTE PROVIDER - NSDCFUSCHEDAPPT_GEN_ALL_CORE_FT
TAYLOR MCFADDEN ; 05/12/2020 ; NPP Neurology [id number] ; 05/16/2020 ; NPP IntMed [address] ; 06/01/2020 ; NPP Puled [id number]

## 2020-04-24 NOTE — DIETITIAN INITIAL EVALUATION ADULT. - PROBLEM SELECTOR PLAN 2
pt was seen by Pulmonology at [facility] -Continue Methylprednisolone inhalers and steroids for copd  Continue Lasix   continue to monitor

## 2020-04-24 NOTE — DIETITIAN INITIAL EVALUATION ADULT. - PERTINENT LABORATORY DATA
04-24 Na138 mmol/L Glu 164 mg/dL<H> K+ 4.0 mmol/L Cr  0.89 mg/dL BUN 26 mg/dL<H> 04-21 Phos 3.3 mg/dL 04-24 Alb 3.3 g/dL

## 2020-04-24 NOTE — PROGRESS NOTE ADULT - PROBLEM SELECTOR PLAN 2
pt was seen by Pulmonology at [facility] -Continue Methylprednisolone inhalers and steroids for copd  Continue Lasix   continue to monitor

## 2020-04-24 NOTE — PROGRESS NOTE ADULT - SUBJECTIVE AND OBJECTIVE BOX
Patient is a 74y old  Male who presents with a chief complaint of     SUBJECTIVE / OVERNIGHT EVENTS:    O2 requirements have increased early this morning  now on 15L NRB    Vital Signs Last 24 Hrs  T(C): 36.4 (24 Apr 2020 10:58), Max: 36.4 (23 Apr 2020 20:27)  T(F): 97.6 (24 Apr 2020 10:58), Max: 97.6 (23 Apr 2020 20:27)  HR: 76 (24 Apr 2020 10:58) (71 - 104)  BP: 120/55 (24 Apr 2020 10:58) (117/101 - 140/88)  BP(mean): --  RR: 22 (24 Apr 2020 10:58) (20 - 22)  SpO2: 100% (24 Apr 2020 10:58) (91% - 100%)      GENERAL: NAD, well-developed  HEAD:  Atraumatic, Normocephalic  EYES: EOMI, PERRLA, conjunctiva and sclera clear  NECK: Supple, No JVD, No LAD, No carotid bruits  CHEST/LUNG: Decreased BS @ both bases  HEART: Regular rate and rhythm; No murmurs, rubs, or gallops  ABDOMEN: Soft, Nontender, Nondistended; Bowel sounds present  EXTREMITIES:  2+ Peripheral Pulses, No clubbing, cyanosis, or edema  SKIN: No rashes or lesions; rt CW chemoport  NEURO: A+O x 3; nonfocal CN/motor/sensory/reflexes  PSYCH: Nl affect; no agitation or delirium; no suicidal or homicidal ideation      LABS:                        8.5    1.94  )-----------( 92       ( 24 Apr 2020 06:30 )             27.7     04-24    138  |  97<L>  |  26<H>  ----------------------------<  164<H>  4.0   |  30  |  0.89    Ca    8.9      24 Apr 2020 06:30    TPro  6.2  /  Alb  3.3  /  TBili  0.3  /  DBili  x   /  AST  8   /  ALT  12  /  AlkPhos  65  04-24      CAPILLARY BLOOD GLUCOSE                RADIOLOGY & ADDITIONAL TESTS:    Imaging Personally Reviewed:  [x] YES  [ ] NO    Consultant(s) Notes Reviewed:  [x] YES  [ ] NO    MEDICATIONS  (STANDING):  aspirin enteric coated 81 milliGRAM(s) Oral daily  atorvastatin 10 milliGRAM(s) Oral at bedtime  budesonide 160 MICROgram(s)/formoterol 4.5 MICROgram(s) Inhaler 2 Puff(s) Inhalation two times a day  enoxaparin Injectable 100 milliGRAM(s) SubCutaneous every 12 hours  folic acid 1 milliGRAM(s) Oral daily  furosemide    Tablet 40 milliGRAM(s) Oral daily  imipramine 25 milliGRAM(s) Oral daily  methylPREDNISolone sodium succinate Injectable 30 milliGRAM(s) IV Push three times a day  metoprolol tartrate 50 milliGRAM(s) Oral two times a day  montelukast 10 milliGRAM(s) Oral daily  pantoprazole    Tablet 40 milliGRAM(s) Oral before breakfast  thiamine 100 milliGRAM(s) Oral daily    MEDICATIONS  (PRN):  acetaminophen   Tablet .. 650 milliGRAM(s) Oral every 6 hours PRN Temp greater or equal to 38C (100.4F)  ALBUTerol    90 MICROgram(s) HFA Inhaler 2 Puff(s) Inhalation every 4 hours PRN Shortness of Breath and/or Wheezing  benzonatate 100 milliGRAM(s) Oral three times a day PRN Cough  guaiFENesin   Syrup  (Sugar-Free) 200 milliGRAM(s) Oral every 6 hours PRN Cough  hydrocodone/homatropine Syrup 5 milliLiter(s) Oral at bedtime PRN for severe cough -      Care Discussed with Consultants/Other Providers [x] YES  [ ] NO    HEALTH ISSUES - PROBLEM Dx:  Pulmonary thromboembolism  Need for prophylactic measure: Need for prophylactic measure  HLD (hyperlipidemia): HLD (hyperlipidemia)  Dementia: Dementia  Hypertension: Hypertension  CAD (coronary artery disease): CAD (coronary artery disease)  Lymphoma: Lymphoma  Pulmonary embolism: Pulmonary embolism  COPD (chronic obstructive pulmonary disease): COPD (chronic obstructive pulmonary disease)  Respiratory failure with hypoxia: Respiratory failure with hypoxia

## 2020-04-24 NOTE — DISCHARGE NOTE PROVIDER - HOSPITAL COURSE
74 year old male with PMHx of COPD, DANIEL on CPAP, depression, HLD, HTN, CAD s/p PCI, CHB s/p PPM, bioprosthetic AVR, dementia, Stage IV Hodgkin's lymphoma with lung nodule, tracheal stenosis admitted with acute hypoxic respiratory failure secondary to suspected COVID-19 pneumonia.    CT chest with GGO.    COVID SWAB NEGATIVE X2.    As per pulm, ddx COVID vs belomycin tox, but since did not improve on IV steroids, pt still clinically suspicious of COVID.    TOCI given--------------    Heme also following.                dispo:

## 2020-04-24 NOTE — DIETITIAN INITIAL EVALUATION ADULT. - SIGNS/SYMPTOMS
As evidenced by elevated BUN, blood glucose resp distress, r/o covid19, sob, no diet order during recent admission (per last RDN note 4/20/20)

## 2020-04-24 NOTE — CHART NOTE - NSCHARTNOTEFT_GEN_A_CORE
I spoke with Dr. Horne who is covering for pt's oncologist.  They do not come to [facility].  I explained that pt may require tociluzimab treatment here at the hospital.  They verbalized to me over the phone that, from their point of view, the potential benefits outweight the risks in his current clinical state. I spoke with Dr. Horne who is covering for pt's oncologist.  They do not come to [facility].  I explained that pt may require tociluzimab treatment here at the hospital.  They requested my team to reach out to house hematology for consult in light of pt's pancytopenia.

## 2020-04-24 NOTE — DIETITIAN INITIAL EVALUATION ADULT. - REASON INDICATOR FOR ASSESSMENT
Initial Dietitian Evaluation 2/2 to extended length of stay.   RD unable to have face to face encounter with patient to perform nutrition interview and/or nutrition-focused physical exam due to contact/isolation precautions related to COVID-19.

## 2020-04-24 NOTE — DISCHARGE NOTE PROVIDER - CARE PROVIDER_API CALL
Hon BEBO Majano (MD)  Hematology; Internal Medicine; Medical Oncology  [address], [place] [address]  Phone: [phone]  Fax: [phone]  Follow Up Time:

## 2020-04-24 NOTE — DIETITIAN INITIAL EVALUATION ADULT. - CONTINUE CURRENT NUTRITION CARE PLAN
1. Continue Regular diet, Low Sodium and add PO supplement Ensure Enlive 240mls 2x daily (700kcal, 40g protein) for optimal nutrition 2. Monitor weights, labs, BM's, skin integrity, p.o. intake. 3. Please Encourage po intake, assist with meals and menu selections, provide alternatives PRN.

## 2020-04-24 NOTE — DIETITIAN INITIAL EVALUATION ADULT. - ENERGY NEEDS
Height (cm): 172.7 (22 Apr 2020 15:34) Weight (kg): 103.3 (22 Apr 2020 15:34)  BMI (kg/m2): 34.6 (22 Apr 2020 15:34)  IBW: 154lbs (+/-10%) %IBW: 148%  Estimated Energy Needs (11-14kcal/kg of ABW): 1136-1446Kcal/d  Protein needs based on IBW.

## 2020-04-24 NOTE — DIETITIAN INITIAL EVALUATION ADULT. - PERTINENT MEDS FT
MEDICATIONS  (STANDING):  aspirin enteric coated 81 milliGRAM(s) Oral daily  atorvastatin 10 milliGRAM(s) Oral at bedtime  budesonide 160 MICROgram(s)/formoterol 4.5 MICROgram(s) Inhaler 2 Puff(s) Inhalation two times a day  enoxaparin Injectable 100 milliGRAM(s) SubCutaneous every 12 hours  folic acid 1 milliGRAM(s) Oral daily  furosemide    Tablet 40 milliGRAM(s) Oral daily  imipramine 25 milliGRAM(s) Oral daily  methylPREDNISolone sodium succinate Injectable 30 milliGRAM(s) IV Push three times a day  metoprolol tartrate 50 milliGRAM(s) Oral two times a day  montelukast 10 milliGRAM(s) Oral daily  pantoprazole    Tablet 40 milliGRAM(s) Oral before breakfast  senna 2 Tablet(s) Oral at bedtime  thiamine 100 milliGRAM(s) Oral daily

## 2020-04-24 NOTE — DISCHARGE NOTE PROVIDER - NSDCCPCAREPLAN_GEN_ALL_CORE_FT
PRINCIPAL DISCHARGE DIAGNOSIS  Diagnosis: Respiratory failure with hypoxia  Assessment and Plan of Treatment: Respiratory failure with hypoxia      SECONDARY DISCHARGE DIAGNOSES  Diagnosis: Lymphoma  Assessment and Plan of Treatment: Lymphoma    Diagnosis: CAD (coronary artery disease)  Assessment and Plan of Treatment: CAD (coronary artery disease)    Diagnosis: Hypertension  Assessment and Plan of Treatment: Hypertension

## 2020-04-24 NOTE — PROGRESS NOTE ADULT - SUBJECTIVE AND OBJECTIVE BOX
PULMONARY PROGRESS NOTE    TAYLOR MCFADDEN  MRN-[id number]    Patient is a 74y old  Male who presents with a chief complaint of     HPI:  transiently on NRB today  he was on NC again this afternoon after lunch   pulse ox 90-94% on 6L  comfortable    ROS:   -    ACTIVE MEDICATION LIST:  MEDICATIONS  (STANDING):  aspirin enteric coated 81 milliGRAM(s) Oral daily  atorvastatin 10 milliGRAM(s) Oral at bedtime  budesonide 160 MICROgram(s)/formoterol 4.5 MICROgram(s) Inhaler 2 Puff(s) Inhalation two times a day  enoxaparin Injectable 100 milliGRAM(s) SubCutaneous every 12 hours  folic acid 1 milliGRAM(s) Oral daily  furosemide    Tablet 40 milliGRAM(s) Oral daily  imipramine 25 milliGRAM(s) Oral daily  methylPREDNISolone sodium succinate Injectable 30 milliGRAM(s) IV Push three times a day  metoprolol tartrate 50 milliGRAM(s) Oral two times a day  montelukast 10 milliGRAM(s) Oral daily  pantoprazole    Tablet 40 milliGRAM(s) Oral before breakfast  senna 2 Tablet(s) Oral at bedtime  thiamine 100 milliGRAM(s) Oral daily    MEDICATIONS  (PRN):  acetaminophen   Tablet .. 650 milliGRAM(s) Oral every 6 hours PRN Temp greater or equal to 38C (100.4F)  ALBUTerol    90 MICROgram(s) HFA Inhaler 2 Puff(s) Inhalation every 4 hours PRN Shortness of Breath and/or Wheezing  benzonatate 100 milliGRAM(s) Oral three times a day PRN Cough  guaiFENesin   Syrup  (Sugar-Free) 200 milliGRAM(s) Oral every 6 hours PRN Cough  hydrocodone/homatropine Syrup 5 milliLiter(s) Oral at bedtime PRN for severe cough -      EXAM:  Vital Signs Last 24 Hrs  T(C): 36.4 (24 Apr 2020 10:58), Max: 36.4 (23 Apr 2020 20:27)  T(F): 97.6 (24 Apr 2020 10:58), Max: 97.6 (23 Apr 2020 20:27)  HR: 76 (24 Apr 2020 10:58) (71 - 104)  BP: 120/55 (24 Apr 2020 10:58) (117/101 - 140/88)  BP(mean): --  RR: 22 (24 Apr 2020 10:58) (20 - 22)  SpO2: 100% (24 Apr 2020 10:58) (91% - 100%)    GENERAL: The patient is awake and alert in no apparent distress.     LUNGS:  respirations unlabored    HEART: Regular rate                              8.5    1.94  )-----------( 92       ( 24 Apr 2020 06:30 )             27.7       04-24    138  |  97<L>  |  26<H>  ----------------------------<  164<H>  4.0   |  30  |  0.89    Ca    8.9      24 Apr 2020 06:30    TPro  6.2  /  Alb  3.3  /  TBili  0.3  /  DBili  x   /  AST  8   /  ALT  12  /  AlkPhos  65  04-24      Ferritin, Serum: 771.6 ng/mL (04-24-20 @ 06:30)  Ferritin, Serum: 1220 ng/mL (04-21-20 @ 12:32)  Ferritin, Serum: 1038 ng/mL (04-20-20 @ 13:04)  Ferritin, Serum: 1050 ng/mL (04-20-20 @ 13:02)    C-Reactive Protein, Serum: 16.1 mg/L <H> (04-24-20 @ 06:30)  C-Reactive Protein, Serum: 16.24 mg/dL <H> (04-20-20 @ 13:04)  C-Reactive Protein, Serum: 15.93 mg/dL <H> (04-20-20 @ 13:02)        < from: Xray Chest 1 View- PORTABLE-Urgent (04.24.20 @ 08:14) >    EXAM:  XR CHEST PORTABLE URGENT 1V        PROCEDURE DATE:  Apr 24 2020         INTERPRETATION:    Examination: XR CHEST URGENT    History: ADMDIAG1: R09.02 HYPOXEMIA/, hypoxia    Findings:  Status post mid sternotomy. Right chest wall Mediport tip in SVC. Left chest wall dual-lead pacemaker. The heart is unchanged in size. No pneumothorax. Bilateral patchy lung opacities. Degenerative changes of the spine.    Impression:  1.  Patchy bilateral lung opacities likely representing infection.      DISCRETE X-RAY DATA:  Percent of LEFT lung opacification: 1-33%  Percent of RIGHT lung opacification: 1-33%  Change in lung opacification from most recent x-ray (<=3 days): No Prior  Change from prior dated 3 or more days (same admission): Stable                NICOLE CRUM M.D., RADIOLOGY RESIDENT  This document has been electronically signed.  ELSI QUINONEZ M.D., ATTENDING RADIOLOGIST  This document has been electronically signed. Apr 24 2020  9:20AM    < end of copied text >    PROBLEM LIST:  74y Male with HEALTH ISSUES - PROBLEM Dx:  Pulmonary thromboembolism  Need for prophylactic measure: Need for prophylactic measure  HLD (hyperlipidemia): HLD (hyperlipidemia)  Dementia: Dementia  Hypertension: Hypertension  CAD (coronary artery disease): CAD (coronary artery disease)  Lymphoma: Lymphoma  Pulmonary embolism: Pulmonary embolism  COPD (chronic obstructive pulmonary disease): COPD (chronic obstructive pulmonary disease)  Respiratory failure with hypoxia: Respiratory failure with hypoxia            RECS:  hypoxic resp failure due to: COVID? vs. 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  continue NC as tolerated  suggest PAP QHS (He is in private room, covid negative X2)  appreciate heme onco f/u re: IL6 if needed  emailed plasma trial - done today  continue steroids today- will be day 5  if  bleomycin toxicity would see some improvement in symptoms on steroids so I am inclined to think we are dealing with COVID  if his markers continue to increase, may need to try toci one time dose   agree with full dose AC given his recent PICC line thrombosis & his CTA showing small embolus in RLL  supportive care  tylenol PRN pain/fever  isolation precautions  trend ferritin, d dimer, CRP  GOC discussion  Limited physical exam in efforts to limit unnecessary exposure     Discussed with DR Morales  Please call with any questions.    Mady Pollard DO  [facility] Pulmonary/Sleep Medicine  [phone]

## 2020-04-24 NOTE — DISCHARGE NOTE PROVIDER - NSDCMRMEDTOKEN_GEN_ALL_CORE_FT
acetaminophen 325 mg oral tablet: 3 tab(s) orally every 6 hours, As needed, Temp greater or equal to 38C (100.4F), Mild Pain (1 - 3)  albuterol 90 mcg/inh inhalation aerosol: 2 puff(s) inhaled every 4 hours, As needed, Shortness of Breath and/or Wheezing  aspirin 81 mg oral delayed release tablet: 1 tab(s) orally once a day  atorvastatin 10 mg oral tablet: 1 tab(s) orally once a day (at bedtime)  benzonatate 100 mg oral capsule: 1 cap(s) orally 3 times a day, As needed, Cough  budesonide-formoterol 160 mcg-4.5 mcg/inh inhalation aerosol:  inhaled   enoxaparin: 100 milligram(s) subcutaneous every 12 hours  folic acid 1 mg oral tablet: 1 tab(s) orally once a day  guaiFENesin 100 mg/5 mL oral liquid: 10 milliliter(s) orally every 6 hours, As needed, Cough  hydrocodone-homatropine 5 mg-1.5 mg/5 mL oral syrup: 5 milliliter(s) orally once a day (at bedtime), As needed, for severe cough -  imipramine 25 mg oral tablet: 1 tab(s) orally once a day  Lasix 40 mg oral tablet: 1 tab(s) orally once a day  methylPREDNISolone: 30 milligram(s) intravenous 3 times a day  metoprolol tartrate 50 mg oral tablet: 1 tab(s) orally 2 times a day  montelukast 10 mg oral tablet: 1 tab(s) orally once a day  pantoprazole 40 mg oral delayed release tablet: 1 tab(s) orally once a day  thiamine 100 mg oral tablet: 1 tab(s) orally once a day

## 2020-04-25 NOTE — PROGRESS NOTE ADULT - SUBJECTIVE AND OBJECTIVE BOX
Patient is a 74y old  Male who presents with a chief complaint of     SUBJECTIVE / OVERNIGHT EVENTS:    sadaf easton his back  now on 15L NRB, saturating low 90s    Vital Signs Last 24 Hrs  T(C): 36.7 (24 Apr 2020 23:18), Max: 36.7 (24 Apr 2020 23:18)  T(F): 98.1 (24 Apr 2020 23:18), Max: 98.1 (24 Apr 2020 23:18)  HR: 74 (25 Apr 2020 08:12) (70 - 83)  BP: 134/80 (25 Apr 2020 04:34) (120/55 - 138/71)  BP(mean): --  RR: 18 (25 Apr 2020 06:33) (18 - 24)  SpO2: 96% (25 Apr 2020 08:12) (95% - 100%)    I&O's Summary    04-24-20 @ 07:01  -  04-25-20 @ 07:00  --------------------------------------------------------  IN: 400 mL / OUT: 500 mL / NET: -100 mL        GENERAL: NAD, on 15L NRB  HEAD:  Atraumatic, Normocephalic  EYES: EOMI, PERRLA, conjunctiva and sclera clear  NECK: Supple, No JVD, No LAD, No carotid bruits  CHEST/LUNG: Decreased BS @ both bases  HEART: Regular rate and rhythm; No murmurs, rubs, or gallops  ABDOMEN: Soft, Nontender, Nondistended; Bowel sounds present  EXTREMITIES:  2+ Peripheral Pulses, No clubbing, cyanosis, or edema  SKIN: No rashes or lesions; rt CW chemoport  NEURO: A+O x 3; nonfocal CN/motor/sensory/reflexes  PSYCH: Nl affect; no agitation or delirium; no suicidal or homicidal ideation        LABS:                        8.4    1.20  )-----------( 89       ( 25 Apr 2020 06:15 )             26.1     04-25    140  |  98  |  25<H>  ----------------------------<  165<H>  4.4   |  30  |  0.90    Ca    8.9      25 Apr 2020 06:15    TPro  6.1  /  Alb  3.1<L>  /  TBili  0.3  /  DBili  x   /  AST  12  /  ALT  16  /  AlkPhos  73  04-25      CAPILLARY BLOOD GLUCOSE                RADIOLOGY & ADDITIONAL TESTS:    Imaging Personally Reviewed:  [x] YES  [ ] NO    Consultant(s) Notes Reviewed:  [x] YES  [ ] NO

## 2020-04-25 NOTE — PROGRESS NOTE ADULT - PROBLEM SELECTOR PLAN 2
pt was seen by Pulmonology at [facility] -Continue Methylprednisolone inhalers and steroids for copd  Continue Lasix   continue to monitor

## 2020-04-25 NOTE — PROGRESS NOTE ADULT - PROBLEM SELECTOR PLAN 1
suspect secondary to COVID but could be due to other viral etiologies/bleomycin toxicity  on IV solumedrol since 4/22  COVID 19 Negative from 4/20   Given high clinical and radiological suspicion, repeat COVID sent -> (-) 2nd time  continue supplemental Oxygen to maintain saturation above 92-94%  Proning/chest PT, hematology recommending against tociluzimab unless COVID (+) and with rising inflammatory markers  Plasma trial emailed 4/24/20  Low-threshold for intubation  Appreciate pulmonology

## 2020-04-25 NOTE — PROGRESS NOTE ADULT - SUBJECTIVE AND OBJECTIVE BOX
PULMONARY PROGRESS NOTE    TAYLOR MCFADDEN  MRN-[id number]    Patient is a 74y old  Male who presents with a chief complaint of     HPI:  he is back on NRB resting in chair  pulse ox is 95%  per RN- no events overnight- unclear why he is back on NRB no desaturations documented or signed out  he has a cough    ROS:   -    ACTIVE MEDICATION LIST:  MEDICATIONS  (STANDING):  aspirin enteric coated 81 milliGRAM(s) Oral daily  atorvastatin 10 milliGRAM(s) Oral at bedtime  budesonide 160 MICROgram(s)/formoterol 4.5 MICROgram(s) Inhaler 2 Puff(s) Inhalation two times a day  enoxaparin Injectable 100 milliGRAM(s) SubCutaneous every 12 hours  folic acid 1 milliGRAM(s) Oral daily  furosemide    Tablet 40 milliGRAM(s) Oral daily  imipramine 25 milliGRAM(s) Oral daily  methylPREDNISolone sodium succinate Injectable 30 milliGRAM(s) IV Push three times a day  metoprolol tartrate 50 milliGRAM(s) Oral two times a day  montelukast 10 milliGRAM(s) Oral daily  pantoprazole    Tablet 40 milliGRAM(s) Oral before breakfast  polyethylene glycol 3350 17 Gram(s) Oral daily  senna 2 Tablet(s) Oral at bedtime  thiamine 100 milliGRAM(s) Oral daily    MEDICATIONS  (PRN):  acetaminophen   Tablet .. 650 milliGRAM(s) Oral every 6 hours PRN Temp greater or equal to 38C (100.4F)  ALBUTerol    90 MICROgram(s) HFA Inhaler 2 Puff(s) Inhalation every 4 hours PRN Shortness of Breath and/or Wheezing  benzonatate 100 milliGRAM(s) Oral three times a day PRN Cough  guaiFENesin   Syrup  (Sugar-Free) 200 milliGRAM(s) Oral every 6 hours PRN Cough  hydrocodone/homatropine Syrup 5 milliLiter(s) Oral at bedtime PRN for severe cough -      EXAM:  Vital Signs Last 24 Hrs  T(C): 36.7 (24 Apr 2020 23:18), Max: 36.7 (24 Apr 2020 23:18)  T(F): 98.1 (24 Apr 2020 23:18), Max: 98.1 (24 Apr 2020 23:18)  HR: 74 (25 Apr 2020 08:12) (70 - 83)  BP: 134/80 (25 Apr 2020 04:34) (127/70 - 138/71)  BP(mean): --  RR: 18 (25 Apr 2020 06:33) (18 - 24)  SpO2: 96% (25 Apr 2020 08:12) (95% - 100%)    GENERAL: The patient is awake and alert in no apparent distress.     LUNGS:  respirations unlabored while on NRB    HEART: Regular rate                             8.4    1.20  )-----------( 89       ( 25 Apr 2020 06:15 )             26.1       04-25    140  |  98  |  25<H>  ----------------------------<  165<H>  4.4   |  30  |  0.90    Ca    8.9      25 Apr 2020 06:15    TPro  6.1  /  Alb  3.1<L>  /  TBili  0.3  /  DBili  x   /  AST  12  /  ALT  16  /  AlkPhos  73  04-25      Ferritin, Serum: 771.6 ng/mL (04-24-20 @ 06:30)  Ferritin, Serum: 1220 ng/mL (04-21-20 @ 12:32)  Ferritin, Serum: 1038 ng/mL (04-20-20 @ 13:04)  Ferritin, Serum: 1050 ng/mL (04-20-20 @ 13:02)    C-Reactive Protein, Serum: 16.1 mg/L <H> (04-24-20 @ 06:30)  C-Reactive Protein, Serum: 16.24 mg/dL <H> (04-20-20 @ 13:04)  C-Reactive Protein, Serum: 15.93 mg/dL <H> (04-20-20 @ 13:02)            PROBLEM LIST:  74y Male with HEALTH ISSUES - PROBLEM Dx:  Pulmonary thromboembolism  Need for prophylactic measure: Need for prophylactic measure  HLD (hyperlipidemia): HLD (hyperlipidemia)  Dementia: Dementia  < from: Xray Chest 1 View- PORTABLE-Urgent (04.24.20 @ 08:14) >    EXAM:  XR CHEST PORTABLE URGENT 1V        PROCEDURE DATE:  Apr 24 2020         INTERPRETATION:    Examination: XR CHEST URGENT    History: ADMDIAG1: R09.02 HYPOXEMIA/, hypoxia    Findings:  Status post mid sternotomy. Right chest wall Mediport tip in SVC. Left chest wall dual-lead pacemaker. The heart is unchanged in size. No pneumothorax. Bilateral patchy lung opacities. Degenerative changes of the spine.    Impression:  1.  Patchy bilateral lung opacities likely representing infection.      DISCRETE X-RAY DATA:  Percent of LEFT lung opacification: 1-33%  Percent of RIGHT lung opacification: 1-33%  Change in lung opacification from most recent x-ray (<=3 days): No Prior  Change from prior dated 3 or more days (same admission): Stable                NICOLE CRUM M.D., RADIOLOGY RESIDENT  This document has been electronically signed.  ELSI QUINONEZ M.D., ATTENDING RADIOLOGIST  This document has been electronically signed. Apr 24 2020  9:20AM    < end of copied text >  Hypertension: Hypertension  CAD (coronary artery disease): CAD (coronary artery disease)  Lymphoma: Lymphoma  Pulmonary embolism: Pulmonary embolism  COPD (chronic obstructive pulmonary disease): COPD (chronic obstructive pulmonary disease)  Respiratory failure with hypoxia: Respiratory failure with hypoxia            IMPRESSION:  hypoxic resp failure due to: COVID? vs. bleomycin toxicity from chemotherapy started in jan?    SUGGEST:  recheck COVID PCR  if he is to get plasma or toci- needs + PCR results   switch back to NC (spoke to RN)   appreciate heme onco f/u re: IL6 if needed  emailed plasma trial - done today  stop steriods- he has been on solumedrol for 5 days   if this is bleomycin toxicity would see some improvement in symptoms on steroids so I am inclined to think we are dealing with COVID  continue to trend markers  continue with full dose AC given his recent PICC line thrombosis & his CTA showing small embolus in RLL  supportive care  tylenol PRN pain/fever  isolation precautions  GOC discussion    Limited physical exam in efforts to limit unnecessary exposure     Discussed with DR Morales and RN         Please call with any questions.    Mady Pollard, DO  [facility] Pulmonary/Sleep Medicine  [phone]

## 2020-04-26 NOTE — PROGRESS NOTE ADULT - ASSESSMENT
74 year old male with Stage IV Hodgkin's lymphoma with lung nodules ( Dx 2019, on chemotherapy with ABVD,  last treatement 4/16/2020, He is currently being treated by Dr. Majano (heme/onc) for his Hodgkin lymphoma), also COPD, DANIEL on CPAP, depression, HLD, HTN, CAD s/p PCI, CHB s/p PPM, bioprosthetic AVR, dementia, tracheal stenosis was admitted to [place]  ED 4/20-4/2 for SOB , hypoxia and was about to be intubated on admission at [facility], but his O2 sats improved with BiLevel PAP which was later transitioned to a venit mask. CT chest showed pattern of GGO suggests infection including atypical pneumonia/viral infection from atypical agents including COVID-19 .Small embolus within the anterobasilar subsegmental right lower lobe pulmonary artery. COVID test was negative though clinically was suspicious for. covid – CT  chest shows GGO, biomarkers elevated. Pt was transferred to [facility]  for continuation of medical care.  Hematology was called regarding possibility of starting Tocilizumab; now with worsening leukopenia.    #Hypoxic respiratory failure  -Patient is on non-rebreather, stable  -Patient received steroids for suspected bleomycin toxicity without significant improvement, making it less likely and raising again the suspicion that hypoxic respiratory failure due to COVID-19  -Has had 3 negative COVID-19 nasopharyngeal swabs; without positive result, would not recommend tociliuzumab (tests positive AND has signs of significant elevation of cytokine release). However, since suspicion is high, would consider pulm evaluation for a BAL to confirm suspected diagnosis (and also r/o other etiologies such as PCP)  -O2 management per primary team    #Severe neutropenia  -patient does not recall if he had G-CSF neutropenic prophylaxis, but does remember receiving a shot x5 days  -suspect worsening leukopenia due to recent chemotherapy, near liza  -can start Mepron 1500 mg QD with food for PCP ppx  -start Levaquin 500 mg QD ppx (check EKG to r/o prolonged QTc prior to starting)  -can start G-CSF for neutropenic support (filgrastim 5 mcg/kg = 500 mcg QD); stop if develops new signs of respiratory distress or CRS; stop filgrastim if absolute neutrophil count > 1.0 k/uL    #Stage IV Hodgkin's lymphoma  -S/p ABVD, last dose 4/16/20  -Thrombocytopenia in the setting of recent chemo. Please trend CBC.  -Please contact patient's primary hematologist, Dr. Majano, as his group might have privileges at [facility] and can follow the patient throughout his hospitalization; if so, will sign off case.    Rebel Rodriguez MD, MPH  Hematology / Oncology Fellow, PGY5  p

## 2020-04-26 NOTE — PROGRESS NOTE ADULT - SUBJECTIVE AND OBJECTIVE BOX
Hematology Oncology Follow-up    INTERVAL HPI/OVERNIGHT EVENTS:  Continues to be short of breath, on non-rebreather. Denies any new complaints.  Received call from primary team regarding worsening leukopenia; peripheral blood smear requested. Patient recently underwent chemotherapy ABVD for Hodgkin's at an outside hospital; patient reports that he received "shot" 5 days in a row (?G-CSF) but does not know what medication he received.    Review of Systems: Denies any pain, nausea, vomiting. ROS otherwise negative.    VITAL SIGNS:  T(F): 97.4 (04-26-20 @ 10:24)  HR: 98 (04-26-20 @ 10:24)  BP: 139/82 (04-26-20 @ 10:24)  RR: 19 (04-26-20 @ 10:24)  SpO2: 95% (04-26-20 @ 10:24)    Physical exam (from primary team note, limiting laying on hands due to COVID-19 rule out):  GENERAL: NAD, on 15L NRB  HEAD:  Atraumatic, Normocephalic  EYES: EOMI, PERRLA, conjunctiva and sclera clear  NECK: Supple, No JVD, No LAD, No carotid bruits  CHEST/LUNG: Decreased BS @ both bases  HEART: Regular rate and rhythm; No murmurs, rubs, or gallops  ABDOMEN: Soft, Nontender, Nondistended; Bowel sounds present  EXTREMITIES:  2+ Peripheral Pulses, No clubbing, cyanosis, or edema  SKIN: No rashes or lesions; rt CW chemoport  NEURO: A+O x 3; nonfocal CN/motor/sensory/reflexes  PSYCH: Nl affect; no agitation or delirium; no suicidal or homicidal ideation    MEDICATIONS  (STANDING):  aspirin enteric coated 81 milliGRAM(s) Oral daily  atorvastatin 10 milliGRAM(s) Oral at bedtime  budesonide 160 MICROgram(s)/formoterol 4.5 MICROgram(s) Inhaler 2 Puff(s) Inhalation two times a day  enoxaparin Injectable 100 milliGRAM(s) SubCutaneous every 12 hours  folic acid 1 milliGRAM(s) Oral daily  furosemide    Tablet 40 milliGRAM(s) Oral daily  imipramine 25 milliGRAM(s) Oral daily  lidocaine   Patch 1 Patch Transdermal daily  metoprolol tartrate 50 milliGRAM(s) Oral two times a day  montelukast 10 milliGRAM(s) Oral daily  pantoprazole    Tablet 40 milliGRAM(s) Oral before breakfast  polyethylene glycol 3350 17 Gram(s) Oral daily  senna 2 Tablet(s) Oral at bedtime  thiamine 100 milliGRAM(s) Oral daily    MEDICATIONS  (PRN):  acetaminophen   Tablet .. 650 milliGRAM(s) Oral every 6 hours PRN Temp greater or equal to 38C (100.4F), Mild Pain (1 - 3), Moderate Pain (4 - 6)  ALBUTerol    90 MICROgram(s) HFA Inhaler 2 Puff(s) Inhalation every 4 hours PRN Shortness of Breath and/or Wheezing  benzonatate 100 milliGRAM(s) Oral three times a day PRN Cough  guaiFENesin   Syrup  (Sugar-Free) 200 milliGRAM(s) Oral every 6 hours PRN Cough  hydrocodone/homatropine Syrup 5 milliLiter(s) Oral at bedtime PRN for severe cough -      No Known Allergies      LABS:                        9.3    0.75  )-----------( 107      ( 26 Apr 2020 05:26 )             30.3     04-26    138  |  95<L>  |  22  ----------------------------<  108<H>  3.8   |  31  |  0.90    Ca    8.9      26 Apr 2020 05:26    TPro  6.2  /  Alb  3.3  /  TBili  0.5  /  DBili  x   /  AST  20  /  ALT  23  /  AlkPhos  85  04-26           Ferritin, Serum: 793.5 ng/mL <H> (04-26-20 @ 05:26)  Ferritin, Serum: 771.6 ng/mL <H> (04-24-20 @ 06:30)            RADIOLOGY & ADDITIONAL TESTS:  Studies reviewed.    < from: Xray Chest 1 View- PORTABLE-Urgent (04.24.20 @ 08:14) >    EXAM:  XR CHEST PORTABLE URGENT 1V        PROCEDURE DATE:  Apr 24 2020         INTERPRETATION:    Examination: XR CHEST URGENT    History: ADMDIAG1: R09.02 HYPOXEMIA/, hypoxia    Findings:  Status post mid sternotomy. Right chest wall Mediport tip in SVC. Left chest wall dual-lead pacemaker. The heart is unchanged in size. No pneumothorax. Bilateral patchy lung opacities. Degenerative changes of the spine.    Impression:  1.  Patchy bilateral lung opacities likely representing infection.      DISCRETE X-RAY DATA:  Percent of LEFT lung opacification: 1-33%  Percent of RIGHT lung opacification: 1-33%  Change in lung opacification from most recent x-ray (<=3 days): No Prior  Change from prior dated 3 or more days (same admission): Stable    NICOLE CRUM M.D., RADIOLOGY RESIDENT  This document has been electronically signed.  ELSI QUINONEZ M.D., ATTENDING RADIOLOGIST  This document has been electronically signed. Apr 24 2020  9:20AM      < end of copied text >      < from: CT Angio Chest w/ IV Cont (04.21.20 @ 10:39) >    EXAM:  CT ANGIO CHEST (W)AW IC                                  PROCEDURE DATE:  04/21/2020          INTERPRETATION:  CLINICAL INFORMATION: Shortness of breath, Hodgkin's lymphoma    COMPARISON: CT Chest 2/5/2020    PROCEDURE:   CT Angiography of the Chest.  90 ml of Omnipaque 350 was injected intravenously. 10 ml were discarded.  Sagittal and coronal reformats were performed as well as 3D (MIP) reconstructions.      FINDINGS:    AIRWAYS, LUNGS and PLEURA: Patent central airways. Focal wall thickening and stenosis of the mid trachea likely sequela of prior intubation.    Bilateral patchy groundglass and reticular opacities new from 2/5/2020.    No pleural effusion or pneumothorax.     MEDIASTINUM AND FOUZIA: No lymphadenopathy.    HEART AND VESSELS: The heart is enlarged. Status post aortic valve replacement. A left pacer is present. No pericardial effusion. No aortic aneurysm or dissection.    Small filling defect within the anterior basilar segmental branch of right lower lobe pulmonaryartery (series 5 image 64). Main pulmonary artery normal in diameter.    Tip of the Mediport catheter within SVC.    VISUALIZED UPPER ABDOMEN: Cholelithiasis.    CHEST WALL AND BONES: No aggressive osseous lesion.     LOWER NECK: Within normal limits.    IMPRESSION:     Pattern of GGO suggests infection including atypical pneumonia/viral infection from atypical agents including COVID-19 (C19V-1).    Small embolus within the anterobasilar subsegmental right lower lobe pulmonary artery.    These findings were discussed with Dr. JUAN CARLOS THOMPSON, on 4/21/2020 10:55 AM with read back.    ANEESH FRANKLIN   This document has been electronically signed. Apr 21 2020 10:55AM    < end of copied text >

## 2020-04-26 NOTE — PROGRESS NOTE ADULT - PROBLEM SELECTOR PLAN 1
suspect secondary to COVID but could be due to other viral etiologies/bleomycin toxicity  s/p IV solumedrol 4/22-4/25  COVID 19 Negative from 4/20 + 4/25  Still suspect COVID-19 clinically over bleomycin toxicity  continue supplemental Oxygen to maintain saturation above 92-94%  Proning/chest PT, hematology recommending against tociluzimab unless COVID (+) and with rising inflammatory markers  Plasma trial emailed 4/24/20  Low-threshold for intubation  Appreciate pulmonology

## 2020-04-26 NOTE — PROGRESS NOTE ADULT - PROBLEM SELECTOR PLAN 2
pt was seen by Pulmonology at [facility] -Continue Methylprednisolone Cont inhalers; s/p solumedrol 4/22-4/25  Continue Lasix   continue to monitor

## 2020-04-26 NOTE — PROGRESS NOTE ADULT - SUBJECTIVE AND OBJECTIVE BOX
PULMONARY PROGRESS NOTE    ATYLOR MCFADDEN  MRN-[id number]    Patient is a 74y old  Male who presents with a chief complaint of     HPI:  -Appears comfortable on nonrebreather FiO2 100%. NAD  -  -  -    ROS:   -  -    ACTIVE MEDICATION LIST:  MEDICATIONS  (STANDING):  aspirin enteric coated 81 milliGRAM(s) Oral daily  atorvastatin 10 milliGRAM(s) Oral at bedtime  budesonide 160 MICROgram(s)/formoterol 4.5 MICROgram(s) Inhaler 2 Puff(s) Inhalation two times a day  enoxaparin Injectable 100 milliGRAM(s) SubCutaneous every 12 hours  folic acid 1 milliGRAM(s) Oral daily  furosemide    Tablet 40 milliGRAM(s) Oral daily  imipramine 25 milliGRAM(s) Oral daily  lidocaine   Patch 1 Patch Transdermal daily  metoprolol tartrate 50 milliGRAM(s) Oral two times a day  montelukast 10 milliGRAM(s) Oral daily  pantoprazole    Tablet 40 milliGRAM(s) Oral before breakfast  polyethylene glycol 3350 17 Gram(s) Oral daily  senna 2 Tablet(s) Oral at bedtime  thiamine 100 milliGRAM(s) Oral daily    MEDICATIONS  (PRN):  acetaminophen   Tablet .. 650 milliGRAM(s) Oral every 6 hours PRN Temp greater or equal to 38C (100.4F), Mild Pain (1 - 3), Moderate Pain (4 - 6)  ALBUTerol    90 MICROgram(s) HFA Inhaler 2 Puff(s) Inhalation every 4 hours PRN Shortness of Breath and/or Wheezing  benzonatate 100 milliGRAM(s) Oral three times a day PRN Cough  guaiFENesin   Syrup  (Sugar-Free) 200 milliGRAM(s) Oral every 6 hours PRN Cough  hydrocodone/homatropine Syrup 5 milliLiter(s) Oral at bedtime PRN for severe cough -      EXAM:  Vital Signs Last 24 Hrs  T(C): 36.3 (26 Apr 2020 10:24), Max: 36.3 (25 Apr 2020 11:48)  T(F): 97.4 (26 Apr 2020 10:24), Max: 97.4 (25 Apr 2020 20:15)  HR: 98 (26 Apr 2020 10:24) (70 - 98)  BP: 139/82 (26 Apr 2020 10:24) (109/67 - 142/87)  BP(mean): --  RR: 19 (26 Apr 2020 10:24) (19 - 20)  SpO2: 95% (26 Apr 2020 10:24) (95% - 100%)    GENERAL: The patient is awake and alert in no apparent distress.     Limited exam due to poss. COVID 19 infection    Labs:                        9.3    0.75  )-----------( 107      ( 26 Apr 2020 05:26 )             30.3   04-26    138  |  95<L>  |  22  ----------------------------<  108<H>  3.8   |  31  |  0.90    Ca    8.9      26 Apr 2020 05:26    TPro  6.2  /  Alb  3.3  /  TBili  0.5  /  DBili  x   /  AST  20  /  ALT  23  /  AlkPhos  85  04-26  Ferritin, Serum in AM (04.26.20 @ 05:26)    Ferritin, Serum: 793.5 ng/mL      PROBLEM LIST:  74y Male with HEALTH ISSUES - PROBLEM Dx:  Pulmonary thromboembolism  Need for prophylactic measure: Need for prophylactic measure  HLD (hyperlipidemia): HLD (hyperlipidemia)  Dementia: Dementia  Hypertension: Hypertension  CAD (coronary artery disease): CAD (coronary artery disease)  Lymphoma: Lymphoma  Pulmonary embolism: Pulmonary embolism  COPD (chronic obstructive pulmonary disease): COPD (chronic obstructive pulmonary disease)  Respiratory failure with hypoxia: Respiratory failure with hypoxia            RECS:  recheck COVID PCR  if he is to get plasma or toci- needs + PCR results   switch back to NC (spoke to RN)   appreciate heme onco f/u re: IL6 if needed  emailed plasma trial - done today  stop steriods- he has been on solumedrol for 5 days   if this is bleomycin toxicity would see some improvement in symptoms on steroids so I am inclined to think we are dealing with COVID  continue to trend markers  continue with full dose AC given his recent PICC line thrombosis & his CTA showing small embolus in RLL  supportive care  tylenol PRN pain/fever  isolation precautions  GOC discussion      Vandana Hardy MD  [phone]

## 2020-04-26 NOTE — CONSULT NOTE ADULT - SUBJECTIVE AND OBJECTIVE BOX
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 Pulses, No clubbing, cyanosis, or edema  LYMPH: No lymphadenopathy noted  SKIN: No rashes or lesions    LABS:  CBC Full  -  ( 26 Apr 2020 05:26 )  WBC Count : 0.75 K/uL  RBC Count : 4.21 M/uL  Hemoglobin : 9.3 g/dL  Hematocrit : 30.3 %  Platelet Count - Automated : 107 K/uL  Mean Cell Volume : 72.0 fL  Mean Cell Hemoglobin : 22.1 pg  Mean Cell Hemoglobin Concentration : 30.7 %  Auto Neutrophil # : 0.30 K/uL  Auto Lymphocyte # : 0.21 K/uL  Auto Monocyte # : 0.09 K/uL  Auto Eosinophil # : 0.04 K/uL  Auto Basophil # : 0.00 K/uL  Auto Neutrophil % : 46.2 %  Auto Lymphocyte % : 32.3 %  Auto Monocyte % : 13.8 %  Auto Eosinophil % : 6.2 %  Auto Basophil % : 0.0 %      04-26    138  |  95<L>  |  22  ----------------------------<  108<H>  3.8   |  31  |  0.90    Ca    8.9      26 Apr 2020 05:26    TPro  6.2  /  Alb  3.3  /  TBili  0.5  /  DBili  x   /  AST  20  /  ALT  23  /  AlkPhos  85  04-26      LIVER FUNCTIONS - ( 26 Apr 2020 05:26 )  Alb: 3.3 g/dL / Pro: 6.2 g/dL / ALK PHOS: 85 u/L / ALT: 23 u/L / AST: 20 u/L / GGT: x                   MICROBIOLOGY:    Rapid Respiratory Viral Panel (04.26.20 @ 07:30)    Adenovirus (RapRVP): Not Detected    Influenza A (RapRVP): Not Detected    Influenza AH1 2009 (RapRVP): Not Detected    Influenza AH1 (RapRVP): Not Detected    Influenza AH3 (RapRVP): Not Detected    Influenza B (RapRVP): Not Detected    Parainfluenza 1 (RapRVP): Not Detected    Parainfluenza 2 (RapRVP): Not Detected    Parainfluenza 3 (RapRVP): Not Detected    Parainfluenza 4 (RapRVP): Not Detected    Resp Syncytial Virus (RapRVP): Not Detected    Chlamydia pneumoniae (RapRVP): Not Detected    Mycoplasma pneumoniae (RapRVP): Not Detected    Entero/Rhinovirus (RapRVP): Not Detected    hMPV (RapRVP): Not Detected    Coronavirus (229E,HKU1,NL63,OC43): Not Detected This Respiratory Panel uses polymerase chain reaction (PCR)  to detect for adenovirus; coronavirus (HKU1, NL63, 229E,  OC43); human metapneumovirus (hMPV); human  enterovirus/rhinovirus (Entero/RV); influenza A; influenza  A/H1: influenza A/H3; influenza A/H1-2009; influenza B;  parainfluenza viruses 1,2,3,4; respiratory syncytial virus;  Mycoplasma pneumoniae; and Chlamydophila pneumoniae.    Note: This assay does not detect the novel 2019 coronavirus.  Positive coronavirus results using this assay confirm  infection with the classic human coronaviruses associated  with respiratory infections.      COVID-19 PCR . (04.25.20 @ 11:30)    COVID-19 PCR: NotDetec: This test has been validated by [employer] to be accurate;  though it has not been FDA cleared/approved by the usual pathway.  As with all laboratory tests, results should be correlated with clinical  findings.  https://www.fda.gov/media/718342/download  https://www.fda.gov/media/294453/download      COVID-19 PCR . (04.22.20 @ 12:47)    COVID-19 PCR: NotDetec: This test has been validated by [employer] to be accurate;  though it has not been FDA cleared/approved by the usual pathway.  As with all laboratory tests, results should be correlated with clinical  findings.  https://www.fda.gov/media/266313/download  https://www.fda.gov/media/826162/download    COVID-19 PCR . (04.20.20 @ 04:53)    COVID-19 PCR: NotDetec: This test has been validated by [employer] to be accurate;  though it has not been FDA cleared/approved by the usual pathway.  As with all laboratory tests, results should be correlated with clinical  findings.  https://www.fda.gov/media/158521/download  https://www.fda.gov/media/331597/download                    RADIOLOGY:      < from: Xray Chest 1 View- PORTABLE-Urgent (04.24.20 @ 08:14) >    Findings:  Status post mid sternotomy. Right chest wall Mediport tip in SVC. Left chest wall dual-lead pacemaker. The heart is unchanged in size. No pneumothorax. Bilateral patchy lung opacities. Degenerative changes of the spine.    Impression:  1.  Patchy bilateral lung opacities likely representing infection.      DISCRETE X-RAY DATA:  Percent of LEFT lung opacification: 1-33%  Percent of RIGHT lung opacification: 1-33%  Change in lung opacification from most recent x-ray (<=3 days): No Prior  Change from prior dated 3 or more days (same admission): Stable    < end of copied text >        < from: CT Angio Chest w/ IV Cont (04.21.20 @ 10:39) >  IMPRESSION:     Pattern of GGO suggests infection including atypical pneumonia/viral infection from atypical agents including COVID-19 (C19V-1).    Small embolus within the anterobasilar subsegmental right lower lobe pulmonary artery.    < end of copied text >

## 2020-04-26 NOTE — PROGRESS NOTE ADULT - SUBJECTIVE AND OBJECTIVE BOX
Supine, on 15LNRB, saturating mid-90s      Vital Signs Last 24 Hrs  T(C): 36.3 (25 Apr 2020 20:15), Max: 36.3 (25 Apr 2020 11:48)  T(F): 97.4 (25 Apr 2020 20:15), Max: 97.4 (25 Apr 2020 20:15)  HR: 70 (26 Apr 2020 09:06) (70 - 89)  BP: 109/67 (26 Apr 2020 04:57) (109/67 - 142/87)  BP(mean): --  RR: 20 (26 Apr 2020 03:22) (20 - 20)  SpO2: 96% (26 Apr 2020 09:06) (96% - 100%)          GENERAL: NAD, on 15L NRB  HEAD:  Atraumatic, Normocephalic  EYES: EOMI, PERRLA, conjunctiva and sclera clear  NECK: Supple, No JVD, No LAD, No carotid bruits  CHEST/LUNG: Decreased BS @ both bases  HEART: Regular rate and rhythm; No murmurs, rubs, or gallops  ABDOMEN: Soft, Nontender, Nondistended; Bowel sounds present  EXTREMITIES:  2+ Peripheral Pulses, No clubbing, cyanosis, or edema  SKIN: No rashes or lesions; rt CW chemoport  NEURO: A+O x 3; nonfocal CN/motor/sensory/reflexes  PSYCH: Nl affect; no agitation or delirium; no suicidal or homicidal ideation      LABS:                        9.3    0.75  )-----------( 107      ( 26 Apr 2020 05:26 )             30.3     04-26    138  |  95<L>  |  22  ----------------------------<  108<H>  3.8   |  31  |  0.90    Ca    8.9      26 Apr 2020 05:26    TPro  6.2  /  Alb  3.3  /  TBili  0.5  /  DBili  x   /  AST  20  /  ALT  23  /  AlkPhos  85  04-26      CAPILLARY BLOOD GLUCOSE                RADIOLOGY & ADDITIONAL TESTS:    Imaging Personally Reviewed:  [x] YES  [ ] NO    Consultant(s) Notes Reviewed:  [x] YES  [ ] NO

## 2020-04-27 NOTE — PROGRESS NOTE ADULT - PROBLEM SELECTOR PLAN 2
pt was seen by Pulmonology at [facility] -Continue Methylprednisolone Cont inhalers; s/p solumedrol 4/22-4/25  Continue Lasix   continue to monitor

## 2020-04-27 NOTE — PROGRESS NOTE ADULT - SUBJECTIVE AND OBJECTIVE BOX
Infectious Diseases progress note:    Subjective: Events noted.  Pt had unwitnessed fall o/n, denied hitting head.  Pt lying in bed, satting 97% on 15L NRB.      ROS:  CONSTITUTIONAL:  No fever, chills, rigors  CARDIOVASCULAR:  No chest pain or palpitations  RESPIRATORY:   No SOB, cough, dyspnea on exertion.  No wheezing  GASTROINTESTINAL:  No abd pain, N/V, diarrhea/constipation  EXTREMITIES:  No swelling or joint pain  GENITOURINARY:  No burning on urination, increased frequency or urgency.  No flank pain  NEUROLOGIC:  No HA, visual disturbances  SKIN: No rashes    Allergies    No Known Allergies    Intolerances        ANTIBIOTICS/RELEVANT:  antimicrobials  atovaquone Suspension 1500 milliGRAM(s) Oral daily  cefepime   IVPB      cefepime   IVPB 2000 milliGRAM(s) IV Intermittent every 8 hours    immunologic:  filgrastim-sndz Injectable 480 MICROGram(s) SubCutaneous daily    OTHER:  acetaminophen   Tablet .. 650 milliGRAM(s) Oral every 6 hours PRN  ALBUTerol    90 MICROgram(s) HFA Inhaler 2 Puff(s) Inhalation every 4 hours PRN  aspirin enteric coated 81 milliGRAM(s) Oral daily  atorvastatin 10 milliGRAM(s) Oral at bedtime  benzonatate 100 milliGRAM(s) Oral three times a day PRN  budesonide 160 MICROgram(s)/formoterol 4.5 MICROgram(s) Inhaler 2 Puff(s) Inhalation two times a day  enoxaparin Injectable 100 milliGRAM(s) SubCutaneous every 12 hours  folic acid 1 milliGRAM(s) Oral daily  furosemide    Tablet 40 milliGRAM(s) Oral daily  guaiFENesin   Syrup  (Sugar-Free) 200 milliGRAM(s) Oral every 6 hours PRN  hydrocodone/homatropine Syrup 5 milliLiter(s) Oral at bedtime PRN  imipramine 25 milliGRAM(s) Oral daily  lidocaine   Patch 1 Patch Transdermal daily  metoprolol tartrate 50 milliGRAM(s) Oral two times a day  montelukast 10 milliGRAM(s) Oral daily  pantoprazole    Tablet 40 milliGRAM(s) Oral before breakfast  polyethylene glycol 3350 17 Gram(s) Oral daily  senna 2 Tablet(s) Oral at bedtime  thiamine 100 milliGRAM(s) Oral daily      Objective:  Vital Signs Last 24 Hrs  T(C): 36.8 (27 Apr 2020 12:55), Max: 36.8 (27 Apr 2020 12:55)  T(F): 98.3 (27 Apr 2020 12:55), Max: 98.3 (27 Apr 2020 12:55)  HR: 108 (27 Apr 2020 12:55) (75 - 109)  BP: 120/78 (27 Apr 2020 12:55) (103/61 - 151/87)  BP(mean): --  RR: 24 (27 Apr 2020 12:55) (19 - 25)  SpO2: 97% (27 Apr 2020 12:55) (95% - 100%)    PHYSICAL EXAM:  Constitutional:NAD, on NRB mask  Eyes:HALLIE, EOMI  Ear/Nose/Throat: no thrush, mucositis.  Moist mucous membranes	  Neck:no JVD, no lymphadenopathy, supple  Respiratory: CTA melvina  Cardiovascular: S1S2 RRR, no murmurs  Gastrointestinal:soft, nontender,  nondistended (+) BS  Extremities:no e/e/c  Skin:  no rashes, open wounds or ulcerations        LABS:                        10.1   0.74  )-----------( 116      ( 27 Apr 2020 04:47 )             32.5     04-27    136  |  92<L>  |  16  ----------------------------<  174<H>  3.6   |  26  |  0.97    Ca    8.8      27 Apr 2020 04:47    TPro  6.3  /  Alb  3.3  /  TBili  0.7  /  DBili  x   /  AST  15  /  ALT  23  /  AlkPhos  89  04-27    PT/INR - ( 27 Apr 2020 04:47 )   PT: 16.3 SEC;   INR: 1.40          PTT - ( 27 Apr 2020 04:47 )  PTT:31.4 SEC      Procalcitonin, Serum: 0.15 (04-26 @ 05:26)  Procalcitonin, Serum: 0.13 (04-24 @ 06:30)  Procalcitonin, Serum: 0.30 (04-20 @ 13:04)                    MICROBIOLOGY:    COVID-19 PCR . (04.25.20 @ 11:30)    COVID-19 PCR: NotDetec: This test has been validated by [employer] to be accurate;  though it has not been FDA cleared/approved by the usual pathway.  As with all laboratory tests, results should be correlated with clinical  findings.  https://www.fda.gov/media/856554/download  https://www.fda.gov/media/964878/download          RADIOLOGY & ADDITIONAL STUDIES:    < from: Xray Chest 1 View- PORTABLE-Urgent (04.24.20 @ 08:14) >  Impression:  1.  Patchy bilateral lung opacities likely representing infection.      DISCRETE X-RAY DATA:  Percent of LEFT lung opacification: 1-33%  Percent of RIGHT lung opacification: 1-33%  Change in lung opacification from most recent x-ray (<=3 days): No Prior  Change from prior dated 3 or more days (same admission): Stable    < end of copied text >

## 2020-04-27 NOTE — PROGRESS NOTE ADULT - ASSESSMENT
74 year old male with PMHx of COPD, DANIEL on CPAP, depression, HLD, HTN, CAD s/p PCI, CHB s/p PPM, bioprosthetic AVR, dementia, Stage IV Hodgkin's lymphoma with lung nodule, tracheal stenosis was admitted to [facility] 4/20-4/2 for SOB x 3-4 days , hypoxia and was about to be intubated on admission at [facility], but his O2 sats improved with BiLevel PAP which was later transitioned to a venti mask.     CT chest showed pattern of GGO suggests infection including atypical pneumonia/viral infection from atypical agents including COVID-19.  Small embolus within the anterobasilar subsegmental right lower lobe pulmonary artery. COVID test was negative  though  clinically was suspicious for  covid – CT  chest shows GGO, biomarkers elevated   Pt was transferred to [facility]  for continuation of medical care   During my interview, pt awake, conversant, reports improvement in SOB   Intermittent cough (22 Apr 2020 17:08)    Vitals:  T 97.4, P 98, /82, RR 19, O2 sat 95% NRB 15 L.   Pt with neutropenia.  Ferritin 793, CRP 8.7, RVP (-), Covid pcr (-) x 3.  CT angio chest shows GGO and reticular opacities b/l.  Pt a/w acute hypoxic respiratory failure, pt requiring venti mask on admission.  Pt is being managed for COPD and has been on methylprednisolone inhalers and steroids as well as lasix.  Pt has known DVT with picc line, on full dose lovenox.  Hematology following for lymphoma, s/p recent chemo and followed for pancytopenia.      Pt with suspected COVID pna despite multiple pcr test (-).  Bleomycin toxicity also being considered.      ID consult called for further recommendations.     Suspected Covid:    - CT angio chest (+) b/l GGOs and small PE in RLL.  Pt with hypoxia, requiring NRB.  Pt already on COPD managment and s/p solumedrol for possible bleomycin toxicity, however without improvement.  Despite Covid (-) PCR x 3, suspicion remains high.      - Pt being considered for tocilizumab (f/u with Heme given pt with lymphoma and recently received chemotherapy) and convalescent plasma if covid PCR tests (+).  Consider bronchoscopy for further covid PCR testing - as per Pulm pt is considered high risk candidate at this time.    - Check repeat ferritin, d-dimer and crp in 48 to 72 hrs.    - r/o atypical causes of pna.  Check fungitell, Legionella Ag, histoplasma, crypt ag, coccidiodes.     Neutropenia:    - Given neutropenia, agree with prophylactic abx.  Pt with QTc > 500, avoid fluoroquinolones if possible.  Cont cefepime for now.  Monitor neutropenia and temp curve.     - Check blood cultures if pt spikes fever.      Will follow,    Kaity Rodriguez  [phone]

## 2020-04-27 NOTE — PROVIDER CONTACT NOTE (OTHER) - SITUATION
bp 92/60, patient fell asleep after tylenol admin bp 92/60, hr 113, patient fell asleep after tylenol admin

## 2020-04-27 NOTE — PROGRESS NOTE ADULT - SUBJECTIVE AND OBJECTIVE BOX
Supine, on 15LNRB, saturating 100% on his bakc in bed  Mild on/off confusion  Slid onto his buttocks last evening, but denied hitting his head, he did not pass out    Vital Signs Last 24 Hrs  T(C): 36.7 (27 Apr 2020 04:14), Max: 36.7 (27 Apr 2020 04:14)  T(F): 98 (27 Apr 2020 04:14), Max: 98 (27 Apr 2020 04:14)  HR: 104 (27 Apr 2020 10:00) (75 - 109)  BP: 151/87 (27 Apr 2020 04:14) (103/61 - 151/87)  BP(mean): --  RR: 25 (27 Apr 2020 04:14) (19 - 25)  SpO2: 95% (27 Apr 2020 10:00) (95% - 100%)    I&O's Summary      GENERAL: NAD, on 15L NRB  HEAD:  Atraumatic, Normocephalic  EYES: EOMI, PERRLA, conjunctiva and sclera clear  NECK: Supple, No JVD, No LAD, No carotid bruits  CHEST/LUNG: Decreased BS @ both bases  HEART: Regular rate and rhythm; No murmurs, rubs, or gallops  ABDOMEN: Soft, Nontender, Nondistended; Bowel sounds present  EXTREMITIES:  2+ Peripheral Pulses, No clubbing, cyanosis, or edema  SKIN: No rashes or lesions; rt CW chemoport  NEURO: A+O x 2-3; nonfocal CN/motor/sensory/reflexes  PSYCH: Nl affect; no agitation or delirium; no suicidal or homicidal ideation    LABS:                        10.1   0.74  )-----------( 116      ( 27 Apr 2020 04:47 )             32.5     04-27    136  |  92<L>  |  16  ----------------------------<  174<H>  3.6   |  26  |  0.97    Ca    8.8      27 Apr 2020 04:47    TPro  6.3  /  Alb  3.3  /  TBili  0.7  /  DBili  x   /  AST  15  /  ALT  23  /  AlkPhos  89  04-27    PT/INR - ( 27 Apr 2020 04:47 )   PT: 16.3 SEC;   INR: 1.40          PTT - ( 27 Apr 2020 04:47 )  PTT:31.4 SEC  CAPILLARY BLOOD GLUCOSE      POCT Blood Glucose.: 141 mg/dL (27 Apr 2020 07:42)  POCT Blood Glucose.: 107 mg/dL (26 Apr 2020 20:59)  POCT Blood Glucose.: 107 mg/dL (26 Apr 2020 17:38)            RADIOLOGY & ADDITIONAL TESTS:    Imaging Personally Reviewed:  [x] YES  [ ] NO    Consultant(s) Notes Reviewed:  [x] YES  [ ] NO

## 2020-04-27 NOTE — PROGRESS NOTE ADULT - SUBJECTIVE AND OBJECTIVE BOX
PULMONARY PROGRESS NOTE    TAYLOR MCFADDEN  MRN-[id number]    Patient is a 74y old  Male who presents with a chief complaint of COVID 19 infection (26 Apr 2020 11:03)      HPI:  back on NRB today  pulse ox 93%  dry cough  somewhat forgetful      ROS:   -    ACTIVE MEDICATION LIST:  MEDICATIONS  (STANDING):  aspirin enteric coated 81 milliGRAM(s) Oral daily  atorvastatin 10 milliGRAM(s) Oral at bedtime  atovaquone Suspension 1500 milliGRAM(s) Oral daily  budesonide 160 MICROgram(s)/formoterol 4.5 MICROgram(s) Inhaler 2 Puff(s) Inhalation two times a day  cefepime   IVPB      cefepime   IVPB 2000 milliGRAM(s) IV Intermittent every 8 hours  enoxaparin Injectable 100 milliGRAM(s) SubCutaneous every 12 hours  filgrastim-sndz Injectable 480 MICROGram(s) SubCutaneous daily  folic acid 1 milliGRAM(s) Oral daily  furosemide    Tablet 40 milliGRAM(s) Oral daily  imipramine 25 milliGRAM(s) Oral daily  lidocaine   Patch 1 Patch Transdermal daily  metoprolol tartrate 50 milliGRAM(s) Oral two times a day  montelukast 10 milliGRAM(s) Oral daily  pantoprazole    Tablet 40 milliGRAM(s) Oral before breakfast  polyethylene glycol 3350 17 Gram(s) Oral daily  senna 2 Tablet(s) Oral at bedtime  thiamine 100 milliGRAM(s) Oral daily    MEDICATIONS  (PRN):  acetaminophen   Tablet .. 650 milliGRAM(s) Oral every 6 hours PRN Temp greater or equal to 38C (100.4F), Mild Pain (1 - 3), Moderate Pain (4 - 6)  ALBUTerol    90 MICROgram(s) HFA Inhaler 2 Puff(s) Inhalation every 4 hours PRN Shortness of Breath and/or Wheezing  benzonatate 100 milliGRAM(s) Oral three times a day PRN Cough  guaiFENesin   Syrup  (Sugar-Free) 200 milliGRAM(s) Oral every 6 hours PRN Cough  hydrocodone/homatropine Syrup 5 milliLiter(s) Oral at bedtime PRN for severe cough -      EXAM:  Vital Signs Last 24 Hrs  T(C): 36.7 (27 Apr 2020 04:14), Max: 36.7 (27 Apr 2020 04:14)  T(F): 98 (27 Apr 2020 04:14), Max: 98 (27 Apr 2020 04:14)  HR: 104 (27 Apr 2020 10:00) (75 - 109)  BP: 151/87 (27 Apr 2020 04:14) (103/61 - 151/87)  BP(mean): --  RR: 25 (27 Apr 2020 04:14) (19 - 25)  SpO2: 95% (27 Apr 2020 10:00) (95% - 100%)    GENERAL: The patient is awake  in mild distress when speaking  LUNGS: tachypenic                             10.1   0.74  )-----------( 116      ( 27 Apr 2020 04:47 )             32.5       04-27    136  |  92<L>  |  16  ----------------------------<  174<H>  3.6   |  26  |  0.97    Ca    8.8      27 Apr 2020 04:47    TPro  6.3  /  Alb  3.3  /  TBili  0.7  /  DBili  x   /  AST  15  /  ALT  23  /  AlkPhos  89  04-27    < from: Xray Chest 1 View- PORTABLE-Urgent (04.24.20 @ 08:14) >    EXAM:  XR CHEST PORTABLE URGENT 1V        PROCEDURE DATE:  Apr 24 2020         INTERPRETATION:    Examination: XR CHEST URGENT    History: ADMDIAG1: R09.02 HYPOXEMIA/, hypoxia    Findings:  Status post mid sternotomy. Right chest wall Mediport tip in SVC. Left chest wall dual-lead pacemaker. The heart is unchanged in size. No pneumothorax. Bilateral patchy lung opacities. Degenerative changes of the spine.    Impression:  1.  Patchy bilateral lung opacities likely representing infection.      DISCRETE X-RAY DATA:  Percent of LEFT lung opacification: 1-33%  Percent of RIGHT lung opacification: 1-33%  Change in lung opacification from most recent x-ray (<=3 days): No Prior  Change from prior dated 3 or more days (same admission): Stable                NICOLE CRUM M.D., RADIOLOGY RESIDENT  This document has been electronically signed.  ELSI QUINONEZ M.D., ATTENDING RADIOLOGIST  This document has been electronically signed. Apr 24 2020  9:20AM    < end of copied text >        Ferritin, Serum: 793.5 ng/mL (04-26-20 @ 05:26)  Ferritin, Serum: 771.6 ng/mL (04-24-20 @ 06:30)  Ferritin, Serum: 1220 ng/mL (04-21-20 @ 12:32)  Ferritin, Serum: 1038 ng/mL (04-20-20 @ 13:04)  Ferritin, Serum: 1050 ng/mL (04-20-20 @ 13:02)    C-Reactive Protein, Serum: 8.7 mg/L <H> (04-26-20 @ 05:26)  C-Reactive Protein, Serum: 16.1 mg/L <H> (04-24-20 @ 06:30)            PROBLEM LIST:  74y Male with HEALTH ISSUES - PROBLEM Dx:  Pancytopenia due to antineoplastic chemotherapy: Pancytopenia due to antineoplastic chemotherapy  Pulmonary thromboembolism  Need for prophylactic measure: Need for prophylactic measure  HLD (hyperlipidemia): HLD (hyperlipidemia)  Dementia: Dementia  Hypertension: Hypertension  CAD (coronary artery disease): CAD (coronary artery disease)  Lymphoma: Lymphoma  Pulmonary embolism: Pulmonary embolism  COPD (chronic obstructive pulmonary disease): COPD (chronic obstructive pulmonary disease)  Respiratory failure with hypoxia: Respiratory failure with hypoxia      RECS:  hypoxia due to COVID (most likely) vs. bleomycin toxicity  COVID Negative X3 but high clinical suspicion   he is high risk of bronchoscopy given his co-morbidities, active infection and possible difficulty in extubating post procedure  continue with NRB , suggest HFNC - max 40L and at 100% fio2 to help with saturations if he desaturates < 90% on NRB  empiric PAP QHS/sleeping- diagnosed with DANIEL outpatient - he his being moved into negative pressure room  he is s/p 5 days of solumedrol (between [facility] & [place] hospitalizations)   continue with full dose AC given his recent PICC line thrombosis & his CTA showing small embolus in RLL  supportive care  tylenol PRN pain/fever  isolation precautions  GOC discussion     Limited physical exam in efforts to limit unnecessary exposure     Discussed with DR Morales and RN       Please call with any questions.    Mady Pollard DO  [facility] Pulmonary/Sleep Medicine  [phone]

## 2020-04-27 NOTE — PROVIDER CONTACT NOTE (FALL NOTIFICATION) - ASSESSMENT
Patient was found sitting on the floor screaming help. Patient stated that he just "slid off the chair onto the floor". Patient is alert & oriented x2 with periods of confusion and forgetfulness. Patient now in bed with bed alarm activated. No injuries noted. VS stable.

## 2020-04-27 NOTE — CHART NOTE - NSCHARTNOTEFT_GEN_A_CORE
Called by RN. Patient with unwitnessed fall. Upon ROS patient denied hitting his head and denies any injuries. Patient stated that he slid OOB and landed on his butt. Physical exam unremarkable. Patient A/Ox3 and moving all extremities. head atraumatic. No brushing or skin tears noted. No imaging needed at this time. Vitals as below.    Vital Signs Last 24 Hrs  T(C): 36.7 (27 Apr 2020 04:14), Max: 36.7 (27 Apr 2020 04:14)  T(F): 98 (27 Apr 2020 04:14), Max: 98 (27 Apr 2020 04:14)  HR: 109 (27 Apr 2020 04:14) (70 - 109)  BP: 151/87 (27 Apr 2020 04:14) (103/61 - 151/87)    RR: 25 (27 Apr 2020 04:14) (19 - 25)  SpO2: 100% (27 Apr 2020 04:14) (95% - 100%)

## 2020-04-27 NOTE — PROGRESS NOTE ADULT - PROBLEM SELECTOR PLAN 1
suspect secondary to COVID but could be due to other viral etiologies/bleomycin toxicity  s/p IV solumedrol 4/22-4/25  COVID 19 Negative from 4/20 + 4/25  Still suspect COVID-19 clinically over bleomycin toxicity  continue supplemental Oxygen to maintain saturation above 92-94%  Proning/chest PT, hematology recommending against tociluzimab unless COVID (+) and with rising inflammatory markers  Plasma trial emailed 4/24/20  Low-threshold for intubation  Contacted bedboard 4/27/20 for negative pressure room for HFNC attempt with antibiotics/lovenox  Appreciate pulmonology

## 2020-04-28 NOTE — PROGRESS NOTE ADULT - SUBJECTIVE AND OBJECTIVE BOX
Infectious Diseases progress note:    Subjective: Pt with NRB, has intermittent coughing.  Tmax 102.5, tachycardia.  Inflammatory markers rising.  WBC and ANC improved.     ROS:  CONSTITUTIONAL:  No fever, chills, rigors  CARDIOVASCULAR:  No chest pain or palpitations  RESPIRATORY:   No SOB, cough, dyspnea on exertion.  No wheezing  GASTROINTESTINAL:  No abd pain, N/V, diarrhea/constipation  EXTREMITIES:  No swelling or joint pain  GENITOURINARY:  No burning on urination, increased frequency or urgency.  No flank pain  NEUROLOGIC:  No HA, visual disturbances  SKIN: No rashes    Allergies    No Known Allergies    Intolerances        ANTIBIOTICS/RELEVANT:  antimicrobials  atovaquone Suspension 1500 milliGRAM(s) Oral daily  cefepime   IVPB      cefepime   IVPB 2000 milliGRAM(s) IV Intermittent every 8 hours    immunologic:  filgrastim-sndz Injectable 480 MICROGram(s) SubCutaneous daily    OTHER:  acetaminophen   Tablet .. 650 milliGRAM(s) Oral every 6 hours PRN  ALBUTerol    90 MICROgram(s) HFA Inhaler 2 Puff(s) Inhalation every 4 hours PRN  aspirin enteric coated 81 milliGRAM(s) Oral daily  atorvastatin 10 milliGRAM(s) Oral at bedtime  benzonatate 100 milliGRAM(s) Oral three times a day PRN  budesonide 160 MICROgram(s)/formoterol 4.5 MICROgram(s) Inhaler 2 Puff(s) Inhalation two times a day  enoxaparin Injectable 100 milliGRAM(s) SubCutaneous every 12 hours  folic acid 1 milliGRAM(s) Oral daily  furosemide    Tablet 40 milliGRAM(s) Oral daily  guaiFENesin   Syrup  (Sugar-Free) 200 milliGRAM(s) Oral every 6 hours PRN  hydrocodone/homatropine Syrup 5 milliLiter(s) Oral at bedtime PRN  imipramine 25 milliGRAM(s) Oral daily  lidocaine   Patch 1 Patch Transdermal daily  metoprolol tartrate 50 milliGRAM(s) Oral two times a day  montelukast 10 milliGRAM(s) Oral daily  pantoprazole    Tablet 40 milliGRAM(s) Oral before breakfast  polyethylene glycol 3350 17 Gram(s) Oral daily  senna 2 Tablet(s) Oral at bedtime  thiamine 100 milliGRAM(s) Oral daily      Objective:  Vital Signs Last 24 Hrs  T(C): 36.6 (28 Apr 2020 11:45), Max: 39.2 (27 Apr 2020 16:58)  T(F): 97.8 (28 Apr 2020 11:45), Max: 102.5 (27 Apr 2020 16:58)  HR: 89 (28 Apr 2020 11:45) (74 - 123)  BP: 118/68 (28 Apr 2020 11:45) (92/60 - 118/68)  BP(mean): --  RR: 22 (28 Apr 2020 11:45) (18 - 26)  SpO2: 94% (28 Apr 2020 11:45) (85% - 100%)    PHYSICAL EXAM:  Constitutional:NAD  Eyes:HALLIE, EOMI  Ear/Nose/Throat: no thrush, mucositis.  Moist mucous membranes	  Neck:no JVD, no lymphadenopathy, supple  Respiratory: CTA melvina  Cardiovascular: S1S2 RRR, no murmurs  Gastrointestinal:soft, nontender,  nondistended (+) BS  Extremities:no e/e/c  Skin:  no rashes, open wounds or ulcerations        LABS:                        10.0   2.42  )-----------( 171      ( 28 Apr 2020 05:21 )             32.2     04-28    134<L>  |  90<L>  |  14  ----------------------------<  122<H>  3.5   |  30  |  0.93    Ca    9.1      28 Apr 2020 05:21  Phos  2.9     04-28  Mg     2.1     04-28    TPro  6.5  /  Alb  3.1<L>  /  TBili  0.8  /  DBili  x   /  AST  13  /  ALT  16  /  AlkPhos  83  04-28    PT/INR - ( 28 Apr 2020 05:21 )   PT: 15.9 SEC;   INR: 1.38          PTT - ( 28 Apr 2020 05:21 )  PTT:31.9 SEC      Procalcitonin, Serum: 0.41 (04-28 @ 05:21)  Procalcitonin, Serum: 0.15 (04-26 @ 05:26)  Procalcitonin, Serum: 0.13 (04-24 @ 06:30)  Procalcitonin, Serum: 0.30 (04-20 @ 13:04)                    MICROBIOLOGY:    COVID-19 PCR . (04.25.20 @ 11:30)    COVID-19 PCR: NotDetec: This test has been validated by [employer] to be accurate;  though it has not been FDA cleared/approved by the usual pathway.  As with all laboratory tests, results should be correlated with clinical  findings.  https://www.fda.gov/media/315918/download  https://www.fda.gov/media/656924/download    Rapid Respiratory Viral Panel (04.26.20 @ 07:30)    Adenovirus (RapRVP): Not Detected    Influenza A (RapRVP): Not Detected    Influenza AH1 2009 (RapRVP): Not Detected    Influenza AH1 (RapRVP): Not Detected    Influenza AH3 (RapRVP): Not Detected    Influenza B (RapRVP): Not Detected    Parainfluenza 1 (RapRVP): Not Detected    Parainfluenza 2 (RapRVP): Not Detected    Parainfluenza 3 (RapRVP): Not Detected    Parainfluenza 4 (RapRVP): Not Detected    Resp Syncytial Virus (RapRVP): Not Detected    Chlamydia pneumoniae (RapRVP): Not Detected    Mycoplasma pneumoniae (RapRVP): Not Detected    Entero/Rhinovirus (RapRVP): Not Detected    hMPV (RapRVP): Not Detected    Coronavirus (229E,HKU1,NL63,OC43): Not Detected This Respiratory Panel uses polymerase chain reaction (PCR)  to detect for adenovirus; coronavirus (HKU1, NL63, 229E,  OC43); human metapneumovirus (hMPV); human  enterovirus/rhinovirus (Entero/RV); influenza A; influenza  A/H1: influenza A/H3; influenza A/H1-2009; influenza B;  parainfluenza viruses 1,2,3,4; respiratory syncytial virus;  Mycoplasma pneumoniae; and Chlamydophila pneumoniae.    Note: This assay does not detect the novel 2019 coronavirus.  Positive coronavirus results using this assay confirm  infection with the classic human coronaviruses associated  with respiratory infections.            RADIOLOGY & ADDITIONAL STUDIES:      < from: Xray Chest 1 View- PORTABLE-Urgent (04.24.20 @ 08:14) >  Impression:  1.  Patchy bilateral lung opacities likely representing infection.    < end of copied text >

## 2020-04-28 NOTE — PROGRESS NOTE ADULT - SUBJECTIVE AND OBJECTIVE BOX
Supine, on 15LNRB, saturating 100% lying on his back  102.5 temp last evening  he is less confused today    Vital Signs Last 24 Hrs  T(C): 36.7 (28 Apr 2020 05:36), Max: 39.2 (27 Apr 2020 16:58)  T(F): 98 (28 Apr 2020 05:36), Max: 102.5 (27 Apr 2020 16:58)  HR: 74 (28 Apr 2020 07:56) (74 - 123)  BP: 112/70 (28 Apr 2020 05:36) (92/60 - 120/78)  BP(mean): --  RR: 19 (28 Apr 2020 10:32) (18 - 26)  SpO2: 96% (28 Apr 2020 10:32) (92% - 100%)    I&O's Summary        GENERAL: NAD, on 15L NRB  HEAD:  Atraumatic, Normocephalic  EYES: EOMI, PERRLA, conjunctiva and sclera clear  NECK: Supple, No JVD, No LAD, No carotid bruits  CHEST/LUNG: Decreased BS @ both bases  HEART: Regular rate and rhythm; No murmurs, rubs, or gallops  ABDOMEN: Soft, Nontender, Nondistended; Bowel sounds present  EXTREMITIES:  2+ Peripheral Pulses, No clubbing, cyanosis, or edema  SKIN: No rashes or lesions; rt CW chemoport  NEURO: A+O x 3; nonfocal CN/motor/sensory/reflexes  PSYCH: Nl affect; no agitation or delirium; no suicidal or homicidal ideation        LABS:                        10.0   2.42  )-----------( 171      ( 28 Apr 2020 05:21 )             32.2     04-28    134<L>  |  90<L>  |  14  ----------------------------<  122<H>  3.5   |  30  |  0.93    Ca    9.1      28 Apr 2020 05:21  Phos  2.9     04-28  Mg     2.1     04-28    TPro  6.5  /  Alb  3.1<L>  /  TBili  0.8  /  DBili  x   /  AST  13  /  ALT  16  /  AlkPhos  83  04-28    PT/INR - ( 28 Apr 2020 05:21 )   PT: 15.9 SEC;   INR: 1.38          PTT - ( 28 Apr 2020 05:21 )  PTT:31.9 SEC  CAPILLARY BLOOD GLUCOSE                RADIOLOGY & ADDITIONAL TESTS:    Imaging Personally Reviewed:  [x] YES  [ ] NO    Consultant(s) Notes Reviewed:  [x] YES  [ ] NO      MEDICATIONS  (STANDING):  aspirin enteric coated 81 milliGRAM(s) Oral daily  atorvastatin 10 milliGRAM(s) Oral at bedtime  atovaquone Suspension 1500 milliGRAM(s) Oral daily  budesonide 160 MICROgram(s)/formoterol 4.5 MICROgram(s) Inhaler 2 Puff(s) Inhalation two times a day  cefepime   IVPB      cefepime   IVPB 2000 milliGRAM(s) IV Intermittent every 8 hours  enoxaparin Injectable 100 milliGRAM(s) SubCutaneous every 12 hours  filgrastim-sndz Injectable 480 MICROGram(s) SubCutaneous daily  folic acid 1 milliGRAM(s) Oral daily  furosemide    Tablet 40 milliGRAM(s) Oral daily  imipramine 25 milliGRAM(s) Oral daily  lidocaine   Patch 1 Patch Transdermal daily  metoprolol tartrate 50 milliGRAM(s) Oral two times a day  montelukast 10 milliGRAM(s) Oral daily  pantoprazole    Tablet 40 milliGRAM(s) Oral before breakfast  polyethylene glycol 3350 17 Gram(s) Oral daily  senna 2 Tablet(s) Oral at bedtime  thiamine 100 milliGRAM(s) Oral daily    MEDICATIONS  (PRN):  acetaminophen   Tablet .. 650 milliGRAM(s) Oral every 6 hours PRN Temp greater or equal to 38C (100.4F), Mild Pain (1 - 3), Moderate Pain (4 - 6)  ALBUTerol    90 MICROgram(s) HFA Inhaler 2 Puff(s) Inhalation every 4 hours PRN Shortness of Breath and/or Wheezing  benzonatate 100 milliGRAM(s) Oral three times a day PRN Cough  guaiFENesin   Syrup  (Sugar-Free) 200 milliGRAM(s) Oral every 6 hours PRN Cough  hydrocodone/homatropine Syrup 5 milliLiter(s) Oral at bedtime PRN for severe cough -      Care Discussed with Consultants/Other Providers [x] YES  [ ] NO    HEALTH ISSUES - PROBLEM Dx:  Pancytopenia due to antineoplastic chemotherapy: Pancytopenia due to antineoplastic chemotherapy  Pulmonary thromboembolism  Need for prophylactic measure: Need for prophylactic measure  HLD (hyperlipidemia): HLD (hyperlipidemia)  Dementia: Dementia  Hypertension: Hypertension  CAD (coronary artery disease): CAD (coronary artery disease)  Lymphoma: Lymphoma  Pulmonary embolism: Pulmonary embolism  COPD (chronic obstructive pulmonary disease): COPD (chronic obstructive pulmonary disease)  Respiratory failure with hypoxia: Respiratory failure with hypoxia

## 2020-04-28 NOTE — PROGRESS NOTE ADULT - PROBLEM SELECTOR PLAN 2
pt was seen by Pulmonology at [facility] -Continue Methylprednisolone Cont inhalers; s/p solumedrol 4/22-4/25  Continue Lasix   continue to monitor

## 2020-04-28 NOTE — PHYSICAL THERAPY INITIAL EVALUATION ADULT - CRITERIA FOR SKILLED THERAPEUTIC INTERVENTIONS
predicted duration of therapy intervention/risk reduction/prevention/anticipated discharge recommendation/impairments found/rehab potential/therapy frequency/functional limitations in following categories

## 2020-04-28 NOTE — PROGRESS NOTE ADULT - SUBJECTIVE AND OBJECTIVE BOX
PULMONARY PROGRESS NOTE    TAYLOR MCFADDEN  MRN-[id number]    Patient is a 74y old  Male who presents with a chief complaint of COVID 19 infection (26 Apr 2020 11:03)      HPI:  febrile overnight  tried him from NRB to 6L - became tachypneic and hypoxia down to 85%      ROS:   -    ACTIVE MEDICATION LIST:  MEDICATIONS  (STANDING):  aspirin enteric coated 81 milliGRAM(s) Oral daily  atorvastatin 10 milliGRAM(s) Oral at bedtime  atovaquone Suspension 1500 milliGRAM(s) Oral daily  budesonide 160 MICROgram(s)/formoterol 4.5 MICROgram(s) Inhaler 2 Puff(s) Inhalation two times a day  cefepime   IVPB      cefepime   IVPB 2000 milliGRAM(s) IV Intermittent every 8 hours  enoxaparin Injectable 100 milliGRAM(s) SubCutaneous every 12 hours  filgrastim-sndz Injectable 480 MICROGram(s) SubCutaneous daily  folic acid 1 milliGRAM(s) Oral daily  furosemide    Tablet 40 milliGRAM(s) Oral daily  imipramine 25 milliGRAM(s) Oral daily  lidocaine   Patch 1 Patch Transdermal daily  metoprolol tartrate 50 milliGRAM(s) Oral two times a day  montelukast 10 milliGRAM(s) Oral daily  pantoprazole    Tablet 40 milliGRAM(s) Oral before breakfast  polyethylene glycol 3350 17 Gram(s) Oral daily  senna 2 Tablet(s) Oral at bedtime  thiamine 100 milliGRAM(s) Oral daily    MEDICATIONS  (PRN):  acetaminophen   Tablet .. 650 milliGRAM(s) Oral every 6 hours PRN Temp greater or equal to 38C (100.4F), Mild Pain (1 - 3), Moderate Pain (4 - 6)  ALBUTerol    90 MICROgram(s) HFA Inhaler 2 Puff(s) Inhalation every 4 hours PRN Shortness of Breath and/or Wheezing  benzonatate 100 milliGRAM(s) Oral three times a day PRN Cough  guaiFENesin   Syrup  (Sugar-Free) 200 milliGRAM(s) Oral every 6 hours PRN Cough  hydrocodone/homatropine Syrup 5 milliLiter(s) Oral at bedtime PRN for severe cough -      EXAM:  Vital Signs Last 24 Hrs  T(C): 36.6 (28 Apr 2020 11:45), Max: 39.2 (27 Apr 2020 16:58)  T(F): 97.8 (28 Apr 2020 11:45), Max: 102.5 (27 Apr 2020 16:58)  HR: 89 (28 Apr 2020 11:45) (74 - 123)  BP: 118/68 (28 Apr 2020 11:45) (92/60 - 120/78)  BP(mean): --  RR: 22 (28 Apr 2020 11:45) (18 - 26)  SpO2: 94% (28 Apr 2020 11:45) (85% - 100%)    GENERAL: The patient is awake and alert in no apparent distress as long as he has his NRB   obese male  less confused today  able to complete sentences on NRB                            10.0   2.42  )-----------( 171      ( 28 Apr 2020 05:21 )             32.2       04-28    134<L>  |  90<L>  |  14  ----------------------------<  122<H>  3.5   |  30  |  0.93    Ca    9.1      28 Apr 2020 05:21  Phos  2.9     04-28  Mg     2.1     04-28    TPro  6.5  /  Alb  3.1<L>  /  TBili  0.8  /  DBili  x   /  AST  13  /  ALT  16  /  AlkPhos  83  04-28      Ferritin, Serum: 857.4 ng/mL (04-28-20 @ 05:21)  Ferritin, Serum: 793.5 ng/mL (04-26-20 @ 05:26)  Ferritin, Serum: 771.6 ng/mL (04-24-20 @ 06:30)  Ferritin, Serum: 1220 ng/mL (04-21-20 @ 12:32)  Ferritin, Serum: 1038 ng/mL (04-20-20 @ 13:04)  Ferritin, Serum: 1050 ng/mL (04-20-20 @ 13:02)    C-Reactive Protein, Serum: 202.3 mg/L <H> (04-28-20 @ 05:21)  C-Reactive Protein, Serum: 8.7 mg/L <H> (04-26-20 @ 05:26)  C-Reactive Protein, Serum: 16.1 mg/L <H> (04-24-20 @ 06:30)      < from: Xray Chest 1 View- PORTABLE-Urgent (04.24.20 @ 08:14) >    EXAM:  XR CHEST PORTABLE URGENT 1V        PROCEDURE DATE:  Apr 24 2020         INTERPRETATION:    Examination: XR CHEST URGENT    History: ADMDIAG1: R09.02 HYPOXEMIA/, hypoxia    Findings:  Status post mid sternotomy. Right chest wall Mediport tip in SVC. Left chest wall dual-lead pacemaker. The heart is unchanged in size. No pneumothorax. Bilateral patchy lung opacities. Degenerative changes of the spine.    Impression:  1.  Patchy bilateral lung opacities likely representing infection.      DISCRETE X-RAY DATA:  Percent of LEFT lung opacification: 1-33%  Percent of RIGHT lung opacification: 1-33%  Change in lung opacification from most recent x-ray (<=3 days): No Prior  Change from prior dated 3 or more days (same admission): Stable                NICOLE CRUM M.D., RADIOLOGY RESIDENT  This document has been electronically signed.  ELSI QUINONEZ M.D., ATTENDING RADIOLOGIST  This document has been electronically signed. Apr 24 2020  9:20AM        < end of copied text >        PROBLEM LIST:  74y Male with HEALTH ISSUES - PROBLEM Dx:  Pancytopenia due to antineoplastic chemotherapy: Pancytopenia due to antineoplastic chemotherapy  Pulmonary thromboembolism  Need for prophylactic measure: Need for prophylactic measure  HLD (hyperlipidemia): HLD (hyperlipidemia)  Dementia: Dementia  Hypertension: Hypertension  CAD (coronary artery disease): CAD (coronary artery disease)  Lymphoma: Lymphoma  Pulmonary embolism: Pulmonary embolism  COPD (chronic obstructive pulmonary disease): COPD (chronic obstructive pulmonary disease)  Respiratory failure with hypoxia: Respiratory failure with hypoxia        RECS:  I spoke to wife- sarah- he has history of prolonged intubation, s/p tracheostomy with decannulation now with tracheal stenosis- says he has had  cough since his trach.  informed wife covid negative, high 02 requirements and risks/benefits of bronch  she is not sure of her husbands wishes and for now would like to continue abx/o2/supportive care.  I informed wife because of his co-morbidities and  high 02 requirements he his high risk for bronch with BAL  he is s/p 5 days of solumedrol with no improvement in 02 requirements so i am less suspicious this due to bleomycin  covid ordered again today  continue NRB  chest PT  supportive care    discussed with wife and Dr Morales          Please call with any questions.    Madyandrew Pollard DO  [facility] Pulmonary/Sleep [facility]  [phone]

## 2020-04-28 NOTE — PROGRESS NOTE ADULT - PROBLEM SELECTOR PLAN 1
suspect secondary to COVID but could be due to other viral etiologies/bleomycin toxicity  s/p IV solumedrol 4/22-4/25  COVID 19 Negative from 4/20 + 4/25  Still suspect COVID-19 clinically over bleomycin toxicity or PCP PNA  Proning/chest PT, hematology recommending against tociluzimab unless COVID (+) and with rising inflammatory markers  Plasma trial emailed 4/24/20  Titrate down FiO2 to keep sats in lower 90s  Reswab for COVID 19 on 4/28  Fungitell, cryptococcus Ag, histoplasma Ag, coccidiodes Ab testing  Ordered legionella Ur Ag  Appreciate pulmonology

## 2020-04-28 NOTE — PROGRESS NOTE ADULT - ASSESSMENT
74 year old male with PMHx of COPD, DANIEL on CPAP, depression, HLD, HTN, CAD s/p PCI, CHB s/p PPM, bioprosthetic AVR, dementia, Stage IV Hodgkin's lymphoma with lung nodule, tracheal stenosis was admitted to [facility] 4/20-4/2 for SOB x 3-4 days , hypoxia and was about to be intubated on admission at [facility], but his O2 sats improved with BiLevel PAP which was later transitioned to a venti mask.     CT chest showed pattern of GGO suggests infection including atypical pneumonia/viral infection from atypical agents including COVID-19.  Small embolus within the anterobasilar subsegmental right lower lobe pulmonary artery. COVID test was negative  though  clinically was suspicious for  covid – CT  chest shows GGO, biomarkers elevated   Pt was transferred to [facility]  for continuation of medical care   During my interview, pt awake, conversant, reports improvement in SOB   Intermittent cough (22 Apr 2020 17:08)    Vitals:  T 97.4, P 98, /82, RR 19, O2 sat 95% NRB 15 L.   Pt with neutropenia.  Ferritin 793, CRP 8.7, RVP (-), Covid pcr (-) x 3.  CT angio chest shows GGO and reticular opacities b/l.  Pt a/w acute hypoxic respiratory failure, pt requiring venti mask on admission.  Pt is being managed for COPD and has been on methylprednisolone inhalers and steroids as well as lasix.  Pt has known DVT with picc line, on full dose lovenox.  Hematology following for lymphoma, s/p recent chemo and followed for pancytopenia.      Pt with suspected COVID pna despite multiple pcr test (-).  Bleomycin toxicity also being considered.      ID consult called for further recommendations.     Suspected Covid:    - CT angio chest (+) b/l GGOs and small PE in RLL.  Pt with hypoxia, requiring NRB.  Pt already on COPD managment and s/p solumedrol for possible bleomycin toxicity, however without improvement.  Despite Covid (-) PCR x 3, suspicion remains high.      - Pt being considered for tocilizumab (f/u with Heme given pt with lymphoma and recently received chemotherapy) and convalescent plasma if covid PCR tests (+).  Consider bronchoscopy for further covid PCR testing - as per Pulm pt is considered high risk candidate at this time.    - Check repeat ferritin, d-dimer and crp in 48 to 72 hrs - inflammatory markers rising    - r/o atypical causes of pna.  Check fungitell, Legionella Ag, histoplasma, crypt ag, coccidiodes.     Neutropenia:    - Given neutropenia, agree with prophylactic abx.  Pt with QTc > 500, avoid fluoroquinolones if possible.  Cont cefepime for now.  Monitor neutropenia and temp curve.     - Check blood cultures if pt spikes fever.        * ANC improving.  Pt with fevers.  Recommend sending blood cultures x 2 if continues to spike.  Repeat Covid testing.     Will follow,    Kaity Rodriguez  [phone]

## 2020-04-28 NOTE — PHYSICAL THERAPY INITIAL EVALUATION ADULT - PERTINENT HX OF CURRENT PROBLEM, REHAB EVAL
Patient is a 74 year old male admitted for hypoxic respiratory failure due to covid. PMH listed below

## 2020-04-29 NOTE — PROGRESS NOTE ADULT - PROBLEM SELECTOR PLAN 1
suspect secondary to COVID19 but could be due to other viral etiologies/bleomycin toxicity  s/p IV solumedrol 4/22-4/25  COVID 19 Negative from 4/20, 4/25, 4/28  Proning/chest PT, hematology recommending against tociluzimab unless COVID (+) and with rising inflammatory markers  Plasma trial emailed 4/24/20  Titrate down FiO2 to keep sats in lower 90s  Reswab for COVID 19 on 4/28 also neg. RVP neg  Fungitell, cryptococcus Ag, histoplasma Ag, coccidiodes Ab testing  Ordered legionella Ur Ag  Appreciate pulmonology, bronch high risk per pulm.

## 2020-04-29 NOTE — PROGRESS NOTE ADULT - PROBLEM SELECTOR PLAN 2
pt was seen by Pulmonology at [facility] -Continue Methylprednisolone Cont inhalers; s/p solumedrol 4/22-4/25  Continue Lasix   continue to monitor

## 2020-04-29 NOTE — PROGRESS NOTE ADULT - SUBJECTIVE AND OBJECTIVE BOX
PULMONARY PROGRESS NOTE    TAYLOR MCFADDEN  MRN-[id number]    Patient is a 74y old  Male who presents with a chief complaint of COVID 19 infection (26 Apr 2020 11:03)      HPI:  febrile overnight  remains on NRB- 96%  occasional cough      ROS:   -    ACTIVE MEDICATION LIST:  MEDICATIONS  (STANDING):  aspirin enteric coated 81 milliGRAM(s) Oral daily  atorvastatin 10 milliGRAM(s) Oral at bedtime  atovaquone Suspension 1500 milliGRAM(s) Oral daily  budesonide 160 MICROgram(s)/formoterol 4.5 MICROgram(s) Inhaler 2 Puff(s) Inhalation two times a day  cefepime   IVPB      cefepime   IVPB 2000 milliGRAM(s) IV Intermittent every 8 hours  enoxaparin Injectable 100 milliGRAM(s) SubCutaneous every 12 hours  folic acid 1 milliGRAM(s) Oral daily  furosemide    Tablet 40 milliGRAM(s) Oral daily  imipramine 25 milliGRAM(s) Oral daily  lidocaine   Patch 1 Patch Transdermal daily  metoprolol tartrate 50 milliGRAM(s) Oral two times a day  montelukast 10 milliGRAM(s) Oral daily  pantoprazole    Tablet 40 milliGRAM(s) Oral before breakfast  polyethylene glycol 3350 17 Gram(s) Oral daily  senna 2 Tablet(s) Oral at bedtime  thiamine 100 milliGRAM(s) Oral daily    MEDICATIONS  (PRN):  acetaminophen   Tablet .. 650 milliGRAM(s) Oral every 6 hours PRN Temp greater or equal to 38C (100.4F), Mild Pain (1 - 3), Moderate Pain (4 - 6)  ALBUTerol    90 MICROgram(s) HFA Inhaler 2 Puff(s) Inhalation every 4 hours PRN Shortness of Breath and/or Wheezing  benzonatate 100 milliGRAM(s) Oral three times a day PRN Cough  guaiFENesin   Syrup  (Sugar-Free) 200 milliGRAM(s) Oral every 6 hours PRN Cough  hydrocodone/homatropine Syrup 5 milliLiter(s) Oral at bedtime PRN for severe cough -      EXAM:  Vital Signs Last 24 Hrs  T(C): 37.1 (29 Apr 2020 12:00), Max: 38.7 (29 Apr 2020 04:40)  T(F): 98.8 (29 Apr 2020 12:00), Max: 101.6 (29 Apr 2020 04:40)  HR: 97 (29 Apr 2020 11:39) (44 - 103)  BP: 116/71 (29 Apr 2020 11:39) (102/61 - 120/71)  BP(mean): --  RR: 23 (29 Apr 2020 11:39) (22 - 23)  SpO2: 96% (29 Apr 2020 11:39) (93% - 97%)    GENERAL: The patient is awake and alert   he is tachypneic while eating  sitting in bed eating lunch  able to communicate w/o distress                            11.2   12.37 )-----------( 175      ( 29 Apr 2020 04:32 )             36.2       04-29    133<L>  |  89<L>  |  15  ----------------------------<  141<H>  3.6   |  26  |  0.90    Ca    9.0      29 Apr 2020 04:32  Phos  2.1     04-29  Mg     2.2     04-29    TPro  6.8  /  Alb  3.0<L>  /  TBili  1.0  /  DBili  x   /  AST  18  /  ALT  14  /  AlkPhos  108  04-29      Ferritin, Serum: 857.4 ng/mL (04-28-20 @ 05:21)  Ferritin, Serum: 793.5 ng/mL (04-26-20 @ 05:26)  Ferritin, Serum: 771.6 ng/mL (04-24-20 @ 06:30)  Ferritin, Serum: 1220 ng/mL (04-21-20 @ 12:32)  Ferritin, Serum: 1038 ng/mL (04-20-20 @ 13:04)  Ferritin, Serum: 1050 ng/mL (04-20-20 @ 13:02)    C-Reactive Protein, Serum: 202.3 mg/L <H> (04-28-20 @ 05:21)  C-Reactive Protein, Serum: 8.7 mg/L <H> (04-26-20 @ 05:26)    < from: Xray Chest 1 View- PORTABLE-Urgent (04.24.20 @ 08:14) >    EXAM:  XR CHEST PORTABLE URGENT 1V        PROCEDURE DATE:  Apr 24 2020         INTERPRETATION:    Examination: XR CHEST URGENT    History: ADMDIAG1: R09.02 HYPOXEMIA/, hypoxia    Findings:  Status post mid sternotomy. Right chest wall Mediport tip in SVC. Left chest wall dual-lead pacemaker. The heart is unchanged in size. No pneumothorax. Bilateral patchy lung opacities. Degenerative changes of the spine.    Impression:  1.  Patchy bilateral lung opacities likely representing infection.      DISCRETE X-RAY DATA:  Percent of LEFT lung opacification: 1-33%  Percent of RIGHT lung opacification: 1-33%  Change in lung opacification from most recent x-ray (<=3 days): No Prior  Change from prior dated 3 or more days (same admission): Stable        NICOLE CRUM M.D., RADIOLOGY RESIDENT  This document has been electronically signed.  ELSI QUINONEZ M.D., ATTENDING RADIOLOGIST  This document has been electronically signed. Apr 24 2020  9:20AM                 PROBLEM LIST:  74y Male with HEALTH ISSUES - PROBLEM Dx:  Pancytopenia due to antineoplastic chemotherapy: Pancytopenia due to antineoplastic chemotherapy  Pulmonary thromboembolism  Need for prophylactic measure: Need for prophylactic measure  HLD (hyperlipidemia): HLD (hyperlipidemia)  Dementia: Dementia  Hypertension: Hypertension  CAD (coronary artery disease): CAD (coronary artery disease)  Lymphoma: Lymphoma  Pulmonary embolism: Pulmonary embolism  COPD (chronic obstructive pulmonary disease): COPD (chronic obstructive pulmonary disease)  Respiratory failure with hypoxia: Respiratory failure with hypoxia      RECS:  his covid is negative again  deep suction- to  get sample        Please call with any questions.    Mady Pollard DO  [facility] Pulmonary/Sleep Medicine  [phone] PULMONARY PROGRESS NOTE    TAYLOR MCFADDEN  MRN-[id number]    Patient is a 74y old  Male who presents with a chief complaint of COVID 19 infection (26 Apr 2020 11:03)      HPI:  febrile overnight  remains on NRB- 96%  occasional cough      ROS:   -    ACTIVE MEDICATION LIST:  MEDICATIONS  (STANDING):  aspirin enteric coated 81 milliGRAM(s) Oral daily  atorvastatin 10 milliGRAM(s) Oral at bedtime  atovaquone Suspension 1500 milliGRAM(s) Oral daily  budesonide 160 MICROgram(s)/formoterol 4.5 MICROgram(s) Inhaler 2 Puff(s) Inhalation two times a day  cefepime   IVPB      cefepime   IVPB 2000 milliGRAM(s) IV Intermittent every 8 hours  enoxaparin Injectable 100 milliGRAM(s) SubCutaneous every 12 hours  folic acid 1 milliGRAM(s) Oral daily  furosemide    Tablet 40 milliGRAM(s) Oral daily  imipramine 25 milliGRAM(s) Oral daily  lidocaine   Patch 1 Patch Transdermal daily  metoprolol tartrate 50 milliGRAM(s) Oral two times a day  montelukast 10 milliGRAM(s) Oral daily  pantoprazole    Tablet 40 milliGRAM(s) Oral before breakfast  polyethylene glycol 3350 17 Gram(s) Oral daily  senna 2 Tablet(s) Oral at bedtime  thiamine 100 milliGRAM(s) Oral daily    MEDICATIONS  (PRN):  acetaminophen   Tablet .. 650 milliGRAM(s) Oral every 6 hours PRN Temp greater or equal to 38C (100.4F), Mild Pain (1 - 3), Moderate Pain (4 - 6)  ALBUTerol    90 MICROgram(s) HFA Inhaler 2 Puff(s) Inhalation every 4 hours PRN Shortness of Breath and/or Wheezing  benzonatate 100 milliGRAM(s) Oral three times a day PRN Cough  guaiFENesin   Syrup  (Sugar-Free) 200 milliGRAM(s) Oral every 6 hours PRN Cough  hydrocodone/homatropine Syrup 5 milliLiter(s) Oral at bedtime PRN for severe cough -      EXAM:  Vital Signs Last 24 Hrs  T(C): 37.1 (29 Apr 2020 12:00), Max: 38.7 (29 Apr 2020 04:40)  T(F): 98.8 (29 Apr 2020 12:00), Max: 101.6 (29 Apr 2020 04:40)  HR: 97 (29 Apr 2020 11:39) (44 - 103)  BP: 116/71 (29 Apr 2020 11:39) (102/61 - 120/71)  BP(mean): --  RR: 23 (29 Apr 2020 11:39) (22 - 23)  SpO2: 96% (29 Apr 2020 11:39) (93% - 97%)    GENERAL: The patient is awake and alert   he is tachypneic while eating  sitting in bed eating lunch  able to communicate w/o distress                            11.2   12.37 )-----------( 175      ( 29 Apr 2020 04:32 )             36.2       04-29    133<L>  |  89<L>  |  15  ----------------------------<  141<H>  3.6   |  26  |  0.90    Ca    9.0      29 Apr 2020 04:32  Phos  2.1     04-29  Mg     2.2     04-29    TPro  6.8  /  Alb  3.0<L>  /  TBili  1.0  /  DBili  x   /  AST  18  /  ALT  14  /  AlkPhos  108  04-29      Ferritin, Serum: 857.4 ng/mL (04-28-20 @ 05:21)  Ferritin, Serum: 793.5 ng/mL (04-26-20 @ 05:26)  Ferritin, Serum: 771.6 ng/mL (04-24-20 @ 06:30)  Ferritin, Serum: 1220 ng/mL (04-21-20 @ 12:32)  Ferritin, Serum: 1038 ng/mL (04-20-20 @ 13:04)  Ferritin, Serum: 1050 ng/mL (04-20-20 @ 13:02)    C-Reactive Protein, Serum: 202.3 mg/L <H> (04-28-20 @ 05:21)  C-Reactive Protein, Serum: 8.7 mg/L <H> (04-26-20 @ 05:26)    < from: Xray Chest 1 View- PORTABLE-Urgent (04.24.20 @ 08:14) >    EXAM:  XR CHEST PORTABLE URGENT 1V        PROCEDURE DATE:  Apr 24 2020         INTERPRETATION:    Examination: XR CHEST URGENT    History: ADMDIAG1: R09.02 HYPOXEMIA/, hypoxia    Findings:  Status post mid sternotomy. Right chest wall Mediport tip in SVC. Left chest wall dual-lead pacemaker. The heart is unchanged in size. No pneumothorax. Bilateral patchy lung opacities. Degenerative changes of the spine.    Impression:  1.  Patchy bilateral lung opacities likely representing infection.      DISCRETE X-RAY DATA:  Percent of LEFT lung opacification: 1-33%  Percent of RIGHT lung opacification: 1-33%  Change in lung opacification from most recent x-ray (<=3 days): No Prior  Change from prior dated 3 or more days (same admission): Stable        NICOLE CRUM M.D., RADIOLOGY RESIDENT  This document has been electronically signed.  ELSI QUINONEZ M.D., ATTENDING RADIOLOGIST  This document has been electronically signed. Apr 24 2020  9:20AM                 PROBLEM LIST:  74y Male with HEALTH ISSUES - PROBLEM Dx:  Pancytopenia due to antineoplastic chemotherapy: Pancytopenia due to antineoplastic chemotherapy  Pulmonary thromboembolism  Need for prophylactic measure: Need for prophylactic measure  HLD (hyperlipidemia): HLD (hyperlipidemia)  Dementia: Dementia  Hypertension: Hypertension  CAD (coronary artery disease): CAD (coronary artery disease)  Lymphoma: Lymphoma  Pulmonary embolism: Pulmonary embolism  COPD (chronic obstructive pulmonary disease): COPD (chronic obstructive pulmonary disease)  Respiratory failure with hypoxia: Respiratory failure with hypoxia      RECS:  his covid is negative again  because of his co-morbidities and medical history, bronchoscopy is high risk- he may be difficult to extubate post procedure  he is on abx per ID  he is only a candidate for plasma if we get a + COVID result  he is getting supportive care with full dose lovenox, 02- tylenol for fevers  continue chest PT q6hrs  i have ordered IS  can try another course of solumedrol if ok with ID  I spoke to his wife sarah again today- updated her on fever overnight and no change in 02 requirements  prognosis guarded  GOC/palliative care consult    Discussed with Dr Moraes    Please call with any questions.    Mady Pollard DO  [facility] Pulmonary/Sleep Medicine  [phone]

## 2020-04-29 NOTE — PROGRESS NOTE ADULT - SUBJECTIVE AND OBJECTIVE BOX
Infectious Diseases progress note:    Subjective: Pt remains on NRB, (+) fevers today.      ROS:  CONSTITUTIONAL:  No fever, chills, rigors  CARDIOVASCULAR:  No chest pain or palpitations  RESPIRATORY:   No SOB, cough, dyspnea on exertion.  No wheezing  GASTROINTESTINAL:  No abd pain, N/V, diarrhea/constipation  EXTREMITIES:  No swelling or joint pain  GENITOURINARY:  No burning on urination, increased frequency or urgency.  No flank pain  NEUROLOGIC:  No HA, visual disturbances  SKIN: No rashes    Allergies    No Known Allergies    Intolerances        ANTIBIOTICS/RELEVANT:  antimicrobials  atovaquone Suspension 1500 milliGRAM(s) Oral daily  cefepime   IVPB      cefepime   IVPB 2000 milliGRAM(s) IV Intermittent every 8 hours    immunologic:    OTHER:  acetaminophen   Tablet .. 650 milliGRAM(s) Oral every 6 hours PRN  ALBUTerol    90 MICROgram(s) HFA Inhaler 2 Puff(s) Inhalation every 4 hours PRN  aspirin enteric coated 81 milliGRAM(s) Oral daily  atorvastatin 10 milliGRAM(s) Oral at bedtime  benzonatate 100 milliGRAM(s) Oral three times a day PRN  budesonide 160 MICROgram(s)/formoterol 4.5 MICROgram(s) Inhaler 2 Puff(s) Inhalation two times a day  enoxaparin Injectable 100 milliGRAM(s) SubCutaneous every 12 hours  folic acid 1 milliGRAM(s) Oral daily  furosemide    Tablet 40 milliGRAM(s) Oral daily  guaiFENesin   Syrup  (Sugar-Free) 200 milliGRAM(s) Oral every 6 hours PRN  hydrocodone/homatropine Syrup 5 milliLiter(s) Oral at bedtime PRN  imipramine 25 milliGRAM(s) Oral daily  lidocaine   Patch 1 Patch Transdermal daily  metoprolol tartrate 50 milliGRAM(s) Oral two times a day  montelukast 10 milliGRAM(s) Oral daily  pantoprazole    Tablet 40 milliGRAM(s) Oral before breakfast  polyethylene glycol 3350 17 Gram(s) Oral daily  senna 2 Tablet(s) Oral at bedtime  thiamine 100 milliGRAM(s) Oral daily      Objective:  Vital Signs Last 24 Hrs  T(C): 37.1 (29 Apr 2020 12:00), Max: 38.7 (29 Apr 2020 04:40)  T(F): 98.8 (29 Apr 2020 12:00), Max: 101.6 (29 Apr 2020 04:40)  HR: 74 (29 Apr 2020 22:22) (44 - 105)  BP: 118/76 (29 Apr 2020 16:52) (116/71 - 120/71)  BP(mean): --  RR: 21 (29 Apr 2020 16:52) (21 - 23)  SpO2: 96% (29 Apr 2020 22:22) (93% - 97%)    PHYSICAL EXAM:  Constitutional:NAD, on NRB  Eyes:HALLIE, EOMI  Ear/Nose/Throat: no thrush, mucositis.  Moist mucous membranes	  Neck:no JVD, no lymphadenopathy, supple  Respiratory: CTA melvina  Cardiovascular: S1S2 RRR, no murmurs  Gastrointestinal:soft, nontender,  nondistended (+) BS  Extremities:no e/e/c  Skin:  no rashes, open wounds or ulcerations        LABS:                        11.2   12.37 )-----------( 175      ( 29 Apr 2020 04:32 )             36.2     04-29    133<L>  |  89<L>  |  15  ----------------------------<  141<H>  3.6   |  26  |  0.90    Ca    9.0      29 Apr 2020 04:32  Phos  2.1     04-29  Mg     2.2     04-29    TPro  6.8  /  Alb  3.0<L>  /  TBili  1.0  /  DBili  x   /  AST  18  /  ALT  14  /  AlkPhos  108  04-29    PT/INR - ( 28 Apr 2020 05:21 )   PT: 15.9 SEC;   INR: 1.38          PTT - ( 28 Apr 2020 05:21 )  PTT:31.9 SEC      Procalcitonin, Serum: 0.41 (04-28 @ 05:21)  Procalcitonin, Serum: 0.15 (04-26 @ 05:26)  Procalcitonin, Serum: 0.13 (04-24 @ 06:30)  Procalcitonin, Serum: 0.30 (04-20 @ 13:04)                    MICROBIOLOGY:          RADIOLOGY & ADDITIONAL STUDIES:  < from: Xray Chest 1 View- PORTABLE-Urgent (04.24.20 @ 08:14) >  Impression:  1.  Patchy bilateral lung opacities likely representing infection.      DISCRETE X-RAY DATA:  Percent of LEFT lung opacification: 1-33%  Percent of RIGHT lung opacification: 1-33%  Change in lung opacification from most recent x-ray (<=3 days): No Prior  Change from prior dated 3 or more days (same admission): Stable    < end of copied text >

## 2020-04-29 NOTE — PROGRESS NOTE ADULT - ASSESSMENT
[place] [facility]  [phone]  [facility] [phone]  DX: MELANOMA SKIN CHECK  OV: 09/24/2019  [facility] 74 year old male with PMHx of COPD, DANIEL on CPAP, depression, HLD, HTN, CAD s/p PCI, CHB s/p PPM, bioprosthetic AVR, dementia, Stage IV Hodgkin's lymphoma with lung nodule, tracheal stenosis was admitted to [place]  ED 4/20-4/2 for SOB x 3-4 days , hypoxia and was about to be intubated on admission at [facility], but his O2 sats improved with BiLevel PAP which was later transitioned to a venti mask.     CT chest showed pattern of GGO suggests infection including atypical pneumonia/viral infection from atypical agents including COVID-19.  Small embolus within the anterobasilar subsegmental right lower lobe pulmonary artery. COVID test was negative  though  clinically was suspicious for  covid – CT  chest shows GGO, biomarkers elevated   Pt was transferred to [facility]  for continuation of medical care   During my interview, pt awake, conversant, reports improvement in SOB   Intermittent cough (22 Apr 2020 17:08)    Vitals:  T 97.4, P 98, /82, RR 19, O2 sat 95% NRB 15 L.   Pt with neutropenia.  Ferritin 793, CRP 8.7, RVP (-), Covid pcr (-) x 3.  CT angio chest shows GGO and reticular opacities b/l.  Pt a/w acute hypoxic respiratory failure, pt requiring venti mask on admission.  Pt is being managed for COPD and has been on methylprednisolone inhalers and steroids as well as lasix.  Pt has known DVT with picc line, on full dose lovenox.  Hematology following for lymphoma, s/p recent chemo and followed for pancytopenia.      Pt with suspected COVID pna despite multiple pcr test (-).  Bleomycin toxicity also being considered.      ID consult called for further recommendations.     Suspected Covid:    - CT angio chest (+) b/l GGOs and small PE in RLL.  Pt with hypoxia, requiring NRB.  Pt already on COPD managment and s/p solumedrol for possible bleomycin toxicity, however without improvement.  Despite Covid (-) PCR x 3, suspicion remains high.      - Pt being considered for tocilizumab (f/u with Heme given pt with lymphoma and recently received chemotherapy) and convalescent plasma if covid PCR tests (+).  Consider bronchoscopy for further covid PCR testing - as per Pulm pt is considered high risk candidate at this time.    - Check repeat ferritin, d-dimer and crp in 48 to 72 hrs - inflammatory markers rising    - r/o atypical causes of pna.  Check fungitell, Legionella Ag, histoplasma, crypt ag, coccidiodes.     Neutropenia:    - Given neutropenia, agree with prophylactic abx.  Pt with QTc > 500, avoid fluoroquinolones if possible.  Wbc rising s/p filgrastim.  Cont cefepime for now as pt cont to have fever.      - Blood cultures testing.        * ANC improving.  Pt with fevers.  Recommend sending blood cultures x 2 if continues to spike.  Repeat Covid testing negative (x4)    Will follow,    Kaity Kelleyi  [phone]

## 2020-04-29 NOTE — PROGRESS NOTE ADULT - SUBJECTIVE AND OBJECTIVE BOX
Patient is a 74y old  Male who presents with a chief complaint of COVID 19 infection (26 Apr 2020 11:03)      SUBJECTIVE / OVERNIGHT EVENTS:  feels anxious  remain on NRB  "I want to go home, you guys already tried your best"  Explained to him that he can't go home yet with high O2 requirement        Vital Signs Last 24 Hrs  T(C): 37.1 (29 Apr 2020 12:00), Max: 38.7 (29 Apr 2020 04:40)  T(F): 98.8 (29 Apr 2020 12:00), Max: 101.6 (29 Apr 2020 04:40)  HR: 97 (29 Apr 2020 11:39) (44 - 103)  BP: 116/71 (29 Apr 2020 11:39) (102/61 - 120/71)  BP(mean): --  RR: 23 (29 Apr 2020 11:39) (22 - 23)  SpO2: 96% (29 Apr 2020 11:39) (93% - 97%)  I&O's Summary      PHYSICAL EXAM:  GENERAL: NAD, anxious, on NRB  HEAD:  Atraumatic, Normocephalic  EYES: EOMI, PERRLA, conjunctiva and sclera clear  NECK: Supple, No JVD  CHEST/LUNG: mild decrease breath sounds bilaterally; No wheeze   HEART: Regular rate and rhythm; No murmurs, rubs, or gallops  ABDOMEN: Soft, Nontender, Nondistended; Bowel sounds present  Neuro: AAO x 3, no focal deficit, 5/5 b/l extremities  EXTREMITIES:  2+ Peripheral Pulses, No clubbing, cyanosis, or edema  SKIN: No rashes or lesions      LABS:                        11.2   12.37 )-----------( 175      ( 29 Apr 2020 04:32 )             36.2     04-29    133<L>  |  89<L>  |  15  ----------------------------<  141<H>  3.6   |  26  |  0.90    Ca    9.0      29 Apr 2020 04:32  Phos  2.1     04-29  Mg     2.2     04-29    TPro  6.8  /  Alb  3.0<L>  /  TBili  1.0  /  DBili  x   /  AST  18  /  ALT  14  /  AlkPhos  108  04-29    PT/INR - ( 28 Apr 2020 05:21 )   PT: 15.9 SEC;   INR: 1.38          PTT - ( 28 Apr 2020 05:21 )  PTT:31.9 SEC  CAPILLARY BLOOD GLUCOSE                RADIOLOGY & ADDITIONAL TESTS:    Imaging Personally Reviewed:  [x] YES  [ ] NO    Consultant(s) Notes Reviewed:  [x] YES  [ ] NO      MEDICATIONS  (STANDING):  aspirin enteric coated 81 milliGRAM(s) Oral daily  atorvastatin 10 milliGRAM(s) Oral at bedtime  atovaquone Suspension 1500 milliGRAM(s) Oral daily  budesonide 160 MICROgram(s)/formoterol 4.5 MICROgram(s) Inhaler 2 Puff(s) Inhalation two times a day  cefepime   IVPB      cefepime   IVPB 2000 milliGRAM(s) IV Intermittent every 8 hours  enoxaparin Injectable 100 milliGRAM(s) SubCutaneous every 12 hours  folic acid 1 milliGRAM(s) Oral daily  furosemide    Tablet 40 milliGRAM(s) Oral daily  imipramine 25 milliGRAM(s) Oral daily  lidocaine   Patch 1 Patch Transdermal daily  metoprolol tartrate 50 milliGRAM(s) Oral two times a day  montelukast 10 milliGRAM(s) Oral daily  pantoprazole    Tablet 40 milliGRAM(s) Oral before breakfast  polyethylene glycol 3350 17 Gram(s) Oral daily  senna 2 Tablet(s) Oral at bedtime  thiamine 100 milliGRAM(s) Oral daily    MEDICATIONS  (PRN):  acetaminophen   Tablet .. 650 milliGRAM(s) Oral every 6 hours PRN Temp greater or equal to 38C (100.4F), Mild Pain (1 - 3), Moderate Pain (4 - 6)  ALBUTerol    90 MICROgram(s) HFA Inhaler 2 Puff(s) Inhalation every 4 hours PRN Shortness of Breath and/or Wheezing  benzonatate 100 milliGRAM(s) Oral three times a day PRN Cough  guaiFENesin   Syrup  (Sugar-Free) 200 milliGRAM(s) Oral every 6 hours PRN Cough  hydrocodone/homatropine Syrup 5 milliLiter(s) Oral at bedtime PRN for severe cough -      Care Discussed with Consultants/Other Providers [x] YES  [ ] NO    HEALTH ISSUES - PROBLEM Dx:  Pancytopenia due to antineoplastic chemotherapy: Pancytopenia due to antineoplastic chemotherapy  Pulmonary thromboembolism  Need for prophylactic measure: Need for prophylactic measure  HLD (hyperlipidemia): HLD (hyperlipidemia)  Dementia: Dementia  Hypertension: Hypertension  CAD (coronary artery disease): CAD (coronary artery disease)  Lymphoma: Lymphoma  Pulmonary embolism: Pulmonary embolism  COPD (chronic obstructive pulmonary disease): COPD (chronic obstructive pulmonary disease)  Respiratory failure with hypoxia: Respiratory failure with hypoxia

## 2020-04-29 NOTE — PROVIDER CONTACT NOTE (OTHER) - ACTION/TREATMENT ORDERED:
Provider at bedside, assessed Patient, will order tylenol IV
monitor
Ice packs and reassess temp
Will continue to monitor

## 2020-04-30 NOTE — CONSULT NOTE ADULT - SUBJECTIVE AND OBJECTIVE BOX
ENT paged for Code Airway    On arrival, patient was intubated with 5.0 ETT by Anesthesia, however, unable to pass ETT past the subglottis due to tracheal narrowing. Able to ventilate patient via trach tube, but sats remained in the 80s. Given unsecure airway and history of tracheal stenosis, decision was made to perform bedside tracheotomy. Acute care surgery Dr. Hein present at bedside for tracheotomy procedure as well general surgery resident and myself.    Procedure:   Approximately 1cc of 1% lido with epi injected subcutaneously over the trachea. Vertical incision made with scalpel at midline, approximately 3cm in length. Dissection was continued in midline with scalpel along with blunt dissection with long clamp. Cricoid was palpated, and the tissue overlying the trachea was divided inferiorly. Once tracheal rings were palpable, percutaneous tracheostomy was performed. After perc trach tube was placed into the trachea, placement was confirmed with CO2 monitor. However, because there was no cuff on the perc trach, ventilation was poor and decision was made to replace the perc trach with a shiley trach tube. Once the perc trach was removed, the tracheal hole was further expanded with the clamp and a 4DCF shiley trach tube was placed. Position was confirmed with CO2 monitor. Trach tube was then secured and with sutures and trach tie and patient was transferred to [facility]U.    In [facility]U, patient was noted to have air leak around the trach tube and ENT was called to bedside for further evaluation. An additional 3cc of air was placed in the cuff with resolution of air leak. Patient was oxygenating and ventilating well.    ICU Vital Signs Last 24 Hrs  T(C): 36.4 (30 Apr 2020 14:06), Max: 37.2 (29 Apr 2020 22:55)  T(F): 97.6 (30 Apr 2020 14:06), Max: 98.9 (29 Apr 2020 22:55)  HR: 109 (30 Apr 2020 14:06) (74 - 109)  BP: 118/80 (30 Apr 2020 06:29) (107/75 - 118/80)  BP(mean): --  ABP: 140/79 (30 Apr 2020 14:06) (140/79 - 140/79)  ABP(mean): 99 (30 Apr 2020 14:06) (99 - 99)  RR: 17 (30 Apr 2020 14:06) (17 - 21)  SpO2: 98% (30 Apr 2020 14:06) (92% - 98%)    Sedated  On [facility] ventilator  Neck: 4DCF trach tube in place, secured with sutures, cuff hyperinflated  No crepitus of neck    74M with hx of COPD, DANIEL on CPAP, HTN, CAD s/p PCI, stage IV Hodkin's lymphoma and mild tracheal stenosis (can be seen on CT chest) now s/p emergent bedside tracheotomy after inability to pass 5.0 ETT past the subglottis. Now s/p tracheotomy by Surg/ENT. Size 4 shiley trach in place.    -routine trach care, inner cannula is disposable and should be changed at least once a day, please obtain extra inner cannulas from central supply  -please keep extra size 4 shiley trach at bedside at all times  -keep sutures and trach tie in place, do no cut suture or remove trach tie  -keep cuff inflated to the minimum pressure needed to resolve air leak  -size 4 trach tube is small, and a small air leak or positional air leak is okay as long as patient is oxygenating and ventilating well  -ok to remove sutures securing on POD7  -call/page with questions

## 2020-04-30 NOTE — PROGRESS NOTE ADULT - SUBJECTIVE AND OBJECTIVE BOX
Infectious Diseases progress note:    Subjective: Events noted.  Code blue called earlier today for pt unresponsiveness and low BP.  Pt s/p tracheostomy, on pressors, transferred to ICU.    ROS:  Sedated, trached on vent.  Unable to assess    Allergies    No Known Allergies    Intolerances        ANTIBIOTICS/RELEVANT:  antimicrobials  atovaquone Suspension 1500 milliGRAM(s) Oral daily  cefepime   IVPB      cefepime   IVPB 2000 milliGRAM(s) IV Intermittent every 8 hours    immunologic:    OTHER:  acetaminophen   Tablet .. 650 milliGRAM(s) Oral every 6 hours PRN  ALBUTerol    90 MICROgram(s) HFA Inhaler 2 Puff(s) Inhalation every 4 hours PRN  aspirin enteric coated 81 milliGRAM(s) Oral daily  atorvastatin 10 milliGRAM(s) Oral at bedtime  budesonide 160 MICROgram(s)/formoterol 4.5 MICROgram(s) Inhaler 2 Puff(s) Inhalation two times a day  chlorhexidine 0.12% Liquid 15 milliLiter(s) Oral Mucosa every 12 hours  chlorhexidine 4% Liquid 1 Application(s) Topical <User Schedule>  enoxaparin Injectable 100 milliGRAM(s) SubCutaneous every 12 hours  fentaNYL   Infusion. 0.5 MICROgram(s)/kG/Hr IV Continuous <Continuous>  folic acid 1 milliGRAM(s) Oral daily  imipramine 25 milliGRAM(s) Oral daily  lidocaine   Patch 1 Patch Transdermal daily  montelukast 10 milliGRAM(s) Oral daily  norepinephrine Infusion 0.05 MICROgram(s)/kG/Min IV Continuous <Continuous>  pantoprazole  Injectable 40 milliGRAM(s) IV Push daily  polyethylene glycol 3350 17 Gram(s) Oral daily  propofol Infusion 10 MICROgram(s)/kG/Min IV Continuous <Continuous>  senna 2 Tablet(s) Oral at bedtime  thiamine 100 milliGRAM(s) Oral daily      Objective:  Vital Signs Last 24 Hrs  T(C): 36.4 (30 Apr 2020 14:06), Max: 37.2 (29 Apr 2020 22:55)  T(F): 97.6 (30 Apr 2020 14:06), Max: 98.9 (29 Apr 2020 22:55)  HR: 109 (30 Apr 2020 14:06) (74 - 109)  BP: 118/80 (30 Apr 2020 06:29) (107/75 - 118/80)  BP(mean): --  RR: 17 (30 Apr 2020 14:06) (17 - 21)  SpO2: 98% (30 Apr 2020 14:06) (92% - 98%)    PHYSICAL EXAM:  Constitutional:NAD  Eyes:HALLIE, EOMI  Ear/Nose/Throat: no thrush, mucositis.  Moist mucous membranes	  Neck:no JVD, no lymphadenopathy, supple  Respiratory: CTA melvina  Cardiovascular: S1S2 RRR, no murmurs  Gastrointestinal:soft, nontender,  nondistended (+) BS  Extremities:no e/e/c  Skin:  no rashes, open wounds or ulcerations        LABS:                        11.6   21.39 )-----------( 211      ( 30 Apr 2020 04:30 )             37.4     04-30    134<L>  |  89<L>  |  17  ----------------------------<  151<H>  3.8   |  26  |  1.03    Ca    9.0      30 Apr 2020 04:30  Phos  3.0     04-30  Mg     2.5     04-30    TPro  6.2  /  Alb  3.3  /  TBili  0.8  /  DBili  x   /  AST  29  /  ALT  14  /  AlkPhos  184<H>  04-30          Procalcitonin, Serum: 0.42 (04-30 @ 04:30)  Procalcitonin, Serum: 0.41 (04-28 @ 05:21)  Procalcitonin, Serum: 0.15 (04-26 @ 05:26)  Procalcitonin, Serum: 0.13 (04-24 @ 06:30)  Procalcitonin, Serum: 0.30 (04-20 @ 13:04)      Ferritin, Serum in AM (04.30.20 @ 04:30)    Ferritin, Serum: 2204 ng/mL    Ferritin, Serum in AM (04.28.20 @ 05:21)    Ferritin, Serum: 857.4 ng/mL    D-Dimer Assay, Quantitative: 1004: A result less than 230 ng/mL DDU correlates with the absence  of thrombosis in a patient with low and moderate pre-test  probability of thrombosis.    Note: 1 ng/ml DDU   2 ng/ml FEU  If you have any questions, please contact Hematology Lab at  ext. 3050. ng/mL (04.26.20 @ 05:26)                  MICROBIOLOGY:    Culture - Blood (04.29.20 @ 11:58)    Specimen Source: .Blood Blood-Venous    Culture Results:   No growth to date.    Culture - Blood (04.29.20 @ 11:58)    Specimen Source: .Blood Blood-Peripheral    Culture Results:   No growth to date.          RADIOLOGY & ADDITIONAL STUDIES:    < from: Xray Chest 1 View- PORTABLE-Urgent (04.30.20 @ 14:40) >  Findings:  Tracheostomy tube in place. Right chest wall Mediport tip as on the prior study. Interval placement of a left IJ catheter with tip in the left brachiocephalic vein/SVC junction. Enteric tube tip is in the stomach.  Bilateral patchy lung opacities, increased from prior. No pneumothorax.  Status post median sternotomy. Cardiac silhouette cannot be accurately assessed on this projection. Left chest wall dual lead pacemaker. Partially imaged distended loop of bowel within the left upper abdomen.  Degenerative changes of the spine.    Impression:  1.  Interval placement of left IJ catheter with tip in left brachiocephalic vein/SVC junction.  2.  Increased bilateral patchy lung opacities.      DISCRETE X-RAY DATA:  Percent of LEFT lung opacification: 34-66%  Percent of RIGHT lung opacification: 34-66%  Change in lung opacification from most recent x-ray (<=3 days): No Prior  Change from prior dated 3 or more days (same admission): Increase    < end of copied text >

## 2020-04-30 NOTE — PROGRESS NOTE ADULT - SUBJECTIVE AND OBJECTIVE BOX
PULMONARY PROGRESS NOTE    TAYLOR MCFADDEN  MRN-[id number]    Patient is a 74y old  Male who presents with a chief complaint of COVID 19 infection (26 Apr 2020 11:03)      HPI:  tachypenic  hard to complete sentences  taking off NRB  -    ROS:   -    ACTIVE MEDICATION LIST:  MEDICATIONS  (STANDING):  aspirin enteric coated 81 milliGRAM(s) Oral daily  atorvastatin 10 milliGRAM(s) Oral at bedtime  atovaquone Suspension 1500 milliGRAM(s) Oral daily  budesonide 160 MICROgram(s)/formoterol 4.5 MICROgram(s) Inhaler 2 Puff(s) Inhalation two times a day  cefepime   IVPB      cefepime   IVPB 2000 milliGRAM(s) IV Intermittent every 8 hours  enoxaparin Injectable 100 milliGRAM(s) SubCutaneous every 12 hours  folic acid 1 milliGRAM(s) Oral daily  furosemide    Tablet 40 milliGRAM(s) Oral daily  imipramine 25 milliGRAM(s) Oral daily  lidocaine   Patch 1 Patch Transdermal daily  metoprolol tartrate 50 milliGRAM(s) Oral two times a day  montelukast 10 milliGRAM(s) Oral daily  pantoprazole    Tablet 40 milliGRAM(s) Oral before breakfast  polyethylene glycol 3350 17 Gram(s) Oral daily  senna 2 Tablet(s) Oral at bedtime  thiamine 100 milliGRAM(s) Oral daily    MEDICATIONS  (PRN):  acetaminophen   Tablet .. 650 milliGRAM(s) Oral every 6 hours PRN Temp greater or equal to 38C (100.4F), Mild Pain (1 - 3), Moderate Pain (4 - 6)  ALBUTerol    90 MICROgram(s) HFA Inhaler 2 Puff(s) Inhalation every 4 hours PRN Shortness of Breath and/or Wheezing  benzonatate 100 milliGRAM(s) Oral three times a day PRN Cough  guaiFENesin   Syrup  (Sugar-Free) 200 milliGRAM(s) Oral every 6 hours PRN Cough  hydrocodone/homatropine Syrup 5 milliLiter(s) Oral at bedtime PRN for severe cough -      EXAM:  Vital Signs Last 24 Hrs  T(C): 36.9 (30 Apr 2020 06:29), Max: 37.2 (29 Apr 2020 22:55)  T(F): 98.5 (30 Apr 2020 06:29), Max: 98.9 (29 Apr 2020 22:55)  HR: 74 (30 Apr 2020 09:07) (74 - 105)  BP: 118/80 (30 Apr 2020 06:29) (107/75 - 118/80)  BP(mean): --  RR: 20 (30 Apr 2020 08:00) (19 - 21)  SpO2: 92% (30 Apr 2020 09:07) (92% - 98%)    GENERAL: The patient is awake in resp distress  labored breathing  unable to complete sentences                           11.6   21.39 )-----------( 211      ( 30 Apr 2020 04:30 )             37.4       04-30    134<L>  |  89<L>  |  17  ----------------------------<  151<H>  3.8   |  26  |  1.03    Ca    9.0      30 Apr 2020 04:30  Phos  3.0     04-30  Mg     2.5     04-30    TPro  6.2  /  Alb  3.3  /  TBili  0.8  /  DBili  x   /  AST  29  /  ALT  14  /  AlkPhos  184<H>  04-30      Ferritin, Serum: 2204 ng/mL (04-30-20 @ 04:30)  Ferritin, Serum: 857.4 ng/mL (04-28-20 @ 05:21)  Ferritin, Serum: 793.5 ng/mL (04-26-20 @ 05:26)  Ferritin, Serum: 771.6 ng/mL (04-24-20 @ 06:30)  Ferritin, Serum: 1220 ng/mL (04-21-20 @ 12:32)  Ferritin, Serum: 1038 ng/mL (04-20-20 @ 13:04)  Ferritin, Serum: 1050 ng/mL (04-20-20 @ 13:02)    C-Reactive Protein, Serum: 246.5 mg/L <H> (04-30-20 @ 04:30)  C-Reactive Protein, Serum: 202.3 mg/L <H> (04-28-20 @ 05:21)  C-Reactive Protein, Serum: 8.7 mg/L <H> (04-26-20 @ 05:26)    < from: Xray Chest 1 View- PORTABLE-Urgent (04.24.20 @ 08:14) >    EXAM:  XR CHEST PORTABLE URGENT 1V        PROCEDURE DATE:  Apr 24 2020         INTERPRETATION:    Examination: XR CHEST URGENT    History: ADMDIAG1: R09.02 HYPOXEMIA/, hypoxia    Findings:  Status post mid sternotomy. Right chest wall Mediport tip in SVC. Left chest wall dual-lead pacemaker. The heart is unchanged in size. No pneumothorax. Bilateral patchy lung opacities. Degenerative changes of the spine.    Impression:  1.  Patchy bilateral lung opacities likely representing infection.      DISCRETE X-RAY DATA:  Percent of LEFT lung opacification: 1-33%  Percent of RIGHT lung opacification: 1-33%  Change in lung opacification from most recent x-ray (<=3 days): No Prior  Change from prior dated 3 or more days (same admission): Stable                NICOLE CRUM M.D., RADIOLOGY RESIDENT  This document has been electronically signed.  ELSI QUINONEZ M.D., ATTENDING RADIOLOGIST  This document has been electronically signed. Apr 24 2020  9:20AM        < end of copied text >          PROBLEM LIST:  74y Male with HEALTH ISSUES - PROBLEM Dx:  Pancytopenia due to antineoplastic chemotherapy: Pancytopenia due to antineoplastic chemotherapy  Pulmonary thromboembolism  Need for prophylactic measure: Need for prophylactic measure  HLD (hyperlipidemia): HLD (hyperlipidemia)  Dementia: Dementia  Hypertension: Hypertension  CAD (coronary artery disease): CAD (coronary artery disease)  Lymphoma: Lymphoma  Pulmonary embolism: Pulmonary embolism  COPD (chronic obstructive pulmonary disease): COPD (chronic obstructive pulmonary disease)  Respiratory failure with hypoxia: Respiratory failure with hypoxia        RECS:  worsening markers  high risk bronchoscopy for COVID PCR  continue supportive care with NRB   if he desaturates on NRB, can start HFNC with fi100% and 40L max (he is already in negative pressure room  he is on abx per ID  he is only a candidate for plasma if we get a + COVID result  restart solumedrol 1mg/ kg in two divided doses- if bleomycin toxicity - although the supplemental 02 is worsening resp status  continue chest PT q6hrs  will call wife today to update   prognosis guarded  GOC/palliative care consult    Discussed with Dr Moraes      Please call with any questions.    Mady Pollard, DO  [facility] Pulmonary/Sleep Medicine  [phone]

## 2020-04-30 NOTE — PROCEDURE NOTE - ADDITIONAL PROCEDURE DETAILS
Primary team was very difficult to get a hold of (not present during code airway) MICU attending called during initial rapid response was very helpful in delineating his medical history. After inquiring about DNR/DNI status and receiving the information that he was a full code, we performed the procedure.

## 2020-04-30 NOTE — AIRWAY PLACEMENT NOTE ADULT - AIRWAY COMMENTS:
Code blue called- ROSC on arrival. Hx of tracheostomy- orally intubated with 7.5 ETT; wouldn't pass. reattempted with 7.0 and 6.0 ett. Again would not pass.+etCO2, but balloon above the cords. Code airway called. On arrival, I reintubated with 5.0 ETT and again it would not pass. Surgeon prepped and performed percutaneous tracheostomy. + ETCO2. %.0 tube removed. Ventilation was maintained throughout and SpO2 reained 88-91% throughout the interventions. Patient transferred to [facility]U in stable condition.

## 2020-04-30 NOTE — CONSULT NOTE ADULT - PROBLEM SELECTOR RECOMMENDATION 7
Patient with Stage IV Hodgkin's lymphoma with lung nodule, who is now trached-on ventilator, pressors, and in the ICU.  -Weaning the patient off the ventilator may be difficult with underlying comorbidities of COPD, +PE, and Dementia.  -Will call wife Patricia tomorrow morning to discuss/clarify GOC. -Continue fentanyl infusion and titrate as needed for adequate pain control. Patient with Stage IV Hodgkin's lymphoma with lung nodule, who is now trached-on ventilator, pressors, and in the ICU.  -Weaning the patient off the ventilator and pressors may be difficult with underlying comorbidities of COPD, +PE, Dementia, HLD, HTN, CAD, and CHB.  -Will call wife Patricia tomorrow morning to discuss/clarify GOC.

## 2020-04-30 NOTE — CONSULT NOTE ADULT - PROBLEM SELECTOR RECOMMENDATION 2
-Continue lovenox -F/u with oncologist regarding chemotherapy and how the patient tolerated it-and also what the next steps are that were discussed with the patient and family.

## 2020-04-30 NOTE — CONSULT NOTE ADULT - PROBLEM SELECTOR RECOMMENDATION 9
-Patient now in ICU, trached, and on ventilator  -Monitor ABG's, adjust vent settings as needed  -Continue propofol and fentanyl infusion for pain relief/vent synchrony/sedation

## 2020-04-30 NOTE — PROGRESS NOTE ADULT - ATTENDING COMMENTS
Pt seen and examined. 74 year old male with Stage IV Hodgkin's lymphoma with lung nodules ( Dx 2019, on chemotherapy with ABVD,  last treatement 4/16/2020, He is currently being treated by Dr. Majano (heme/onc) for his Hodgkin lymphoma), also COPD, DANIEL on CPAP, depression, HLD, HTN, CAD s/p PCI, CHB s/p PPM, bioprosthetic AVR, dementia, tracheal stenosis was admitted to [place]  ED 4/20-4/2 hypoxic respiratory failure. CT chest showed pattern of GGO suggests infection including atypical pneumonia/viral infection from atypical agents including COVID-19 . COVID swab x multiple times negative but clinical suspicion remains high. Appreciate pulm eval. Neutropenic today. as per patientm he received 5 days of Neupogen with his oncologist 10 days ago.  -can start Mepron 1500 mg QD with food for PCP ppx (may need bronch to ensure that there is no PCP PNA  -start Levaquin 500 mg QD ppx given neutropenia  -can start G-CSF for neutropenic support (filgrastim 5 mcg/kg = 500 mcg QD); stop if develops new signs of respiratory distress or CRS; stop filgrastim if absolute neutrophil count > 1.0 k/uL
palliative eval for GOC.     - Dr. DENTON Moraes ([facility])  - [phone]
palliative eval for GOC.   overall prognosis is poor.     - Dr. DENTON Moraes ([facility])  - [phone]

## 2020-04-30 NOTE — CHART NOTE - NSCHARTNOTEFT_GEN_A_CORE
NUTRITION FOLLOW-UP:  Pt. confused/disoriented.  Unable to conduct nutrition focused physical exam due to isolation precautions in place related to COVID.    No noted nausea/vomiting/diarrhea/constipation or issues chewing/swallowing, per flow sheets. Question adequacy of Pt.'s PO intake given on non-rebreather mask since admission (x 8 days).  No documentation re: Pt.'s PO intake upon chart review.    Unable to assess for possible significant weight changes @ this time.    Weight:  None current   103.3kg (4/22/2020)    Edema:  None noted.    Skin:  No noted pressure injuries.        Pertinent Medications: MEDICATIONS  (STANDING):  aspirin enteric coated 81 milliGRAM(s) Oral daily  atorvastatin 10 milliGRAM(s) Oral at bedtime  atovaquone Suspension 1500 milliGRAM(s) Oral daily  budesonide 160 MICROgram(s)/formoterol 4.5 MICROgram(s) Inhaler 2 Puff(s) Inhalation two times a day  cefepime   IVPB      cefepime   IVPB 2000 milliGRAM(s) IV Intermittent every 8 hours  enoxaparin Injectable 100 milliGRAM(s) SubCutaneous every 12 hours  folic acid 1 milliGRAM(s) Oral daily  furosemide    Tablet 40 milliGRAM(s) Oral daily  imipramine 25 milliGRAM(s) Oral daily  lidocaine   Patch 1 Patch Transdermal daily  metoprolol tartrate 50 milliGRAM(s) Oral two times a day  montelukast 10 milliGRAM(s) Oral daily  pantoprazole    Tablet 40 milliGRAM(s) Oral before breakfast  polyethylene glycol 3350 17 Gram(s) Oral daily  senna 2 Tablet(s) Oral at bedtime  thiamine 100 milliGRAM(s) Oral daily    MEDICATIONS  (PRN):  acetaminophen   Tablet .. 650 milliGRAM(s) Oral every 6 hours PRN Temp greater or equal to 38C (100.4F), Mild Pain (1 - 3), Moderate Pain (4 - 6)  ALBUTerol    90 MICROgram(s) HFA Inhaler 2 Puff(s) Inhalation every 4 hours PRN Shortness of Breath and/or Wheezing  benzonatate 100 milliGRAM(s) Oral three times a day PRN Cough  guaiFENesin   Syrup  (Sugar-Free) 200 milliGRAM(s) Oral every 6 hours PRN Cough  hydrocodone/homatropine Syrup 5 milliLiter(s) Oral at bedtime PRN for severe cough -    Pertinent Labs:  04-30 Na134 mmol/L<L> Glu 151 mg/dL<H> K+ 3.8 mmol/L Cr  1.03 mg/dL BUN 17 mg/dL 04-30 Phos 3.0 mg/dL 04-30 Alb 3.3 g/dL      Diet, Regular:   Low Sodium (04-23-20 @ 11:49)      PLAN/RECOMMENDATIONS:    1) Continue PO current diet, as tolerated.    2) Obtain current & weekly weights  3) Please document percentage PO intake of meals to help further assess nutritional status.      RDN remains available and will f/u PRN.          Rebeca Sultana, DAYANA, CDN pager [phone]

## 2020-04-30 NOTE — PROGRESS NOTE ADULT - PROBLEM SELECTOR PLAN 1
suspect secondary to COVID19 but could be due to other viral etiologies/bleomycin toxicity  s/p IV solumedrol 4/22-4/25  COVID 19 Negative from 4/20, 4/25, 4/28  Proning/chest PT, hematology recommending against tociluzimab unless COVID (+) and with rising inflammatory markers  Titrate down FiO2 to keep sats in lower 90s  Reswab for COVID 19 on 4/28 also neg. RVP neg  Fungitell, cryptococcus Ag, histoplasma Ag, coccidiodes Ab testing  Appreciate pulmonology, bronch high risk per pulm.  respiratory status further decompensate on NRB, code blue and RRT, intubated 4/30/20, trach for difficulty airway due to trachea stenosis. Transferred to ICU

## 2020-04-30 NOTE — CONSULT NOTE ADULT - ASSESSMENT
74 year old male with PMHx of COPD, DANIEL on CPAP, depression, HLD, HTN, CAD s/p PCI, CHB s/p PPM, bioprosthetic AVR, dementia, Stage IV Hodgkin's lymphoma with lung nodule, and tracheal stenosis. Palliative care consulted for GOC discussion.
74 year old male with PMHx of COPD, DANIEL on CPAP, depression, HLD, HTN, CAD s/p PCI, CHB s/p PPM, bioprosthetic AVR, dementia, Stage IV Hodgkin's lymphoma with lung nodule, tracheal stenosis was admitted to [place]  ED 4/20-4/2 for SOB x 3-4 days , hypoxia and was about to be intubated on admission at [facility], but his O2 sats improved with BiLevel PAP which was later transitioned to a venti mask.     CT chest showed pattern of GGO suggests infection including atypical pneumonia/viral infection from atypical agents including COVID-19.  Small embolus within the anterobasilar subsegmental right lower lobe pulmonary artery. COVID test was negative  though  clinically was suspicious for  covid – CT  chest shows GGO, biomarkers elevated   Pt was transferred to [facility]  for continuation of medical care   During my interview, pt awake, conversant, reports improvement in SOB   Intermittent cough (22 Apr 2020 17:08)    Vitals:  T 97.4, P 98, /82, RR 19, O2 sat 95% NRB 15 L.   Pt with neutropenia.  Ferritin 793, CRP 8.7, RVP (-), Covid pcr (-) x 3.  CT angio chest shows GGO and reticular opacities b/l.  Pt a/w acute hypoxic respiratory failure, pt requiring venti mask on admission.  Pt is being managed for COPD and has been on methylprednisolone inhalers and steroids as well as lasix.  Pt has known DVT with picc line, on full dose lovenox.  Hematology following for lymphoma, s/p recent chemo and followed for pancytopenia.      Pt with suspected COVID pna despite multiple pcr test (-).  Bleomycin toxicity also being considered.      ID consult called for further recommendations.     Suspected Covid:    - CT angio chest (+) b/l GGOs and small PE in RLL.  Pt with hypoxia, requiring NRB.  Pt already on COPD managment and s/p solumedrol for possible bleomycin toxicity, however without improvement.  Despite Covid (-) PCR x 3, suspicion remains high.      - Pt being considered for tocilizumab (f/u with Heme given pt with lymphoma and recently received chemotherapy) and convalescent plasma if covid PCR tests (+).  Consider bronchoscopy for further covid PCR testing.    - Check repeat ferritin, d-dimer and crp in 48 to 72 hrs.    - r/o atypical causes of pna.  Check fungitell, Legionella Ag, histoplasma, crypt ag, coccidiodes.     Neutropenia:    - Given neutropenia, agree with prophylactic abx.  Pt with QTc > 500, avoid fluoroquinolones if possible.  Can start cefepime for now.  Monitor neutropenia and temp curve.     - Check blood cultures if pt spikes fever.      Will follow,    Kaity Rodriguez  [phone]

## 2020-04-30 NOTE — PROGRESS NOTE ADULT - ASSESSMENT
74 year old male with PMHx of COPD, DANIEL on CPAP, depression, HLD, HTN, CAD s/p PCI, CHB s/p PPM, bioprosthetic AVR, dementia, Stage IV Hodgkin's lymphoma with lung nodule, tracheal stenosis was admitted to [place]  [facility] 4/20-4/2 for SOB x 3-4 days , hypoxia and was about to be intubated on admission at [facility], but his O2 sats improved with BiLevel PAP which was later transitioned to a venti mask.     CT chest showed pattern of GGO suggests infection including atypical pneumonia/viral infection from atypical agents including COVID-19.  Small embolus within the anterobasilar subsegmental right lower lobe pulmonary artery. COVID test was negative  though  clinically was suspicious for  covid – CT  chest shows GGO, biomarkers elevated   Pt was transferred to [facility]  for continuation of medical care   During my interview, pt awake, conversant, reports improvement in SOB   Intermittent cough (22 Apr 2020 17:08)    Vitals:  T 97.4, P 98, /82, RR 19, O2 sat 95% NRB 15 L.   Pt with neutropenia.  Ferritin 793, CRP 8.7, RVP (-), Covid pcr (-) x 3.  CT angio chest shows GGO and reticular opacities b/l.  Pt a/w acute hypoxic respiratory failure, pt requiring venti mask on admission.  Pt is being managed for COPD and has been on methylprednisolone inhalers and steroids as well as lasix.  Pt has known DVT with picc line, on full dose lovenox.  Hematology following for lymphoma, s/p recent chemo and followed for pancytopenia.      Pt with suspected COVID pna despite multiple pcr test (-).  Bleomycin toxicity also being considered.      ID consult called for further recommendations.     Suspected Covid:    - CT angio chest (+) b/l GGOs and small PE in RLL.  Pt with hypoxia, requiring NRB.  Pt already on COPD managment and s/p solumedrol for possible bleomycin toxicity, however without improvement.  Despite Covid (-) PCR x 4, suspicion remains high.      - Pt being considered for tocilizumab (f/u with Heme given pt with lymphoma and recently received chemotherapy) and convalescent plasma if covid PCR tests (+).  Consider bronchoscopy for further covid PCR testing - as per Pulm pt is considered high risk candidate at this time.    - Check repeat ferritin, d-dimer and crp in 48 to 72 hrs - inflammatory markers rising    - r/o atypical causes of pna.  Check fungitell, Legionella Ag (neg), histoplasma, crypt ag (neg), coccidiodes.     Neutropenia:    - Given neutropenia, agree with prophylactic abx.  Pt with QTc > 500, avoid fluoroquinolones if possible.  Wbc rising s/p filgrastim.  Cont cefepime for now as pt cont to have fever.      - Blood cultures testing.        * Pt unresponsive, hypotensive.  Repeat cxr with worsening b/l infiltrates, s/p code blue, s/p emergent trach, now on pressors, transferred to ICU.  Palliative on board.      Will follow,    Kaity Rodriguez  [phone]

## 2020-04-30 NOTE — CONSULT NOTE ADULT - PROBLEM SELECTOR RECOMMENDATION 4
-Continue PRN albuterol and symbicort. -Continue chest PT  -Possible bronchoscopy today?  -Continue PRN albuterol

## 2020-04-30 NOTE — CHART NOTE - NSCHARTNOTEFT_GEN_A_CORE
Pt was code blue- RRT and anesthesia at bedside. CPR performed, trach performed due to history of tracheal stenosis. Wife Patricia aware and agreeable to trach. Pt transferred to [facility], signout given to resident. Dr. Moraes aware.

## 2020-04-30 NOTE — CHART NOTE - NSCHARTNOTEFT_GEN_A_CORE
TAYLOR MCFADDEN  MRN-[id number]    CODE BLUE CHART NOTE    Code Blue called for unresponsiveness. CPR already started prior to arrival. Pt received compressions for apprx 4 minutes and 1 epi. ROSC after CPR. BP in 60/40s, levophed was started. Pt received oxygen via BVM. Anesthesias at bedside for intubation. Anesthesia with difficult intubation likely due to hx of tracheal stenosis. Code Airway was called. ENT/Surgery performed emergent tracheostomy. TAYLOR MCFADDEN  [id number]    CODE BLUE CHART NOTE    Code Blue called for unresponsiveness. CPR already started prior to arrival. Pt received compressions for apprx 4 minutes and 1 epi. ROSC after CPR. BP in 60/40s, levophed was started. Pt received oxygen via BVM. Anesthesias at bedside for intubation. Anesthesia with difficult intubation likely due to hx of tracheal stenosis. Code Airway was called. ENT/Surgery performed emergent tracheostomy successfully. Pt transferred to ICU. For detail of meds given, please refer to code blue sheet in chart.    Donaldo Castillo MD  Internal Medicine, PGY-3  Pager: [phone] TAYLOR MCFADDEN  MRN-[id number]    CODE BLUE CHART NOTE    Code Blue called for unresponsiveness. CPR already started prior to arrival. Pt received compressions for apprx 4 minutes and 1 epi. ROSC after CPR. BP in 60/40s, levophed was started. Pt received oxygen via BVM. Anesthesias at bedside for intubation. Anesthesia with difficult intubation likely due to hx of tracheal stenosis. Code Airway was called. ENT/Surgery performed emergent tracheostomy successfully. Pt transferred to ICU. For detail of meds given, please refer to code blue sheet in chart.    Donaldo Castillo MD  Internal Medicine, PGY-3  Pager: [phone]      ---ATTENDING NOTE---    I agree with above.     Critical Care Attestation:  Attending with resident/fellow:  I have personally provided 35 minutes of critical care time concurrently with the resident/fellow. This time excludes time spent on separate procedures and time spent teaching. I have reviewed the resident/fellow’s documentation and I agree with the assessment and plan of care.

## 2020-04-30 NOTE — PROGRESS NOTE ADULT - PROBLEM SELECTOR PLAN 2
pt was seen by Pulmonology at [facility] -Continue Methylprednisolone Cont inhalers; s/p solumedrol 4/22-4/25  Continue Lasix   continue to monitor
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
## 2020-04-30 NOTE — CONSULT NOTE ADULT - PROBLEM SELECTOR RECOMMENDATION 6
-Continue chest PT  -Possible bronchoscopy today?  -Continue PRN albuterol -Patient has a PMHx of dementia but does not take any dementia medications.  -Patient received lorazepam 0.5 mg this morning for agitation.  -Pt was on a 1:1 watch when assessed this morning and was AAO to person and place but, confused to date. Patient was extremely restless at time of interview.  -Monitor ABG's (some confusion could be r/t hypoxia)  -F/u with wife regarding baseline neurological status.

## 2020-04-30 NOTE — CONSULT NOTE ADULT - PROBLEM SELECTOR RECOMMENDATION 8
Patient with Stage IV Hodgkin's lymphoma with lung nodule, who is now trached-on ventilator, pressors, and in the ICU.  -Weaning the patient off the ventilator and pressors may be difficult with underlying comorbidities of COPD, +PE, Dementia, HLD, HTN, CAD, and CHB.  -Will call wife Patricia tomorrow morning to discuss/clarify GOC.

## 2020-04-30 NOTE — CONSULT NOTE ADULT - PROBLEM SELECTOR RECOMMENDATION 5
-Patient has a PMHx of dementia but does not take any dementia medications.  -Patient received lorazepam 0.5 mg this morning for agitation.  -Pt was on a 1:1 watch when assessed this morning and was AAO to person and place but, confused to date. Patient was extremely restless at time of interview.  -Monitor ABG's (some confusion could be r/t hypoxia)  -F/u with wife regarding baseline neurological status. -Continue fentanyl infusion and titrate as needed for adequate pain control.

## 2020-04-30 NOTE — CONSULT NOTE ADULT - SUBJECTIVE AND OBJECTIVE BOX
HPI:  74 year old male with PMHx of COPD, DANIEL on CPAP, depression, HLD, HTN, CAD s/p PCI, CHB s/p PPM, bioprosthetic AVR, dementia, Stage IV Hodgkin's lymphoma with lung nodule, tracheal stenosis was admitted to [facility] ED 4/2-4/20 for SOB x 3-4 days, hypoxia, and was about to be intubated on admission at [facility], but his O2 sats improved with BiLevel PAP which was later transitioned to a venti mask   CT chest showed pattern of GGO, suggests infection including atypical pneumonia/viral infection from atypical agents including COVID-19. Small embolus within the anterobasilar subsegmental right lower lobe pulmonary artery. COVID test was negative though clinically was suspicious for covid, biomarkers elevated.  Pt was transferred to [facility]  for continuation of medical care.  Upon exam today, pt was extremely SOB and only able to answer simple yes/no questions.    PERTINENT PM/SXH:   Pacemaker  Hypertension  Lymphoma  Dementia  COPD (chronic obstructive pulmonary disease)  Depression  DVT (deep venous thrombosis)  HLD (hyperlipidemia)  HTN (hypertension)  Aortic valve replaced  Stented coronary artery  CAD (coronary artery disease)    S/P AVR (aortic valve replacement)  Cardiac pacemaker  Artificial pacemaker  S/P aortic valve replacement with porcine valve    FAMILY HISTORY:  FH: ovarian cancer  FH: liver cancer    ITEMS NOT CHECKED ARE NOT PRESENT    SOCIAL HISTORY:   Significant other/partner:  [ ]  Children:  [ ]  Lutheran/Spirituality:  Substance hx:  [ ]   Tobacco hx:  [ ]   Alcohol hx: [ ]   Home Opioid hx:  [ ] I-Stop Reference No:  Living Situation: [x]Home  [ ]Long term care  [ ]Rehab [ ]Other    ADVANCE DIRECTIVES:    DNR  MOLST  [ ]  Living Will  [ ]   DECISION MAKER(s):  [ ] Health Care Proxy(s)  [x] Surrogate(s)  [ ] Guardian           Name(s): Phone Number(s): wife Patricia [phone]    BASELINE (I)ADL(s) (prior to admission):  [place]: [ ]Total  [ ] Moderate [ ]Dependent    Allergies    No Known Allergies    Intolerances    MEDICATIONS  (STANDING):  aspirin enteric coated 81 milliGRAM(s) Oral daily  atorvastatin 10 milliGRAM(s) Oral at bedtime  atovaquone Suspension 1500 milliGRAM(s) Oral daily  budesonide 160 MICROgram(s)/formoterol 4.5 MICROgram(s) Inhaler 2 Puff(s) Inhalation two times a day  cefepime   IVPB      cefepime   IVPB 2000 milliGRAM(s) IV Intermittent every 8 hours  chlorhexidine 0.12% Liquid 15 milliLiter(s) Oral Mucosa every 12 hours  chlorhexidine 4% Liquid 1 Application(s) Topical <User Schedule>  enoxaparin Injectable 100 milliGRAM(s) SubCutaneous every 12 hours  fentaNYL    Injectable 200 MICROGram(s) IV Push once  fentaNYL   Infusion. 0.5 MICROgram(s)/kG/Hr (5.17 mL/Hr) IV Continuous <Continuous>  folic acid 1 milliGRAM(s) Oral daily  imipramine 25 milliGRAM(s) Oral daily  lidocaine   Patch 1 Patch Transdermal daily  midazolam Injectable 4 milliGRAM(s) IV Push once  montelukast 10 milliGRAM(s) Oral daily  norepinephrine Infusion 0.05 MICROgram(s)/kG/Min (4.84 mL/Hr) IV Continuous <Continuous>  pantoprazole  Injectable 40 milliGRAM(s) IV Push daily  polyethylene glycol 3350 17 Gram(s) Oral daily  propofol Infusion 10 MICROgram(s)/kG/Min (6.2 mL/Hr) IV Continuous <Continuous>  senna 2 Tablet(s) Oral at bedtime  thiamine 100 milliGRAM(s) Oral daily    MEDICATIONS  (PRN):  acetaminophen Tablet 650 milliGRAM(s) Oral every 6 hours PRN Temp greater or equal to 38C (100.4F), Mild Pain (1 - 3), Moderate Pain (4 - 6)  ALBUTerol 90 MICROgram(s) HFA Inhaler 2 Puff(s) Inhalation every 4 hours PRN Shortness of Breath and/or Wheezing    PRESENT SYMPTOMS: [X]Unable to obtain due to poor mentation   Source if other than patient:  [ ]Family   [ ]Team     Pain (Impact on QOL):  Denies pain.  Location -         Minimal acceptable level (0-10 scale):   Aggravating factors -  Quality -  Radiation -  Severity (0-10 scale) -    Timing -    PAIN AD Score:     [email or web address] (press ctrl +  left click to view)    Dyspnea:                           [ ]Mild [ ]Moderate [X]Severe  Anxiety:                             [ ]Mild [X]Moderate [ ]Severe  Fatigue:                             [ ]Mild [ ]Moderate [ ]Severe  Nausea:                             [ ]Mild [ ]Moderate [ ]Severe  Loss of appetite:              [ ]Mild [ ]Moderate [ ]Severe  Constipation:                    [ ]Mild [ ]Moderate [ ]Severe  Grief Present                    [ ] Yes   [ ] No   Other Symptoms:  [X]All other review of systems negative     Karnofsky Performance Score/Palliative Performance Status Version 2:  20%    http://palliative.info/resource_material/[email or web address].pdf    PHYSICAL EXAM:  Vital Signs Last 24 Hrs  T(C): 36.4 (30 Apr 2020 14:06), Max: 37.2 (29 Apr 2020 22:55)  T(F): 97.6 (30 Apr 2020 14:06), Max: 98.9 (29 Apr 2020 22:55)  HR: 109 (30 Apr 2020 14:06) (74 - 109)  BP: 118/80 (30 Apr 2020 06:29) (107/75 - 118/80)  BP(mean): --  RR: 17 (30 Apr 2020 14:06) (17 - 21)  SpO2: 98% (30 Apr 2020 14:06) (92% - 98%)     GENERAL:  []Alert  [X]Oriented x2 (person and place)  [X]Lethargic  [ ]Cachexia  [ ]Unarousable  [X]Verbal  [ ]Non-Verbal  Behavioral:   [X] Anxiety  [] Delirium [ ] Agitation [ ] Other  HEENT:  [X]Normal   [ ]Dry mouth   [ ]ET Tube/Trach  [ ]Oral lesions  PULMONARY:   [ ]Clear [X]Tachypnea  [ ]Audible excessive secretions   [ ]Rhonchi        [ ]Right [ ]Left [ ]Bilateral  [ ]Crackles        [ ]Right [ ]Left [ ]Bilateral  [ ]Wheezing     [ ]Right [ ]Left [ ]Bilateral  CARDIOVASCULAR:    [X]Regular [ ]Irregular [ ]Tachy  [ ]Morales [ ]Murmur [ ]Other  GASTROINTESTINAL:  [X]Soft  [ ]Distended   [X]+BS  [ ]Non tender [ ]Tender  [ ]PEG [ ]OGT/ NGT  Last BM:   GENITOURINARY:  []Normal [X] Incontinent   [ ]Oliguria/Anuria   [ ][place]  MUSCULOSKELETAL:   [ ]Normal   [X]Weakness  []Bed/Wheelchair bound [ ]Edema  NEUROLOGIC:   [X]No focal deficits  [ ] Cognitive impairment  [ ] Dysphagia [ ]Dysarthria [ ] Paresis [ ]Other   SKIN:   []Normal   [ ]Pressure ulcer(s)  [ ]Rash    CRITICAL CARE:  [ ] Shock Present  [ ]Septic [ ]Cardiogenic [ ]Neurologic [ ]Hypovolemic  [ ]  Vasopressors [ ]  Inotropes   [ ] Respiratory failure present  [ ] Acute  [ ] Chronic [ ] Hypoxic  [ ] Hypercarbic [ ] Other  [ ] Other organ failure     LABS:                        11.6   21.39 )-----------( 211      ( 30 Apr 2020 04:30 )             37.4   04-30    134<L>  |  89<L>  |  17  ----------------------------<  151<H>  3.8   |  26  |  1.03    Ca    9.0      30 Apr 2020 04:30  Phos  3.0     04-30  Mg     2.5     04-30    TPro  6.2  /  Alb  3.3  /  TBili  0.8  /  DBili  x   /  AST  29  /  ALT  14  /  AlkPhos  184<H>  04-30    Urinalysis (04.20.20 @ 14:14)    Glucose Qualitative, Urine: Negative mg/dL / Blood, Urine: Trace    pH Urine: 5.0 / Color: Yellow / Urine Appearance: Clear    Bilirubin: Negative / Ketone - Urine: Negative / Specific Gravity: 1.010    Protein, Urine: Negative mg/dL / Urobilinogen: Negative mg/dL    Nitrite: Negative / Leukocyte Esterase Concentration: Negative    RADIOLOGY & ADDITIONAL STUDIES:    < from: Xray Chest 1 View- PORTABLE-Urgent (04.30.20 @ 14:40) >  Impression:  1.  Interval placement of left IJ catheter with tip in left brachiocephalic vein/SVC junction.  2.  Increased bilateral patchy lung opacities.    < from: Xray Chest 1 View- PORTABLE-Urgent (04.24.20 @ 08:14) >  Impression:  1.  Patchy bilateral lung opacities likely representing infection.    < from: CT Angio Chest w/ IV Cont (04.21.20 @ 10:39) >  IMPRESSION:   Pattern of GGO suggests infection including atypical pneumonia/viral infection from atypical agents including COVID-19 (C19V-1).  Small embolus within the anterobasilar subsegmental right lower lobe pulmonary artery.  These findings were discussed with Dr. JUAN CARLOS THOMPSON, on 4/21/2020 10:55 AM with read back.    < from: CT Upper Extremity w/ IV Cont, Right (02.16.20 @ 14:03) >  Impression:  Redemonstration of deep vein thrombosis within the right axillary and basilic vein. Surrounding soft tissue swelling is noted. There is no drainable fluid collection. No subcutaneous air. No CT evidence of osteomyelitis.    < end of copied text >    PROTEIN CALORIE MALNUTRITION PRESENT: [ ] Yes [ ] No  [X] PPSV2 < or = to 30% [ ] significant weight loss  [X] poor nutritional intake [ ] catabolic state [ ] anasarca     Albumin, Serum: 3.3 g/dL (04-30-20 @ 04:30)  Artificial Nutrition [ ]     REFERRALS:   [ ]Chaplaincy  [ ] Hospice  [ ]Child Life  [ ]Social Work  [ ]Case management [ ]Holistic Therapy   Goals of Care Discussion Document: ANDRES Velasquez (04-20-20 @ 17:07)  Goals of Care Conversation:   Participants:  · Spouse  patricia taylor    Advance Directives:  · Does patient have Advance Directive  No  · Do you want to complete the HCP and name a Sunil Care Agent  No  · Does Patient Have a Surrogate  Yes    Conversation Discussion:  · Conversation  Diagnosis; Prognosis; MOLST Discussed; Treatment Options  · Conversation Details  Spoke to pt wife Patricia who states pt has her make all of his decisions.  She states that she did have a conversation with the pt and the both agree that they would want all resuscitative efforts made and that she would determine when to change the course of treatment as pts condition warranted.    Personal Advance Directives Treatment Guidelines:   Treatment Guidelines:  · Decision Maker  Surrogate    Electronic Signatures:  Alejandra Velasquez (Administration)  (Signed 20-Apr-2020 17:11)  	Authored: Goals of Care Conversation, Personal Advance Directives Treatment Guidelines      Last Updated: 20-Apr-2020 17:11 by Alejandra Velasquez (Administration)

## 2020-04-30 NOTE — CONSULT NOTE ADULT - PROBLEM SELECTOR RECOMMENDATION 3
-F/u with oncologist regarding chemotherapy and how the patient tolerated it-and also what the next steps are that were discussed with the patient and family. -Continue PRN albuterol and symbicort.

## 2020-04-30 NOTE — PROGRESS NOTE ADULT - SUBJECTIVE AND OBJECTIVE BOX
Patient is a 74 y old  Male who presents with a chief complaint of COVID 19 infection (26 Apr 2020 11:03)      SUBJECTIVE / OVERNIGHT EVENTS:  code blue and RRT  s/p difficult intubation. s/p code airway  s/p Trach  transferred to ICU      Vital Signs Last 24 Hrs  T(C): 36.4 (30 Apr 2020 14:06), Max: 37.2 (29 Apr 2020 22:55)  T(F): 97.6 (30 Apr 2020 14:06), Max: 98.9 (29 Apr 2020 22:55)  HR: 109 (30 Apr 2020 14:06) (74 - 109)  BP: 118/80 (30 Apr 2020 06:29) (107/75 - 118/80)  BP(mean): --  RR: 17 (30 Apr 2020 14:06) (17 - 21)  SpO2: 98% (30 Apr 2020 14:06) (92% - 98%)  I&O's Summary      PHYSICAL EXAM:  GENERAL: NAD, intubated, sedated, +trach  HEAD:  Atraumatic, Normocephalic  EYES: EOMI, PERRLA, conjunctiva and sclera clear  NECK: Supple, No JVD  CHEST: Mild dec breath sounds b/l. no wheezing  HEART: Regular rate and rhythm; No murmurs, rubs, or gallops  ABDOMEN: Soft, Nontender, Nondistended; Bowel sounds present  Neuro: intubated, sedated  EXTREMITIES:  2+ Peripheral Pulses, No clubbing, cyanosis, or edema  SKIN: No rashes or lesions     LABS:                        11.6   21.39 )-----------( 211      ( 30 Apr 2020 04:30 )             37.4     04-30    134<L>  |  89<L>  |  17  ----------------------------<  151<H>  3.8   |  26  |  1.03    Ca    9.0      30 Apr 2020 04:30  Phos  3.0     04-30  Mg     2.5     04-30    TPro  6.2  /  Alb  3.3  /  TBili  0.8  /  DBili  x   /  AST  29  /  ALT  14  /  AlkPhos  184<H>  04-30      CAPILLARY BLOOD GLUCOSE                RADIOLOGY & ADDITIONAL TESTS:    Imaging Personally Reviewed:  [x] YES  [ ] NO    Consultant(s) Notes Reviewed:  [x] YES  [ ] NO      MEDICATIONS  (STANDING):  budesonide 160 MICROgram(s)/formoterol 4.5 MICROgram(s) Inhaler 2 Puff(s) Inhalation two times a day  cefepime   IVPB      cefepime   IVPB 2000 milliGRAM(s) IV Intermittent every 8 hours  chlorhexidine 0.12% Liquid 15 milliLiter(s) Oral Mucosa every 12 hours  chlorhexidine 4% Liquid 1 Application(s) Topical <User Schedule>  enoxaparin Injectable 100 milliGRAM(s) SubCutaneous every 12 hours  fentaNYL    Injectable 200 MICROGram(s) IV Push once  fentaNYL   Infusion. 0.5 MICROgram(s)/kG/Hr (5.17 mL/Hr) IV Continuous <Continuous>  furosemide    Tablet 40 milliGRAM(s) Oral daily  lidocaine   Patch 1 Patch Transdermal daily  metoprolol tartrate 50 milliGRAM(s) Oral two times a day  midazolam Injectable 4 milliGRAM(s) IV Push once  norepinephrine Infusion 0.05 MICROgram(s)/kG/Min (4.84 mL/Hr) IV Continuous <Continuous>  propofol Infusion 10 MICROgram(s)/kG/Min (6.2 mL/Hr) IV Continuous <Continuous>    MEDICATIONS  (PRN):  ALBUTerol    90 MICROgram(s) HFA Inhaler 2 Puff(s) Inhalation every 4 hours PRN Shortness of Breath and/or Wheezing  benzonatate 100 milliGRAM(s) Oral three times a day PRN Cough  guaiFENesin   Syrup  (Sugar-Free) 200 milliGRAM(s) Oral every 6 hours PRN Cough  hydrocodone/homatropine Syrup 5 milliLiter(s) Oral at bedtime PRN for severe cough -      Care Discussed with Consultants/Other Providers [x] YES  [ ] NO    HEALTH ISSUES - PROBLEM Dx:  Pancytopenia due to antineoplastic chemotherapy: Pancytopenia due to antineoplastic chemotherapy  Pulmonary thromboembolism  Need for prophylactic measure: Need for prophylactic measure  HLD (hyperlipidemia): HLD (hyperlipidemia)  Dementia: Dementia  Hypertension: Hypertension  CAD (coronary artery disease): CAD (coronary artery disease)  Lymphoma: Lymphoma  Pulmonary embolism: Pulmonary embolism  COPD (chronic obstructive pulmonary disease): COPD (chronic obstructive pulmonary disease)  Respiratory failure with hypoxia: Respiratory failure with hypoxia

## 2020-05-01 NOTE — CHART NOTE - NSCHARTNOTEFT_GEN_A_CORE
Called by bedside by nurse for TAYLOR MCFADDEN    On physical exam, no spontaneous movements were present. Patient did not respond to verbal or physical stimuli. Pupils were mid-dilated and fixed. No breath sounds were appreciated over either lung field. No carotid pulses were palpable. No heart sounds were auscultated.   Patient pronounced dead at 4:05 am. Attending notified.  Family was notified. Family declined autopsy.     Tessa Burkett MD  PGY-3, Internal Medicine  [place]: [phone]  [facility]: [id number]

## 2020-05-01 NOTE — DISCHARGE NOTE FOR THE EXPIRED PATIENT - HOSPITAL COURSE
74 M with PMHx of COPD, DANIEL on CPAP, depression, HLD, HTN, CAD s/p PCI, CHB s/p PPM, bioprosthetic AVR, dementia, Stage IV Hodgkin's lymphoma with lung nodule, tracheal stenosis was presented to [facility] ED 4/20-4/2 with SOB x 3-4 days, found to be hypoxic. O2 sats improved with bipap and patient was transitioned to venti mask. CT chest showed pattern of GGO suggests infection including atypical pneumonia/viral infection from atypical agents including COVID-19. Also found to have a small embolus within the anterobasilar subsegmental right lower lobe pulmonary artery. COVID test was negative. Patient was transferred to [facility]. COVID neg x4. s/p solumedrol for possible bleomycin toxicity, no improvement. Code blue called for patient on 4/30. CPR was performed, ROSC achieved after 5 minutes. Patient with difficult airway given history of tracheal stenosis; oral intubation attempted multiple times without success. Decision was made to perform percutaneous tracheostomy. Patient had signs of brain death, and upon d/w family, decision made to make patient comfort care. Patient passed away at 4:05 am.

## 2020-05-01 NOTE — CHART NOTE - NSCHARTNOTEFT_GEN_A_CORE
Called and spoke with Patricia, patient's wife and surrogate. Explained that patient currently does not have any pupillary or corneal reflex. He has significant acidemia in his blood, which I explained to Patricia and her daughter, Ana. I explained my concern that her  clinically had findings consistent w/ brain death, and also was decompensating from a hemodynamic standpoint. This occurred in the setting of his cardiac arrest and difficult intubation, which caused his brain to have relative hypoxia for a presumably prolonged period of time. Explained the options going forward in his care, and the differences between aggressive medical therapy and symptom focused approach. Patricia agreed that she did not want to cause her  more suffering, and has decided to make patient DNR. She would like to come in and visit with her daughter tonight. I completed a MOLST form and coordinated with the night ANM of this unit, Neeru Gagnon (Spectra# [id number]) to have wife and one daughter come in for visitation for 15 minutes. Following their visit, we agreed that we will focus on his comfort, stopping blood draws, and providing medicines for dyspnea, discomfort, and increased secretions.     Tessa Burkett MD  PGY-3, Internal Medicine  [facility]: [phone]  [facility]: [id number] Called and spoke with Patricia, patient's wife and surrogate. Explained that patient currently does not have any pupillary or corneal reflex. He has significant acidemia in his blood, which I explained to Patricia and her daughters, Thi and Krystle. I explained my concern that her  clinically had findings consistent w/ brain death, and also was decompensating from a hemodynamic standpoint. This occurred in the setting of his cardiac arrest and difficult intubation, which caused his brain to have relative hypoxia for a presumably prolonged period of time. Explained the options going forward in his care, and the differences between aggressive medical therapy and symptom focused approach. Patricia agreed that she did not want to cause her  more suffering, and has decided to make patient DNR. She would like to come in and visit with her daughter tonight. I completed a MOLST form and coordinated with the night ANM of this unit, Neeru Gagnon (Spectra# [id number]) to have wife and one daughter come in for visitation for 15 minutes. Following their visit, we agreed that we will focus on his comfort, stopping blood draws, and providing medicines for dyspnea, discomfort, and increased secretions.     Tessa Burkett MD  PGY-3, Internal Medicine  [place]: [phone]  [facility]: [phone]

## 2020-05-02 LAB
COCCIDIOIDES AB SER CF-ACNC: NEGATIVE — SIGNIFICANT CHANGE UP
COCCIDIOIDES IGG SPEC QL IA: NEGATIVE — SIGNIFICANT CHANGE UP

## 2020-05-04 LAB
CULTURE RESULTS: SIGNIFICANT CHANGE UP
CULTURE RESULTS: SIGNIFICANT CHANGE UP
SPECIMEN SOURCE: SIGNIFICANT CHANGE UP
SPECIMEN SOURCE: SIGNIFICANT CHANGE UP

## 2020-05-05 LAB — C IMMITIS IGM SPEC QL IA: NEGATIVE — SIGNIFICANT CHANGE UP

## 2020-05-12 ENCOUNTER — APPOINTMENT (OUTPATIENT)
Dept: NEUROLOGY | Facility: CLINIC | Age: 75
End: 2020-05-12
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
## 2020-05-16 ENCOUNTER — APPOINTMENT (OUTPATIENT)
Dept: INTERNAL MEDICINE | Facility: CLINIC | Age: 75
End: 2020-05-16

## 2020-06-01 ENCOUNTER — APPOINTMENT (OUTPATIENT)
Dept: PULMONOLOGY | Facility: CLINIC | Age: 75
End: 2020-06-01

## 2020-06-16 NOTE — PROGRESS NOTE ADULT - PROBLEM SELECTOR PLAN 1
secondary to thrombophlebitis with underlying cellulitis  ct of right UE, no findings of abscess  s/p vanco and zosyn  ID consulted, start on vantin and doxy x 5 days  cxr no acute findings   right arm cellulitis still swollen/tender on palpation, concern for abscess   No fevers for at least two days  f/u repeat blood cx Hemostasis: Aluminum Chloride secondary to thrombophlebitis with underlying cellulitis  - CT of right UE, no findings of abscess  - Continue Vantin + Dozy for 5 days total  - Right arm cellulitis still swollen/tender on palpation, concern for abscess   - ID: Dr. Arroyo following  - Heme: Dr. Majano following  - f/u repeat blood cx

## 2020-09-05 NOTE — PROGRESS NOTE ADULT - PROBLEM/PLAN-8
- Chronic/baseline history of dementia  - Delirium precautions  - Geriatric Medicine evaluation and recommendations appreciated  - Continue current medication regimen   - Outpatient follow-up with PCP  DISPLAY PLAN FREE TEXT

## 2020-10-16 NOTE — DISCHARGE NOTE ADULT - INSTRUCTIONS
Discharge Summary      Patient ID: Elonda Severin      Patient's PCP: Gypsy Ramirez MD    Admit Date: 10/10/2020     Discharge Date:  10/16/2020    Admitting Provider: Albaro Barboza MD    Discharging Provider: FLASH Dickerson     Reason for this admission:   Hematemesis    Discharge Diagnoses: Active Hospital Problems    Diagnosis Date Noted    Elevated LFTs [R79.89] 10/15/2020    GI bleed [K92.2] 10/11/2020    COVID-19 [U07.1] 10/11/2020    Pneumonia of left lower lobe due to infectious organism [J18.9] 10/11/2020    Colitis [K52.9] 10/11/2020    Hematemesis [K92.0] 10/11/2020    Elevated BUN [R79.9] 10/11/2020    Benign essential HTN [I10] 10/11/2020    History of dementia [Z86.59]     Other constipation [K59.09]     Fecal impaction in rectum ([address] [K56.41] 07/03/2019    Heme positive stool [R19.5]     Anemia [D64.9] 07/02/2019    Declining functional status [R53.81] 07/02/2019       Procedures:  XR CHEST PORTABLE   Final Result      Left basilar minimal airspace disease. CT ABDOMEN PELVIS WO CONTRAST Additional Contrast? None   Final Result   1. Large amount of stool in the rectum. Fecal impaction should be    considered. There is question of some mild infiltration of the adjacent    fat. Stercoral colitis cannot be excluded. 2.  There are multiple prominent small bowel loops. Although a well-   defined transition point is not clearly identified, an obstructive    process cannot be excluded. 3.  Bilateral nephrolithiasis without definite CT evidence for    hydronephrosis. The ureters are not well-visualized. No definite    evidence for ureteral stone.                 Consults:   Case management  Pharmacy    Briefly:   Ms. Elonda Severin is a 69-year-old long-term female resident of [address] [place] [address] with PMH of chronic iron deficiency anemia, GI bleed, GERD, blindness, chronic back pain, dementia, depression, DMII, HTN, HLD, insomnia and recent COVID 19 admitted for hematemesis. Hematemesis spontaneously resolved and Hgb remained stable. Pt and her  declined further evaluation with scopes due to hx of GIB and prior endoscopies that did not reveal the source of bleeding. Pt had positive CXR findings associated with COVID and was started with remdesevir, dexamethasone, IV rocephin/azithromycin, vitamin c, vitamin d, magnesium and zinc with improvement. Earlier in hospital stay, patient had increased O2 demands up to 15 L nonrebreather. In the past 48 hours, O2 demand has diminished and she is currently on 3 L nasal cannula with stable oxygen saturations. Patient will be discharged back to [facility] today. She should continue droplet precautions for an additional 5 days. She will be given a prescription for dexamethasone to complete a 10-day course. She will also continue vitamin C, vitamin D, zinc and magnesium for the next 3 months. Hospital Course: Active Hospital Problems     Diagnosis Date Noted    GI bleed [K92.2]  -Present on admission with hematemesis.  Hemoccult positive.  History of iron deficiency anemia requiring recurrent transfusions.  Per chart review and family report, patient has had multiple upper and lower endoscopies that failed to identify any source of bleeding.  Family and patient refused any further endoscopies.   -Hemoglobin 13.5 on arrival.  Down to 10.5 on 10/11 and 9.3 on 10/12. Improved to 11.1 on 10/13 and 11.4 on 10/14. 10.5 on 10/15.  -Continue PPI BID and Carafate    10/11/2020    COVID-19 [U07.1]  -COVID-19 +10/11  -Patient is a longstanding resident of [address] [place] [address]  -treated with magnesium, vitamin C, zinc, remdesevir, dexamethasone, Rocephin and azithromycin  -Holding therapeutic anticoagulation secondary to GI bleed.  Patient and her  made aware it is contraindicated in her current state and both expressed understanding.  -Continue supplemental oxygen; patiently down from 12 L high flow earlier in hospital stay to 3L NC   -CODE STATUS DNR/DNI.  -Droplet plus precautions    Transaminitis  -LFTs noted to be trending up.   Suspect secondary to remdesivir  -We will continue to monitor on outpatient basis with repeat CMP in 2 weeks  -Hold home statin; restart once LFTs are within normal limits and when appropriate    10/11/2020    Pneumonia of left lower lobe due to infectious organism [J18.9]  -Secondary to COVID-19  -Chest x-ray 10/11 with left basilar airspace disease  -completed course of rocephin and azithromycin  -Continue treatment for COVID-19 as outlined above  -Continue supplemental oxygen    10/11/2020    Colitis [K52.9]  -Patient with chronic fecal impaction and suspected stercoral colitis on previous CT abdomen pelvis.  Evidence of large stool burden as far back as May 2018 per chart review.  -Per CT abdomen/pelvis July 2019 patient with abnormally enlarged mesorectal lymph nodes and presence of rectal neoplasm could not be ruled out.  Patient and her  have declined further work-up for this.  -treated with Flagyl and Rocephin while inpatient for possible colitis; will give an additional 5-day prescription for Flagyl on discharge  -Continue bowel regimen with daily suppositories and oral laxatives, stool softeners    10/11/2020    Hematemesis [K92.0]  -Now resolved  -Patient and her  have declined further endoscopies    10/11/2020    Elevated BUN [R79.9]  -Suspect related to GI bleed and possibly in part due to dehydration  -BUN wnl with resolution of GI bleed and IV fluids    10/11/2020    Benign essential HTN [I10]  -Continue Procardia    10/11/2020    History of dementia [Z86.59]  -Mild.  Continue Aricept.  Patient currently alert and oriented x3.         Other constipation [K59.09]  -Continue bowel regimen         Fecal impaction in rectum ([facility] [address] [K56.41]  -Longstanding issue per chart review  -Continue bowel regimen    07/03/2019    Heme positive stool [R19.5]  -See under anemia         Anemia [D64.9]  -Longstanding history of iron deficiency anemia requiring transfusions  -EGD and colonoscopy in July 2019 with Dr. Sharon Scott with no source of bleeding identified at that time.  Admitted at [facility] for the same in October 2019 and underwent capsule endoscopy and multiple colonoscopies without evidence of bleeding.  Patient has been reports biopsies were negative for malignancy at [facility]. -Patient and her  have declined any further endoscopies  -Hemoglobin 13.5 on arrival.  Down drifted to 10.5 on 10/11 after receiving IV fluids and 9.3 today.  -Continue PPI twice daily and Carafate  -Received Venofer infusion x2; continue oral iron  -Monitor and transfuse for hemoglobin less than 7    07/02/2019    Declining functional status [R53.81]  -Patient is a long-term resident of [address] [place] [address] she is bedbound and nonambulatory with severe contractures in her lower extremities. Isiah Palomares is also blind with history of mild dementia although alert and oriented x3 currently.  She requires assistance with all ADLs including eating and drinking.  Continue frequent oral care. -CODE STATUS DNR/DNI per patient request            Disposition: SNF    Discharged Condition: Stable    Vital Signs  Temp: 97.4 °F (36.3 °C)  Pulse: 105  Resp: 27  BP: (!) 144/60  SpO2: 98 %(dipped to 89 on room air with in bed effort; placed on 2 lpm)  O2 Device: Nasal cannula  O2 Flow Rate (L/min): 2 L/min    Vital signs reviewed in electronic chart. Physical exam  Constitutional:  Well developed, thin and frail elderly lady lying in bed in no acute distress on 3 L nasal cannula. Eyes: Blind in both eyes. Keeps eyelids closed most of the time. HENT:  Atraumatic, external ears normal, external nose/nares normal, oropharynx moist, no pharyngeal exudates. Neck:  Supple. No JVD or thyromegaly. Respiratory:  No respiratory distress, normal breath sounds, no rales, no wheezing.   On 3 L nasal cannula. Cardiovascular:  Normal rate, normal rhythm, no murmurs, no gallops, no rubs. GI:  Soft, nondistended, normal bowel sounds, nontender, no organomegaly, no mass. :  No costovertebral angle tenderness. Musculoskeletal:  No edema, no tenderness, no obvious deformities. Patient is moving all extremities. Integument:  Well hydrated, no rash. Lymphatic:  No cervical or axillary lymphadenopathy noted. Neurologic:  Alert & oriented x 3,  no focal deficits noted. Strength is equal throughout. Psychiatric:  Speech and behavior appropriate. Activity: activity as tolerated  Diet: Puréed, small and bite sized  Follow Up: Primary Care Physician in 2 weeks  Patient to proceed with the following lab (CMP) in 2 weeks    Labs: For convenience and continuity at follow-up the following most recent labs are provided:    CBC:   Lab Results   Component Value Date    WBC 5.1 10/16/2020    HGB 11.3 10/16/2020    HCT 36.7 10/16/2020     10/16/2020       RENAL:   Lab Results   Component Value Date     10/16/2020    K 3.3 10/16/2020    CL 99 10/16/2020    CO2 23 10/16/2020    BUN 16 10/16/2020    CREATININE <0.5 10/16/2020         Discharge Medications:     Current Discharge Medication List           Details   dexamethasone (DECADRON) 6 MG tablet Take 1 tablet by mouth daily for 5 days  Qty: 5 tablet, Refills: 0      magnesium oxide (MAG-OX) 400 (241.3 Mg) MG TABS tablet Take 1 tablet by mouth daily  Qty: 30 tablet, Refills: 3      zinc sulfate (ZINCATE) 220 (50 Zn) MG capsule Take 1 capsule by mouth daily  Qty: 30 capsule, Refills: 3      metroNIDAZOLE (FLAGYL) 500 MG tablet Take 1 tablet by mouth every 8 hours for 5 days  Qty: 15 tablet, Refills: 0      Cholecalciferol (VITAMIN D) 25 MCG TABS Take 1 tablet by mouth daily  Qty: 30 tablet, Refills: 3    Comments: Labeling may look different. 25 mcg=1000 Units. Please double check dosages.               Details   NIFEdipine (ADALAT CC) 60 MG extended release tablet Take 1 tablet by mouth daily  Qty: 30 tablet, Refills: 3      morphine (MS CONTIN) 15 MG extended release tablet Take 1 tablet by mouth 2 times daily for 10 days.   Qty: 20 tablet, Refills: 0    Comments: Reduce doses taken as pain becomes manageable  Associated Diagnoses: Chronic back pain, unspecified back location, unspecified back pain laterality              Details   glycerin moisturizing mouth spray (OASIS) 35 % LIQD Take 2 sprays by mouth 4 times daily      ferrous sulfate (IRON 325) 325 (65 Fe) MG tablet Take 1 tablet by mouth 3 times daily (with meals)  Qty: 60 tablet, Refills: 3      magnesium hydroxide (MILK OF MAGNESIA) 400 MG/5ML suspension Take 2,400 mg by mouth once as needed (if no bowel movement in 3 days (unless contraindicated))       ondansetron (ZOFRAN) 4 MG tablet Take 4 mg by mouth every 6 hours as needed for Nausea or Vomiting      cyclobenzaprine (FLEXERIL) 5 MG tablet Take 5 mg by mouth daily      topiramate (TOPAMAX) 25 MG tablet Take 25 mg by mouth 2 times daily      pantoprazole sodium (PROTONIX) 40 MG PACK packet Take 40 mg by mouth 2 times daily (before meals)      senna (SENOKOT) 8.6 MG tablet Take 2 tablets by mouth daily as needed for Constipation       polycarbophil (FIBERCON) 625 MG tablet Take 625 mg by mouth 2 times daily       venlafaxine (EFFEXOR) 100 MG tablet Take 200 mg by mouth every morning       sucralfate (CARAFATE) 1 GM tablet Take 1 tablet by mouth 4 times daily  Qty: 120 tablet, Refills: 0      cyproheptadine (PERIACTIN) 4 MG tablet Take 8 mg by mouth 3 times daily       bisacodyl (DULCOLAX) 10 MG suppository Place 10 mg rectally daily as needed for Constipation Use if no bowel movement in 4 days      acetaminophen (TYLENOL) 650 MG suppository Place 650 mg rectally every 6 hours as needed for Fever or Pain       Multiple Vitamins-Minerals (THERA M PLUS) TABS Take 1 each by mouth daily      donepezil (ARICEPT) 10 MG tablet Take 10 mg by mouth nightly Patient was seen and examined by Dr. Stella Panchal and plan of care reviewed. Signed:  Electronically signed by FLASH Brown on 10/16/2020 at 10:27 AM       Thank you Malik Sharma MD for the opportunity to be involved in this patient's care. If you have any questions or concerns please feel free to contact me at [phone]. Low salt diet

## 2021-01-19 NOTE — PATIENT PROFILE ADULT - FALLEN IN THE PAST
normal appearance , no jugular venous distention , Thyroid normal size
Breath sounds clear and equal bilaterally.
no

## 2021-03-11 NOTE — PHYSICAL THERAPY INITIAL EVALUATION ADULT - MANUAL MUSCLE TESTING RESULTS, REHAB EVAL
Medication Management [address]  [phone] (phone)  [phone] (fax)      Ms. Anahy Rueda is a 68 y.o.  female with history of persistent atrial fib. , per Dr. Hood Rockwell referral, who presents today for Warfarin monitoring and adjustment (1 week visit). Patient verifies current dosing regimen and tablet strength. No missed or extra doses. Patient denies bleeding/bruising/chest pain. Has usual SOB, but less swelling of legs. No blood in urine or stool. No dietary changes, but plans to resume salads she'd been eating prior to last week. No changes in medication/OTC agents/herbals. No change in alcohol use or tobacco use. No change in activity level. Patient denies headaches/dizziness/lightheadedness/falls. No vomiting/diarrhea or acute illness. Still having chills at night. No procedures scheduled in the future at this time. Sees Dr. Marlee Cullen early April, PCP late April. Assessment:   Lab Results   Component Value Date    INR 3.40 (H) 03/11/2021    INR 2.10 (H) 03/04/2021    INR 5.00 (H) 03/02/2021     INR supratherapeutic - goal 2-3. Recent Labs     03/11/21  1333   INR 3.40*     H&H on 3/8 CBC for Dr. Marlee Cullen:  13.9/44.2 (14.5/44.8 on 9/14/20). Plan:  POCT INR ordered/performed/result reviewed. 3 mg today, Th, then continue PO Coumadin 6 mg MWF, 7.5 mg TThSS. Recheck INR in 1.5 week(s). (Report given - orders entered by David Cotton, PharmD.)  Patient reminded to call the Anticoagulation Clinic with any signs or symptoms of bleeding or with any medication changes. Patient given instructions utilizing the teach back method. Advised extra caution. Discharged ambulatory in no apparent distress, wearing mask. After visit summary printed and reviewed with patient.       Medications reviewed and updated on home medication list.    Influenza vaccine:     [] given    [x] declined   [x] received previously   [] plans to receive at a later time   [] refused    [x] documented in Epic at least 4/5 throughout/grossly assessed due to

## 2021-04-15 NOTE — PATIENT PROFILE ADULT - ANY SIGNIFICANT CHANGE IN ABILITY TO PERFORM THE FOLLOWING ADL SINCE THE ONSET OF PRESENT ILLNESS?
Patient Education   Personalized Prevention Plan  You are due for the preventive services outlined below.  Your care team is available to assist you in scheduling these services.  If you have already completed any of these items, please share that information with your care team to update in your medical record.  Health Maintenance Due   Topic Date Due     COVID-19 Vaccine (1) Never done     Zoster (Shingles) Vaccine (1 of 2) Never done     Eye Exam  01/01/2020     Flu Vaccine (1) 09/01/2020     Kidney Microalbumin Urine Test  11/18/2020     A1C Lab  12/16/2020     PHQ-2  01/01/2021     Annual Wellness Visit  01/13/2021     Cholesterol Lab  01/13/2021     Diabetic Foot Exam  01/13/2021     FALL RISK ASSESSMENT  01/13/2021         no

## 2021-04-15 NOTE — PATIENT PROFILE ADULT - HAS THE PATIENT BEEN ADMITTED FROM SHORT TERM REHAB?
Attempted to call patient in regards to colonoscopy referral. No answer and unable to leave voicemail.
no

## 2021-04-29 NOTE — PROGRESS NOTE ADULT - PROBLEM/PLAN-6
Follow up call made 4/28- LM. Called again 4/29 and LM. [employer] message sent as well. Will continue to try and reach. DISPLAY PLAN FREE TEXT

## 2021-05-24 NOTE — BEHAVIORAL HEALTH ASSESSMENT NOTE - HPI (INCLUDE ILLNESS QUALITY, SEVERITY, DURATION, TIMING, CONTEXT, MODIFYING FACTORS, ASSOCIATED SIGNS AND SYMPTOMS)
05/24/21 0903   Signing Clinician's Name / Credentials   Signing clinician's name / credentials Ryanne Alston PTA   Quick Adds   Rehab Discipline PT   PT Assistant Visit Number 1   Therapeutic Activity   Minutes of Treatment 20 minutes   Symptoms Noted During/After Treatment Shortness of breath   Treatment Detail Pt was just finishing eating breakfast in the recliner but was willing to participate in PT this morning. Pt walked SBA1 225 feet with no A.D. WBOS slight waddling side to side but able to maintain balance. Pt was SBA1 with bed and recliner transfers no unsafe manuevers.    PT Discharge Planning    PT Discharge Recommendation (DC Rec) Transitional Care Facility   PT Rationale for DC Rec Pt still demonstrates limited activity tolerance and impaired cognition, not safe to return home alone.     PT Brief overview of current status  Pt was SBA1 with sit to stands, sit to supine. Pt ambulated 225 feet with SBA1 WBOS no LOB.    Additional Documentation   PT Plan Progress mobility and strengthening as able   Total Session Time   Total Session Time (minutes) 20 minutes      Patient seen, evaluated and chart reviewed. Patient is a 74 MWM, with no prior psychiatric hospitalizations or significant treatment history, with h/o COPD, DANIEL, HLD, HTN, CAD s/p PCI, CHB s/p PPM, bioprosthetic AVR, dementia, recent diagnosis of Hodgkin's lymphoma in Dec 2019 who presented for change in mental status. Patient woke up this morning and had oatmeal and a cookie for breakfast at 4 AM. Patient's wife states that patient had sudden difficulty speaking and couldn't sit still. He asked for ice cream repeatedly. Patient's wife report she had an anxiety attack because she had never experienced an episode like this before. Patient's nurse arrived at 10 AM this morning and patient would not allow nurse to take his temperature. Wife decided to seek further evaluation at [facility]. Patient is restless in bed on examination. Of note, patient with recent admission to [facility] in January 2020 for fevers 2/2 Hodgkin's lymphoma. Denies fever, chills, chest pain, abdominal pain, dizziness, lightheadedness, nausea, or vomiting.  He currently appears comfortable, and pleasantly confused. Wife is at bedside.

## 2021-07-15 NOTE — PROGRESS NOTE ADULT - ASSESSMENT
Seen for AMS  -Much improved.  CT Head - No acute pathology.  Lactic acidosis.  No signs of CVA.  H/o Dementia.  H/o PPM    H/o aortic valve replacement.  H/o Vitamin B12 deficiency - On supplementation.  D/w Pt's wife at bedside. Questions answered.  Fall safety precautions.  Would follow. yes

## 2021-08-12 NOTE — ED ADULT NURSE NOTE - NS ED NOTE POA CENTRAL LINE INSERT DATE
Patient: Coy Pang [[id number]]     Procedure: SERVICE TO ORTHOPEDICS Status: Needs Scheduling   Requested appt date:  Authorizing: Kat Delarosa MD in [address]   Referral: [id number]   (Pending Review)       Priority: Routine               Diagnosis: Chronic right shoulder pain [M25.511, G89.29]       We have tried to reach the above patient in regards to the above referral and we have not heard back. We are now cancelling the request.     
To   Please see message below FYI  
05-Jan-2020

## 2021-08-20 NOTE — PROGRESS NOTE ADULT - ATTENDING COMMENTS
I personally saw and examined the patient in detail.  I have spoken to the above provider regarding the assessment and plan of care.  I reviewed the above assessment and plan of care, and agree.  I have made changes in the body of the note where appropriate. No

## 2021-09-08 NOTE — H&P ADULT - NSHPREVIEWOFSYSTEMS_GEN_ALL_CORE
no Patient is poor historian.   Constitutional: denies fever, chills, diaphoresis   HEENT: denies blurry vision, double vision, eye pain, difficulty hearing  Respiratory: denies SOB, cough, sputum production  Cardiovascular: denies CP, palpitations, edema  Gastrointestinal: denies nausea, vomiting, diarrhea, constipation, abdominal pain  Genitourinary: denies dysuria, frequency, urgency, hematuria   Skin/Breast: denies rash, itching  Neurologic: denies headache, weakness, dizziness, paresthesias, numbness/tingling  Psychiatric: denies anxiety, depression, suicidal, homicidal thoughts  ROS negative except as noted above

## 2021-11-30 NOTE — PROGRESS NOTE ADULT - REASON FOR ADMISSION
Confusion
I have personally performed a face to face diagnostic evaluation on this patient. I have reviewed the ACP note and agree with the history, exam and plan of care, except as noted.

## 2022-01-01 NOTE — PATIENT PROFILE ADULT - JOB HELP
Otolaryngology Clinic Note    Subjective:       Patient ID: Lyssa Betancourt is a 8 m.o. female.    Chief Complaint: No chief complaint on file.      History of Present Illness: Lyssa Betancourt is a 8 m.o. female presenting with RAOM, Started  at 3 months. Does zyrtec daily. Has eczema patch on forehead. Ears have resolved in between before.   Monday last week got rocephin series. Had omnicef, amoxil, augmentin, omnicef since July.   Nose clears sometimes. Chronic runny  nose, allergy family. No hearing concerns.      History reviewed. No pertinent surgical history.  History reviewed. No pertinent past medical history.  Social Determinants of Health     Tobacco Use: Low Risk     Smoking Tobacco Use: Never    Smokeless Tobacco Use: Never    Passive Exposure: Not on file   Alcohol Use: Not on file   Financial Resource Strain: Not on file   Food Insecurity: Not on file   Transportation Needs: Not on file   Physical Activity: Not on file   Stress: Not on file   Social Connections: Not on file   Housing Stability: Not on file   Depression: Not on file     Review of patient's allergies indicates:  No Known Allergies  Current Outpatient Medications   Medication Instructions    acetaminophen (TYLENOL) 100 mg/mL suspension Oral, Every 4 hours PRN    cetirizine (ZYRTEC) 1 mg/mL syrup Oral, Daily    ibuprofen (ADVIL,MOTRIN) 100 mg/5 mL suspension Oral, Every 6 hours PRN    ketoconazole (NIZORAL) 2 % cream Topical (Top), 2 times daily PRN    ketoconazole (NIZORAL) 2 % shampoo Topical (Top), Every other day    Lactobacillus rhamnosus GG (CULTURELLE) 10 billion cell capsule 1 capsule, Oral, Daily, Probiotic drops    pediatric multivitamin chewable tablet 1 tablet, Oral, Daily, Vitamin d drops    timoloL 0.25 % ophthalmic solution 1-2 drops, 2 times daily    timolol maleate 0.5% (TIMOPTIC-XE) 0.5 % SolG 3 times daily         ENT ROS negative except as stated above.             Objective:      Vitals:    12/19/22 0617    Pulse: 125   Resp: (!) 24   Temp: 97.9 °F (36.6 °C)       General: NAD, well appearing  Eyes: Normal conjunctiva and lids  Face: symmetric, nerve intact  Nose: The nose is without any evidence of any deformity. The nasal mucosa is moist. The septum is midline. There is no evidence of septal hematoma. The turbinates are without abnormality.   Ears: The ears are with normal-appearing pinna. Examination of the canals is normal appearing bilaterally. There is no drainage or erythema noted. Hearing is grossly intact.  Mouth: No obvious abnormalities to the lips. The teeth are unremarkable. The gingivae are without any obvious evidence of infection or lesion. The oral mucosa is moist and pink. There are no obvious masses to the hard or soft palate.   Oropharynx: The uvula is midline.  The tongue is midline. The posterior pharynx is without erythema or exudate. The tonsils are normal appearing.  Salivary glands: The salivary glands are symmetric and not enlarged, no masses  Neck: No lymphadenopathy, trachea midline, thryoid not enlarged.  Psych: Normal mood and affect.   Neuro: Grossly intact  Speech: babbles  Cardio: RRR  Respiratory: No increased WOB  Abdomen: soft, NT, ND       Assessment and Plan:   RAOM    Proceed with ear tubes, family would like to hold off on adenoids for now. Risks discussed.     RTC: 1 month PO with audio    Plan of care was discussed in detail with the patient, who agreed with the plan as above. All questions were answered in detail.     Laisha Martin MD  Otolaryngology    no

## 2022-02-23 NOTE — ED ADULT NURSE NOTE - TEMPLATE
Pt hasn't had OB visit with primary care clinic since 12/15. Pt educated on importance of follow up with provider. Pt was okay with nurse walking pt to [facility] clinic to schedule appt.     Ipad  used to help facilitate ultrasound and MFM provider conversation.    Respiratory

## 2022-04-11 NOTE — ED ADULT TRIAGE NOTE - BSA (M2)
Printed 3-28-22 H&P  Faxed to [facility] pre admit  Fax confirmation received   2.34

## 2022-04-19 NOTE — H&P ADULT - NSHPPOAPULMEMBOLUS_GEN_A_CORE
Occupational Therapy     Referred by: Tiburcio Sweeney DO; Medical Diagnosis (from order):    Diagnosis Information      Diagnosis    V12.54 (ICD-9-CM) - Z86.73 (ICD-10-CM) - History of CVA (cerebrovascular accident)                Daily Treatment Note    Visit:  28     SUBJECTIVE                                                                                                               Has been using the left hand to stabilize to open a bottle/jar. Tried using the drawer, but it was difficult. Despite that was able to get the new bottle open.    OBJECTIVE                                                                                                                        TREATMENT                                                                                                                  Therapeutic Exercise:        Therapeutic Activity:  Trial using left hand to stabilize to open different types of bottles. Also re-instructed in use of one handed technique.    Neuromuscular Re-Education:  Elbow extension in all planes on top of a ball across midline, clockwise and counterclockwise circles. No assistance or facilitation needed.  -Active forward reach sliding arm on the table top to grasp 1\" x 2\" pegs. Active supination to neutral position, active finger and thumb extension and grasp, move to side of body, extend elbow and drop on the floor. Patient needed slight assist to bend elbow fully to get to height of tabletop.  -Electrical stimulation for facilitation of finger and thumb extensors paired with functional grasp and release.        Skilled input: verbal instruction/cues    Writer verbally educated and received verbal consent for hand placement, positioning of patient, and techniques to be performed today from patient        ASSESSMENT                                                                                                             Increased active motion noted for the hand, especially the  thumb.       PLAN                                                                                                                           Suggestions for next session as indicated: Progress per plan of care  Cont E stim for shoulder shrug , neuro re ed for Left UE         Therapy procedure time and total treatment time can be found documented on the Time Entry flowsheet   no

## 2022-04-26 NOTE — CONSULT NOTE ADULT - REASON FOR ADMISSION
Counseled   Started on  Progesterone only OCP. If can tolerate will consider transitioning to a LARC   
avoidance of allergens  Epipen prescribed ( Will have RN look into PA)   Cont. Daily antihistamines  
SOB

## 2022-06-07 NOTE — ED ADULT NURSE NOTE - CAS DISCH BELONGINGS RETURNED
75 year old female who lived at home alone prior to a fall in April and now has been in a rehab facility, with PMHx of SVT s/p ablation, HTN, HLD, hypothyroidism, vertigo, peripheral neuropathy, osteoarthritis, cervical ca, recent admission for pubic rami fracture and MSSA bacteremia Tx w/ Cefazolin to end 5/29, presenting to ED from rehab with c/o worsening abdominal pain x3wks and obstipation x4-5 days, psychiatry has been consulted for evaluation and medicine management of agitation in the hospital setting.    Patient reports frustration with ongoing hospital/rehab stays since April but denies any SI, HI, and psychosis. Per collateral from sister, patient recently began to demonstrate waxing and waning episodes of confusion and agitation in the hospital setting. Symptoms are likely d/t delirium in the setting of prolonged inpatient admissions. R/O organic causes of delirium.     Recommendations:  - No indication for 1:1 CO  - Medical management per medicine team on 9N  - Labs=== check TSH, B12, and folate levels. EKG: QTc = 450ms, NSR  - Frequent orientation and presence of family. Avoid use of benzodiazepines, anticholinergics, and antihistamines  - AGITATION PRN: Zyprexa 1.25mg IM/PO q8hrs, HOLD for QTc >500  - Maintain contact with Veronica ([phone]) 75 year old female who lived at home alone prior to a fall in April and now has been in a rehab facility, with PMHx of SVT s/p ablation, HTN, HLD, hypothyroidism, vertigo, peripheral neuropathy, osteoarthritis, cervical ca, recent admission for pubic rami fracture and MSSA bacteremia Tx w/ Cefazolin to end 5/29, presenting to ED from rehab with c/o worsening abdominal pain x3wks and obstipation x4-5 days, psychiatry has been consulted for evaluation and medicine management of agitation in the hospital setting.    Patient reports frustration with ongoing hospital/rehab stays since April but denies any SI, HI, and psychosis. Per collateral from sister, patient recently began to demonstrate waxing and waning episodes of confusion and agitation in the hospital setting. Symptoms are likely d/t delirium in the setting of prolonged inpatient admissions. R/O organic causes of delirium.     Recommendations:  - No indication for 1:1 CO  - Medical management per medicine team on 9N  - Labs=== check TSH, B12, and folate levels. EKG: QTc = 450ms, NSR  - Frequent orientation and presence of family. Avoid use of benzodiazepines, anticholinergics, and antihistamines  - AGITATION PRN: Zyprexa 1.25mg IM/PO q6hrs, HOLD for QTc >500  - Maintain contact with Veronica ([phone]) 75 year old female who lived at home alone prior to a fall in April and now has been in a rehab facility, with PMHx of SVT s/p ablation, HTN, HLD, hypothyroidism, vertigo, peripheral neuropathy, osteoarthritis, cervical ca, recent admission for pubic rami fracture and MSSA bacteremia Tx w/ Cefazolin to end 5/29, presenting to ED from rehab with c/o worsening abdominal pain x3wks and obstipation x4-5 days, psychiatry has been consulted for evaluation and medicine management of agitation in the hospital setting.    Patient reports frustration with ongoing hospital/rehab stays since April but denies any SI, HI, and psychosis. Per collateral from sister, patient recently began to demonstrate waxing and waning episodes of confusion and agitation in the hospital setting. Symptoms are likely d/t delirium in the setting of prolonged inpatient admissions. R/O organic causes of delirium.     Recommendations:  - No indication for 1:1 CO  - Medical management per medicine team on 9N  - Labs=== check TSH, B12, and folate levels. EKG: QTc = 450ms, NSR  - Frequent orientation and presence of family. Avoid use of benzodiazepines, anticholinergics, and antihistamines  - AGITATION PRN: Zyprexa 1.25mg IM/PO q6hrs, HOLD for QTc >500  - Melatonin 5mg PO @8PM for help with sleep   - Maintain contact with Veronica ([phone]) 75 year old female who lived at home alone prior to a fall in April and now has been in a rehab facility, with PMHx of SVT s/p ablation, HTN, HLD, hypothyroidism, vertigo, peripheral neuropathy, osteoarthritis, cervical ca, recent admission for pubic rami fracture and MSSA bacteremia Tx w/ Cefazolin to end 5/29, presenting to ED from rehab with c/o worsening abdominal pain x3wks and obstipation x4-5 days, psychiatry has been consulted for evaluation and medicine management of agitation in the hospital setting.    Patient reports frustration with ongoing hospital/rehab stays since April but denies any SI, HI, and psychosis. Per collateral from sister, patient recently began to demonstrate waxing and waning episodes of confusion and agitation in the hospital setting. Symptoms are likely d/t delirium in the setting of prolonged inpatient admission. R/O organic causes of delirium.     Recommendations:  - No indication for 1:1 CO  - Medical management per medicine team on 9N  - Labs=== check TSH, B12, and folate levels. EKG: QTc = 450ms, NSR  - Frequent orientation and presence of family. Avoid use of benzodiazepines, anticholinergics, and antihistamines  - Melatonin 5mg PO @8PM for help with sleep wake reversal associated with delirium  - Maintain contact with Veronica ([phone])  - AGITATION PRN: Zyprexa 1.25mg IM/PO q6hrs, HOLD for QTc >500 Yes

## 2022-08-08 NOTE — PHYSICAL THERAPY INITIAL EVALUATION ADULT - SITTING BALANCE: DYNAMIC
normal balance Siliq Counseling:  I discussed with the patient the risks of Siliq including but not limited to new or worsening depression, suicidal thoughts and behavior, immunosuppression, malignancy, posterior leukoencephalopathy syndrome, and serious infections.  The patient understands that monitoring is required including a PPD at baseline and must alert us or the primary physician if symptoms of infection or other concerning signs are noted. There is also a special program designed to monitor depression which is required with Siliq.

## 2022-12-04 NOTE — PROGRESS NOTE ADULT - PROBLEM SELECTOR PROBLEM 8
Problem: Adult Inpatient Plan of Care  Goal: Plan of Care Review  Outcome: Ongoing, Progressing  Goal: Patient-Specific Goal (Individualized)  Outcome: Ongoing, Progressing  Goal: Absence of Hospital-Acquired Illness or Injury  Outcome: Ongoing, Progressing  Goal: Optimal Comfort and Wellbeing  Outcome: Ongoing, Progressing  Goal: Readiness for Transition of Care  Outcome: Ongoing, Progressing     Problem: Impaired Wound Healing  Goal: Optimal Wound Healing  Outcome: Ongoing, Progressing      Dementia

## 2023-02-24 NOTE — BEHAVIORAL HEALTH ASSESSMENT NOTE - NSBHCHARTREVIEWINVESTIGATE_PSY_A_CORE FT
< from: 12 Lead ECG (01.08.20 @ 05:30) >    Ventricular Rate 89 BPM    Atrial Rate 89 BPM    P-R Interval 176 ms    QRS Duration 186 ms    Q-T Interval 492 ms    QTC Calculation(Bezet) 598 ms    P Axis 87 degrees    R Axis 107 degrees    T Axis -10 degrees    Diagnosis Line Sinus rhythm withpremature atrial complexes  A sensed and V paced  Confirmed by [employer]ak PLEITEZ, James ([id number]) on 1/8/2020 1:04:30 PM    < end of copied text > 8

## 2023-06-30 NOTE — ED PROVIDER NOTE - CPE EDP ENMT NORM
Luz Elena Shipley's chief complaint for this visit includes:  Chief Complaint   Patient presents with     Skin Check     Patient is here for a skin check. Patient has spots on face that she is concerned about.     PCP: Kiara Zazueta    Referring Provider:  No referring provider defined for this encounter.    There were no vitals taken for this visit.  No Pain (0)        Allergies   Allergen Reactions     Cartia Xt [Diltiazem] Other (See Comments)     Heartburn whenever on this medication     Losartan Cough     Miconazole      Sulfa Antibiotics Rash         Do you need any medication refills at today's visit? Discuss clobetasol as it is no longer on formulary.    Merari Weinberg MA        
normal...

## 2023-08-08 NOTE — PROGRESS NOTE ADULT - PROVIDER SPECIALTY LIST ADULT
Elizabeth Hill 1954 is a 68 y.o. female   presented to PCP with non tender left breast lump. Diagnostic mammogram reveals a 2.2 cm x 2.4 cm x 4.4 cm irregularly shaped, hypoechoic mass with angular margins seen in the left breast at 2 o'clock, 8 cm from the nipple. There is a lymph node seen in the left axilla, 13 cm from the nipple. Cortical thickness measures 4 mm on the left. This borderline abnormal lymph node is quite deep within the axilla and may be difficult to access for sampling if clinically desired. Biospy revealed invasive mammary carcinoma no special type, invasive carcinoma involves 3 of 3 submitted core biopsies, max. Dimension= 13 mm, LVI not involved. Axillary biopsy revealed invasive/metastatic adenocarcinoma, compatible with breast primary involving fibroadipose tissues and a portion of lymphoid parenchyma, ER/OK positive, HER 2 negative. Genetic testing was negative. June 8, 2021 patient underwent left breast modified radical mastectomy, ANAND  directed axillary dissection. Results revealed Invasive carcinoma, Grade 2, Greatest dimension 60 mm, DCIS present, LVI present, Margins uninvolved, Closest margin >20 mm, 4/10 lymph nodes micrometastases, ER/OK positive, HER2 negative. The pt completed a course of radiation to the L chest wall and regional lymphatics on 11/19/2021. Last seen in radiation oncology by Dr. Sangita Arteaga 7/7/22. She returns today for follow up   She started adjuvant hormonal therapy with anastrozole in October 2021. She was seen by PT for left upper extremity lymphedema. 10/12/22 Medical Oncology Dr. Katiuska Suero: BRCA gene mutation, MADAN status s/p mastectomy and axillary lymph node dissection. Completed adjuvant chemotherapy with Taxotere and Cyclophosphamide followed by Radiation therapy. She will continue on adjuvant hormonal therapy with anastrozole 1 mg. Plan for f/u in 1 year sooner if needed.       4/19/23 Surgical Oncology DANILO Contreras:    S/P left Pulmonology mastectomy with breast prothesis. Whole breast radiation. Lymphedema with referral to lymphedema PT/OT. Left arm lymphedema feels more full and sometimes causes pain. Continues to wear her compression sleeve. She should inquire about a lymphedema compression pump. No CBE. Experiencing epigastric pain for the last month. She will her PCP for further investigation. 7/20/23 - Mammo diagnostic right w 3d & cad  US breast right limited (diagnostic)  FINDINGS:   Mammo diagnostic right w 3d & cad  There are no suspicious masses, grouped microcalcifications or areas of unexplained architectural distortion. The skin and nipple areolar complex are unremarkable. There is an oval circumscribed linear nodular structure best appreciated in the posterior right MLO view. This appears on ultrasound to likely correlate with benign fibroglandular tissue in ductal like structures. No suspicious features. IMPRESSION:  Postop findings and likely benign tissue asymmetry in the right outer 8:00 region. Up Coming:    10/18/23 Medical Oncology Dr. Tala Rojas  10/18/23 Surgical Oncology Bebeto CARRASCO        Follow up visit     Oncology History   Malignant neoplasm of upper-outer quadrant of left breast in female, estrogen receptor positive ([address])   4/19/2021 Biopsy    Left breast US guided biopsy:  A. 2 o'clock 8 cm from the nipple  Invasive mammary carcinoma of no special type  Grade 2  ER 90  MS 1  HER2 1+  Lymphovascular invasion: not identified    B. Left Axillary lymph node biopsy:  Invasive/ metastatic adenocarcinoma, compatible with breast primary involving fibroadipose tissues and a portion of lymphoid pranchyma. ER 90  MS 1  HER2 1+    Concordant. Malignancy appears multifocal. The 2 adjacent masses span an area of approximately 4 cm. Right breast clear.       5/3/2021 Genetic Testing    The following genes were evaluated: OLINDA, BRCA1, BRCA2, CDH1, CHEK2, PALB2, PTEN, STK11, TP53  Additional genes evaluated for a total of 36 genes analyzed  Negative result. No pathogenic sequence variants or deletions/dupllications identified  [employer]     6/8/2021 Surgery    Left breast modified radical mastectomy with ANAND  directed axillary dissection  Invasive carcinoma of no special type (ductal)  Grade 2  6 cm  Lymphovascular invasion present  Margins negative  4/10 Lymph nodes (3.5 cm)  Anatomic Stage IIIA  Prognostic Stage IB     7/16/2021 - 9/17/2021 Chemotherapy    pegfilgrastim (NEULASTA ONPRO), 6 mg, Subcutaneous, Once, 4 of 4 cycles  Administration: 6 mg (7/16/2021), 6 mg (8/6/2021), 6 mg (8/27/2021), 6 mg (9/17/2021)  cyclophosphamide (CYTOXAN) IVPB, 600 mg/m2 = 1,212 mg, Intravenous, Once, 4 of 4 cycles  Administration: 1,212 mg (7/16/2021), 1,212 mg (8/6/2021), 1,212 mg (8/27/2021), 1,212 mg (9/17/2021)  DOCEtaxel (TAXOTERE) chemo infusion, 75 mg/m2 = 151.6 mg, Intravenous, Once, 4 of 4 cycles  Administration: 151.6 mg (7/16/2021), 151.6 mg (8/6/2021), 151.6 mg (8/27/2021), 151.6 mg (9/17/2021)     10/18/2021 - 11/19/2021 Radiation    BH L CW 10X/6X 13 / 13 200 0 2,600 32   BH L CW BOLUS 10X/6X 12 / 12 200 0 2,400 30   BH L PAB 10X 25 / 25 60 0 1,500 32   BH L Sclav 10X 25 / 25 200 0 5,000 32      Treatment Dates:  10/18/2021 - 11/19/2021. Dr Karly Epps     10/2021 -  Hormone Therapy    anastrozole 1 mg once a day    Dr Manolo Owens         Review of Systems:  Review of Systems   Constitutional: Negative. HENT: Negative. Eyes: Negative. Wears glasses    Respiratory: Positive for cough, shortness of breath and wheezing (when breathing deep ). Cardiovascular: Negative. Gastrointestinal: Negative. Endocrine: Negative. Genitourinary: Negative. Musculoskeletal: Positive for arthralgias and back pain. Skin: Negative. Allergic/Immunologic: Positive for environmental allergies. Neurological: Negative. Hematological: Negative. Psychiatric/Behavioral: Negative.         Clinical Trial: no    Teaching no    Health Maintenance   Topic Date Due   • Cervical Cancer Screening  01/19/2020   • Pneumococcal Vaccine: 65+ Years (3 - PPSV23 if available, else PCV20) 11/05/2020   • COVID-19 Vaccine (5 - Booster for Pfizer series) 12/28/2022   • BMI: Followup Plan  02/04/2023   • Influenza Vaccine (1) 09/01/2023   • ONC Colorectal Surgery Screening  10/18/2023   • Breast Cancer Survivorship Visit  10/18/2023   • ONC Cervical Cancer Screening  10/18/2023   • ONC DXA Scan  10/18/2023   • ONC Physical Therapy Referral  10/18/2023   • Fall Risk  05/03/2024   • Depression Screening  05/03/2024   • Urinary Incontinence Screening  05/03/2024   • Medicare Annual Wellness Visit (AWV)  05/03/2024   • Breast Cancer Screening: Mammogram  07/20/2024   • BMI: Adult  07/31/2024   • Colorectal Cancer Screening  07/05/2027   • Hepatitis C Screening  Completed   • Osteoporosis Screening  Completed   • HIB Vaccine  Aged Out   • IPV Vaccine  Aged Out   • Hepatitis A Vaccine  Aged Out   • Meningococcal ACWY Vaccine  Aged Out   • HPV Vaccine  Aged Out     Patient Active Problem List   Diagnosis   • Benign essential hypertension   • Chronic rhinitis   • Hidradenitis suppurativa   • Hypothyroidism   • Impaired fasting glucose   • Morbid obesity ([address])   • Venous insufficiency (chronic) (peripheral)   • Malignant neoplasm of upper-outer quadrant of left breast in female, estrogen receptor positive ([address])   • Use of anastrozole   • S/P mastectomy, left     Past Medical History:   Diagnosis Date   • BRCA gene mutation negative 05/19/2021    [employer]; 36 gene panel   • Breast cancer ([address])     Left breast   • Colon polyp    • Dental crowns present    • Exercise involving walking     the dogs   • History of angina     per pt "years ago and related to stress"   • History of cancer chemotherapy    • History of chemotherapy     breast cancer (left) 2021.    • History of radiation therapy    • History of radiation therapy    • [place] (hard of hearing)     wears hearing aids/will wear DOS bilat   • Hypertension    • Limb alert care status     No BP/IV Left Arm   • Lymphedema of left arm    • Prediabetes    • Wears glasses      Past Surgical History:   Procedure Laterality Date   • AXILLARY SURGERY Right     sweat glands removed -    • BREAST CYST EXCISION Right 2000    benign   • BREAST SURGERY Right    • CHOLECYSTECTOMY     • COLONOSCOPY     • DILATION AND CURETTAGE OF UTERUS     • MASTECTOMY Left 06/08/2021   • NH BREAST REDUCTION N/A 03/18/2022    Procedure: R BREAST REDUCTION; L BREAST EXCISION STANDING SKIN DEFORMITY; LOCAL FLAP;  Surgeon: Jorge Meyers MD;  Location: [facility];  Service: Plastics   • NH MAST MODF RAD W/AX LYMPH NOD W/WO PECT/NERI MIN Left 06/08/2021    Procedure: BREAST MODIFIED RADICAL MASTECTOMY; ANAND  DIRECTED AXILLARY DISSECTION;  Surgeon: Carson Fernandez MD;  Location: [facility];  Service: Surgical Oncology   • REDUCTION MAMMAPLASTY Right     March 2022   • US BREAST NEEDLE LOC LEFT Left 06/02/2021   • US GUIDED BREAST BIOPSY LEFT COMPLETE Left 04/19/2021   • US GUIDED BREAST LYMPH NODE BIOPSY LEFT Left 04/19/2021   • WISDOM TOOTH EXTRACTION       Family History   Problem Relation Age of Onset   • Dementia Mother    • Colon cancer Mother         49's-62's   • Lung cancer Father         49's-62's   • No Known Problems Sister    • Breast cancer Paternal Aunt    • No Known Problems Sister    • Stomach cancer Paternal Uncle         66's     Social History     Socioeconomic History   • Marital status: /Civil Union     Spouse name: Not on file   • Number of children: Not on file   • Years of education: Not on file   • Highest education level: Not on file   Occupational History   • Not on file   Tobacco Use   • Smoking status: Never     Passive exposure: Never   • Smokeless tobacco: Never   Vaping Use   • Vaping Use: Never used   Substance and Sexual Activity   • Alcohol use: No   • Drug use: No   • Sexual activity: Yes Partners: Male     Birth control/protection: Male Sterilization     Comment: defer   Other Topics Concern   • Not on file   Social History Narrative   • Not on file     Social Determinants of Health     Financial Resource Strain: Low Risk  (5/3/2023)    Overall Financial Resource Strain (CARDIA)    • Difficulty of Paying Living Expenses: Not hard at all   Food Insecurity: Not on file   Transportation Needs: No Transportation Needs (5/3/2023)    PRAPARE - Transportation    • Lack of Transportation (Medical): No    • Lack of Transportation (Non-Medical): No   Physical Activity: Not on file   Stress: Not on file   Social Connections: Not on file   Intimate Partner Violence: Not on file   Housing Stability: Not on file       Current Outpatient Medications:   •  albuterol (ProAir HFA) 90 mcg/act inhaler, Inhale 2 puffs every 4 (four) hours as needed for wheezing or shortness of breath, Disp: 8 g, Rfl: 0  •  anastrozole (ARIMIDEX) 1 mg tablet, TAKE ONE TABLET BY MOUTH EVERY DAY, Disp: 90 tablet, Rfl: 1  •  Ascorbic Acid (VITAMIN C PO), Take by mouth in the morning, Disp: , Rfl:   •  fluticasone (FLONASE) 50 mcg/act nasal spray, 2 puffs into each nostril daily, Disp: , Rfl:   •  metoprolol tartrate (LOPRESSOR) 50 mg tablet, Take 1 tablet (50 mg total) by mouth daily, Disp: 90 tablet, Rfl: 0  •  Multiple Vitamins-Minerals (MULTIVITAMIN ADULT PO), Take by mouth daily, Disp: , Rfl:   •  naproxen (NAPROSYN) 500 mg tablet, Take 1 tablet (500 mg total) by mouth 2 (two) times a day with meals for 14 days, Disp: 28 tablet, Rfl: 0  •  VITAMIN D PO, Take by mouth in the morning, Disp: , Rfl:   No Known Allergies  There were no vitals filed for this visit.

## 2023-08-29 NOTE — PROGRESS NOTE ADULT - RS GEN HX ROS MEA POS PC
Pt with c/o RUQ and RLQ pain x3 weeks, worse today. Some episodes of emesis. Mucus/diarrhea. No fever or dysuria. ABC intact.          dyspnea/cough

## 2023-09-14 NOTE — ED ADULT NURSE NOTE - SUICIDE SCREENING DEPRESSION
I spoke with Alvino and I sent a note to Dr. Albert.  He does not have a undisplaced or impending fracture of his femur.  It is our recommendation that they proceed with radiation.  He understands this.  He has already been seen.  
Negative
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
## 2023-11-01 NOTE — H&P ADULT - PROBLEM SELECTOR PLAN 5
Detail Level: Detailed Render Post-Care Instructions In Note?: yes Consent: The patient's consent was obtained including but not limited to risks of crusting, scabbing, blistering, scarring, darker or lighter pigmentary change, recurrence, incomplete removal and infection. Render Note In Bullet Format When Appropriate: No Application Tool (Optional): Liquid Nitrogen Sprayer Number Of Freeze-Thaw Cycles: 1 freeze-thaw cycle Duration Of Freeze Thaw-Cycle (Seconds): 5 Post-Care Instructions: I reviewed with the patient in detail post-care instructions. Patient is to wear sunprotection, and avoid picking at any of the treated lesions. Pt may apply Vaseline to crusted or scabbing areas. -check Lipid profile   -will start atorvastatin 40mG as pt has hx of CAD w/ stent

## 2024-02-08 NOTE — PROGRESS NOTE ADULT - SUBJECTIVE AND OBJECTIVE BOX
Phone call to patient to convey stable echo results.   Message left for patient to return clinic's call.     DEPARTMENT OF ANESTHESIA  POST ANESTHETIC EVALUATION    The Patient was interviewed and evaluated.  The patient is S/P a bone marrow biopsy    Vital Signs Last 24 Hrs  T(C): 36.8 (14 Jan 2020 05:33), Max: 36.9 (13 Jan 2020 20:35)  T(F): 98.3 (14 Jan 2020 05:33), Max: 98.5 (13 Jan 2020 20:35)  HR: 77 (14 Jan 2020 05:33) (71 - 94)  BP: 143/75 (14 Jan 2020 05:33) (116/75 - 152/87)  BP(mean): --  RR: 18 (14 Jan 2020 05:33) (18 - 19)  SpO2: 98% (14 Jan 2020 05:33) (94% - 98%)    Evaluation:  The patient is doing well  (x ) No apparent complications or complaints regarding anesthesia care at this time  ( x) All questions were answered    Condition:  (x ) Stable      ( ) Guarded      ( ) Critical    Recommendations:  (x ) None     ( ) Other:
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
## 2024-09-04 NOTE — CONSULT NOTE ADULT - I WAS PHYSICALLY PRESENT FOR THE KEY PORTIONS OF THE EVALUATION AND MANAGEMENT (E/M) SERVICE PROVIDED.  I AGREE WITH THE ABOVE HISTORY, PHYSICAL, AND PLAN WHICH I HAVE REVIEWED AND EDITED WHERE APPROPRIATE
Detail Level: Simple Price (Do Not Change): 0.00 Instructions: This plan will send the code FBSD to the PM system.  DO NOT or CHANGE the price. Statement Selected

## 2024-09-11 NOTE — PATIENT PROFILE ADULT - INFLUENZA IMMUNIZATION DATE (APPROXIMATE)
Airway    Performed by: Monika Donis MD  Authorized by: Monika Donis MD    Final Airway Type:  Endotracheal airway  Final Endotracheal Airway*:  ETT  ETT Size (mm)*:  7.0  Cuff*:  Regular  Technique Used for Successful ETT Placement:  Direct laryngoscopy  Devices/Methods Used in Placement*:  Mask, Oral Airway, Oral ETT and Stylet  Intubation Procedure*:  Preoxygenation, ETCO2, Atraumatic, Dentition Unchanged and Pharynx Clear  Insertion Site:  Oral  Blade Type*:  MAC  Blade Size*:  3  Secured at (cm)*:  20  Placement Verified by: auscultation, capnometry and palpation of cuff    Glottic View*:  1 - full view of glottis  Attempts*:  1  Number of Other Approaches Attempted:  0   Patient Identified, Procedure confirmed, Emergency equipment available and Safety protocols followed  Location:  OR  Urgency:  Elective  Difficult Airway: No    Indications for Airway Management:  Anesthesia  Spontaneous Ventilation: absent    Sedation Level:  Deep  Mask Difficulty Assessment:  1 - vent by mask  Start Time: 9/11/2024 9:18 AM       10-Sep-2019

## 2024-09-25 NOTE — DISCHARGE NOTE NURSING/CASE MANAGEMENT/SOCIAL WORK - NSCORESITESY/N_GEN_A_CORE_RD
[FreeTextEntry1] : Dear Dr. FELICE VILLAGRAN ,\[employer] - [place] Section\par  Past-President, Societies for Pediatric U[employer]\par   Yes
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
## 2025-04-14 NOTE — BEHAVIORAL HEALTH ASSESSMENT NOTE - KNOWN PSYCHIATRIC ADMISSION WITHIN THE PAST 30 DAYS
Yes, If Dr Aguilar does video visit / or face time with hospice nurse phone while she is with patient and deems patient appropriate to incapacitate her, then I can be the second signature.     No

## 2025-05-10 NOTE — PROCEDURE NOTE - NSSITEPREP_SKIN_A_CORE
Duplicate Request.  Refill Request has been forwarded to Dr. Lala.  
povidone iodine (if allergic to chlorhexidine)